# Patient Record
Sex: MALE | Race: WHITE | NOT HISPANIC OR LATINO | Employment: OTHER | URBAN - METROPOLITAN AREA
[De-identification: names, ages, dates, MRNs, and addresses within clinical notes are randomized per-mention and may not be internally consistent; named-entity substitution may affect disease eponyms.]

---

## 2017-01-18 ENCOUNTER — LAB (OUTPATIENT)
Dept: INTERNAL MEDICINE | Facility: CLINIC | Age: 67
End: 2017-01-18

## 2017-01-18 DIAGNOSIS — R79.89 LFTS ABNORMAL: ICD-10-CM

## 2017-01-18 DIAGNOSIS — E78.5 HYPERLIPIDEMIA, UNSPECIFIED HYPERLIPIDEMIA TYPE: ICD-10-CM

## 2017-01-18 DIAGNOSIS — Z12.5 SCREENING FOR PROSTATE CANCER: Primary | ICD-10-CM

## 2017-01-18 DIAGNOSIS — I15.9 SECONDARY HYPERTENSION: ICD-10-CM

## 2017-01-18 LAB
ALBUMIN SERPL-MCNC: 4.1 G/DL (ref 3.2–4.8)
ALBUMIN/GLOB SERPL: 1.5 G/DL (ref 1.5–2.5)
ALP SERPL-CCNC: 64 U/L (ref 25–100)
ALT SERPL W P-5'-P-CCNC: 40 U/L (ref 7–40)
ANION GAP SERPL CALCULATED.3IONS-SCNC: 8 MMOL/L (ref 3–11)
ARTICHOKE IGE QN: 96 MG/DL (ref 0–130)
AST SERPL-CCNC: 22 U/L (ref 0–33)
BASOPHILS # BLD AUTO: 0.01 10*3/MM3 (ref 0–0.2)
BASOPHILS NFR BLD AUTO: 0.2 % (ref 0–1)
BILIRUB SERPL-MCNC: 0.8 MG/DL (ref 0.3–1.2)
BUN BLD-MCNC: 17 MG/DL (ref 9–23)
BUN/CREAT SERPL: 24.3 (ref 7–25)
CALCIUM SPEC-SCNC: 9.6 MG/DL (ref 8.7–10.4)
CHLORIDE SERPL-SCNC: 103 MMOL/L (ref 99–109)
CHOLEST SERPL-MCNC: 172 MG/DL (ref 0–200)
CO2 SERPL-SCNC: 31 MMOL/L (ref 20–31)
CREAT BLD-MCNC: 0.7 MG/DL (ref 0.6–1.3)
DEPRECATED RDW RBC AUTO: 42.4 FL (ref 37–54)
EOSINOPHIL # BLD AUTO: 0.13 10*3/MM3 (ref 0.1–0.3)
EOSINOPHIL NFR BLD AUTO: 2.2 % (ref 0–3)
ERYTHROCYTE [DISTWIDTH] IN BLOOD BY AUTOMATED COUNT: 12.6 % (ref 11.3–14.5)
GFR SERPL CREATININE-BSD FRML MDRD: 113 ML/MIN/1.73
GLOBULIN UR ELPH-MCNC: 2.8 GM/DL
GLUCOSE BLD-MCNC: 107 MG/DL (ref 70–100)
HCT VFR BLD AUTO: 43.5 % (ref 38.9–50.9)
HCV AB SER DONR QL: NORMAL
HDLC SERPL-MCNC: 35 MG/DL (ref 40–60)
HGB BLD-MCNC: 14.6 G/DL (ref 13.1–17.5)
IMM GRANULOCYTES # BLD: 0.01 10*3/MM3 (ref 0–0.03)
IMM GRANULOCYTES NFR BLD: 0.2 % (ref 0–0.6)
LYMPHOCYTES # BLD AUTO: 1.36 10*3/MM3 (ref 0.6–4.8)
LYMPHOCYTES NFR BLD AUTO: 23.5 % (ref 24–44)
MCH RBC QN AUTO: 30.7 PG (ref 27–31)
MCHC RBC AUTO-ENTMCNC: 33.6 G/DL (ref 32–36)
MCV RBC AUTO: 91.4 FL (ref 80–99)
MONOCYTES # BLD AUTO: 0.65 10*3/MM3 (ref 0–1)
MONOCYTES NFR BLD AUTO: 11.2 % (ref 0–12)
NEUTROPHILS # BLD AUTO: 3.62 10*3/MM3 (ref 1.5–8.3)
NEUTROPHILS NFR BLD AUTO: 62.7 % (ref 41–71)
PLATELET # BLD AUTO: 238 10*3/MM3 (ref 150–450)
PMV BLD AUTO: 10.6 FL (ref 6–12)
POTASSIUM BLD-SCNC: 4.1 MMOL/L (ref 3.5–5.5)
PROT SERPL-MCNC: 6.9 G/DL (ref 5.7–8.2)
PSA SERPL-MCNC: 0.2 NG/ML (ref 0–4)
RBC # BLD AUTO: 4.76 10*6/MM3 (ref 4.2–5.76)
SODIUM BLD-SCNC: 142 MMOL/L (ref 132–146)
TRIGL SERPL-MCNC: 224 MG/DL (ref 0–150)
TSH SERPL DL<=0.05 MIU/L-ACNC: 1.63 MIU/ML (ref 0.35–5.35)
WBC NRBC COR # BLD: 5.78 10*3/MM3 (ref 3.5–10.8)

## 2017-01-18 PROCEDURE — 80053 COMPREHEN METABOLIC PANEL: CPT | Performed by: PHYSICIAN ASSISTANT

## 2017-01-18 PROCEDURE — 85025 COMPLETE CBC W/AUTO DIFF WBC: CPT | Performed by: PHYSICIAN ASSISTANT

## 2017-01-18 PROCEDURE — 80061 LIPID PANEL: CPT | Performed by: PHYSICIAN ASSISTANT

## 2017-01-18 PROCEDURE — 84443 ASSAY THYROID STIM HORMONE: CPT | Performed by: PHYSICIAN ASSISTANT

## 2017-01-18 PROCEDURE — 86803 HEPATITIS C AB TEST: CPT | Performed by: PHYSICIAN ASSISTANT

## 2017-01-18 PROCEDURE — G0103 PSA SCREENING: HCPCS | Performed by: PHYSICIAN ASSISTANT

## 2017-01-19 ENCOUNTER — OFFICE VISIT (OUTPATIENT)
Dept: INTERNAL MEDICINE | Facility: CLINIC | Age: 67
End: 2017-01-19

## 2017-01-19 VITALS
DIASTOLIC BLOOD PRESSURE: 78 MMHG | WEIGHT: 315 LBS | BODY MASS INDEX: 44.1 KG/M2 | SYSTOLIC BLOOD PRESSURE: 136 MMHG | HEIGHT: 71 IN

## 2017-01-19 DIAGNOSIS — E66.01 MORBID OBESITY DUE TO EXCESS CALORIES (HCC): ICD-10-CM

## 2017-01-19 DIAGNOSIS — Z00.00 HEALTH CARE MAINTENANCE: Primary | ICD-10-CM

## 2017-01-19 DIAGNOSIS — R73.09 ELEVATED GLUCOSE: ICD-10-CM

## 2017-01-19 LAB
BILIRUB BLD-MCNC: NEGATIVE MG/DL
CLARITY, POC: CLEAR
COLOR UR: YELLOW
GLUCOSE UR STRIP-MCNC: NEGATIVE MG/DL
HBA1C MFR BLD: 5.4 %
KETONES UR QL: NEGATIVE
LEUKOCYTE EST, POC: NEGATIVE
NITRITE UR-MCNC: NEGATIVE MG/ML
PH UR: 7 [PH] (ref 5–8)
PROT UR STRIP-MCNC: NEGATIVE MG/DL
RBC # UR STRIP: NEGATIVE /UL
SP GR UR: 1.01 (ref 1–1.03)
UROBILINOGEN UR QL: NORMAL

## 2017-01-19 PROCEDURE — 83036 HEMOGLOBIN GLYCOSYLATED A1C: CPT | Performed by: PHYSICIAN ASSISTANT

## 2017-01-19 PROCEDURE — 81003 URINALYSIS AUTO W/O SCOPE: CPT | Performed by: PHYSICIAN ASSISTANT

## 2017-01-19 PROCEDURE — 99397 PER PM REEVAL EST PAT 65+ YR: CPT | Performed by: PHYSICIAN ASSISTANT

## 2017-01-19 RX ORDER — ATORVASTATIN CALCIUM 10 MG/1
10 TABLET, FILM COATED ORAL DAILY
Qty: 30 TABLET | Refills: 11 | Status: SHIPPED | OUTPATIENT
Start: 2017-01-19 | End: 2018-02-01 | Stop reason: SDUPTHER

## 2017-01-19 RX ORDER — LISINOPRIL AND HYDROCHLOROTHIAZIDE 20; 12.5 MG/1; MG/1
1 TABLET ORAL DAILY
Qty: 30 TABLET | Refills: 11 | Status: SHIPPED | OUTPATIENT
Start: 2017-01-19 | End: 2018-02-16 | Stop reason: SDUPTHER

## 2017-01-19 RX ORDER — ATENOLOL 50 MG/1
50 TABLET ORAL DAILY
Qty: 30 TABLET | Refills: 11 | Status: SHIPPED | OUTPATIENT
Start: 2017-01-19 | End: 2018-01-24 | Stop reason: DRUGHIGH

## 2017-01-19 NOTE — PROGRESS NOTES
"Chief Complaint   Patient presents with   • Annual Exam     Med refills       Subjective   Obi Jackman Jr. is a 66 y.o. male.       History of Present Illness     The patient is being seen for a health maintenance evaluation.  The last health maintenance was 1 year(s) ago.    Social History  Obi  does not smoke cigarettes.   He drinks rare alcohol.  He does not use illicit drugs.    General History  Obi  does have regular dental visits.  He does complain of vision problems. Wears glasses. Last eye exam was 1/2016  Immunizations are up to date. The patient needs the following immunizations: influenza vaccine fall 2016; shingles vaccine up to date; unsure about pneumonia; TDaP 2014    Lifestyle  Obi  consumes a in general, an \"unhealthy\" diet.  He exercises rarely.    Reproductive Health  Obi  is sexually active. His contraceptive plan is no method.   He does not have erectile dysfunction.     Screening  Last PSA was 2017.  Last prostate exam was  2016.  Last testicular exam was 2016.  Last colonoscopy was 2016 by Dr Arnold, repeat 5 years. Saw diverticulosis.              Current Outpatient Prescriptions:   •  aspirin 81 MG tablet, Take  by mouth daily., Disp: , Rfl:   •  atenolol (TENORMIN) 50 MG tablet, Take 1 tablet by mouth Daily., Disp: 30 tablet, Rfl: 11  •  atorvastatin (LIPITOR) 10 MG tablet, Take 1 tablet by mouth Daily., Disp: 30 tablet, Rfl: 11  •  lisinopril-hydrochlorothiazide (PRINZIDE,ZESTORETIC) 20-12.5 MG per tablet, Take 1 tablet by mouth Daily., Disp: 30 tablet, Rfl: 11  •  ALPRAZolam (XANAX) 0.25 MG tablet, Take 1 tablet by mouth Daily As Needed for anxiety., Disp: 10 tablet, Rfl: 0  •  cyclobenzaprine (FLEXERIL) 10 MG tablet, Take 1 tablet by mouth 3 (Three) Times a Day As Needed for muscle spasms., Disp: 30 tablet, Rfl: 2  •  naltrexone-bupropion ER (CONTRAVE) 8-90 MG tablet, See pharmacy note for instructions., Disp: 70 tablet, Rfl: 0  •  naltrexone-bupropion ER (CONTRAVE) " "8-90 MG tablet, Take 2 tablets by mouth 2 (Two) Times a Day., Disp: 120 tablet, Rfl: 3     PMFSH  The following portions of the patient's history were reviewed and updated as appropriate: allergies, current medications, past family history, past medical history, past social history, past surgical history and problem list.    Review of Systems   Constitutional: Negative for appetite change, fever and unexpected weight change.   HENT: Negative for ear pain, facial swelling and sore throat.    Eyes: Negative for pain and visual disturbance.   Respiratory: Negative for chest tightness, shortness of breath and wheezing.    Cardiovascular: Negative for chest pain and palpitations.   Gastrointestinal: Negative for abdominal pain and blood in stool.   Endocrine: Negative.    Genitourinary: Negative for difficulty urinating and hematuria.   Musculoskeletal: Negative for joint swelling.   Neurological: Negative for tremors, seizures and syncope.   Hematological: Negative for adenopathy.   Psychiatric/Behavioral: Negative.        Objective   Visit Vitals   • /78   • Ht 71\" (180.3 cm)   • Wt (!) 335 lb (152 kg)   • BMI 46.72 kg/m2       Physical Exam   Constitutional: He is oriented to person, place, and time. He appears well-developed and well-nourished. No distress.   HENT:   Head: Normocephalic and atraumatic. Hair is normal.   Right Ear: Hearing, tympanic membrane, external ear and ear canal normal.   Left Ear: Hearing, tympanic membrane, external ear and ear canal normal.   Nose: No sinus tenderness or nasal deformity.   Mouth/Throat: Uvula is midline, oropharynx is clear and moist and mucous membranes are normal. No oral lesions. No uvula swelling.   Eyes: Conjunctivae, EOM and lids are normal. Pupils are equal, round, and reactive to light. Right eye exhibits no discharge. Left eye exhibits no discharge. No scleral icterus. Right eye exhibits normal extraocular motion and no nystagmus. Left eye exhibits normal " extraocular motion and no nystagmus.   Fundoscopic exam:       The right eye shows red reflex.        The left eye shows red reflex.   Neck: Normal range of motion. Neck supple. No JVD present. No tracheal deviation present. No thyromegaly present.   Cardiovascular: Normal rate, regular rhythm, normal heart sounds, intact distal pulses and normal pulses.  Exam reveals no gallop.    No murmur heard.  Pulmonary/Chest: Effort normal and breath sounds normal. No respiratory distress. He has no wheezes. He has no rales. He exhibits no tenderness.   Abdominal: Soft. Bowel sounds are normal. He exhibits no distension and no mass. There is no tenderness. There is no guarding. No hernia. Hernia confirmed negative in the right inguinal area and confirmed negative in the left inguinal area.   Genitourinary: Testes normal and penis normal. Circumcised.   Genitourinary Comments: Prostate exam deferred d/t recent normal exam with colonoscopy   Musculoskeletal: Normal range of motion. He exhibits no edema, tenderness or deformity.   Lymphadenopathy:     He has no cervical adenopathy. No inguinal adenopathy noted on the right or left side.   Neurological: He is alert and oriented to person, place, and time. He has normal reflexes. He displays normal reflexes. No cranial nerve deficit. He exhibits normal muscle tone. Coordination normal.   Skin: Skin is warm and dry. No rash noted. He is not diaphoretic.   Psychiatric: He has a normal mood and affect. His behavior is normal. Judgment and thought content normal.   Nursing note and vitals reviewed.      Results for orders placed or performed in visit on 01/19/17   POC Urinalysis Dipstick, Automated   Result Value Ref Range    Color Yellow Yellow, Straw, Dark Yellow, Debbie    Clarity, UA Clear Clear    Glucose, UA Negative Negative, 1000 mg/dL (3+) mg/dL    Bilirubin Negative Negative    Ketones, UA Negative Negative    Specific Gravity  1.015 1.005 - 1.030    Blood, UA Negative  Negative    pH, Urine 7.0 5.0 - 8.0    Protein, POC Negative Negative mg/dL    Urobilinogen, UA Normal Normal    Leukocytes Negative Negative    Nitrite, UA Negative Negative   POC Glycosylated Hemoglobin (Hb A1C)   Result Value Ref Range    Hemoglobin A1C 5.4 %        ASSESSMENT/PLAN    Problem List Items Addressed This Visit        Digestive    Morbid obesity due to excess calories     Trial of contrave. Continue to work on diet and exercise changes.         Relevant Medications    naltrexone-bupropion ER (CONTRAVE) 8-90 MG tablet    naltrexone-bupropion ER (CONTRAVE) 8-90 MG tablet       Other    Elevated glucose     A1c is normal. Pt will work on reducing carbohydrates in his diet.         Relevant Orders    POC Glycosylated Hemoglobin (Hb A1C) (Completed)    Health care maintenance - Primary     Immunizations: influenza vaccine done fall 2016; TDaP 2014; pt is going to check with pharmacy about pneumonia vaccines  Eye exam: done 1/2016  Prostate exam: done with colonoscopy 12/2016  PSA: reviewed, normal today  Colonoscopy: done 12/2016; repeat 2021  Labs: reviewed fasting labs with patient today           Relevant Orders    POC Urinalysis Dipstick, Automated (Completed)               Return in about 4 months (around 5/19/2017) for Recheck; annual physical in 1 year.

## 2017-01-19 NOTE — MR AVS SNAPSHOT
Obi Jackman    1/19/2017 1:30 PM   Office Visit    Dept Phone:  [phone]   Encounter #:  [id number]    Provider:  PEGGY Hope   Department:  [facility] MEDICINE [facility] ENDOCRINOLOG[facility] [place]                Your Full Care Plan              Today's Medication Changes          These changes are accurate as of: 1/19/17  3:12 PM.  If you have any questions, ask your nurse or doctor.               Stop taking medication(s)listed here:     HYDROcodone-acetaminophen 5-325 MG per tablet   Commonly known as:  NORCO           LOTEMAX 0.5 % gel ophthalmic gel   Generic drug:  loteprednol etabonate                Where to Get Your Medications      These medications were sent to [facility] - [place], [place] - [facility] [address] - [phone] [facility] [phone] [facility] [address] [address]     Phone:  [phone]     atenolol 50 MG tablet    atorvastatin 10 MG tablet    lisinopril-hydrochlorothiazide 20-12.5 MG per tablet                  Your Updated Medication List          This list is accurate as of: 1/19/17  3:12 PM.  Always use your most recent med list.                ALPRAZolam 0.25 MG tablet   Commonly known as:  XANAX       aspirin 81 MG tablet       atenolol 50 MG tablet   Commonly known as:  TENORMIN   Take 1 tablet by mouth Daily.       atorvastatin 10 MG tablet   Commonly known as:  LIPITOR   Take 1 tablet by mouth Daily.       cyclobenzaprine 10 MG tablet   Commonly known as:  FLEXERIL   Take 1 tablet by mouth 3 (three) times a day as needed for muscle spasms.       lisinopril-hydrochlorothiazide 20-12.5 MG per tablet   Commonly known as:  PRINZIDE,ZESTORETIC   Take 1 tablet by mouth Daily.               We Performed the Following     POC Glycosylated Hemoglobin (Hb A1C)     POC Urinalysis Dipstick, Automated       You Were Diagnosed With        Codes Comments    Annual physical exam    -  Primary ICD-10-CM: Z00.00  ICD-9-CM: V70.0     Elevated glucose    "  ICD-10-CM: R73.09  ICD-9-CM: 790.29       Instructions     None    Patient Instructions History      Upcoming Appointments     Visit Type Date Time Department    PHYSICAL 2017  1:30 PM [facility]      ENDOGENX Signup     [facility] ENDOGENX allows you to send messages to your doctor, view your test results, renew your prescriptions, schedule appointments, and more. To sign up, go to [employer] and click on the Sign Up Now link in the New User? box. Enter your ENDOGENX Activation Code exactly as it appears below along with the last four digits of your Social Security Number and your Date of Birth () to complete the sign-up process. If you do not sign up before the expiration date, you must request a new code.    ENDOGENX Activation Code: [email or web address]  Expires: 2017 10:20 AM    If you have questions, you can email [email or web address] or call [phone] to talk to our ENDOGENX staff. Remember, ENDOGENX is NOT to be used for urgent needs. For medical emergencies, dial 911.               Other Info from Your Visit           Allergies     No Known Allergies      Reason for Visit     Annual Exam Med refills      Vital Signs     Blood Pressure Height Weight Body Mass Index Smoking Status       136/78 71\" (180.3 cm) 335 lb (152 kg) 46.72 kg/m2 Never Smoker       Problems and Diagnoses Noted     Elevated glucose    Annual physical exam    -  Primary      Results     POC Urinalysis Dipstick, Automated      Component Value Standard Range & Units    Color Yellow Yellow, Straw, Dark Yellow, Debbie    Clarity, UA Clear Clear    Glucose, UA Negative Negative, 1000 mg/dL (3+) mg/dL    Bilirubin Negative Negative    Ketones, UA Negative Negative    Specific Gravity  1.015 1.005 - 1.030    Blood, UA Negative Negative    pH, Urine 7.0 5.0 - 8.0    Protein, POC Negative Negative mg/dL    Urobilinogen, UA Normal Normal    Leukocytes Negative Negative    Nitrite, UA Negative Negative          "       POC Glycosylated Hemoglobin (Hb A1C)      Component Value Standard Range & Units    Hemoglobin A1C 5.4 %

## 2017-01-20 ENCOUNTER — TELEPHONE (OUTPATIENT)
Dept: INTERNAL MEDICINE | Facility: CLINIC | Age: 67
End: 2017-01-20

## 2017-01-20 DIAGNOSIS — R51.9 ACUTE NONINTRACTABLE HEADACHE, UNSPECIFIED HEADACHE TYPE: ICD-10-CM

## 2017-01-20 RX ORDER — CYCLOBENZAPRINE HCL 10 MG
10 TABLET ORAL 3 TIMES DAILY PRN
Qty: 30 TABLET | Refills: 2 | Status: SHIPPED | OUTPATIENT
Start: 2017-01-20 | End: 2017-05-27 | Stop reason: SDUPTHER

## 2017-01-20 RX ORDER — ALPRAZOLAM 0.25 MG/1
0.25 TABLET ORAL DAILY PRN
Qty: 10 TABLET | Refills: 0 | OUTPATIENT
Start: 2017-01-20 | End: 2019-01-08 | Stop reason: SDUPTHER

## 2017-01-20 NOTE — TELEPHONE ENCOUNTER
Okay to call in refill of xanax 0.25 mg 1 po prn when flying #10, 0RF. Thanks    I sent in refill of flexeril.

## 2017-01-20 NOTE — ASSESSMENT & PLAN NOTE
Immunizations: influenza vaccine done fall 2016; TDaP 2014; pt is going to check with pharmacy about pneumonia vaccines  Eye exam: done 1/2016  Prostate exam: done with colonoscopy 12/2016  PSA: reviewed, normal today  Colonoscopy: done 12/2016; repeat 2021  Labs: reviewed fasting labs with patient today

## 2017-01-20 NOTE — TELEPHONE ENCOUNTER
----- Message from Deborah VINCENT MA sent at 1/20/2017  3:10 PM EST -----  Contact: patient  Pls advise on xanax refill, LOV: 01/19/16 FOV: none, Efrem has been updated and been placed on your desk.     ----- Message -----     From: Deborah VINCENT MA     Sent: 1/20/2017   9:05 AM       To: Ester Norman can you pls run a Efrem for xanax. Thanks!    ----- Message -----     From: Vonda Tabares MA     Sent: 1/20/2017   9:01 AM       To: Deborah VINCENT MA        ----- Message -----     From: Teena Tolentino     Sent: 1/20/2017   8:32 AM       To: Vonda Tabares MA    rx request: flexiril and xanax  Pharmacy: [facility]   [phone]

## 2017-01-23 ENCOUNTER — TELEPHONE (OUTPATIENT)
Dept: INTERNAL MEDICINE | Facility: CLINIC | Age: 67
End: 2017-01-23

## 2017-01-23 NOTE — TELEPHONE ENCOUNTER
Received fax from [facility] stating that Flexeril has been identified as a high risk drug for elderly pts and if ok to continue the med? Per Cammy called and informed pharmacy that it is okay to use med prn.

## 2017-03-24 ENCOUNTER — OFFICE VISIT (OUTPATIENT)
Dept: INTERNAL MEDICINE | Facility: CLINIC | Age: 67
End: 2017-03-24

## 2017-03-24 VITALS
OXYGEN SATURATION: 98 % | SYSTOLIC BLOOD PRESSURE: 138 MMHG | WEIGHT: 315 LBS | HEIGHT: 71 IN | BODY MASS INDEX: 44.1 KG/M2 | TEMPERATURE: 98.1 F | DIASTOLIC BLOOD PRESSURE: 78 MMHG | HEART RATE: 67 BPM

## 2017-03-24 DIAGNOSIS — J10.1 INFLUENZA A: Primary | ICD-10-CM

## 2017-03-24 LAB
EXPIRATION DATE: NORMAL
FLUAV AG NPH QL: POSITIVE
FLUBV AG NPH QL: NEGATIVE
INTERNAL CONTROL: NORMAL
Lab: NORMAL

## 2017-03-24 PROCEDURE — 87804 INFLUENZA ASSAY W/OPTIC: CPT | Performed by: PHYSICIAN ASSISTANT

## 2017-03-24 PROCEDURE — 96372 THER/PROPH/DIAG INJ SC/IM: CPT | Performed by: PHYSICIAN ASSISTANT

## 2017-03-24 PROCEDURE — 99213 OFFICE O/P EST LOW 20 MIN: CPT | Performed by: PHYSICIAN ASSISTANT

## 2017-03-24 RX ORDER — OSELTAMIVIR PHOSPHATE 75 MG/1
75 CAPSULE ORAL 2 TIMES DAILY
Qty: 10 CAPSULE | Refills: 0 | Status: SHIPPED | OUTPATIENT
Start: 2017-03-24 | End: 2017-06-02

## 2017-03-24 RX ORDER — PREDNISONE 10 MG/1
TABLET ORAL
Qty: 30 TABLET | Refills: 0 | Status: SHIPPED | OUTPATIENT
Start: 2017-03-24 | End: 2017-06-02

## 2017-03-24 RX ORDER — AZITHROMYCIN 250 MG/1
TABLET, FILM COATED ORAL
Qty: 6 TABLET | Refills: 0 | Status: SHIPPED | OUTPATIENT
Start: 2017-03-24 | End: 2017-06-02

## 2017-03-24 RX ORDER — ALBUTEROL SULFATE 90 UG/1
2 AEROSOL, METERED RESPIRATORY (INHALATION) EVERY 4 HOURS PRN
Qty: 1 INHALER | Refills: 2 | Status: SHIPPED | OUTPATIENT
Start: 2017-03-24 | End: 2018-12-28 | Stop reason: SDUPTHER

## 2017-03-24 RX ORDER — METHYLPREDNISOLONE ACETATE 40 MG/ML
40 INJECTION, SUSPENSION INTRA-ARTICULAR; INTRALESIONAL; INTRAMUSCULAR; SOFT TISSUE ONCE
Status: COMPLETED | OUTPATIENT
Start: 2017-03-24 | End: 2017-03-24

## 2017-03-24 RX ADMIN — METHYLPREDNISOLONE ACETATE 40 MG: 40 INJECTION, SUSPENSION INTRA-ARTICULAR; INTRALESIONAL; INTRAMUSCULAR; SOFT TISSUE at 12:12

## 2017-03-24 NOTE — PROGRESS NOTES
Chief Complaint   Patient presents with   • Head/chest congestion, cough wi/mucus, wheezing x1 week       Subjective   Obi Jackman Jr. is a 66 y.o. male.       History of Present Illness     Pt has been feeling bad for about a week with head congestion, has now moved into his chest with wheezing, congestion. 2 sons with flu diagnosis. No fever, no shortness of breath.      Current Outpatient Prescriptions:   •  ALPRAZolam (XANAX) 0.25 MG tablet, Take 1 tablet by mouth Daily As Needed for anxiety., Disp: 10 tablet, Rfl: 0  •  aspirin 81 MG tablet, Take  by mouth daily., Disp: , Rfl:   •  atenolol (TENORMIN) 50 MG tablet, Take 1 tablet by mouth Daily., Disp: 30 tablet, Rfl: 11  •  atorvastatin (LIPITOR) 10 MG tablet, Take 1 tablet by mouth Daily., Disp: 30 tablet, Rfl: 11  •  cyclobenzaprine (FLEXERIL) 10 MG tablet, Take 1 tablet by mouth 3 (Three) Times a Day As Needed for muscle spasms., Disp: 30 tablet, Rfl: 2  •  lisinopril-hydrochlorothiazide (PRINZIDE,ZESTORETIC) 20-12.5 MG per tablet, Take 1 tablet by mouth Daily., Disp: 30 tablet, Rfl: 11  •  naltrexone-bupropion ER (CONTRAVE) 8-90 MG tablet, Take 2 tablets by mouth 2 (Two) Times a Day., Disp: 120 tablet, Rfl: 3  •  albuterol (PROVENTIL HFA;VENTOLIN HFA) 108 (90 BASE) MCG/ACT inhaler, Inhale 2 puffs Every 4 (Four) Hours As Needed for Wheezing., Disp: 1 inhaler, Rfl: 2  •  azithromycin (ZITHROMAX Z-KYLER) 250 MG tablet, Take 2 tablets the first day, then 1 tablet daily for 4 days., Disp: 6 tablet, Rfl: 0  •  oseltamivir (TAMIFLU) 75 MG capsule, Take 1 capsule by mouth 2 (Two) Times a Day., Disp: 10 capsule, Rfl: 0  •  predniSONE (DELTASONE) 10 MG tablet, Take 4 tablets for 3 days, then 3 tablets for 3 days, then 2 tablets for 3 days, then 1 tablet for 3 days, Disp: 30 tablet, Rfl: 0     PMFSH  The following portions of the patient's history were reviewed and updated as appropriate: allergies, current medications, past family history, past medical history,  "past social history, past surgical history and problem list.    Review of Systems   Constitutional: Positive for fatigue. Negative for activity change and unexpected weight change.   HENT: Positive for congestion, postnasal drip and sore throat. Negative for ear pain.    Eyes: Negative for pain and discharge.   Respiratory: Positive for cough. Negative for chest tightness, shortness of breath and wheezing.    Cardiovascular: Negative for chest pain and palpitations.   Gastrointestinal: Negative for abdominal pain, diarrhea and vomiting.   Endocrine: Negative.    Genitourinary: Negative.    Musculoskeletal: Negative for joint swelling.   Skin: Negative for color change, rash and wound.   Allergic/Immunologic: Negative.    Neurological: Negative for seizures and syncope.   Psychiatric/Behavioral: Negative.        Objective   /78  Pulse 67  Temp 98.1 °F (36.7 °C)  Ht 71\" (180.3 cm)  Wt (!) 316 lb (143 kg)  SpO2 98%  BMI 44.07 kg/m2    Physical Exam   Constitutional: He is oriented to person, place, and time. He appears well-developed and well-nourished.  Non-toxic appearance. No distress.   HENT:   Head: Normocephalic and atraumatic. Hair is normal.   Right Ear: External ear normal. No drainage, swelling or tenderness. Tympanic membrane is retracted.   Left Ear: External ear normal. No drainage, swelling or tenderness. Tympanic membrane is retracted.   Nose: Mucosal edema present. No epistaxis.   Mouth/Throat: Uvula is midline and mucous membranes are normal. No oral lesions. No uvula swelling. Posterior oropharyngeal erythema present. No oropharyngeal exudate.   Eyes: Conjunctivae and EOM are normal. Pupils are equal, round, and reactive to light. Right eye exhibits no discharge. Left eye exhibits no discharge. No scleral icterus.   Neck: Normal range of motion. Neck supple.   Cardiovascular: Normal rate, regular rhythm and normal heart sounds.  Exam reveals no gallop.    No murmur heard.  Pulmonary/Chest: " No stridor. No respiratory distress. He has wheezes in the right upper field and the left upper field. He has no rales. He exhibits no tenderness.   Abdominal: Soft. There is no tenderness.   Lymphadenopathy:     He has cervical adenopathy.   Neurological: He is alert and oriented to person, place, and time. He exhibits normal muscle tone.   Skin: Skin is warm and dry. No rash noted. He is not diaphoretic.   Psychiatric: He has a normal mood and affect. His behavior is normal. Judgment and thought content normal.   Nursing note and vitals reviewed.      Results for orders placed or performed in visit on 03/24/17   POCT Influenza A/B   Result Value Ref Range    Rapid Influenza A Ag positive     Rapid Influenza B Ag negative     Internal Control Passed Passed    Lot Number [id number]     Expiration Date 12/06/2019         ASSESSMENT/PLAN    Problem List Items Addressed This Visit     None      Visit Diagnoses     Influenza A    -  Primary    Depomedrol 40 mg IM in office. Take prednisone taper and tamilflu as directed. Use albuterol inhaler prn. If no improvement by next wee, start z-pack.    Relevant Medications    methylPREDNISolone acetate (DEPO-medrol) injection 40 mg (Completed)    predniSONE (DELTASONE) 10 MG tablet    azithromycin (ZITHROMAX Z-KYLER) 250 MG tablet    oseltamivir (TAMIFLU) 75 MG capsule    albuterol (PROVENTIL HFA;VENTOLIN HFA) 108 (90 BASE) MCG/ACT inhaler    Other Relevant Orders    POCT Influenza A/B (Completed)               Return if symptoms worsen or fail to improve, for Next scheduled follow up.

## 2017-05-27 DIAGNOSIS — R51.9 ACUTE NONINTRACTABLE HEADACHE, UNSPECIFIED HEADACHE TYPE: ICD-10-CM

## 2017-05-30 RX ORDER — CYCLOBENZAPRINE HCL 10 MG
TABLET ORAL
Qty: 30 TABLET | Refills: 2 | Status: SHIPPED | OUTPATIENT
Start: 2017-05-30 | End: 2017-12-29 | Stop reason: SDUPTHER

## 2017-06-02 ENCOUNTER — OFFICE VISIT (OUTPATIENT)
Dept: INTERNAL MEDICINE | Facility: CLINIC | Age: 67
End: 2017-06-02

## 2017-06-02 VITALS — DIASTOLIC BLOOD PRESSURE: 64 MMHG | SYSTOLIC BLOOD PRESSURE: 130 MMHG | BODY MASS INDEX: 44.35 KG/M2 | WEIGHT: 315 LBS

## 2017-06-02 DIAGNOSIS — M76.822 POSTERIOR TIBIAL TENDONITIS, LEFT: ICD-10-CM

## 2017-06-02 DIAGNOSIS — M54.32 SCIATICA OF LEFT SIDE: Primary | ICD-10-CM

## 2017-06-02 PROCEDURE — 99213 OFFICE O/P EST LOW 20 MIN: CPT | Performed by: PHYSICIAN ASSISTANT

## 2017-06-02 NOTE — PROGRESS NOTES
Chief Complaint   Patient presents with   • Left hip pain x2 weeks     needs new script for orthotic inserts       Subjective   Obi Jackman Jr. is a 66 y.o. male.       History of Present Illness     For about 2 weeks pt had pain in the middle of his left buttocks. Stopped putting his wallet in his back pocket. Has since had some improvement- no pain in 2 days. It would hurt when he was standing or walking. No discomfort when he was sleeping unless he laid on his left side.    Has history of posterior tibial tendonitis, feels like he is having some mild recurrence. Was given some shoe inserts years ago when he was first diagnosed- they are wearing out.      Current Outpatient Prescriptions:   •  albuterol (PROVENTIL HFA;VENTOLIN HFA) 108 (90 BASE) MCG/ACT inhaler, Inhale 2 puffs Every 4 (Four) Hours As Needed for Wheezing., Disp: 1 inhaler, Rfl: 2  •  ALPRAZolam (XANAX) 0.25 MG tablet, Take 1 tablet by mouth Daily As Needed for anxiety., Disp: 10 tablet, Rfl: 0  •  aspirin 81 MG tablet, Take  by mouth daily., Disp: , Rfl:   •  atenolol (TENORMIN) 50 MG tablet, Take 1 tablet by mouth Daily., Disp: 30 tablet, Rfl: 11  •  atorvastatin (LIPITOR) 10 MG tablet, Take 1 tablet by mouth Daily., Disp: 30 tablet, Rfl: 11  •  cyclobenzaprine (FLEXERIL) 10 MG tablet, TAKE ONE TABLET BY MOUTH THREE TIMES A DAY AS NEEDED FOR MUSCLE SPASMS, Disp: 30 tablet, Rfl: 2  •  lisinopril-hydrochlorothiazide (PRINZIDE,ZESTORETIC) 20-12.5 MG per tablet, Take 1 tablet by mouth Daily., Disp: 30 tablet, Rfl: 11  •  naltrexone-bupropion ER (CONTRAVE) 8-90 MG tablet, Take 2 tablets by mouth 2 (Two) Times a Day., Disp: 120 tablet, Rfl: 3     PMFSH  The following portions of the patient's history were reviewed and updated as appropriate: allergies, current medications, past family history, past medical history, past social history, past surgical history and problem list.    Review of Systems   Constitutional: Negative for appetite change, fever  and unexpected weight change.   HENT: Negative.    Eyes: Negative for pain and visual disturbance.   Respiratory: Negative for chest tightness, shortness of breath and wheezing.    Cardiovascular: Negative for chest pain and palpitations.   Gastrointestinal: Negative for abdominal pain, blood in stool, diarrhea, nausea and vomiting.   Endocrine: Negative.    Genitourinary: Negative for difficulty urinating, flank pain, frequency and urgency.   Musculoskeletal: Positive for myalgias. Negative for back pain, gait problem and joint swelling.   Skin: Negative for color change and rash.   Neurological: Negative for tremors and weakness.   Hematological: Negative for adenopathy.   Psychiatric/Behavioral: Negative for confusion and decreased concentration.   All other systems reviewed and are negative.      Objective   /64  Wt (!) 318 lb (144 kg)  BMI 44.35 kg/m2    Physical Exam   Constitutional: He is oriented to person, place, and time. He appears well-developed and well-nourished. No distress.   HENT:   Head: Normocephalic and atraumatic.   Eyes: Conjunctivae and EOM are normal. Pupils are equal, round, and reactive to light. No scleral icterus.   Neck: Normal range of motion. Neck supple.   Cardiovascular: Normal rate, regular rhythm and normal heart sounds.  Exam reveals no gallop.    No murmur heard.  Pulmonary/Chest: Effort normal and breath sounds normal. No respiratory distress. He has no wheezes. He has no rales. He exhibits no tenderness.   Abdominal: Soft. There is no tenderness.   Musculoskeletal: He exhibits no tenderness or deformity.        Lumbar back: He exhibits normal range of motion, no tenderness, no bony tenderness, no swelling, no edema, no pain and no spasm.        Left lower leg: He exhibits no tenderness, no bony tenderness, no swelling, no edema, no deformity and no laceration.   Neurological: He is alert and oriented to person, place, and time. He displays no atrophy and no tremor. He  exhibits normal muscle tone. Coordination normal.   Skin: Skin is warm and dry. No rash noted. He is not diaphoretic.   Psychiatric: He has a normal mood and affect. His behavior is normal. Judgment and thought content normal.   Nursing note and vitals reviewed.      Results for orders placed or performed in visit on 03/24/17   POCT Influenza A/B   Result Value Ref Range    Rapid Influenza A Ag positive     Rapid Influenza B Ag negative     Internal Control Passed Passed    Lot Number [id number]     Expiration Date 12/06/2019         ASSESSMENT/PLAN    Problem List Items Addressed This Visit     None      Visit Diagnoses     Sciatica of left side    -  Primary    Pt will schedule PT if his pain recurs.    Relevant Orders    Ambulatory Referral to Physical Therapy Evaluate and treat    Posterior tibial tendonitis, left        Gave pt rx for inserts to see if he can get a new set.    Relevant Orders    Custom Orthotic               Return if symptoms worsen or fail to improve, for Next scheduled follow up.

## 2017-07-03 DIAGNOSIS — E66.01 MORBID OBESITY DUE TO EXCESS CALORIES (HCC): ICD-10-CM

## 2017-07-06 RX ORDER — NALTREXONE HYDROCHLORIDE AND BUPROPION HYDROCHLORIDE 8; 90 MG/1; MG/1
TABLET, EXTENDED RELEASE ORAL
Qty: 120 TABLET | Refills: 3 | Status: SHIPPED | OUTPATIENT
Start: 2017-07-06 | End: 2018-06-22

## 2017-12-29 ENCOUNTER — OFFICE VISIT (OUTPATIENT)
Dept: INTERNAL MEDICINE | Facility: CLINIC | Age: 67
End: 2017-12-29

## 2017-12-29 VITALS
OXYGEN SATURATION: 99 % | HEART RATE: 94 BPM | BODY MASS INDEX: 45.19 KG/M2 | TEMPERATURE: 97.8 F | DIASTOLIC BLOOD PRESSURE: 76 MMHG | SYSTOLIC BLOOD PRESSURE: 130 MMHG | WEIGHT: 315 LBS

## 2017-12-29 DIAGNOSIS — F40.243 FEAR OF FLYING: ICD-10-CM

## 2017-12-29 DIAGNOSIS — B35.1 ONYCHOMYCOSIS OF LEFT GREAT TOE: Primary | ICD-10-CM

## 2017-12-29 DIAGNOSIS — Z00.00 HEALTH CARE MAINTENANCE: ICD-10-CM

## 2017-12-29 DIAGNOSIS — G44.229 CHRONIC TENSION-TYPE HEADACHE, NOT INTRACTABLE: ICD-10-CM

## 2017-12-29 LAB
25(OH)D3 SERPL-MCNC: 8.4 NG/ML
ALBUMIN SERPL-MCNC: 4.1 G/DL (ref 3.2–4.8)
ALBUMIN/GLOB SERPL: 1.6 G/DL (ref 1.5–2.5)
ALP SERPL-CCNC: 63 U/L (ref 25–100)
ALT SERPL W P-5'-P-CCNC: 31 U/L (ref 7–40)
ANION GAP SERPL CALCULATED.3IONS-SCNC: 7 MMOL/L (ref 3–11)
ARTICHOKE IGE QN: 88 MG/DL (ref 0–130)
AST SERPL-CCNC: 21 U/L (ref 0–33)
BILIRUB SERPL-MCNC: 1 MG/DL (ref 0.3–1.2)
BUN BLD-MCNC: 13 MG/DL (ref 9–23)
BUN/CREAT SERPL: 16.3 (ref 7–25)
CALCIUM SPEC-SCNC: 9 MG/DL (ref 8.7–10.4)
CHLORIDE SERPL-SCNC: 102 MMOL/L (ref 99–109)
CHOLEST SERPL-MCNC: 142 MG/DL (ref 0–200)
CO2 SERPL-SCNC: 31 MMOL/L (ref 20–31)
CREAT BLD-MCNC: 0.8 MG/DL (ref 0.6–1.3)
DEPRECATED RDW RBC AUTO: 44 FL (ref 37–54)
ERYTHROCYTE [DISTWIDTH] IN BLOOD BY AUTOMATED COUNT: 13 % (ref 11.3–14.5)
GFR SERPL CREATININE-BSD FRML MDRD: 97 ML/MIN/1.73
GLOBULIN UR ELPH-MCNC: 2.6 GM/DL
GLUCOSE BLD-MCNC: 110 MG/DL (ref 70–100)
HCT VFR BLD AUTO: 44.2 % (ref 38.9–50.9)
HDLC SERPL-MCNC: 36 MG/DL (ref 40–60)
HGB BLD-MCNC: 14.6 G/DL (ref 13.1–17.5)
MCH RBC QN AUTO: 30.5 PG (ref 27–31)
MCHC RBC AUTO-ENTMCNC: 33 G/DL (ref 32–36)
MCV RBC AUTO: 92.3 FL (ref 80–99)
PLATELET # BLD AUTO: 251 10*3/MM3 (ref 150–450)
PMV BLD AUTO: 11 FL (ref 6–12)
POTASSIUM BLD-SCNC: 4.2 MMOL/L (ref 3.5–5.5)
PROT SERPL-MCNC: 6.7 G/DL (ref 5.7–8.2)
PSA SERPL-MCNC: 0.36 NG/ML (ref 0–4)
RBC # BLD AUTO: 4.79 10*6/MM3 (ref 4.2–5.76)
SODIUM BLD-SCNC: 140 MMOL/L (ref 132–146)
TRIGL SERPL-MCNC: 247 MG/DL (ref 0–150)
TSH SERPL DL<=0.05 MIU/L-ACNC: 1.53 MIU/ML (ref 0.35–5.35)
WBC NRBC COR # BLD: 6.03 10*3/MM3 (ref 3.5–10.8)

## 2017-12-29 PROCEDURE — 85027 COMPLETE CBC AUTOMATED: CPT | Performed by: PHYSICIAN ASSISTANT

## 2017-12-29 PROCEDURE — 84443 ASSAY THYROID STIM HORMONE: CPT | Performed by: PHYSICIAN ASSISTANT

## 2017-12-29 PROCEDURE — 82306 VITAMIN D 25 HYDROXY: CPT | Performed by: PHYSICIAN ASSISTANT

## 2017-12-29 PROCEDURE — 99214 OFFICE O/P EST MOD 30 MIN: CPT | Performed by: PHYSICIAN ASSISTANT

## 2017-12-29 PROCEDURE — G0103 PSA SCREENING: HCPCS | Performed by: PHYSICIAN ASSISTANT

## 2017-12-29 PROCEDURE — 80061 LIPID PANEL: CPT | Performed by: PHYSICIAN ASSISTANT

## 2017-12-29 PROCEDURE — 80053 COMPREHEN METABOLIC PANEL: CPT | Performed by: PHYSICIAN ASSISTANT

## 2017-12-29 RX ORDER — CYCLOBENZAPRINE HCL 10 MG
TABLET ORAL
Qty: 30 TABLET | Refills: 2 | Status: SHIPPED | OUTPATIENT
Start: 2017-12-29 | End: 2017-12-29 | Stop reason: SDUPTHER

## 2017-12-29 RX ORDER — CYCLOBENZAPRINE HCL 10 MG
TABLET ORAL
Qty: 30 TABLET | Refills: 2 | Status: SHIPPED | OUTPATIENT
Start: 2017-12-29 | End: 2018-06-22 | Stop reason: SDUPTHER

## 2017-12-29 RX ORDER — TERBINAFINE HYDROCHLORIDE 250 MG/1
250 TABLET ORAL DAILY
Qty: 84 TABLET | Refills: 0 | Status: SHIPPED | OUTPATIENT
Start: 2017-12-29 | End: 2018-06-22 | Stop reason: SDUPTHER

## 2017-12-29 NOTE — PROGRESS NOTES
Chief Complaint   Patient presents with   • Ear pain x2 days     wants blood work       Subjective   Obi Jackman Jr. is a 67 y.o. male.       History of Present Illness     Pt woke up a couple days ago with pain in his left jaw and left ear. Started blowing his nose around the same time. No change in pain since then. Mild congestion and drainage. Worried about going into holiday weekend with infection.    He is fasting for his yearly labs.    Notes that he has a fungus on his left great toenail that seems to be worsening.    His left foot posterior tibialis is flaring back up. Plans to go back to see Dr Guzman if unable to be treated by ortho he plans to see later today.    He is going to see Dr Benjy Jorge for eval of his hips. He has continued low back and hip pain and wants to see if hips are contributing to his issues.          Current Outpatient Prescriptions:   •  albuterol (PROVENTIL HFA;VENTOLIN HFA) 108 (90 BASE) MCG/ACT inhaler, Inhale 2 puffs Every 4 (Four) Hours As Needed for Wheezing., Disp: 1 inhaler, Rfl: 2  •  ALPRAZolam (XANAX) 0.25 MG tablet, Take 1 tablet by mouth Daily As Needed for anxiety., Disp: 10 tablet, Rfl: 0  •  aspirin 81 MG tablet, Take  by mouth daily., Disp: , Rfl:   •  atenolol (TENORMIN) 50 MG tablet, Take 1 tablet by mouth Daily., Disp: 30 tablet, Rfl: 11  •  atorvastatin (LIPITOR) 10 MG tablet, Take 1 tablet by mouth Daily., Disp: 30 tablet, Rfl: 11  •  CONTRAVE 8-90 MG tablet, TAKE 2 TABLETS BY MOUTH 2 TIMES A DAY, Disp: 120 tablet, Rfl: 3  •  cyclobenzaprine (FLEXERIL) 10 MG tablet, Take one tablet by mouth three times a day as needed for muscle spasms., Disp: 30 tablet, Rfl: 2  •  lisinopril-hydrochlorothiazide (PRINZIDE,ZESTORETIC) 20-12.5 MG per tablet, Take 1 tablet by mouth Daily., Disp: 30 tablet, Rfl: 11  •  terbinafine (LAMISIL) 250 MG tablet, Take 1 tablet by mouth Daily., Disp: 84 tablet, Rfl: 0     PMFSH  The following portions of the patient's history were  reviewed and updated as appropriate: allergies, current medications, past family history, past medical history, past social history, past surgical history and problem list.    Review of Systems   Constitutional: Negative for appetite change, fever and unexpected weight change.   HENT: Positive for ear pain.    Eyes: Negative for pain and visual disturbance.   Respiratory: Negative for chest tightness, shortness of breath and wheezing.    Cardiovascular: Negative for chest pain and palpitations.   Gastrointestinal: Negative for abdominal pain, blood in stool, diarrhea, nausea and vomiting.   Endocrine: Negative.    Genitourinary: Negative for difficulty urinating, flank pain, frequency and urgency.   Musculoskeletal: Positive for back pain. Negative for joint swelling.   Skin: Negative for color change and rash.   Neurological: Negative for dizziness, tremors, weakness and light-headedness.   Hematological: Negative for adenopathy.   Psychiatric/Behavioral: Negative for confusion and decreased concentration.   All other systems reviewed and are negative.      Objective   /76  Pulse 94  Temp 97.8 °F (36.6 °C)  Wt (!) 147 kg (324 lb)  SpO2 99%  BMI 45.19 kg/m2    Physical Exam   Constitutional: He appears well-developed and well-nourished.   HENT:   Head: Normocephalic.   Right Ear: Hearing, tympanic membrane, external ear and ear canal normal.   Left Ear: Hearing, tympanic membrane, external ear and ear canal normal.   Nose: Nose normal.   Mouth/Throat: Oropharynx is clear and moist.   Eyes: Conjunctivae are normal. Pupils are equal, round, and reactive to light.   Neck: Normal range of motion.   Cardiovascular: Normal rate, regular rhythm and normal heart sounds.    Pulmonary/Chest: Effort normal and breath sounds normal. He has no decreased breath sounds. He has no wheezes. He has no rhonchi. He has no rales.   Musculoskeletal: Normal range of motion.   Neurological: He is alert.   Skin: Skin is warm and  dry.   Left great toe darkened toe nail consistent with onychomycosis   Psychiatric: He has a normal mood and affect. His behavior is normal.   Nursing note and vitals reviewed.      Results for orders placed or performed in visit on 12/29/17   Lipid Panel   Result Value Ref Range    Total Cholesterol 142 0 - 200 mg/dL    Triglycerides 247 (H) 0 - 150 mg/dL    HDL Cholesterol 36 (L) 40 - 60 mg/dL    LDL Cholesterol  88 0 - 130 mg/dL   Comprehensive Metabolic Panel   Result Value Ref Range    Glucose 110 (H) 70 - 100 mg/dL    BUN 13 9 - 23 mg/dL    Creatinine 0.80 0.60 - 1.30 mg/dL    Sodium 140 132 - 146 mmol/L    Potassium 4.2 3.5 - 5.5 mmol/L    Chloride 102 99 - 109 mmol/L    CO2 31.0 20.0 - 31.0 mmol/L    Calcium 9.0 8.7 - 10.4 mg/dL    Total Protein 6.7 5.7 - 8.2 g/dL    Albumin 4.10 3.20 - 4.80 g/dL    ALT (SGPT) 31 7 - 40 U/L    AST (SGOT) 21 0 - 33 U/L    Alkaline Phosphatase 63 25 - 100 U/L    Total Bilirubin 1.0 0.3 - 1.2 mg/dL    eGFR Non African Amer 97 >60 mL/min/1.73    Globulin 2.6 gm/dL    A/G Ratio 1.6 1.5 - 2.5 g/dL    BUN/Creatinine Ratio 16.3 7.0 - 25.0    Anion Gap 7.0 3.0 - 11.0 mmol/L   CBC (No Diff)   Result Value Ref Range    WBC 6.03 3.50 - 10.80 10*3/mm3    RBC 4.79 4.20 - 5.76 10*6/mm3    Hemoglobin 14.6 13.1 - 17.5 g/dL    Hematocrit 44.2 38.9 - 50.9 %    MCV 92.3 80.0 - 99.0 fL    MCH 30.5 27.0 - 31.0 pg    MCHC 33.0 32.0 - 36.0 g/dL    RDW 13.0 11.3 - 14.5 %    RDW-SD 44.0 37.0 - 54.0 fl    MPV 11.0 6.0 - 12.0 fL    Platelets 251 150 - 450 10*3/mm3   TSH   Result Value Ref Range    TSH 1.526 0.350 - 5.350 mIU/mL   Vitamin D 25 Hydroxy   Result Value Ref Range    25 Hydroxy, Vitamin D 8.4 ng/ml   PSA Screen   Result Value Ref Range    PSA 0.360 0.000 - 4.000 ng/mL        ASSESSMENT/PLAN    Problem List Items Addressed This Visit        Nervous and Auditory    Chronic tension type headache     Headaches are unchanged.  Continue current treatment regimen.             Relevant Medications     cyclobenzaprine (FLEXERIL) 10 MG tablet       Musculoskeletal and Integument    Onychomycosis of left great toe - Primary     Start lamisil as directed. He will let me know if ineffective and he needs a referral to podiatry.         Relevant Medications    terbinafine (LAMISIL) 250 MG tablet       Other    Anxiety disorder     Refilled pt's xanax prescription of 0.25 mg 1 po prn when flying #10, 0RF per Dr Madrigal.    The patient has read and signed the [facility] Controlled Substance Contract.  I will continue to see patient for regular follow up appointments.  They are well controlled on their medication.  TAJ is updated every 3 months. The patient is aware of the potential for addiction and dependence.         Health care maintenance    Relevant Orders    Lipid Panel (Completed)    Comprehensive Metabolic Panel (Completed)    CBC (No Diff) (Completed)    TSH (Completed)    Vitamin D 25 Hydroxy (Completed)    PSA Screen (Completed)               Return for Annual physical.

## 2017-12-30 PROBLEM — B35.1 ONYCHOMYCOSIS OF LEFT GREAT TOE: Status: ACTIVE | Noted: 2017-12-30

## 2017-12-31 NOTE — ASSESSMENT & PLAN NOTE
Start lamisil as directed. He will let me know if ineffective and he needs a referral to podiatry.

## 2017-12-31 NOTE — ASSESSMENT & PLAN NOTE
Refilled pt's xanax prescription of 0.25 mg 1 po prn when flying #10, 0RF per Dr Madrigal.    The patient has read and signed the [facility] Controlled Substance Contract.  I will continue to see patient for regular follow up appointments.  They are well controlled on their medication.  TAJ is updated every 3 months. The patient is aware of the potential for addiction and dependence.

## 2018-01-08 DIAGNOSIS — E55.9 VITAMIN D DEFICIENCY: Primary | ICD-10-CM

## 2018-01-08 RX ORDER — ERGOCALCIFEROL 1.25 MG/1
50000 CAPSULE ORAL WEEKLY
Qty: 12 CAPSULE | Refills: 1 | Status: SHIPPED | OUTPATIENT
Start: 2018-01-08 | End: 2018-07-01 | Stop reason: SDUPTHER

## 2018-01-24 ENCOUNTER — OFFICE VISIT (OUTPATIENT)
Dept: INTERNAL MEDICINE | Facility: CLINIC | Age: 68
End: 2018-01-24

## 2018-01-24 VITALS
SYSTOLIC BLOOD PRESSURE: 130 MMHG | WEIGHT: 315 LBS | TEMPERATURE: 98.4 F | HEIGHT: 71 IN | OXYGEN SATURATION: 99 % | HEART RATE: 82 BPM | DIASTOLIC BLOOD PRESSURE: 86 MMHG | BODY MASS INDEX: 44.1 KG/M2

## 2018-01-24 DIAGNOSIS — R68.89 FLU-LIKE SYMPTOMS: ICD-10-CM

## 2018-01-24 DIAGNOSIS — H66.002 ACUTE SUPPURATIVE OTITIS MEDIA OF LEFT EAR WITHOUT SPONTANEOUS RUPTURE OF TYMPANIC MEMBRANE, RECURRENCE NOT SPECIFIED: Primary | ICD-10-CM

## 2018-01-24 DIAGNOSIS — J06.9 UPPER RESPIRATORY TRACT INFECTION, UNSPECIFIED TYPE: ICD-10-CM

## 2018-01-24 LAB
EXPIRATION DATE: NORMAL
FLUAV AG NPH QL: NEGATIVE
FLUBV AG NPH QL: NEGATIVE
INTERNAL CONTROL: NORMAL
Lab: NORMAL

## 2018-01-24 PROCEDURE — 87804 INFLUENZA ASSAY W/OPTIC: CPT | Performed by: NURSE PRACTITIONER

## 2018-01-24 PROCEDURE — 99214 OFFICE O/P EST MOD 30 MIN: CPT | Performed by: NURSE PRACTITIONER

## 2018-01-24 RX ORDER — ATENOLOL 25 MG/1
TABLET ORAL
Refills: 1 | COMMUNITY
Start: 2018-01-12 | End: 2018-03-12 | Stop reason: DRUGHIGH

## 2018-01-24 RX ORDER — DEXTROMETHORPHAN HYDROBROMIDE AND PROMETHAZINE HYDROCHLORIDE 15; 6.25 MG/5ML; MG/5ML
SYRUP ORAL
Qty: 100 ML | Refills: 0 | Status: CANCELLED | OUTPATIENT
Start: 2018-01-24

## 2018-01-24 RX ORDER — DEXTROMETHORPHAN HYDROBROMIDE AND PROMETHAZINE HYDROCHLORIDE 15; 6.25 MG/5ML; MG/5ML
5 SYRUP ORAL 4 TIMES DAILY PRN
Qty: 100 ML | Refills: 0 | OUTPATIENT
Start: 2018-01-24 | End: 2018-06-22

## 2018-01-24 RX ORDER — AMOXICILLIN AND CLAVULANATE POTASSIUM 875; 125 MG/1; MG/1
1 TABLET, FILM COATED ORAL EVERY 12 HOURS SCHEDULED
Qty: 20 TABLET | Refills: 0 | OUTPATIENT
Start: 2018-01-24 | End: 2018-06-22

## 2018-01-24 NOTE — PROGRESS NOTES
"Chief Complaint   Patient presents with   • Headache     Sx started 48 hours ago   • Cough     phlem   • Generalized Body Aches   • Chills     HPI  Obi Jackman Jr. is a 67 y.o. male  presents with complaint of 4 day history of fatigue, allergy type symptoms; Monday--PND, with yellow-green sputum, prod cough with yellow-green sputum, headaches, body aches, chills, sore throat; ear pain, sinus pressure, denies wheezing, shortness of breath    Taking delsym for cough which is not helping and reports that cough is worse    He was exposed to influenza and RSV on Friday as one grandchild tested positive for flu and the other tested positive for RSV.        Past Medical History:   Diagnosis Date   • Swelling of left ear        No family history on file.    Social History     Social History   • Marital status:      Spouse name: N/A   • Number of children: N/A   • Years of education: N/A     Occupational History   • Not on file.     Social History Main Topics   • Smoking status: Never Smoker   • Smokeless tobacco: Never Used   • Alcohol use Yes      Comment: rarely   • Drug use: No   • Sexual activity: Defer     Other Topics Concern   • Not on file     Social History Narrative       ROS  Review of Systems   Constitutional: Positive for fatigue. Negative for activity change, appetite change and fever.   HENT: Positive for congestion, ear pain, postnasal drip, sinus pressure and sore throat.    Respiratory: Positive for cough. Negative for chest tightness, shortness of breath and wheezing.    Neurological: Positive for headaches. Negative for dizziness.   All other systems reviewed and are negative.      /86  Pulse 82  Temp 98.4 °F (36.9 °C) (Oral)   Ht 180.3 cm (71\")  Wt (!) 147 kg (324 lb)  SpO2 99%  BMI 45.19 kg/m2    PHYSICAL EXAM  Physical Exam   Constitutional: He is oriented to person, place, and time. He appears well-developed and well-nourished.   HENT:   Head: Normocephalic and atraumatic. "   Right Ear: External ear and ear canal normal. Tympanic membrane is not erythematous and not bulging. A middle ear effusion (mild clear) is present.   Left Ear: External ear and ear canal normal. Tympanic membrane is erythematous and bulging. A middle ear effusion (large purulent effusion, cloudy, dull) is present.   Nose: Mucosal edema (with erythema) present.   Mouth/Throat: Uvula is midline. Posterior oropharyngeal erythema present. No oropharyngeal exudate or posterior oropharyngeal edema.   Eyes: Conjunctivae are normal.   Neck: Normal range of motion. Neck supple.   Cardiovascular: Normal rate, regular rhythm and normal heart sounds.    No murmur heard.  Pulmonary/Chest: Effort normal and breath sounds normal.   Lymphadenopathy:     He has cervical adenopathy (bilateral ant cervical node enlargement w/o tenderness).        Right cervical: No posterior cervical adenopathy present.       Left cervical: No posterior cervical adenopathy present.   Neurological: He is alert and oriented to person, place, and time.   Skin: Skin is warm, dry and intact.   Vitals reviewed.    Results for orders placed or performed in visit on 01/24/18   POCT Influenza A/B   Result Value Ref Range    Rapid Influenza A Ag Negative     Rapid Influenza B Ag Negative     Internal Control Passed Passed    Lot Number [id number]     Expiration Date 03/07/2020          ASSESSMENT/PLAN  1. Acute suppurative otitis media of left ear without spontaneous rupture of tympanic membrane, recurrence not specified  - amoxicillin-clavulanate (AUGMENTIN) 875-125 MG per tablet; Take 1 tablet by mouth Every 12 (Twelve) Hours.  Dispense: 20 tablet; Refill: 0    2. Upper respiratory tract infection, unspecified type  - promethazine-dextromethorphan (PROMETHAZINE-DM) 6.25-15 MG/5ML syrup; Take 5 mL by mouth 4 (Four) Times a Day As Needed for Cough.  Dispense: 100 mL; Refill: 0    3. Flu-like symptoms  - POCT Influenza A/B    F/U if no improvement or worsening of  symptoms; otherwise keep next scheduled appointment with Dr. Molina    Patient verbalizes understanding of medication dosage, comfort measures, instructions for treatment and follow-up.    YASMIN Hurd  01/24/2018  6:06 PM

## 2018-02-01 RX ORDER — ATORVASTATIN CALCIUM 10 MG/1
TABLET, FILM COATED ORAL
Qty: 30 TABLET | Refills: 5 | Status: SHIPPED | OUTPATIENT
Start: 2018-02-01 | End: 2018-08-22 | Stop reason: SDUPTHER

## 2018-02-16 RX ORDER — LISINOPRIL AND HYDROCHLOROTHIAZIDE 20; 12.5 MG/1; MG/1
1 TABLET ORAL DAILY
Qty: 30 TABLET | Refills: 11 | Status: SHIPPED | OUTPATIENT
Start: 2018-02-16 | End: 2019-04-12 | Stop reason: SDUPTHER

## 2018-02-16 NOTE — TELEPHONE ENCOUNTER
Received a refill request via fax from [facility] for Lisinopril-HCTZ 20-12.5 MG, med refilled electronically pt is within protocol.

## 2018-03-10 RX ORDER — LISINOPRIL AND HYDROCHLOROTHIAZIDE 20; 12.5 MG/1; MG/1
TABLET ORAL
Qty: 30 TABLET | Refills: 11 | OUTPATIENT
Start: 2018-03-10

## 2018-03-11 ENCOUNTER — TELEPHONE (OUTPATIENT)
Dept: INTERNAL MEDICINE | Facility: CLINIC | Age: 68
End: 2018-03-11

## 2018-03-11 NOTE — TELEPHONE ENCOUNTER
Please advise on Atenolol, listed under historical provider. Received fax from patient pharmacy for refill.

## 2018-03-12 ENCOUNTER — TELEPHONE (OUTPATIENT)
Dept: INTERNAL MEDICINE | Facility: CLINIC | Age: 68
End: 2018-03-12

## 2018-03-12 RX ORDER — ATENOLOL 50 MG/1
50 TABLET ORAL DAILY
Qty: 30 TABLET | Refills: 2 | Status: SHIPPED | OUTPATIENT
Start: 2018-03-12 | End: 2018-06-02 | Stop reason: SDUPTHER

## 2018-03-12 NOTE — TELEPHONE ENCOUNTER
Spoke with pharmacy who claimed they did not receive the rx refill from 2/16/18 but received the refill denial on the 3/10/18. I gave verbal for pharmacy to refill the lisinopril-hctz Q: 30 w/ 11 refills.

## 2018-03-12 NOTE — TELEPHONE ENCOUNTER
[facility] CALLED IN REGARDS TO THEM GETTING A E FILE MESSAGE IN REGARDS TO THE PATIENT'S REFILL ON LISINOPRIL MEDICATION BEING DENIED? PHARM STATES THAT THEY HAVEN'T FILLED FOR PATIENT IN A WHILE AND WOULD LIKE TO GET A CALL BACK FROM THE MA TOP SEE WHAT NEEDS TO BE DONE IN REGARDS TO THIS REQUEST/ PHARM CALL BACK NUMBER [phone]

## 2018-03-12 NOTE — TELEPHONE ENCOUNTER
Called pt and clarified strength of med, per pt he is taking atenolol 50 mg 1 daily, med refilled electronically ok per Cammy.

## 2018-06-02 RX ORDER — ATENOLOL 50 MG/1
50 TABLET ORAL DAILY
Qty: 30 TABLET | Refills: 2 | Status: SHIPPED | OUTPATIENT
Start: 2018-06-02 | End: 2018-06-22 | Stop reason: SDUPTHER

## 2018-06-22 ENCOUNTER — OFFICE VISIT (OUTPATIENT)
Dept: INTERNAL MEDICINE | Facility: CLINIC | Age: 68
End: 2018-06-22

## 2018-06-22 VITALS
HEART RATE: 73 BPM | OXYGEN SATURATION: 96 % | WEIGHT: 315 LBS | SYSTOLIC BLOOD PRESSURE: 126 MMHG | RESPIRATION RATE: 16 BRPM | HEIGHT: 71 IN | BODY MASS INDEX: 44.1 KG/M2 | DIASTOLIC BLOOD PRESSURE: 82 MMHG

## 2018-06-22 DIAGNOSIS — G43.809 OTHER MIGRAINE WITHOUT STATUS MIGRAINOSUS, NOT INTRACTABLE: ICD-10-CM

## 2018-06-22 DIAGNOSIS — F32.A DEPRESSION, UNSPECIFIED DEPRESSION TYPE: ICD-10-CM

## 2018-06-22 DIAGNOSIS — G47.33 OSA (OBSTRUCTIVE SLEEP APNEA): ICD-10-CM

## 2018-06-22 DIAGNOSIS — E55.9 VITAMIN D DEFICIENCY: ICD-10-CM

## 2018-06-22 DIAGNOSIS — G44.229 CHRONIC TENSION-TYPE HEADACHE, NOT INTRACTABLE: Primary | ICD-10-CM

## 2018-06-22 DIAGNOSIS — F40.243 FEAR OF FLYING: ICD-10-CM

## 2018-06-22 DIAGNOSIS — I15.9 SECONDARY HYPERTENSION: ICD-10-CM

## 2018-06-22 DIAGNOSIS — B35.1 ONYCHOMYCOSIS OF LEFT GREAT TOE: ICD-10-CM

## 2018-06-22 DIAGNOSIS — E66.01 MORBID OBESITY DUE TO EXCESS CALORIES (HCC): ICD-10-CM

## 2018-06-22 LAB
25(OH)D3 SERPL-MCNC: 38.5 NG/ML
ALBUMIN SERPL-MCNC: 4.32 G/DL (ref 3.2–4.8)
ALBUMIN/GLOB SERPL: 1.6 G/DL (ref 1.5–2.5)
ALP SERPL-CCNC: 64 U/L (ref 25–100)
ALT SERPL W P-5'-P-CCNC: 35 U/L (ref 7–40)
ANION GAP SERPL CALCULATED.3IONS-SCNC: 7 MMOL/L (ref 3–11)
AST SERPL-CCNC: 25 U/L (ref 0–33)
BILIRUB SERPL-MCNC: 0.9 MG/DL (ref 0.3–1.2)
BUN BLD-MCNC: 13 MG/DL (ref 9–23)
BUN/CREAT SERPL: 14.8 (ref 7–25)
CALCIUM SPEC-SCNC: 9.2 MG/DL (ref 8.7–10.4)
CHLORIDE SERPL-SCNC: 106 MMOL/L (ref 99–109)
CO2 SERPL-SCNC: 31 MMOL/L (ref 20–31)
CREAT BLD-MCNC: 0.88 MG/DL (ref 0.6–1.3)
GFR SERPL CREATININE-BSD FRML MDRD: 87 ML/MIN/1.73
GLOBULIN UR ELPH-MCNC: 2.8 GM/DL
GLUCOSE BLD-MCNC: 106 MG/DL (ref 70–100)
POTASSIUM BLD-SCNC: 4.5 MMOL/L (ref 3.5–5.5)
PROT SERPL-MCNC: 7.1 G/DL (ref 5.7–8.2)
SODIUM BLD-SCNC: 144 MMOL/L (ref 132–146)

## 2018-06-22 PROCEDURE — 82306 VITAMIN D 25 HYDROXY: CPT | Performed by: PHYSICIAN ASSISTANT

## 2018-06-22 PROCEDURE — 99214 OFFICE O/P EST MOD 30 MIN: CPT | Performed by: PHYSICIAN ASSISTANT

## 2018-06-22 PROCEDURE — 80053 COMPREHEN METABOLIC PANEL: CPT | Performed by: PHYSICIAN ASSISTANT

## 2018-06-22 RX ORDER — CYCLOBENZAPRINE HCL 10 MG
TABLET ORAL
Qty: 30 TABLET | Refills: 5 | Status: SHIPPED | OUTPATIENT
Start: 2018-06-22 | End: 2019-09-03 | Stop reason: SDUPTHER

## 2018-06-22 RX ORDER — ATENOLOL 50 MG/1
50 TABLET ORAL DAILY
Qty: 30 TABLET | Refills: 2 | Status: SHIPPED | OUTPATIENT
Start: 2018-06-22 | End: 2018-09-24 | Stop reason: SDUPTHER

## 2018-06-22 RX ORDER — TERBINAFINE HYDROCHLORIDE 250 MG/1
250 TABLET ORAL DAILY
Qty: 84 TABLET | Refills: 0 | Status: SHIPPED | OUTPATIENT
Start: 2018-06-22 | End: 2019-09-03

## 2018-06-22 RX ORDER — BUPROPION HYDROCHLORIDE 150 MG/1
150 TABLET ORAL DAILY
Qty: 30 TABLET | Refills: 5 | Status: SHIPPED | OUTPATIENT
Start: 2018-06-22 | End: 2019-01-08 | Stop reason: SDUPTHER

## 2018-06-22 RX ORDER — SUMATRIPTAN 100 MG/1
100 TABLET, FILM COATED ORAL ONCE AS NEEDED
Qty: 9 TABLET | Refills: 3 | Status: SHIPPED | OUTPATIENT
Start: 2018-06-22 | End: 2018-09-24 | Stop reason: SDUPTHER

## 2018-06-22 NOTE — PROGRESS NOTES
Chief Complaint   Patient presents with   • Med Refill     CPAP reorder, Pt is getting migraines and has taken wife's Imitrex and says it has been working       Subjective   Obi Jackman Jr. is a 67 y.o. male.       History of Present Illness     Pt has been taking contrave regularly and initially had weight loss but now has not seen much continued weight loss.    Pt has been getting migraine headaches one or two times a month, has used his wife's imitrex occasionally and it helps to eliminate the headache before it becomes severe. He takes 1/2- 1 of her 100 mg tablets.    Pt is seeing Dr Abbott for lumbar steroid injections- the 1st 2 have partially worked. He is going to have one more to see if it helps. Surgery may be a next step if this does not work.    Needs a new CPAP machine, saw sleep specialist several years ago.    Needs refill of xanax he uses prn for flying. Still has 1 tablet left.      Current Outpatient Prescriptions:   •  ALPRAZolam (XANAX) 0.25 MG tablet, Take 1 tablet by mouth Daily As Needed for anxiety., Disp: 10 tablet, Rfl: 0  •  aspirin 81 MG tablet, Take  by mouth daily., Disp: , Rfl:   •  atenolol (TENORMIN) 50 MG tablet, Take 1 tablet by mouth Daily., Disp: 30 tablet, Rfl: 2  •  atorvastatin (LIPITOR) 10 MG tablet, TAKE ONE TABLET BY MOUTH DAILY, Disp: 30 tablet, Rfl: 5  •  cyclobenzaprine (FLEXERIL) 10 MG tablet, Take one tablet by mouth three times a day as needed for muscle spasms., Disp: 30 tablet, Rfl: 5  •  lisinopril-hydrochlorothiazide (PRINZIDE,ZESTORETIC) 20-12.5 MG per tablet, Take 1 tablet by mouth Daily., Disp: 30 tablet, Rfl: 11  •  terbinafine (LAMISIL) 250 MG tablet, Take 1 tablet by mouth Daily., Disp: 84 tablet, Rfl: 0  •  vitamin D (ERGOCALCIFEROL) 30870 units capsule capsule, Take 1 capsule by mouth 1 (One) Time Per Week., Disp: 12 capsule, Rfl: 1  •  albuterol (PROVENTIL HFA;VENTOLIN HFA) 108 (90 BASE) MCG/ACT inhaler, Inhale 2 puffs Every 4 (Four) Hours As Needed  "for Wheezing., Disp: 1 inhaler, Rfl: 2  •  buPROPion XL (WELLBUTRIN XL) 150 MG 24 hr tablet, Take 1 tablet by mouth Daily., Disp: 30 tablet, Rfl: 5  •  Liraglutide -Weight Management (SAXENDA) 18 MG/3ML solution pen-injector, Inject 3 mg under the skin Daily. Start with 0.6 mg daily, increase by 0.6 mg weekly or as tolerated, up to 3 mg, Disp: 5 pen, Rfl: 3  •  SUMAtriptan (IMITREX) 100 MG tablet, Take 1 tablet by mouth 1 (One) Time As Needed for Migraine for up to 1 dose., Disp: 9 tablet, Rfl: 3     PMFSH  The following portions of the patient's history were reviewed and updated as appropriate: allergies, current medications, past family history, past medical history, past social history, past surgical history and problem list.    Review of Systems   Constitutional: Negative for appetite change, chills, fever and unexpected weight change.   HENT: Negative.    Eyes: Negative for pain and visual disturbance.   Respiratory: Negative for chest tightness, shortness of breath and wheezing.    Cardiovascular: Negative for chest pain and palpitations.   Gastrointestinal: Negative for abdominal pain, blood in stool, diarrhea, nausea and vomiting.   Endocrine: Negative.    Genitourinary: Negative.  Negative for difficulty urinating, flank pain, frequency and urgency.   Musculoskeletal: Positive for back pain. Negative for joint swelling.   Skin: Negative for color change, rash and wound.   Allergic/Immunologic: Negative.    Neurological: Negative for dizziness, tremors, syncope, speech difficulty and weakness.   Hematological: Negative for adenopathy.   Psychiatric/Behavioral: Positive for decreased concentration. Negative for confusion, hallucinations and suicidal ideas. The patient is nervous/anxious.    All other systems reviewed and are negative.      Objective   /82   Pulse 73   Resp 16   Ht 180.3 cm (71\")   Wt (!) 147 kg (323 lb)   SpO2 96%   BMI 45.05 kg/m²     Physical Exam   Constitutional: He appears " well-developed and well-nourished.   HENT:   Head: Normocephalic.   Right Ear: Hearing, tympanic membrane, external ear and ear canal normal.   Left Ear: Hearing, tympanic membrane, external ear and ear canal normal.   Nose: Nose normal.   Mouth/Throat: Oropharynx is clear and moist.   Eyes: Conjunctivae are normal. Pupils are equal, round, and reactive to light.   Neck: Normal range of motion.   Cardiovascular: Normal rate, regular rhythm and normal heart sounds.    Pulmonary/Chest: Effort normal and breath sounds normal. He has no decreased breath sounds. He has no wheezes. He has no rhonchi. He has no rales.   Musculoskeletal: Normal range of motion.   Neurological: He is alert.   Skin: Skin is warm and dry.   Psychiatric: He has a normal mood and affect. His behavior is normal.   Nursing note and vitals reviewed.           ASSESSMENT/PLAN    Problem List Items Addressed This Visit        Cardiovascular and Mediastinum    Hypertension    Relevant Medications    atenolol (TENORMIN) 50 MG tablet    Other Relevant Orders    Comprehensive Metabolic Panel (Completed)       Respiratory    CHRISTOPHE (obstructive sleep apnea)    Relevant Orders    Ambulatory Referral to Sleep Medicine       Digestive    Morbid obesity due to excess calories     Obesity is unchanged.  Discussed the patient's BMI.  The BMI is above average; BMI management plan is completed.  General weight loss/lifestyle modification strategies discussed (elicit support from others; identify saboteurs; non-food rewards, etc).  Informal exercise measures discussed, e.g. taking stairs instead of elevator.  Regular aerobic exercise program discussed.  Pharmacotherapy as ordered.         Relevant Medications    Liraglutide -Weight Management (SAXENDA) 18 MG/3ML solution pen-injector       Nervous and Auditory    Chronic tension type headache - Primary    Relevant Medications    atenolol (TENORMIN) 50 MG tablet    SUMAtriptan (IMITREX) 100 MG tablet     cyclobenzaprine (FLEXERIL) 10 MG tablet    buPROPion XL (WELLBUTRIN XL) 150 MG 24 hr tablet       Musculoskeletal and Integument    Onychomycosis of left great toe    Relevant Medications    terbinafine (LAMISIL) 250 MG tablet       Other    Anxiety disorder     Gave pt refill of xanax 0.25 mg 1 po prn on flying #10, 0RF.    The patient has read and signed the [facility] Controlled Substance Contract.  I will continue to see patient for regular follow up appointments.  They are well controlled on their medication.  TAJ is updated every 3 months. The patient is aware of the potential for addiction and dependence.         Relevant Medications    buPROPion XL (WELLBUTRIN XL) 150 MG 24 hr tablet    Liraglutide -Weight Management (SAXENDA) 18 MG/3ML solution pen-injector    Depression    Relevant Medications    buPROPion XL (WELLBUTRIN XL) 150 MG 24 hr tablet    Liraglutide -Weight Management (SAXENDA) 18 MG/3ML solution pen-injector      Other Visit Diagnoses     Other migraine without status migrainosus, not intractable        Relevant Medications    atenolol (TENORMIN) 50 MG tablet    SUMAtriptan (IMITREX) 100 MG tablet    cyclobenzaprine (FLEXERIL) 10 MG tablet    buPROPion XL (WELLBUTRIN XL) 150 MG 24 hr tablet    Vitamin D deficiency        Relevant Orders    Vitamin D 25 Hydroxy (Completed)               Return in about 6 months (around 12/22/2018) for Annual physical.

## 2018-06-25 ENCOUNTER — PRIOR AUTHORIZATION (OUTPATIENT)
Dept: INTERNAL MEDICINE | Facility: CLINIC | Age: 68
End: 2018-06-25

## 2018-06-25 NOTE — ASSESSMENT & PLAN NOTE
Obesity is unchanged.  Discussed the patient's BMI.  The BMI is above average; BMI management plan is completed.  General weight loss/lifestyle modification strategies discussed (elicit support from others; identify saboteurs; non-food rewards, etc).  Informal exercise measures discussed, e.g. taking stairs instead of elevator.  Regular aerobic exercise program discussed.  Pharmacotherapy as ordered.

## 2018-06-25 NOTE — ASSESSMENT & PLAN NOTE
Gave pt refill of xanax 0.25 mg 1 po prn on flying #10, 0RF.    The patient has read and signed the [facility] Controlled Substance Contract.  I will continue to see patient for regular follow up appointments.  They are well controlled on their medication.  TAJ is updated every 3 months. The patient is aware of the potential for addiction and dependence.

## 2018-06-25 NOTE — TELEPHONE ENCOUNTER
Received PA request via fax from [facility] pharmacy for , submitted PA via [email or web address], awaiting response.     PA was approved:CaseId:[id number];Status:Approved;Review Type:Prior Auth;Coverage Start Date:06/25/2018;Coverage End Date:10/15/2018  Pharmacy notified.

## 2018-07-01 DIAGNOSIS — E55.9 VITAMIN D DEFICIENCY: ICD-10-CM

## 2018-07-01 RX ORDER — ERGOCALCIFEROL 1.25 MG/1
50000 CAPSULE ORAL WEEKLY
Qty: 12 CAPSULE | Refills: 3 | Status: SHIPPED | OUTPATIENT
Start: 2018-07-01 | End: 2020-07-27

## 2018-08-23 RX ORDER — ATORVASTATIN CALCIUM 10 MG/1
TABLET, FILM COATED ORAL
Qty: 30 TABLET | Refills: 5 | Status: SHIPPED | OUTPATIENT
Start: 2018-08-23 | End: 2019-04-12 | Stop reason: SDUPTHER

## 2018-09-24 DIAGNOSIS — G43.809 OTHER MIGRAINE WITHOUT STATUS MIGRAINOSUS, NOT INTRACTABLE: ICD-10-CM

## 2018-09-24 RX ORDER — SUMATRIPTAN 100 MG/1
100 TABLET, FILM COATED ORAL ONCE AS NEEDED
Qty: 9 TABLET | Refills: 3 | Status: SHIPPED | OUTPATIENT
Start: 2018-09-24 | End: 2019-03-16 | Stop reason: SDUPTHER

## 2018-09-24 RX ORDER — ATENOLOL 50 MG/1
50 TABLET ORAL DAILY
Qty: 30 TABLET | Refills: 2 | Status: SHIPPED | OUTPATIENT
Start: 2018-09-24 | End: 2019-01-19 | Stop reason: SDUPTHER

## 2018-09-24 NOTE — TELEPHONE ENCOUNTER
Received refill request via fax from [facility] for sumatriptan and atenolol, meds refilled electronically pt is within protocol.

## 2018-10-30 DIAGNOSIS — E66.01 MORBID OBESITY DUE TO EXCESS CALORIES (HCC): ICD-10-CM

## 2018-10-30 RX ORDER — LIRAGLUTIDE 6 MG/ML
INJECTION, SOLUTION SUBCUTANEOUS
Qty: 15 ML | Refills: 3 | Status: SHIPPED | OUTPATIENT
Start: 2018-10-30 | End: 2019-06-10 | Stop reason: SDUPTHER

## 2018-11-01 ENCOUNTER — PRIOR AUTHORIZATION (OUTPATIENT)
Dept: INTERNAL MEDICINE | Facility: CLINIC | Age: 68
End: 2018-11-01

## 2018-12-28 ENCOUNTER — OFFICE VISIT (OUTPATIENT)
Dept: INTERNAL MEDICINE | Facility: CLINIC | Age: 68
End: 2018-12-28

## 2018-12-28 VITALS
SYSTOLIC BLOOD PRESSURE: 128 MMHG | HEIGHT: 71 IN | TEMPERATURE: 97.9 F | WEIGHT: 315 LBS | OXYGEN SATURATION: 97 % | BODY MASS INDEX: 44.1 KG/M2 | HEART RATE: 86 BPM | DIASTOLIC BLOOD PRESSURE: 82 MMHG

## 2018-12-28 DIAGNOSIS — J40 BRONCHITIS: ICD-10-CM

## 2018-12-28 DIAGNOSIS — F41.9 ANXIETY: ICD-10-CM

## 2018-12-28 DIAGNOSIS — J06.9 UPPER RESPIRATORY TRACT INFECTION, UNSPECIFIED TYPE: Primary | ICD-10-CM

## 2018-12-28 PROCEDURE — 99214 OFFICE O/P EST MOD 30 MIN: CPT | Performed by: NURSE PRACTITIONER

## 2018-12-28 RX ORDER — ALBUTEROL SULFATE 90 UG/1
2 AEROSOL, METERED RESPIRATORY (INHALATION) EVERY 4 HOURS PRN
Qty: 1 INHALER | Refills: 2 | Status: SHIPPED | OUTPATIENT
Start: 2018-12-28 | End: 2020-10-07

## 2018-12-28 RX ORDER — BENZONATATE 200 MG/1
200 CAPSULE ORAL 3 TIMES DAILY PRN
Qty: 21 CAPSULE | Refills: 0 | Status: SHIPPED | OUTPATIENT
Start: 2018-12-28 | End: 2019-05-17

## 2018-12-28 RX ORDER — AZITHROMYCIN 250 MG/1
TABLET, FILM COATED ORAL
Qty: 6 TABLET | Refills: 0 | Status: SHIPPED | OUTPATIENT
Start: 2018-12-28 | End: 2019-01-08

## 2018-12-28 NOTE — PROGRESS NOTES
Chief Complaint   Patient presents with   • Sore Throat     with bad cough x2days       History of Present Illness  68 y.o.male presents for sore throat, cough, URI sx.    The patient describes several days of runny nose and congestion.  It seems to be not improving with home care.  The patient reports associated sinus drainage and scratchy sore throat.  The patient is now experiencing an intermittent cough productive yellow/green.  There is no fever, chills or acute dyspnea.  Having to sleep in chair because of cough and drainage.  Joint/neck pain this morning.    Hx of anxiety; requesting refill xanax; uses rarely with situational anxiety. Last rx was around June 18.    Review of Systems   Constitutional: Positive for chills. Negative for fever.   HENT: Positive for congestion, postnasal drip, rhinorrhea, sinus pressure, sneezing and sore throat.    Respiratory: Positive for cough. Negative for shortness of breath.    Musculoskeletal: Positive for myalgias and neck pain.   Neurological: Negative for dizziness and headache.   Psychiatric/Behavioral: The patient is nervous/anxious.        [facility]  The following portions of the patient's history were reviewed and updated as appropriate: allergies, current medications, past family history, past medical history, past social history, past surgical history and problem list.     Past Medical History:   Diagnosis Date   • Bulging of lumbar intervertebral disc    • Hypertension    • Swelling of left ear       History reviewed. No pertinent surgical history.   No Known Allergies   History reviewed. No pertinent family history.   Social History     Socioeconomic History   • Marital status:    Tobacco Use   • Smoking status: Never Smoker   • Smokeless tobacco: Never Used   Substance and Sexual Activity   • Alcohol use: Yes     Comment: rarely   • Drug use: No   • Sexual activity: Defer   Other Topics Concern   • Not on file   Social History Narrative   • Not on file  "        Current Outpatient Medications:   •  ALPRAZolam (XANAX) 0.25 MG tablet, Take 1 tablet by mouth Daily As Needed for anxiety., Disp: 10 tablet, Rfl: 0  •  aspirin 81 MG tablet, Take  by mouth daily., Disp: , Rfl:   •  atenolol (TENORMIN) 50 MG tablet, Take 1 tablet by mouth Daily., Disp: 30 tablet, Rfl: 2  •  atorvastatin (LIPITOR) 10 MG tablet, TAKE ONE TABLET BY MOUTH DAILY, Disp: 30 tablet, Rfl: 5  •  buPROPion XL (WELLBUTRIN XL) 150 MG 24 hr tablet, Take 1 tablet by mouth Daily., Disp: 30 tablet, Rfl: 5  •  cyclobenzaprine (FLEXERIL) 10 MG tablet, Take one tablet by mouth three times a day as needed for muscle spasms., Disp: 30 tablet, Rfl: 5  •  Liraglutide -Weight Management (SAXENDA) 18 MG/3ML solution pen-injector, Inject 3 mg under the skin Daily. Start with 0.6 mg daily, increase by 0.6 mg weekly or as tolerated, up to 3 mg, Disp: 5 pen, Rfl: 3  •  lisinopril-hydrochlorothiazide (PRINZIDE,ZESTORETIC) 20-12.5 MG per tablet, Take 1 tablet by mouth Daily., Disp: 30 tablet, Rfl: 11  •  SAXENDA 18 MG/3ML solution pen-injector, INJECT 3 MG UNDER THE SKIN DAILY (START WITH 0.6 MG DAILY, INCREASE BY 0.6 MG WEEKLY OR AS TOLERATED, UP TO 3 MG), Disp: 15 mL, Rfl: 3  •  SUMAtriptan (IMITREX) 100 MG tablet, Take one tablet at onset of headache. May repeat dose one time in 2 hours if headache not relieved., Disp: 9 tablet, Rfl: 3  •  vitamin D (ERGOCALCIFEROL) 48020 units capsule capsule, Take 1 capsule by mouth 1 (One) Time Per Week., Disp: 12 capsule, Rfl: 3  •  albuterol (PROVENTIL HFA;VENTOLIN HFA) 108 (90 BASE) MCG/ACT inhaler, Inhale 2 puffs Every 4 (Four) Hours As Needed for Wheezing., Disp: 1 inhaler, Rfl: 2  •  terbinafine (LAMISIL) 250 MG tablet, Take 1 tablet by mouth Daily., Disp: 84 tablet, Rfl: 0    VITALS:  /82   Pulse 86   Temp 97.9 °F (36.6 °C)   Ht 180.3 cm (71\")   Wt (!) 146 kg (321 lb)   SpO2 97%   BMI 44.77 kg/m²     Physical Exam   Constitutional: He is oriented to person, place, " and time. He appears well-developed and well-nourished. No distress.   HENT:   Right Ear: External ear and ear canal normal. Tympanic membrane is not erythematous and not bulging. A middle ear effusion is present.   Left Ear: External ear and ear canal normal. Tympanic membrane is not erythematous and not bulging. A middle ear effusion is present.   Nose: Mucosal edema, rhinorrhea and congestion present. Right sinus exhibits no maxillary sinus tenderness and no frontal sinus tenderness. Left sinus exhibits no maxillary sinus tenderness and no frontal sinus tenderness.   Mouth/Throat: Mucous membranes are normal. Posterior oropharyngeal erythema present. No oropharyngeal exudate, posterior oropharyngeal edema or tonsillar abscesses.   Cardiovascular: Normal rate, regular rhythm and normal heart sounds.   Pulmonary/Chest: Effort normal and breath sounds normal. No respiratory distress.   Frequent coughing   Lymphadenopathy:        Head (right side): No submental, no submandibular and no tonsillar adenopathy present.        Head (left side): No submental, no submandibular and no tonsillar adenopathy present.     He has no cervical adenopathy.   Neurological: He is alert and oriented to person, place, and time.   Skin: Skin is warm and dry.       LABS  No new labs    ASSESSMENT/PLAN  Obi was seen today for sore throat.    Diagnoses and all orders for this visit:    Upper respiratory tract infection, unspecified type  -     azithromycin (ZITHROMAX) 250 MG tablet; Take 2 tablets the first day, then 1 tablet daily for 4 days.    Bronchitis  -     azithromycin (ZITHROMAX) 250 MG tablet; Take 2 tablets the first day, then 1 tablet daily for 4 days.  -     albuterol sulfate  (90 Base) MCG/ACT inhaler; Inhale 2 puffs Every 4 (Four) Hours As Needed for Wheezing.  -     benzonatate (TESSALON) 200 MG capsule; Take 1 capsule by mouth 3 (Three) Times a Day As Needed for Cough.    Anxiety    If worsening of symptoms or no  improvement in symptoms or fever > 101.5 patient should contact our office for further evaluation treatment or seek urgent care.    Needs refill for xanax;  shey pending. will defer to pcp for review refill xanax.      I discussed the patients findings and my recommendations with patient.     Patient was encouraged to keep me informed of any acute changes, lack of improvement, or any new concerning symptoms.    Patient voiced understanding of all instructions and denied further questions.      FOLLOW-UP  Return if symptoms worsen or fail to improve.    Electronically signed by:    YASMIN Gordon  12/28/2018

## 2019-01-03 ENCOUNTER — TELEPHONE (OUTPATIENT)
Dept: INTERNAL MEDICINE | Facility: CLINIC | Age: 69
End: 2019-01-03

## 2019-01-03 NOTE — TELEPHONE ENCOUNTER
----- Message from YASMIN Chen sent at 12/31/2018 11:14 AM EST -----  Pt was in this weekend for URI.  He was asking for refill on his xanax for situational anxiety.  I asked simone to run a shey.  Looks like last time you filled was in June?  [facility] does not do any benzo that I see, so I told pt I would forward med request to you for review.

## 2019-01-03 NOTE — TELEPHONE ENCOUNTER
He is due for annual physical. Could do refill at that time (see previous message). Depending on when he is able to schedule and how soon he needs it, may be able to refill prior to appt.

## 2019-01-03 NOTE — TELEPHONE ENCOUNTER
Spoke with patient and I made him a physical appt Tuesday and he stated he would be ok to wait for the rx for xanax till then

## 2019-01-08 ENCOUNTER — OFFICE VISIT (OUTPATIENT)
Dept: INTERNAL MEDICINE | Facility: CLINIC | Age: 69
End: 2019-01-08

## 2019-01-08 VITALS
OXYGEN SATURATION: 98 % | TEMPERATURE: 97.8 F | WEIGHT: 315 LBS | HEART RATE: 71 BPM | DIASTOLIC BLOOD PRESSURE: 80 MMHG | HEIGHT: 71 IN | SYSTOLIC BLOOD PRESSURE: 128 MMHG | BODY MASS INDEX: 44.1 KG/M2

## 2019-01-08 DIAGNOSIS — R19.5 POSITIVE FECAL OCCULT BLOOD TEST: ICD-10-CM

## 2019-01-08 DIAGNOSIS — Z00.00 HEALTH CARE MAINTENANCE: Primary | ICD-10-CM

## 2019-01-08 DIAGNOSIS — F32.A DEPRESSION, UNSPECIFIED DEPRESSION TYPE: ICD-10-CM

## 2019-01-08 DIAGNOSIS — F40.243 FEAR OF FLYING: ICD-10-CM

## 2019-01-08 LAB
25(OH)D3 SERPL-MCNC: 49.4 NG/ML
ALBUMIN SERPL-MCNC: 4.51 G/DL (ref 3.2–4.8)
ALBUMIN/GLOB SERPL: 2 G/DL (ref 1.5–2.5)
ALP SERPL-CCNC: 75 U/L (ref 25–100)
ALT SERPL W P-5'-P-CCNC: 41 U/L (ref 7–40)
ANION GAP SERPL CALCULATED.3IONS-SCNC: 6 MMOL/L (ref 3–11)
ARTICHOKE IGE QN: 89 MG/DL (ref 0–130)
AST SERPL-CCNC: 28 U/L (ref 0–33)
BASOPHILS # BLD AUTO: 0.01 10*3/MM3 (ref 0–0.2)
BASOPHILS NFR BLD AUTO: 0.1 % (ref 0–1)
BILIRUB SERPL-MCNC: 0.8 MG/DL (ref 0.3–1.2)
BUN BLD-MCNC: 14 MG/DL (ref 9–23)
BUN/CREAT SERPL: 16.1 (ref 7–25)
CALCIUM SPEC-SCNC: 9.4 MG/DL (ref 8.7–10.4)
CHLORIDE SERPL-SCNC: 100 MMOL/L (ref 99–109)
CHOLEST SERPL-MCNC: 141 MG/DL (ref 0–200)
CO2 SERPL-SCNC: 32 MMOL/L (ref 20–31)
CREAT BLD-MCNC: 0.87 MG/DL (ref 0.6–1.3)
DEPRECATED RDW RBC AUTO: 44.8 FL (ref 37–54)
EOSINOPHIL # BLD AUTO: 0.11 10*3/MM3 (ref 0–0.3)
EOSINOPHIL NFR BLD AUTO: 1.6 % (ref 0–3)
ERYTHROCYTE [DISTWIDTH] IN BLOOD BY AUTOMATED COUNT: 13 % (ref 11.3–14.5)
GFR SERPL CREATININE-BSD FRML MDRD: 88 ML/MIN/1.73
GLOBULIN UR ELPH-MCNC: 2.3 GM/DL
GLUCOSE BLD-MCNC: 80 MG/DL (ref 70–100)
HCT VFR BLD AUTO: 45.8 % (ref 38.9–50.9)
HDLC SERPL-MCNC: 30 MG/DL (ref 40–60)
HGB BLD-MCNC: 15 G/DL (ref 13.1–17.5)
IMM GRANULOCYTES # BLD AUTO: 0.02 10*3/MM3 (ref 0–0.03)
IMM GRANULOCYTES NFR BLD AUTO: 0.3 % (ref 0–0.6)
LYMPHOCYTES # BLD AUTO: 1.64 10*3/MM3 (ref 0.6–4.8)
LYMPHOCYTES NFR BLD AUTO: 24.6 % (ref 24–44)
MCH RBC QN AUTO: 30.7 PG (ref 27–31)
MCHC RBC AUTO-ENTMCNC: 32.8 G/DL (ref 32–36)
MCV RBC AUTO: 93.9 FL (ref 80–99)
MONOCYTES # BLD AUTO: 0.61 10*3/MM3 (ref 0–1)
MONOCYTES NFR BLD AUTO: 9.1 % (ref 0–12)
NEUTROPHILS # BLD AUTO: 4.3 10*3/MM3 (ref 1.5–8.3)
NEUTROPHILS NFR BLD AUTO: 64.6 % (ref 41–71)
PLATELET # BLD AUTO: 285 10*3/MM3 (ref 150–450)
PMV BLD AUTO: 10.4 FL (ref 6–12)
POTASSIUM BLD-SCNC: 4.6 MMOL/L (ref 3.5–5.5)
PROT SERPL-MCNC: 6.8 G/DL (ref 5.7–8.2)
PSA SERPL-MCNC: 0.36 NG/ML (ref 0–4)
RBC # BLD AUTO: 4.88 10*6/MM3 (ref 4.2–5.76)
SODIUM BLD-SCNC: 138 MMOL/L (ref 132–146)
TRIGL SERPL-MCNC: 184 MG/DL (ref 0–150)
TSH SERPL DL<=0.05 MIU/L-ACNC: 1.25 MIU/ML (ref 0.35–5.35)
WBC NRBC COR # BLD: 6.67 10*3/MM3 (ref 3.5–10.8)

## 2019-01-08 PROCEDURE — 84443 ASSAY THYROID STIM HORMONE: CPT | Performed by: PHYSICIAN ASSISTANT

## 2019-01-08 PROCEDURE — 99213 OFFICE O/P EST LOW 20 MIN: CPT | Performed by: PHYSICIAN ASSISTANT

## 2019-01-08 PROCEDURE — 99397 PER PM REEVAL EST PAT 65+ YR: CPT | Performed by: PHYSICIAN ASSISTANT

## 2019-01-08 PROCEDURE — 90471 IMMUNIZATION ADMIN: CPT | Performed by: PHYSICIAN ASSISTANT

## 2019-01-08 PROCEDURE — 82306 VITAMIN D 25 HYDROXY: CPT | Performed by: PHYSICIAN ASSISTANT

## 2019-01-08 PROCEDURE — G0103 PSA SCREENING: HCPCS | Performed by: PHYSICIAN ASSISTANT

## 2019-01-08 PROCEDURE — 90472 IMMUNIZATION ADMIN EACH ADD: CPT | Performed by: PHYSICIAN ASSISTANT

## 2019-01-08 PROCEDURE — 85025 COMPLETE CBC W/AUTO DIFF WBC: CPT | Performed by: PHYSICIAN ASSISTANT

## 2019-01-08 PROCEDURE — 90632 HEPA VACCINE ADULT IM: CPT | Performed by: PHYSICIAN ASSISTANT

## 2019-01-08 PROCEDURE — 80053 COMPREHEN METABOLIC PANEL: CPT | Performed by: PHYSICIAN ASSISTANT

## 2019-01-08 PROCEDURE — 90746 HEPB VACCINE 3 DOSE ADULT IM: CPT | Performed by: PHYSICIAN ASSISTANT

## 2019-01-08 PROCEDURE — 90670 PCV13 VACCINE IM: CPT | Performed by: PHYSICIAN ASSISTANT

## 2019-01-08 PROCEDURE — 80061 LIPID PANEL: CPT | Performed by: PHYSICIAN ASSISTANT

## 2019-01-08 RX ORDER — BUPROPION HYDROCHLORIDE 150 MG/1
150 TABLET ORAL DAILY
Qty: 30 TABLET | Refills: 5 | Status: SHIPPED | OUTPATIENT
Start: 2019-01-08 | End: 2019-09-27 | Stop reason: SDUPTHER

## 2019-01-08 RX ORDER — ALPRAZOLAM 0.25 MG/1
0.25 TABLET ORAL DAILY PRN
Qty: 10 TABLET | Refills: 0 | OUTPATIENT
Start: 2019-01-08 | End: 2019-06-28 | Stop reason: SDUPTHER

## 2019-01-08 NOTE — PROGRESS NOTES
"Chief Complaint   Patient presents with   • Annual Exam     PE/ vaccines       Subjective   Obi Jackman Jr. is a 68 y.o. male.       History of Present Illness     The patient is being seen for a health maintenance evaluation.  The last health maintenance was 2 year(s) ago.    Social History  Obi  does not smoke cigarettes.   He drinks rare alcohol. Once a month.  He does not use illicit drugs.    General History  Obi  does have regular dental visits.  He does complain of vision problems. Wears contacts. Last eye exam was 2018, due soon.  Immunizations are up to date. The patient needs the following immunizations: hepatitis A and hepatitis B series needed; needs one of the pneumonia vaccine; TDaP done 2014    Lifestyle  Obi  consumes a in general, a \"healthy\" diet  .  He exercises rarely.    Reproductive Health  Obi  is sexually active. His contraceptive plan is no method.   He does not have erectile dysfunction.     Screening  Last PSA was 2017.  Last prostate exam was  2017. Family history of prostate cancer: father with prostate cancer in his 70s.   Last testicular exam was 2017.   Last colonoscopy was 2016 by Dr Arnold, repeat 2021. Family history of colon cancer: none  Other pertinent family history and/or screenings: none                        Current Outpatient Medications:   •  albuterol sulfate  (90 Base) MCG/ACT inhaler, Inhale 2 puffs Every 4 (Four) Hours As Needed for Wheezing., Disp: 1 inhaler, Rfl: 2  •  ALPRAZolam (XANAX) 0.25 MG tablet, Take 1 tablet by mouth Daily As Needed for Anxiety., Disp: 10 tablet, Rfl: 0  •  aspirin 81 MG tablet, Take  by mouth daily., Disp: , Rfl:   •  atenolol (TENORMIN) 50 MG tablet, Take 1 tablet by mouth Daily., Disp: 30 tablet, Rfl: 2  •  atorvastatin (LIPITOR) 10 MG tablet, TAKE ONE TABLET BY MOUTH DAILY, Disp: 30 tablet, Rfl: 5  •  benzonatate (TESSALON) 200 MG capsule, Take 1 capsule by mouth 3 (Three) Times a Day As Needed for Cough., Disp: " 21 capsule, Rfl: 0  •  cyclobenzaprine (FLEXERIL) 10 MG tablet, Take one tablet by mouth three times a day as needed for muscle spasms., Disp: 30 tablet, Rfl: 5  •  Liraglutide -Weight Management (SAXENDA) 18 MG/3ML solution pen-injector, Inject 3 mg under the skin Daily. Start with 0.6 mg daily, increase by 0.6 mg weekly or as tolerated, up to 3 mg, Disp: 5 pen, Rfl: 3  •  lisinopril-hydrochlorothiazide (PRINZIDE,ZESTORETIC) 20-12.5 MG per tablet, Take 1 tablet by mouth Daily., Disp: 30 tablet, Rfl: 11  •  SAXENDA 18 MG/3ML solution pen-injector, INJECT 3 MG UNDER THE SKIN DAILY (START WITH 0.6 MG DAILY, INCREASE BY 0.6 MG WEEKLY OR AS TOLERATED, UP TO 3 MG), Disp: 15 mL, Rfl: 3  •  SUMAtriptan (IMITREX) 100 MG tablet, Take one tablet at onset of headache. May repeat dose one time in 2 hours if headache not relieved., Disp: 9 tablet, Rfl: 3  •  terbinafine (LAMISIL) 250 MG tablet, Take 1 tablet by mouth Daily., Disp: 84 tablet, Rfl: 0  •  vitamin D (ERGOCALCIFEROL) 96498 units capsule capsule, Take 1 capsule by mouth 1 (One) Time Per Week., Disp: 12 capsule, Rfl: 3  •  buPROPion XL (WELLBUTRIN XL) 150 MG 24 hr tablet, Take 1 tablet by mouth Daily., Disp: 30 tablet, Rfl: 5     PMF[facility]  The following portions of the patient's history were reviewed and updated as appropriate: allergies, current medications, past family history, past medical history, past social history, past surgical history and problem list.    Review of Systems   Constitutional: Negative for appetite change, fever and unexpected weight change.   HENT: Negative for ear pain, facial swelling and sore throat.    Eyes: Negative for pain and visual disturbance.   Respiratory: Negative for chest tightness, shortness of breath and wheezing.    Cardiovascular: Negative for chest pain and palpitations.   Gastrointestinal: Negative for abdominal pain and blood in stool.   Endocrine: Negative.    Genitourinary: Negative for difficulty urinating and hematuria.  "  Musculoskeletal: Positive for back pain and gait problem. Negative for joint swelling.   Neurological: Negative for tremors, seizures and syncope.   Hematological: Negative for adenopathy.   Psychiatric/Behavioral: Negative.        Objective   /80   Pulse 71   Temp 97.8 °F (36.6 °C)   Ht 180.3 cm (71\")   Wt (!) 143 kg (316 lb)   SpO2 98%   BMI 44.07 kg/m²     Physical Exam   Constitutional: He is oriented to person, place, and time. He appears well-developed and well-nourished. No distress.   HENT:   Head: Normocephalic and atraumatic. Hair is normal.   Right Ear: Hearing, tympanic membrane, external ear and ear canal normal.   Left Ear: Hearing, tympanic membrane, external ear and ear canal normal.   Nose: No sinus tenderness or nasal deformity.   Mouth/Throat: Uvula is midline, oropharynx is clear and moist and mucous membranes are normal. No oral lesions. No uvula swelling.   Eyes: Conjunctivae, EOM and lids are normal. Pupils are equal, round, and reactive to light. Right eye exhibits no discharge. Left eye exhibits no discharge. No scleral icterus. Right eye exhibits normal extraocular motion and no nystagmus. Left eye exhibits normal extraocular motion and no nystagmus.   Fundoscopic exam:       The right eye shows red reflex.        The left eye shows red reflex.   Neck: Normal range of motion. Neck supple. No JVD present. No tracheal deviation present. No thyromegaly present.   Cardiovascular: Normal rate, regular rhythm, normal heart sounds, intact distal pulses and normal pulses. Exam reveals no gallop.   No murmur heard.  Pulmonary/Chest: Effort normal and breath sounds normal. No respiratory distress. He has no wheezes. He has no rales. He exhibits no tenderness.   Abdominal: Soft. Bowel sounds are normal. He exhibits no distension and no mass. There is no tenderness. There is no guarding. No hernia.   Genitourinary: Prostate normal, testes normal and penis normal. Rectal exam shows guaiac " positive stool.   Musculoskeletal: Normal range of motion. He exhibits no edema, tenderness or deformity.   Lymphadenopathy:     He has no cervical adenopathy.   Neurological: He is alert and oriented to person, place, and time. He has normal reflexes. He displays normal reflexes. No cranial nerve deficit. He exhibits normal muscle tone. Coordination normal.   Skin: Skin is warm and dry. No rash noted. He is not diaphoretic.   Psychiatric: He has a normal mood and affect. His behavior is normal. Judgment and thought content normal.   Nursing note and vitals reviewed.      Results for orders placed or performed in visit on 01/08/19   Comprehensive Metabolic Panel   Result Value Ref Range    Glucose 80 70 - 100 mg/dL    BUN 14 9 - 23 mg/dL    Creatinine 0.87 0.60 - 1.30 mg/dL    Sodium 138 132 - 146 mmol/L    Potassium 4.6 3.5 - 5.5 mmol/L    Chloride 100 99 - 109 mmol/L    CO2 32.0 (H) 20.0 - 31.0 mmol/L    Calcium 9.4 8.7 - 10.4 mg/dL    Total Protein 6.8 5.7 - 8.2 g/dL    Albumin 4.51 3.20 - 4.80 g/dL    ALT (SGPT) 41 (H) 7 - 40 U/L    AST (SGOT) 28 0 - 33 U/L    Alkaline Phosphatase 75 25 - 100 U/L    Total Bilirubin 0.8 0.3 - 1.2 mg/dL    eGFR Non African Amer 88 >60 mL/min/1.73    Globulin 2.3 gm/dL    A/G Ratio 2.0 1.5 - 2.5 g/dL    BUN/Creatinine Ratio 16.1 7.0 - 25.0    Anion Gap 6.0 3.0 - 11.0 mmol/L   Lipid Panel   Result Value Ref Range    Total Cholesterol 141 0 - 200 mg/dL    Triglycerides 184 (H) 0 - 150 mg/dL    HDL Cholesterol 30 (L) 40 - 60 mg/dL    LDL Cholesterol  89 0 - 130 mg/dL   TSH   Result Value Ref Range    TSH 1.247 0.350 - 5.350 mIU/mL   Vitamin D 25 Hydroxy   Result Value Ref Range    25 Hydroxy, Vitamin D 49.4 ng/ml   PSA Screen   Result Value Ref Range    PSA 0.360 0.000 - 4.000 ng/mL   CBC Auto Differential   Result Value Ref Range    WBC 6.67 3.50 - 10.80 10*3/mm3    RBC 4.88 4.20 - 5.76 10*6/mm3    Hemoglobin 15.0 13.1 - 17.5 g/dL    Hematocrit 45.8 38.9 - 50.9 %    MCV 93.9 80.0 -  99.0 fL    MCH 30.7 27.0 - 31.0 pg    MCHC 32.8 32.0 - 36.0 g/dL    RDW 13.0 11.3 - 14.5 %    RDW-SD 44.8 37.0 - 54.0 fl    MPV 10.4 6.0 - 12.0 fL    Platelets 285 150 - 450 10*3/mm3    Neutrophil % 64.6 41.0 - 71.0 %    Lymphocyte % 24.6 24.0 - 44.0 %    Monocyte % 9.1 0.0 - 12.0 %    Eosinophil % 1.6 0.0 - 3.0 %    Basophil % 0.1 0.0 - 1.0 %    Immature Grans % 0.3 0.0 - 0.6 %    Neutrophils, Absolute 4.30 1.50 - 8.30 10*3/mm3    Lymphocytes, Absolute 1.64 0.60 - 4.80 10*3/mm3    Monocytes, Absolute 0.61 0.00 - 1.00 10*3/mm3    Eosinophils, Absolute 0.11 0.00 - 0.30 10*3/mm3    Basophils, Absolute 0.01 0.00 - 0.20 10*3/mm3    Immature Grans, Absolute 0.02 0.00 - 0.03 10*3/mm3        ASSESSMENT/PLAN    Problem List Items Addressed This Visit        Other    Anxiety disorder     Gave pt refill of xanax 0.25 mg 1 po prn on flying #10, 0RF.    The patient has read and signed the [facility] Controlled Substance Contract.  I will continue to see patient for regular follow up appointments.  They are well controlled on their medication.  TAJ is updated every 3 months. The patient is aware of the potential for addiction and dependence.         Relevant Medications    buPROPion XL (WELLBUTRIN XL) 150 MG 24 hr tablet    ALPRAZolam (XANAX) 0.25 MG tablet    Depression    Relevant Medications    buPROPion XL (WELLBUTRIN XL) 150 MG 24 hr tablet    ALPRAZolam (XANAX) 0.25 MG tablet    Health care maintenance - Primary     Immunizations: influenza vaccine done fall 2018; TDaP 2014; initial shot of both hep A and hep B series given today; prevnar given today  Eye exam: done 1/2018  Prostate exam: done today  PSA: ordered  Colonoscopy: done 12/2016; repeat 2021  Labs: fasting labs ordered today           Relevant Orders    Hepatitis A Vaccine Adult IM (Completed)    CBC & Differential (Completed)    Comprehensive Metabolic Panel (Completed)    Lipid Panel (Completed)    TSH (Completed)    Vitamin D 25 Hydroxy (Completed)     PSA Screen (Completed)    CBC Auto Differential (Completed)      Other Visit Diagnoses     Positive fecal occult blood test        Pt will check subsequent FOBT at home. Recent colonoscopy that was wnl.               Return in about 4 months (around 5/8/2019) for Recheck, Annual in 1 year.

## 2019-01-10 NOTE — ASSESSMENT & PLAN NOTE
Immunizations: influenza vaccine done fall 2018; TDaP 2014; initial shot of both hep A and hep B series given today; prevnar given today  Eye exam: done 1/2018  Prostate exam: done today  PSA: ordered  Colonoscopy: done 12/2016; repeat 2021  Labs: fasting labs ordered today

## 2019-01-10 NOTE — ASSESSMENT & PLAN NOTE
Gave pt refill of xanax 0.25 mg 1 po prn on flying #10, 0RF.    The patient has read and signed the [facility] Controlled Substance Contract.  I will continue to see patient for regular follow up appointments.  They are well controlled on their medication.  TAJ is updated every 3 months. The patient is aware of the potential for addiction and dependence.

## 2019-01-17 NOTE — PROGRESS NOTES
Your elevated blood sugar and triglycerides have improved! Everything else is stable. Please continue your current medications.

## 2019-01-20 RX ORDER — ATENOLOL 50 MG/1
50 TABLET ORAL DAILY
Qty: 30 TABLET | Refills: 2 | Status: SHIPPED | OUTPATIENT
Start: 2019-01-20 | End: 2019-06-27 | Stop reason: SDUPTHER

## 2019-03-16 DIAGNOSIS — G43.809 OTHER MIGRAINE WITHOUT STATUS MIGRAINOSUS, NOT INTRACTABLE: ICD-10-CM

## 2019-03-16 RX ORDER — SUMATRIPTAN 100 MG/1
TABLET, FILM COATED ORAL
Qty: 9 TABLET | Refills: 3 | Status: SHIPPED | OUTPATIENT
Start: 2019-03-16 | End: 2019-10-21 | Stop reason: SDUPTHER

## 2019-03-23 ENCOUNTER — OFFICE VISIT (OUTPATIENT)
Dept: INTERNAL MEDICINE | Facility: CLINIC | Age: 69
End: 2019-03-23

## 2019-03-23 VITALS
OXYGEN SATURATION: 97 % | DIASTOLIC BLOOD PRESSURE: 86 MMHG | SYSTOLIC BLOOD PRESSURE: 160 MMHG | HEART RATE: 94 BPM | TEMPERATURE: 98.4 F | BODY MASS INDEX: 44.1 KG/M2 | HEIGHT: 71 IN | WEIGHT: 315 LBS

## 2019-03-23 DIAGNOSIS — J01.00 ACUTE NON-RECURRENT MAXILLARY SINUSITIS: Primary | ICD-10-CM

## 2019-03-23 DIAGNOSIS — R68.89 FLU-LIKE SYMPTOMS: ICD-10-CM

## 2019-03-23 DIAGNOSIS — R05.9 COUGH: ICD-10-CM

## 2019-03-23 PROCEDURE — 87804 INFLUENZA ASSAY W/OPTIC: CPT | Performed by: NURSE PRACTITIONER

## 2019-03-23 PROCEDURE — 99213 OFFICE O/P EST LOW 20 MIN: CPT | Performed by: NURSE PRACTITIONER

## 2019-03-23 RX ORDER — DEXTROMETHORPHAN HYDROBROMIDE AND PROMETHAZINE HYDROCHLORIDE 15; 6.25 MG/5ML; MG/5ML
5 SYRUP ORAL 4 TIMES DAILY PRN
Qty: 60 ML | Refills: 0 | Status: SHIPPED | OUTPATIENT
Start: 2019-03-23 | End: 2019-09-03

## 2019-03-23 RX ORDER — AZITHROMYCIN 250 MG/1
TABLET, FILM COATED ORAL
Qty: 6 TABLET | Refills: 0 | Status: SHIPPED | OUTPATIENT
Start: 2019-03-23 | End: 2019-05-17

## 2019-03-23 NOTE — PROGRESS NOTES
Chief Complaint   Patient presents with   • URI     x2 days with body aches        History of Present Illness  68 y.o.male presents for URI.  The patient describes 2 days of sinus congestion not improving with home care.  The patient reports associated sinus pressure with nasal purulence yellow-green drainage.  The patient is now experiencing a post nasal drip with associated scratchy throat, frequent cough, headaches and myalgias.  There is low grade fever, chills; no acute dyspnea.  Positive sick contacts.      Review of Systems   Constitutional: Negative for chills and fever.   HENT: Positive for congestion, postnasal drip, rhinorrhea, sinus pressure and sore throat. Negative for ear pain and sneezing.    Respiratory: Positive for cough. Negative for shortness of breath.    Musculoskeletal: Positive for myalgias.   Neurological: Positive for headache.       [facility]  The following portions of the patient's history were reviewed and updated as appropriate: allergies, current medications, past family history, past medical history, past social history, past surgical history and problem list.       Past Medical History:   Diagnosis Date   • Bulging of lumbar intervertebral disc    • Hypertension    • Swelling of left ear       Past Surgical History:   Procedure Laterality Date   • SPINE SURGERY        No Known Allergies   History reviewed. No pertinent family history.         Current Outpatient Medications:   •  albuterol sulfate  (90 Base) MCG/ACT inhaler, Inhale 2 puffs Every 4 (Four) Hours As Needed for Wheezing., Disp: 1 inhaler, Rfl: 2  •  ALPRAZolam (XANAX) 0.25 MG tablet, Take 1 tablet by mouth Daily As Needed for Anxiety., Disp: 10 tablet, Rfl: 0  •  aspirin 81 MG tablet, Take  by mouth daily., Disp: , Rfl:   •  atenolol (TENORMIN) 50 MG tablet, TAKE 1 TABLET BY MOUTH DAILY, Disp: 30 tablet, Rfl: 2  •  atorvastatin (LIPITOR) 10 MG tablet, TAKE ONE TABLET BY MOUTH DAILY, Disp: 30 tablet, Rfl: 5  •   "benzonatate (TESSALON) 200 MG capsule, Take 1 capsule by mouth 3 (Three) Times a Day As Needed for Cough., Disp: 21 capsule, Rfl: 0  •  buPROPion XL (WELLBUTRIN XL) 150 MG 24 hr tablet, Take 1 tablet by mouth Daily., Disp: 30 tablet, Rfl: 5  •  cyclobenzaprine (FLEXERIL) 10 MG tablet, Take one tablet by mouth three times a day as needed for muscle spasms., Disp: 30 tablet, Rfl: 5  •  Liraglutide -Weight Management (SAXENDA) 18 MG/3ML solution pen-injector, Inject 3 mg under the skin Daily. Start with 0.6 mg daily, increase by 0.6 mg weekly or as tolerated, up to 3 mg, Disp: 5 pen, Rfl: 3  •  lisinopril-hydrochlorothiazide (PRINZIDE,ZESTORETIC) 20-12.5 MG per tablet, Take 1 tablet by mouth Daily., Disp: 30 tablet, Rfl: 11  •  SAXENDA 18 MG/3ML solution pen-injector, INJECT 3 MG UNDER THE SKIN DAILY (START WITH 0.6 MG DAILY, INCREASE BY 0.6 MG WEEKLY OR AS TOLERATED, UP TO 3 MG), Disp: 15 mL, Rfl: 3  •  SUMAtriptan (IMITREX) 100 MG tablet, TAKE 1 TABLET BY MOUTH AT ONSET OF HEADACHE. MAY REPEAT DOSE ONE TIME IN 2 HOURS IF HEADACHE NOT RELIEVED., Disp: 9 tablet, Rfl: 3  •  terbinafine (LAMISIL) 250 MG tablet, Take 1 tablet by mouth Daily., Disp: 84 tablet, Rfl: 0  •  vitamin D (ERGOCALCIFEROL) 57913 units capsule capsule, Take 1 capsule by mouth 1 (One) Time Per Week., Disp: 12 capsule, Rfl: 3    VITALS:  /86   Pulse 94   Temp 98.4 °F (36.9 °C)   Ht 180.3 cm (71\")   Wt (!) 143 kg (315 lb)   SpO2 97%   BMI 43.93 kg/m²     Physical Exam   Constitutional: He is oriented to person, place, and time. He appears well-developed and well-nourished. No distress.   HENT:   Head: Normocephalic and atraumatic.   Right Ear: Tympanic membrane, external ear and ear canal normal.   Left Ear: Tympanic membrane, external ear and ear canal normal.   Nose: Mucosal edema, rhinorrhea, sinus tenderness and congestion present. Right sinus exhibits maxillary sinus tenderness. Right sinus exhibits no frontal sinus tenderness. Left " sinus exhibits maxillary sinus tenderness. Left sinus exhibits no frontal sinus tenderness.   Mouth/Throat: Oropharynx is clear and moist. No oropharyngeal exudate.   Purulent thick nasal secretions   Eyes: Conjunctivae are normal. Pupils are equal, round, and reactive to light. Right conjunctiva is not injected. Left conjunctiva is not injected.   Cardiovascular: Normal rate, regular rhythm and normal heart sounds.   Pulmonary/Chest: Effort normal and breath sounds normal. No respiratory distress.   Lymphadenopathy:        Head (right side): No submental, no submandibular and no tonsillar adenopathy present.        Head (left side): No submental, no submandibular and no tonsillar adenopathy present.     He has no cervical adenopathy.   Neurological: He is alert and oriented to person, place, and time.   Skin: Skin is warm and dry.   Nursing note and vitals reviewed.      LABS  Results for orders placed or performed in visit on 03/23/19   POCT Influenza A/B   Result Value Ref Range    Rapid Influenza A Ag Negative Negative    Rapid Influenza B Ag Negative Negative    Internal Control Passed Passed    Lot Number [id number]     Expiration Date 10-        ASSESSMENT/PLAN  Obi was seen today for uri.    Diagnoses and all orders for this visit:    Acute non-recurrent maxillary sinusitis  -     azithromycin (ZITHROMAX) 250 MG tablet; Take 2 tablets the first day, then 1 tablet daily for 4 days.    Flu-like symptoms  -     POCT Influenza A/B    Cough  -     promethazine-dextromethorphan (PROMETHAZINE-DM) 6.25-15 MG/5ML syrup; Take 5 mL by mouth 4 (Four) Times a Day As Needed for Cough.    Command oral antihistamine and increase water intake.  If worsening of symptoms or no improvement in symptoms or fever > 101.5 patient should contact our office for further evaluation treatment or seek urgent care.    I discussed the patients findings and my recommendations with patient.  Patient was encouraged to keep me  informed of any acute changes, lack of improvement, or any new concerning symptoms.    Patient voiced understanding of all instructions and denied further questions.      FOLLOW-UP  Return if symptoms worsen or fail to improve.    Electronically signed by:    YASMIN Gordon  03/23/2019

## 2019-04-12 RX ORDER — LISINOPRIL AND HYDROCHLOROTHIAZIDE 20; 12.5 MG/1; MG/1
1 TABLET ORAL DAILY
Qty: 30 TABLET | Refills: 11 | Status: SHIPPED | OUTPATIENT
Start: 2019-04-12 | End: 2020-06-02

## 2019-04-12 RX ORDER — ATORVASTATIN CALCIUM 10 MG/1
TABLET, FILM COATED ORAL
Qty: 30 TABLET | Refills: 5 | Status: SHIPPED | OUTPATIENT
Start: 2019-04-12 | End: 2020-01-13

## 2019-05-17 ENCOUNTER — OFFICE VISIT (OUTPATIENT)
Dept: INTERNAL MEDICINE | Facility: CLINIC | Age: 69
End: 2019-05-17

## 2019-05-17 VITALS
OXYGEN SATURATION: 100 % | BODY MASS INDEX: 44.1 KG/M2 | HEIGHT: 71 IN | HEART RATE: 81 BPM | SYSTOLIC BLOOD PRESSURE: 140 MMHG | DIASTOLIC BLOOD PRESSURE: 90 MMHG | TEMPERATURE: 98.2 F | WEIGHT: 315 LBS

## 2019-05-17 DIAGNOSIS — J01.10 ACUTE NON-RECURRENT FRONTAL SINUSITIS: Primary | ICD-10-CM

## 2019-05-17 DIAGNOSIS — J01.00 ACUTE NON-RECURRENT MAXILLARY SINUSITIS: ICD-10-CM

## 2019-05-17 DIAGNOSIS — R05.9 COUGH: ICD-10-CM

## 2019-05-17 PROCEDURE — 99213 OFFICE O/P EST LOW 20 MIN: CPT | Performed by: NURSE PRACTITIONER

## 2019-05-17 RX ORDER — AZITHROMYCIN 250 MG/1
TABLET, FILM COATED ORAL
Qty: 6 TABLET | Refills: 0 | Status: SHIPPED | OUTPATIENT
Start: 2019-05-17 | End: 2019-05-23

## 2019-05-17 RX ORDER — BENZONATATE 200 MG/1
200 CAPSULE ORAL 3 TIMES DAILY PRN
Qty: 30 CAPSULE | Refills: 0 | Status: SHIPPED | OUTPATIENT
Start: 2019-05-17 | End: 2019-09-27

## 2019-05-17 RX ORDER — AZELASTINE HYDROCHLORIDE, FLUTICASONE PROPIONATE 137; 50 UG/1; UG/1
SPRAY, METERED NASAL
Qty: 1 BOTTLE | Refills: 5 | Status: ON HOLD | OUTPATIENT
Start: 2019-05-17 | End: 2020-08-04

## 2019-05-17 NOTE — PROGRESS NOTES
Chief Complaint   Patient presents with   • Sinusitis     post nasal drip sore throat with sinus headache       History of Present Illness  68 y.o.male presents for sinusitis.  The patient describes greater than one week of sinus congestion not improving with home care. The patient reports associated sinus pressure with nasal purulence yellow drainage.  The patient is now experiencing a post nasal drip with associated scratchy throat.  There is no fever, chills or acute dyspnea.  Uses cpap at night can't breath because sinus congestion is so bad; face hurts and frontal headache.  pcn don't work for him on sinus infections. zpak work.    Review of Systems   Constitutional: Positive for fatigue. Negative for chills and fever.   HENT: Positive for congestion, postnasal drip, rhinorrhea, sinus pressure and sore throat. Negative for ear pain and sneezing.    Respiratory: Positive for cough. Negative for shortness of breath.    Musculoskeletal: Negative for myalgias.   Neurological: Positive for headache.       [facility]  The following portions of the patient's history were reviewed and updated as appropriate: allergies, current medications, past family history, past medical history, past social history, past surgical history and problem list.       Past Medical History:   Diagnosis Date   • Bulging of lumbar intervertebral disc    • Hypertension    • Swelling of left ear       Past Surgical History:   Procedure Laterality Date   • SPINE SURGERY        No Known Allergies   History reviewed. No pertinent family history.         Current Outpatient Medications:   •  albuterol sulfate  (90 Base) MCG/ACT inhaler, Inhale 2 puffs Every 4 (Four) Hours As Needed for Wheezing., Disp: 1 inhaler, Rfl: 2  •  ALPRAZolam (XANAX) 0.25 MG tablet, Take 1 tablet by mouth Daily As Needed for Anxiety., Disp: 10 tablet, Rfl: 0  •  aspirin 81 MG tablet, Take  by mouth daily., Disp: , Rfl:   •  atenolol (TENORMIN) 50 MG tablet, TAKE 1 TABLET  "BY MOUTH DAILY, Disp: 30 tablet, Rfl: 2  •  atorvastatin (LIPITOR) 10 MG tablet, TAKE ONE TABLET BY MOUTH DAILY, Disp: 30 tablet, Rfl: 5  •  buPROPion XL (WELLBUTRIN XL) 150 MG 24 hr tablet, Take 1 tablet by mouth Daily., Disp: 30 tablet, Rfl: 5  •  cyclobenzaprine (FLEXERIL) 10 MG tablet, Take one tablet by mouth three times a day as needed for muscle spasms., Disp: 30 tablet, Rfl: 5  •  Liraglutide -Weight Management (SAXENDA) 18 MG/3ML solution pen-injector, Inject 3 mg under the skin Daily. Start with 0.6 mg daily, increase by 0.6 mg weekly or as tolerated, up to 3 mg, Disp: 5 pen, Rfl: 3  •  lisinopril-hydrochlorothiazide (PRINZIDE,ZESTORETIC) 20-12.5 MG per tablet, TAKE 1 TABLET BY MOUTH DAILY, Disp: 30 tablet, Rfl: 11  •  promethazine-dextromethorphan (PROMETHAZINE-DM) 6.25-15 MG/5ML syrup, Take 5 mL by mouth 4 (Four) Times a Day As Needed for Cough., Disp: 60 mL, Rfl: 0  •  SAXENDA 18 MG/3ML solution pen-injector, INJECT 3 MG UNDER THE SKIN DAILY (START WITH 0.6 MG DAILY, INCREASE BY 0.6 MG WEEKLY OR AS TOLERATED, UP TO 3 MG), Disp: 15 mL, Rfl: 3  •  SUMAtriptan (IMITREX) 100 MG tablet, TAKE 1 TABLET BY MOUTH AT ONSET OF HEADACHE. MAY REPEAT DOSE ONE TIME IN 2 HOURS IF HEADACHE NOT RELIEVED., Disp: 9 tablet, Rfl: 3  •  terbinafine (LAMISIL) 250 MG tablet, Take 1 tablet by mouth Daily., Disp: 84 tablet, Rfl: 0  •  vitamin D (ERGOCALCIFEROL) 43301 units capsule capsule, Take 1 capsule by mouth 1 (One) Time Per Week., Disp: 12 capsule, Rfl: 3    VITALS:  /90   Pulse 81   Temp 98.2 °F (36.8 °C)   Ht 180.3 cm (71\")   Wt (!) 144 kg (317 lb)   SpO2 100%   BMI 44.21 kg/m²     Physical Exam   Constitutional: He is oriented to person, place, and time. He appears well-developed and well-nourished. No distress.   HENT:   Head: Normocephalic and atraumatic.   Right Ear: Tympanic membrane, external ear and ear canal normal.   Left Ear: Tympanic membrane, external ear and ear canal normal.   Nose: Mucosal " edema, rhinorrhea, sinus tenderness and congestion present. Right sinus exhibits maxillary sinus tenderness and frontal sinus tenderness. Left sinus exhibits maxillary sinus tenderness and frontal sinus tenderness.   Mouth/Throat: Oropharynx is clear and moist. No oropharyngeal exudate.   Purulent nasal secretions   Eyes: Conjunctivae are normal. Pupils are equal, round, and reactive to light. Right conjunctiva is not injected. Left conjunctiva is not injected.   Cardiovascular: Normal rate, regular rhythm and normal heart sounds.   Pulmonary/Chest: Effort normal and breath sounds normal. No respiratory distress.   Lymphadenopathy:        Head (right side): No submental, no submandibular and no tonsillar adenopathy present.        Head (left side): No submental, no submandibular and no tonsillar adenopathy present.     He has no cervical adenopathy.   Neurological: He is alert and oriented to person, place, and time.   Skin: Skin is warm and dry.   Nursing note and vitals reviewed.      LABS  No new labs    ASSESSMENT/PLAN  Obi was seen today for sinusitis.    Diagnoses and all orders for this visit:    Acute non-recurrent frontal sinusitis  -     Azelastine-Fluticasone 137-50 MCG/ACT suspension; 1 spray each nostril daily  -     azithromycin (ZITHROMAX) 250 MG tablet; Take 2 tablets the first day, then 1 tablet daily for 4 days.    Acute non-recurrent maxillary sinusitis  -     Azelastine-Fluticasone 137-50 MCG/ACT suspension; 1 spray each nostril daily  -     azithromycin (ZITHROMAX) 250 MG tablet; Take 2 tablets the first day, then 1 tablet daily for 4 days.    Cough  -     benzonatate (TESSALON) 200 MG capsule; Take 1 capsule by mouth 3 (Three) Times a Day As Needed for Cough.    cont oral antihistamine.  If worsening of symptoms or no improvement in symptoms or fever > 101.5 patient should contact our office for further evaluation treatment or seek urgent care.    I discussed the patients findings and my  recommendations with patient.  Patient was encouraged to keep me informed of any acute changes, lack of improvement, or any new concerning symptoms.    Patient voiced understanding of all instructions and denied further questions.      FOLLOW-UP  Return if symptoms worsen or fail to improve.    Electronically signed by:    YASMIN Gordon  05/17/2019

## 2019-05-23 ENCOUNTER — OFFICE VISIT (OUTPATIENT)
Dept: INTERNAL MEDICINE | Facility: CLINIC | Age: 69
End: 2019-05-23

## 2019-05-23 VITALS
HEART RATE: 93 BPM | SYSTOLIC BLOOD PRESSURE: 158 MMHG | TEMPERATURE: 97.7 F | OXYGEN SATURATION: 99 % | BODY MASS INDEX: 44.1 KG/M2 | WEIGHT: 315 LBS | DIASTOLIC BLOOD PRESSURE: 82 MMHG | HEIGHT: 71 IN

## 2019-05-23 DIAGNOSIS — J01.10 ACUTE NON-RECURRENT FRONTAL SINUSITIS: Primary | ICD-10-CM

## 2019-05-23 PROCEDURE — 96372 THER/PROPH/DIAG INJ SC/IM: CPT | Performed by: INTERNAL MEDICINE

## 2019-05-23 PROCEDURE — 99213 OFFICE O/P EST LOW 20 MIN: CPT | Performed by: INTERNAL MEDICINE

## 2019-05-23 RX ORDER — CEFDINIR 300 MG/1
300 CAPSULE ORAL 2 TIMES DAILY
Qty: 20 CAPSULE | Refills: 0 | Status: SHIPPED | OUTPATIENT
Start: 2019-05-23 | End: 2019-09-03

## 2019-05-23 RX ORDER — METHYLPREDNISOLONE 4 MG/1
TABLET ORAL
Qty: 21 TABLET | Refills: 0 | Status: SHIPPED | OUTPATIENT
Start: 2019-05-23 | End: 2019-09-03

## 2019-05-23 RX ORDER — METHYLPREDNISOLONE ACETATE 40 MG/ML
40 INJECTION, SUSPENSION INTRA-ARTICULAR; INTRALESIONAL; INTRAMUSCULAR; SOFT TISSUE ONCE
Status: COMPLETED | OUTPATIENT
Start: 2019-05-23 | End: 2019-05-23

## 2019-05-23 RX ORDER — AZELASTINE 1 MG/ML
2 SPRAY, METERED NASAL 2 TIMES DAILY
COMMUNITY
End: 2019-10-01

## 2019-05-23 RX ADMIN — METHYLPREDNISOLONE ACETATE 40 MG: 40 INJECTION, SUSPENSION INTRA-ARTICULAR; INTRALESIONAL; INTRAMUSCULAR; SOFT TISSUE at 08:54

## 2019-05-23 NOTE — PROGRESS NOTES
Chief Complaint   Patient presents with   • Sinusitis     follow up from 05/17/19       Subjective   Obi Jackman Jr. is a 68 y.o. male.       History of Present Illness     Pt has had ongoing congestion, sore throat, purulent mucus and sinus pressure and difficulty breathing through his nose. Took a z-pack with no improvement. Has to sleep sitting up and cannot use his CPAP- tried using it through his mouth and it did not work. Using astelin and saline solution.         Current Outpatient Medications:   •  albuterol sulfate  (90 Base) MCG/ACT inhaler, Inhale 2 puffs Every 4 (Four) Hours As Needed for Wheezing., Disp: 1 inhaler, Rfl: 2  •  ALPRAZolam (XANAX) 0.25 MG tablet, Take 1 tablet by mouth Daily As Needed for Anxiety., Disp: 10 tablet, Rfl: 0  •  aspirin 81 MG tablet, Take  by mouth daily., Disp: , Rfl:   •  atenolol (TENORMIN) 50 MG tablet, TAKE 1 TABLET BY MOUTH DAILY, Disp: 30 tablet, Rfl: 2  •  atorvastatin (LIPITOR) 10 MG tablet, TAKE ONE TABLET BY MOUTH DAILY, Disp: 30 tablet, Rfl: 5  •  azelastine (ASTELIN) 0.1 % nasal spray, 2 sprays into the nostril(s) as directed by provider 2 (Two) Times a Day. Use in each nostril as directed, Disp: , Rfl:   •  Azelastine-Fluticasone 137-50 MCG/ACT suspension, 1 spray each nostril daily, Disp: 1 bottle, Rfl: 5  •  buPROPion XL (WELLBUTRIN XL) 150 MG 24 hr tablet, Take 1 tablet by mouth Daily., Disp: 30 tablet, Rfl: 5  •  cyclobenzaprine (FLEXERIL) 10 MG tablet, Take one tablet by mouth three times a day as needed for muscle spasms., Disp: 30 tablet, Rfl: 5  •  Liraglutide -Weight Management (SAXENDA) 18 MG/3ML solution pen-injector, Inject 3 mg under the skin Daily. Start with 0.6 mg daily, increase by 0.6 mg weekly or as tolerated, up to 3 mg, Disp: 5 pen, Rfl: 3  •  lisinopril-hydrochlorothiazide (PRINZIDE,ZESTORETIC) 20-12.5 MG per tablet, TAKE 1 TABLET BY MOUTH DAILY, Disp: 30 tablet, Rfl: 11  •  promethazine-dextromethorphan (PROMETHAZINE-DM)  6.25-15 MG/5ML syrup, Take 5 mL by mouth 4 (Four) Times a Day As Needed for Cough., Disp: 60 mL, Rfl: 0  •  SUMAtriptan (IMITREX) 100 MG tablet, TAKE 1 TABLET BY MOUTH AT ONSET OF HEADACHE. MAY REPEAT DOSE ONE TIME IN 2 HOURS IF HEADACHE NOT RELIEVED., Disp: 9 tablet, Rfl: 3  •  terbinafine (LAMISIL) 250 MG tablet, Take 1 tablet by mouth Daily., Disp: 84 tablet, Rfl: 0  •  vitamin D (ERGOCALCIFEROL) 17601 units capsule capsule, Take 1 capsule by mouth 1 (One) Time Per Week., Disp: 12 capsule, Rfl: 3  •  benzonatate (TESSALON) 200 MG capsule, Take 1 capsule by mouth 3 (Three) Times a Day As Needed for Cough., Disp: 30 capsule, Rfl: 0  •  cefdinir (OMNICEF) 300 MG capsule, Take 1 capsule by mouth 2 (Two) Times a Day., Disp: 20 capsule, Rfl: 0  •  methylPREDNISolone (MEDROL) 4 MG tablet, follow package directions, Disp: 21 tablet, Rfl: 0  •  SAXENDA 18 MG/3ML solution pen-injector, INJECT 3 MG UNDER THE SKIN DAILY (START WITH 0.6 MG DAILY, INCREASE BY 0.6 MG WEEKLY OR AS TOLERATED, UP TO 3 MG), Disp: 15 mL, Rfl: 3     [facility]  The following portions of the patient's history were reviewed and updated as appropriate: allergies, current medications, past family history, past medical history, past social history, past surgical history and problem list.    Review of Systems   Constitutional: Positive for fatigue. Negative for activity change and unexpected weight change.   HENT: Positive for congestion, postnasal drip and sore throat. Negative for ear pain.    Eyes: Negative for pain and discharge.   Respiratory: Positive for cough. Negative for chest tightness, shortness of breath and wheezing.    Cardiovascular: Negative for chest pain and palpitations.   Gastrointestinal: Negative for abdominal pain, diarrhea and vomiting.   Endocrine: Negative.    Genitourinary: Negative.    Musculoskeletal: Negative for joint swelling.   Skin: Negative for color change, rash and wound.   Allergic/Immunologic: Negative.    Neurological:  "Negative for seizures and syncope.   Psychiatric/Behavioral: Negative.        Objective   /82   Pulse 93   Temp 97.7 °F (36.5 °C)   Ht 180.3 cm (71\")   Wt (!) 143 kg (316 lb)   SpO2 99%   BMI 44.07 kg/m²     Physical Exam   Constitutional: He is oriented to person, place, and time. He appears well-developed and well-nourished.  Non-toxic appearance. No distress.   HENT:   Head: Normocephalic and atraumatic. Hair is normal.   Right Ear: External ear normal. No drainage, swelling or tenderness. Tympanic membrane is retracted.   Left Ear: External ear normal. No drainage, swelling or tenderness. Tympanic membrane is retracted.   Nose: Mucosal edema present. No epistaxis.   Mouth/Throat: Uvula is midline and mucous membranes are normal. No oral lesions. No uvula swelling. Posterior oropharyngeal erythema present. No oropharyngeal exudate.   Eyes: Conjunctivae and EOM are normal. Pupils are equal, round, and reactive to light. Right eye exhibits no discharge. Left eye exhibits no discharge. No scleral icterus.   Neck: Normal range of motion. Neck supple.   Cardiovascular: Normal rate, regular rhythm and normal heart sounds. Exam reveals no gallop.   No murmur heard.  Pulmonary/Chest: Breath sounds normal. No stridor. No respiratory distress. He has no wheezes. He has no rales. He exhibits no tenderness.   Abdominal: Soft. There is no tenderness.   Lymphadenopathy:     He has cervical adenopathy.   Neurological: He is alert and oriented to person, place, and time. He exhibits normal muscle tone.   Skin: Skin is warm and dry. No rash noted. He is not diaphoretic.   Psychiatric: He has a normal mood and affect. His behavior is normal. Judgment and thought content normal.   Nursing note and vitals reviewed.      Results for orders placed or performed in visit on 03/23/19   POCT Influenza A/B   Result Value Ref Range    Rapid Influenza A Ag Negative Negative    Rapid Influenza B Ag Negative Negative    Internal " Control Passed Passed    Lot Number [id number]     Expiration Date 10-         ASSESSMENT/PLAN    Problem List Items Addressed This Visit     None      Visit Diagnoses     Acute non-recurrent frontal sinusitis    -  Primary    Depomedrol 40 mg IM in office. Start medrol dose pack tomorrow if steroid was helpful. Start omnicef as directed.    Relevant Medications    methylPREDNISolone acetate (DEPO-medrol) injection 40 mg (Completed)    methylPREDNISolone (MEDROL) 4 MG tablet    cefdinir (OMNICEF) 300 MG capsule               Return if symptoms worsen or fail to improve.

## 2019-06-10 DIAGNOSIS — E66.01 MORBID OBESITY DUE TO EXCESS CALORIES (HCC): ICD-10-CM

## 2019-06-10 RX ORDER — LIRAGLUTIDE 6 MG/ML
INJECTION, SOLUTION SUBCUTANEOUS
Qty: 15 ML | Refills: 3 | Status: SHIPPED | OUTPATIENT
Start: 2019-06-10 | End: 2019-09-03 | Stop reason: SDUPTHER

## 2019-06-27 RX ORDER — ATENOLOL 50 MG/1
50 TABLET ORAL DAILY
Qty: 30 TABLET | Refills: 2 | Status: SHIPPED | OUTPATIENT
Start: 2019-06-27 | End: 2019-09-27 | Stop reason: SDUPTHER

## 2019-06-28 RX ORDER — ALPRAZOLAM 0.25 MG/1
0.25 TABLET ORAL DAILY PRN
Qty: 10 TABLET | Refills: 0 | Status: SHIPPED | OUTPATIENT
Start: 2019-06-28 | End: 2020-08-13 | Stop reason: SDUPTHER

## 2019-06-28 RX ORDER — ALPRAZOLAM 0.25 MG/1
0.25 TABLET ORAL DAILY PRN
Qty: 10 TABLET | Refills: 0 | OUTPATIENT
Start: 2019-06-28 | End: 2019-06-28 | Stop reason: SDUPTHER

## 2019-07-15 ENCOUNTER — OFFICE VISIT (OUTPATIENT)
Dept: ORTHOPEDIC SURGERY | Facility: CLINIC | Age: 69
End: 2019-07-15

## 2019-07-15 VITALS — OXYGEN SATURATION: 96 % | WEIGHT: 308 LBS | BODY MASS INDEX: 43.12 KG/M2 | HEIGHT: 71 IN | HEART RATE: 79 BPM

## 2019-07-15 DIAGNOSIS — M25.572 LEFT ANKLE PAIN, UNSPECIFIED CHRONICITY: Primary | ICD-10-CM

## 2019-07-15 DIAGNOSIS — I87.8 VENOUS STASIS: ICD-10-CM

## 2019-07-15 DIAGNOSIS — M19.072 OSTEOARTHRITIS OF LEFT ANKLE OR FOOT: ICD-10-CM

## 2019-07-15 DIAGNOSIS — E66.01 CLASS 3 SEVERE OBESITY DUE TO EXCESS CALORIES WITH SERIOUS COMORBIDITY AND BODY MASS INDEX (BMI) OF 40.0 TO 44.9 IN ADULT (HCC): ICD-10-CM

## 2019-07-15 DIAGNOSIS — G62.9 NEUROPATHY: ICD-10-CM

## 2019-07-15 PROCEDURE — 99204 OFFICE O/P NEW MOD 45 MIN: CPT | Performed by: ORTHOPAEDIC SURGERY

## 2019-07-15 NOTE — PROGRESS NOTES
NEW PATIENT    Patient: Obi Jackman Jr.  : 1950    Primary Care Provider: Cammy Oneal PA    Requesting Provider: As above    Pain of the Left Ankle      History    Chief Complaint: Left hindfoot pain    History of Present Illness: This is a very pleasant 68-year-old gentleman who I last saw in .  I had treated him for left posterior tibial tendinitis.  He went through the casting, boot physical therapy and orthotic protocol.  He improved enough to continue to do usual activities, but over the past several years he has gotten worse.  He has noted increasing flattening of the foot.  He is walking with the foot externally rotated.  He has pain in the ankle and hindfoot.  He still has orthotics, the current pair are about 1-1/2 to 2 years old.  He is wondering what his other options are.  He is retired but still active.  He rates the pain as 6-8 out of 10, aching and sharp.  Worse with increased activity.  He has been on a weight loss program, he takes an injectable medication.  He has lost 20 pounds in the past 7 months.  He does not think he has diabetes, but he does not know his hemoglobin A1c.  On exam I found he had fairly dense neuropathy.  He has an MRI on a disc with report.  I reviewed it.  It was done on , ordered by Dr. Dat Jorge.  It shows essentially complete rupture of the posterior tibial tendon.  It shows some arthritic changes in the ankle joint, but more profound in the hindfoot    Current Outpatient Medications on File Prior to Visit   Medication Sig Dispense Refill   • albuterol sulfate  (90 Base) MCG/ACT inhaler Inhale 2 puffs Every 4 (Four) Hours As Needed for Wheezing. 1 inhaler 2   • ALPRAZolam (XANAX) 0.25 MG tablet Take 1 tablet by mouth Daily As Needed for Anxiety (when flying). 10 tablet 0   • aspirin 81 MG tablet Take  by mouth daily.     • atenolol (TENORMIN) 50 MG tablet TAKE 1 TABLET BY MOUTH DAILY 30 tablet 2   • atorvastatin (LIPITOR) 10  MG tablet TAKE ONE TABLET BY MOUTH DAILY 30 tablet 5   • azelastine (ASTELIN) 0.1 % nasal spray 2 sprays into the nostril(s) as directed by provider 2 (Two) Times a Day. Use in each nostril as directed     • Azelastine-Fluticasone 137-50 MCG/ACT suspension 1 spray each nostril daily 1 bottle 5   • benzonatate (TESSALON) 200 MG capsule Take 1 capsule by mouth 3 (Three) Times a Day As Needed for Cough. 30 capsule 0   • buPROPion XL (WELLBUTRIN XL) 150 MG 24 hr tablet Take 1 tablet by mouth Daily. 30 tablet 5   • cefdinir (OMNICEF) 300 MG capsule Take 1 capsule by mouth 2 (Two) Times a Day. 20 capsule 0   • cyclobenzaprine (FLEXERIL) 10 MG tablet Take one tablet by mouth three times a day as needed for muscle spasms. 30 tablet 5   • lisinopril-hydrochlorothiazide (PRINZIDE,ZESTORETIC) 20-12.5 MG per tablet TAKE 1 TABLET BY MOUTH DAILY 30 tablet 11   • methylPREDNISolone (MEDROL) 4 MG tablet follow package directions 21 tablet 0   • promethazine-dextromethorphan (PROMETHAZINE-DM) 6.25-15 MG/5ML syrup Take 5 mL by mouth 4 (Four) Times a Day As Needed for Cough. 60 mL 0   • SAXENDA 18 MG/3ML solution pen-injector INJECT 3 MG UNDER THE SKIN DAILY (START WITH 0.6 MG DAILY, INCREASE BY 0.6 MG WEEKLY OR AS TOLERATED, UP TO 3 MG) 15 mL 3   • SUMAtriptan (IMITREX) 100 MG tablet TAKE 1 TABLET BY MOUTH AT ONSET OF HEADACHE. MAY REPEAT DOSE ONE TIME IN 2 HOURS IF HEADACHE NOT RELIEVED. 9 tablet 3   • terbinafine (LAMISIL) 250 MG tablet Take 1 tablet by mouth Daily. 84 tablet 0   • vitamin D (ERGOCALCIFEROL) 66592 units capsule capsule Take 1 capsule by mouth 1 (One) Time Per Week. 12 capsule 3     No current facility-administered medications on file prior to visit.       No Known Allergies   Past Medical History:   Diagnosis Date   • Bulging of lumbar intervertebral disc    • Hypertension    • Swelling of left ear      Past Surgical History:   Procedure Laterality Date   • SPINE SURGERY       Family History   Problem Relation Age  "of Onset   • No Known Problems Mother    • No Known Problems Father       Social History     Socioeconomic History   • Marital status:      Spouse name: Not on file   • Number of children: Not on file   • Years of education: Not on file   • Highest education level: Not on file   Tobacco Use   • Smoking status: Never Smoker   • Smokeless tobacco: Never Used   Substance and Sexual Activity   • Alcohol use: Yes     Comment: rarely   • Drug use: No   • Sexual activity: Defer        Review of Systems   Constitutional: Negative.    HENT: Negative.    Eyes: Negative.    Respiratory: Negative.    Cardiovascular: Negative.    Gastrointestinal: Negative.    Endocrine: Negative.    Genitourinary: Negative.    Musculoskeletal: Positive for arthralgias and joint swelling.   Skin: Negative.    Allergic/Immunologic: Positive for environmental allergies.   Neurological: Negative.    Hematological: Negative.    Psychiatric/Behavioral: Negative.        The following portions of the patient's history were reviewed and updated as appropriate: allergies, current medications, past family history, past medical history, past social history, past surgical history and problem list.    Physical Exam:   Pulse 79   Ht 180.3 cm (70.98\")   Wt (!) 140 kg (308 lb)   SpO2 96%   BMI 42.98 kg/m²   GENERAL: Body habitus: morbidly obese    Lower extremity edema: Right: 2+ pitting; Leftt: 2+ pitting    Varicose veins:  Right: mild and venous stasis pigment; Left: venous stasis pigment    Gait: antalgic and broad based     Mental Status:  awake and alert; oriented to person, place, and time    Voice:  clear  SKIN:  venous stasis pigment    Hair Growth:  Right:diminished; Left:  diminished  NAILS: Toenails: normal  HEENT: Head: Normocephalic, atraumatic,  without obvious abnormality.  eye: normal external eye, no icterus  ears: normal external ears  nose: normal external nose  pharynx: dental hygiene adequate  PULM:  Repiratory effort " normal  CV:  Dorsalis Pedis:  Right: 2+; Left:2+ of note, the pulses were a little difficult to feel through his edema, if we do surgery I would like to get ABIs preoperatively.    Posterior Tibial: Right:2+; Left:2+    Capillary Refill:  Brisk  MSK:  Hand:right handed and mild arthritis, Heberden's nodes      Tibia:  Right:  tender over subcutaneous border; Left:  tender over subcutaneous border      Ankle:  Right: non tender, ROM  normal and motor function  normal; Left:  Not tender in the ankle joint itself, he is tender at the tip of the fibula.  Achilles is tight.  With the knee flexed he has 10 degrees ankle dorsiflexion, 30 degrees plantarflexion, 15 degrees eversion, inversion just to neutral      Foot:  Right:  non tender and Moderate flatfoot, arch reconstitutes with nonweightbearing; Left:  Significant hindfoot valgus with lateral impingement, tender over the talonavicular, subtalar and calcaneocuboid joints.  With manipulation I can bring the arch back to neutral.  The Achilles is very tight.  Nontender in the midfoot and forefoot      NEURO: Heel Walking:  Right:  He is able to get his forefoot off the ground, take 1 or 2 steps on his heels; Left:  He is able to get his forefoot off the ground, take 1 or 2 steps on his heels    Toe Walking:  Right:  He is able to get up on the toes on the right; Left:  Unable to do this     Lawn-Rachel 5.07 monofilament test: Almost completely insensate to the Lawn Rachel fiber bilaterally    Lower extremity sensation: diminished     Reflexes:  Biceps:  Right:  1+; Left:  1+           Quads:  Right:  1+; Left:  1+           Ankle:  Right:  0; Left:  0      Calf Atrophy:none    Motor Function: No function of the left posterior tibial tendon, others are 5 out of 5         Medical Decision Making    Data Review:   ordered and reviewed x-rays today, reviewed radiology images, reviewed radiology results and reviewed outside records    Assessment and Plan/  Diagnosis/Treatment options:   1. Osteoarthritis of left ankle or foot  Difficult problem in a patient whose BMI is very high.  We talked about conservative treatment which would involve a PTB brace.  I explained that I allow patients either all poly-brace or double upright.  It would allow ankle range of motion.  The goal would be to both help his pain, and help slow down valgus tilt of the hindfoot.  I explained that right now his ankle is still level in the mortise, if that begins to tilt then we have very limited options for treatment.  I would recommend that he at least get the brace, if not consider surgery.  I think his deformity has gone past the ability to be controlled with an orthotic.  Again the primary risk is tilting of the talus.  With respect to surgery I would do a triple arthrodesis with iliac crest bone graft and Achilles lengthening.  I explained the triple fusion to the patient in detail. I used the foot model to show them the joints involved.  I explained one goal is to put the foot back underneath the tibia, to help preserve the ankle as long as possible.  A second goal is to decrease pain from the arthritis in the hindfoot.     I explained the goal is a stiff but less painful foot.  I explained that most patients report about 70-80% improvement in their pain.  I explained that one of the down-sides of the procedure is that over time, the other joints  develop arthritis, and can become painful.       I explained they will still have  up and down movement due to the ankle not being involved in the surgery; they will not have side to side movement.  I explained the post-op regimen: 12 weeks non-weight bearing in a cast, then 6-8 weeks in a walking boot with a support stocking.  He would need an increased time for nonweightbearing due to the neuropathy.  I explained how all foot and ankle surgery swells longer than any other surgery.  He already has pre-existing swelling, and he will probably  have increased swelling permanently.  I explained it really takes a year for full recovery.    I explained the hardware and how it is usually permanent.  I explained the iliac crest bone graft.  I explained the Achilles lengthening.       We discussed the risks including but not limited to: death, infection, neuro-vascular damage, stroke, heart attack, bleeding, blood clots, chronic pain, mal-union, non-union, hardware failure, deformity, amputation,numbness in the thigh, pelvic fracture, hernia, stiffness, progressive arthritis, need for further surgery, etc.  Questions were asked and answered in detail.    He is going to think about his options.  I gave him a prescription for the brace.  He will call back if he wishes to consider surgery.  As above I would recommend that he at least do the brace, to help prevent tilting of the talus.  2. Neuropathy  Quite dense neuropathy on exam    3. Venous stasis  Severe venous stasis he needs to wear support stockings daily, he reports that he generally does.  We talked about various types of support stockings and where to obtain them.  It was difficult for me to palpate his pulses through his edema, if he elects to proceed with surgery I want to get ABIs preoperatively.    4. Class 3 severe obesity due to excess calories with serious comorbidity and body mass index (BMI) of 40.0 to 44.9 in adult (CMS/[facility])  Complicating factor, it will make recovery and obtaining iliac crest graft more difficult.

## 2019-07-16 PROBLEM — I87.8 VENOUS STASIS: Status: ACTIVE | Noted: 2019-07-16

## 2019-07-16 PROBLEM — G62.9 NEUROPATHY: Status: ACTIVE | Noted: 2019-07-16

## 2019-07-16 PROBLEM — E66.01 CLASS 3 SEVERE OBESITY DUE TO EXCESS CALORIES WITH SERIOUS COMORBIDITY AND BODY MASS INDEX (BMI) OF 40.0 TO 44.9 IN ADULT (HCC): Status: ACTIVE | Noted: 2019-07-16

## 2019-07-16 PROBLEM — E66.813 CLASS 3 SEVERE OBESITY DUE TO EXCESS CALORIES WITH SERIOUS COMORBIDITY AND BODY MASS INDEX (BMI) OF 40.0 TO 44.9 IN ADULT: Status: ACTIVE | Noted: 2019-07-16

## 2019-07-16 PROBLEM — M19.079 OSTEOARTHRITIS OF ANKLE OR FOOT: Status: ACTIVE | Noted: 2019-07-16

## 2019-08-07 ENCOUNTER — TELEPHONE (OUTPATIENT)
Dept: ORTHOPEDIC SURGERY | Facility: CLINIC | Age: 69
End: 2019-08-07

## 2019-08-07 DIAGNOSIS — I15.9 SECONDARY HYPERTENSION: ICD-10-CM

## 2019-08-07 DIAGNOSIS — I87.8 VENOUS STASIS: Primary | ICD-10-CM

## 2019-08-07 DIAGNOSIS — G62.9 NEUROPATHY: ICD-10-CM

## 2019-08-07 DIAGNOSIS — E66.01 MORBID OBESITY DUE TO EXCESS CALORIES (HCC): ICD-10-CM

## 2019-08-07 DIAGNOSIS — M19.072 OSTEOARTHRITIS OF LEFT ANKLE OR FOOT: ICD-10-CM

## 2019-08-07 DIAGNOSIS — E78.5 HYPERLIPIDEMIA, UNSPECIFIED HYPERLIPIDEMIA TYPE: ICD-10-CM

## 2019-08-07 DIAGNOSIS — R09.89 OTHER SPECIFIED SYMPTOMS AND SIGNS INVOLVING THE CIRCULATORY AND RESPIRATORY SYSTEMS: ICD-10-CM

## 2019-08-13 ENCOUNTER — OFFICE VISIT (OUTPATIENT)
Dept: INTERNAL MEDICINE | Facility: CLINIC | Age: 69
End: 2019-08-13

## 2019-08-13 VITALS
HEART RATE: 84 BPM | BODY MASS INDEX: 43.4 KG/M2 | SYSTOLIC BLOOD PRESSURE: 140 MMHG | DIASTOLIC BLOOD PRESSURE: 70 MMHG | WEIGHT: 311 LBS | OXYGEN SATURATION: 99 %

## 2019-08-13 DIAGNOSIS — R19.09 UMBILICAL MASS: Primary | ICD-10-CM

## 2019-08-13 PROCEDURE — 99213 OFFICE O/P EST LOW 20 MIN: CPT | Performed by: PHYSICIAN ASSISTANT

## 2019-08-13 NOTE — PROGRESS NOTES
Chief Complaint   Patient presents with   • lump on Abdomen     Pt in today for lump on abdomen x 2 days       Subjective   Obi Jackman Jr. is a 68 y.o. male.       History of Present Illness     Pt noticed a lump on his umbilicus about 2 days ago. Notes that he has lost weight in the past few months and it could have been there longer. Tender to touch but otherwise not bothersome. Does not reduce. Feels firm. History of umbilical hernia repair with mesh years ago. No bowel changes.      Current Outpatient Medications:   •  albuterol sulfate  (90 Base) MCG/ACT inhaler, Inhale 2 puffs Every 4 (Four) Hours As Needed for Wheezing., Disp: 1 inhaler, Rfl: 2  •  ALPRAZolam (XANAX) 0.25 MG tablet, Take 1 tablet by mouth Daily As Needed for Anxiety (when flying)., Disp: 10 tablet, Rfl: 0  •  aspirin 81 MG tablet, Take  by mouth daily., Disp: , Rfl:   •  atenolol (TENORMIN) 50 MG tablet, TAKE 1 TABLET BY MOUTH DAILY, Disp: 30 tablet, Rfl: 2  •  atorvastatin (LIPITOR) 10 MG tablet, TAKE ONE TABLET BY MOUTH DAILY, Disp: 30 tablet, Rfl: 5  •  azelastine (ASTELIN) 0.1 % nasal spray, 2 sprays into the nostril(s) as directed by provider 2 (Two) Times a Day. Use in each nostril as directed, Disp: , Rfl:   •  Azelastine-Fluticasone 137-50 MCG/ACT suspension, 1 spray each nostril daily, Disp: 1 bottle, Rfl: 5  •  benzonatate (TESSALON) 200 MG capsule, Take 1 capsule by mouth 3 (Three) Times a Day As Needed for Cough., Disp: 30 capsule, Rfl: 0  •  buPROPion XL (WELLBUTRIN XL) 150 MG 24 hr tablet, Take 1 tablet by mouth Daily., Disp: 30 tablet, Rfl: 5  •  cefdinir (OMNICEF) 300 MG capsule, Take 1 capsule by mouth 2 (Two) Times a Day., Disp: 20 capsule, Rfl: 0  •  cyclobenzaprine (FLEXERIL) 10 MG tablet, Take one tablet by mouth three times a day as needed for muscle spasms., Disp: 30 tablet, Rfl: 5  •  lisinopril-hydrochlorothiazide (PRINZIDE,ZESTORETIC) 20-12.5 MG per tablet, TAKE 1 TABLET BY MOUTH DAILY, Disp: 30 tablet,  Rfl: 11  •  methylPREDNISolone (MEDROL) 4 MG tablet, follow package directions, Disp: 21 tablet, Rfl: 0  •  promethazine-dextromethorphan (PROMETHAZINE-DM) 6.25-15 MG/5ML syrup, Take 5 mL by mouth 4 (Four) Times a Day As Needed for Cough., Disp: 60 mL, Rfl: 0  •  SAXENDA 18 MG/3ML solution pen-injector, INJECT 3 MG UNDER THE SKIN DAILY (START WITH 0.6 MG DAILY, INCREASE BY 0.6 MG WEEKLY OR AS TOLERATED, UP TO 3 MG), Disp: 15 mL, Rfl: 3  •  SUMAtriptan (IMITREX) 100 MG tablet, TAKE 1 TABLET BY MOUTH AT ONSET OF HEADACHE. MAY REPEAT DOSE ONE TIME IN 2 HOURS IF HEADACHE NOT RELIEVED., Disp: 9 tablet, Rfl: 3  •  terbinafine (LAMISIL) 250 MG tablet, Take 1 tablet by mouth Daily., Disp: 84 tablet, Rfl: 0  •  vitamin D (ERGOCALCIFEROL) 41568 units capsule capsule, Take 1 capsule by mouth 1 (One) Time Per Week., Disp: 12 capsule, Rfl: 3     PMFSH  The following portions of the patient's history were reviewed and updated as appropriate: allergies, current medications, past family history, past medical history, past social history, past surgical history and problem list.    Review of Systems   Constitutional: Negative for activity change, appetite change and fatigue.   HENT: Negative for congestion and rhinorrhea.    Respiratory: Negative for chest tightness and shortness of breath.    Cardiovascular: Negative for chest pain and palpitations.   Gastrointestinal: Negative for abdominal pain, constipation and diarrhea.   Genitourinary: Negative for dysuria.   Musculoskeletal: Negative for arthralgias and myalgias.   Neurological: Negative for dizziness, weakness, light-headedness and headaches.   Psychiatric/Behavioral: Negative for dysphoric mood. The patient is not nervous/anxious.        Objective   /70   Pulse 84   Wt (!) 141 kg (311 lb)   SpO2 99% Comment: ra  BMI 43.40 kg/m²     Physical Exam   Constitutional: He appears well-developed and well-nourished.   HENT:   Head: Normocephalic.   Right Ear: Hearing,  tympanic membrane, external ear and ear canal normal.   Left Ear: Hearing, tympanic membrane, external ear and ear canal normal.   Nose: Nose normal.   Mouth/Throat: Oropharynx is clear and moist.   Eyes: Conjunctivae are normal. Pupils are equal, round, and reactive to light.   Neck: Normal range of motion.   Cardiovascular: Normal rate, regular rhythm and normal heart sounds.   Pulmonary/Chest: Effort normal and breath sounds normal. He has no decreased breath sounds. He has no wheezes. He has no rhonchi. He has no rales.   Abdominal:       Musculoskeletal: Normal range of motion.   Neurological: He is alert.   Skin: Skin is warm and dry.   Psychiatric: He has a normal mood and affect. His behavior is normal.   Nursing note and vitals reviewed.        ASSESSMENT/PLAN    Problem List Items Addressed This Visit     None      Visit Diagnoses     Umbilical mass    -  Primary    Check CT of abd/pelvis to further evaluate mass vs hernia. Further recs based on results.    Relevant Orders    CT Abdomen Pelvis With & Without Contrast               Return if symptoms worsen or fail to improve, for Next scheduled follow up.

## 2019-08-23 ENCOUNTER — HOSPITAL ENCOUNTER (OUTPATIENT)
Dept: CARDIOLOGY | Facility: HOSPITAL | Age: 69
Discharge: HOME | End: 2019-08-23
Admitting: PHYSICIAN ASSISTANT

## 2019-08-23 VITALS — HEIGHT: 71 IN | WEIGHT: 310.85 LBS | BODY MASS INDEX: 43.52 KG/M2

## 2019-08-23 DIAGNOSIS — M19.072 OSTEOARTHRITIS OF LEFT ANKLE OR FOOT: ICD-10-CM

## 2019-08-23 DIAGNOSIS — I87.8 VENOUS STASIS: ICD-10-CM

## 2019-08-23 DIAGNOSIS — R09.89 OTHER SPECIFIED SYMPTOMS AND SIGNS INVOLVING THE CIRCULATORY AND RESPIRATORY SYSTEMS: ICD-10-CM

## 2019-08-23 DIAGNOSIS — E78.5 HYPERLIPIDEMIA, UNSPECIFIED HYPERLIPIDEMIA TYPE: ICD-10-CM

## 2019-08-23 DIAGNOSIS — G62.9 NEUROPATHY: ICD-10-CM

## 2019-08-23 DIAGNOSIS — E66.01 MORBID OBESITY DUE TO EXCESS CALORIES (HCC): ICD-10-CM

## 2019-08-23 DIAGNOSIS — I15.9 SECONDARY HYPERTENSION: ICD-10-CM

## 2019-08-23 LAB
BH CV LOWER ARTERIAL LEFT ABI RATIO: 1.28
BH CV LOWER ARTERIAL LEFT DORSALIS PEDIS SYS MAX: 179 MMHG
BH CV LOWER ARTERIAL LEFT GREAT TOE SYS MAX: 154 MMHG
BH CV LOWER ARTERIAL LEFT POST TIBIAL SYS MAX: 184 MMHG
BH CV LOWER ARTERIAL LEFT TBI RATIO: 1.07
BH CV LOWER ARTERIAL RIGHT ABI RATIO: 1.25
BH CV LOWER ARTERIAL RIGHT DORSALIS PEDIS SYS MAX: 180 MMHG
BH CV LOWER ARTERIAL RIGHT GREAT TOE SYS MAX: 142 MMHG
BH CV LOWER ARTERIAL RIGHT POST TIBIAL SYS MAX: 175 MMHG
BH CV LOWER ARTERIAL RIGHT TBI RATIO: 0.99
UPPER ARTERIAL LEFT ARM BRACHIAL SYS MAX: 143 MMHG
UPPER ARTERIAL RIGHT ARM BRACHIAL SYS MAX: 144 MMHG

## 2019-08-23 PROCEDURE — 93922 UPR/L XTREMITY ART 2 LEVELS: CPT | Performed by: INTERNAL MEDICINE

## 2019-08-23 PROCEDURE — 93922 UPR/L XTREMITY ART 2 LEVELS: CPT

## 2019-08-26 ENCOUNTER — OFFICE VISIT (OUTPATIENT)
Dept: ORTHOPEDIC SURGERY | Facility: CLINIC | Age: 69
End: 2019-08-26

## 2019-08-26 VITALS — HEART RATE: 78 BPM | HEIGHT: 71 IN | BODY MASS INDEX: 43.52 KG/M2 | WEIGHT: 310.85 LBS | OXYGEN SATURATION: 98 %

## 2019-08-26 DIAGNOSIS — M19.072 OSTEOARTHRITIS OF LEFT ANKLE OR FOOT: Primary | ICD-10-CM

## 2019-08-26 DIAGNOSIS — E66.01 CLASS 3 SEVERE OBESITY DUE TO EXCESS CALORIES WITH SERIOUS COMORBIDITY AND BODY MASS INDEX (BMI) OF 40.0 TO 44.9 IN ADULT (HCC): ICD-10-CM

## 2019-08-26 DIAGNOSIS — G62.9 NEUROPATHY: ICD-10-CM

## 2019-08-26 DIAGNOSIS — I87.8 VENOUS STASIS: ICD-10-CM

## 2019-08-26 DIAGNOSIS — E66.01 MORBID OBESITY DUE TO EXCESS CALORIES (HCC): ICD-10-CM

## 2019-08-26 PROCEDURE — 99214 OFFICE O/P EST MOD 30 MIN: CPT | Performed by: ORTHOPAEDIC SURGERY

## 2019-08-26 NOTE — PROGRESS NOTES
ESTABLISHED PATIENT    Patient: Obi Jackman Jr.  : 1950    Primary Care Provider: Cammy Oneal PA    Requesting Provider: As above    Follow-up (Osteoarthritis of left ankle or foot)      History    Chief Complaint: Left hindfoot pain    History of Present Illness: Mr. Jackman returns with ABIs that show normal blood flow.  He reports that he went to the brace shop to be fitted for the brace, but in talking to another patient there, he decided to proceed with surgery.  He has many questions listed, I went over all of them in detail.  He has been trying to wear his support stockings more frequently.    Current Outpatient Medications on File Prior to Visit   Medication Sig Dispense Refill   • albuterol sulfate  (90 Base) MCG/ACT inhaler Inhale 2 puffs Every 4 (Four) Hours As Needed for Wheezing. 1 inhaler 2   • ALPRAZolam (XANAX) 0.25 MG tablet Take 1 tablet by mouth Daily As Needed for Anxiety (when flying). 10 tablet 0   • aspirin 81 MG tablet Take  by mouth daily.     • atenolol (TENORMIN) 50 MG tablet TAKE 1 TABLET BY MOUTH DAILY 30 tablet 2   • atorvastatin (LIPITOR) 10 MG tablet TAKE ONE TABLET BY MOUTH DAILY 30 tablet 5   • azelastine (ASTELIN) 0.1 % nasal spray 2 sprays into the nostril(s) as directed by provider 2 (Two) Times a Day. Use in each nostril as directed     • Azelastine-Fluticasone 137-50 MCG/ACT suspension 1 spray each nostril daily 1 bottle 5   • benzonatate (TESSALON) 200 MG capsule Take 1 capsule by mouth 3 (Three) Times a Day As Needed for Cough. 30 capsule 0   • buPROPion XL (WELLBUTRIN XL) 150 MG 24 hr tablet Take 1 tablet by mouth Daily. 30 tablet 5   • cefdinir (OMNICEF) 300 MG capsule Take 1 capsule by mouth 2 (Two) Times a Day. 20 capsule 0   • cyclobenzaprine (FLEXERIL) 10 MG tablet Take one tablet by mouth three times a day as needed for muscle spasms. 30 tablet 5   • lisinopril-hydrochlorothiazide (PRINZIDE,ZESTORETIC) 20-12.5 MG per tablet TAKE 1 TABLET BY MOUTH  DAILY 30 tablet 11   • methylPREDNISolone (MEDROL) 4 MG tablet follow package directions 21 tablet 0   • promethazine-dextromethorphan (PROMETHAZINE-DM) 6.25-15 MG/5ML syrup Take 5 mL by mouth 4 (Four) Times a Day As Needed for Cough. 60 mL 0   • SAXENDA 18 MG/3ML solution pen-injector INJECT 3 MG UNDER THE SKIN DAILY (START WITH 0.6 MG DAILY, INCREASE BY 0.6 MG WEEKLY OR AS TOLERATED, UP TO 3 MG) 15 mL 3   • SUMAtriptan (IMITREX) 100 MG tablet TAKE 1 TABLET BY MOUTH AT ONSET OF HEADACHE. MAY REPEAT DOSE ONE TIME IN 2 HOURS IF HEADACHE NOT RELIEVED. 9 tablet 3   • terbinafine (LAMISIL) 250 MG tablet Take 1 tablet by mouth Daily. 84 tablet 0   • vitamin D (ERGOCALCIFEROL) 42802 units capsule capsule Take 1 capsule by mouth 1 (One) Time Per Week. 12 capsule 3     No current facility-administered medications on file prior to visit.       No Known Allergies   Past Medical History:   Diagnosis Date   • Bulging of lumbar intervertebral disc    • Hypertension    • Swelling of left ear      Past Surgical History:   Procedure Laterality Date   • SPINE SURGERY       Family History   Problem Relation Age of Onset   • No Known Problems Mother    • No Known Problems Father       Social History     Socioeconomic History   • Marital status:      Spouse name: Not on file   • Number of children: Not on file   • Years of education: Not on file   • Highest education level: Not on file   Tobacco Use   • Smoking status: Never Smoker   • Smokeless tobacco: Never Used   Substance and Sexual Activity   • Alcohol use: Yes     Comment: rarely   • Drug use: No   • Sexual activity: Defer        Review of Systems   Constitutional: Negative.    HENT: Negative.    Eyes: Negative.    Respiratory: Negative.    Cardiovascular: Negative.    Gastrointestinal: Negative.    Endocrine: Negative.    Genitourinary: Negative.    Musculoskeletal: Positive for joint swelling.   Skin: Negative.    Allergic/Immunologic: Negative.    Neurological:  "Negative.    Hematological: Negative.    Psychiatric/Behavioral: Negative.        The following portions of the patient's history were reviewed and updated as appropriate: allergies, current medications, past family history, past medical history, past social history, past surgical history and problem list.    Physical Exam:   Pulse 78   Ht 180.3 cm (70.98\")   Wt (!) 141 kg (310 lb 13.6 oz)   SpO2 98%   BMI 43.37 kg/m²   GENERAL: Body habitus: morbidly obese    Lower extremity edema: Left: 3+ pitting; Right: 3+ pitting    Gait: antalgic and broad based     Mental Status:  awake and alert; oriented to person, place, and time  MSK:  Tibia:  Right:  tender over subcutaneous border; Left:  tender over subcutaneous border        Ankle:  Right: non tender; Left:  Tender in the sinus Tarsi, significant hindfoot valgus        Foot:  Right:  non tender; Left:  Tender in the sinus Tarsi, hindfoot valgus    NEURO Sensation:  diminished    Medical Decision Making    Data Review:   I went over the ABIs which are normal    Assessment/Plan/Diagnosis/Treatment Options:   1. Osteoarthritis of left ankle or foot  Left hindfoot valgus and significant hindfoot arthritis.  The ankle is still level in the mortise.  We discussed options in great detail.  He would like to proceed with surgery.  What I will do is a left triple arthrodesis with iliac crest bone graft and Achilles tendon lengthening.  He is in a high risk category for complications with respect to foot and ankle healing and bone healing and wound healing.  I discussed the postop regimen, 12 weeks nonweightbearing.  I explained the usual 2 to 3-day hospitalization.  We went over everything in detail.  I answered his list of questions.   We discussed the risks including but not limited to: death, infection, neurovascular damage, strokes, heart attacks, blood clots, chronic pain, deformity, stiffness, malunion, nonunion, hardware failure, need for further surgery,  " amputation,numbness in the thigh, hernia, pelvic fracture, etc. Questions were asked and answered in detail.  He asked me if the swelling would be less in his foot after surgery, I explained that no it will be worse.  He asked me if the swelling will be less on the right side, I explained that no absolutely not.  The only thing that helps the venous stasis is wearing compression socks, and losing over 100 pounds would significantly help.        2. Neuropathy  This affects bone healing in the foot and ankle, I explained how its different compared to other types of surgery.  He will be nonweightbearing 12 weeks.      3. Morbid obesity due to excess calories (CMS/[facility]) BMI 43.37  This significantly increases surgical risk, we discussed this in detail, I still will need to do an iliac crest bone graft because of needing a bone block to push the foot back into neutral on the lateral aspect

## 2019-08-27 ENCOUNTER — HOSPITAL ENCOUNTER (OUTPATIENT)
Dept: CT IMAGING | Facility: HOSPITAL | Age: 69
Discharge: HOME | End: 2019-08-27
Admitting: PHYSICIAN ASSISTANT

## 2019-08-27 DIAGNOSIS — R19.09 UMBILICAL MASS: ICD-10-CM

## 2019-08-27 LAB — CREAT BLDA-MCNC: 0.8 MG/DL (ref 0.6–1.3)

## 2019-08-27 PROCEDURE — 25010000002 IOPAMIDOL 61 % SOLUTION: Performed by: PHYSICIAN ASSISTANT

## 2019-08-27 PROCEDURE — 74178 CT ABD&PLV WO CNTR FLWD CNTR: CPT

## 2019-08-27 PROCEDURE — 82565 ASSAY OF CREATININE: CPT

## 2019-08-27 RX ADMIN — BARIUM SULFATE 450 ML: 21 SUSPENSION ORAL at 10:50

## 2019-08-27 RX ADMIN — IOPAMIDOL 95 ML: 612 INJECTION, SOLUTION INTRAVENOUS at 11:50

## 2019-08-29 NOTE — PROGRESS NOTES
The CT of your abdomen and pelvis did not show any evidence of a hernia or other abnormality. The palpable nodule may be a superficial sebaceous cyst. Please monitor it and if it seems to be getting larger, we can do an ultrasound. Otherwise, no cause for concern.

## 2019-08-30 ENCOUNTER — TELEPHONE (OUTPATIENT)
Dept: INTERNAL MEDICINE | Facility: CLINIC | Age: 69
End: 2019-08-30

## 2019-09-03 ENCOUNTER — OFFICE VISIT (OUTPATIENT)
Dept: INTERNAL MEDICINE | Facility: CLINIC | Age: 69
End: 2019-09-03

## 2019-09-03 VITALS
BODY MASS INDEX: 43.82 KG/M2 | HEIGHT: 71 IN | OXYGEN SATURATION: 99 % | HEART RATE: 70 BPM | WEIGHT: 313 LBS | DIASTOLIC BLOOD PRESSURE: 79 MMHG | SYSTOLIC BLOOD PRESSURE: 130 MMHG

## 2019-09-03 DIAGNOSIS — M25.541 ARTHRALGIA OF BOTH HANDS: ICD-10-CM

## 2019-09-03 DIAGNOSIS — G89.29 CHRONIC GROIN PAIN, RIGHT: ICD-10-CM

## 2019-09-03 DIAGNOSIS — R10.31 CHRONIC GROIN PAIN, RIGHT: ICD-10-CM

## 2019-09-03 DIAGNOSIS — E66.01 MORBID OBESITY DUE TO EXCESS CALORIES (HCC): ICD-10-CM

## 2019-09-03 DIAGNOSIS — M79.642 BILATERAL HAND PAIN: ICD-10-CM

## 2019-09-03 DIAGNOSIS — G89.29 CHRONIC RIGHT SHOULDER PAIN: Primary | ICD-10-CM

## 2019-09-03 DIAGNOSIS — G44.229 CHRONIC TENSION-TYPE HEADACHE, NOT INTRACTABLE: ICD-10-CM

## 2019-09-03 DIAGNOSIS — M25.511 CHRONIC RIGHT SHOULDER PAIN: Primary | ICD-10-CM

## 2019-09-03 DIAGNOSIS — M25.542 ARTHRALGIA OF BOTH HANDS: ICD-10-CM

## 2019-09-03 DIAGNOSIS — M79.641 BILATERAL HAND PAIN: ICD-10-CM

## 2019-09-03 PROCEDURE — 86140 C-REACTIVE PROTEIN: CPT | Performed by: PHYSICIAN ASSISTANT

## 2019-09-03 PROCEDURE — 85652 RBC SED RATE AUTOMATED: CPT | Performed by: PHYSICIAN ASSISTANT

## 2019-09-03 PROCEDURE — 99214 OFFICE O/P EST MOD 30 MIN: CPT | Performed by: PHYSICIAN ASSISTANT

## 2019-09-03 PROCEDURE — 86038 ANTINUCLEAR ANTIBODIES: CPT | Performed by: PHYSICIAN ASSISTANT

## 2019-09-03 PROCEDURE — 86431 RHEUMATOID FACTOR QUANT: CPT | Performed by: PHYSICIAN ASSISTANT

## 2019-09-03 RX ORDER — CYCLOBENZAPRINE HCL 10 MG
TABLET ORAL
Qty: 30 TABLET | Refills: 5 | Status: SHIPPED | OUTPATIENT
Start: 2019-09-03 | End: 2020-08-13 | Stop reason: SDUPTHER

## 2019-09-03 NOTE — PROGRESS NOTES
Chief Complaint   Patient presents with   • Follow-up     f/u after CT SCAN   • Joint Pain     pain in left wrist, right shoulder, right groin area, and bilateral thumbs       Subjective   Obi Jackman Jr. is a 68 y.o. male.       History of Present Illness     In the past month or 2 pt has had increased joint pain. Has pain in his left wrist, both thumbs, deep in his right shoulder, right groin. Has intermittent stabbing pain in his right groin that varies in intensity.     His shoulder pain probably started first and has been going on for over a year. When it is at its worst, he can point to a spot deep in his shoulder.    The right groin pain has been ongoing for over a year, gets worse at times as well. Only when he is walking or on his feet.    Has seen Dr Abbott and Dr Guzman, has not seen a general orthopedist regarding hisjoint pain.     Also has pain in his bilateral thumb joints for the past 6-8 months. Uses     Current Outpatient Medications:   •  albuterol sulfate  (90 Base) MCG/ACT inhaler, Inhale 2 puffs Every 4 (Four) Hours As Needed for Wheezing., Disp: 1 inhaler, Rfl: 2  •  ALPRAZolam (XANAX) 0.25 MG tablet, Take 1 tablet by mouth Daily As Needed for Anxiety (when flying)., Disp: 10 tablet, Rfl: 0  •  aspirin 81 MG tablet, Take  by mouth daily., Disp: , Rfl:   •  atenolol (TENORMIN) 50 MG tablet, TAKE 1 TABLET BY MOUTH DAILY, Disp: 30 tablet, Rfl: 2  •  atorvastatin (LIPITOR) 10 MG tablet, TAKE ONE TABLET BY MOUTH DAILY, Disp: 30 tablet, Rfl: 5  •  azelastine (ASTELIN) 0.1 % nasal spray, 2 sprays into the nostril(s) as directed by provider 2 (Two) Times a Day. Use in each nostril as directed, Disp: , Rfl:   •  Azelastine-Fluticasone 137-50 MCG/ACT suspension, 1 spray each nostril daily, Disp: 1 bottle, Rfl: 5  •  benzonatate (TESSALON) 200 MG capsule, Take 1 capsule by mouth 3 (Three) Times a Day As Needed for Cough., Disp: 30 capsule, Rfl: 0  •  buPROPion XL (WELLBUTRIN XL) 150 MG 24 hr  "tablet, Take 1 tablet by mouth Daily., Disp: 30 tablet, Rfl: 5  •  cyclobenzaprine (FLEXERIL) 10 MG tablet, Take one tablet by mouth three times a day as needed for muscle spasms., Disp: 30 tablet, Rfl: 5  •  Liraglutide -Weight Management (SAXENDA) 18 MG/3ML solution pen-injector, Inject 3 mg under the skin into the appropriate area as directed Daily., Disp: 6 pen, Rfl: 5  •  lisinopril-hydrochlorothiazide (PRINZIDE,ZESTORETIC) 20-12.5 MG per tablet, TAKE 1 TABLET BY MOUTH DAILY, Disp: 30 tablet, Rfl: 11  •  SUMAtriptan (IMITREX) 100 MG tablet, TAKE 1 TABLET BY MOUTH AT ONSET OF HEADACHE. MAY REPEAT DOSE ONE TIME IN 2 HOURS IF HEADACHE NOT RELIEVED., Disp: 9 tablet, Rfl: 3  •  vitamin D (ERGOCALCIFEROL) 33435 units capsule capsule, Take 1 capsule by mouth 1 (One) Time Per Week., Disp: 12 capsule, Rfl: 3     PMFSH  The following portions of the patient's history were reviewed and updated as appropriate: allergies, current medications, past family history, past medical history, past social history, past surgical history and problem list.    Review of Systems   Constitutional: Negative for fever and unexpected weight change.   HENT: Negative.    Eyes: Negative.    Respiratory: Negative for chest tightness, shortness of breath and wheezing.    Cardiovascular: Negative.    Gastrointestinal: Negative for abdominal pain.   Endocrine: Negative.    Genitourinary: Negative.    Musculoskeletal: Positive for arthralgias.   Skin: Negative for color change, rash and wound.   Allergic/Immunologic: Negative.    Neurological: Negative for seizures and syncope.   Hematological: Negative for adenopathy. Does not bruise/bleed easily.   Psychiatric/Behavioral: Negative for confusion.       Objective   /79 (BP Location: Left arm, Patient Position: Sitting)   Pulse 70   Ht 180.3 cm (70.98\")   Wt (!) 142 kg (313 lb)   SpO2 99%   BMI 43.67 kg/m²     Physical Exam   Constitutional: He appears well-developed and well-nourished. "   HENT:   Head: Normocephalic.   Right Ear: Hearing, tympanic membrane, external ear and ear canal normal.   Left Ear: Hearing, tympanic membrane, external ear and ear canal normal.   Nose: Nose normal.   Mouth/Throat: Oropharynx is clear and moist.   Eyes: Conjunctivae are normal. Pupils are equal, round, and reactive to light.   Neck: Normal range of motion.   Cardiovascular: Normal rate, regular rhythm and normal heart sounds.   Pulmonary/Chest: Effort normal and breath sounds normal. He has no decreased breath sounds. He has no wheezes. He has no rhonchi. He has no rales.   Musculoskeletal: Normal range of motion.        Right shoulder: He exhibits tenderness, pain and spasm. He exhibits normal range of motion.        Right hip: He exhibits tenderness. He exhibits normal range of motion and normal strength.   Neurological: He is alert.   Skin: Skin is warm and dry.   Psychiatric: He has a normal mood and affect. His behavior is normal.   Nursing note and vitals reviewed.      Results for orders placed or performed in visit on 09/03/19   C-reactive Protein   Result Value Ref Range    C-Reactive Protein 1.32 (H) 0.00 - 0.50 mg/dL   Rheumatoid Factor   Result Value Ref Range    Rheumatoid Factor Quantitative 200.0 (H) 0.0 - 14.0 IU/mL   Sedimentation Rate   Result Value Ref Range    Sed Rate 21 (H) 0 - 20 mm/hr        ASSESSMENT/PLAN    Problem List Items Addressed This Visit        Digestive    Morbid obesity due to excess calories (CMS/HCC)     Obesity is improving with treatment.  Discussed the patient's BMI.  The BMI is above average; BMI management plan is completed.  General weight loss/lifestyle modification strategies discussed (elicit support from others; identify saboteurs; non-food rewards, etc).  Informal exercise measures discussed, e.g. taking stairs instead of elevator.  Regular aerobic exercise program discussed.  Pharmacotherapy as ordered.         Relevant Medications    Liraglutide -Weight  Management (SAXENDA) 18 MG/3ML solution pen-injector       Nervous and Auditory    Chronic tension type headache    Relevant Medications    cyclobenzaprine (FLEXERIL) 10 MG tablet      Other Visit Diagnoses     Chronic right shoulder pain    -  Primary    Check labs as ordered. Check xray of shoulder. Refer for PT and ortho eval. Further recs based on lab and xr results.    Relevant Orders    Ambulatory Referral to Orthopedic Surgery    Ambulatory Referral to Physical Therapy Evaluate and treat    XR Shoulder 2+ View Right (Completed)    Nuclear Antigen Antibody, IFA    C-reactive Protein (Completed)    Rheumatoid Factor (Completed)    Sedimentation Rate (Completed)    Chronic groin pain, right        Relevant Orders    Ambulatory Referral to Orthopedic Surgery    Ambulatory Referral to Physical Therapy Evaluate and treat    XR Hip With or Without Pelvis 2 - 3 View Right (Completed)    Nuclear Antigen Antibody, IFA    C-reactive Protein (Completed)    Rheumatoid Factor (Completed)    Sedimentation Rate (Completed)    Bilateral hand pain        Relevant Orders    XR Hand 3+ View Bilateral (In Office) (Completed)    Nuclear Antigen Antibody, IFA    C-reactive Protein (Completed)    Rheumatoid Factor (Completed)    Sedimentation Rate (Completed)    Arthralgia of both hands        Check labs for underlying autoimmune process. Check xrays as ordered. Further recs based on results.               No Follow-up on file.

## 2019-09-04 ENCOUNTER — HOSPITAL ENCOUNTER (OUTPATIENT)
Dept: GENERAL RADIOLOGY | Facility: HOSPITAL | Age: 69
Discharge: HOME | End: 2019-09-04
Admitting: PHYSICIAN ASSISTANT

## 2019-09-04 DIAGNOSIS — M25.511 CHRONIC RIGHT SHOULDER PAIN: ICD-10-CM

## 2019-09-04 DIAGNOSIS — G89.29 CHRONIC RIGHT SHOULDER PAIN: ICD-10-CM

## 2019-09-04 DIAGNOSIS — R10.31 CHRONIC GROIN PAIN, RIGHT: ICD-10-CM

## 2019-09-04 DIAGNOSIS — G89.29 CHRONIC GROIN PAIN, RIGHT: ICD-10-CM

## 2019-09-04 DIAGNOSIS — M79.641 BILATERAL HAND PAIN: ICD-10-CM

## 2019-09-04 DIAGNOSIS — M79.642 BILATERAL HAND PAIN: ICD-10-CM

## 2019-09-04 DIAGNOSIS — M25.50 MULTIPLE JOINT PAIN: Primary | ICD-10-CM

## 2019-09-04 DIAGNOSIS — R76.8 RHEUMATOID FACTOR POSITIVE: ICD-10-CM

## 2019-09-04 LAB
CHROMATIN AB SERPL-ACNC: 200 IU/ML (ref 0–14)
CRP SERPL-MCNC: 1.32 MG/DL (ref 0–0.5)
ERYTHROCYTE [SEDIMENTATION RATE] IN BLOOD: 21 MM/HR (ref 0–20)

## 2019-09-04 PROCEDURE — 73030 X-RAY EXAM OF SHOULDER: CPT

## 2019-09-04 PROCEDURE — 73130 X-RAY EXAM OF HAND: CPT

## 2019-09-04 PROCEDURE — 73502 X-RAY EXAM HIP UNI 2-3 VIEWS: CPT

## 2019-09-05 NOTE — PROGRESS NOTES
Your recent labs show elevation of inflammatory markers and a positive rheumatoid factor. One lab is still pending but I am going to go ahead and put in a referral to a rheumatologist for additional testing and recommendations.     If you are unable to get an appointment with the rheumatologist prior to your surgery, you could still go ahead with the orthopedic appointment to see if there is anything that can be done for your hip pain.

## 2019-09-06 LAB
ANA HOMOGEN TITR SER: ABNORMAL {TITER}
ANA SER QL IA: POSITIVE
Lab: ABNORMAL

## 2019-09-06 NOTE — PROGRESS NOTES
The final autoimmune test, AKASH, was also positive. You should be hearing about a Rheumatology appointment soon. Please let us know if it has not been scheduled.

## 2019-09-09 ENCOUNTER — TRANSCRIBE ORDERS (OUTPATIENT)
Dept: LAB | Facility: HOSPITAL | Age: 69
End: 2019-09-09

## 2019-09-09 ENCOUNTER — HOSPITAL ENCOUNTER (OUTPATIENT)
Dept: PHYSICAL THERAPY | Facility: HOSPITAL | Age: 69
Setting detail: THERAPIES SERIES
Discharge: HOME | End: 2019-09-09

## 2019-09-09 ENCOUNTER — LAB (OUTPATIENT)
Dept: LAB | Facility: HOSPITAL | Age: 69
End: 2019-09-09

## 2019-09-09 ENCOUNTER — TRANSCRIBE ORDERS (OUTPATIENT)
Dept: ADMINISTRATIVE | Facility: HOSPITAL | Age: 69
End: 2019-09-09

## 2019-09-09 DIAGNOSIS — M25.551 CHRONIC RIGHT HIP PAIN: ICD-10-CM

## 2019-09-09 DIAGNOSIS — R76.9 ABNORMAL IMMUNOLOGICAL FINDING IN SERUM: ICD-10-CM

## 2019-09-09 DIAGNOSIS — G89.29 CHRONIC RIGHT HIP PAIN: ICD-10-CM

## 2019-09-09 DIAGNOSIS — M25.511 CHRONIC RIGHT SHOULDER PAIN: Primary | ICD-10-CM

## 2019-09-09 DIAGNOSIS — G89.29 CHRONIC RIGHT SHOULDER PAIN: Primary | ICD-10-CM

## 2019-09-09 DIAGNOSIS — M25.50 PAIN IN JOINT, MULTIPLE SITES: Primary | ICD-10-CM

## 2019-09-09 DIAGNOSIS — M25.551 RIGHT HIP PAIN: Primary | ICD-10-CM

## 2019-09-09 DIAGNOSIS — M25.50 PAIN IN JOINT, MULTIPLE SITES: ICD-10-CM

## 2019-09-09 LAB
ALBUMIN SERPL-MCNC: 4.1 G/DL (ref 3.5–5.2)
ALBUMIN/GLOB SERPL: 1.4 G/DL
ALP SERPL-CCNC: 59 U/L (ref 39–117)
ALT SERPL W P-5'-P-CCNC: 16 U/L (ref 1–41)
ANION GAP SERPL CALCULATED.3IONS-SCNC: 10.9 MMOL/L (ref 5–15)
AST SERPL-CCNC: 19 U/L (ref 1–40)
BASOPHILS # BLD AUTO: 0.02 10*3/MM3 (ref 0–0.2)
BASOPHILS NFR BLD AUTO: 0.3 % (ref 0–1.5)
BILIRUB SERPL-MCNC: 0.4 MG/DL (ref 0.2–1.2)
BUN BLD-MCNC: 8 MG/DL (ref 8–23)
BUN/CREAT SERPL: 10.5 (ref 7–25)
CALCIUM SPEC-SCNC: 9.6 MG/DL (ref 8.6–10.5)
CHLORIDE SERPL-SCNC: 101 MMOL/L (ref 98–107)
CO2 SERPL-SCNC: 29.1 MMOL/L (ref 22–29)
CREAT BLD-MCNC: 0.76 MG/DL (ref 0.76–1.27)
DEPRECATED RDW RBC AUTO: 43.5 FL (ref 37–54)
EOSINOPHIL # BLD AUTO: 0.18 10*3/MM3 (ref 0–0.4)
EOSINOPHIL NFR BLD AUTO: 2.9 % (ref 0.3–6.2)
ERYTHROCYTE [DISTWIDTH] IN BLOOD BY AUTOMATED COUNT: 12.6 % (ref 12.3–15.4)
GFR SERPL CREATININE-BSD FRML MDRD: 102 ML/MIN/1.73
GLOBULIN UR ELPH-MCNC: 2.9 GM/DL
GLUCOSE BLD-MCNC: 86 MG/DL (ref 65–99)
HCT VFR BLD AUTO: 44.4 % (ref 37.5–51)
HGB BLD-MCNC: 14.4 G/DL (ref 13–17.7)
IMM GRANULOCYTES # BLD AUTO: 0.01 10*3/MM3 (ref 0–0.05)
IMM GRANULOCYTES NFR BLD AUTO: 0.2 % (ref 0–0.5)
LYMPHOCYTES # BLD AUTO: 1.24 10*3/MM3 (ref 0.7–3.1)
LYMPHOCYTES NFR BLD AUTO: 20.1 % (ref 19.6–45.3)
MCH RBC QN AUTO: 30.8 PG (ref 26.6–33)
MCHC RBC AUTO-ENTMCNC: 32.4 G/DL (ref 31.5–35.7)
MCV RBC AUTO: 94.9 FL (ref 79–97)
MONOCYTES # BLD AUTO: 0.68 10*3/MM3 (ref 0.1–0.9)
MONOCYTES NFR BLD AUTO: 11 % (ref 5–12)
NEUTROPHILS # BLD AUTO: 4.05 10*3/MM3 (ref 1.7–7)
NEUTROPHILS NFR BLD AUTO: 65.5 % (ref 42.7–76)
NRBC BLD AUTO-RTO: 0 /100 WBC (ref 0–0.2)
PLATELET # BLD AUTO: 302 10*3/MM3 (ref 140–450)
PMV BLD AUTO: 10.1 FL (ref 6–12)
POTASSIUM BLD-SCNC: 4.1 MMOL/L (ref 3.5–5.2)
PROT SERPL-MCNC: 7 G/DL (ref 6–8.5)
RBC # BLD AUTO: 4.68 10*6/MM3 (ref 4.14–5.8)
SODIUM BLD-SCNC: 141 MMOL/L (ref 136–145)
WBC NRBC COR # BLD: 6.18 10*3/MM3 (ref 3.4–10.8)

## 2019-09-09 PROCEDURE — 86235 NUCLEAR ANTIGEN ANTIBODY: CPT

## 2019-09-09 PROCEDURE — 85025 COMPLETE CBC W/AUTO DIFF WBC: CPT

## 2019-09-09 PROCEDURE — 80053 COMPREHEN METABOLIC PANEL: CPT

## 2019-09-09 PROCEDURE — 86200 CCP ANTIBODY: CPT

## 2019-09-09 PROCEDURE — 83520 IMMUNOASSAY QUANT NOS NONAB: CPT

## 2019-09-09 PROCEDURE — 36415 COLL VENOUS BLD VENIPUNCTURE: CPT

## 2019-09-09 PROCEDURE — 86255 FLUORESCENT ANTIBODY SCREEN: CPT

## 2019-09-09 PROCEDURE — 97161 PT EVAL LOW COMPLEX 20 MIN: CPT

## 2019-09-09 NOTE — THERAPY EVALUATION
Outpatient Physical Therapy Ortho Initial Evaluation  [facility]     Patient Name: Obi Jackman Jr.  : 1950  MRN: [id number]  Today's Date: 2019      Visit Date: 2019    Patient Active Problem List   Diagnosis   • Allergic rhinitis   • Anxiety disorder   • Benign paroxysmal positional vertigo   • Chronic tension type headache   • Depression   • ED (erectile dysfunction) of non-organic origin   • Hyperlipidemia   • Hypertension   • LFTs abnormal   • CHRISTOPHE (obstructive sleep apnea)   • Elevated glucose   • Health care maintenance   • Morbid obesity due to excess calories (CMS/[facility])   • Onychomycosis of left great toe   • Osteoarthritis of ankle or foot   • Venous stasis   • Neuropathy   • Class 3 severe obesity due to excess calories with serious comorbidity and body mass index (BMI) of 40.0 to 44.9 in adult (CMS/[facility])        Past Medical History:   Diagnosis Date   • Bulging of lumbar intervertebral disc    • Hypertension    • Swelling of left ear         Past Surgical History:   Procedure Laterality Date   • SPINE SURGERY         Visit Dx:     ICD-10-CM ICD-9-CM   1. Chronic right shoulder pain M25.511 719.41    G89.29 338.29   2. Chronic right hip pain M25.551 719.45    G89.29 338.29         Patient History     Row Name 19 1345          History    Chief Complaint  Pain;Difficulty with daily activities;Difficulty Walking  -LF     Type of Pain  Hip pain;Shoulder pain  -LF     Date Current Problem(s) Began  -- 1 year ago  -LF     Brief Description of Current Complaint  Mr. Jackman presents with right groin and right shoulder pain that have both been ongoing for about a year.  The groin pain is not all the time, but hits him and feels like it takes him to his knees.  Saw rheumatolgist today and was started on prophylatic anti-inflammtory and is scheduled for a MRI of his right hip later this week.  Is is also scheduled to see orthopedics.  His right shoulder is 2-3/10 most of the time, with  increased pain certain movements.  He also describes constant wrist and finger pain.   He is scheduled for a left foot surgery in October and will be NWB following this  -LF     Patient/Caregiver Goals  Relieve pain;Improve mobility;Know what to do to help the symptoms  -LF     Occupation/sports/leisure activities  Retired in June  -LF     What clinical tests have you had for this problem?  X-ray  -     Results of Clinical Tests  arthritic changes at the hip  -LF        Pain     Pain Location  Hip  -LF     Pain at Present  5  -LF     Pain at Best  3  -LF     Pain at Worst  10  -LF     Pain Frequency  Constant/continuous  -LF     Pain Description  Aching;Sharp  -LF     What Performance Factors Make the Current Problem(s) WORSE?  Hip pain worse with walking, standing, rolling over in bed, getting in/out of car and bed.  Does okay for about first 300' this has to slow down. Shoulder pain, reaching out to the side, reaching behind head.  -LF     What Performance Factors Make the Current Problem(s) BETTER?  rest, using a cane, ibuprofen  -LF     Pain Comments  Shoulder pain  2-3 all the time, 8-9/10 at worst   -LF        Fall Risk Assessment    Any falls in the past year:  No  -LF        Daily Activities    Primary Language  English  -     How does patient learn best?  Listening  -     Teaching needs identified  Home Exercise Program;Management of Condition  -LF     Does patient have problems with the following?  None  -LF     Barriers to learning  None  -LF     Pt Participated in POC and Goals  Yes  -LF       User Key  (r) = Recorded By, (t) = Taken By, (c) = Cosigned By    Initials Name Provider Type     Nichelle Matamoros PT Physical Therapist          PT Ortho     Row Name 09/09/19 6232       Posture/Observations    Posture/Observations Comments  Palpation:  Right anterior shoulder tenderness; R hip mild tenderness toward groin.  Increased hip pain with post. shear and rotation.  -LF       Special  Tests/Palpation    Special Tests/Palpation  Shoulder;Hip  -LF       Shoulder Impingement/Rotator Cuff Special Tests    Eden-Suhail Test (RC Lesion vs. Bursitis)  Right:;Negative  -LF    Neer Impingement Test (RC Lesion vs. Bursitis)  Right:;Negative  -LF    Empty Can Test (RC Lesion)  Right:;Negative  -LF    Speed's Test (LH of Biceps Lesion)  Right:;Negative  -LF       General ROM    RT Upper Ext  Rt Shoulder ABduction;Rt Shoulder Flexion;Rt Shoulder External Rotation  -LF    GENERAL ROM COMMENTS  hip IR R 30, L 33; hip ER R 20, L 22.    -LF       Right Upper Ext    Rt Shoulder Abduction AROM  142 pain  -LF    Rt Shoulder Flexion AROM  150 pain  -LF    Rt Shoulder External Rotation AROM  58 supine at 45 abd, pain  -LF    Rt Upper Extremity Comments   behind head and behind back functional reach WNL's compared to LUE.   -LF       MMT (Manual Muscle Testing)    General MMT Comments  Right shoulder strength 4+/5 flexion and IR, otherwise 5/5.  Bilateral hip flex and abduction 4+/5.  5/5 at knee  -LF       Sensation    Sensation WNL?  WNL  -LF      User Key  (r) = Recorded By, (t) = Taken By, (c) = Cosigned By    Initials Name Provider Type    Nichelle Nuno, PT Physical Therapist              PT OP Goals     Row Name 09/09/19 1345          PT Short Term Goals    STG Date to Achieve  09/30/19  -LF     STG 1  Right shoulder pain improved by 50%  -LF     STG 1 Progress  New  -LF     STG 2  Rigth hip pain improved by 50%  -LF     STG 2 Progress  New  -LF     STG 3  Patient independent with HEP for management of symptoms.  -LF     STG 3 Progress  New  -LF     STG 4  Right shoulder tenderness resolved  -LF     STG 4 Progress  New  -LF        Long Term Goals    LTG Date to Achieve  10/21/19  -LF     LTG 1  Patient to report 3 or better on LEFS for getting into or out of car  -LF     LTG 1 Progress  New  -LF     LTG 2  Patient to report 3 on LEFS for walking 2 blocks (per right hip pain limitiations)  -LF     LTG 2  Progress  New  -LF     LTG 3  Patient to report no difficulty walking between rooms.  -LF     LTG 3 Progress  New  -LF     LTG 4  Patient to demonstrate R shoulder ROM WNL's as comparted to L and without increased pain  -LF     LTG 4 Progress  New  -LF     LTG 5  Patient to report right shoulder pain 2/10 or less with daily activities.  -LF        Time Calculation    PT Goal Re-Cert Due Date  12/08/19  -LF       User Key  (r) = Recorded By, (t) = Taken By, (c) = Cosigned By    Initials Name Provider Type    LF Nichelle Matamoros, PT Physical Therapist          PT Assessment/Plan     Row Name 09/09/19 1835          PT Assessment    Functional Limitations  Impaired gait;Limitation in home management;Limitations in community activities;Performance in leisure activities;Performance in self-care ADL  -LF     Impairments  Gait;Impaired flexibility;Muscle strength;Pain;Range of motion  -LF     Assessment Comments  Patient presents with right hip/groin pain limiting his normal mobility.  Also with right shoulder pain most consistent with impingement/tendonitis, although not provoked by special test today.  Further treatment indicated to help decrease pain, improved strength and ROM, and improve overall function.  He will limited with weight-bearing exercises due to left foot and ankle issues and he will be having surgery on this next month.   -LF     Please refer to paper survey for additional self-reported information  Yes  -LF     Rehab Potential  Good  -LF     Patient/caregiver participated in establishment of treatment plan and goals  Yes  -LF     Patient would benefit from skilled therapy intervention  Yes  -LF        PT Plan    PT Frequency  2x/week  -LF     Planned CPT's?  PT EVAL LOW COMPLEXITY: 63440;PT THER PROC EA 15 MIN: 44237;PT MANUAL THERAPY EA 15 MIN: 37173;PT GAIT TRAINING EA 15 MIN: 49091;PT ELECTRICAL STIM UNATTEND: ;PT HOT/COLD PACK WC NONMCARE: 36934  -LF     PT Plan Comments  cont. 2x/week.   Progress HEP with focus on strengthening.  Consider ASTYM to right shoulder  -LF       User Key  (r) = Recorded By, (t) = Taken By, (c) = Cosigned By    Initials Name Provider Type    Nichelle Nuno PT Physical Therapist            Outcome Measure Options: Lower Extremity Functional Scale (LEFS), Quick DASH  Quick DASH  Open a tight or new jar.: Severe Difficulty  Do heavy household chores (e.g., wash walls, wash floors): Severe Difficulty  Carry a shopping bag or briefcase: Mild Difficulty  Wash your back: Mild Difficulty  Use a knife to cut food: Moderate Difficulty  Recreational activities in which you take some force or impact through your arm, should or hand (e.g. golf, hammering, tennis, etc.): Unable  During the past week, to what extent has your arm, shoulder, or hand problem interfered with your normal social activites with family, friends, neighbors or groups?: Moderately  During the past week, were you limited in your work or other regular daily activities as a result of your arm, shoulder or hand problem?: Very limited  Arm, Shoulder, or hand pain: Severe  Tingling (pins and needles) in your arm, shoulder, or hand: Mild  During the past week, how much difficulty have you had sleeping because of the pain in your arm, shoulder or hand?: Moderate Difficiculty  Number of Questions Answered: 11  Quick DASH Score: 56.82  Lower Extremity Functional Index  Any of your usual work, housework or school activities: Moderate difficulty  Your usual hobbies, recreational or sporting activities: Extreme difficulty or unable to perform activity  Getting into or out of the bath: Quite a bit of difficulty  Walking between rooms: Moderate difficulty  Putting on your shoes or socks: Quite a bit of difficulty  Squatting: Extreme difficulty or unable to perform activity  Lifting an object, like a bag of groceries from the floor: A little bit of difficulty  Performing light activities around your home: Moderate  difficulty  Performing heavy activities around your home: Extreme difficulty or unable to perform activity  Getting into or out of a car: Moderate difficulty  Walking 2 blocks: Quite a bit of difficulty  Walking a mile: Extreme difficulty or unable to perform activity  Going up or down 10 stairs (about 1 flight of stairs): Quite a bit of difficulty  Standing for 1 hour: Extreme difficulty or unable to perform activity  Sitting for 1 hour: A little bit of difficulty  Running on even ground: Extreme difficulty or unable to perform activity  Running on uneven ground: Extreme difficulty or unable to perform activity  Making sharp turns while running fast: Extreme difficulty or unable to perform activity  Hopping: Extreme difficulty or unable to perform activity  Rolling over in bed: Moderate difficulty  Total: 20      Time Calculation:     Start Time: 1345     Therapy Charges for Today     Code Description Service Date Service Provider Modifiers Qty    53364011743 HC PT EVAL LOW COMPLEXITY 4 9/9/2019 Nichelle Matamoros, PT GP 1          PT G-Codes  Outcome Measure Options: Lower Extremity Functional Scale (LEFS), Quick DASH  Quick DASH Score: 56.82  Total: 20         Nichelle Matamoros PT  9/9/2019

## 2019-09-10 ENCOUNTER — OFFICE VISIT (OUTPATIENT)
Dept: ORTHOPEDIC SURGERY | Facility: CLINIC | Age: 69
End: 2019-09-10

## 2019-09-10 VITALS — WEIGHT: 313.05 LBS | HEIGHT: 71 IN | HEART RATE: 97 BPM | OXYGEN SATURATION: 99 % | BODY MASS INDEX: 43.83 KG/M2

## 2019-09-10 DIAGNOSIS — M25.551 RIGHT HIP PAIN: Primary | ICD-10-CM

## 2019-09-10 PROCEDURE — 99214 OFFICE O/P EST MOD 30 MIN: CPT | Performed by: ORTHOPAEDIC SURGERY

## 2019-09-10 RX ORDER — ETODOLAC 400 MG/1
400 TABLET, FILM COATED ORAL 2 TIMES DAILY
Refills: 3 | Status: ON HOLD | COMMUNITY
Start: 2019-09-09 | End: 2020-08-04

## 2019-09-10 NOTE — PROGRESS NOTES
Orthopaedic Clinic Note: Hip New Patient    Chief Complaint   Patient presents with   • Right Hip - Pain        HPI  New patient to me.  Prior patient of Dr. Megan Jackman Jr. is a 69 y.o. male who presents with right hip pain for 2 year(s). Onset has been atraumatic and gradual nature.  Patient localizes the pain to the groin.  Is worse with walking, climbing stairs, standing for long periods of time.  He describes as occasional stabbing shooting pain.  Previous treatments have included loading, attempted weight loss, and use of a cane.  Despite this, he is having limitations in daily activities.  He is here to discuss treatment options for his ongoing right hip pain.  Of note, he is scheduled for fusion of bones in the left foot with Dr. Guzman in 6 weeks.     Past Medical History:   Diagnosis Date   • Bulging of lumbar intervertebral disc    • Hypertension    • Swelling of left ear       Past Surgical History:   Procedure Laterality Date   • SPINE SURGERY        Family History   Problem Relation Age of Onset   • No Known Problems Mother    • No Known Problems Father      Social History     Socioeconomic History   • Marital status:      Spouse name: Not on file   • Number of children: Not on file   • Years of education: Not on file   • Highest education level: Not on file   Tobacco Use   • Smoking status: Never Smoker   • Smokeless tobacco: Never Used   Substance and Sexual Activity   • Alcohol use: Yes     Comment: rarely   • Drug use: No   • Sexual activity: Defer      Current Outpatient Medications on File Prior to Visit   Medication Sig Dispense Refill   • albuterol sulfate  (90 Base) MCG/ACT inhaler Inhale 2 puffs Every 4 (Four) Hours As Needed for Wheezing. 1 inhaler 2   • ALPRAZolam (XANAX) 0.25 MG tablet Take 1 tablet by mouth Daily As Needed for Anxiety (when flying). 10 tablet 0   • aspirin 81 MG tablet Take  by mouth daily.     • atenolol (TENORMIN) 50 MG tablet TAKE 1  TABLET BY MOUTH DAILY 30 tablet 2   • atorvastatin (LIPITOR) 10 MG tablet TAKE ONE TABLET BY MOUTH DAILY 30 tablet 5   • azelastine (ASTELIN) 0.1 % nasal spray 2 sprays into the nostril(s) as directed by provider 2 (Two) Times a Day. Use in each nostril as directed     • Azelastine-Fluticasone 137-50 MCG/ACT suspension 1 spray each nostril daily 1 bottle 5   • benzonatate (TESSALON) 200 MG capsule Take 1 capsule by mouth 3 (Three) Times a Day As Needed for Cough. 30 capsule 0   • buPROPion XL (WELLBUTRIN XL) 150 MG 24 hr tablet Take 1 tablet by mouth Daily. 30 tablet 5   • cyclobenzaprine (FLEXERIL) 10 MG tablet Take one tablet by mouth three times a day as needed for muscle spasms. 30 tablet 5   • etodolac (LODINE) 400 MG tablet   3   • Liraglutide -Weight Management (SAXENDA) 18 MG/3ML solution pen-injector Inject 3 mg under the skin into the appropriate area as directed Daily. 6 pen 5   • lisinopril-hydrochlorothiazide (PRINZIDE,ZESTORETIC) 20-12.5 MG per tablet TAKE 1 TABLET BY MOUTH DAILY 30 tablet 11   • SUMAtriptan (IMITREX) 100 MG tablet TAKE 1 TABLET BY MOUTH AT ONSET OF HEADACHE. MAY REPEAT DOSE ONE TIME IN 2 HOURS IF HEADACHE NOT RELIEVED. 9 tablet 3   • vitamin D (ERGOCALCIFEROL) 38757 units capsule capsule Take 1 capsule by mouth 1 (One) Time Per Week. 12 capsule 3     No current facility-administered medications on file prior to visit.       No Known Allergies     Review of Systems   Constitutional: Negative.    HENT: Negative.    Eyes: Negative.    Respiratory: Positive for apnea.    Cardiovascular: Positive for leg swelling.   Gastrointestinal: Negative.    Endocrine: Negative.    Genitourinary: Negative.    Musculoskeletal: Positive for arthralgias (hip pain) and joint swelling.   Skin: Negative.    Allergic/Immunologic: Negative.    Neurological: Negative.    Hematological: Negative.    Psychiatric/Behavioral: Negative.         The patient's Review of Systems was personally reviewed and confirmed  "as accurate.    The following portions of the patient's history were reviewed and updated as appropriate: allergies, current medications, past family history, past medical history, past social history, past surgical history and problem list.    Physical Exam  Pulse 97, height 180.3 cm (70.98\"), weight (!) 142 kg (313 lb 0.9 oz), SpO2 99 %.    Body mass index is 43.68 kg/m².    GENERAL APPEARANCE: awake, alert & oriented x 3, in no acute distress, well developed, well nourished and obese  PSYCH: normal affect  LUNGS:  breathing nonlabored  EYES: sclera anicteric  CARDIOVASCULAR: palpable dorsalis pedis, palpable posterior tibial bilaterally. Capillary refill less than 2 seconds  EXTREMITIES: no clubbing, cyanosis  GAIT:  Normal           Right Hip Exam:  RANGE OF MOTION:   FLEXION CONTRACTURE: None   FLEXION: 110 degrees   INTERNAL ROTATION: 20 degrees at 90 degrees of flexion   EXTERNAL ROTATION: 40 degrees at 90 degrees of flexion    PAIN WITH HIP MOTION: Yes, mild pain in the groin with terminal internal rotation of the hip  PAIN WITH LOGROLL: no  STINCHFIELD TEST: negative    KNEE EXAM: full knee ROM (0-120), stable to varus/valgus stress at terminal extension and 30 degrees     STRENGTH:  5/5 hip adduction, abduction, flexion. 5/5 strength knee flexion, extension. 5/5 strength ankle dorsiflexion and plantarflexion.     GREATER TROCHANTER BURSAL PAIN:  no     REFLEXES:   PATELLAR 2+/4   ACHILLES 2+/4    CLONUS: negative  STRAIGHT LEG TEST:   negative    SENSATION TO LIGHT TOUCH:  DEEP PERONEAL/SUPERFICIAL PERONEAL/SURAL/SAPHENOUS/TIBIAL:  intact    EDEMA:   no  ERYTHEMA:  no  WOUNDS/INCISIONS: none, no overlying skin problems.      Left Hip Exam:   RANGE OF MOTION:   FLEXION CONTRACTURE: None   FLEXION: 110 degrees   INTERNAL ROTATION: 20 degrees at 90 degrees of flexion   EXTERNAL ROTATION: 40 degrees at 90 degrees of flexion    PAIN WITH HIP MOTION: no  PAIN WITH LOGROLL: no  STINCHFIELD TEST: negative    KNEE " EXAM: full knee ROM (0-120), stable to varus/valgus stress at terminal extension and 30 degrees     STRENGTH:  5/5 hip adduction, abduction, flexion. 5/5 strength knee flexion, extension. 5/5 strength ankle dorsiflexion and plantarflexion.     GREATER TROCHANTER BURSAL PAIN:  no     REFLEXES:   PATELLAR 2+/4   ACHILLES 2+/4    CLONUS: negative  STRAIGHT LEG TEST:   negative    SENSATION TO LIGHT TOUCH:  DEEP PERONEAL/SUPERFICIAL PERONEAL/SURAL/SAPHENOUS/TIBIAL:  intact    EDEMA:   no  ERYTHEMA:  no  WOUNDS/INCISIONS: none, no overlying skin problems.      ------------------------------------------------------------------    LEG LENGTHS:  equal  _____________________________________________________  _____________________________________________________    RADIOGRAPHIC FINDINGS:   AP pelvis and right hip radiographs from 9/4/2019 were personally reviewed.  Radiographs demonstrate mild arthritic changes of the right hip.  No significant joint space narrowing.  No acute bony process.    Assessment/Plan:   Diagnosis Plan   1. Right hip pain  [place] Guide For Pain Meds Inj     Patient symptoms appear to be consistent with labral tear of the right hip.  Given his age, I recommended proceeding with an intra-articular right hip cortisone injection.  He is scheduled for an MRI on 9/14/2019.  Barring any exotic diagnoses, I suspect that there will be a labral tear seen on the MRI as well as some degenerative changes of the hip.  We will proceed with cortisone injection in the right hip after the MRI.  I will see him back in 2 months for repeat evaluation.    Evangelist Amato MD  09/10/19  9:28 AM

## 2019-09-11 LAB
ENA SM AB SER-ACNC: <0.2 AI (ref 0–0.9)
ENA SS-A AB SER-ACNC: <0.2 AI (ref 0–0.9)
ENA SS-B AB SER-ACNC: <0.2 AI (ref 0–0.9)

## 2019-09-13 LAB — CCP IGA+IGG SERPL IA-ACNC: 195 UNITS (ref 0–19)

## 2019-09-14 ENCOUNTER — HOSPITAL ENCOUNTER (OUTPATIENT)
Dept: MRI IMAGING | Facility: HOSPITAL | Age: 69
Discharge: HOME | End: 2019-09-14
Admitting: INTERNAL MEDICINE

## 2019-09-14 DIAGNOSIS — M25.551 RIGHT HIP PAIN: ICD-10-CM

## 2019-09-14 PROCEDURE — 73721 MRI JNT OF LWR EXTRE W/O DYE: CPT

## 2019-09-16 ENCOUNTER — HOSPITAL ENCOUNTER (OUTPATIENT)
Dept: PHYSICAL THERAPY | Facility: HOSPITAL | Age: 69
Setting detail: THERAPIES SERIES
Discharge: HOME | End: 2019-09-16

## 2019-09-16 DIAGNOSIS — G89.29 CHRONIC RIGHT HIP PAIN: ICD-10-CM

## 2019-09-16 DIAGNOSIS — G89.29 CHRONIC RIGHT SHOULDER PAIN: Primary | ICD-10-CM

## 2019-09-16 DIAGNOSIS — M25.511 CHRONIC RIGHT SHOULDER PAIN: Primary | ICD-10-CM

## 2019-09-16 DIAGNOSIS — M25.551 CHRONIC RIGHT HIP PAIN: ICD-10-CM

## 2019-09-16 PROCEDURE — 97110 THERAPEUTIC EXERCISES: CPT

## 2019-09-16 NOTE — THERAPY TREATMENT NOTE
Outpatient Physical Therapy Ortho Treatment Note  [facility]     Patient Name: Obi Jackman Jr.  : 1950  MRN: [id number]  Today's Date: 2019      Visit Date: 2019    Visit Dx:    ICD-10-CM ICD-9-CM   1. Chronic right shoulder pain M25.511 719.41    G89.29 338.29   2. Chronic right hip pain M25.551 719.45    G89.29 338.29       Patient Active Problem List   Diagnosis   • Allergic rhinitis   • Anxiety disorder   • Benign paroxysmal positional vertigo   • Chronic tension type headache   • Depression   • ED (erectile dysfunction) of non-organic origin   • Hyperlipidemia   • Hypertension   • LFTs abnormal   • CHRISTOPHE (obstructive sleep apnea)   • Elevated glucose   • Health care maintenance   • Morbid obesity due to excess calories (CMS/[facility])   • Onychomycosis of left great toe   • Osteoarthritis of ankle or foot   • Venous stasis   • Neuropathy   • Class 3 severe obesity due to excess calories with serious comorbidity and body mass index (BMI) of 40.0 to 44.9 in adult (CMS/[facility])        Past Medical History:   Diagnosis Date   • Bulging of lumbar intervertebral disc    • Hypertension    • Swelling of left ear         Past Surgical History:   Procedure Laterality Date   • SPINE SURGERY             PT Assessment/Plan     Row Name 19 2367          PT Assessment    Assessment Comments  Continues with shoulder and hip pain.  Able to complete AAROM  flexion with cane and pulleys, good ROM but some pain when he returns to neutral.  modified HEP to focus gentle stretching at hip.    -LF        PT Plan    PT Plan Comments  cont. per POC with gentle ROM, flexibility exercises. Progress toward strengthening as tolerated  -       User Key  (r) = Recorded By, (t) = Taken By, (c) = Cosigned By    Initials Name Provider Type    Nichelle Nuno, PT Physical Therapist            OP Exercises     Row Name 19 5402             Subjective Comments    Subjective Comments  Still having same pain.  Goes  back to ortho tomorrow for injection, and rheumatologist on Wednesday  -LF         Subjective Pain    Able to rate subjective pain?  yes  -LF      Pre-Treatment Pain Level  8  -LF      Post-Treatment Pain Level  1  -LF      Subjective Pain Comment  8/10 pain with arm movement and walking, 1/10 at rest  -LF         Total Minutes    47700 - PT Therapeutic Exercise Minutes  38  -LF         Exercise 1    Exercise Name 1  AAROM with cane (sitting)  -LF      Reps 1  10  -LF      Additional Comments  attempted table slides but increased pain  -LF         Exercise 2    Exercise Name 2  Pulleys flex/scap  -LF      Reps 2  15 each  -LF         Exercise 3    Exercise Name 3  rows with red band  -LF      Reps 3  10  -LF         Exercise 4    Exercise Name 4  Shldr isometrics:  flex, IR, ER  -LF      Reps 4  10 each  -LF         Exercise 5    Exercise Name 5  bridge (modified range)  -LF      Reps 5  5  -LF         Exercise 6    Exercise Name 6  hip ER stretch  -LF      Time 6  2'  -LF         Exercise 7    Exercise Name 7  hip flexor stretch (supine)  -LF      Time 7  2'  -LF         Exercise 8    Exercise Name 8  pendulum  -LF      Time 8  1'  -LF        User Key  (r) = Recorded By, (t) = Taken By, (c) = Cosigned By    Initials Name Provider Type    LF Nichelle Matamoros, PT Physical Therapist            Therapy Education  Education Details: Modified HEP to include rows, pulleys, shoulder isometrics, hip stretches, and modified bridge.  Will defer SLRs for now.  Also tried to answer patients questions regarding A.D. use after his foot surgery.  He said it will be inpatient, anticipate he should have P.T. to address needs.  Educated on performing exercises in painfree range  Given: HEP  Program: Modified  How Provided: Verbal, Demonstration, Written  Provided to: Patient  Level of Understanding: Teach back education performed              Time Calculation:   Start Time: 1345  Therapy Charges for Today     Code Description  Service Date Service Provider Modifiers Qty    93821268250  PT THER PROC EA 15 MIN 9/16/2019 Nichelle Matamoros, PT GP 3                    Nichelle Matamoros, PT  9/16/2019

## 2019-09-17 ENCOUNTER — APPOINTMENT (OUTPATIENT)
Dept: GENERAL RADIOLOGY | Facility: HOSPITAL | Age: 69
End: 2019-09-17

## 2019-09-17 ENCOUNTER — HOSPITAL ENCOUNTER (OUTPATIENT)
Dept: GENERAL RADIOLOGY | Facility: HOSPITAL | Age: 69
Discharge: HOME | End: 2019-09-17
Admitting: ORTHOPAEDIC SURGERY

## 2019-09-17 DIAGNOSIS — M25.551 RIGHT HIP PAIN: ICD-10-CM

## 2019-09-17 PROCEDURE — 25010000002 TRIAMCINOLONE PER 10 MG: Performed by: ORTHOPAEDIC SURGERY

## 2019-09-17 PROCEDURE — 25010000002 IOPAMIDOL 61 % SOLUTION: Performed by: ORTHOPAEDIC SURGERY

## 2019-09-17 PROCEDURE — 77002 NEEDLE LOCALIZATION BY XRAY: CPT

## 2019-09-17 RX ORDER — BUPIVACAINE HYDROCHLORIDE 2.5 MG/ML
3 INJECTION, SOLUTION EPIDURAL; INFILTRATION; INTRACAUDAL ONCE
Status: COMPLETED | OUTPATIENT
Start: 2019-09-17 | End: 2019-09-17

## 2019-09-17 RX ORDER — LIDOCAINE HYDROCHLORIDE 10 MG/ML
3 INJECTION, SOLUTION EPIDURAL; INFILTRATION; INTRACAUDAL; PERINEURAL ONCE
Status: COMPLETED | OUTPATIENT
Start: 2019-09-17 | End: 2019-09-17

## 2019-09-17 RX ORDER — LIDOCAINE HYDROCHLORIDE 10 MG/ML
5 INJECTION, SOLUTION EPIDURAL; INFILTRATION; INTRACAUDAL; PERINEURAL ONCE
Status: COMPLETED | OUTPATIENT
Start: 2019-09-17 | End: 2019-09-17

## 2019-09-17 RX ORDER — TRIAMCINOLONE ACETONIDE 40 MG/ML
80 INJECTION, SUSPENSION INTRA-ARTICULAR; INTRAMUSCULAR ONCE
Status: COMPLETED | OUTPATIENT
Start: 2019-09-17 | End: 2019-09-17

## 2019-09-17 RX ADMIN — LIDOCAINE HYDROCHLORIDE 5 ML: 10 INJECTION, SOLUTION EPIDURAL; INFILTRATION; INTRACAUDAL; PERINEURAL at 14:40

## 2019-09-17 RX ADMIN — LIDOCAINE HYDROCHLORIDE 3 ML: 10 INJECTION, SOLUTION EPIDURAL; INFILTRATION; INTRACAUDAL; PERINEURAL at 14:40

## 2019-09-17 RX ADMIN — IOPAMIDOL 50 ML: 612 INJECTION, SOLUTION INTRAVENOUS at 14:40

## 2019-09-17 RX ADMIN — TRIAMCINOLONE ACETONIDE 80 MG: 40 INJECTION, SUSPENSION INTRA-ARTICULAR; INTRAMUSCULAR at 14:40

## 2019-09-17 RX ADMIN — BUPIVACAINE HYDROCHLORIDE 3 ML: 2.5 INJECTION, SOLUTION EPIDURAL; INFILTRATION; INTRACAUDAL; PERINEURAL at 14:40

## 2019-09-18 ENCOUNTER — TELEPHONE (OUTPATIENT)
Dept: INTERNAL MEDICINE | Facility: CLINIC | Age: 69
End: 2019-09-18

## 2019-09-18 ENCOUNTER — APPOINTMENT (OUTPATIENT)
Dept: PHYSICAL THERAPY | Facility: HOSPITAL | Age: 69
End: 2019-09-18

## 2019-09-18 NOTE — TELEPHONE ENCOUNTER
PATIENT IS CALLING TO CHECK STATUS OF PRIOR AUTH FOR SAXENDA. ADVISED PATIENT THAT PER INSURANCE PROVIDER, THIS PROCESS CAN TAKE UP TO 30 DAYS. HE STATES THAT HE HAS ENOUGH MEDICATION TO LAST TWO WEEKS.

## 2019-09-20 NOTE — TELEPHONE ENCOUNTER
Saxenda was denied, pharmacy notified, is there anything else you would like to prescribe in place of this?

## 2019-09-20 NOTE — TELEPHONE ENCOUNTER
He has been on this for quite a while. Looks like it was approved in March until June. Can you find out why they are denying it?

## 2019-09-23 ENCOUNTER — HOSPITAL ENCOUNTER (OUTPATIENT)
Dept: PHYSICAL THERAPY | Facility: HOSPITAL | Age: 69
Setting detail: THERAPIES SERIES
Discharge: HOME | End: 2019-09-23

## 2019-09-23 DIAGNOSIS — M25.511 CHRONIC RIGHT SHOULDER PAIN: Primary | ICD-10-CM

## 2019-09-23 DIAGNOSIS — M25.551 CHRONIC RIGHT HIP PAIN: ICD-10-CM

## 2019-09-23 DIAGNOSIS — G89.29 CHRONIC RIGHT HIP PAIN: ICD-10-CM

## 2019-09-23 DIAGNOSIS — G89.29 CHRONIC RIGHT SHOULDER PAIN: Primary | ICD-10-CM

## 2019-09-23 PROCEDURE — 97110 THERAPEUTIC EXERCISES: CPT

## 2019-09-23 NOTE — TELEPHONE ENCOUNTER
Pt has an appt on the 30th, he would like to  a sample of the saxenda but since insurance is not approving it, he would like to try an alternative, he said he has taken a pill form in the past

## 2019-09-23 NOTE — THERAPY TREATMENT NOTE
Outpatient Physical Therapy Ortho Treatment Note   [facility]     Patient Name: Obi Jackman Jr.  : 1950  MRN: [id number]  Today's Date: 2019      Visit Date: 2019    Visit Dx:    ICD-10-CM ICD-9-CM   1. Chronic right shoulder pain M25.511 719.41    G89.29 338.29   2. Chronic right hip pain M25.551 719.45    G89.29 338.29       Patient Active Problem List   Diagnosis   • Allergic rhinitis   • Anxiety disorder   • Benign paroxysmal positional vertigo   • Chronic tension type headache   • Depression   • ED (erectile dysfunction) of non-organic origin   • Hyperlipidemia   • Hypertension   • LFTs abnormal   • CHRISTOPHE (obstructive sleep apnea)   • Elevated glucose   • Health care maintenance   • Morbid obesity due to excess calories (CMS/[facility])   • Onychomycosis of left great toe   • Osteoarthritis of ankle or foot   • Venous stasis   • Neuropathy   • Class 3 severe obesity due to excess calories with serious comorbidity and body mass index (BMI) of 40.0 to 44.9 in adult (CMS/[facility])        Past Medical History:   Diagnosis Date   • Bulging of lumbar intervertebral disc    • Hypertension    • Swelling of left ear         Past Surgical History:   Procedure Laterality Date   • SPINE SURGERY             PT Assessment/Plan     Row Name 19 1400          PT Assessment    Assessment Comments  Good improvement in pain with medications and was able to tolerate exercises much better.  ROM and gait improved.  Raising arm out to side causes most discomfort.  Anticpated patient having 1 more visit to progress HEP, but he decided to cancel at end of todays session due to his schedule, so discussed progressions/modifications. Encouraged him to call with any questions/concerns  -        PT Plan    PT Plan Comments  patient to call for follow-up if needed, otherwise will discharge couple of weeks if not heard from him  -       User Key  (r) = Recorded By, (t) = Taken By, (c) = Cosigned By    Initials Name  Provider Type     Nichelle Matamoros PT Physical Therapist            OP Exercises     Row Name 09/23/19 1400             Subjective Comments    Subjective Comments  Patient reports having hip injection last week, and this with combination of his oral medications he is starting to feel better  -LF         Subjective Pain    Able to rate subjective pain?  yes  -LF      Pre-Treatment Pain Level  3  -LF      Post-Treatment Pain Level  1  -LF      Subjective Pain Comment  1/10 hip pain with walking, 1-2/ shoulder at rest, 3/10 with movement  -LF         Total Minutes    12287 - PT Therapeutic Exercise Minutes  32  -LF         Exercise 1    Exercise Name 1  AAROM with cane flexion, abuction extension  -LF      Reps 1  10  -LF         Exercise 2    Exercise Name 2  Pulleys flex/scap  -LF      Reps 2  15 each  -LF         Exercise 3    Exercise Name 3  rows and B shoulder ER with red band  -LF      Reps 3  10 each  -LF         Exercise 4    Exercise Name 4  wall slides flex and scapt  -LF      Reps 4  10 each  -LF         Exercise 5    Exercise Name 5  finger ladder  -LF      Reps 5  5  -LF         Exercise 6    Exercise Name 6  SLR  -LF      Sets 6  2  -LF      Reps 6  5  -LF         Exercise 7    Exercise Name 7  supine hip abd/add  -LF      Reps 7  5  -LF         Exercise 8    Exercise Name 8  heel slides  -LF      Reps 8  5  -LF         Exercise 9    Exercise Name 9  seated hip flex and LAQ  -LF      Reps 9  10 each  -LF        User Key  (r) = Recorded By, (t) = Taken By, (c) = Cosigned By    Initials Name Provider Type     Nichelle Matamoros PT Physical Therapist          Therapy Education  Education Details: updated HEP with exercises completed today.  Also reviewed RROM shoulder exercises and patient to try as tolerated using yellow theraband to start.  Discussed modifications to reps/resistance of any exercises if increased pain.  Progressions also as symptoms improve  Given: HEP, Symptoms/condition  management  Program: Progressed, Reinforced  How Provided: Verbal, Demonstration, Written  Provided to: Patient  Level of Understanding: Teach back education performed              Time Calculation:   Start Time: 1400  Therapy Charges for Today     Code Description Service Date Service Provider Modifiers Qty    57926246994 HC PT THER PROC EA 15 MIN 9/23/2019 Nichelle Matamoros, PT GP 2                    Nichelle Matamoros, PT  9/23/2019

## 2019-09-25 LAB
DSDNA (NDNA) SCRN BY CRITHIDIA (REFERENCE): ABNORMAL TITER
DSDNA AB TITR SER: ABNORMAL TITER
RNA AB SER-ACNC: 28.3 EU/ML

## 2019-09-27 ENCOUNTER — OFFICE VISIT (OUTPATIENT)
Dept: INTERNAL MEDICINE | Facility: CLINIC | Age: 69
End: 2019-09-27

## 2019-09-27 VITALS
WEIGHT: 307.4 LBS | BODY MASS INDEX: 42.89 KG/M2 | OXYGEN SATURATION: 99 % | SYSTOLIC BLOOD PRESSURE: 140 MMHG | HEART RATE: 76 BPM | DIASTOLIC BLOOD PRESSURE: 90 MMHG

## 2019-09-27 DIAGNOSIS — E66.01 CLASS 3 SEVERE OBESITY DUE TO EXCESS CALORIES WITH SERIOUS COMORBIDITY AND BODY MASS INDEX (BMI) OF 40.0 TO 44.9 IN ADULT (HCC): ICD-10-CM

## 2019-09-27 DIAGNOSIS — F32.A DEPRESSION, UNSPECIFIED DEPRESSION TYPE: ICD-10-CM

## 2019-09-27 DIAGNOSIS — I15.9 SECONDARY HYPERTENSION: ICD-10-CM

## 2019-09-27 DIAGNOSIS — R73.09 ELEVATED GLUCOSE: ICD-10-CM

## 2019-09-27 DIAGNOSIS — Z01.818 PRE-OP EVALUATION: Primary | ICD-10-CM

## 2019-09-27 LAB
GLUCOSE BLDC GLUCOMTR-MCNC: 88 MG/DL (ref 70–130)
HBA1C MFR BLD: 5.1 %

## 2019-09-27 PROCEDURE — 99214 OFFICE O/P EST MOD 30 MIN: CPT | Performed by: PHYSICIAN ASSISTANT

## 2019-09-27 PROCEDURE — 83036 HEMOGLOBIN GLYCOSYLATED A1C: CPT | Performed by: PHYSICIAN ASSISTANT

## 2019-09-27 PROCEDURE — 82947 ASSAY GLUCOSE BLOOD QUANT: CPT | Performed by: PHYSICIAN ASSISTANT

## 2019-09-27 RX ORDER — ATENOLOL 50 MG/1
50 TABLET ORAL DAILY
Qty: 30 TABLET | Refills: 5 | Status: SHIPPED | OUTPATIENT
Start: 2019-09-27 | End: 2020-03-19 | Stop reason: SDUPTHER

## 2019-09-27 RX ORDER — BUPROPION HYDROCHLORIDE 150 MG/1
150 TABLET ORAL DAILY
Qty: 30 TABLET | Refills: 5 | Status: SHIPPED | OUTPATIENT
Start: 2019-09-27 | End: 2020-03-19 | Stop reason: SDUPTHER

## 2019-09-27 NOTE — PROGRESS NOTES
Chief Complaint   Patient presents with   • Preop for foot surgery       Subjective   Obi Jackman Jr. is a 69 y.o. male.       History of Present Illness     Pre-Op Evaluation  Obi Jackman Jr. is a 69 y.o. male who presents to the office today for a preoperative consultation at the request of surgeon Dr Guzman who plans on performing left triple arthrodesis w/ ICBG and Achilles Tendon Lengthening on October 15. This consultation is requested for the specific conditions prompting preoperative evaluation (i.e. because of potential affect on operative risk): hypertension, . Planned anesthesia is general. The patient has the following known anesthesia issues: no previous problems. Patient has a bleeding risk of: no recent abnormal bleeding, no remote history of abnormal bleeding, no use of Ca-channel blockers and no history of PE or DVT. Patient does not have objections to receiving blood products if needed.    Pt notes that his saxenda has been denied by new insurance. He has had a significant amount of weight loss with it- brought a log of his weight loss.    Past Medical History:   Diagnosis Date   • Bulging of lumbar intervertebral disc    • Hyperlipidemia    • Hypertension    • Migraine    • CHRISTOPHE on CPAP    • Rheumatoid arthritis (CMS/HCC)    • Swelling of left ear    • Wears contact lenses      Past Surgical History:   Procedure Laterality Date   • CATARACT EXTRACTION Right    • COLONOSCOPY  2015   • EYE SURGERY Bilateral     cornea transplant    • HERNIA REPAIR       Family History   Problem Relation Age of Onset   • No Known Problems Mother    • No Known Problems Father          Current Outpatient Medications:   •  albuterol sulfate  (90 Base) MCG/ACT inhaler, Inhale 2 puffs Every 4 (Four) Hours As Needed for Wheezing. (Patient taking differently: Inhale 2 puffs Every 4 (Four) Hours As Needed for Wheezing or Shortness of Air.), Disp: 1 inhaler, Rfl: 2  •  ALPRAZolam (XANAX) 0.25 MG tablet, Take  1 tablet by mouth Daily As Needed for Anxiety (when flying)., Disp: 10 tablet, Rfl: 0  •  aspirin 81 MG tablet, Take 81 mg by mouth Daily., Disp: , Rfl:   •  atenolol (TENORMIN) 50 MG tablet, Take 1 tablet by mouth Daily., Disp: 30 tablet, Rfl: 5  •  atorvastatin (LIPITOR) 10 MG tablet, TAKE ONE TABLET BY MOUTH DAILY, Disp: 30 tablet, Rfl: 5  •  Azelastine-Fluticasone 137-50 MCG/ACT suspension, 1 spray each nostril daily (Patient taking differently: 2 sprays Daily As Needed (nasal congestion). 1 spray each nostril daily), Disp: 1 bottle, Rfl: 5  •  buPROPion XL (WELLBUTRIN XL) 150 MG 24 hr tablet, Take 1 tablet by mouth Daily., Disp: 30 tablet, Rfl: 5  •  cyclobenzaprine (FLEXERIL) 10 MG tablet, Take one tablet by mouth three times a day as needed for muscle spasms. (Patient taking differently: Take 10 mg by mouth 3 (Three) Times a Day As Needed for Muscle Spasms. Take one tablet by mouth three times a day as needed for muscle spasms.), Disp: 30 tablet, Rfl: 5  •  etodolac (LODINE) 400 MG tablet, Take 400 mg by mouth 2 (Two) Times a Day., Disp: , Rfl: 3  •  Liraglutide -Weight Management (SAXENDA) 18 MG/3ML solution pen-injector, Inject 3 mg under the skin into the appropriate area as directed Daily., Disp: 6 pen, Rfl: 5  •  lisinopril-hydrochlorothiazide (PRINZIDE,ZESTORETIC) 20-12.5 MG per tablet, TAKE 1 TABLET BY MOUTH DAILY, Disp: 30 tablet, Rfl: 11  •  SUMAtriptan (IMITREX) 100 MG tablet, TAKE 1 TABLET BY MOUTH AT ONSET OF HEADACHE. MAY REPEAT DOSE ONE TIME IN 2 HOURS IF HEADACHE NOT RELIEVED., Disp: 9 tablet, Rfl: 3  •  vitamin D (ERGOCALCIFEROL) 84927 units capsule capsule, Take 1 capsule by mouth 1 (One) Time Per Week. (Patient taking differently: Take 50,000 Units by mouth 1 (One) Time Per Week. Sunday), Disp: 12 capsule, Rfl: 3  •  terbinafine (lamiSIL) 250 MG tablet, Take 250 mg by mouth Daily., Disp: , Rfl:      [facility]  The following portions of the patient's history were reviewed and updated as  appropriate: allergies, current medications, past family history, past medical history, past social history, past surgical history and problem list.    Review of Systems   Constitutional: Negative for activity change, appetite change, chills, diaphoresis and fatigue.   HENT: Negative for congestion and rhinorrhea.    Respiratory: Negative for chest tightness and shortness of breath.    Cardiovascular: Negative for chest pain and palpitations.   Gastrointestinal: Negative for abdominal pain, blood in stool, diarrhea and nausea.   Genitourinary: Negative for dysuria.   Musculoskeletal: Positive for arthralgias and myalgias.   Neurological: Negative for dizziness, weakness, light-headedness and headaches.   Psychiatric/Behavioral: Negative for dysphoric mood. The patient is not nervous/anxious.        Objective   /90   Pulse 76   Wt (!) 139 kg (307 lb 6.4 oz)   SpO2 99%   BMI 42.89 kg/m²     Physical Exam   Constitutional: He appears well-developed and well-nourished.   HENT:   Head: Normocephalic.   Right Ear: Hearing, tympanic membrane, external ear and ear canal normal.   Left Ear: Hearing, tympanic membrane, external ear and ear canal normal.   Nose: Nose normal.   Mouth/Throat: Oropharynx is clear and moist.   Eyes: Conjunctivae are normal. Pupils are equal, round, and reactive to light.   Neck: Normal range of motion.   Cardiovascular: Normal rate, regular rhythm and normal heart sounds.   Pulmonary/Chest: Effort normal and breath sounds normal. He has no decreased breath sounds. He has no wheezes. He has no rhonchi. He has no rales.   Musculoskeletal: Normal range of motion.   Neurological: He is alert.   Skin: Skin is warm and dry.   Psychiatric: He has a normal mood and affect. His behavior is normal.   Nursing note and vitals reviewed.           ASSESSMENT/PLAN    Problem List Items Addressed This Visit        Cardiovascular and Mediastinum    Hypertension     Hypertension is unchanged.  Continue  current treatment regimen.  Dietary sodium restriction.  Weight loss.  Regular aerobic exercise.  Continue current medications.  Blood pressure will be reassessed at the next regular appointment.         Relevant Medications    atenolol (TENORMIN) 50 MG tablet       Digestive    Class 3 severe obesity due to excess calories with serious comorbidity and body mass index (BMI) of 40.0 to 44.9 in adult (CMS/[facility])     Obesity is improving with treatment.  Discussed the patient's BMI.  The BMI is above average; BMI management plan is completed.  General weight loss/lifestyle modification strategies discussed (elicit support from others; identify saboteurs; non-food rewards, etc).  Diet interventions: low calorie (1000 kCal/d) deficit diet.  Regular aerobic exercise program discussed.  Pharmacotherapy as ordered.            Other    Depression     Psychological condition is unchanged.  Continue current treatment regimen.  Regular aerobic exercise.  Psychological condition  will be reassessed at the next regular appointment.         Relevant Medications    buPROPion XL (WELLBUTRIN XL) 150 MG 24 hr tablet    Elevated glucose    Relevant Orders    POC Glycosylated Hemoglobin (Hb A1C) (Completed)    POC Glucose (Completed)    Pre-op evaluation - Primary     1. Patient is at low risk for jessica-op cardiovascular complications with stable BP and heart rate.  2. Patient is at low risk for jessica-op pulmonary complications with no h/o lung disease nor tob use.  3. Labs to be done at preadmission testing; proceed with surgery as planned and please call with questions/concerns                      Return for Next scheduled follow up.

## 2019-09-29 PROBLEM — Z01.818 PRE-OP EVALUATION: Status: ACTIVE | Noted: 2019-09-29

## 2019-09-30 NOTE — ASSESSMENT & PLAN NOTE
Psychological condition is unchanged.  Continue current treatment regimen.  Regular aerobic exercise.  Psychological condition  will be reassessed at the next regular appointment.

## 2019-09-30 NOTE — ASSESSMENT & PLAN NOTE
Obesity is improving with treatment.  Discussed the patient's BMI.  The BMI is above average; BMI management plan is completed.  General weight loss/lifestyle modification strategies discussed (elicit support from others; identify saboteurs; non-food rewards, etc).  Diet interventions: low calorie (1000 kCal/d) deficit diet.  Regular aerobic exercise program discussed.  Pharmacotherapy as ordered.

## 2019-09-30 NOTE — ASSESSMENT & PLAN NOTE
1. Patient is at low risk for jessica-op cardiovascular complications with stable BP and heart rate.  2. Patient is at low risk for jessica-op pulmonary complications with no h/o lung disease nor tob use.  3. Labs to be done at preadmission testing; proceed with surgery as planned and please call with questions/concerns

## 2019-10-01 ENCOUNTER — APPOINTMENT (OUTPATIENT)
Dept: PREADMISSION TESTING | Facility: HOSPITAL | Age: 69
End: 2019-10-01

## 2019-10-01 VITALS — WEIGHT: 302.47 LBS | BODY MASS INDEX: 42.35 KG/M2 | HEIGHT: 71 IN

## 2019-10-01 DIAGNOSIS — M19.072 OSTEOARTHRITIS OF LEFT ANKLE OR FOOT: ICD-10-CM

## 2019-10-01 DIAGNOSIS — G62.9 NEUROPATHY: ICD-10-CM

## 2019-10-01 DIAGNOSIS — E66.01 MORBID OBESITY DUE TO EXCESS CALORIES (HCC): ICD-10-CM

## 2019-10-01 LAB
ANION GAP SERPL CALCULATED.3IONS-SCNC: 11 MMOL/L (ref 5–15)
BASOPHILS # BLD AUTO: 0.02 10*3/MM3 (ref 0–0.2)
BASOPHILS NFR BLD AUTO: 0.2 % (ref 0–1.5)
BUN BLD-MCNC: 13 MG/DL (ref 8–23)
BUN/CREAT SERPL: 17.3 (ref 7–25)
C3 SERPL-MCNC: 155 MG/DL (ref 82–167)
C4 SERPL-MCNC: 29 MG/DL (ref 14–44)
CALCIUM SPEC-SCNC: 9.8 MG/DL (ref 8.6–10.5)
CHLORIDE SERPL-SCNC: 97 MMOL/L (ref 98–107)
CO2 SERPL-SCNC: 29 MMOL/L (ref 22–29)
CREAT BLD-MCNC: 0.75 MG/DL (ref 0.76–1.27)
DEPRECATED RDW RBC AUTO: 42.1 FL (ref 37–54)
EOSINOPHIL # BLD AUTO: 0.04 10*3/MM3 (ref 0–0.4)
EOSINOPHIL NFR BLD AUTO: 0.4 % (ref 0.3–6.2)
ERYTHROCYTE [DISTWIDTH] IN BLOOD BY AUTOMATED COUNT: 12.1 % (ref 12.3–15.4)
GFR SERPL CREATININE-BSD FRML MDRD: 104 ML/MIN/1.73
GLUCOSE BLD-MCNC: 95 MG/DL (ref 65–99)
HBA1C MFR BLD: 5.2 % (ref 4.8–5.6)
HCT VFR BLD AUTO: 48.1 % (ref 37.5–51)
HGB BLD-MCNC: 15.8 G/DL (ref 13–17.7)
IMM GRANULOCYTES # BLD AUTO: 0.03 10*3/MM3 (ref 0–0.05)
IMM GRANULOCYTES NFR BLD AUTO: 0.3 % (ref 0–0.5)
LYMPHOCYTES # BLD AUTO: 1.53 10*3/MM3 (ref 0.7–3.1)
LYMPHOCYTES NFR BLD AUTO: 16.8 % (ref 19.6–45.3)
MCH RBC QN AUTO: 30.8 PG (ref 26.6–33)
MCHC RBC AUTO-ENTMCNC: 32.8 G/DL (ref 31.5–35.7)
MCV RBC AUTO: 93.8 FL (ref 79–97)
MONOCYTES # BLD AUTO: 0.61 10*3/MM3 (ref 0.1–0.9)
MONOCYTES NFR BLD AUTO: 6.7 % (ref 5–12)
NEUTROPHILS # BLD AUTO: 6.9 10*3/MM3 (ref 1.7–7)
NEUTROPHILS NFR BLD AUTO: 75.6 % (ref 42.7–76)
NRBC BLD AUTO-RTO: 0 /100 WBC (ref 0–0.2)
PLATELET # BLD AUTO: 284 10*3/MM3 (ref 140–450)
PMV BLD AUTO: 9.5 FL (ref 6–12)
POTASSIUM BLD-SCNC: 4.1 MMOL/L (ref 3.5–5.2)
RBC # BLD AUTO: 5.13 10*6/MM3 (ref 4.14–5.8)
SODIUM BLD-SCNC: 137 MMOL/L (ref 136–145)
WBC NRBC COR # BLD: 9.13 10*3/MM3 (ref 3.4–10.8)

## 2019-10-01 PROCEDURE — 36415 COLL VENOUS BLD VENIPUNCTURE: CPT

## 2019-10-01 PROCEDURE — 85025 COMPLETE CBC W/AUTO DIFF WBC: CPT | Performed by: ORTHOPAEDIC SURGERY

## 2019-10-01 PROCEDURE — 86160 COMPLEMENT ANTIGEN: CPT | Performed by: INTERNAL MEDICINE

## 2019-10-01 PROCEDURE — 93010 ELECTROCARDIOGRAM REPORT: CPT | Performed by: INTERNAL MEDICINE

## 2019-10-01 PROCEDURE — 83036 HEMOGLOBIN GLYCOSYLATED A1C: CPT | Performed by: ORTHOPAEDIC SURGERY

## 2019-10-01 PROCEDURE — 80048 BASIC METABOLIC PNL TOTAL CA: CPT | Performed by: ORTHOPAEDIC SURGERY

## 2019-10-01 PROCEDURE — 93005 ELECTROCARDIOGRAM TRACING: CPT

## 2019-10-01 RX ORDER — TERBINAFINE HYDROCHLORIDE 250 MG/1
250 TABLET ORAL DAILY
Status: ON HOLD | COMMUNITY
End: 2020-08-04

## 2019-10-01 NOTE — PAT
Patient to apply Chlorhexadine wipes  to surgical area (as instructed) the night before procedure and the AM of procedure. Wipes provided.    Patient instructed to drink 20 ounces (or until full) of Gatorade and it needs to be completed 1 hour before given arrival time for procedure (NO RED Gatorade)    Patient verbalized understanding.    Patient instructed to bring CPAP mask and tubing to the hospital for overnight stay.  Explained that it is not necessary to bring their CPAP machine to the hospital instead a CPAP machine will be provided for use by the hospital. If patient knows their CPAP settings, those settings will be implemented.  If not, the CPAP machine will be utilized on the auto setting using their mask and tubing.    Patient verbalized understanding.    Per Anesthesia Request, patient instructed not to take their ACE/ARB medications on the AM of surgery.

## 2019-10-11 ENCOUNTER — OFFICE VISIT (OUTPATIENT)
Dept: INTERNAL MEDICINE | Facility: CLINIC | Age: 69
End: 2019-10-11

## 2019-10-11 VITALS
DIASTOLIC BLOOD PRESSURE: 90 MMHG | WEIGHT: 301.6 LBS | BODY MASS INDEX: 42.06 KG/M2 | HEART RATE: 76 BPM | SYSTOLIC BLOOD PRESSURE: 130 MMHG | OXYGEN SATURATION: 98 %

## 2019-10-11 DIAGNOSIS — M19.072 OSTEOARTHRITIS OF LEFT ANKLE OR FOOT: Primary | ICD-10-CM

## 2019-10-11 DIAGNOSIS — M25.50 MULTIPLE JOINT PAIN: ICD-10-CM

## 2019-10-11 PROCEDURE — 99213 OFFICE O/P EST LOW 20 MIN: CPT | Performed by: PHYSICIAN ASSISTANT

## 2019-10-11 NOTE — PROGRESS NOTES
Chief Complaint   Patient presents with   • Needs handicap parking permit     Acute       Subjective   Obi Jackman Jr. is a 69 y.o. male.       History of Present Illness     Pt is going to be having his ankle surgery next week. He will be using a knee scooter for 3 months and the full recovery will take up to a year. He has completed his pre-op requirements and is ready.     Struggling some with joint pain because he had stop the diclofenac.       Current Outpatient Medications:   •  atenolol (TENORMIN) 50 MG tablet, Take 1 tablet by mouth Daily., Disp: 30 tablet, Rfl: 5  •  atorvastatin (LIPITOR) 10 MG tablet, TAKE ONE TABLET BY MOUTH DAILY, Disp: 30 tablet, Rfl: 5  •  lisinopril-hydrochlorothiazide (PRINZIDE,ZESTORETIC) 20-12.5 MG per tablet, TAKE 1 TABLET BY MOUTH DAILY, Disp: 30 tablet, Rfl: 11  •  albuterol sulfate  (90 Base) MCG/ACT inhaler, Inhale 2 puffs Every 4 (Four) Hours As Needed for Wheezing. (Patient taking differently: Inhale 2 puffs Every 4 (Four) Hours As Needed for Wheezing or Shortness of Air.), Disp: 1 inhaler, Rfl: 2  •  ALPRAZolam (XANAX) 0.25 MG tablet, Take 1 tablet by mouth Daily As Needed for Anxiety (when flying)., Disp: 10 tablet, Rfl: 0  •  aspirin 81 MG tablet, Take 81 mg by mouth Daily., Disp: , Rfl:   •  Azelastine-Fluticasone 137-50 MCG/ACT suspension, 1 spray each nostril daily (Patient taking differently: 2 sprays Daily As Needed (nasal congestion). 1 spray each nostril daily), Disp: 1 bottle, Rfl: 5  •  buPROPion XL (WELLBUTRIN XL) 150 MG 24 hr tablet, Take 1 tablet by mouth Daily., Disp: 30 tablet, Rfl: 5  •  cyclobenzaprine (FLEXERIL) 10 MG tablet, Take one tablet by mouth three times a day as needed for muscle spasms. (Patient taking differently: Take 10 mg by mouth 3 (Three) Times a Day As Needed for Muscle Spasms. Take one tablet by mouth three times a day as needed for muscle spasms.), Disp: 30 tablet, Rfl: 5  •  etodolac (LODINE) 400 MG tablet, Take 400 mg by  mouth 2 (Two) Times a Day., Disp: , Rfl: 3  •  Liraglutide -Weight Management (SAXENDA) 18 MG/3ML solution pen-injector, Inject 3 mg under the skin into the appropriate area as directed Daily., Disp: 6 pen, Rfl: 5  •  SUMAtriptan (IMITREX) 100 MG tablet, TAKE 1 TABLET BY MOUTH AT ONSET OF HEADACHE. MAY REPEAT DOSE ONE TIME IN 2 HOURS IF HEADACHE NOT RELIEVED., Disp: 9 tablet, Rfl: 3  •  terbinafine (lamiSIL) 250 MG tablet, Take 250 mg by mouth Daily., Disp: , Rfl:   •  vitamin D (ERGOCALCIFEROL) 69442 units capsule capsule, Take 1 capsule by mouth 1 (One) Time Per Week. (Patient taking differently: Take 50,000 Units by mouth 1 (One) Time Per Week. Sunday), Disp: 12 capsule, Rfl: 3     [facility]  The following portions of the patient's history were reviewed and updated as appropriate: allergies, current medications, past family history, past medical history, past social history, past surgical history and problem list.    Review of Systems   Constitutional: Negative for activity change, appetite change and fatigue.   HENT: Negative for congestion and rhinorrhea.    Respiratory: Negative for chest tightness and shortness of breath.    Cardiovascular: Negative for chest pain and palpitations.   Gastrointestinal: Negative for abdominal pain.   Genitourinary: Negative for dysuria.   Musculoskeletal: Positive for arthralgias and gait problem. Negative for myalgias.   Neurological: Negative for dizziness, weakness, light-headedness and headaches.   Psychiatric/Behavioral: Negative for dysphoric mood. The patient is not nervous/anxious.        Objective   /90   Pulse 76   Wt (!) 137 kg (301 lb 9.6 oz)   SpO2 98%   BMI 42.06 kg/m²     Physical Exam   Constitutional: He is oriented to person, place, and time. He appears well-developed and well-nourished.   HENT:   Head: Normocephalic and atraumatic.   Right Ear: External ear normal.   Left Ear: External ear normal.   Eyes: Pupils are equal, round, and reactive to light.    Neck: Normal range of motion.   Cardiovascular: Normal rate.   Pulmonary/Chest: Effort normal.   Musculoskeletal: Normal range of motion.   Neurological: He is alert and oriented to person, place, and time.   Skin: Skin is warm and dry.   Psychiatric: He has a normal mood and affect. His behavior is normal. Thought content normal.            ASSESSMENT/PLAN    Problem List Items Addressed This Visit        Musculoskeletal and Integument    Osteoarthritis of ankle or foot - Primary     Pending surgery next week.           Other Visit Diagnoses     Multiple joint pain        Handicap parking pass completed- for 6 years because pt has chronic joint pain, possibly underlying autoimmune process. He will use it only as needed.               Return for Next scheduled follow up.

## 2019-10-14 ENCOUNTER — TELEPHONE (OUTPATIENT)
Dept: ORTHOPEDIC SURGERY | Facility: CLINIC | Age: 69
End: 2019-10-14

## 2019-10-14 RX ORDER — ONDANSETRON 4 MG/1
4 TABLET, FILM COATED ORAL EVERY 6 HOURS PRN
Qty: 30 TABLET | Refills: 0 | Status: ON HOLD | OUTPATIENT
Start: 2019-10-14 | End: 2019-10-17 | Stop reason: SDUPTHER

## 2019-10-14 RX ORDER — HYDROCODONE BITARTRATE AND ACETAMINOPHEN 7.5; 325 MG/1; MG/1
1-2 TABLET ORAL EVERY 6 HOURS PRN
Qty: 45 TABLET | Refills: 0 | Status: SHIPPED | OUTPATIENT
Start: 2019-10-14 | End: 2019-10-28 | Stop reason: SDUPTHER

## 2019-10-14 RX ORDER — OXYCODONE HYDROCHLORIDE AND ACETAMINOPHEN 5; 325 MG/1; MG/1
1-2 TABLET ORAL EVERY 6 HOURS PRN
Qty: 45 TABLET | Refills: 0 | Status: SHIPPED | OUTPATIENT
Start: 2019-10-14 | End: 2020-02-03

## 2019-10-14 NOTE — TELEPHONE ENCOUNTER
Called patient and let him know that the pain meds will be given to him before discharge, and the nausea meds were called to pharmacy and should be available already. He understood and had no further problems or questions.     Winter

## 2019-10-14 NOTE — TELEPHONE ENCOUNTER
Regarding: Prescription Question  Contact: [phone]  ----- Message from [employer], Generic sent at 10/14/2019 12:49 PM EDT -----    My medication list for post-surgery was sent to me. Will the scripts for meds be provided to me or my wife during my hospital stay, so I can have them filled upon discharge?  Thank you.   Boy Jackman  1950

## 2019-10-15 ENCOUNTER — ANESTHESIA (OUTPATIENT)
Dept: PERIOP | Facility: HOSPITAL | Age: 69
End: 2019-10-15

## 2019-10-15 ENCOUNTER — ANESTHESIA EVENT (OUTPATIENT)
Dept: PERIOP | Facility: HOSPITAL | Age: 69
End: 2019-10-15

## 2019-10-15 ENCOUNTER — APPOINTMENT (OUTPATIENT)
Dept: GENERAL RADIOLOGY | Facility: HOSPITAL | Age: 69
End: 2019-10-15

## 2019-10-15 ENCOUNTER — HOSPITAL ENCOUNTER (INPATIENT)
Facility: HOSPITAL | Age: 69
LOS: 3 days | Discharge: HOME HEALTH | End: 2019-10-18
Attending: ORTHOPAEDIC SURGERY | Admitting: ORTHOPAEDIC SURGERY

## 2019-10-15 DIAGNOSIS — E66.01 MORBID OBESITY DUE TO EXCESS CALORIES (HCC): ICD-10-CM

## 2019-10-15 DIAGNOSIS — G62.9 NEUROPATHY: ICD-10-CM

## 2019-10-15 DIAGNOSIS — M19.072 OSTEOARTHRITIS OF LEFT ANKLE OR FOOT: ICD-10-CM

## 2019-10-15 PROBLEM — Z98.1 S/P ANKLE ARTHRODESIS: Status: ACTIVE | Noted: 2019-10-15

## 2019-10-15 LAB — POTASSIUM BLD-SCNC: 3.5 MMOL/L (ref 3.5–5.2)

## 2019-10-15 PROCEDURE — C1769 GUIDE WIRE: HCPCS | Performed by: ORTHOPAEDIC SURGERY

## 2019-10-15 PROCEDURE — 27685 REVISION OF LOWER LEG TENDON: CPT | Performed by: ORTHOPAEDIC SURGERY

## 2019-10-15 PROCEDURE — 94660 CPAP INITIATION&MGMT: CPT

## 2019-10-15 PROCEDURE — 63710000001 DIPHENHYDRAMINE PER 50 MG: Performed by: ORTHOPAEDIC SURGERY

## 2019-10-15 PROCEDURE — 25010000002 MIDAZOLAM PER 1 MG: Performed by: ANESTHESIOLOGY

## 2019-10-15 PROCEDURE — 25010000002 HYDROMORPHONE PER 4 MG: Performed by: ANESTHESIOLOGY

## 2019-10-15 PROCEDURE — 25010000002 ROPIVACAINE PER 1 MG: Performed by: NURSE ANESTHETIST, CERTIFIED REGISTERED

## 2019-10-15 PROCEDURE — C1713 ANCHOR/SCREW BN/BN,TIS/BN: HCPCS | Performed by: ORTHOPAEDIC SURGERY

## 2019-10-15 PROCEDURE — 20902 REMOVAL OF BONE FOR GRAFT: CPT | Performed by: ORTHOPAEDIC SURGERY

## 2019-10-15 PROCEDURE — 25010000002 CEFAZOLIN PER 500 MG: Performed by: ORTHOPAEDIC SURGERY

## 2019-10-15 PROCEDURE — 28715 ARTHRODESIS TRIPLE: CPT | Performed by: ORTHOPAEDIC SURGERY

## 2019-10-15 PROCEDURE — 84132 ASSAY OF SERUM POTASSIUM: CPT | Performed by: ANESTHESIOLOGY

## 2019-10-15 PROCEDURE — 25010000002 PROPOFOL 10 MG/ML EMULSION: Performed by: NURSE ANESTHETIST, CERTIFIED REGISTERED

## 2019-10-15 PROCEDURE — 0QB30ZZ EXCISION OF LEFT PELVIC BONE, OPEN APPROACH: ICD-10-PCS | Performed by: ORTHOPAEDIC SURGERY

## 2019-10-15 PROCEDURE — 0L8P0ZZ DIVISION OF LEFT LOWER LEG TENDON, OPEN APPROACH: ICD-10-PCS | Performed by: ORTHOPAEDIC SURGERY

## 2019-10-15 PROCEDURE — 76000 FLUOROSCOPY <1 HR PHYS/QHP: CPT

## 2019-10-15 PROCEDURE — 25010000002 FENTANYL CITRATE (PF) 100 MCG/2ML SOLUTION: Performed by: ANESTHESIOLOGY

## 2019-10-15 PROCEDURE — 94799 UNLISTED PULMONARY SVC/PX: CPT

## 2019-10-15 PROCEDURE — 25010000002 HYDROMORPHONE 1 MG/ML SOLUTION: Performed by: ORTHOPAEDIC SURGERY

## 2019-10-15 PROCEDURE — 0SGJ07Z FUSION OF LEFT TARSAL JOINT WITH AUTOLOGOUS TISSUE SUBSTITUTE, OPEN APPROACH: ICD-10-PCS | Performed by: ORTHOPAEDIC SURGERY

## 2019-10-15 PROCEDURE — 25010000002 DEXAMETHASONE SODIUM PHOSPHATE 10 MG/ML SOLUTION: Performed by: ANESTHESIOLOGY

## 2019-10-15 PROCEDURE — 25010000002 NEOSTIGMINE 10 MG/10ML SOLUTION: Performed by: NURSE ANESTHETIST, CERTIFIED REGISTERED

## 2019-10-15 PROCEDURE — 25010000002 BUPRENORPHINE PER 0.1 MG: Performed by: ANESTHESIOLOGY

## 2019-10-15 PROCEDURE — 25010000002 PROMETHAZINE PER 50 MG: Performed by: ORTHOPAEDIC SURGERY

## 2019-10-15 PROCEDURE — 25010000002 FENTANYL CITRATE (PF) 100 MCG/2ML SOLUTION: Performed by: NURSE ANESTHETIST, CERTIFIED REGISTERED

## 2019-10-15 PROCEDURE — 25010000002 ONDANSETRON PER 1 MG: Performed by: NURSE PRACTITIONER

## 2019-10-15 DEVICE — IMPLANTABLE DEVICE: Type: IMPLANTABLE DEVICE | Site: FOOT | Status: FUNCTIONAL

## 2019-10-15 DEVICE — SCRW CANN 16THRD 6.5X80MM: Type: IMPLANTABLE DEVICE | Site: ANKLE | Status: FUNCTIONAL

## 2019-10-15 DEVICE — SCRW CANN 16THRD 6.5X75MM: Type: IMPLANTABLE DEVICE | Site: ANKLE | Status: FUNCTIONAL

## 2019-10-15 DEVICE — SCRW CANN PT 1/3 4.5X50MM: Type: IMPLANTABLE DEVICE | Site: ANKLE | Status: FUNCTIONAL

## 2019-10-15 DEVICE — SCRW CANN PT 1/3 4.5X40MM: Type: IMPLANTABLE DEVICE | Site: ANKLE | Status: FUNCTIONAL

## 2019-10-15 RX ORDER — BUPRENORPHINE HYDROCHLORIDE 0.32 MG/ML
INJECTION INTRAMUSCULAR; INTRAVENOUS
Status: COMPLETED | OUTPATIENT
Start: 2019-10-15 | End: 2019-10-15

## 2019-10-15 RX ORDER — LIDOCAINE HYDROCHLORIDE 10 MG/ML
INJECTION, SOLUTION EPIDURAL; INFILTRATION; INTRACAUDAL; PERINEURAL AS NEEDED
Status: DISCONTINUED | OUTPATIENT
Start: 2019-10-15 | End: 2019-10-15 | Stop reason: SURG

## 2019-10-15 RX ORDER — OXYCODONE HYDROCHLORIDE AND ACETAMINOPHEN 5; 325 MG/1; MG/1
2 TABLET ORAL EVERY 4 HOURS PRN
Status: DISCONTINUED | OUTPATIENT
Start: 2019-10-15 | End: 2019-10-18 | Stop reason: HOSPADM

## 2019-10-15 RX ORDER — PROMETHAZINE HYDROCHLORIDE 25 MG/ML
6.25 INJECTION, SOLUTION INTRAMUSCULAR; INTRAVENOUS ONCE AS NEEDED
Status: DISCONTINUED | OUTPATIENT
Start: 2019-10-15 | End: 2019-10-15 | Stop reason: HOSPADM

## 2019-10-15 RX ORDER — LIDOCAINE HYDROCHLORIDE 10 MG/ML
0.5 INJECTION, SOLUTION EPIDURAL; INFILTRATION; INTRACAUDAL; PERINEURAL ONCE AS NEEDED
Status: COMPLETED | OUTPATIENT
Start: 2019-10-15 | End: 2019-10-15

## 2019-10-15 RX ORDER — ATORVASTATIN CALCIUM 10 MG/1
10 TABLET, FILM COATED ORAL NIGHTLY
Status: DISCONTINUED | OUTPATIENT
Start: 2019-10-16 | End: 2019-10-18 | Stop reason: HOSPADM

## 2019-10-15 RX ORDER — BISACODYL 10 MG
10 SUPPOSITORY, RECTAL RECTAL DAILY PRN
Status: DISCONTINUED | OUTPATIENT
Start: 2019-10-15 | End: 2019-10-18 | Stop reason: HOSPADM

## 2019-10-15 RX ORDER — BUPIVACAINE HYDROCHLORIDE 2.5 MG/ML
INJECTION, SOLUTION EPIDURAL; INFILTRATION; INTRACAUDAL
Status: COMPLETED | OUTPATIENT
Start: 2019-10-15 | End: 2019-10-15

## 2019-10-15 RX ORDER — DIPHENHYDRAMINE HCL 25 MG
25 CAPSULE ORAL NIGHTLY PRN
Status: DISCONTINUED | OUTPATIENT
Start: 2019-10-15 | End: 2019-10-18 | Stop reason: HOSPADM

## 2019-10-15 RX ORDER — SODIUM CHLORIDE 0.9 % (FLUSH) 0.9 %
3 SYRINGE (ML) INJECTION EVERY 12 HOURS SCHEDULED
Status: DISCONTINUED | OUTPATIENT
Start: 2019-10-15 | End: 2019-10-15 | Stop reason: HOSPADM

## 2019-10-15 RX ORDER — DEXAMETHASONE SODIUM PHOSPHATE 10 MG/ML
INJECTION, SOLUTION INTRAMUSCULAR; INTRAVENOUS
Status: COMPLETED | OUTPATIENT
Start: 2019-10-15 | End: 2019-10-15

## 2019-10-15 RX ORDER — FENTANYL CITRATE 50 UG/ML
50 INJECTION, SOLUTION INTRAMUSCULAR; INTRAVENOUS
Status: DISCONTINUED | OUTPATIENT
Start: 2019-10-15 | End: 2019-10-15 | Stop reason: HOSPADM

## 2019-10-15 RX ORDER — ONDANSETRON 2 MG/ML
4 INJECTION INTRAMUSCULAR; INTRAVENOUS EVERY 6 HOURS PRN
Status: DISCONTINUED | OUTPATIENT
Start: 2019-10-15 | End: 2019-10-18 | Stop reason: HOSPADM

## 2019-10-15 RX ORDER — ALBUTEROL SULFATE 2.5 MG/3ML
2.5 SOLUTION RESPIRATORY (INHALATION) EVERY 4 HOURS PRN
Status: DISCONTINUED | OUTPATIENT
Start: 2019-10-15 | End: 2019-10-18 | Stop reason: HOSPADM

## 2019-10-15 RX ORDER — HYDROCODONE BITARTRATE AND ACETAMINOPHEN 7.5; 325 MG/1; MG/1
2 TABLET ORAL EVERY 4 HOURS PRN
Status: DISCONTINUED | OUTPATIENT
Start: 2019-10-15 | End: 2019-10-18 | Stop reason: HOSPADM

## 2019-10-15 RX ORDER — FAMOTIDINE 10 MG/ML
20 INJECTION, SOLUTION INTRAVENOUS ONCE
Status: CANCELLED | OUTPATIENT
Start: 2019-10-15 | End: 2019-10-15

## 2019-10-15 RX ORDER — HYDRALAZINE HYDROCHLORIDE 20 MG/ML
5 INJECTION INTRAMUSCULAR; INTRAVENOUS
Status: DISCONTINUED | OUTPATIENT
Start: 2019-10-15 | End: 2019-10-15 | Stop reason: HOSPADM

## 2019-10-15 RX ORDER — MEPERIDINE HYDROCHLORIDE 25 MG/ML
12.5 INJECTION INTRAMUSCULAR; INTRAVENOUS; SUBCUTANEOUS
Status: DISCONTINUED | OUTPATIENT
Start: 2019-10-15 | End: 2019-10-15 | Stop reason: HOSPADM

## 2019-10-15 RX ORDER — LABETALOL HYDROCHLORIDE 5 MG/ML
5 INJECTION, SOLUTION INTRAVENOUS
Status: DISCONTINUED | OUTPATIENT
Start: 2019-10-15 | End: 2019-10-15 | Stop reason: HOSPADM

## 2019-10-15 RX ORDER — PROMETHAZINE HYDROCHLORIDE 25 MG/ML
12.5 INJECTION, SOLUTION INTRAMUSCULAR; INTRAVENOUS EVERY 4 HOURS PRN
Status: DISCONTINUED | OUTPATIENT
Start: 2019-10-15 | End: 2019-10-18 | Stop reason: HOSPADM

## 2019-10-15 RX ORDER — NEOSTIGMINE METHYLSULFATE 1 MG/ML
INJECTION, SOLUTION INTRAVENOUS AS NEEDED
Status: DISCONTINUED | OUTPATIENT
Start: 2019-10-15 | End: 2019-10-15 | Stop reason: SURG

## 2019-10-15 RX ORDER — MELOXICAM 7.5 MG/1
15 TABLET ORAL ONCE
Status: COMPLETED | OUTPATIENT
Start: 2019-10-15 | End: 2019-10-15

## 2019-10-15 RX ORDER — HYDROMORPHONE HYDROCHLORIDE 1 MG/ML
0.5 INJECTION, SOLUTION INTRAMUSCULAR; INTRAVENOUS; SUBCUTANEOUS
Status: DISCONTINUED | OUTPATIENT
Start: 2019-10-15 | End: 2019-10-15 | Stop reason: HOSPADM

## 2019-10-15 RX ORDER — LABETALOL HYDROCHLORIDE 5 MG/ML
10 INJECTION, SOLUTION INTRAVENOUS EVERY 4 HOURS PRN
Status: DISCONTINUED | OUTPATIENT
Start: 2019-10-15 | End: 2019-10-18 | Stop reason: HOSPADM

## 2019-10-15 RX ORDER — DIPHENOXYLATE HYDROCHLORIDE AND ATROPINE SULFATE 2.5; .025 MG/1; MG/1
1 TABLET ORAL DAILY
Status: DISCONTINUED | OUTPATIENT
Start: 2019-10-15 | End: 2019-10-18 | Stop reason: HOSPADM

## 2019-10-15 RX ORDER — HYDROCODONE BITARTRATE AND ACETAMINOPHEN 5; 325 MG/1; MG/1
1 TABLET ORAL ONCE AS NEEDED
Status: DISCONTINUED | OUTPATIENT
Start: 2019-10-15 | End: 2019-10-15 | Stop reason: HOSPADM

## 2019-10-15 RX ORDER — HYDROCODONE BITARTRATE AND ACETAMINOPHEN 7.5; 325 MG/1; MG/1
1 TABLET ORAL EVERY 4 HOURS PRN
Status: DISCONTINUED | OUTPATIENT
Start: 2019-10-15 | End: 2019-10-18 | Stop reason: HOSPADM

## 2019-10-15 RX ORDER — ATRACURIUM BESYLATE 10 MG/ML
INJECTION, SOLUTION INTRAVENOUS AS NEEDED
Status: DISCONTINUED | OUTPATIENT
Start: 2019-10-15 | End: 2019-10-15 | Stop reason: SURG

## 2019-10-15 RX ORDER — LABETALOL HYDROCHLORIDE 5 MG/ML
10 INJECTION, SOLUTION INTRAVENOUS EVERY 4 HOURS PRN
Status: DISCONTINUED | OUTPATIENT
Start: 2019-10-15 | End: 2019-10-15 | Stop reason: SDUPTHER

## 2019-10-15 RX ORDER — IPRATROPIUM BROMIDE AND ALBUTEROL SULFATE 2.5; .5 MG/3ML; MG/3ML
3 SOLUTION RESPIRATORY (INHALATION) ONCE AS NEEDED
Status: DISCONTINUED | OUTPATIENT
Start: 2019-10-15 | End: 2019-10-15 | Stop reason: HOSPADM

## 2019-10-15 RX ORDER — CEFAZOLIN SODIUM IN 0.9 % NACL 3 G/100 ML
3 INTRAVENOUS SOLUTION, PIGGYBACK (ML) INTRAVENOUS EVERY 8 HOURS
Status: COMPLETED | OUTPATIENT
Start: 2019-10-15 | End: 2019-10-16

## 2019-10-15 RX ORDER — MELOXICAM 7.5 MG/1
15 TABLET ORAL DAILY
Status: DISCONTINUED | OUTPATIENT
Start: 2019-10-16 | End: 2019-10-18 | Stop reason: HOSPADM

## 2019-10-15 RX ORDER — OXYCODONE HYDROCHLORIDE AND ACETAMINOPHEN 5; 325 MG/1; MG/1
1 TABLET ORAL EVERY 4 HOURS PRN
Status: DISCONTINUED | OUTPATIENT
Start: 2019-10-15 | End: 2019-10-18 | Stop reason: HOSPADM

## 2019-10-15 RX ORDER — SODIUM CHLORIDE, SODIUM LACTATE, POTASSIUM CHLORIDE, CALCIUM CHLORIDE 600; 310; 30; 20 MG/100ML; MG/100ML; MG/100ML; MG/100ML
9 INJECTION, SOLUTION INTRAVENOUS CONTINUOUS
Status: DISCONTINUED | OUTPATIENT
Start: 2019-10-15 | End: 2019-10-18 | Stop reason: HOSPADM

## 2019-10-15 RX ORDER — DEXTROSE, SODIUM CHLORIDE, AND POTASSIUM CHLORIDE 5; .45; .15 G/100ML; G/100ML; G/100ML
75 INJECTION INTRAVENOUS CONTINUOUS
Status: DISCONTINUED | OUTPATIENT
Start: 2019-10-15 | End: 2019-10-18 | Stop reason: HOSPADM

## 2019-10-15 RX ORDER — CYCLOBENZAPRINE HCL 10 MG
10 TABLET ORAL 3 TIMES DAILY PRN
Status: DISCONTINUED | OUTPATIENT
Start: 2019-10-15 | End: 2019-10-18 | Stop reason: HOSPADM

## 2019-10-15 RX ORDER — NALOXONE HCL 0.4 MG/ML
0.4 VIAL (ML) INJECTION AS NEEDED
Status: DISCONTINUED | OUTPATIENT
Start: 2019-10-15 | End: 2019-10-15 | Stop reason: HOSPADM

## 2019-10-15 RX ORDER — ATENOLOL 50 MG/1
50 TABLET ORAL DAILY
Status: DISCONTINUED | OUTPATIENT
Start: 2019-10-16 | End: 2019-10-18 | Stop reason: HOSPADM

## 2019-10-15 RX ORDER — LISINOPRIL 20 MG/1
20 TABLET ORAL
Status: DISCONTINUED | OUTPATIENT
Start: 2019-10-15 | End: 2019-10-18 | Stop reason: HOSPADM

## 2019-10-15 RX ORDER — FENTANYL CITRATE 50 UG/ML
INJECTION, SOLUTION INTRAMUSCULAR; INTRAVENOUS
Status: COMPLETED | OUTPATIENT
Start: 2019-10-15 | End: 2019-10-15

## 2019-10-15 RX ORDER — SODIUM CHLORIDE 0.9 % (FLUSH) 0.9 %
3-10 SYRINGE (ML) INJECTION AS NEEDED
Status: DISCONTINUED | OUTPATIENT
Start: 2019-10-15 | End: 2019-10-15 | Stop reason: HOSPADM

## 2019-10-15 RX ORDER — ONDANSETRON 2 MG/ML
4 INJECTION INTRAMUSCULAR; INTRAVENOUS ONCE AS NEEDED
Status: DISCONTINUED | OUTPATIENT
Start: 2019-10-15 | End: 2019-10-15 | Stop reason: HOSPADM

## 2019-10-15 RX ORDER — PROMETHAZINE HYDROCHLORIDE 25 MG/1
25 SUPPOSITORY RECTAL ONCE AS NEEDED
Status: DISCONTINUED | OUTPATIENT
Start: 2019-10-15 | End: 2019-10-15 | Stop reason: HOSPADM

## 2019-10-15 RX ORDER — MIDAZOLAM HYDROCHLORIDE 1 MG/ML
INJECTION INTRAMUSCULAR; INTRAVENOUS
Status: COMPLETED | OUTPATIENT
Start: 2019-10-15 | End: 2019-10-15

## 2019-10-15 RX ORDER — GLYCOPYRROLATE 0.2 MG/ML
INJECTION INTRAMUSCULAR; INTRAVENOUS AS NEEDED
Status: DISCONTINUED | OUTPATIENT
Start: 2019-10-15 | End: 2019-10-15 | Stop reason: SURG

## 2019-10-15 RX ORDER — DIPHENHYDRAMINE HYDROCHLORIDE 50 MG/ML
25 INJECTION INTRAMUSCULAR; INTRAVENOUS NIGHTLY PRN
Status: DISCONTINUED | OUTPATIENT
Start: 2019-10-15 | End: 2019-10-18 | Stop reason: HOSPADM

## 2019-10-15 RX ORDER — NALOXONE HCL 0.4 MG/ML
0.4 VIAL (ML) INJECTION
Status: DISCONTINUED | OUTPATIENT
Start: 2019-10-15 | End: 2019-10-18 | Stop reason: HOSPADM

## 2019-10-15 RX ORDER — ACETAMINOPHEN 500 MG
500 TABLET ORAL ONCE
Status: COMPLETED | OUTPATIENT
Start: 2019-10-15 | End: 2019-10-15

## 2019-10-15 RX ORDER — PROMETHAZINE HYDROCHLORIDE 25 MG/1
25 TABLET ORAL ONCE AS NEEDED
Status: DISCONTINUED | OUTPATIENT
Start: 2019-10-15 | End: 2019-10-15 | Stop reason: HOSPADM

## 2019-10-15 RX ORDER — PROPOFOL 10 MG/ML
VIAL (ML) INTRAVENOUS AS NEEDED
Status: DISCONTINUED | OUTPATIENT
Start: 2019-10-15 | End: 2019-10-15 | Stop reason: SURG

## 2019-10-15 RX ORDER — BUPROPION HYDROCHLORIDE 150 MG/1
150 TABLET ORAL DAILY
Status: DISCONTINUED | OUTPATIENT
Start: 2019-10-16 | End: 2019-10-18 | Stop reason: HOSPADM

## 2019-10-15 RX ORDER — CEFAZOLIN SODIUM IN 0.9 % NACL 3 G/100 ML
3 INTRAVENOUS SOLUTION, PIGGYBACK (ML) INTRAVENOUS ONCE
Status: DISCONTINUED | OUTPATIENT
Start: 2019-10-15 | End: 2019-10-15 | Stop reason: HOSPADM

## 2019-10-15 RX ORDER — PREGABALIN 75 MG/1
150 CAPSULE ORAL ONCE
Status: DISCONTINUED | OUTPATIENT
Start: 2019-10-15 | End: 2019-10-15

## 2019-10-15 RX ORDER — FAMOTIDINE 20 MG/1
20 TABLET, FILM COATED ORAL ONCE
Status: COMPLETED | OUTPATIENT
Start: 2019-10-15 | End: 2019-10-15

## 2019-10-15 RX ADMIN — HYDROMORPHONE HYDROCHLORIDE 0.5 MG: 1 INJECTION, SOLUTION INTRAMUSCULAR; INTRAVENOUS; SUBCUTANEOUS at 12:21

## 2019-10-15 RX ADMIN — HYDROMORPHONE HYDROCHLORIDE 1 MG: 1 INJECTION, SOLUTION INTRAMUSCULAR; INTRAVENOUS; SUBCUTANEOUS at 16:34

## 2019-10-15 RX ADMIN — MIDAZOLAM HYDROCHLORIDE 2 MG: 1 INJECTION, SOLUTION INTRAMUSCULAR; INTRAVENOUS at 07:15

## 2019-10-15 RX ADMIN — SODIUM CHLORIDE, POTASSIUM CHLORIDE, SODIUM LACTATE AND CALCIUM CHLORIDE 9 ML/HR: 600; 310; 30; 20 INJECTION, SOLUTION INTRAVENOUS at 07:07

## 2019-10-15 RX ADMIN — FAMOTIDINE 20 MG: 20 TABLET ORAL at 07:08

## 2019-10-15 RX ADMIN — OXYCODONE HYDROCHLORIDE AND ACETAMINOPHEN 2 TABLET: 5; 325 TABLET ORAL at 20:23

## 2019-10-15 RX ADMIN — FENTANYL CITRATE 50 MCG: 50 INJECTION INTRAMUSCULAR; INTRAVENOUS at 11:41

## 2019-10-15 RX ADMIN — ONDANSETRON 4 MG: 2 INJECTION INTRAMUSCULAR; INTRAVENOUS at 15:17

## 2019-10-15 RX ADMIN — LIDOCAINE HYDROCHLORIDE 50 MG: 10 INJECTION, SOLUTION EPIDURAL; INFILTRATION; INTRACAUDAL; PERINEURAL at 08:01

## 2019-10-15 RX ADMIN — PREGABALIN 150 MG: 75 CAPSULE ORAL at 13:31

## 2019-10-15 RX ADMIN — GLYCOPYRROLATE 0.4 MG: 0.2 INJECTION, SOLUTION INTRAMUSCULAR; INTRAVENOUS at 10:51

## 2019-10-15 RX ADMIN — BUPIVACAINE HYDROCHLORIDE 10 ML: 2.5 INJECTION, SOLUTION EPIDURAL; INFILTRATION; INTRACAUDAL; PERINEURAL at 07:15

## 2019-10-15 RX ADMIN — BUPRENORPHINE HYDROCHLORIDE 0.3 MG: 0.32 INJECTION INTRAMUSCULAR; INTRAVENOUS at 07:20

## 2019-10-15 RX ADMIN — POTASSIUM CHLORIDE, DEXTROSE MONOHYDRATE AND SODIUM CHLORIDE 75 ML/HR: 150; 5; 450 INJECTION, SOLUTION INTRAVENOUS at 16:34

## 2019-10-15 RX ADMIN — DEXAMETHASONE SODIUM PHOSPHATE 2 MG: 10 INJECTION INTRAMUSCULAR; INTRAVENOUS at 07:20

## 2019-10-15 RX ADMIN — MELOXICAM 15 MG: 7.5 TABLET ORAL at 13:45

## 2019-10-15 RX ADMIN — ROPIVACAINE HYDROCHLORIDE 6 ML/HR: 5 INJECTION, SOLUTION EPIDURAL; INFILTRATION; PERINEURAL at 11:19

## 2019-10-15 RX ADMIN — BUPIVACAINE HYDROCHLORIDE 20 ML: 2.5 INJECTION, SOLUTION EPIDURAL; INFILTRATION; INTRACAUDAL; PERINEURAL at 07:20

## 2019-10-15 RX ADMIN — FENTANYL CITRATE 50 MCG: 50 INJECTION INTRAMUSCULAR; INTRAVENOUS at 11:16

## 2019-10-15 RX ADMIN — EPHEDRINE SULFATE 10 MG: 50 INJECTION INTRAMUSCULAR; INTRAVENOUS; SUBCUTANEOUS at 08:19

## 2019-10-15 RX ADMIN — ACETAMINOPHEN 500 MG: 500 TABLET ORAL at 13:31

## 2019-10-15 RX ADMIN — PROMETHAZINE HYDROCHLORIDE 12.5 MG: 25 INJECTION INTRAMUSCULAR; INTRAVENOUS at 21:53

## 2019-10-15 RX ADMIN — MULTIVITAMIN TABLET 1 TABLET: TABLET at 16:29

## 2019-10-15 RX ADMIN — HYDROMORPHONE HYDROCHLORIDE 0.5 MG: 1 INJECTION, SOLUTION INTRAMUSCULAR; INTRAVENOUS; SUBCUTANEOUS at 12:01

## 2019-10-15 RX ADMIN — FENTANYL CITRATE 100 MCG: 50 INJECTION, SOLUTION INTRAMUSCULAR; INTRAVENOUS at 07:15

## 2019-10-15 RX ADMIN — BUPIVACAINE HYDROCHLORIDE 30 ML: 2.5 INJECTION, SOLUTION EPIDURAL; INFILTRATION; INTRACAUDAL; PERINEURAL at 07:25

## 2019-10-15 RX ADMIN — ATRACURIUM BESYLATE 50 MG: 10 INJECTION, SOLUTION INTRAVENOUS at 08:01

## 2019-10-15 RX ADMIN — DEXAMETHASONE SODIUM PHOSPHATE 2 MG: 10 INJECTION INTRAMUSCULAR; INTRAVENOUS at 07:25

## 2019-10-15 RX ADMIN — SODIUM CHLORIDE, POTASSIUM CHLORIDE, SODIUM LACTATE AND CALCIUM CHLORIDE: 600; 310; 30; 20 INJECTION, SOLUTION INTRAVENOUS at 08:20

## 2019-10-15 RX ADMIN — OXYCODONE HYDROCHLORIDE AND ACETAMINOPHEN 2 TABLET: 5; 325 TABLET ORAL at 12:40

## 2019-10-15 RX ADMIN — PROPOFOL 200 MG: 10 INJECTION, EMULSION INTRAVENOUS at 08:01

## 2019-10-15 RX ADMIN — DIPHENHYDRAMINE HYDROCHLORIDE 25 MG: 25 CAPSULE ORAL at 21:00

## 2019-10-15 RX ADMIN — NEOSTIGMINE METHYLSULFATE 2.5 MG: 1 INJECTION, SOLUTION INTRAVENOUS at 10:51

## 2019-10-15 RX ADMIN — ONDANSETRON 4 MG: 2 INJECTION INTRAMUSCULAR; INTRAVENOUS at 20:53

## 2019-10-15 RX ADMIN — BUPRENORPHINE HYDROCHLORIDE 0.3 MG: 0.32 INJECTION INTRAMUSCULAR; INTRAVENOUS at 07:25

## 2019-10-15 RX ADMIN — LISINOPRIL 20 MG: 20 TABLET ORAL at 16:29

## 2019-10-15 RX ADMIN — SODIUM CHLORIDE 3 G: 9 INJECTION, SOLUTION INTRAVENOUS at 17:12

## 2019-10-15 RX ADMIN — LIDOCAINE HYDROCHLORIDE 0.2 ML: 10 INJECTION, SOLUTION EPIDURAL; INFILTRATION; INTRACAUDAL; PERINEURAL at 07:07

## 2019-10-15 NOTE — PROGRESS NOTES
Orthopedic Foot/Ankle Progress Note    Subjective     Post-Op:left triple fusion  Systemic or Specific Complaints: pain at pelvic graft site  Objective     Vital signs in last 24 hours:  Temp:  [97.3 °F (36.3 °C)-97.7 °F (36.5 °C)] 97.5 °F (36.4 °C)  Heart Rate:  [66-77] 70  Resp:  [12-18] 17  BP: (108-136)/(62-79) 125/70    Neurovascular: left toes pink, decrease motor and sensory due to block               Wound: left leg splint dry    Data Review  Lab Results (last 24 hours)     Procedure Component Value Units Date/Time    Potassium [[id number]]  (Normal) Collected:  10/15/19 0708    Specimen:  Blood Updated:  10/15/19 0744     Potassium 3.5 mmol/L             Assessment/Plan     Status post- left triple fusion with ICBG, achilles lengthen  -elevate, non-wt bear  -10-12 weeks 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

## 2019-10-15 NOTE — OP NOTE
Operative Report    10/15/19   11:04 AM    Preoperative diagnosis: Significant arthritis left hindfoot involving talonavicular, calcaneocuboid, subtalar joint with contracture of Achilles and failure of posterior tibial tendon    Postoperative diagnosis: Same    Anesthesia: General with blocks for postop pain control    Surgeon: Elodia Guzman M.D.    Assistant: Rachel WOODS, present for the entire procedure including prepping, draping, retraction, closure, dressing.    Operative procedure: Left triple arthrodesis with iliac crest bone graft and Achilles tendon lengthening    Operative indications: This is a very pleasant 69-year-old gentleman with significant arthritis and deformity of the left hindfoot.  He has failure of the posterior tibial tendon and contracture of the Achilles.  He has a rigid valgus deformity.  He has tried extensive conservative treatment.  At the time of surgery I found significant arthritis in the hindfoot joints.    Operative procedure:The patient was taken to the operating room where general anesthesia was induced without difficulty.  He was given preoperative blocks and antibiotics.  The left leg and iliac crest prepped and draped in the usual sterile fashion.  A well-padded tourniquet was placed on the thigh.  The appropriate timeout was called.  I approached the iliac crest first.  I made a 4 cm incision over the lateral crest and carried this down through soft tissue with electrocautery. The fascia on the crest was divided, and it was dissected subperiosteally.  Care was taken to avoid the anterior iliac spine and lateral femoral cutaneous nerve.  I removed a 2 x 2 centimeter tricortical bone block using a saw and osteotome.  10 cc of cortical cancellus bone were then removed.  The incision was irrigated copiously with antibiotic solution.  The crest was packed with thrombin impregnated Gelfoam.  The fascia was closed snugly with interrupted 0 sutures.  Subcutaneous tissue and  skin with Monocryl and skin glue.    Attention was directed to the leg.  It was elevated, wrapped with an Esmarch, and the tourniquet inflated to 350 mmHg.  Tourniquet time was 110 minutes.  I started with a 10 cm curvilinear incision over the sinus Tarsi and carried this down through soft tissue bluntly.  Neurovascular structures were protected.  The extensor digitorum brevis and the fat pad were elevated, they were later replaced into their origins.  I opened the subtalar and calcaneocuboid joint.  I removed any remaining cartilage.  A joint resurfacing tool was then used to contour the surfaces and expose bleeding subchondral bone to increase the surface area for arthrodesis.  All surfaces of both joints were then completely fish scaled to again increase surface area for fusion.  I then made a 10 cm incision medially over the talonavicular joint.  This was carried down through soft tissue bluntly.  The joint capsule was opened.  There was significant cartilage loss in the joint, it was almost dislocated.  I then used a joint resurfacing tool again to contour the surfaces and expose bleeding subchondral bone.  Both sides were completely fish scaled.  I also contoured the navicular so that it would receive the talus back in its appropriate reduced position.  I then took the tricortical bone block and trimmed it accordingly and placed in the calcaneocuboid joint.  This lengthened the lateral column and reduced the talonavicular joint to its appropriate anatomic position.  This also restored the patient's arch.  I then pinned the talonavicular joint with the guidewires for the 4.5 cannulated screws.  With C-arm in multiple planes to show the arch was now reduced anatomically the joint was reduced anatomically.  The Achilles tendon was now obviously very tight.  I lengthened it using 3 incisions.  I made one incision at the insertion laterally, one at the midpoint medially and one at the musculotendinous junction  laterally.  Through each incision one third of the tendon was transected and the tendon was then placed on stretch and lengthened until I could dorsiflex the ankle 10°.  I then placed two guidewires for the 6.5 cannulated screw set from the neck of the talus and calcaneus.  C-arm in multiple planes including AP ankle, AP foot, Villafuerte heel view, and lateral confirmed appropriate position and the appropriate length screws were placed.  I then took an Integra plate and placed it over my calcaneocuboid bone block.  The screw holes were filled in standard fashion and the central bars were spread to nicely compress the bone graft.  C-arm in multiple planes showed anatomic alignment for walking and shoe wear, excellent bone to bone contact, appropriate position and length of the hardware.  The incisions were irrigated copiously with antibiotic solution.  Bone graft was morselized and packed around the joints.  The extensor digitorum brevis and fat pad were replaced in their origins and the incisions were closed with Monocryl and nylon.  A drain was placed in the lateral incision and brought out through separate stab incision.  The incisions were all dressed sterily.  The patient was placed in a 3-sided splint.  They were awakened, extubated and transferred to recovery in stable condition.  Post op plan is for admission for pain control and elevation.                 Estimated blood loss: 350 cc    Specimens: None    Drains: Hemovac    Complications: None    Elodia Guzman MD  10/15/19  11:04 AM

## 2019-10-15 NOTE — ANESTHESIA PROCEDURE NOTES
Peripheral Block      Patient reassessed immediately prior to procedure    Patient location during procedure: post-op  Start time: 10/15/2019 7:20 AM  Reason for block: at surgeon's request and post-op pain management  Performed by  CRNA: Isaiah Elias CRNA  Assisted by: Lisandro Solorzano MD  Preanesthetic Checklist  Completed: patient identified, site marked, surgical consent, pre-op evaluation, timeout performed, IV checked, risks and benefits discussed and monitors and equipment checked  Prep:  Pt Position: supine  Sterile barriers:cap, gloves, mask and sterile barriers  Prep: ChloraPrep  Patient monitoring: blood pressure monitoring, continuous pulse oximetry and EKG  Procedure  Sedation:yes  Performed under: local infiltration  Guidance:ultrasound guided  Images:still images obtained, printed/placed on chart    Laterality:right  Block Type:adductor canal block  Injection Technique:single-shot  Needle Type:echogenic and short-bevel  Needle Gauge:18 G  Resistance on Injection: none  Catheter Size:20 G (20g)    Medications Used: dexamethasone sodium phosphate injection, 2 mg  buprenorphine (BUPRENEX) injection, 0.3 mg  bupivacaine PF (MARCAINE) 0.25 % injection, 20 mL  Med admintered at 10/15/2019 7:20 AM      Post Assessment  Injection Assessment: negative aspiration for heme, incremental injection and no paresthesia on injection  Patient Tolerance:comfortable throughout block  Complications:no  Additional Notes  Procedure:             The pt was placed in the Supine position.  The Insertion site was  prepped and Draped in sterile fashion.  The pt was anesthetized with  IV Sedation( see meds).  Skin and cutaneous tissue was infiltrated and anesthetized with 1% Lidocaine 3 mls via a 25g needle.  A BBraun 4 inch 18g echogenic needle was then  inserted approximately midline, mid-thigh and advanced In-plane with Ultrasound guidance.  Normal Saline PSF was utilized for hydrodissection of tissue.  The Vastus medialis  and Sartorius muscle where visualized and the needle tip was placed in the adductor canal,  lateral to the femoral artery.  LA injection spread was visualized, injection was incremental 1-5ml, injection pressure was normal or little, no intraneural injection, no vascular injection.  LA dose was injected thru the needle(see dose above).  A BBraun 20g wire stylet catheter was placed via the needle with ultrasound visualization and confirmation with NS fluid bolus. The labeled Catheter was then secured to skin at insertion site with skin afix and steristrips to curled catheter and CHG transparent dressing.  Thank you.

## 2019-10-15 NOTE — BRIEF OP NOTE
Orthopedics left triple arthrodesis with iliac crest graft and achilles lengthening LEFT, ILIAC CREST BONE GRAFTLEFT, ACHILLES TENDON LENGTHENING  Brief Op Note    Obi HANSON Gasper Cheung  10/15/2019    Pre-op Diagnosis:   Osteoarthritis of left ankle or foot [M19.072]  Neuropathy [G62.9]  Morbid obesity due to excess calories (CMS/HCC) [E66.01]      Post-op Diagnosis:  same       Post-Op Diagnosis Codes:     * Osteoarthritis of left ankle or foot [M19.072]     * Neuropathy [G62.9]     * Morbid obesity due to excess calories (CMS/HCC) [E66.01]    Procedure(s):  left triple arthrodesis with iliac crest graft and achilles lengthening LEFT  ILIAC CREST BONE GRAFTLEFT  ACHILLES TENDON LENGTHENING    Surgeon(s):  Elodia Guzman MD    Anesthesia:  General    Staff:   Circulator: Juan Lanier RN; Idris Joseph RN  Radiology Technologist: Esdras Gann RT  Scrub Person: Chris Lock RN  Nursing Assistant: aMia Orantes  Assistant: Rachel Segundo PA-C    Estimated Blood Loss:350cc      Specimens: none      Drains:  hemovac    Complications:  None    Tourniquet:: 110min    Dressing:splint    Disposition:rr stable    Elodia Guzman MD     Date: 10/15/2019  Time: 11:04 AM

## 2019-10-15 NOTE — ANESTHESIA PROCEDURE NOTES
Airway  Urgency: elective    Date/Time: 10/15/2019 8:04 AM  Airway not difficult    General Information and Staff    Patient location during procedure: OR  CRNA: Isaiah Elias CRNA    Indications and Patient Condition  Indications for airway management: airway protection    Preoxygenated: yes  MILS not maintained throughout  Mask difficulty assessment: 1 - vent by mask    Final Airway Details  Final airway type: endotracheal airway      Successful airway: ETT  Cuffed: yes   Successful intubation technique: direct laryngoscopy  Facilitating devices/methods: intubating stylet  Endotracheal tube insertion site: oral  Blade: Vadim  Blade size: 4  Cormack-Lehane Classification: grade IIb - view of arytenoids or posterior of glottis only  Placement verified by: chest auscultation and capnometry   Measured from: lips  ETT/EBT  to lips (cm): 20  Number of attempts at approach: 1    Additional Comments  Negative epigastric sounds, Breath sound equal bilaterally with symmetric chest rise and fall

## 2019-10-15 NOTE — PLAN OF CARE
Problem: Pain, Acute (Adult)  Goal: Acceptable Pain Control/Comfort Level  Outcome: Ongoing (interventions implemented as appropriate)   10/15/19 8245   Pain, Acute (Adult)   Acceptable Pain Control/Comfort Level making progress toward outcome

## 2019-10-15 NOTE — ANESTHESIA POSTPROCEDURE EVALUATION
Patient: Obi Jackman Jr.    Procedure Summary     Date:  10/15/19 Room / Location:   [facility]    Anesthesia Start:  0756 Anesthesia Stop:  1121    Procedures:       left triple arthrodesis with iliac crest graft and achilles lengthening LEFT (Left Foot)      ILIAC CREST BONE GRAFTLEFT (Left Hip)      ACHILLES TENDON LENGTHENING (Left Leg Lower) Diagnosis:       Osteoarthritis of left ankle or foot      Neuropathy      Morbid obesity due to excess calories (CMS/HCC)      (Osteoarthritis of left ankle or foot [M19.072])      (Neuropathy [G62.9])      (Morbid obesity due to excess calories (CMS/HCC) [E66.01])    Surgeon:  Elodia Guzman MD Provider:  Lisandro Solorzano MD    Anesthesia Type:  general ASA Status:  3          Anesthesia Type: general  Last vitals  BP   110/64 (10/15/19 1300)   Temp   97.7 °F (36.5 °C) (10/15/19 1215)   Pulse   70 (10/15/19 1300)   Resp   14 (10/15/19 1300)     SpO2   95 % (10/15/19 1300)     Post Anesthesia Care and Evaluation    Patient location during evaluation: PACU  Patient participation: complete - patient participated  Level of consciousness: awake and alert  Pain management: adequate  Airway patency: patent  Anesthetic complications: No anesthetic complications  PONV Status: none  Cardiovascular status: hemodynamically stable and acceptable  Respiratory status: nonlabored ventilation, acceptable and nasal cannula  Hydration status: acceptable

## 2019-10-15 NOTE — H&P
Patient Name: Obi Jackman Jr.  MRN: [id number]  : 1950  DOS: 10/15/2019    Attending: Elodia Carolina MD    Primary Care Provider: Cammy Oneal PA      Chief complaint:  Left ankle pain    Subjective   Patient is a 69 y.o. male presented for left triple arthrodesis with iliac crest bone graft by Dr. Carolina.    He underwent surgery under GA. He tolerated surgery well and is admitted for further medical management.    Seen in his room after surgery, he is doing fairly well.  He is somewhat drowsy.  No complaints of nausea vomiting or shortness of breath.  He has some pain at the site of iliac crest harvest.       Allergies:  No Known Allergies    Meds:  Medications Prior to Admission   Medication Sig Dispense Refill Last Dose   • atenolol (TENORMIN) 50 MG tablet Take 1 tablet by mouth Daily. 30 tablet 5 10/15/2019 at 0430   • atorvastatin (LIPITOR) 10 MG tablet TAKE ONE TABLET BY MOUTH DAILY 30 tablet 5 10/15/2019 at 0430   • buPROPion XL (WELLBUTRIN XL) 150 MG 24 hr tablet Take 1 tablet by mouth Daily. 30 tablet 5 10/15/2019 at 0430   • lisinopril-hydrochlorothiazide (PRINZIDE,ZESTORETIC) 20-12.5 MG per tablet TAKE 1 TABLET BY MOUTH DAILY 30 tablet 11 10/14/2019 at 0800   • Multiple Vitamins-Minerals (MULTIVITAMIN ADULT PO) Take 1 tablet by mouth Daily.   10/14/2019 at 0800   • terbinafine (lamiSIL) 250 MG tablet Take 250 mg by mouth Daily.   10/14/2019 at 0800   • vitamin D (ERGOCALCIFEROL) 29198 units capsule capsule Take 1 capsule by mouth 1 (One) Time Per Week. (Patient taking differently: Take 50,000 Units by mouth 1 (One) Time Per Week. ) 12 capsule 3 10/14/2019 at 0800   • albuterol sulfate  (90 Base) MCG/ACT inhaler Inhale 2 puffs Every 4 (Four) Hours As Needed for Wheezing. (Patient taking differently: Inhale 2 puffs Every 4 (Four) Hours As Needed for Wheezing or Shortness of Air.) 1 inhaler 2 More than a month at Unknown time   • ALPRAZolam (XANAX) 0.25 MG tablet Take 1 tablet  by mouth Daily As Needed for Anxiety (when flying). 10 tablet 0 More than a month at Unknown time   • aspirin 81 MG tablet Take 81 mg by mouth Daily.   2 weeks ago   • Azelastine-Fluticasone 137-50 MCG/ACT suspension 1 spray each nostril daily (Patient taking differently: 2 sprays Daily As Needed (nasal congestion). 1 spray each nostril daily) 1 bottle 5 More than a month at Unknown time   • cyclobenzaprine (FLEXERIL) 10 MG tablet Take one tablet by mouth three times a day as needed for muscle spasms. (Patient taking differently: Take 10 mg by mouth 3 (Three) Times a Day As Needed for Muscle Spasms. Take one tablet by mouth three times a day as needed for muscle spasms.) 30 tablet 5 More than a month at Unknown time   • etodolac (LODINE) 400 MG tablet Take 400 mg by mouth 2 (Two) Times a Day.  3 10/5/2019   • HYDROcodone-acetaminophen (NORCO) 7.5-325 MG per tablet Take 1-2 tablets by mouth Every 6 (Six) Hours As Needed for Moderate Pain  (as needed for post surgical pain). 45 tablet 0 To start after surgery   • Liraglutide -Weight Management (SAXENDA) 18 MG/3ML solution pen-injector Inject 3 mg under the skin into the appropriate area as directed Daily. 6 pen 5 10/5/2019   • ondansetron (ZOFRAN) 4 MG tablet Take 1 tablet by mouth Every 6 (Six) Hours As Needed for Nausea or Vomiting. 30 tablet 0 To start after surgery   • oxyCODONE-acetaminophen (PERCOCET) 5-325 MG per tablet Take 1-2 tablets by mouth Every 6 (Six) Hours As Needed for Other (as needed for post surgical pain). 45 tablet 0 To start after surgery   • SUMAtriptan (IMITREX) 100 MG tablet TAKE 1 TABLET BY MOUTH AT ONSET OF HEADACHE. MAY REPEAT DOSE ONE TIME IN 2 HOURS IF HEADACHE NOT RELIEVED. 9 tablet 3 2 weeks ago       History:   Past Medical History:   Diagnosis Date   • Bulging of lumbar intervertebral disc    • Hyperlipidemia    • Hypertension    • Migraine    • CHRISTOPHE on CPAP    • Rheumatoid arthritis (CMS/HCC)    • Swelling of left ear    • Wears  "contact lenses      Past Surgical History:   Procedure Laterality Date   • CATARACT EXTRACTION Right    • COLONOSCOPY  2015   • EYE SURGERY Bilateral     cornea transplant    • HERNIA REPAIR       Family History   Problem Relation Age of Onset   • No Known Problems Mother    • No Known Problems Father      Social History     Tobacco Use   • Smoking status: Never Smoker   • Smokeless tobacco: Never Used   Substance Use Topics   • Alcohol use: Yes     Comment: rarely   • Drug use: No   .  Retired from being president of [employer].    Review of Systems  Pertinent items are noted in HPI, all other systems reviewed and negative    Vital Signs  Visit Vitals  /70   Pulse 70   Temp 97.5 °F (36.4 °C) (Oral)   Resp 17   Ht 180.3 cm (71\")   Wt 135 kg (297 lb)   SpO2 99%   BMI 41.42 kg/m²       Physical Exam:    General Appearance:    Alert, cooperative, in no acute distress   Head:    Normocephalic, without obvious abnormality, atraumatic   Eyes:            Lids and lashes normal, conjunctivae and sclerae normal, no   icterus, no pallor, corneas clear   Ears:    Ears appear intact with no abnormalities noted   Neck:   No adenopathy, supple, trachea midline, no thyromegaly   Lungs:     Clear to auscultation, respirations regular, even and                   unlabored    Heart:    Regular rhythm and normal rate, normal S1 and S2, no            murmur, no gallop   Abdomen:     Normal bowel sounds, no masses, no organomegaly, soft        non-tender, non-distended, no guarding, no rebound                 Tenderness.Obese.   Genitalia:    Deferred   Extremities:  Left lower extremity: Ace wrapping and splint on the left leg/ankle.  Cap refill of the distal toes is intact.  Hemovac emanates from beneath the dressing.  Popliteal nerve block catheter in place.   Pulses:   Pulses palpable and equal bilaterally   Skin:   No bleeding, bruising or rash           I reviewed the patient's new clinical results.       Results " from last 7 days   Lab Units 10/15/19  0708   POTASSIUM mmol/L 3.5     Results for ERIC QUINTERO JR. (MRN [id number]) as of 10/15/2019 13:20   Ref. Range 10/1/2019 13:26   Glucose Latest Ref Range: 65 - 99 mg/dL 95   Sodium Latest Ref Range: 136 - 145 mmol/L 137   Potassium Latest Ref Range: 3.5 - 5.2 mmol/L 4.1   CO2 Latest Ref Range: 22.0 - 29.0 mmol/L 29.0   Chloride Latest Ref Range: 98 - 107 mmol/L 97 (L)   Anion Gap Latest Ref Range: 5.0 - 15.0 mmol/L 11.0   Creatinine Latest Ref Range: 0.76 - 1.27 mg/dL 0.75 (L)   BUN Latest Ref Range: 8 - 23 mg/dL 13   BUN/Creatinine Ratio Latest Ref Range: 7.0 - 25.0  17.3   Calcium Latest Ref Range: 8.6 - 10.5 mg/dL 9.8   eGFR Non  Am Latest Ref Range: >60 mL/min/1.73 104   Hemoglobin A1C Latest Ref Range: 4.80 - 5.60 % 5.20   WBC Latest Ref Range: 3.40 - 10.80 10*3/mm3 9.13   RBC Latest Ref Range: 4.14 - 5.80 10*6/mm3 5.13   Hemoglobin Latest Ref Range: 13.0 - 17.7 g/dL 15.8   Hematocrit Latest Ref Range: 37.5 - 51.0 % 48.1   RDW Latest Ref Range: 12.3 - 15.4 % 12.1 (L)   MCV Latest Ref Range: 79.0 - 97.0 fL 93.8   MCH Latest Ref Range: 26.6 - 33.0 pg 30.8   MCHC Latest Ref Range: 31.5 - 35.7 g/dL 32.8   MPV Latest Ref Range: 6.0 - 12.0 fL 9.5   Platelets Latest Ref Range: 140 - 450 10*3/mm3 284     Assessment and Plan:     S/P left ankle triple arthrodesis    Hyperlipidemia    Hypertension    CHRISTOPHE (obstructive sleep apnea)    Morbid obesity due to excess calories (CMS/HCC)    Osteoarthritis of ankle or foot    Neuropathy    Arthritis of foot, left      Plan  1. PT/OT- LLE NWB  2. Pain control-prns, popliteal block   3. IS-encourage  4. DVT proph- [facility]/Lovenox  5. Bowel regimen  6. Resume home medications as appropriate  7. Monitor post-op labs  8. Discharge planning     HTN, HLD  - Continue home atenolol, statin and lisinopril  - Hold HCTZ  - Monitor BP   - Holding parameters for BP meds  - Labetalol PRN for SBP>170    CHRISTOPHE  - CPAP at night    Discuss with pt  and wife postop management plan, and they express understanding and agreement.    Joselyn Patel MD  10/15/19  2:40 PM

## 2019-10-15 NOTE — ANESTHESIA PROCEDURE NOTES
Peripheral Block    Pre-sedation assessment completed: 10/15/2019 7:25 AM    Patient reassessed immediately prior to procedure    Patient location during procedure: OR  Start time: 10/15/2019 7:25 AM  Reason for block: at surgeon's request and post-op pain management  Performed by  CRNA: Isaiah Elias CRNA  Assisted by: Lisandro Solorzano MD  Preanesthetic Checklist  Completed: patient identified, site marked, surgical consent, pre-op evaluation, timeout performed, IV checked, risks and benefits discussed and monitors and equipment checked  Prep:  Pt Position: supine  Sterile barriers:cap, gloves, sterile barriers and mask  Prep: ChloraPrep  Patient monitoring: blood pressure monitoring, continuous pulse oximetry and EKG  Procedure  Sedation:yes  Performed under: general  Guidance:ultrasound guided  Images:still images obtained, printed/placed on chart    Laterality:left  Block Type:TAP  Injection Technique:single-shot  Needle Type:short-bevel and echogenic  Needle Gauge:20 G  Resistance on Injection: none    Medications Used: buprenorphine (BUPRENEX) injection, 0.3 mg  dexamethasone sodium phosphate injection, 2 mg  bupivacaine PF (MARCAINE) 0.25 % injection, 30 mL  Med admintered at 10/15/2019 7:25 AM      Medications  Comment:        Post Assessment  Injection Assessment: negative aspiration for heme, incremental injection and no paresthesia on injection  Patient Tolerance:comfortable throughout block  Complications:no  Additional Notes      Under Ultrasound guidance, a BBraun 4inch 360 degree needle was advanced with Normal Saline hydro dissection of tissue.  The Internal Oblique and Transversus Abdominus muscles where visualized.  At or before the aponeurosis of Internal Oblique, local anesthetic spread was visualized in the Transversus Abdominus Plane. Injection was made incrementally with aspiration every 5 mls.  There was no  intravascular injection,  injection pressure was normal, there was no neural  injection, and the procedure was completed without difficulty.  Thank You.

## 2019-10-15 NOTE — ANESTHESIA PROCEDURE NOTES
Peripheral Block    Pre-sedation assessment completed: 10/15/2019 7:10 AM    Patient reassessed immediately prior to procedure    Patient location during procedure: pre-op  Reason for block: at surgeon's request and post-op pain management  Performed by  CRNA: Isaiah Elias CRNA  Assisted by: Lisandro Solorzano MD  Preanesthetic Checklist  Completed: patient identified, site marked, surgical consent, pre-op evaluation, timeout performed, IV checked, risks and benefits discussed and monitors and equipment checked  Prep:  Pt Position: right lateral decubitus  Sterile barriers:cap, gloves, mask and sterile barriers  Prep: ChloraPrep  Patient monitoring: blood pressure monitoring, continuous pulse oximetry and EKG  Procedure  Sedation:yes  Performed under: local infiltration  Guidance:ultrasound guided  Images:still images obtained, printed/placed on chart    Laterality:left  Block Type:popliteal  Injection Technique:catheter  Needle Type:echogenic  Needle Gauge:18 G  Resistance on Injection: none  Catheter Size:20 G  Cath Depth at skin: 7 cm    Medications Used: midazolam (VERSED) injection, 2 mg  fentaNYL citrate (PF) (SUBLIMAZE) injection, 100 mcg  bupivacaine PF (MARCAINE) 0.25 % injection, 10 mL  Med admintered at 10/15/2019 7:15 AM      Medications  Preservative Free Saline:5ml    Post Assessment  Injection Assessment: negative aspiration for heme, no paresthesia on injection and incremental injection  Patient Tolerance:comfortable throughout block  Complications:no  Additional Notes  Procedure:                                                         The pt was placed in  lateral position.  The Insertion site was  prepped and Draped in sterile fashion.  The pt was anesthetized with  IV Sedation( see meds).  Skin and cutaneous tissue was infiltrated and anesthetized with 1% Lidocaine 3 mls via a 25g needle.  A BBraun 4 inch 18g echogenic needle was then  inserted approximately 3 cm proximal to the popliteal pretty a  at the lateral mid biceps femoris and advanced In-plane with Ultrasound guidance.  Normal Saline PSF was utilized for hydrodissection of tissue.  The popliteal artery was visualized and the common peroneal and tibial bifurcation was located.  LA injection spread was visualized, injection was incremental 1-5ml, injection pressure was normal or little, no intraneural injection, no vascular injection.  .  A BBraun 20g wire stylet catheter was placed via the needle with ultrasound visualization and confirmation with NS fluid bolus. The labeled Catheter was then secured at insertions site with skin afix,  mastisol, steristrips  and a CHG transparent dressing was placed over. Thank you

## 2019-10-15 NOTE — PLAN OF CARE
Problem: Patient Care Overview  Goal: Plan of Care Review  Outcome: Ongoing (interventions implemented as appropriate)   10/15/19 4378   Coping/Psychosocial   Plan of Care Reviewed With patient   Plan of Care Review   Progress improving   OTHER   Outcome Summary pt denioes pain controlled yet is asleep with each follow up post medication delivery voids well site CDI vitals WNL

## 2019-10-15 NOTE — H&P
Pre-Op H&P  Obi Jackman Jr.  [id number]  1950    Chief complaint: Left ankle pain, stiffness and swelling    HPI:  Patient is a 69 y.o.male who presents with complaints of pain, stiffness and swelling of the left ankle due to osteoarthritis; rheumatoid factor positive    X-ray imaging was performed with findings that demonstrate no tilt of the talus in the   mortise, very large soft tissue envelope, numerous phleboliths, reasonable   cartilage in the ankle joint, no comparison.      He presents today for a left triple arthrodesis with iliac crest bone graft and achilles tendon lengthening.      Review of Systems:  General ROS: negative for chills, fever or skin lesions;  No changes since last office visit  Cardiovascular ROS: no chest pain or dyspnea on exertion  Respiratory ROS: no cough, shortness of breath, or wheezing    Allergies: No Known Allergies    Home Meds:    No current facility-administered medications on file prior to encounter.      Current Outpatient Medications on File Prior to Encounter   Medication Sig Dispense Refill   • albuterol sulfate  (90 Base) MCG/ACT inhaler Inhale 2 puffs Every 4 (Four) Hours As Needed for Wheezing. (Patient taking differently: Inhale 2 puffs Every 4 (Four) Hours As Needed for Wheezing or Shortness of Air.) 1 inhaler 2   • ALPRAZolam (XANAX) 0.25 MG tablet Take 1 tablet by mouth Daily As Needed for Anxiety (when flying). 10 tablet 0   • aspirin 81 MG tablet Take 81 mg by mouth Daily.     • atorvastatin (LIPITOR) 10 MG tablet TAKE ONE TABLET BY MOUTH DAILY 30 tablet 5   • Azelastine-Fluticasone 137-50 MCG/ACT suspension 1 spray each nostril daily (Patient taking differently: 2 sprays Daily As Needed (nasal congestion). 1 spray each nostril daily) 1 bottle 5   • lisinopril-hydrochlorothiazide (PRINZIDE,ZESTORETIC) 20-12.5 MG per tablet TAKE 1 TABLET BY MOUTH DAILY 30 tablet 11   • SUMAtriptan (IMITREX) 100 MG tablet TAKE 1 TABLET BY MOUTH AT ONSET OF  "HEADACHE. MAY REPEAT DOSE ONE TIME IN 2 HOURS IF HEADACHE NOT RELIEVED. 9 tablet 3   • vitamin D (ERGOCALCIFEROL) 26948 units capsule capsule Take 1 capsule by mouth 1 (One) Time Per Week. (Patient taking differently: Take 50,000 Units by mouth 1 (One) Time Per Week. Sunday) 12 capsule 3       PMH:   Past Medical History:   Diagnosis Date   • Bulging of lumbar intervertebral disc    • Hyperlipidemia    • Hypertension    • Migraine    • CHRISTOPHE on CPAP    • Rheumatoid arthritis (CMS/HCC)    • Swelling of left ear    • Wears contact lenses      PSH:    Past Surgical History:   Procedure Laterality Date   • CATARACT EXTRACTION Right    • COLONOSCOPY  2015   • EYE SURGERY Bilateral     cornea transplant    • HERNIA REPAIR         Social History:   Tobacco:   Social History     Tobacco Use   Smoking Status Never Smoker   Smokeless Tobacco Never Used      Alcohol:     Social History     Substance and Sexual Activity   Alcohol Use Yes    Comment: rarely       Vitals:  /79 (BP Location: Right arm, Patient Position: Lying)   Pulse 68   Temp 97.3 °F (36.3 °C) (Temporal)   Resp 18   Ht 180.3 cm (71\")   Wt 135 kg (297 lb)   SpO2 96%   BMI 41.42 kg/m²     Physical Exam:  General Appearance:    Alert, cooperative, no distress, appears stated age   Head:    Normocephalic, without obvious abnormality, atraumatic   Lungs:     Clear to auscultation bilaterally, respirations unlabored    Heart:   Regular rate and rhythm, S1 and S2 normal, no murmur, rub    or gallop    Abdomen:    Soft, non-tender.  +bowel sounds   Breast Exam:    deferred   Genitalia:    deferred   Extremities:   Extremities normal, atraumatic, no cyanosis or edema   Skin:   Skin color, texture, turgor normal, no rashes or lesions   Neurologic:   Grossly intact   Results Review  LABS:  Lab Results   Component Value Date    WBC 9.13 10/01/2019    HGB 15.8 10/01/2019    HCT 48.1 10/01/2019    MCV 93.8 10/01/2019     10/01/2019    NEUTROABS 6.90 " 10/01/2019    GLUCOSE 95 10/01/2019    BUN 13 10/01/2019    CREATININE 0.75 (L) 10/01/2019    EGFRIFNONA 104 10/01/2019     10/01/2019    K 4.1 10/01/2019    CL 97 (L) 10/01/2019    CO2 29.0 10/01/2019    CALCIUM 9.8 10/01/2019    ALBUMIN 4.10 09/09/2019    AST 19 09/09/2019    ALT 16 09/09/2019    BILITOT 0.4 09/09/2019       RADIOLOGY:  Imaging Results (last 72 hours)     ** No results found for the last 72 hours. **          I reviewed the patient's new clinical results.    Impression:   Osteoarthritis of the left ankle    Plan:   Left triple arthrodesis with iliac crest bone graft and achilles tendon lengthening    Anahy Livingston PA-C   10/15/2019   6:56 AM

## 2019-10-15 NOTE — ANESTHESIA PREPROCEDURE EVALUATION
Anesthesia Evaluation     Patient summary reviewed and Nursing notes reviewed                Airway   Mallampati: I  TM distance: >3 FB  Neck ROM: full  No difficulty expected  Dental - normal exam     Comment: UPPER FRONT CAPS    Pulmonary - normal exam   (+) sleep apnea on CPAP,   Cardiovascular - normal exam    (+) hypertension, hyperlipidemia,       Neuro/Psych  (+) headaches, psychiatric history Anxiety and Depression,     GI/Hepatic/Renal/Endo    (+) morbid obesity,      Musculoskeletal     Abdominal  - normal exam    Bowel sounds: normal.   Substance History - negative use     OB/GYN negative ob/gyn ROS         Other   (+) arthritis (RHEUMATOID)                   Anesthesia Plan    ASA 3     general   (POPLITEAL CATHETER/ADDUCTOR SS/TAP FOR POST OP PAIN)  intravenous induction   Anesthetic plan, all risks, benefits, and alternatives have been provided, discussed and informed consent has been obtained with: patient.    Plan discussed with CRNA.

## 2019-10-15 NOTE — PROGRESS NOTES
[facility]    Acute pain service Inpatient Progress Note    Patient Name: Obi Jackman Jr.  :  1950  MRN:  [id number]        Acute Pain  Service Inpatient Progress Note:    Analgesia:Poor  Pain Score:10/10  LOC: alert and awake  Resp Status: room air  Cardiac: VS stable  Side Effects:None  Catheter Site:clean, dressing intact and dry  Cath type: peripheral nerve cath with ON Q  Infusion rate: 8ml/hr  Dosing/Volume: bupivacaine 0.25%  Catheter Plan:Catheter to remain Insitu, Continue catheter infusion rate unchanged and Bolus Catheter  Comments: Patient seen in pacu having 10/10 hip pain and mid foot pain.  I bolused his nerve catheter for the foot pain and will approach the hip pain with po opioids at this time.

## 2019-10-16 ENCOUNTER — ANESTHESIA EVENT (OUTPATIENT)
Dept: TELEMETRY | Facility: HOSPITAL | Age: 69
End: 2019-10-16

## 2019-10-16 ENCOUNTER — ANESTHESIA (OUTPATIENT)
Dept: TELEMETRY | Facility: HOSPITAL | Age: 69
End: 2019-10-16

## 2019-10-16 PROBLEM — G89.18 ACUTE POSTOPERATIVE PAIN: Status: ACTIVE | Noted: 2019-10-16

## 2019-10-16 PROBLEM — D62 ACUTE BLOOD LOSS ANEMIA: Status: ACTIVE | Noted: 2019-10-16

## 2019-10-16 LAB
ANION GAP SERPL CALCULATED.3IONS-SCNC: 9 MMOL/L (ref 5–15)
BUN BLD-MCNC: 8 MG/DL (ref 8–23)
BUN/CREAT SERPL: 11 (ref 7–25)
CALCIUM SPEC-SCNC: 8.8 MG/DL (ref 8.6–10.5)
CHLORIDE SERPL-SCNC: 99 MMOL/L (ref 98–107)
CO2 SERPL-SCNC: 32 MMOL/L (ref 22–29)
CREAT BLD-MCNC: 0.73 MG/DL (ref 0.76–1.27)
DEPRECATED RDW RBC AUTO: 44.5 FL (ref 37–54)
ERYTHROCYTE [DISTWIDTH] IN BLOOD BY AUTOMATED COUNT: 12.5 % (ref 12.3–15.4)
GFR SERPL CREATININE-BSD FRML MDRD: 107 ML/MIN/1.73
GLUCOSE BLD-MCNC: 127 MG/DL (ref 65–99)
HCT VFR BLD AUTO: 38.6 % (ref 37.5–51)
HGB BLD-MCNC: 12.4 G/DL (ref 13–17.7)
MCH RBC QN AUTO: 31 PG (ref 26.6–33)
MCHC RBC AUTO-ENTMCNC: 32.1 G/DL (ref 31.5–35.7)
MCV RBC AUTO: 96.5 FL (ref 79–97)
PLATELET # BLD AUTO: 242 10*3/MM3 (ref 140–450)
PMV BLD AUTO: 9.8 FL (ref 6–12)
POTASSIUM BLD-SCNC: 3.8 MMOL/L (ref 3.5–5.2)
RBC # BLD AUTO: 4 10*6/MM3 (ref 4.14–5.8)
SODIUM BLD-SCNC: 140 MMOL/L (ref 136–145)
WBC NRBC COR # BLD: 9.1 10*3/MM3 (ref 3.4–10.8)

## 2019-10-16 PROCEDURE — 97163 PT EVAL HIGH COMPLEX 45 MIN: CPT

## 2019-10-16 PROCEDURE — 80048 BASIC METABOLIC PNL TOTAL CA: CPT | Performed by: NURSE PRACTITIONER

## 2019-10-16 PROCEDURE — 25010000002 DEXAMETHASONE SODIUM PHOSPHATE 10 MG/ML SOLUTION: Performed by: NURSE ANESTHETIST, CERTIFIED REGISTERED

## 2019-10-16 PROCEDURE — 97116 GAIT TRAINING THERAPY: CPT

## 2019-10-16 PROCEDURE — 25010000002 CEFAZOLIN PER 500 MG: Performed by: ORTHOPAEDIC SURGERY

## 2019-10-16 PROCEDURE — 25010000002 BUPRENORPHINE PER 0.1 MG: Performed by: NURSE ANESTHETIST, CERTIFIED REGISTERED

## 2019-10-16 PROCEDURE — 85027 COMPLETE CBC AUTOMATED: CPT | Performed by: ORTHOPAEDIC SURGERY

## 2019-10-16 PROCEDURE — 99024 POSTOP FOLLOW-UP VISIT: CPT | Performed by: ORTHOPAEDIC SURGERY

## 2019-10-16 PROCEDURE — 25010000002 ENOXAPARIN PER 10 MG: Performed by: ORTHOPAEDIC SURGERY

## 2019-10-16 PROCEDURE — 94660 CPAP INITIATION&MGMT: CPT

## 2019-10-16 PROCEDURE — 94799 UNLISTED PULMONARY SVC/PX: CPT

## 2019-10-16 PROCEDURE — 25010000002 HYDROMORPHONE 1 MG/ML SOLUTION: Performed by: ORTHOPAEDIC SURGERY

## 2019-10-16 PROCEDURE — 63710000001 DIPHENHYDRAMINE PER 50 MG: Performed by: ORTHOPAEDIC SURGERY

## 2019-10-16 RX ORDER — BUPRENORPHINE HYDROCHLORIDE 0.32 MG/ML
INJECTION INTRAMUSCULAR; INTRAVENOUS
Status: COMPLETED | OUTPATIENT
Start: 2019-10-16 | End: 2019-10-16

## 2019-10-16 RX ORDER — DOCUSATE SODIUM 100 MG/1
100 CAPSULE, LIQUID FILLED ORAL 2 TIMES DAILY
Status: DISCONTINUED | OUTPATIENT
Start: 2019-10-16 | End: 2019-10-18 | Stop reason: HOSPADM

## 2019-10-16 RX ORDER — DEXAMETHASONE SODIUM PHOSPHATE 10 MG/ML
INJECTION, SOLUTION INTRAMUSCULAR; INTRAVENOUS
Status: COMPLETED | OUTPATIENT
Start: 2019-10-16 | End: 2019-10-16

## 2019-10-16 RX ORDER — BUPIVACAINE HYDROCHLORIDE 2.5 MG/ML
INJECTION, SOLUTION EPIDURAL; INFILTRATION; INTRACAUDAL
Status: COMPLETED | OUTPATIENT
Start: 2019-10-16 | End: 2019-10-16

## 2019-10-16 RX ADMIN — LISINOPRIL 20 MG: 20 TABLET ORAL at 09:09

## 2019-10-16 RX ADMIN — BUPRENORPHINE HYDROCHLORIDE 0.3 MG: 0.32 INJECTION INTRAMUSCULAR; INTRAVENOUS at 08:00

## 2019-10-16 RX ADMIN — SODIUM CHLORIDE 3 G: 9 INJECTION, SOLUTION INTRAVENOUS at 01:44

## 2019-10-16 RX ADMIN — ATENOLOL 50 MG: 50 TABLET ORAL at 09:09

## 2019-10-16 RX ADMIN — SODIUM CHLORIDE 3 G: 9 INJECTION, SOLUTION INTRAVENOUS at 09:07

## 2019-10-16 RX ADMIN — OXYCODONE HYDROCHLORIDE AND ACETAMINOPHEN 1 TABLET: 5; 325 TABLET ORAL at 09:08

## 2019-10-16 RX ADMIN — BUPIVACAINE HYDROCHLORIDE 30 ML: 2.5 INJECTION, SOLUTION EPIDURAL; INFILTRATION; INTRACAUDAL at 08:00

## 2019-10-16 RX ADMIN — OXYCODONE HYDROCHLORIDE AND ACETAMINOPHEN 2 TABLET: 5; 325 TABLET ORAL at 01:44

## 2019-10-16 RX ADMIN — DOCUSATE SODIUM 100 MG: 100 CAPSULE, LIQUID FILLED ORAL at 20:33

## 2019-10-16 RX ADMIN — ATORVASTATIN CALCIUM 10 MG: 10 TABLET, FILM COATED ORAL at 20:33

## 2019-10-16 RX ADMIN — BUPROPION HYDROCHLORIDE 150 MG: 150 TABLET, FILM COATED, EXTENDED RELEASE ORAL at 09:18

## 2019-10-16 RX ADMIN — OXYCODONE HYDROCHLORIDE AND ACETAMINOPHEN 2 TABLET: 5; 325 TABLET ORAL at 13:03

## 2019-10-16 RX ADMIN — OXYCODONE HYDROCHLORIDE AND ACETAMINOPHEN 2 TABLET: 5; 325 TABLET ORAL at 19:40

## 2019-10-16 RX ADMIN — DIPHENHYDRAMINE HYDROCHLORIDE 25 MG: 25 CAPSULE ORAL at 20:33

## 2019-10-16 RX ADMIN — MELOXICAM 15 MG: 7.5 TABLET ORAL at 09:08

## 2019-10-16 RX ADMIN — HYDROMORPHONE HYDROCHLORIDE 1 MG: 1 INJECTION, SOLUTION INTRAMUSCULAR; INTRAVENOUS; SUBCUTANEOUS at 04:01

## 2019-10-16 RX ADMIN — MULTIVITAMIN TABLET 1 TABLET: TABLET at 09:09

## 2019-10-16 RX ADMIN — ENOXAPARIN SODIUM 40 MG: 40 INJECTION SUBCUTANEOUS at 09:09

## 2019-10-16 RX ADMIN — DEXAMETHASONE SODIUM PHOSPHATE 4 MG: 10 INJECTION, SOLUTION INTRAMUSCULAR; INTRAVENOUS at 08:00

## 2019-10-16 NOTE — PLAN OF CARE
Problem: Patient Care Overview  Goal: Plan of Care Review  Outcome: Ongoing (interventions implemented as appropriate)   10/16/19 7739   Coping/Psychosocial   Plan of Care Reviewed With patient   Plan of Care Review   Progress no change   OTHER   Outcome Summary Patient has complained of moderate pain several times; PRN percocet given x2. Arrow pump at 6ml/hr. Patient states msot pain is in his left hip. Experienced N/V last night with small amount of light brown emesis. PRN zofran given with no relief, PRN phenergan given 1 hour later. Pt did experience relief of nausea with dose of phenergan. VSS. LLE has been elevated on pillows. NWB to LLE.        Problem: Surgery Nonspecified (Adult)  Goal: Signs and Symptoms of Listed Potential Problems Will be Absent, Minimized or Managed (Surgery Nonspecified)  Outcome: Ongoing (interventions implemented as appropriate)    Goal: Anesthesia/Sedation Recovery  Outcome: Outcome(s) achieved Date Met: 10/16/19

## 2019-10-16 NOTE — PROGRESS NOTES
[facility]    Acute pain service Inpatient Progress Note    Patient Name: Obi Jackman Jr.  :  1950  MRN:  [id number]        Acute Pain  Service Inpatient Progress Note:    Analgesia:Excellent  LOC: alert and awake  Resp Status: room air  Cardiac: VS stable  Side Effects:None  Catheter Site:clean, dressing intact and dry  Cath type: peripheral nerve cath with ON Q  Infusion rate: 6ml/hr  Catheter Plan:Catheter to remain Insitu and Continue catheter infusion rate unchanged  Comments: Pt cont. To state foot no pain, totally numb, will cont as is, did have pain to hip that required iv/po meds overnight, received TQL block and is now sitting up eating doing well, hip palpated at incision line, no pain, much improved.  Thank you

## 2019-10-16 NOTE — ANESTHESIA PROCEDURE NOTES
Peripheral Block    Pre-sedation assessment completed: 10/16/2019 7:45 AM    Patient reassessed immediately prior to procedure    Patient location during procedure: pre-op  Start time: 10/16/2019 7:50 AM  Reason for block: at surgeon's request and post-op pain management  Performed by  Anesthesiologist: Rajan Jiménez MD  Assisted by: Isaiah Elias CRNA  Preanesthetic Checklist  Completed: patient identified, site marked, surgical consent, pre-op evaluation, timeout performed, IV checked, risks and benefits discussed and monitors and equipment checked  Prep:  Sterile barriers:cap, gloves and mask  Prep: ChloraPrep  Patient monitoring: blood pressure monitoring, continuous pulse oximetry and EKG  Procedure  Sedation:yes    Guidance:ultrasound guided  ULTRASOUND INTERPRETATION. Using ultrasound guidance a 22 G gauge needle was placed in close proximity to the nerve, at which point, under ultrasound guidance anesthetic was injected in the area of the nerve and spread of the anesthesia was seen on ultrasound in close proximity thereto.  There were no abnormalities seen on ultrasound; a digital image was taken; and the patient tolerated the procedure with no complications. Images:still images obtained, printed/placed on chart    Anesthesia block type: Quadratus Lumborum.  Injection Technique:single-shot  Needle Type:echogenic  Needle Gauge:20 G  Resistance on Injection: none    Medications Used: buprenorphine (BUPRENEX) injection, 0.3 mg  dexamethasone sodium phosphate injection, 4 mg  bupivacaine PF (MARCAINE) 0.25 % injection, 30 mL  Med admintered at 10/16/2019 8:00 AM      Medications  Preservative Free Saline:5ml    Post Assessment  Injection Assessment: negative aspiration for heme, no paresthesia on injection and incremental injection  Complications:yes  Additional Notes  Patient requiring multiple doses of pain meds overnight and postop for continuous Left hip pain and abdominal soreness.  To facilitate pain  control and pt discharge, discussed risk/benefits to TQL block.  Pt readily agrees, therefore pt to preop for block.  Trans muscular Quadratus Lumborum Procedure:      The patient was positioned in the lateral decubitus position, legs and arms in position of anatomic correctness or comfort.  The procedure area was prepped with CHG and US probe prepared with sterile Sleeve.  The needle was inserted in-plane to the transducer through the quadratus lumborum muscle, penetrating the ventral fascia, utilizing NS hydro dissection. Fluid spread was then visualized between QL and Psoas, at that time LA was then injected in ml aliquots. Thank you

## 2019-10-16 NOTE — PLAN OF CARE
Problem: Patient Care Overview  Goal: Plan of Care Review  Outcome: Ongoing (interventions implemented as appropriate)   10/16/19 2591   Coping/Psychosocial   Plan of Care Reviewed With patient;spouse   Plan of Care Review   Progress improving   OTHER   Outcome Summary PT eval complete. NWB LLE.  Pt drowsy and lethargic from surgery and nerve cath replacement. Min A bed mobility. Mod A of 2 STS and SPT to knee scooter. Pt ambulated 25' and 15' with knee scooter mod A of 2. Unable to attempt stairs this date due to drowsiness. Recommend home with home health and family pending mobility and evaluation of stairs tomorrow.

## 2019-10-16 NOTE — PROGRESS NOTES
Discharge Planning Assessment  [facility]     Patient Name: Obi Jackman Jr.  MRN: [id number]  Today's Date: 10/16/2019    Admit Date: 10/15/2019    Discharge Needs Assessment     Row Name 10/16/19 0935       Living Environment    Lives With  spouse    Current Living Arrangements  home/apartment/condo    Primary Care Provided by  self    Provides Primary Care For  no one    Family Caregiver if Needed  spouse    Quality of Family Relationships  involved;supportive    Able to Return to Prior Arrangements  yes       Resource/Environmental Concerns    Resource/Environmental Concerns  none       Transition Planning    Patient/Family Anticipates Transition to  home with family    Patient/Family Anticipated Services at Transition  ;rehabilitation services       Discharge Needs Assessment    Readmission Within the Last 30 Days  no previous admission in last 30 days    Concerns to be Addressed  discharge planning    Equipment Currently Used at Home  bipap/cpap;cane, quad;other (see comments);shower chair knee walker    Anticipated Changes Related to Illness  none    Equipment Needed After Discharge  none        Discharge Plan     Row Name 10/16/19 0936       Plan    Plan  Home    Patient/Family in Agreement with Plan  yes    Plan Comments  Spoke with patient in room to initiate discharge planning.  He lives with his wife in [facility].  Prior to admission, he was independent with ADL's, using a quad cane to assist with ambulation.  He also has a CPAP, shower chair and knee walker at home.  He has never had home health.  Mr. Jackman has RX coverage and has his scripts filled at [facility].  His plan is to return home with his wife at discharge.  He denies any needs at this time.  CM will continue to follow.  Pat Oleary RN [phone]    Final Discharge Disposition Code  01 - home or self-care        Destination      No service coordination in this encounter.      Durable Medical Equipment      No  service coordination in this encounter.      Dialysis/Infusion      No service coordination in this encounter.      Home Medical Care      No service coordination in this encounter.      Therapy      No service coordination in this encounter.      Community Resources      No service coordination in this encounter.        Expected Discharge Date and Time     Expected Discharge Date Expected Discharge Time    Oct 17, 2019         Demographic Summary     Row Name 10/16/19 0935       General Information    Admission Type  observation    Arrived From  PACU/recovery room    Referral Source  admission list    Reason for Consult  discharge planning    Preferred Language  English     Used During This Interaction  no    General Information Comments  PCP- Cammy Oneal        Functional Status     Row Name 10/16/19 0935       Functional Status    Usual Activity Tolerance  moderate    Current Activity Tolerance  fair       Functional Status, IADL    Medications  assistive equipment    Meal Preparation  assistive equipment    Housekeeping  assistive equipment    Laundry  assistive equipment    Shopping  assistive equipment        Psychosocial    No documentation.       Abuse/Neglect    No documentation.       Legal     Row Name 10/16/19 0935       Financial/Legal    Finance Comments  Verified with patient that he has [facility].  No issues obtaining medications.        Substance Abuse    No documentation.       Patient Forms    No documentation.           Pat Oleary RN

## 2019-10-16 NOTE — THERAPY EVALUATION
Patient Name: Obi Jackman Jr.  : 1950    MRN: [id number]                              Today's Date: 10/16/2019       Admit Date: 10/15/2019    Visit Dx:     ICD-10-CM ICD-9-CM   1. Osteoarthritis of left ankle or foot M19.072 715.97   2. Neuropathy G62.9 355.9   3. Morbid obesity due to excess calories (CMS/[facility]) E66.01 278.01     Patient Active Problem List   Diagnosis   • Allergic rhinitis   • Anxiety disorder   • Benign paroxysmal positional vertigo   • Chronic tension type headache   • Depression   • ED (erectile dysfunction) of non-organic origin   • Hyperlipidemia   • Hypertension   • LFTs abnormal   • CHRISTOPHE (obstructive sleep apnea)   • Elevated glucose   • Health care maintenance   • Morbid obesity due to excess calories (CMS/[facility])   • Onychomycosis of left great toe   • Osteoarthritis of ankle or foot   • Venous stasis   • Neuropathy   • Class 3 severe obesity due to excess calories with serious comorbidity and body mass index (BMI) of 40.0 to 44.9 in adult (CMS/[facility])   • Pre-op evaluation   • Arthritis of foot, left   • S/P left ankle triple arthrodesis   • Acute blood loss anemia, mild, asymptomatic    • Acute postoperative pain     Past Medical History:   Diagnosis Date   • Bulging of lumbar intervertebral disc    • Hyperlipidemia    • Hypertension    • Migraine    • CHRISTOPHE on CPAP    • Rheumatoid arthritis (CMS/[facility])    • Swelling of left ear    • Wears contact lenses      Past Surgical History:   Procedure Laterality Date   • CATARACT EXTRACTION Right    • COLONOSCOPY     • EYE SURGERY Bilateral     cornea transplant    • HERNIA REPAIR       General Information     Row Name 10/16/19 1306          PT Evaluation Time/Intention    Document Type  evaluation  -AA     Mode of Treatment  physical therapy  -AA     Row Name 10/16/19 130          General Information    Patient Profile Reviewed?  yes  -AA     Prior Level of Function  independent:;all household mobility;community mobility  -AA      Existing Precautions/Restrictions  non-weight bearing;left;fall nerve cath, hemovac  -AA     Barriers to Rehab  none identified  -AA     Row Name 10/16/19 1306          Relationship/Environment    Lives With  spouse  -AA     Row Name 10/16/19 1306          Resource/Environmental Concerns    Current Living Arrangements  home/apartment/condo  -AA     Row Name 10/16/19 1306          Home Main Entrance    Number of Stairs, Main Entrance  two  -AA     Stair Railings, Main Entrance  none  -AA     Row Name 10/16/19 1306          Stairs Within Home, Primary    Number of Stairs, Within Home, Primary  none  -AA     Row Name 10/16/19 1306          Cognitive Assessment/Intervention- PT/OT    Orientation Status (Cognition)  oriented x 4  -AA     Row Name 10/16/19 1306          Safety Issues, Functional Mobility    Safety Issues Affecting Function (Mobility)  awareness of need for assistance;insight into deficits/self awareness;judgment;problem solving;positioning of assistive device;safety precautions follow-through/compliance;sequencing abilities;safety precaution awareness  -AA     Impairments Affecting Function (Mobility)  balance;coordination;endurance/activity tolerance;pain;range of motion (ROM);strength  -AA     Comment, Safety Issues/Impairments (Mobility)  pt lethargic   -AA       User Key  (r) = Recorded By, (t) = Taken By, (c) = Cosigned By    Initials Name Provider Type    Ernestina Costa, PT Physical Therapist        Mobility     Row Name 10/16/19 1306          Bed Mobility Assessment/Treatment    Bed Mobility Assessment/Treatment  scooting/bridging;supine-sit;rolling left  -AA     Rolling Left [place] (Bed Mobility)  minimum assist (75% patient effort);verbal cues  -AA     Scooting/Bridging [place] (Bed Mobility)  minimum assist (75% patient effort);verbal cues  -AA     Supine-Sit [place] (Bed Mobility)  minimum assist (75% patient effort);verbal cues  -AA     Assistive Device (Bed Mobility)  bed  rails  -AA     Comment (Bed Mobility)  increased time to complete due to pain and pt lethargic from anesthesia.  Dizziness upon sitting  -AA     Row Name 10/16/19 1306          Transfer Assessment/Treatment    Comment (Transfers)  STS and SPT bed to knee scooter, toilet to from knee scooter, and recliner to from knee scooter.  Max cues for sequencing and NWB.  Pt require increased time due to dizziness, fatigue, and pain  -AA     Row Name 10/16/19 1306          Bed-Chair Transfer    Bed-Chair [place] (Transfers)  moderate assist (50% patient effort);2 person assist;verbal cues;nonverbal cues (demo/gesture)  -AA     Assistive Device (Bed-Chair Transfers)  walker, knee scooter  -AA     Row Name 10/16/19 1306          Sit-Stand Transfer    Sit-Stand [place] (Transfers)  moderate assist (50% patient effort);2 person assist;verbal cues;nonverbal cues (demo/gesture)  -AA     Assistive Device (Sit-Stand Transfers)  walker, knee scooter  -AA     Row Name 10/16/19 1306          Gait/Stairs Assessment/Training    Gait/Stairs Assessment/Training  gait/ambulation independence;gait/ambulation assistive device;distance ambulated;gait pattern  -AA     [place] Level (Gait)  moderate assist (50% patient effort);2 person assist;verbal cues;nonverbal cues (demo/gesture)  -AA     Assistive Device (Gait)  walker, knee scooter  -AA     Distance in Feet (Gait)  25, 15  -AA     Deviations/Abnormal Patterns (Gait)  bilateral deviations;dena decreased;steppage  -AA     Bilateral Gait Deviations  forward flexed posture  -AA     Comment (Gait/Stairs)  pt ambulated in room with knee scooter and mod A of 2 with balance deficits, pain, and short steps.  Pt unsteady in room with max cues for deep breathing to reduce anxiety.    -AA     Row Name 10/16/19 1306          Mobility Assessment/Intervention    Extremity Weight-bearing Status  left lower extremity  -AA     Left Lower Extremity (Weight-bearing Status)  non weight-bearing  (NWB)  -AA       User Key  (r) = Recorded By, (t) = Taken By, (c) = Cosigned By    Initials Name Provider Type    Ernestina Costa, PT Physical Therapist        Obj/Interventions     Row Name 10/16/19 1306          General ROM    GENERAL ROM COMMENTS  RLe WNL  -AA     Row Name 10/16/19 1306          MMT (Manual Muscle Testing)    General MMT Comments  RLe 5/5  -AA     Row Name 10/16/19 1306          Static Sitting Balance    Level of [place] (Unsupported Sitting, Static Balance)  contact guard assist  -AA     Sitting Position (Unsupported Sitting, Static Balance)  sitting on edge of bed  -AA     Time Able to Maintain Position (Unsupported Sitting, Static Balance)  more than 5 minutes  -AA     Row Name 10/16/19 1306          Static Standing Balance    Level of [place] (Supported Standing, Static Balance)  minimal assist, 75% patient effort;2 person assist  -AA     Time Able to Maintain Position (Supported Standing, Static Balance)  1 to 2 minutes  -AA     Comment (Supported Standing, Static Balance)  knee scooter  -AA     Row Name 10/16/19 1306          Dynamic Standing Balance    Level of [place], Reaches Outside Midline (Standing, Dynamic Balance)  moderate assist, 50 to 74% patient effort;2 person assist  -AA     Assistive Device Utilized (Supported Standing, Dynamic Balance)  other (see comments) knee scooter  -AA     Row Name 10/16/19 1306          Sensory Assessment/Intervention    Sensory General Assessment  -- L foot numbness, able to move toes  -AA       User Key  (r) = Recorded By, (t) = Taken By, (c) = Cosigned By    Initials Name Provider Type    Ernestina Costa, PT Physical Therapist        Goals/Plan     Row Name 10/16/19 1306          Bed Mobility Goal 1 (PT)    Activity/Assistive Device (Bed Mobility Goal 1, PT)  bed mobility activities, all  -AA     [place] Level/Cues Needed (Bed Mobility Goal 1, PT)  independent  -AA     Time Frame (Bed Mobility Goal 1, PT)  10 days  -AA      Row Name 10/16/19 1306          Transfer Goal 1 (PT)    Activity/Assistive Device (Transfer Goal 1, PT)  sit-to-stand/stand-to-sit;bed-to-chair/chair-to-bed;other (see comments) knee scooter  -AA     [place] Level/Cues Needed (Transfer Goal 1, PT)  conditional independence  -AA     Time Frame (Transfer Goal 1, PT)  10 days  -AA     Row Name 10/16/19 1306          Gait Training Goal 1 (PT)    Activity/Assistive Device (Gait Training Goal 1, PT)  gait (walking locomotion);other (see comments) knee scooter  -AA     [place] Level (Gait Training Goal 1, PT)  conditional independence  -AA     Distance (Gait Goal 1, PT)  150 feet  -AA     Time Frame (Gait Training Goal 1, PT)  10 days  -AA     Row Name 10/16/19 1306          Stairs Goal 1 (PT)    Activity/Assistive Device (Stairs Goal 1, PT)  stairs, all skills  -AA     [place] Level/Cues Needed (Stairs Goal 1, PT)  minimum assist (75% or more patient effort)  -AA     Number of Stairs (Stairs Goal 1, PT)  2  -AA     Time Frame (Stairs Goal 1, PT)  10 days  -AA       User Key  (r) = Recorded By, (t) = Taken By, (c) = Cosigned By    Initials Name Provider Type    Ernestina Costa, PT Physical Therapist        Clinical Impression     Row Name 10/16/19 1306          Pain Assessment    Additional Documentation  Pain Scale: Numbers Pre/Post-Treatment (Group)  -AA     Row Name 10/16/19 1306          Pain Scale: Numbers Pre/Post-Treatment    Pain Scale: Numbers, Pretreatment  6/10  -AA     Pain Scale: Numbers, Post-Treatment  6/10  -AA     Pain Location - Side  Left  -AA     Pain Location  hip  -AA     Pre/Post Treatment Pain Comment  pain 10/10 with mobility in L hip, 6/10 at rest.  No pain L ankle   -AA     Pain Intervention(s)  Medication (See MAR);Repositioned;Ambulation/increased activity;Elevated  -AA     Row Name 10/16/19 1306          Plan of Care Review    Plan of Care Reviewed With  patient;spouse  -AA     Row Name 10/16/19 1306          Physical Therapy  Clinical Impression    Patient/Family Goals Statement (PT Clinical Impression)  use knee scooter safely while NWB  -AA     Criteria for Skilled Interventions Met (PT Clinical Impression)  yes;treatment indicated  -AA     Rehab Potential (PT Clinical Summary)  good, to achieve stated therapy goals  -AA     Predicted Duration of Therapy (PT)  10 days  -AA     Row Name 10/16/19 1306          Positioning and Restraints    Pre-Treatment Position  in bed  -AA     Post Treatment Position  chair  -AA     In Chair  notified nsg;exit alarm on;reclined;with family/caregiver;LLE elevated;call light within reach;encouraged to call for assist  -AA       User Key  (r) = Recorded By, (t) = Taken By, (c) = Cosigned By    Initials Name Provider Type    Ernestina Costa PT Physical Therapist        Outcome Measures     Row Name 10/16/19 1306          How much help from another person do you currently need...    Turning from your back to your side while in flat bed without using bedrails?  3  -AA     Moving from lying on back to sitting on the side of a flat bed without bedrails?  3  -AA     Moving to and from a bed to a chair (including a wheelchair)?  2  -AA     Standing up from a chair using your arms (e.g., wheelchair, bedside chair)?  2  -AA     Climbing 3-5 steps with a railing?  1  -AA     To walk in hospital room?  2  -AA     AM-PAC 6 Clicks Score (PT)  13  -AA     Row Name 10/16/19 1306          Functional Assessment    Outcome Measure Options  AM-PAC 6 Clicks Basic Mobility (PT)  -AA       User Key  (r) = Recorded By, (t) = Taken By, (c) = Cosigned By    Initials Name Provider Type    Ernestina Costa, PT Physical Therapist        Physical Therapy Education     Title: PT OT SLP Therapies (In Progress)     Topic: Physical Therapy (In Progress)     Point: Mobility training (Done)     Learning Progress Summary           Patient Acceptance, E,D, NR,VU by DORINA at 10/16/2019  1:06 PM    Comment:  gait sequencing  knee scooter  sequencing with trasnfers                   Point: Body mechanics (Done)     Learning Progress Summary           Patient Acceptance, E,D, NR,VU by  at 10/16/2019  1:06 PM    Comment:  gait sequencing  knee scooter sequencing with trasnfers                   Point: Precautions (Done)     Learning Progress Summary           Patient Acceptance, E,D, NR,VU by  at 10/16/2019  1:06 PM    Comment:  gait sequencing  knee scooter sequencing with trasnfers                               User Key     Initials Effective Dates Name Provider Type [facility] 04/02/18 -  Ernestina Knight, PT Physical Therapist PT              PT Recommendation and Plan  Planned Therapy Interventions (PT Eval): balance training, bed mobility training, gait training, home exercise program, patient/family education, stair training, strengthening, transfer training  Outcome Summary/Treatment Plan (PT)  Anticipated Discharge Disposition (PT): home with assist, home with home health  Plan of Care Reviewed With: patient, spouse  Progress: improving  Outcome Summary: PT eval complete.  Pt drowsy and lethargic from surgery and nerve cath replacement.  Min A bed mobility.  Mod A of 2 STS and SPT to knee scooter.  Pt ambulated 25' and 15' with knee scooter mod A of 2.  Unable to attempt stairs this date due to drowsiness.  Recommend home with home health and family pending mobility and evaluation of stairs tomorrow.       Time Calculation:   PT Charges     Row Name 10/16/19 1306             Time Calculation    Start Time  1306  -AA      PT Received On  10/16/19  -      PT Goal Re-Cert Due Date  10/26/19  -         Time Calculation- PT    Total Timed Code Minutes- PT  16 minute(s)  -         Timed Charges    51179 - Gait Training Minutes   16  -AA        User Key  (r) = Recorded By, (t) = Taken By, (c) = Cosigned By    Initials Name Provider Type     Ernestina Knight, PT Physical Therapist        Therapy Charges for Today     Code Description  Service Date Service Provider Modifiers Qty    65822765276 HC GAIT TRAINING EA 15 MIN 10/16/2019 Ernestina Knight, PT GP 1    28367424098 HC PT THER SUPP EA 15 MIN 10/16/2019 Ernestina Knight, PT GP 4    85254985556 HC PT EVAL HIGH COMPLEXITY 4 10/16/2019 Ernestina Knight, PT GP 1          PT G-Codes  Outcome Measure Options: AM-PAC 6 Clicks Basic Mobility (PT)  AM-PAC 6 Clicks Score (PT): 13 April PRACHI Knight, PT  10/16/2019

## 2019-10-16 NOTE — PROGRESS NOTES
Orthopedic  Progress Note    Subjective     Post-Operative Day: 1 Day Post-Op-left triple fusion    Systemic or Specific Complaints: lots of pain at pelvis, re-blocked this am  Objective     Vital signs in last 24 hours:  Temp:  [97.5 °F (36.4 °C)-98.8 °F (37.1 °C)] 98.8 °F (37.1 °C)  Heart Rate:  [66-90] 89  Resp:  [12-17] 16  BP: (108-184)/(62-95) 126/65    Neurovascular: toes wigggle left, decrease sensory due to block               Wound: left splint dry    Data Review  Lab Results (last 24 hours)     Procedure Component Value Units Date/Time    Basic Metabolic Panel [[id number]]  (Abnormal) Collected:  10/16/19 0646    Specimen:  Blood Updated:  10/16/19 0838     Glucose 127 mg/dL      BUN 8 mg/dL      Creatinine 0.73 mg/dL      Sodium 140 mmol/L      Potassium 3.8 mmol/L      Chloride 99 mmol/L      CO2 32.0 mmol/L      Calcium 8.8 mg/dL      eGFR Non African Amer 107 mL/min/1.73      BUN/Creatinine Ratio 11.0     Anion Gap 9.0 mmol/L     Narrative:       GFR Normal >60  Chronic Kidney Disease <60  Kidney Failure <15    CBC (No Diff) [[id number]]  (Abnormal) Collected:  10/16/19 0646    Specimen:  Blood Updated:  10/16/19 0822     WBC 9.10 10*3/mm3      RBC 4.00 10*6/mm3      Hemoglobin 12.4 g/dL      Hematocrit 38.6 %      MCV 96.5 fL      MCH 31.0 pg      MCHC 32.1 g/dL      RDW 12.5 %      RDW-SD 44.5 fl      MPV 9.8 fL      Platelets 242 10*3/mm3             Assessment/Plan     Status post- s/p triple fusion, lots of pain at bone graft site, too much pain to go home  -blood loss anemia, check in am           Elodia Guzman MD    Date: 10/16/2019  Time: 8:52 AM

## 2019-10-16 NOTE — PROGRESS NOTES
"IM progress note      Obi Jackman JrStalin  [id number]  1950     LOS: 1 day     Attending: Elodia Guzman MD    Primary Care Provider: Cammy Oneal PA      Chief Complaint/Reason for visit:  Left ankle pain    Subjective   Doing well. Better pain control this morning. Denies f/c/n/v/sob/cp.  ( some help from reblock at IC harvest site. Got to work with PT with knee walker)wy  Objective     Vital Signs  Visit Vitals  /74 (BP Location: Right arm, Patient Position: Lying)   Pulse 92   Temp 96.8 °F (36 °C) (Axillary)   Resp 16   Ht 180.3 cm (71\")   Wt 135 kg (297 lb)   SpO2 93%   BMI 41.42 kg/m²     Temp (24hrs), Av.8 °F (36.6 °C), Min:96.8 °F (36 °C), Max:98.8 °F (37.1 °C)      Nutrition: PO    Respiratory: RA    Physical Therapy:     Physical Exam:     General Appearance:    Alert, cooperative, in no acute distress   Head:    Normocephalic, without obvious abnormality, atraumatic    Lungs:     Normal effort, symmetric chest rise, no crepitus, clear to      auscultation bilaterally             Heart:    Regular rhythm and normal rate, normal S1 and S2   Abdomen:     Normal bowel sounds, no masses, no organomegaly, soft        non-tender, non-distended, no guarding, no rebound                Tenderness. Obese.  Iliac crest Aquacel CDI   Extremities:   Left lower extremity: Ace wrapping and splint on the left leg/ankle.  Cap refill of the distal toes is intact.  Hemovac emanates from beneath the dressing.  Popliteal nerve block catheter in place.   Pulses:   Pulses palpable and equal bilaterally   Skin:   No bleeding, bruising or rash   Neurologic:    Cranial nerves 2 - 12 grossly intact     Results Review:     I reviewed the patient's new clinical results.   Results from last 7 days   Lab Units 10/16/19  0646   WBC 10*3/mm3 9.10   HEMOGLOBIN g/dL 12.4*   HEMATOCRIT % 38.6   PLATELETS 10*3/mm3 242     Results for OBI JACKMAN JRStalin (MRN [id number]) as of 10/16/2019 13:46   Ref. Range 10/1/2019 " 13:26   Hemoglobin Latest Ref Range: 13.0 - 17.7 g/dL 15.8   Hematocrit Latest Ref Range: 37.5 - 51.0 % 48.1     Results from last 7 days   Lab Units 10/16/19  0646 10/15/19  0708   SODIUM mmol/L 140  --    POTASSIUM mmol/L 3.8 3.5   CHLORIDE mmol/L 99  --    CO2 mmol/L 32.0*  --    BUN mg/dL 8  --    CREATININE mg/dL 0.73*  --    CALCIUM mg/dL 8.8  --    GLUCOSE mg/dL 127*  --      I reviewed the patient's new imaging including images and reports.    All medications reviewed.     atenolol 50 mg Oral Daily   atorvastatin 10 mg Oral Nightly   buPROPion  mg Oral Daily   enoxaparin 40 mg Subcutaneous Daily   lisinopril 20 mg Oral Q24H   meloxicam 15 mg Oral Daily   multivitamin 1 tablet Oral Daily       Assessment/Plan     S/P left ankle triple arthrodesis    Osteoarthritis of ankle or foot    Neuropathy    Arthritis of foot, left    Morbid obesity due to excess calories (CMS/HCC)    Hyperlipidemia    Hypertension    CHRISTOPHE (obstructive sleep apnea)    Acute blood loss anemia, mild, asymptomatic     Acute postoperative pain      Plan  1. PT/OT- LLE NWB  2. Pain control-prns, popliteal nerve block   3. IS-encouraged  4. DVT proph- mechs/Lovenox  5. Bowel regimen  6. Monitor post-op labs  7. DC planning for home    HTN, HLD  - Continue home atenolol, statin and lisinopril  - Hold HCTZ, ( resume at discharge )wy  - Monitor BP   - Holding parameters for BP meds  - Labetalol PRN for SBP>170     CHRISTOPHE  - CPAP at night      YASMIN Quesada  10/16/19  1:45 PM     I have personally performed the evaluation on this patient. My history is consistent  with HPI obtained. My exam findings are listed above. I have personally reviewed and discussed the above formulated treatment plan with pt, wife, and . YASMIN.wy

## 2019-10-17 ENCOUNTER — APPOINTMENT (OUTPATIENT)
Dept: GENERAL RADIOLOGY | Facility: HOSPITAL | Age: 69
End: 2019-10-17

## 2019-10-17 LAB
DEPRECATED RDW RBC AUTO: 46.8 FL (ref 37–54)
ERYTHROCYTE [DISTWIDTH] IN BLOOD BY AUTOMATED COUNT: 12.8 % (ref 12.3–15.4)
HCT VFR BLD AUTO: 38.6 % (ref 37.5–51)
HGB BLD-MCNC: 12.1 G/DL (ref 13–17.7)
MCH RBC QN AUTO: 31.1 PG (ref 26.6–33)
MCHC RBC AUTO-ENTMCNC: 31.3 G/DL (ref 31.5–35.7)
MCV RBC AUTO: 99.2 FL (ref 79–97)
PLATELET # BLD AUTO: 220 10*3/MM3 (ref 140–450)
PMV BLD AUTO: 10 FL (ref 6–12)
RBC # BLD AUTO: 3.89 10*6/MM3 (ref 4.14–5.8)
WBC NRBC COR # BLD: 8.62 10*3/MM3 (ref 3.4–10.8)

## 2019-10-17 PROCEDURE — 94799 UNLISTED PULMONARY SVC/PX: CPT

## 2019-10-17 PROCEDURE — 94660 CPAP INITIATION&MGMT: CPT

## 2019-10-17 PROCEDURE — 99024 POSTOP FOLLOW-UP VISIT: CPT | Performed by: ORTHOPAEDIC SURGERY

## 2019-10-17 PROCEDURE — 85027 COMPLETE CBC AUTOMATED: CPT | Performed by: ORTHOPAEDIC SURGERY

## 2019-10-17 PROCEDURE — 63710000001 DIPHENHYDRAMINE PER 50 MG: Performed by: ORTHOPAEDIC SURGERY

## 2019-10-17 PROCEDURE — 97530 THERAPEUTIC ACTIVITIES: CPT

## 2019-10-17 PROCEDURE — 25010000002 ENOXAPARIN PER 10 MG: Performed by: ORTHOPAEDIC SURGERY

## 2019-10-17 PROCEDURE — 97116 GAIT TRAINING THERAPY: CPT

## 2019-10-17 PROCEDURE — 74018 RADEX ABDOMEN 1 VIEW: CPT

## 2019-10-17 RX ORDER — ONDANSETRON 4 MG/1
4 TABLET, FILM COATED ORAL EVERY 6 HOURS PRN
Qty: 30 TABLET | Refills: 0 | Status: SHIPPED | OUTPATIENT
Start: 2019-10-17 | End: 2022-04-25

## 2019-10-17 RX ORDER — SUMATRIPTAN 50 MG/1
50 TABLET, FILM COATED ORAL ONCE
Status: COMPLETED | OUTPATIENT
Start: 2019-10-17 | End: 2019-10-17

## 2019-10-17 RX ORDER — POLYETHYLENE GLYCOL 3350 17 G/17G
17 POWDER, FOR SOLUTION ORAL DAILY
Qty: 510 G | Refills: 0 | Status: SHIPPED | OUTPATIENT
Start: 2019-10-18 | End: 2019-11-17

## 2019-10-17 RX ORDER — ACETAMINOPHEN 325 MG/1
650 TABLET ORAL EVERY 6 HOURS SCHEDULED
Status: DISCONTINUED | OUTPATIENT
Start: 2019-10-17 | End: 2019-10-18 | Stop reason: HOSPADM

## 2019-10-17 RX ORDER — DOCUSATE SODIUM 100 MG/1
100 CAPSULE, LIQUID FILLED ORAL 2 TIMES DAILY
Qty: 60 CAPSULE | Refills: 0 | Status: SHIPPED | OUTPATIENT
Start: 2019-10-17 | End: 2020-02-03

## 2019-10-17 RX ORDER — SIMETHICONE 80 MG
80 TABLET,CHEWABLE ORAL 4 TIMES DAILY PRN
Status: DISCONTINUED | OUTPATIENT
Start: 2019-10-17 | End: 2019-10-18 | Stop reason: HOSPADM

## 2019-10-17 RX ORDER — ACETAMINOPHEN 160 MG/5ML
650 SOLUTION ORAL EVERY 6 HOURS SCHEDULED
Status: DISCONTINUED | OUTPATIENT
Start: 2019-10-17 | End: 2019-10-17

## 2019-10-17 RX ORDER — ACETAMINOPHEN 325 MG/1
650 TABLET ORAL EVERY 6 HOURS PRN
Status: DISCONTINUED | OUTPATIENT
Start: 2019-10-17 | End: 2019-10-18 | Stop reason: HOSPADM

## 2019-10-17 RX ADMIN — ACETAMINOPHEN 650 MG: 325 TABLET ORAL at 09:22

## 2019-10-17 RX ADMIN — BUPROPION HYDROCHLORIDE 150 MG: 150 TABLET, FILM COATED, EXTENDED RELEASE ORAL at 08:38

## 2019-10-17 RX ADMIN — DIPHENHYDRAMINE HYDROCHLORIDE 25 MG: 25 CAPSULE ORAL at 20:18

## 2019-10-17 RX ADMIN — Medication 10 MG: at 04:17

## 2019-10-17 RX ADMIN — SUMATRIPTAN SUCCINATE 50 MG: 50 TABLET ORAL at 15:19

## 2019-10-17 RX ADMIN — ACETAMINOPHEN 650 MG: 325 TABLET ORAL at 17:47

## 2019-10-17 RX ADMIN — Medication 80 MG: at 18:06

## 2019-10-17 RX ADMIN — OXYCODONE HYDROCHLORIDE AND ACETAMINOPHEN 2 TABLET: 5; 325 TABLET ORAL at 13:28

## 2019-10-17 RX ADMIN — OXYCODONE HYDROCHLORIDE AND ACETAMINOPHEN 2 TABLET: 5; 325 TABLET ORAL at 01:55

## 2019-10-17 RX ADMIN — DOCUSATE SODIUM 100 MG: 100 CAPSULE, LIQUID FILLED ORAL at 08:38

## 2019-10-17 RX ADMIN — DOCUSATE SODIUM 100 MG: 100 CAPSULE, LIQUID FILLED ORAL at 20:01

## 2019-10-17 RX ADMIN — MULTIVITAMIN TABLET 1 TABLET: TABLET at 08:38

## 2019-10-17 RX ADMIN — MELOXICAM 15 MG: 7.5 TABLET ORAL at 08:38

## 2019-10-17 RX ADMIN — LISINOPRIL 20 MG: 20 TABLET ORAL at 08:38

## 2019-10-17 RX ADMIN — ATORVASTATIN CALCIUM 10 MG: 10 TABLET, FILM COATED ORAL at 20:01

## 2019-10-17 RX ADMIN — POLYETHYLENE GLYCOL 3350 17 G: 17 POWDER, FOR SOLUTION ORAL at 10:13

## 2019-10-17 RX ADMIN — OXYCODONE HYDROCHLORIDE AND ACETAMINOPHEN 1 TABLET: 5; 325 TABLET ORAL at 17:47

## 2019-10-17 RX ADMIN — ATENOLOL 50 MG: 50 TABLET ORAL at 08:38

## 2019-10-17 RX ADMIN — ENOXAPARIN SODIUM 40 MG: 40 INJECTION SUBCUTANEOUS at 08:38

## 2019-10-17 NOTE — PLAN OF CARE
Problem: Patient Care Overview  Goal: Plan of Care Review  Outcome: Ongoing (interventions implemented as appropriate)   10/16/19 2042   Coping/Psychosocial   Plan of Care Reviewed With patient   Plan of Care Review   Progress improving   OTHER   Outcome Summary Left hip pain somewhat improved with the block this am, still required percocets for pain control. ankle pain well controlled with aeroinfuser at 6ml/hr. left leg elevated, up to chair with PT, required asist of 2 d/t weakness. NWB to Left leg. voiding well appetite good, VSS. cont to monitor       Problem: Fall Risk (Adult)  Goal: Identify Related Risk Factors and Signs and Symptoms  Outcome: Ongoing (interventions implemented as appropriate)   10/16/19 2042   Fall Risk (Adult)   Related Risk Factors (Fall Risk) gait/mobility problems;fatigue/slow reaction;polypharmacy;sensory deficits;sleep pattern alteration;environment unfamiliar   Signs and Symptoms (Fall Risk) presence of risk factors       Problem: Pain, Acute (Adult)  Goal: Identify Related Risk Factors and Signs and Symptoms  Outcome: Ongoing (interventions implemented as appropriate)   10/16/19 2042   Pain, Acute (Adult)   Related Risk Factors (Acute Pain) knowledge deficit;patient perception;persistent pain;positioning;procedure/treatment   Signs and Symptoms (Acute Pain) constipation/diarrhea;facial mask of pain/grimace;fatigue/weakness;sleep pattern alteration;verbalization of pain descriptors       Problem: Skin Injury Risk (Adult)  Goal: Identify Related Risk Factors and Signs and Symptoms  Outcome: Ongoing (interventions implemented as appropriate)   10/16/19 2042   Skin Injury Risk (Adult)   Related Risk Factors (Skin Injury Risk) hospitalization prolonged;medical devices;mobility impaired;moisture

## 2019-10-17 NOTE — PROGRESS NOTES
[facility]    Acute pain service Inpatient Progress Note    Patient Name: Obi Jackman Jr.  :  1950  MRN:  [id number]        Acute Pain  Service Inpatient Progress Note:    Analgesia:Excellent  Pain Score:3/10  LOC: alert and awake  Side Effects:None  Catheter Site:clean, dry and dressing intact  Cath type: peripheral nerve cath with ON Q  Infusion rate: 6ml/hr  Catheter Plan:Catheter to remain Insitu and Continue catheter infusion rate unchanged  Comments: Add tylenol

## 2019-10-17 NOTE — PROGRESS NOTES
"IM progress note      Obi Jackman Jr.  [id number]  1950     LOS: 2 days     Attending: Elodia Guzman MD    Primary Care Provider: Cammy Oneal PA      Chief Complaint/Reason for visit:  Left ankle pain    Subjective   Doing ok. Adequate pain control. Had large BM yesterday, still concerned with constipation. Denies f/c/n/v/sob/cp. Headache and no appetite this am.wy   Pain at IC harvest site    Objective     Vital Signs  Visit Vitals  /74   Pulse 87   Temp 96.3 °F (35.7 °C) (Oral)   Resp 16   Ht 180.3 cm (71\")   Wt 135 kg (297 lb)   SpO2 94%   BMI 41.42 kg/m²     Temp (24hrs), Av.3 °F (36.8 °C), Min:96.3 °F (35.7 °C), Max:99.7 °F (37.6 °C)      Nutrition: PO    Respiratory: RA    Physical Therapy: PT eval complete.  Pt drowsy and lethargic from surgery and nerve cath replacement.  Min A bed mobility.  Mod A of 2 STS and SPT to knee scooter.  Pt ambulated 25' and 15' with knee scooter mod A of 2.  Unable to attempt stairs this date due to drowsiness.  Recommend home with home health and family pending mobility and evaluation of stairs tomorrow.     Physical Exam:     General Appearance:    Alert, cooperative, in no acute distress   Head:    Normocephalic, without obvious abnormality, atraumatic    Lungs:     Normal effort, symmetric chest rise, no crepitus, clear to      auscultation bilaterally             Heart:    Regular rhythm and normal rate, normal S1 and S2   Abdomen:     Normal bowel sounds, no masses, no organomegaly, soft        non-tender, non-distended, no guarding, no rebound                Tenderness. Obese.  Iliac crest Aquacel CDI   Extremities:   Left lower extremity: Ace wrapping and splint on the left leg/ankle.  Cap refill of the distal toes is intact.  Hemovac emanates from beneath the dressing.  Popliteal nerve block catheter in place.   Pulses:   Pulses palpable and equal bilaterally   Skin:   No bleeding, bruising or rash   Neurologic:    Cranial nerves 2 - 12 " grossly intact     Results Review:     I reviewed the patient's new clinical results.   Results from last 7 days   Lab Units 10/17/19  0323 10/16/19  0646   WBC 10*3/mm3 8.62 9.10   HEMOGLOBIN g/dL 12.1* 12.4*   HEMATOCRIT % 38.6 38.6   PLATELETS 10*3/mm3 220 242     Results for ERIC QUINTERO JR. (MRN [id number]) as of 10/16/2019 13:46   Ref. Range 10/1/2019 13:26   Hemoglobin Latest Ref Range: 13.0 - 17.7 g/dL 15.8   Hematocrit Latest Ref Range: 37.5 - 51.0 % 48.1     Results from last 7 days   Lab Units 10/16/19  0646 10/15/19  0708   SODIUM mmol/L 140  --    POTASSIUM mmol/L 3.8 3.5   CHLORIDE mmol/L 99  --    CO2 mmol/L 32.0*  --    BUN mg/dL 8  --    CREATININE mg/dL 0.73*  --    CALCIUM mg/dL 8.8  --    GLUCOSE mg/dL 127*  --      I reviewed the patient's new imaging including images and reports.    All medications reviewed.     atenolol 50 mg Oral Daily   atorvastatin 10 mg Oral Nightly   buPROPion  mg Oral Daily   docusate sodium 100 mg Oral BID   enoxaparin 40 mg Subcutaneous Daily   lisinopril 20 mg Oral Q24H   meloxicam 15 mg Oral Daily   multivitamin 1 tablet Oral Daily   polyethylene glycol 17 g Oral Daily   SUMAtriptan 50 mg Oral Once       Assessment/Plan     S/P left ankle triple arthrodesis    Osteoarthritis of ankle or foot    Neuropathy    Arthritis of foot, left    Morbid obesity due to excess calories (CMS/[facility])    Hyperlipidemia    Hypertension    CHRISTOPHE (obstructive sleep apnea)    Acute blood loss anemia, mild, asymptomatic     Acute postoperative pain      Plan  1. PT/OT- LLE NWB  2. Pain control-prns, popliteal nerve block   3. IS-encouraged  4. DVT proph- mechs/Lovenox  5. Bowel regimen ( Miralax added)wy  6. Monitor post-op labs  7. DC planning for home, hopefully tomorrow    HTN, HLD  - Continue home atenolol, statin and lisinopril  - Hold HCTZ, ( resume at discharge )wy  - Monitor BP   - Holding parameters for BP meds  - Labetalol PRN for SBP>170     CHRISTOPHE  - CPAP at  YASMIN Gonzales  10/17/19  2:40 PM     I have personally performed the evaluation on this patient. My history is consistent  with HPI obtained. My exam findings are listed above. I have personally reviewed and discussed the above formulated treatment plan with YASMIN.

## 2019-10-17 NOTE — PROGRESS NOTES
Orthopedic  Progress Note    Subjective     Post-Operative Day: 2 Days Post-Op-left triple fusion    Systemic or Specific Complaints: lots of pain, difficulty with mobility, cannot go home  Objective     Vital signs in last 24 hours:  Temp:  [96.3 °F (35.7 °C)-99.7 °F (37.6 °C)] 96.3 °F (35.7 °C)  Heart Rate:  [81-87] 87  Resp:  [16-18] 16  BP: (116-137)/(52-74) 134/74    Neurovascular: left toes wiggle               Wound: left splint dry and intact    Data Review  Lab Results (last 24 hours)     Procedure Component Value Units Date/Time    CBC (No Diff) [[id number]]  (Abnormal) Collected:  10/17/19 0323    Specimen:  Blood Updated:  10/17/19 0501     WBC 8.62 10*3/mm3      RBC 3.89 10*6/mm3      Hemoglobin 12.1 g/dL      Hematocrit 38.6 %      MCV 99.2 fL      MCH 31.1 pg      MCHC 31.3 g/dL      RDW 12.8 %      RDW-SD 46.8 fl      MPV 10.0 fL      Platelets 220 10*3/mm3             Assessment/Plan     Status post- left triple fusion  -too much pain to go home  -KUB no free air, no acute findings  -continue PT, mobility, pain meds             Elodia Guzman MD    Date: 10/17/2019  Time: 2:46 PM

## 2019-10-17 NOTE — PROGRESS NOTES
Continued Stay Note  [facility]     Patient Name: Obi Jackman Jr.  MRN: [id number]  Today's Date: 10/17/2019    Admit Date: 10/15/2019    Discharge Plan     Row Name 10/17/19 1523       Plan    Plan  Home with family    Patient/Family in Agreement with Plan  yes    Plan Comments  Spoke with pt. at bedside. Plans are to dc to home with family when medically ready. Denies needs at this time. Will cont. to follow and update.     Final Discharge Disposition Code  01 - home or self-care        Discharge Codes    No documentation.       Expected Discharge Date and Time     Expected Discharge Date Expected Discharge Time    Oct 17, 2019             Naya Henao RN

## 2019-10-17 NOTE — THERAPY TREATMENT NOTE
Patient Name: Obi Jackman Jr.  : 1950    MRN: [id number]                              Today's Date: 10/17/2019       Admit Date: 10/15/2019    Visit Dx:     ICD-10-CM ICD-9-CM   1. Osteoarthritis of left ankle or foot M19.072 715.97   2. Neuropathy G62.9 355.9   3. Morbid obesity due to excess calories (CMS/[facility]) E66.01 278.01     Patient Active Problem List   Diagnosis   • Allergic rhinitis   • Anxiety disorder   • Benign paroxysmal positional vertigo   • Chronic tension type headache   • Depression   • ED (erectile dysfunction) of non-organic origin   • Hyperlipidemia   • Hypertension   • LFTs abnormal   • CHRISTOPHE (obstructive sleep apnea)   • Elevated glucose   • Health care maintenance   • Morbid obesity due to excess calories (CMS/[facility])   • Onychomycosis of left great toe   • Osteoarthritis of ankle or foot   • Venous stasis   • Neuropathy   • Class 3 severe obesity due to excess calories with serious comorbidity and body mass index (BMI) of 40.0 to 44.9 in adult (CMS/[facility])   • Pre-op evaluation   • Arthritis of foot, left   • S/P left ankle triple arthrodesis   • Acute blood loss anemia, mild, asymptomatic    • Acute postoperative pain     Past Medical History:   Diagnosis Date   • Bulging of lumbar intervertebral disc    • Hyperlipidemia    • Hypertension    • Migraine    • CHRISTOPHE on CPAP    • Rheumatoid arthritis (CMS/[facility])    • Swelling of left ear    • Wears contact lenses      Past Surgical History:   Procedure Laterality Date   • CATARACT EXTRACTION Right    • COLONOSCOPY     • EYE SURGERY Bilateral     cornea transplant    • HERNIA REPAIR       General Information     Row Name 10/17/19 1439          PT Evaluation Time/Intention    Document Type  therapy note (daily note)  -AA     Mode of Treatment  physical therapy  -AA     Row Name 10/17/19 1439          General Information    Patient Profile Reviewed?  yes  -AA     Existing Precautions/Restrictions  non-weight bearing;left;fall nerve cath,  hemovac  -AA     Row Name 10/17/19 1439          Cognitive Assessment/Intervention- PT/OT    Orientation Status (Cognition)  oriented x 4  -AA     Row Name 10/17/19 1439          Safety Issues, Functional Mobility    Safety Issues Affecting Function (Mobility)  insight into deficits/self awareness;positioning of assistive device;safety precautions follow-through/compliance;sequencing abilities  -AA     Impairments Affecting Function (Mobility)  balance;coordination;endurance/activity tolerance;pain;range of motion (ROM);strength  -AA       User Key  (r) = Recorded By, (t) = Taken By, (c) = Cosigned By    Initials Name Provider Type    AA Ernestina Knight, PT Physical Therapist        Mobility     Row Name 10/17/19 1439          Bed Mobility Assessment/Treatment    Bed Mobility Assessment/Treatment  scooting/bridging;supine-sit;rolling left;sit-supine  -AA     Rolling Left [place] (Bed Mobility)  conditional independence  -AA     Scooting/Bridging [place] (Bed Mobility)  conditional independence  -AA     Supine-Sit [place] (Bed Mobility)  supervision  -AA     Sit-Supine [place] (Bed Mobility)  contact guard;verbal cues  -AA     Assistive Device (Bed Mobility)  bed rails  -AA     Comment (Bed Mobility)  pt able to advance LLE off EOB without A safely and maintaining NWB. No dizziness  -AA     Row Name 10/17/19 1439          Transfer Assessment/Treatment    Comment (Transfers)  STS to knee scooter with pt stand and sit on knee scooter then transition to stand and place LLE on knee scooter.  Mod A of 2 for transfer.  Min A of 2 knee scooter to recliner.  STS from recliner to RW CGA.  SPT with RW recliner to from BSC with min A of 1 and VC for sequencing.  Pt maintain NWB LLE during all transfers.  Improved safety and sequencing this date.  Wife present and educated on transfer techniques.   -AA     Row Name 10/17/19 1439          Bed-Chair Transfer    Bed-Chair [place] (Transfers)  moderate assist  (50% patient effort);minimum assist (75% patient effort);1 person assist;2 person assist;verbal cues  -AA     Assistive Device (Bed-Chair Transfers)  walker, knee scooter;walker, front-wheeled  -AA     Row Name 10/17/19 1439          Sit-Stand Transfer    Sit-Stand [place] (Transfers)  moderate assist (50% patient effort);minimum assist (75% patient effort);2 person assist;1 person assist;verbal cues  -AA     Assistive Device (Sit-Stand Transfers)  walker, knee scooter;walker, front-wheeled  -AA     Row Name 10/17/19 1439          Gait/Stairs Assessment/Training    Gait/Stairs Assessment/Training  gait/ambulation independence;gait/ambulation assistive device;distance ambulated;gait pattern  -AA     [place] Level (Gait)  minimum assist (75% patient effort);2 person assist;verbal cues  -AA     Assistive Device (Gait)  walker, knee scooter  -AA     Distance in Feet (Gait)  150  -AA     Bilateral Gait Deviations  forward flexed posture  -AA     Comment (Gait/Stairs)  pt decline stair training, family plans to take pt up in rollator/WC;  pt ambulated with knee scooter and min A of 1-2 with improved safety and balance.  Pt performs turns and backing up with good sequencing.  Navigating small spaces to recliner min A with VC required.  -AA     Row Name 10/17/19 1439          Mobility Assessment/Intervention    Extremity Weight-bearing Status  left lower extremity  -AA     Left Lower Extremity (Weight-bearing Status)  non weight-bearing (NWB)  -AA       User Key  (r) = Recorded By, (t) = Taken By, (c) = Cosigned By    Initials Name Provider Type    Ernestina Costa PT Physical Therapist        Obj/Interventions     Row Name 10/17/19 1439          Static Sitting Balance    Level of [place] (Unsupported Sitting, Static Balance)  independent  -AA     Sitting Position (Unsupported Sitting, Static Balance)  sitting on edge of bed  -AA     Time Able to Maintain Position (Unsupported Sitting, Static Balance)  more  than 5 minutes  -     Row Name 10/17/19 1439          Dynamic Sitting Balance    Level of [place], Reaches Outside Midline (Sitting, Dynamic Balance)  independent  -AA     Sitting Position, Reaches Outside Midline (Sitting, Dynamic Balance)  sitting on edge of bed  -     Row Name 10/17/19 1439          Static Standing Balance    Level of [place] (Supported Standing, Static Balance)  minimal assist, 75% patient effort  -AA     Time Able to Maintain Position (Supported Standing, Static Balance)  more than 5 minutes  -AA     Assistive Device Utilized (Supported Standing, Static Balance)  other (see comments);walker, rolling knee scooter  -AA     Row Name 10/17/19 1439          Dynamic Standing Balance    Level of [place], Reaches Outside Midline (Standing, Dynamic Balance)  moderate assist, 50 to 74% patient effort;minimal assist, 75% patient effort;2 person assist  -AA     Assistive Device Utilized (Supported Standing, Dynamic Balance)  walker, rolling;other (see comments) knee scooter  -       User Key  (r) = Recorded By, (t) = Taken By, (c) = Cosigned By    Initials Name Provider Type    Ernestina Costa PT Physical Therapist        Goals/Plan     Row Name 10/17/19 1439          Bed Mobility Goal 1 (PT)    Progress/Outcomes (Bed Mobility Goal 1, PT)  good progress toward goal;goal ongoing  -[facility] Name 10/17/19 1439          Transfer Goal 1 (PT)    Progress/Outcome (Transfer Goal 1, PT)  goal ongoing  -[facility] Name 10/17/19 1439          Gait Training Goal 1 (PT)    Progress/Outcome (Gait Training Goal 1, PT)  goal partially met;goal ongoing  -       User Key  (r) = Recorded By, (t) = Taken By, (c) = Cosigned By    Initials Name Provider Type    Ernestina Costa PT Physical Therapist        Clinical Impression     Row Name 10/17/19 1439          Pain Assessment    Additional Documentation  Pain Scale: Numbers Pre/Post-Treatment (Group)  -[facility] Name 10/17/19 1439          Pain  Scale: Numbers Pre/Post-Treatment    Pain Scale: Numbers, Pretreatment  5/10  -AA     Pain Scale: Numbers, Post-Treatment  5/10  -AA     Pain Location - Side  Left  -AA     Pain Location  hip  -AA     Pre/Post Treatment Pain Comment  no ankle pain  -AA     Pain Intervention(s)  Repositioned;Ambulation/increased activity;Cold applied;Medication (See MAR);Elevated  -AA     Row Name 10/17/19 1439          Plan of Care Review    Plan of Care Reviewed With  patient;spouse  -     Row Name 10/17/19 1439          Positioning and Restraints    Pre-Treatment Position  in bed  -AA     Post Treatment Position  bed  -AA     In Bed  notified nsg;sitting;with nsg;with family/caregiver  -AA       User Key  (r) = Recorded By, (t) = Taken By, (c) = Cosigned By    Initials Name Provider Type    Ernestina Costa PT Physical Therapist        Outcome Measures     Row Name 10/17/19 1439          How much help from another person do you currently need...    Turning from your back to your side while in flat bed without using bedrails?  4  -AA     Moving from lying on back to sitting on the side of a flat bed without bedrails?  4  -AA     Moving to and from a bed to a chair (including a wheelchair)?  2  -AA     Standing up from a chair using your arms (e.g., wheelchair, bedside chair)?  3  -AA     Climbing 3-5 steps with a railing?  1  -AA     To walk in hospital room?  3  -AA     AM-PAC 6 Clicks Score (PT)  [facility]     Row Name 10/17/19 1439          Functional Assessment    Outcome Measure Options  AM-PAC 6 Clicks Basic Mobility (PT)  -       User Key  (r) = Recorded By, (t) = Taken By, (c) = Cosigned By    Initials Name Provider Type    Ernestina Costa PT Physical Therapist        Physical Therapy Education     Title: PT OT SLP Therapies (In Progress)     Topic: Physical Therapy (In Progress)     Point: Mobility training (Done)     Learning Progress Summary           Patient Acceptance, E,D,TB, NR,VU,DU by DORINA at 10/17/2019  2:39  PM    Comment:  transfer sequencing with knee scooter and RW from variety of surfaces  transfers with RW SPT  safety and mobility in the home    Acceptance, E,D, NR,VU by AA at 10/16/2019  1:06 PM    Comment:  gait sequencing  knee scooter sequencing with trasnfers   Family Acceptance, E,D,TB, NR,VU,DU by AA at 10/17/2019  2:39 PM    Comment:  transfer sequencing with knee scooter and RW from variety of surfaces  transfers with RW SPT  safety and mobility in the home                   Point: Body mechanics (Done)     Learning Progress Summary           Patient Acceptance, E,D,TB, NR,VU,DU by AA at 10/17/2019  2:39 PM    Comment:  transfer sequencing with knee scooter and RW from variety of surfaces  transfers with RW SPT  safety and mobility in the home    Acceptance, E,D, NR,VU by AA at 10/16/2019  1:06 PM    Comment:  gait sequencing  knee scooter sequencing with trasnfers   Family Acceptance, E,D,TB, NR,VU,DU by AA at 10/17/2019  2:39 PM    Comment:  transfer sequencing with knee scooter and RW from variety of surfaces  transfers with RW SPT  safety and mobility in the home                   Point: Precautions (Done)     Learning Progress Summary           Patient Acceptance, E,D,TB, NR,VU,DU by AA at 10/17/2019  2:39 PM    Comment:  transfer sequencing with knee scooter and RW from variety of surfaces  transfers with RW SPT  safety and mobility in the home    Acceptance, E,D, NR,VU by AA at 10/16/2019  1:06 PM    Comment:  gait sequencing  knee scooter sequencing with trasnfers   Family Acceptance, E,D,TB, NR,VU,DU by AA at 10/17/2019  2:39 PM    Comment:  transfer sequencing with knee scooter and RW from variety of surfaces  transfers with RW SPT  safety and mobility in the home                               User Key     Initials Effective Dates Name Provider Type Discipline    DORINA 04/02/18 -  Eugene April L, PT Physical Therapist PT              PT Recommendation and Plan  Planned Therapy Interventions (PT  Eval): balance training, bed mobility training, gait training, home exercise program, patient/family education, stair training, strengthening, transfer training  Outcome Summary/Treatment Plan (PT)  Anticipated Discharge Disposition (PT): home with assist, home with home health  Plan of Care Reviewed With: patient, spouse  Progress: improving  Outcome Summary: Pt presents with improved alertness and ability to participate.  Pt perform bed mobility modified independently.  Transfer training with knee scooter to from EOB and to from recliner with mod A to min A of 2.  STS and SPT with RW and min A recliner to from BSC.  Pt ambulated with knee scooter 150 feet with min A of 2.  Decline stair training at this time.  Wife present and educated with teach back on safety and sequencing.  No pain L ankle.  Pain 5/10 hip L.  Pt plans to DC home with family and home health     Time Calculation:   PT Charges     Row Name 10/17/19 1439             Time Calculation    Start Time  1439  -AA      PT Received On  10/17/19  -AA      PT Goal Re-Cert Due Date  10/26/19  -AA         Time Calculation- PT    Total Timed Code Minutes- PT  42 minute(s)  -AA         Timed Charges    25826 - Gait Training Minutes   17  -AA      92405 - PT Therapeutic Activity Minutes  25  -AA        User Key  (r) = Recorded By, (t) = Taken By, (c) = Cosigned By    Initials Name Provider Type    Ernestina Costa, PT Physical Therapist        Therapy Charges for Today     Code Description Service Date Service Provider Modifiers Qty    12059342511 HC GAIT TRAINING EA 15 MIN 10/16/2019 Ernestina Knight, PT GP 1    74180417182 HC PT THER SUPP EA 15 MIN 10/16/2019 Ernestina Knight, PT GP 4    29459917480 HC PT EVAL HIGH COMPLEXITY 4 10/16/2019 Ernestina Knight, PT GP 1    21474662316 HC GAIT TRAINING EA 15 MIN 10/17/2019 Ernestina Knight, PT GP 1    70123648517 HC PT THERAPEUTIC ACT EA 15 MIN 10/17/2019 Ernestina Knight, PT GP 2          PT G-Codes  Outcome Measure Options:  -formerly [facility] 6 Clicks Basic Mobility (PT)  -formerly [facility] 6 Clicks Score (PT): 17 April PRACHI Knight, PT  10/17/2019

## 2019-10-17 NOTE — NURSING NOTE
Acute pain Service:  Catheter site leaking. Changed dressing sterile technique.  Catheter 6 cm at skin.  Skin affix, steristrips and CHG tegaderm applied. Will continue to monitor.

## 2019-10-17 NOTE — PLAN OF CARE
Problem: Patient Care Overview  Goal: Plan of Care Review  Outcome: Ongoing (interventions implemented as appropriate)   10/17/19 5881   Coping/Psychosocial   Plan of Care Reviewed With patient;spouse   Plan of Care Review   Progress improving   OTHER   Outcome Summary Pt presents with improved alertness and ability to participate. Pt perform bed mobility modified independently. Transfer training with knee scooter to from EOB and to from recliner with mod A to min A of 2. STS and SPT with RW and min A recliner to from BSC. Pt ambulated with knee scooter 150 feet with min A of 2. Decline stair training at this time. Wife present and educated with teach back on safety and sequencing. No pain L ankle. Pain 5/10 hip L. Pt plans to DC home with family and home health

## 2019-10-18 VITALS
HEIGHT: 71 IN | DIASTOLIC BLOOD PRESSURE: 78 MMHG | WEIGHT: 297 LBS | TEMPERATURE: 98 F | HEART RATE: 81 BPM | OXYGEN SATURATION: 96 % | BODY MASS INDEX: 41.58 KG/M2 | SYSTOLIC BLOOD PRESSURE: 137 MMHG | RESPIRATION RATE: 16 BRPM

## 2019-10-18 LAB
DEPRECATED RDW RBC AUTO: 45.3 FL (ref 37–54)
ERYTHROCYTE [DISTWIDTH] IN BLOOD BY AUTOMATED COUNT: 12.6 % (ref 12.3–15.4)
HCT VFR BLD AUTO: 38.9 % (ref 37.5–51)
HGB BLD-MCNC: 12.3 G/DL (ref 13–17.7)
MCH RBC QN AUTO: 30.5 PG (ref 26.6–33)
MCHC RBC AUTO-ENTMCNC: 31.6 G/DL (ref 31.5–35.7)
MCV RBC AUTO: 96.5 FL (ref 79–97)
PLATELET # BLD AUTO: 277 10*3/MM3 (ref 140–450)
PMV BLD AUTO: 9.6 FL (ref 6–12)
RBC # BLD AUTO: 4.03 10*6/MM3 (ref 4.14–5.8)
WBC NRBC COR # BLD: 7.88 10*3/MM3 (ref 3.4–10.8)

## 2019-10-18 PROCEDURE — 97116 GAIT TRAINING THERAPY: CPT

## 2019-10-18 PROCEDURE — 97530 THERAPEUTIC ACTIVITIES: CPT

## 2019-10-18 PROCEDURE — 25010000002 ENOXAPARIN PER 10 MG: Performed by: ORTHOPAEDIC SURGERY

## 2019-10-18 PROCEDURE — 94660 CPAP INITIATION&MGMT: CPT

## 2019-10-18 PROCEDURE — 94799 UNLISTED PULMONARY SVC/PX: CPT

## 2019-10-18 PROCEDURE — 99024 POSTOP FOLLOW-UP VISIT: CPT | Performed by: ORTHOPAEDIC SURGERY

## 2019-10-18 PROCEDURE — 85027 COMPLETE CBC AUTOMATED: CPT | Performed by: ORTHOPAEDIC SURGERY

## 2019-10-18 RX ADMIN — ENOXAPARIN SODIUM 40 MG: 40 INJECTION SUBCUTANEOUS at 08:13

## 2019-10-18 RX ADMIN — LISINOPRIL 20 MG: 20 TABLET ORAL at 08:11

## 2019-10-18 RX ADMIN — BUPROPION HYDROCHLORIDE 150 MG: 150 TABLET, FILM COATED, EXTENDED RELEASE ORAL at 08:13

## 2019-10-18 RX ADMIN — HYDROCODONE BITARTRATE AND ACETAMINOPHEN 1 TABLET: 7.5; 325 TABLET ORAL at 01:32

## 2019-10-18 RX ADMIN — ACETAMINOPHEN 650 MG: 325 TABLET ORAL at 01:23

## 2019-10-18 RX ADMIN — ATENOLOL 50 MG: 50 TABLET ORAL at 08:13

## 2019-10-18 RX ADMIN — OXYCODONE HYDROCHLORIDE AND ACETAMINOPHEN 2 TABLET: 5; 325 TABLET ORAL at 13:57

## 2019-10-18 RX ADMIN — MULTIVITAMIN TABLET 1 TABLET: TABLET at 08:13

## 2019-10-18 RX ADMIN — DOCUSATE SODIUM 100 MG: 100 CAPSULE, LIQUID FILLED ORAL at 08:11

## 2019-10-18 RX ADMIN — ACETAMINOPHEN 650 MG: 325 TABLET ORAL at 05:15

## 2019-10-18 RX ADMIN — HYDROCODONE BITARTRATE AND ACETAMINOPHEN 2 TABLET: 7.5; 325 TABLET ORAL at 08:27

## 2019-10-18 RX ADMIN — POLYETHYLENE GLYCOL 3350 17 G: 17 POWDER, FOR SOLUTION ORAL at 08:11

## 2019-10-18 RX ADMIN — MELOXICAM 15 MG: 7.5 TABLET ORAL at 08:13

## 2019-10-18 NOTE — PROGRESS NOTES
[facility]    Acute pain service Inpatient Progress Note    Patient Name: Obi Jackman Jr.  :  1950  MRN:  [id number]        Acute Pain  Service Inpatient Progress Note:    Analgesia:Excellent  Pain Score:2/10  Side Effects:None  Catheter Site:clean, dry and dressing intact  Cath type: peripheral nerve cath with ON Q  Infusion rate: 6ml/hr  Catheter Plan:Catheter to remain Insitu and Continue catheter infusion rate unchanged

## 2019-10-18 NOTE — PROGRESS NOTES
Acute Pain Service:  Peripheral nerve catheter and disposable infusion device teaching completed with patient and family member. Discussion, handout and bracelet provided with [facility] on call central phone number.  Instructed to call with any questions or concerns.  Patient verbalized understanding.  Service will continue to follow until catheter DC'd.  Please contact patient's wife Ivette at [phone] if needed.

## 2019-10-18 NOTE — PROGRESS NOTES
Case Management Discharge Note    Final Note: Plan is for pt. to dc to home with [facility]. Notified Lena of pt's dc by . Family to transport.     Destination      No service has been selected for the patient.      Durable Medical Equipment      No service has been selected for the patient.      Dialysis/Infusion      No service has been selected for the patient.      Home Medical Care - Selection Complete      Service Provider Request Status Selected Services Address Phone Number Fax Number    [facility] Selected Home Health Services [address] [facility] [address] [phone]      Therapy      No service has been selected for the patient.      Community Resources      No service has been selected for the patient.             Final Discharge Disposition Code: 06 - home with home health care

## 2019-10-18 NOTE — PROGRESS NOTES
Case Management Discharge Note    Final Note: Spoke with patient in room.  Therapy recommending home health at discharge.  Patient agreeable.  List provided and patient selected [facility].  Referral called to [facility] for PT/OT.  Patient denies any other needs.  Pat Oleary RN [phone]    Destination      No service has been selected for the patient.      Durable Medical Equipment      No service has been selected for the patient.      Dialysis/Infusion      No service has been selected for the patient.      Home Medical Care      Service Provider Request Status Selected Services Address Phone Number Fax Number    [facility] Selected Home Health Services [address] [address] [phone]      Therapy      No service has been selected for the patient.      Community Resources      No service has been selected for the patient.             Final Discharge Disposition Code: 06 - home with home health care

## 2019-10-18 NOTE — DISCHARGE SUMMARY
Patient Name: Obi Jackman Jr.  MRN: [id number]  : 1950  DOS: 10/18/2019    Attending: Elodia Carolina MD    Primary Care Provider: Cammy Oneal PA    Date of Admission:.10/15/2019  6:36 AM    Date of Discharge:  10/18/2019    Discharge Diagnosis:   S/P left ankle triple arthrodesis    Osteoarthritis of ankle or foot    Neuropathy    Arthritis of foot, left    Morbid obesity due to excess calories (CMS/[facility])    Hyperlipidemia    Hypertension    CHRISTOPHE (obstructive sleep apnea)    Acute blood loss anemia, mild, asymptomatic     Acute postoperative pain      Hospital Course  Patient is a 69 y.o. male presented for left triple arthrodesis with iliac crest bone graft by Dr. Carolina.     He underwent surgery under [facility]. He tolerated surgery well and was admitted for further medical management.      Patient was provided pain medications as needed for pain control, along with popliteal nerve block infusion of Ropivacaine.    Adjustments were made to pain medications to optimize postop pain management. Risks and benefits of opiate medications discussed with patient.    He was seen by PT has progressed well over his stay.  He used an IS for atelectasis prophylaxis and Lovenox along with mechanicals for DVT prophylaxis.  Home medications were resumed as appropriate, and labs were monitored and remained fairly stable.     With the progress he has made, he is ready for DC home today.    Keep splint clean and dry until follow up appointment with Dr. Carolina.  He will have an On Q pump ( instructed on it during this admit)  Discussed with patient regarding plan and he shows understanding and agreement.      Procedures Performed  10/15/19 11:04 AM     Preoperative diagnosis: Significant arthritis left hindfoot involving talonavicular, calcaneocuboid, subtalar joint with contracture of Achilles and failure of posterior tibial tendon     Postoperative diagnosis: Same     Anesthesia: General with blocks for postop pain  "control     Surgeon: Elodia Guzman M.D.    Operative procedure: Left triple arthrodesis with iliac crest bone graft and Achilles tendon lengthening    Pertinent Test Results:    I reviewed the patient's new clinical results.   Results from last 7 days   Lab Units 10/18/19  0637 10/17/19  0323 10/16/19  0646   WBC 10*3/mm3 7.88 8.62 9.10   HEMOGLOBIN g/dL 12.3* 12.1* 12.4*   HEMATOCRIT % 38.9 38.6 38.6   PLATELETS 10*3/mm3 277 220 242     Results from last 7 days   Lab Units 10/16/19  0646 10/15/19  0708   SODIUM mmol/L 140  --    POTASSIUM mmol/L 3.8 3.5   CHLORIDE mmol/L 99  --    CO2 mmol/L 32.0*  --    BUN mg/dL 8  --    CREATININE mg/dL 0.73*  --    CALCIUM mg/dL 8.8  --    GLUCOSE mg/dL 127*  --      I reviewed the patient's new imaging including images and reports.      Physical therapy: Pt increased ambulation to 180 feet with knee scooter and CGA x1. Pt declined stair training, despite being educated on correct techniques, sequencing, and hand placement during ascent/descent of stairs. Gait limited by fatigue. Pt improved with all transfers in/out of bed, requiring only CGA x1. Anticipate pt d/c home with assist and HHPT.     Discharge Assessment:    Vital Signs  Visit Vitals  /78   Pulse 81   Temp 98 °F (36.7 °C) (Oral)   Resp 16   Ht 180.3 cm (71\")   Wt 135 kg (297 lb)   SpO2 96%   BMI 41.42 kg/m²     Temp (24hrs), Av.6 °F (36.4 °C), Min:96.8 °F (36 °C), Max:98.1 °F (36.7 °C)      General Appearance:    Alert, cooperative, in no acute distress   Lungs:     Clear to auscultation,respirations regular, even and                   unlabored    Heart:    Regular rhythm and normal rate, normal S1 and S2   Abdomen:     Normal bowel sounds, no masses, no organomegaly, soft        non-tender, non-distended, no guarding, no rebound                 Tenderness. Iliac crest Aquacel CDI   Extremities:   Left lower extremity: Ace wrapping and splint on the left leg/ankle.  Cap refill of the distal toes is intact. "  Hemovac- out.  Popliteal nerve block catheter in place.   Pulses:   Pulses palpable and equal bilaterally   Skin:   No bleeding, bruising or rash   Neurologic:   Cranial nerves 2 - 12 grossly intact, sensation intact       Discharge Disposition: Home    Discharge Medications     Discharge Medications      New Medications      Instructions Start Date   docusate sodium 100 MG capsule  Commonly known as:  COLACE   100 mg, Oral, 2 Times Daily      enoxaparin 40 MG/0.4ML solution syringe  Commonly known as:  LOVENOX   40 mg, Subcutaneous, Daily      polyethylene glycol powder  Commonly known as:  MIRALAX   17 g, Oral, Daily         Changes to Medications      Instructions Start Date   albuterol sulfate  (90 Base) MCG/ACT inhaler  Commonly known as:  PROVENTIL HFA;VENTOLIN HFA;PROAIR HFA  What changed:  reasons to take this   2 puffs, Inhalation, Every 4 Hours PRN      aspirin 81 MG tablet  What changed:  additional instructions   81 mg, Oral, Daily, Resume in 2 weeks      Azelastine-Fluticasone 137-50 MCG/ACT suspension  What changed:    · how much to take  · when to take this  · reasons to take this  · additional instructions   1 spray each nostril daily      cyclobenzaprine 10 MG tablet  Commonly known as:  FLEXERIL  What changed:    · how much to take  · how to take this  · when to take this  · reasons to take this  · additional instructions   Take one tablet by mouth three times a day as needed for muscle spasms.      vitamin D 14641 units capsule capsule  Commonly known as:  ERGOCALCIFEROL  What changed:  additional instructions   50,000 Units, Oral, Weekly         Continue These Medications      Instructions Start Date   ALPRAZolam 0.25 MG tablet  Commonly known as:  XANAX   0.25 mg, Oral, Daily PRN      atenolol 50 MG tablet  Commonly known as:  TENORMIN   50 mg, Oral, Daily      atorvastatin 10 MG tablet  Commonly known as:  LIPITOR   TAKE ONE TABLET BY MOUTH DAILY      buPROPion  MG 24 hr  tablet  Commonly known as:  WELLBUTRIN XL   150 mg, Oral, Daily      etodolac 400 MG tablet  Commonly known as:  LODINE   400 mg, Oral, 2 Times Daily      HYDROcodone-acetaminophen 7.5-325 MG per tablet  Commonly known as:  NORCO   1-2 tablets, Oral, Every 6 Hours PRN      Liraglutide -Weight Management 18 MG/3ML solution pen-injector  Commonly known as:  SAXENDA   3 mg, Subcutaneous, Daily      lisinopril-hydrochlorothiazide 20-12.5 MG per tablet  Commonly known as:  PRINZIDE,ZESTORETIC   1 tablet, Oral, Daily      MULTIVITAMIN ADULT PO   1 tablet, Oral, Daily      ondansetron 4 MG tablet  Commonly known as:  ZOFRAN   4 mg, Oral, Every 6 Hours PRN      oxyCODONE-acetaminophen 5-325 MG per tablet  Commonly known as:  PERCOCET   1-2 tablets, Oral, Every 6 Hours PRN      SUMAtriptan 100 MG tablet  Commonly known as:  IMITREX   TAKE 1 TABLET BY MOUTH AT ONSET OF HEADACHE. MAY REPEAT DOSE ONE TIME IN 2 HOURS IF HEADACHE NOT RELIEVED.      terbinafine 250 MG tablet  Commonly known as:  lamiSIL   250 mg, Oral, Daily             Discharge Diet: Regular diet    Activity at Discharge:  NWOSIRIS GUTIÉRREZE    Follow-up Appointments  Dr. Carolina per her orders    Discharge took over 30 min.    YASMIN Quesada  10/18/19  12:17 PM

## 2019-10-18 NOTE — DISCHARGE INSTR - ACTIVITY
Do NOT bear any weight on your left foot.     Keep your left foot elevated.     Keep your splint clean and dry.     Use ice as needed for pain and swelling.

## 2019-10-18 NOTE — PLAN OF CARE
Problem: Patient Care Overview  Goal: Plan of Care Review  Outcome: Ongoing (interventions implemented as appropriate)   10/17/19 2035   Coping/Psychosocial   Plan of Care Reviewed With patient   Plan of Care Review   Progress improving   OTHER   Outcome Summary Vitals WNL. Pt tolerates ambulation in the nair with assist X 1 and knee scooter. Pt is able to stand and pivot in room with assist X 1 and walker. LLE NWB status maintained. LLE kept elevated while pt in bed. Hemovac drain was D/C'd today was 0 mL of output. Pt denies numbness or tingling. Chief complaint this shift has been pain. While pt has denied left foot pain, pt has reported left hip pain, a migraine, and abdominal pain. YASMIN Quesada and Dr. RODARTE notified of pain throughout the day. Imitrex was ordered for the migraine bringing pain from 7/10 to 4/10. PRN percocet and scheduled tylenol were given for hip pain. Gel packs applied to both the left hip and foot. Ropivacaine infusing at 6 mL/hr, rate unchanged. Pt reports improvements with these interventions. Simethicone was ordered for gas pain with no improvement. Multiple BMs this shift. KUB ordered and did not reveal obstruction. Splint and Aquacel dressings are clean, dry, and intact. Anticipate D/C home tomorrow.        Problem: Fall Risk (Adult)  Goal: Identify Related Risk Factors and Signs and Symptoms  Outcome: Outcome(s) achieved Date Met: 10/17/19   10/17/19 2035   Fall Risk (Adult)   Related Risk Factors (Fall Risk) gait/mobility problems;environment unfamiliar   Signs and Symptoms (Fall Risk) presence of risk factors     Goal: Absence of Fall  Outcome: Ongoing (interventions implemented as appropriate)   10/17/19 2035   Fall Risk (Adult)   Absence of Fall achieves outcome       Problem: Pain, Acute (Adult)  Goal: Identify Related Risk Factors and Signs and Symptoms  Outcome: Outcome(s) achieved Date Met: 10/17/19   10/17/19 2035   Pain, Acute (Adult)   Related Risk Factors (Acute  Pain) knowledge deficit;surgery   Signs and Symptoms (Acute Pain) alteration in muscle tone;BADLs/IADLs reluctance/inability to perform;fatigue/weakness;verbalization of pain descriptors     Goal: Acceptable Pain Control/Comfort Level  Outcome: Ongoing (interventions implemented as appropriate)   10/17/19 2035   Pain, Acute (Adult)   Acceptable Pain Control/Comfort Level making progress toward outcome       Problem: Skin Injury Risk (Adult)  Goal: Identify Related Risk Factors and Signs and Symptoms  Outcome: Outcome(s) achieved Date Met: 10/17/19   10/17/19 2035   Skin Injury Risk (Adult)   Related Risk Factors (Skin Injury Risk) mobility impaired     Goal: Skin Health and Integrity  Outcome: Ongoing (interventions implemented as appropriate)   10/17/19 2035   Skin Injury Risk (Adult)   Skin Health and Integrity making progress toward outcome       Problem: Surgery Nonspecified (Adult)  Goal: Signs and Symptoms of Listed Potential Problems Will be Absent, Minimized or Managed (Surgery Nonspecified)  Outcome: Ongoing (interventions implemented as appropriate)   10/17/19 2035   Goal/Outcome Evaluation   Problems Assessed (Surgery) all   Problems Present (Surgery) pain

## 2019-10-18 NOTE — DISCHARGE INSTRUCTIONS
1. Do not put weight on operated foot, use crutches, wheelchair,  walker or knee walker  2. Elevate operated foot over heart.  Keep pressure off the heel, put pillows under the calf and let the heel hang free so there is no pressure from the splint on the heel.    3. Keep the splint dry and intact- the ace bandage may be tightened or loosened, but do not remove the splint underneath the ace bandage.    4. Call the office if any problems: [phone]  5. Take the Zofran with the pain meds if you have nausea/vomiting  6. Take lovenox to help prevent blood clots      [facility], [place], operated by [facility] will provide you with home health. They will contact you to set up your first appointment.     You have a nerve catheter to assist with pain control. Please call the phone number provided by anesthesiology for any questions, problems, or concerns regarding your nerve catheter.

## 2019-10-18 NOTE — PLAN OF CARE
Problem: Patient Care Overview  Goal: Plan of Care Review  Outcome: Outcome(s) achieved Date Met: 10/18/19   10/18/19 1816   Coping/Psychosocial   Plan of Care Reviewed With patient;spouse   Plan of Care Review   Progress improving   OTHER   Outcome Summary Vitals WNL. Pt denies numbness or tingling. Chief complaint this shift has been LLE pain rated 3-7/10. PRN norco and percocet alternated with scheduled mobic. Ice packs applied to hip and ankle. Ropivacaine rate increased at 1219 to 10 mL/hr per order for approximately 1 hr. This decreased ankle pain from a 7 to a 4/10. Ropivacaine was accidentally pulled by patient at time of D/C. Per anesthesiology, pt could not be reblocked. Dr. CAYDEN villegas notify Dr. Guzman. Splint and Aquacel dressings are clean, dry, and intact. Pt voids spontaneously. BM this shift. Pt tolerates ambulation in the nair with assist X 1 and a knee scooter. NWB status maintained. LLE elevated when pt not up. Pt D/C'd home this afternoon with .        Problem: Fall Risk (Adult)  Goal: Absence of Fall  Outcome: Outcome(s) achieved Date Met: 10/18/19   10/18/19 1816   Fall Risk (Adult)   Absence of Fall achieves outcome       Problem: Pain, Acute (Adult)  Goal: Acceptable Pain Control/Comfort Level  Outcome: Outcome(s) achieved Date Met: 10/18/19   10/18/19 1816   Pain, Acute (Adult)   Acceptable Pain Control/Comfort Level achieves outcome       Problem: Skin Injury Risk (Adult)  Goal: Skin Health and Integrity  Outcome: Outcome(s) achieved Date Met: 10/18/19   10/18/19 1816   Skin Injury Risk (Adult)   Skin Health and Integrity achieves outcome       Problem: Fracture Orthopaedic (Adult)  Goal: Signs and Symptoms of Listed Potential Problems Will be Absent, Minimized or Managed (Fracture Orthopaedic)  Outcome: Outcome(s) achieved Date Met: 10/18/19   10/18/19 1816   Goal/Outcome Evaluation   Problems Assessed (Orthopaedic Fracture) all   Problems Present (Orthopaedic Fracture) functional  deficit/self-care deficit;pain;skin integrity impairment       Problem: Surgery Nonspecified (Adult)  Goal: Signs and Symptoms of Listed Potential Problems Will be Absent, Minimized or Managed (Surgery Nonspecified)  Outcome: Outcome(s) achieved Date Met: 10/18/19   10/18/19 7856   Goal/Outcome Evaluation   Problems Assessed (Surgery) all

## 2019-10-18 NOTE — THERAPY TREATMENT NOTE
Patient Name: Obi Jackman Jr.  : 1950    MRN: [id number]                              Today's Date: 10/18/2019       Admit Date: 10/15/2019    Visit Dx:     ICD-10-CM ICD-9-CM   1. Osteoarthritis of left ankle or foot M19.072 715.97   2. Neuropathy G62.9 355.9   3. Morbid obesity due to excess calories (CMS/[facility]) E66.01 278.01     Patient Active Problem List   Diagnosis   • Allergic rhinitis   • Anxiety disorder   • Benign paroxysmal positional vertigo   • Chronic tension type headache   • Depression   • ED (erectile dysfunction) of non-organic origin   • Hyperlipidemia   • Hypertension   • LFTs abnormal   • CHRISTOPHE (obstructive sleep apnea)   • Elevated glucose   • Health care maintenance   • Morbid obesity due to excess calories (CMS/[facility])   • Onychomycosis of left great toe   • Osteoarthritis of ankle or foot   • Venous stasis   • Neuropathy   • Class 3 severe obesity due to excess calories with serious comorbidity and body mass index (BMI) of 40.0 to 44.9 in adult (CMS/[facility])   • Pre-op evaluation   • Arthritis of foot, left   • S/P left ankle triple arthrodesis   • Acute blood loss anemia, mild, asymptomatic    • Acute postoperative pain     Past Medical History:   Diagnosis Date   • Bulging of lumbar intervertebral disc    • Hyperlipidemia    • Hypertension    • Migraine    • CHRISTOPHE on CPAP    • Rheumatoid arthritis (CMS/[facility])    • Swelling of left ear    • Wears contact lenses      Past Surgical History:   Procedure Laterality Date   • CATARACT EXTRACTION Right    • COLONOSCOPY     • EYE SURGERY Bilateral     cornea transplant    • HERNIA REPAIR       General Information     Row Name 10/18/19 1015          PT Evaluation Time/Intention    Document Type  therapy note (daily note)  -RAHUL     Mode of Treatment  physical therapy  -RAHUL     Row Name 10/18/19 1015          General Information    Patient Profile Reviewed?  yes  -RAHUL     Existing Precautions/Restrictions  non-weight bearing;left;fall R LE nerve  catheter  -RAHUL     Row Name 10/18/19 1015          Cognitive Assessment/Intervention- PT/OT    Orientation Status (Cognition)  oriented x 4  -RAHUL     Row Name 10/18/19 1015          Safety Issues, Functional Mobility    Impairments Affecting Function (Mobility)  balance;coordination;endurance/activity tolerance;pain;range of motion (ROM);strength  -RAHLU       User Key  (r) = Recorded By, (t) = Taken By, (c) = Cosigned By    Initials Name Provider Type    RAHUL Singh Garnica, PT Physical Therapist        Mobility     Row Name 10/18/19 1015          Bed Mobility Assessment/Treatment    Bed Mobility Assessment/Treatment  scooting/bridging;supine-sit;rolling left  -RAHUL     Rolling Left [place] (Bed Mobility)  conditional independence  -RAHUL     Scooting/Bridging [place] (Bed Mobility)  conditional independence  -RAHUL     Supine-Sit [place] (Bed Mobility)  supervision  -     Assistive Device (Bed Mobility)  bed rails;head of bed elevated  -RAHUL     Comment (Bed Mobility)  Verbal cues for advancement of L LE and using bed rails to assist to sitting.   -     Row Name 10/18/19 1015          Transfer Assessment/Treatment    Comment (Transfers)  Verbal cues for maintaining NWB precautions for L LE, use of UEs to push off EOB for stand, appropriate placement of L LE on knee scooter, and safe transfer technique. Pt assited to bedside commode from EOB using stand pivot transfer, requiring CGA x1.   -     Row Name 10/18/19 1015          Sit-Stand Transfer    Sit-Stand [place] (Transfers)  contact guard;verbal cues  -     Assistive Device (Sit-Stand Transfers)  walker, knee scooter;walker, front-wheeled  -     Row Name 10/18/19 1015          Gait/Stairs Assessment/Training    [place] Level (Gait)  verbal cues;contact guard  -     Assistive Device (Gait)  walker, knee scooter  -     Distance in Feet (Gait)  180 feet  -     Deviations/Abnormal Patterns (Gait)  bilateral deviations;dena decreased;gait speed  decreased  -RAHUL     Bilateral Gait Deviations  forward flexed posture  -RAHUL     Comment (Gait/Stairs)  Pt ambulated with knee scooter with CGA x1. Verbal cues for appropriate placement of L LE on knee scooter while ambulating, navigating through obstacles/barriers in the room. Pt declined stair training at this time. Pt states family plans to take pt up/down stairs. Pt educated on appropriate technique for ascending/descending stairs, sequencing, and UE placement. Gait limited by fatigue.   -     Row Name 10/18/19 1015          Mobility Assessment/Intervention    Extremity Weight-bearing Status  left lower extremity  -RAHUL     Left Lower Extremity (Weight-bearing Status)  non weight-bearing (NWB)  -RAHUL       User Key  (r) = Recorded By, (t) = Taken By, (c) = Cosigned By    Initials Name Provider Type    Singh Potter, PT Physical Therapist        Obj/Interventions     Row Name 10/18/19 1015          Static Sitting Balance    Level of [place] (Unsupported Sitting, Static Balance)  independent  -RAHUL     Sitting Position (Unsupported Sitting, Static Balance)  sitting on edge of bed  -RAHUL     Time Able to Maintain Position (Unsupported Sitting, Static Balance)  more than 5 minutes  -RAHUL     Row Name 10/18/19 1015          Dynamic Sitting Balance    Level of [place], Reaches Outside Midline (Sitting, Dynamic Balance)  independent  -RAHUL     Sitting Position, Reaches Outside Midline (Sitting, Dynamic Balance)  sitting on edge of bed  -RAHUL     Row Name 10/18/19 1015          Static Standing Balance    Level of [place] (Supported Standing, Static Balance)  contact guard assist  -RAHUL     Time Able to Maintain Position (Supported Standing, Static Balance)  more than 5 minutes  -RAHUL     Assistive Device Utilized (Supported Standing, Static Balance)  walker, rolling;other (see comments) knee scooter  -RAHUL     Row Name 10/18/19 1015          Dynamic Standing Balance    Level of [place], Reaches Outside Midline (Standing,  Dynamic Balance)  contact guard assist  -     Time Able to Maintain Position, Reaches Outside Midline (Standing, Dynamic Balance)  4 to 5 minutes  -RAHLU     Assistive Device Utilized (Supported Standing, Dynamic Balance)  walker, rolling knee scooter  -       User Key  (r) = Recorded By, (t) = Taken By, (c) = Cosigned By    Initials Name Provider Type    Singh Potter, PT Physical Therapist        Goals/Plan    No documentation.       Clinical Impression     Row Name 10/18/19 1015          Pain Assessment    Additional Documentation  Pain Scale: Numbers Pre/Post-Treatment (Group)  -[facility] Name 10/18/19 1015          Pain Scale: Numbers Pre/Post-Treatment    Pain Scale: Numbers, Pretreatment  4/10  -RAHUL     Pain Scale: Numbers, Post-Treatment  4/10  -RAHUL     Pain Location - Side  Left  -     Pain Location  hip  -     Pain Intervention(s)  Cold applied;Repositioned;Ambulation/increased activity  -     Row Name 10/18/19 1015          Plan of Care Review    Plan of Care Reviewed With  patient  -[facility] Name 10/18/19 1015          Physical Therapy Clinical Impression    Patient/Family Goals Statement (PT Clinical Impression)  Return home safely  -     Criteria for Skilled Interventions Met (PT Clinical Impression)  yes;treatment indicated  -     Rehab Potential (PT Clinical Summary)  good, to achieve stated therapy goals  -     Row Name 10/18/19 1015          Positioning and Restraints    Pre-Treatment Position  in bed  -     Post Treatment Position  chair  -RAHUL     In Chair  notified nsg;reclined;sitting;call light within reach;encouraged to call for assist;exit alarm on;legs elevated  -       User Key  (r) = Recorded By, (t) = Taken By, (c) = Cosigned By    Initials Name Provider Type    Singh Potter, PT Physical Therapist        Outcome Measures     Row Name 10/18/19 1015          How much help from another person do you currently need...    Turning from your back to your side while in flat bed  without using bedrails?  4  -RAHUL     Moving from lying on back to sitting on the side of a flat bed without bedrails?  4  -RAHUL     Moving to and from a bed to a chair (including a wheelchair)?  3  -RAHUL     Standing up from a chair using your arms (e.g., wheelchair, bedside chair)?  3  -RAHUL     Climbing 3-5 steps with a railing?  1  -RAHUL     To walk in hospital room?  3  -RAHUL     AM-PAC 6 Clicks Score (PT)  18  -RAHUL     Row Name 10/18/19 1015          Functional Assessment    Outcome Measure Options  AM-PAC 6 Clicks Basic Mobility (PT)  -       User Key  (r) = Recorded By, (t) = Taken By, (c) = Cosigned By    Initials Name Provider Type    Singh Potter, JORGE ALBERTO Physical Therapist        Physical Therapy Education     Title: PT OT SLP Therapies (Done)     Topic: Physical Therapy (Done)     Point: Mobility training (Done)     Learning Progress Summary           Patient Acceptance, E,D, VU by RAHUL at 10/18/2019 10:15 AM    Comment:  Educated pt on sequencing during bed to bedside commode transfers, transfers onto knee scooter, L LE positioning on knee scooter, and precautions on L LE.    Acceptance, E,D,TB, NR,VU,DU by AA at 10/17/2019  2:39 PM    Comment:  transfer sequencing with knee scooter and RW from variety of surfaces  transfers with RW SPT  safety and mobility in the home    Acceptance, E,D, NR,VU by AA at 10/16/2019  1:06 PM    Comment:  gait sequencing  knee scooter sequencing with trasnfers   Family Acceptance, E,D,TB, NR,VU,DU by AA at 10/17/2019  2:39 PM    Comment:  transfer sequencing with knee scooter and RW from variety of surfaces  transfers with RW SPT  safety and mobility in the home                   Point: Home exercise program (Done)     Learning Progress Summary           Patient Acceptance, E,D, VU by RAHUL at 10/18/2019 10:15 AM    Comment:  Educated pt on sequencing during bed to bedside commode transfers, transfers onto knee scooter, L LE positioning on knee scooter, and precautions on L LE.                    Point: Body mechanics (Done)     Learning Progress Summary           Patient Acceptance, E,D, VU by RAHUL at 10/18/2019 10:15 AM    Comment:  Educated pt on sequencing during bed to bedside commode transfers, transfers onto knee scooter, L LE positioning on knee scooter, and precautions on L LE.    Acceptance, E,D,TB, NR,VU,DU by AA at 10/17/2019  2:39 PM    Comment:  transfer sequencing with knee scooter and RW from variety of surfaces  transfers with RW SPT  safety and mobility in the home    Acceptance, E,D, NR,VU by DORINA at 10/16/2019  1:06 PM    Comment:  gait sequencing  knee scooter sequencing with cynthiaschaparroers   Family Acceptance, E,D,TB, NR,VU,DU by DORINA at 10/17/2019  2:39 PM    Comment:  transfer sequencing with knee scooter and RW from variety of surfaces  transfers with RW SPT  safety and mobility in the home                   Point: Precautions (Done)     Learning Progress Summary           Patient Acceptance, E,D, VU by RAHUL at 10/18/2019 10:15 AM    Comment:  Educated pt on sequencing during bed to bedside commode transfers, transfers onto knee scooter, L LE positioning on knee scooter, and precautions on L LE.    Acceptance, E,D,TB, NR,VU,DU by DORINA at 10/17/2019  2:39 PM    Comment:  transfer sequencing with knee scooter and RW from variety of surfaces  transfers with RW SPT  safety and mobility in the home    Acceptance, E,D, NR,VU by DORINA at 10/16/2019  1:06 PM    Comment:  gait sequencing  knee scooter sequencing with cynthiaschaparroers   Family Acceptance, E,D,TB, NR,VU,DU by DORINA at 10/17/2019  2:39 PM    Comment:  transfer sequencing with knee scooter and RW from variety of surfaces  transfers with RW SPT  safety and mobility in the home                               User Key     Initials Effective Dates Name Provider Type Discipline    DORINA 04/02/18 -  Ernestina Knight, PT Physical Therapist PT    RAHUL 09/10/19 -  Singh Garnica, PT Physical Therapist PT              PT Recommendation and Plan  Planned Therapy  Interventions (PT Eval): balance training, bed mobility training, gait training, home exercise program, patient/family education, ROM (range of motion), stair training, strengthening, transfer training  Outcome Summary/Treatment Plan (PT)  Anticipated Discharge Disposition (PT): home with assist, home with home health  Plan of Care Reviewed With: patient  Progress: improving  Outcome Summary: Pt increased ambulation to 180 feet with knee scooter and CGA x1. Pt declined stair training, despite being educated on correct techniques, sequencing, and hand placement during ascent/descent of stairs. Gait limited by fatigue. Pt improved with all transfers in/out of bed, requiring only CGA x1. Anticipate pt d/c home with assist and HHPT.      Time Calculation:   PT Charges     Row Name 10/18/19 1015             Time Calculation    Start Time  1015  -RAHUL      PT Received On  10/18/19  -RAHUL      PT Goal Re-Cert Due Date  10/26/19  -         Time Calculation- PT    Total Timed Code Minutes- PT  39 minute(s)  -RAHUL         Timed Charges    22807 - Gait Training Minutes   16  -RAHUL      01332 - PT Therapeutic Activity Minutes  23  -RAHUL        User Key  (r) = Recorded By, (t) = Taken By, (c) = Cosigned By    Initials Name Provider Type    RAHUL Singh Garnica, PT Physical Therapist        Therapy Charges for Today     Code Description Service Date Service Provider Modifiers Qty    07101998992 HC GAIT TRAINING EA 15 MIN 10/18/2019 Singh Garnica, PT GP 1    84369940444 HC PT THERAPEUTIC ACT EA 15 MIN 10/18/2019 Singh Garnica, PT GP 2          PT G-Codes  Outcome Measure Options: AM-PAC 6 Clicks Basic Mobility (PT)  AM-PAC 6 Clicks Score (PT): 18    Singh Garnica PT  10/18/2019

## 2019-10-18 NOTE — PROGRESS NOTES
Orthopedic  Progress Note    Subjective     Post-Operative Day: 3 Days Post-Op-left triple fusion    Systemic or Specific Complaints: better pain control and safer  Mobility, ready to go home  Objective     Vital signs in last 24 hours:  Temp:  [96.8 °F (36 °C)-98.1 °F (36.7 °C)] 98 °F (36.7 °C)  Heart Rate:  [69-88] 81  Resp:  [16] 16  BP: (119-144)/(66-84) 137/78    Neurovascular: left toes wiggle,                Wound: left splint dry, iliac crest dressing clean    Data Review  Lab Results (last 24 hours)     Procedure Component Value Units Date/Time    CBC (No Diff) [[id number]]  (Abnormal) Collected:  10/18/19 0637    Specimen:  Blood Updated:  10/18/19 0726     WBC 7.88 10*3/mm3      RBC 4.03 10*6/mm3      Hemoglobin 12.3 g/dL      Hematocrit 38.9 %      MCV 96.5 fL      MCH 30.5 pg      MCHC 31.6 g/dL      RDW 12.6 %      RDW-SD 45.3 fl      MPV 9.6 fL      Platelets 277 10*3/mm3             Assessment/Plan     Status post- improved pain control after left triple fusion, ok to go home, see me in 2 weeks, non-wt bearing.  2 weeks Lovenox.  Elevate, keep splint clean and dry           Elodia Guzman MD    Date: 10/18/2019  Time: 12:40 PM

## 2019-10-18 NOTE — PLAN OF CARE
Problem: Patient Care Overview  Goal: Plan of Care Review  Outcome: Ongoing (interventions implemented as appropriate)   10/18/19 8187   Coping/Psychosocial   Plan of Care Reviewed With patient   Plan of Care Review   Progress improving   OTHER   Outcome Summary Pt VSS. Pt slept well last night and only required one Norco around midnight, due to pain in his incisional area in left hip. Gel packs replaced through the night. Ropivacaine infusing at 6ml/hr with no rate change. Will continue to monitor.

## 2019-10-18 NOTE — PLAN OF CARE
Problem: Patient Care Overview  Goal: Plan of Care Review  Outcome: Ongoing (interventions implemented as appropriate)   10/18/19 1015   Coping/Psychosocial   Plan of Care Reviewed With patient   Plan of Care Review   Progress improving   OTHER   Outcome Summary Pt increased ambulation to 180 feet with knee scooter and CGA x1. Pt declined stair training, despite being educated on correct techniques, sequencing, and hand placement during ascent/descent of stairs. Gait limited by fatigue. Pt improved with all transfers in/out of bed, requiring only CGA x1. Anticipate pt d/c home with assist and HHPT.

## 2019-10-21 ENCOUNTER — TELEPHONE (OUTPATIENT)
Dept: ORTHOPEDIC SURGERY | Facility: CLINIC | Age: 69
End: 2019-10-21

## 2019-10-21 DIAGNOSIS — G43.809 OTHER MIGRAINE WITHOUT STATUS MIGRAINOSUS, NOT INTRACTABLE: ICD-10-CM

## 2019-10-21 RX ORDER — SUMATRIPTAN 100 MG/1
TABLET, FILM COATED ORAL
Qty: 9 TABLET | Refills: 3 | Status: SHIPPED | OUTPATIENT
Start: 2019-10-21 | End: 2020-01-28 | Stop reason: SDUPTHER

## 2019-10-21 NOTE — TELEPHONE ENCOUNTER
MICAH FROM [facility] CALLED REGARDING A VERBAL ORDER FOR EXTENDED VISITS. SHE SAID SHE WOULD LIKE TO CONTINUE TO WORK WITH THE PATIENT. MICAH CAN BE REACHED [phone].

## 2019-10-21 NOTE — TELEPHONE ENCOUNTER
Called Rosemarie- per Dr Guzman, gave verbal ok for her to continue HHPT with patient. She understood and had no further questions at this time.     Winter

## 2019-10-28 ENCOUNTER — OFFICE VISIT (OUTPATIENT)
Dept: ORTHOPEDIC SURGERY | Facility: CLINIC | Age: 69
End: 2019-10-28

## 2019-10-28 DIAGNOSIS — Z09 SURGERY FOLLOW-UP: Primary | ICD-10-CM

## 2019-10-28 PROCEDURE — 29515 APPLICATION SHORT LEG SPLINT: CPT | Performed by: ORTHOPAEDIC SURGERY

## 2019-10-28 PROCEDURE — 99024 POSTOP FOLLOW-UP VISIT: CPT | Performed by: ORTHOPAEDIC SURGERY

## 2019-10-28 RX ORDER — DIOSMIN COMPLEX NO.1 630 MG
1 TABLET ORAL DAILY
Qty: 90 TABLET | Refills: 5 | Status: SHIPPED | OUTPATIENT
Start: 2019-10-28 | End: 2021-01-13

## 2019-10-28 RX ORDER — HYDROCODONE BITARTRATE AND ACETAMINOPHEN 7.5; 325 MG/1; MG/1
1-2 TABLET ORAL EVERY 6 HOURS PRN
Qty: 45 TABLET | Refills: 0 | Status: SHIPPED | OUTPATIENT
Start: 2019-10-28 | End: 2020-02-03

## 2019-10-28 NOTE — PROGRESS NOTES
Post-op (2 week S/P Triple arthodesis Left; achilles tendon lengthening Left (10/15/19))      Obi HANSON Gasper Wong. is 2 weeks status post left triple fusion, BIBI, 10/15/19. He reports no fever, chills.  He reports pain is well controlled.  They have been taking lovenox for DVT prophylaxis.  They have been NWB in splint.      Past Surgical History:   Procedure Laterality Date   • CATARACT EXTRACTION Right    • COLONOSCOPY  2015   • EYE SURGERY Bilateral     cornea transplant    • FOOT SURGERY Left 10/15/2019    triple arthrodesis and achilles tendon lengthening- [place]   • HERNIA REPAIR         There were no vitals taken for this visit.        Good alignment, no erythema, no drainage, no sign of infection, lots of post op swelling left foot.  Iliac crest is healed    ordered and reviewed x-rays today    Assessment and Plan:   1. Surgery follow-up  Too swollen to remove all staples.  The iliac crest is healed.  We removed every other staple, and put him back in short leg fiberglass splint.  Continue strict non-wt bearing.  I will see him in 7-10 days, no xray needed at that time unless he has a problem.  He should now take 325mg aspirin per day for DVT prophylaxis when Lovenox is finished  - XR Foot 2 View Left  - XR Ankle 2 View Left    2. He has severe venous stasis, should wear support stocking on right, we will also start vasculera

## 2019-11-08 ENCOUNTER — OFFICE VISIT (OUTPATIENT)
Dept: ORTHOPEDIC SURGERY | Facility: CLINIC | Age: 69
End: 2019-11-08

## 2019-11-08 DIAGNOSIS — Z09 SURGERY FOLLOW-UP: Primary | ICD-10-CM

## 2019-11-08 DIAGNOSIS — G62.9 NEUROPATHY: ICD-10-CM

## 2019-11-08 DIAGNOSIS — I87.8 VENOUS STASIS: ICD-10-CM

## 2019-11-08 PROCEDURE — 99024 POSTOP FOLLOW-UP VISIT: CPT | Performed by: ORTHOPAEDIC SURGERY

## 2019-11-08 NOTE — PROGRESS NOTES
Post-op ((1 week f/u- 3 weeks S/P Triple arthodesis Left; achilles tendon lengthening Left (10/15/19)))      Obi Jackman  is 3 weeks status post left triple arthrodesis, 10/15/2019. He reports no fever, chills.  He reports pain is well controlled.  They have been taking lovenox for DVT prophylaxis.  They have been NWB in splint.      Past Surgical History:   Procedure Laterality Date   • ACHILLES TENDON SURGERY Left 10/15/2019    Procedure: ACHILLES TENDON LENGTHENING LEFT;  Surgeon: Elodia Guzman MD;  Location:  [facility];  Service: Orthopedics   • CATARACT EXTRACTION Right    • COLONOSCOPY  2015   • EYE SURGERY Bilateral     cornea transplant    • FOOT SURGERY Left 10/15/2019    triple arthrodesis and achilles tendon lengthening- [facility]   • HERNIA REPAIR     • ILIAC CREST BONE GRAFT Left 10/15/2019    Procedure: ILIAC CREST BONE GRAFT LEFT;  Surgeon: Elodia Guzman MD;  Location: [facility] OR;  Service: Orthopedics   • TOE FUSION Left 10/15/2019    Procedure: TRIPLE ARTHODESIS LEFT;  Surgeon: Elodia Guzman MD;  Location: [facility] OR;  Service: Orthopedics       There were no vitals taken for this visit.        Good alignment, no erythema, no drainage, no sign of infection, normal post op swelling left foot    none    Assessment and Plan:   1. Surgery follow-up  Swelling improved, incisions are healing well, we remove the remaining staples.  We placed him in a short leg fiberglass nonweightbearing cast.  I will see him again in 6 weeks for 2 views of the foot out of the cast    2. Venous stasis  He did get support stockings he is wearing it on the right, he reports he feels much better    3. Neuropathy  No change in neuropathy

## 2019-12-13 ENCOUNTER — TELEPHONE (OUTPATIENT)
Dept: INTERNAL MEDICINE | Facility: CLINIC | Age: 69
End: 2019-12-13

## 2019-12-13 RX ORDER — CLOTRIMAZOLE AND BETAMETHASONE DIPROPIONATE 10; .64 MG/G; MG/G
CREAM TOPICAL 2 TIMES DAILY
Qty: 45 G | Refills: 1 | Status: ON HOLD | OUTPATIENT
Start: 2019-12-13 | End: 2020-08-04

## 2019-12-13 NOTE — TELEPHONE ENCOUNTER
Pt called in and said he is dealing with a foot fungus and wants lotrisone cream (clocrimanzole) to the [facility] on [address]. He asked that it be called in as he has some currently but is about to run out and had knee surgery so its hard for him to get out.    Any questions call him at [phone]

## 2019-12-16 ENCOUNTER — DOCUMENTATION (OUTPATIENT)
Dept: PHYSICAL THERAPY | Facility: HOSPITAL | Age: 69
End: 2019-12-16

## 2019-12-16 DIAGNOSIS — G89.29 CHRONIC RIGHT HIP PAIN: ICD-10-CM

## 2019-12-16 DIAGNOSIS — M25.551 CHRONIC RIGHT HIP PAIN: ICD-10-CM

## 2019-12-16 DIAGNOSIS — G89.29 CHRONIC RIGHT SHOULDER PAIN: Primary | ICD-10-CM

## 2019-12-16 DIAGNOSIS — M25.511 CHRONIC RIGHT SHOULDER PAIN: Primary | ICD-10-CM

## 2019-12-20 ENCOUNTER — OFFICE VISIT (OUTPATIENT)
Dept: ORTHOPEDIC SURGERY | Facility: CLINIC | Age: 69
End: 2019-12-20

## 2019-12-20 DIAGNOSIS — G62.9 NEUROPATHY: ICD-10-CM

## 2019-12-20 DIAGNOSIS — M19.072 OSTEOARTHRITIS OF LEFT ANKLE OR FOOT: ICD-10-CM

## 2019-12-20 DIAGNOSIS — Z09 SURGERY FOLLOW-UP: Primary | ICD-10-CM

## 2019-12-20 DIAGNOSIS — I87.8 VENOUS STASIS: ICD-10-CM

## 2019-12-20 PROCEDURE — 99024 POSTOP FOLLOW-UP VISIT: CPT | Performed by: ORTHOPAEDIC SURGERY

## 2019-12-20 NOTE — PROGRESS NOTES
Post-op (((6 week f/u- 9 weeks S/P Triple arthodesis Left; achilles tendon lengthening Left (10/15/19))))      Obi Jackman Jr. is 8 weeks status post left triple, 10/15/19. He reports no fever, chills.  He reports pain is well controlled.  They have been taking aspirin for DVT prophylaxis.  They have been NWB in cast.      Past Surgical History:   Procedure Laterality Date   • ACHILLES TENDON SURGERY Left 10/15/2019    Procedure: ACHILLES TENDON LENGTHENING LEFT;  Surgeon: Elodia Guzman MD;  Location:  [facility] OR;  Service: Orthopedics   • CATARACT EXTRACTION Right    • COLONOSCOPY  2015   • EYE SURGERY Bilateral     cornea transplant    • FOOT SURGERY Left 10/15/2019    triple arthrodesis and achilles tendon lengthening- [facility]   • HERNIA REPAIR     • ILIAC CREST BONE GRAFT Left 10/15/2019    Procedure: ILIAC CREST BONE GRAFT LEFT;  Surgeon: Elodia Guzman MD;  Location: [facility] OR;  Service: Orthopedics   • TOE FUSION Left 10/15/2019    Procedure: TRIPLE ARTHODESIS LEFT;  Surgeon: Elodia Guzman MD;  Location: [facility] OR;  Service: Orthopedics       There were no vitals taken for this visit.        Good alignment, no erythema, no drainage, no sign of infection, normal post op swelling left foot    ordered and reviewed x-rays today    Assessment and Plan:   1. Surgery follow-up  Healing.  He has been very compliant, so I will let him wear a tall boot but be NON WEIGHT BEARING.  (too early to weight bear) I cautioned him about this very carefully, risk of failure if he walks on it.  Wear support stocking under boot during the day.  May shower but no soaking until all scabs gone.  I will see him for xray of foot 2 views in 4-6 weeks  - XR Foot 2 View Left    2. Osteoarthritis of left ankle or foot  As above    3. Venous stasis  Wear support stockings     4. Neuropathy  No change

## 2020-01-13 RX ORDER — ATORVASTATIN CALCIUM 10 MG/1
TABLET, FILM COATED ORAL
Qty: 30 TABLET | Refills: 0 | Status: SHIPPED | OUTPATIENT
Start: 2020-01-13 | End: 2020-01-28 | Stop reason: SDUPTHER

## 2020-01-20 ENCOUNTER — OFFICE VISIT (OUTPATIENT)
Dept: ORTHOPEDIC SURGERY | Facility: CLINIC | Age: 70
End: 2020-01-20

## 2020-01-20 DIAGNOSIS — G62.9 NEUROPATHY: ICD-10-CM

## 2020-01-20 DIAGNOSIS — M19.072 OSTEOARTHRITIS OF LEFT ANKLE OR FOOT: ICD-10-CM

## 2020-01-20 DIAGNOSIS — E66.01 MORBID OBESITY DUE TO EXCESS CALORIES (HCC): ICD-10-CM

## 2020-01-20 DIAGNOSIS — Z98.890 POST-OPERATIVE STATE: Primary | ICD-10-CM

## 2020-01-20 PROCEDURE — 99212 OFFICE O/P EST SF 10 MIN: CPT | Performed by: ORTHOPAEDIC SURGERY

## 2020-01-20 RX ORDER — IBUPROFEN 200 MG
200 TABLET ORAL AS NEEDED
Status: ON HOLD | COMMUNITY
End: 2020-08-05 | Stop reason: SDUPTHER

## 2020-01-20 RX ORDER — ACETAMINOPHEN 325 MG/1
325 TABLET ORAL AS NEEDED
COMMUNITY

## 2020-01-20 NOTE — PROGRESS NOTES
Follow-up of the Left Foot (1 month f/u/3 months S/P Triple arthodesis Left; achilles tendon lengthening Left (10/15/19))  Answers for HPI/ROS submitted by the patient on 1/13/2020   How long have you been having these symptoms?: Gideon Jackman Jr. is 3 months status post left triple arthrodesis, 10/15/2019. He reports no fever, chills.  He reports pain is improving .  They have been taking aspirin for DVT prophylaxis.  They have been NWB in boot.      Past Surgical History:   Procedure Laterality Date   • ACHILLES TENDON SURGERY Left 10/15/2019    Procedure: ACHILLES TENDON LENGTHENING LEFT;  Surgeon: Elodia Guzman MD;  Location:  [facility] OR;  Service: Orthopedics   • CATARACT EXTRACTION Right    • COLONOSCOPY  2015   • EYE SURGERY Bilateral     cornea transplant    • FOOT SURGERY Left 10/15/2019    triple arthrodesis and achilles tendon lengthening- [place]   • HERNIA REPAIR     • ILIAC CREST BONE GRAFT Left 10/15/2019    Procedure: ILIAC CREST BONE GRAFT LEFT;  Surgeon: Elodia Guzman MD;  Location:  [facility] OR;  Service: Orthopedics   • TOE FUSION Left 10/15/2019    Procedure: TRIPLE ARTHODESIS LEFT;  Surgeon: Elodia Guzman MD;  Location:  [facility] OR;  Service: Orthopedics       There were no vitals taken for this visit.        Good alignment, no erythema, no drainage, no sign of infection, he is wearing a support stocking but he cut off the foot part so it is making the foot swell, he needs to wear a full-length support stocking from toes to knee, his venous stasis is somewhat improved taking the Vasculera    ordered and reviewed x-rays today    Assessment and Plan:   1. Post-operative state  Improving, but he needs to wear support stocking from toes to knee.  The way he cut the foot off is creating a venous tourniquet at the ankle and making the foot swelling worse.  He may wean himself off the scooter now and walk in the boot.  I think physical therapy will help him get started.  He is to walk  only in the boot.  He will see Ms. Valencia in 6 to 8 weeks for standing 2 views of the foot, if everything looks good he may start weaning out of the boot at that time.  I will then see him 3 months later  - XR Foot 2 View Left    2.  Neuropathy- no change

## 2020-01-23 ENCOUNTER — TELEPHONE (OUTPATIENT)
Dept: ORTHOPEDIC SURGERY | Facility: CLINIC | Age: 70
End: 2020-01-23

## 2020-01-23 NOTE — TELEPHONE ENCOUNTER
PATIENT HAD SURGERY IN October. PATIENT ICNISION WOUND IS IRRITATED. PATIENT PUT NEOSPORIN AND A BAND AID ON IT YESTERDAY. UPON REMOVING BAND AID, HE NOTICED THAT THE INCISION WAS IRRITATED. PATIENT IS CONCERNED AND WOULD A RETURN PHONE CALL.

## 2020-01-23 NOTE — TELEPHONE ENCOUNTER
Called patient back- he states that after shower today, that there was some drainage from the incision and it smells some. I am bringing him in tomorrow morning to have Cheyanne look at it.     Winter

## 2020-01-24 ENCOUNTER — LAB (OUTPATIENT)
Dept: LAB | Facility: HOSPITAL | Age: 70
End: 2020-01-24

## 2020-01-24 ENCOUNTER — OFFICE VISIT (OUTPATIENT)
Dept: ORTHOPEDIC SURGERY | Facility: CLINIC | Age: 70
End: 2020-01-24

## 2020-01-24 DIAGNOSIS — T14.8XXA WOUND INFECTION: Primary | ICD-10-CM

## 2020-01-24 DIAGNOSIS — L08.9 WOUND INFECTION: Primary | ICD-10-CM

## 2020-01-24 DIAGNOSIS — L08.9 WOUND INFECTION: ICD-10-CM

## 2020-01-24 DIAGNOSIS — T14.8XXA WOUND INFECTION: ICD-10-CM

## 2020-01-24 PROCEDURE — 99213 OFFICE O/P EST LOW 20 MIN: CPT | Performed by: PHYSICIAN ASSISTANT

## 2020-01-24 PROCEDURE — 87186 SC STD MICRODIL/AGAR DIL: CPT

## 2020-01-24 PROCEDURE — 87015 SPECIMEN INFECT AGNT CONCNTJ: CPT

## 2020-01-24 PROCEDURE — 87070 CULTURE OTHR SPECIMN AEROBIC: CPT

## 2020-01-24 PROCEDURE — 87075 CULTR BACTERIA EXCEPT BLOOD: CPT

## 2020-01-24 PROCEDURE — 87205 SMEAR GRAM STAIN: CPT

## 2020-01-24 RX ORDER — LEVOFLOXACIN 750 MG/1
750 TABLET ORAL DAILY
Qty: 10 TABLET | Refills: 0 | Status: SHIPPED | OUTPATIENT
Start: 2020-01-24 | End: 2020-01-27 | Stop reason: SDUPTHER

## 2020-01-24 NOTE — PROGRESS NOTES
[facility] [place] Clinic Note        Subjective     CC: Post-op (3 months s/p (L) ankle triple arthrodesis 10/15/2019- incision check)      KEYA Jackman Jr. is a 69 y.o. male.  Patient presents for evaluation of his left ankle.  He is 3 months status post left triple arthrodesis.  He saw Dr. Guzman this past Monday.  During that appointment she instructed him on progressive weightbearing in a boot.  He states after that appointment he started noting irritation to the lateral incision with opening up of the central part of the wound and drainage.  He contacted our clinic and was brought in today for evaluation.    ROS:    Constiutional:Pt denies fever, chills, nausea, or vomiting.  MSK:as above        Objective      Past Medical History  Past Medical History:   Diagnosis Date   • Bulging of lumbar intervertebral disc    • Hyperlipidemia    • Hypertension    • Migraine    • CHRISTOPHE on CPAP    • Rheumatoid arthritis (CMS/HCC)    • Swelling of left ear    • Wears contact lenses          Physical Exam  There were no vitals taken for this visit.    There is no height or weight on file to calculate BMI.    Patient is well nourished and well developed.        Ortho Exam  Left lateral ankle  Surgical incision healed except for the central aspect which shows fibrinous tissue (17 mm x 8 mm at widest margins).  Positive localized erythema.  Significant soft tissue swelling noted to the foot and ankle--per patient improved from Monday.            Imaging/Labs/EMG Reviewed:  Wound cultures were obtained today and sent for analysis.      Assessment:  1. Wound infection        Plan:  1. Wound infection--patient was prescribed Levaquin 750 mg daily for 10 days, Bactroban and gauze placed over the wound, patient was placed in a Unna boot.  2. Strict elevation to help assist with swelling.  3. Patient follow-up on Monday with Dr. Guzman.        Cheyanne Putnam PA-C  01/24/20  12:58 PM

## 2020-01-27 ENCOUNTER — OFFICE VISIT (OUTPATIENT)
Dept: ORTHOPEDIC SURGERY | Facility: CLINIC | Age: 70
End: 2020-01-27

## 2020-01-27 VITALS — WEIGHT: 314 LBS | HEIGHT: 71 IN | BODY MASS INDEX: 43.96 KG/M2 | HEART RATE: 95 BPM | OXYGEN SATURATION: 99 %

## 2020-01-27 DIAGNOSIS — I87.8 VENOUS STASIS: ICD-10-CM

## 2020-01-27 DIAGNOSIS — G62.9 NEUROPATHY: ICD-10-CM

## 2020-01-27 DIAGNOSIS — T14.8XXA WOUND INFECTION: Primary | ICD-10-CM

## 2020-01-27 DIAGNOSIS — L08.9 WOUND INFECTION: Primary | ICD-10-CM

## 2020-01-27 DIAGNOSIS — E66.01 MORBID OBESITY DUE TO EXCESS CALORIES (HCC): ICD-10-CM

## 2020-01-27 LAB
BACTERIA SPEC AEROBE CULT: ABNORMAL
BACTERIA SPEC AEROBE CULT: ABNORMAL
GRAM STN SPEC: ABNORMAL
GRAM STN SPEC: ABNORMAL

## 2020-01-27 PROCEDURE — 99212 OFFICE O/P EST SF 10 MIN: CPT | Performed by: ORTHOPAEDIC SURGERY

## 2020-01-27 PROCEDURE — 29580 STRAPPING UNNA BOOT: CPT | Performed by: ORTHOPAEDIC SURGERY

## 2020-01-27 RX ORDER — LEVOFLOXACIN 750 MG/1
750 TABLET ORAL DAILY
Qty: 10 TABLET | Refills: 0 | Status: SHIPPED | OUTPATIENT
Start: 2020-01-27 | End: 2020-02-03 | Stop reason: SDUPTHER

## 2020-01-27 NOTE — PROGRESS NOTES
"Follow-up (3 months s/p (L) ankle triple arthrodesis 10/15/2019- incision check)      Obi Jackman JrStalin is 3 months status post left triple fusion, 10/15/2019.  I saw him with Ms. Putnam on Friday and he had a superficial lateral wound infection.  We did cultures and started him on Levaquin.  Cultures grew skin adam and Citrobacter braakii.  This was a very sensitive bacteria.  We put him in an Unna boot.  Exam today shows the wound is much improved.  He has not had any fever nor chills.    Past Surgical History:   Procedure Laterality Date   • ACHILLES TENDON SURGERY Left 10/15/2019    Procedure: ACHILLES TENDON LENGTHENING LEFT;  Surgeon: Elodia Guzman MD;  Location:  [facility] OR;  Service: Orthopedics   • CATARACT EXTRACTION Right    • COLONOSCOPY  2015   • EYE SURGERY Bilateral     cornea transplant    • FOOT SURGERY Left 10/15/2019    triple arthrodesis and achilles tendon lengthening- [facility]   • HERNIA REPAIR     • ILIAC CREST BONE GRAFT Left 10/15/2019    Procedure: ILIAC CREST BONE GRAFT LEFT;  Surgeon: Eoldia Guzman MD;  Location:  [facility] OR;  Service: Orthopedics   • TOE FUSION Left 10/15/2019    Procedure: TRIPLE ARTHODESIS LEFT;  Surgeon: Elodia Guzman MD;  Location:  [facility] OR;  Service: Orthopedics       Pulse 95   Ht 180.3 cm (70.98\")   Wt (!) 142 kg (314 lb)   SpO2 99%   BMI 43.81 kg/m²        Left foot: On the lateral foot in the center of the incision, the area of superficial tissue breakdown is much improved.  The erythema has resolved.  It is into the dermis only, much less drainage.    reviewed prior lab results    Assessment and Plan:   1. Wound infection  The wound is much improved.  He grew skin adam and Citrobacter, very sensitive to all the drugs tested.  I want him to continue the Levaquin for a total of 14 days.  We put him back into an Unna boot today as that is helping to control the swelling.  He asked me about the persistent swelling in the leg.  I reminded him that " before surgery we discussed his extreme swelling that it is present in both legs and I explained to him before surgery that the swelling would get worse in the left leg and persist for over a year.  The swelling that he has is normal for this situation, albeit quite severe given his underlying venous stasis disease that is quite severe.  He asked me if we could put him in a shorter Unna boot and I explained that is not the right thing to do, because he will swell over it.  It needs to be a standard Unna boot.  I will see him in a week for repeat exam    2. Neuropathy  No change in fairly dense neuropathy    3. Morbid obesity due to excess calories (CMS/[facility])  His weight has gone up 24 pounds since December 1, 2019, current weight 314 pounds today.  We discussed weight loss.    4. Venous stasis  As above we discussed the severe swelling that he had pre-existing to the surgery and why it will now persist and be worse for at least a year, sometimes patients permanently have more swelling after this kind of surgery with underlying severe venous stasis.  He needs to wear support stockings and that is a permanent recommendation.

## 2020-01-28 ENCOUNTER — APPOINTMENT (OUTPATIENT)
Dept: LAB | Facility: HOSPITAL | Age: 70
End: 2020-01-28

## 2020-01-28 ENCOUNTER — OFFICE VISIT (OUTPATIENT)
Dept: INTERNAL MEDICINE | Facility: CLINIC | Age: 70
End: 2020-01-28

## 2020-01-28 VITALS
WEIGHT: 314 LBS | HEART RATE: 71 BPM | OXYGEN SATURATION: 97 % | BODY MASS INDEX: 43.81 KG/M2 | SYSTOLIC BLOOD PRESSURE: 140 MMHG | DIASTOLIC BLOOD PRESSURE: 90 MMHG

## 2020-01-28 DIAGNOSIS — F40.243 FEAR OF FLYING: ICD-10-CM

## 2020-01-28 DIAGNOSIS — E78.5 HYPERLIPIDEMIA, UNSPECIFIED HYPERLIPIDEMIA TYPE: ICD-10-CM

## 2020-01-28 DIAGNOSIS — G43.809 OTHER MIGRAINE WITHOUT STATUS MIGRAINOSUS, NOT INTRACTABLE: ICD-10-CM

## 2020-01-28 DIAGNOSIS — Z00.00 ENCOUNTER FOR MEDICARE ANNUAL WELLNESS EXAM: Primary | ICD-10-CM

## 2020-01-28 DIAGNOSIS — E66.01 CLASS 3 SEVERE OBESITY DUE TO EXCESS CALORIES WITH SERIOUS COMORBIDITY AND BODY MASS INDEX (BMI) OF 40.0 TO 44.9 IN ADULT (HCC): ICD-10-CM

## 2020-01-28 DIAGNOSIS — Z23 NEED FOR 23-POLYVALENT PNEUMOCOCCAL POLYSACCHARIDE VACCINE: ICD-10-CM

## 2020-01-28 DIAGNOSIS — Z12.5 PROSTATE CANCER SCREENING: ICD-10-CM

## 2020-01-28 LAB
ALBUMIN SERPL-MCNC: 4.4 G/DL (ref 3.5–5.2)
ALBUMIN/GLOB SERPL: 1.4 G/DL
ALP SERPL-CCNC: 59 U/L (ref 39–117)
ALT SERPL W P-5'-P-CCNC: 11 U/L (ref 1–41)
ANION GAP SERPL CALCULATED.3IONS-SCNC: 15.1 MMOL/L (ref 5–15)
AST SERPL-CCNC: 14 U/L (ref 1–40)
BILIRUB SERPL-MCNC: 0.4 MG/DL (ref 0.2–1.2)
BUN BLD-MCNC: 12 MG/DL (ref 8–23)
BUN/CREAT SERPL: 14.8 (ref 7–25)
CALCIUM SPEC-SCNC: 9.5 MG/DL (ref 8.6–10.5)
CHLORIDE SERPL-SCNC: 100 MMOL/L (ref 98–107)
CHOLEST SERPL-MCNC: 135 MG/DL (ref 0–200)
CO2 SERPL-SCNC: 25.9 MMOL/L (ref 22–29)
CREAT BLD-MCNC: 0.81 MG/DL (ref 0.76–1.27)
GFR SERPL CREATININE-BSD FRML MDRD: 95 ML/MIN/1.73
GLOBULIN UR ELPH-MCNC: 3.1 GM/DL
GLUCOSE BLD-MCNC: 99 MG/DL (ref 65–99)
HDLC SERPL-MCNC: 32 MG/DL (ref 40–60)
LDLC SERPL CALC-MCNC: 64 MG/DL (ref 0–100)
LDLC/HDLC SERPL: 1.99 {RATIO}
POTASSIUM BLD-SCNC: 3.9 MMOL/L (ref 3.5–5.2)
PROT SERPL-MCNC: 7.5 G/DL (ref 6–8.5)
PSA SERPL-MCNC: 0.53 NG/ML (ref 0–4)
SODIUM BLD-SCNC: 141 MMOL/L (ref 136–145)
TRIGL SERPL-MCNC: 196 MG/DL (ref 0–150)
VLDLC SERPL-MCNC: 39.2 MG/DL (ref 5–40)

## 2020-01-28 PROCEDURE — G0402 INITIAL PREVENTIVE EXAM: HCPCS | Performed by: PHYSICIAN ASSISTANT

## 2020-01-28 PROCEDURE — 99213 OFFICE O/P EST LOW 20 MIN: CPT | Performed by: PHYSICIAN ASSISTANT

## 2020-01-28 PROCEDURE — 80053 COMPREHEN METABOLIC PANEL: CPT | Performed by: PHYSICIAN ASSISTANT

## 2020-01-28 PROCEDURE — 90732 PPSV23 VACC 2 YRS+ SUBQ/IM: CPT | Performed by: PHYSICIAN ASSISTANT

## 2020-01-28 PROCEDURE — 80061 LIPID PANEL: CPT | Performed by: PHYSICIAN ASSISTANT

## 2020-01-28 PROCEDURE — G0009 ADMIN PNEUMOCOCCAL VACCINE: HCPCS | Performed by: PHYSICIAN ASSISTANT

## 2020-01-28 PROCEDURE — G0103 PSA SCREENING: HCPCS | Performed by: PHYSICIAN ASSISTANT

## 2020-01-28 RX ORDER — ATORVASTATIN CALCIUM 10 MG/1
10 TABLET, FILM COATED ORAL DAILY
Qty: 90 TABLET | Refills: 3 | Status: ON HOLD | OUTPATIENT
Start: 2020-01-28 | End: 2020-12-06 | Stop reason: SDUPTHER

## 2020-01-28 RX ORDER — SUMATRIPTAN 100 MG/1
100 TABLET, FILM COATED ORAL ONCE
Qty: 9 TABLET | Refills: 3 | OUTPATIENT
Start: 2020-01-28 | End: 2020-01-28

## 2020-01-28 RX ORDER — HYDROXYCHLOROQUINE SULFATE 200 MG/1
200 TABLET, FILM COATED ORAL 2 TIMES DAILY
COMMUNITY
End: 2022-07-29 | Stop reason: SDUPTHER

## 2020-01-28 RX ORDER — SUMATRIPTAN 100 MG/1
100 TABLET, FILM COATED ORAL ONCE
Qty: 9 TABLET | Refills: 3 | OUTPATIENT
Start: 2020-01-28 | End: 2020-01-28 | Stop reason: SDUPTHER

## 2020-01-28 NOTE — PROGRESS NOTES
"The ABCs of the Annual Wellness Visit  [facility]    Chief Complaint   Patient presents with   • Welcome To Medicare       Subjective   History of Present Illness:  Obi Jackman Jr. is a 69 y.o. male who presents for a  Welcome to Medicare Visit.    HEALTH RISK ASSESSMENT    Recent Hospitalizations:  {Hospitalization history:4075659913::\"No hospitalization(s) within the last year.\"}    Current Medical Providers:  Patient Care Team:  Cammy Oneal PA as PCP - General (Internal Medicine)  Chanda Castanon MD as Consulting Physician (Rheumatology)    Smoking Status:  Social History     Tobacco Use   Smoking Status Never Smoker   Smokeless Tobacco Never Used       Alcohol Consumption:  Social History     Substance and Sexual Activity   Alcohol Use Yes    Comment: rarely       Depression Screen:   PHQ-2/PHQ-9 Depression Screening 1/28/2020   Little interest or pleasure in doing things 1   Feeling down, depressed, or hopeless 1   Trouble falling or staying asleep, or sleeping too much 2   Feeling tired or having little energy 2   Poor appetite or overeating 0   Feeling bad about yourself - or that you are a failure or have let yourself or your family down 2   Trouble concentrating on things, such as reading the newspaper or watching television 1   Moving or speaking so slowly that other people could have noticed. Or the opposite - being so fidgety or restless that you have been moving around a lot more than usual 0   Thoughts that you would be better off dead, or of hurting yourself in some way 0   Total Score 9   If you checked off any problems, how difficult have these problems made it for you to do your work, take care of things at home, or get along with other people? Somewhat difficult       Fall Risk Screen:  STEADI Fall Risk Assessment was completed, and patient is at MODERATE risk for falls. Assessment completed on:1/28/2020    Health Habits and Functional and Cognitive Screening:  Functional & " "Cognitive Status 1/28/2020   Do you have difficulty preparing food and eating? No   Do you have difficulty bathing yourself, getting dressed or grooming yourself? No   Do you have difficulty using the toilet? No   Do you have difficulty moving around from place to place? No   Do you have trouble with steps or getting out of a bed or a chair? No   Current Diet Well Balanced Diet   Dental Exam Up to date   Eye Exam Up to date   Exercise (times per week) 0 times per week   Current Exercise Activities Include No Regular Exercise   Do you need help using the phone?  No   Are you deaf or do you have serious difficulty hearing?  No   Do you need help with transportation? No   Do you need help shopping? No   Do you need help preparing meals?  No   Do you need help with housework?  No   Do you need help with laundry? No   Do you need help taking your medications? No   Do you need help managing money? No   Do you ever drive or ride in a car without wearing a seat belt? No   Have you felt unusual stress, anger or loneliness in the last month? No   Who do you live with? Spouse   If you need help, do you have trouble finding someone available to you? No   Have you been bothered in the last four weeks by sexual problems? No   Do you have difficulty concentrating, remembering or making decisions? No         Does the patient have evidence of cognitive impairment? {Yes/No w/ pre-defaulted No:67083::\"No\"}    Asprin use counseling:{Aspirin :16735}    Visual Acuity:    No exam data present    Age-appropriate Screening Schedule:  Refer to the list below for future screening recommendations based on patient's age, sex and/or medical conditions. Orders for these recommended tests are listed in the plan section. The patient has been provided with a written plan.    Health Maintenance   Topic Date Due   • ZOSTER VACCINE (1 of 2) 09/10/2001   • INFLUENZA VACCINE  08/01/2019   • LIPID PANEL  01/08/2020   • TDAP/TD VACCINES (2 - Td) " "01/01/2024   • COLONOSCOPY  12/14/2026          The following portions of the patient's history were reviewed and updated as appropriate: {history reviewed:20406::\"allergies\",\"current medications\",\"past family history\",\"past medical history\",\"past social history\",\"past surgical history\",\"problem list\"}.    Outpatient Medications Prior to Visit   Medication Sig Dispense Refill   • acetaminophen (TYLENOL) 325 MG tablet Take 325 mg by mouth As Needed for Mild Pain .     • albuterol sulfate  (90 Base) MCG/ACT inhaler Inhale 2 puffs Every 4 (Four) Hours As Needed for Wheezing. (Patient taking differently: Inhale 2 puffs Every 4 (Four) Hours As Needed for Wheezing or Shortness of Air.) 1 inhaler 2   • ALPRAZolam (XANAX) 0.25 MG tablet Take 1 tablet by mouth Daily As Needed for Anxiety (when flying). 10 tablet 0   • aspirin 81 MG tablet Take 1 tablet by mouth Daily. Resume in 2 weeks     • atenolol (TENORMIN) 50 MG tablet Take 1 tablet by mouth Daily. 30 tablet 5   • atorvastatin (LIPITOR) 10 MG tablet TAKE ONE TABLET BY MOUTH EVERY DAY 30 tablet 0   • Azelastine-Fluticasone 137-50 MCG/ACT suspension 1 spray each nostril daily (Patient taking differently: 2 sprays Daily As Needed (nasal congestion). 1 spray each nostril daily) 1 bottle 5   • buPROPion XL (WELLBUTRIN XL) 150 MG 24 hr tablet Take 1 tablet by mouth Daily. 30 tablet 5   • clotrimazole-betamethasone (LOTRISONE) 1-0.05 % cream Apply  topically to the appropriate area as directed 2 (Two) Times a Day. 45 g 1   • cyclobenzaprine (FLEXERIL) 10 MG tablet Take one tablet by mouth three times a day as needed for muscle spasms. (Patient taking differently: Take 10 mg by mouth 3 (Three) Times a Day As Needed for Muscle Spasms. Take one tablet by mouth three times a day as needed for muscle spasms.) 30 tablet 5   • Dietary Management Product (VASCULERA) tablet Take 1 tablet by mouth Daily. 90 tablet 5   • docusate sodium (COLACE) 100 MG capsule Take 1 capsule by " mouth 2 (Two) Times a Day. 60 capsule 0   • enoxaparin (LOVENOX) 40 MG/0.4ML solution syringe Inject 0.4 mL under the skin into the appropriate area as directed Daily. 5.6 mL 0   • etodolac (LODINE) 400 MG tablet Take 400 mg by mouth 2 (Two) Times a Day.  3   • HYDROcodone-acetaminophen (NORCO) 7.5-325 MG per tablet Take 1-2 tablets by mouth Every 6 (Six) Hours As Needed for Moderate Pain  (as needed for post surgical pain). 45 tablet 0   • hydroxychloroquine (PLAQUENIL) 200 MG tablet Take 200 mg by mouth 2 (Two) Times a Day.     • ibuprofen (ADVIL,MOTRIN) 200 MG tablet Take 200 mg by mouth As Needed for Mild Pain .     • levoFLOXacin (LEVAQUIN) 750 MG tablet Take 1 tablet by mouth Daily. 10 tablet 0   • Liraglutide -Weight Management (SAXENDA) 18 MG/3ML solution pen-injector Inject 3 mg under the skin into the appropriate area as directed Daily. 6 pen 5   • lisinopril-hydrochlorothiazide (PRINZIDE,ZESTORETIC) 20-12.5 MG per tablet TAKE 1 TABLET BY MOUTH DAILY 30 tablet 11   • Multiple Vitamins-Minerals (MULTIVITAMIN ADULT PO) Take 1 tablet by mouth Daily.     • ondansetron (ZOFRAN) 4 MG tablet Take 1 tablet by mouth Every 6 (Six) Hours As Needed for Nausea or Vomiting. 30 tablet 0   • oxaprozin (DAYPRO) 600 MG tablet Take 1,200 mg by mouth. Take 2 po every day     • oxyCODONE-acetaminophen (PERCOCET) 5-325 MG per tablet Take 1-2 tablets by mouth Every 6 (Six) Hours As Needed for Other (as needed for post surgical pain). 45 tablet 0   • promethazine-codeine (PHENERGAN with CODEINE) 6.25-10 MG/5ML syrup Take 5 ml every 4 hours as needed for cough or pain 90 mL 0   • SUMAtriptan (IMITREX) 100 MG tablet TAKE 1 TABLET BY MOUTH AT ONSET OF HEADACHE. MAY REPEAT DOSE ONE TIME IN 2 HOURS IF HEADACHE NOT RELIEVED. 9 tablet 3   • terbinafine (lamiSIL) 250 MG tablet Take 250 mg by mouth Daily.     • vitamin D (ERGOCALCIFEROL) 15528 units capsule capsule Take 1 capsule by mouth 1 (One) Time Per Week. (Patient taking  "differently: Take 50,000 Units by mouth 1 (One) Time Per Week. Sunday) 12 capsule 3   • amoxicillin-clavulanate (AUGMENTIN) 875-125 MG per tablet Take 1 tablet by mouth 2 (Two) Times a Day. 20 tablet 0     No facility-administered medications prior to visit.        Patient Active Problem List   Diagnosis   • Allergic rhinitis   • Anxiety disorder   • Benign paroxysmal positional vertigo   • Chronic tension type headache   • Depression   • ED (erectile dysfunction) of non-organic origin   • Hyperlipidemia   • Hypertension   • LFTs abnormal   • CHRISTOPHE (obstructive sleep apnea)   • Elevated glucose   • Health care maintenance   • Morbid obesity due to excess calories (CMS/[facility])   • Onychomycosis of left great toe   • Osteoarthritis of ankle or foot   • Venous stasis   • Neuropathy   • Class 3 severe obesity due to excess calories with serious comorbidity and body mass index (BMI) of 40.0 to 44.9 in adult (CMS/[facility])   • Pre-op evaluation   • Arthritis of foot, left   • S/P left ankle triple arthrodesis   • Acute blood loss anemia, mild, asymptomatic    • Acute postoperative pain   • Wound infection       Advanced Care Planning:  {Advanced Directive Status:48200}    Review of Systems    Compared to one year ago, the patient feels his physical health is {better worse same:54143}.  Compared to one year ago, the patient feels his mental health is {better worse same:08583}.    Reviewed chart for potential of high risk medication in the elderly: {Response;Yes/No/NA:1442448741::\"yes\"}  Reviewed chart for potential of harmful drug interactions in the elderly:{Response;Yes/No/NA:0810270322::\"yes\"}    Objective         Vitals:    01/28/20 1329   BP: 140/90   Pulse: 71   SpO2: 97%   Weight: (!) 142 kg (314 lb)   PainSc:   8   PainLoc: Foot  Comment: joints       Body mass index is 43.81 kg/m².  Discussed the patient's BMI with him. The BMI {BMI plan ([facility] measure 421):92314}.    Physical Exam          Procedures    Assessment/Plan "   Medicare Risks and Personalized Health Plan  CMS Preventative Services Quick Reference  {Medicare Wellness Risk Factors and Personalized Health Plan:10331}    The above risks/problems have been discussed with the patient.  Pertinent information has been shared with the patient in the After Visit Summary.  Follow up plans and orders are seen below in the Assessment/Plan Section.    There are no diagnoses linked to this encounter.    Follow Up:  No follow-ups on file.     An After Visit Summary and PPPS were given to the patient.

## 2020-01-28 NOTE — PROGRESS NOTES
The ABCs of the Annual Wellness Visit  Welcome to Medicare Visit    Chief Complaint   Patient presents with   • Welcome To Medicare       Subjective   History of Present Illness:  Obi Jackman Jr. is a 69 y.o. male who presents for a  Welcome to Medicare Visit.    HEALTH RISK ASSESSMENT    Recent Hospitalizations:  No hospitalization(s) within the last year.    Current Medical Providers:  Patient Care Team:  Cammy Oneal PA as PCP - General (Internal Medicine)  Chanda Castanon MD as Consulting Physician (Rheumatology)    Smoking Status:  Social History     Tobacco Use   Smoking Status Never Smoker   Smokeless Tobacco Never Used       Alcohol Consumption:  Social History     Substance and Sexual Activity   Alcohol Use Yes    Comment: rarely       Depression Screen:   PHQ-2/PHQ-9 Depression Screening 1/28/2020   Little interest or pleasure in doing things 1   Feeling down, depressed, or hopeless 1   Trouble falling or staying asleep, or sleeping too much 2   Feeling tired or having little energy 2   Poor appetite or overeating 0   Feeling bad about yourself - or that you are a failure or have let yourself or your family down 2   Trouble concentrating on things, such as reading the newspaper or watching television 1   Moving or speaking so slowly that other people could have noticed. Or the opposite - being so fidgety or restless that you have been moving around a lot more than usual 0   Thoughts that you would be better off dead, or of hurting yourself in some way 0   Total Score 9   If you checked off any problems, how difficult have these problems made it for you to do your work, take care of things at home, or get along with other people? Somewhat difficult       Fall Risk Screen:  STEADI Fall Risk Assessment was completed, and patient is at MODERATE risk for falls. Assessment completed on:1/28/2020    Health Habits and Functional and Cognitive Screening:  Functional & Cognitive Status 1/28/2020   Do you  have difficulty preparing food and eating? No   Do you have difficulty bathing yourself, getting dressed or grooming yourself? No   Do you have difficulty using the toilet? No   Do you have difficulty moving around from place to place? No   Do you have trouble with steps or getting out of a bed or a chair? No   Current Diet Well Balanced Diet   Dental Exam Up to date   Eye Exam Up to date   Exercise (times per week) 0 times per week   Current Exercise Activities Include No Regular Exercise   Do you need help using the phone?  No   Are you deaf or do you have serious difficulty hearing?  No   Do you need help with transportation? No   Do you need help shopping? No   Do you need help preparing meals?  No   Do you need help with housework?  No   Do you need help with laundry? No   Do you need help taking your medications? No   Do you need help managing money? No   Do you ever drive or ride in a car without wearing a seat belt? No   Have you felt unusual stress, anger or loneliness in the last month? No   Who do you live with? Spouse   If you need help, do you have trouble finding someone available to you? No   Have you been bothered in the last four weeks by sexual problems? No   Do you have difficulty concentrating, remembering or making decisions? No         Does the patient have evidence of cognitive impairment? No    Asprin use counseling:Does not need ASA (and currently is not on it)    Visual Acuity:    No exam data present    Age-appropriate Screening Schedule:  Refer to the list below for future screening recommendations based on patient's age, sex and/or medical conditions. Orders for these recommended tests are listed in the plan section. The patient has been provided with a written plan.    Health Maintenance   Topic Date Due   • ZOSTER VACCINE (1 of 2) 09/10/2001   • LIPID PANEL  01/28/2021   • COLONOSCOPY  12/14/2021   • TDAP/TD VACCINES (2 - Td) 01/01/2024   • INFLUENZA VACCINE  Completed          The  following portions of the patient's history were reviewed and updated as appropriate: allergies, current medications, past family history, past medical history, past social history, past surgical history and problem list.    Outpatient Medications Prior to Visit   Medication Sig Dispense Refill   • acetaminophen (TYLENOL) 325 MG tablet Take 325 mg by mouth As Needed for Mild Pain .     • albuterol sulfate  (90 Base) MCG/ACT inhaler Inhale 2 puffs Every 4 (Four) Hours As Needed for Wheezing. (Patient taking differently: Inhale 2 puffs Every 4 (Four) Hours As Needed for Wheezing or Shortness of Air.) 1 inhaler 2   • ALPRAZolam (XANAX) 0.25 MG tablet Take 1 tablet by mouth Daily As Needed for Anxiety (when flying). 10 tablet 0   • aspirin 81 MG tablet Take 1 tablet by mouth Daily. Resume in 2 weeks     • atenolol (TENORMIN) 50 MG tablet Take 1 tablet by mouth Daily. 30 tablet 5   • Azelastine-Fluticasone 137-50 MCG/ACT suspension 1 spray each nostril daily (Patient taking differently: 2 sprays Daily As Needed (nasal congestion). 1 spray each nostril daily) 1 bottle 5   • buPROPion XL (WELLBUTRIN XL) 150 MG 24 hr tablet Take 1 tablet by mouth Daily. 30 tablet 5   • clotrimazole-betamethasone (LOTRISONE) 1-0.05 % cream Apply  topically to the appropriate area as directed 2 (Two) Times a Day. 45 g 1   • cyclobenzaprine (FLEXERIL) 10 MG tablet Take one tablet by mouth three times a day as needed for muscle spasms. (Patient taking differently: Take 10 mg by mouth 3 (Three) Times a Day As Needed for Muscle Spasms. Take one tablet by mouth three times a day as needed for muscle spasms.) 30 tablet 5   • Dietary Management Product (VASCULERA) tablet Take 1 tablet by mouth Daily. 90 tablet 5   • docusate sodium (COLACE) 100 MG capsule Take 1 capsule by mouth 2 (Two) Times a Day. 60 capsule 0   • enoxaparin (LOVENOX) 40 MG/0.4ML solution syringe Inject 0.4 mL under the skin into the appropriate area as directed Daily. 5.6  mL 0   • etodolac (LODINE) 400 MG tablet Take 400 mg by mouth 2 (Two) Times a Day.  3   • HYDROcodone-acetaminophen (NORCO) 7.5-325 MG per tablet Take 1-2 tablets by mouth Every 6 (Six) Hours As Needed for Moderate Pain  (as needed for post surgical pain). 45 tablet 0   • hydroxychloroquine (PLAQUENIL) 200 MG tablet Take 200 mg by mouth 2 (Two) Times a Day.     • ibuprofen (ADVIL,MOTRIN) 200 MG tablet Take 200 mg by mouth As Needed for Mild Pain .     • levoFLOXacin (LEVAQUIN) 750 MG tablet Take 1 tablet by mouth Daily. 10 tablet 0   • Liraglutide -Weight Management (SAXENDA) 18 MG/3ML solution pen-injector Inject 3 mg under the skin into the appropriate area as directed Daily. 6 pen 5   • lisinopril-hydrochlorothiazide (PRINZIDE,ZESTORETIC) 20-12.5 MG per tablet TAKE 1 TABLET BY MOUTH DAILY 30 tablet 11   • Multiple Vitamins-Minerals (MULTIVITAMIN ADULT PO) Take 1 tablet by mouth Daily.     • ondansetron (ZOFRAN) 4 MG tablet Take 1 tablet by mouth Every 6 (Six) Hours As Needed for Nausea or Vomiting. 30 tablet 0   • oxaprozin (DAYPRO) 600 MG tablet Take 1,200 mg by mouth. Take 2 po every day     • oxyCODONE-acetaminophen (PERCOCET) 5-325 MG per tablet Take 1-2 tablets by mouth Every 6 (Six) Hours As Needed for Other (as needed for post surgical pain). 45 tablet 0   • promethazine-codeine (PHENERGAN with CODEINE) 6.25-10 MG/5ML syrup Take 5 ml every 4 hours as needed for cough or pain 90 mL 0   • terbinafine (lamiSIL) 250 MG tablet Take 250 mg by mouth Daily.     • vitamin D (ERGOCALCIFEROL) 23066 units capsule capsule Take 1 capsule by mouth 1 (One) Time Per Week. (Patient taking differently: Take 50,000 Units by mouth 1 (One) Time Per Week. Sunday) 12 capsule 3   • atorvastatin (LIPITOR) 10 MG tablet TAKE ONE TABLET BY MOUTH EVERY DAY 30 tablet 0   • SUMAtriptan (IMITREX) 100 MG tablet TAKE 1 TABLET BY MOUTH AT ONSET OF HEADACHE. MAY REPEAT DOSE ONE TIME IN 2 HOURS IF HEADACHE NOT RELIEVED. 9 tablet 3   •  amoxicillin-clavulanate (AUGMENTIN) 875-125 MG per tablet Take 1 tablet by mouth 2 (Two) Times a Day. 20 tablet 0     No facility-administered medications prior to visit.        Patient Active Problem List   Diagnosis   • Allergic rhinitis   • Anxiety disorder   • Benign paroxysmal positional vertigo   • Chronic tension type headache   • Depression   • ED (erectile dysfunction) of non-organic origin   • Hyperlipidemia   • Hypertension   • LFTs abnormal   • CHRISTOPHE (obstructive sleep apnea)   • Elevated glucose   • Health care maintenance   • Morbid obesity due to excess calories (CMS/[facility])   • Onychomycosis of left great toe   • Osteoarthritis of ankle or foot   • Venous stasis   • Neuropathy   • Class 3 severe obesity due to excess calories with serious comorbidity and body mass index (BMI) of 40.0 to 44.9 in adult (CMS/[facility])   • Pre-op evaluation   • Arthritis of foot, left   • S/P left ankle triple arthrodesis   • Acute blood loss anemia, mild, asymptomatic    • Acute postoperative pain   • Wound infection       Advanced Care Planning:  Patient has an advance directive - a copy has not been provided. Have asked the patient to send this to us to add to record    Review of Systems   Constitutional: Negative for activity change, appetite change and fatigue.   HENT: Negative for congestion and rhinorrhea.    Respiratory: Negative for chest tightness and shortness of breath.    Cardiovascular: Negative for chest pain and palpitations.   Gastrointestinal: Negative for abdominal pain.   Genitourinary: Negative for dysuria.   Musculoskeletal: Positive for gait problem and joint swelling. Negative for arthralgias and myalgias.   Neurological: Negative for dizziness, weakness, light-headedness and headaches.   Psychiatric/Behavioral: Negative for dysphoric mood. The patient is not nervous/anxious.        Compared to one year ago, the patient feels his physical health is worse.  Compared to one year ago, the patient feels his  mental health is the same.    Reviewed chart for potential of high risk medication in the elderly: yes  Reviewed chart for potential of harmful drug interactions in the elderly:yes    Objective         Vitals:    01/28/20 1329   BP: 140/90   Pulse: 71   SpO2: 97%   Weight: (!) 142 kg (314 lb)   PainSc:   8   PainLoc: Foot  Comment: joints       Body mass index is 43.81 kg/m².  Discussed the patient's BMI with him. The BMI is above average; BMI management plan is completed.    Physical Exam   Constitutional: He appears well-developed and well-nourished.   HENT:   Head: Normocephalic.   Right Ear: Hearing, tympanic membrane, external ear and ear canal normal.   Left Ear: Hearing, tympanic membrane, external ear and ear canal normal.   Nose: Nose normal.   Mouth/Throat: Oropharynx is clear and moist.   Eyes: Pupils are equal, round, and reactive to light. Conjunctivae are normal.   Neck: Normal range of motion.   Cardiovascular: Normal rate, regular rhythm and normal heart sounds.   Pulmonary/Chest: Effort normal and breath sounds normal. He has no decreased breath sounds. He has no wheezes. He has no rhonchi. He has no rales.   Musculoskeletal: Normal range of motion.   Neurological: He is alert.   Skin: Skin is warm and dry.   Psychiatric: He has a normal mood and affect. His behavior is normal.   Nursing note and vitals reviewed.      Lab Results   Component Value Date    TRIG 196 (H) 01/28/2020    HDL 32 (L) 01/28/2020    LDL 64 01/28/2020    VLDL 39.2 01/28/2020        Procedures    Assessment/Plan   Medicare Risks and Personalized Health Plan  CMS Preventative Services Quick Reference  Advance Directive Discussion  Glaucoma Risk  Hearing Problem  Immunizations Discussed/Encouraged (specific immunizations; Pneumococcal 23 and Shingrix )  Inactivity/Sedentary  Obesity/Overweight     The above risks/problems have been discussed with the patient.  Pertinent information has been shared with the patient in the After  Visit Summary.  Follow up plans and orders are seen below in the Assessment/Plan Section.    Diagnoses and all orders for this visit:    1. Encounter for Medicare annual wellness exam (Primary)    2. Other migraine without status migrainosus, not intractable  -     Discontinue: SUMAtriptan (IMITREX) 100 MG tablet; Take 1 tablet by mouth 1 (One) Time for 1 dose. Take one tablet at onset of headache. May repeat dose one time in 2 hours if headache not relieved.  Dispense: 9 tablet; Refill: 3  -     SUMAtriptan (IMITREX) 100 MG tablet; Take 1 tablet by mouth 1 (One) Time for 1 dose. Take one tablet at onset of headache. May repeat dose one time in 2 hours if headache not relieved.  Dispense: 9 tablet; Refill: 3    3. Hyperlipidemia, unspecified hyperlipidemia type  -     atorvastatin (LIPITOR) 10 MG tablet; Take 1 tablet by mouth Daily.  Dispense: 90 tablet; Refill: 3  -     Lipid Panel  -     Comprehensive Metabolic Panel    4. Prostate cancer screening  -     PSA Screen    5. Need for 23-polyvalent pneumococcal polysaccharide vaccine  -     Pneumococcal Polysaccharide Vaccine 23-Valent (PPSV23) Greater Than or Equal To 3yo Subcutaneous / IM    6. Fear of flying  Assessment & Plan:  Gave pt refill of xanax 0.25 mg 1 po prn on flying #10, 0RF.    The patient has read and signed the [facility] Controlled Substance Contract.  I will continue to see patient for regular follow up appointments.  They are well controlled on their medication.  TAJ is updated every 3 months. The patient is aware of the potential for addiction and dependence.      7. Class 3 severe obesity due to excess calories with serious comorbidity and body mass index (BMI) of 40.0 to 44.9 in adult (CMS/[facility])  Assessment & Plan:  Obesity is worsening.  Discussed the patient's BMI.  The BMI is above average; BMI management plan is completed.  General weight loss/lifestyle modification strategies discussed (elicit support from others; identify  saboteurs; non-food rewards, etc).  Diet interventions: moderate (500 kCal/d) deficit diet.  Informal exercise measures discussed, e.g. taking stairs instead of elevator.  Regular aerobic exercise program discussed.  Completed appeal for saxenda, awaiting the results.        Follow Up:  Return in about 1 year (around 1/28/2021) for Medicare Wellness.     An After Visit Summary and PPPS were given to the patient.

## 2020-01-28 NOTE — PROGRESS NOTES
Chief Complaint   Patient presents with   • Welcome To Medicare       Subjective   Obi Jackman Jr. is a 69 y.o. male.       History of Present Illness       Current Outpatient Medications:   •  acetaminophen (TYLENOL) 325 MG tablet, Take 325 mg by mouth As Needed for Mild Pain ., Disp: , Rfl:   •  albuterol sulfate  (90 Base) MCG/ACT inhaler, Inhale 2 puffs Every 4 (Four) Hours As Needed for Wheezing. (Patient taking differently: Inhale 2 puffs Every 4 (Four) Hours As Needed for Wheezing or Shortness of Air.), Disp: 1 inhaler, Rfl: 2  •  ALPRAZolam (XANAX) 0.25 MG tablet, Take 1 tablet by mouth Daily As Needed for Anxiety (when flying)., Disp: 10 tablet, Rfl: 0  •  aspirin 81 MG tablet, Take 1 tablet by mouth Daily. Resume in 2 weeks, Disp: , Rfl:   •  atenolol (TENORMIN) 50 MG tablet, Take 1 tablet by mouth Daily., Disp: 30 tablet, Rfl: 5  •  atorvastatin (LIPITOR) 10 MG tablet, TAKE ONE TABLET BY MOUTH EVERY DAY, Disp: 30 tablet, Rfl: 0  •  Azelastine-Fluticasone 137-50 MCG/ACT suspension, 1 spray each nostril daily (Patient taking differently: 2 sprays Daily As Needed (nasal congestion). 1 spray each nostril daily), Disp: 1 bottle, Rfl: 5  •  buPROPion XL (WELLBUTRIN XL) 150 MG 24 hr tablet, Take 1 tablet by mouth Daily., Disp: 30 tablet, Rfl: 5  •  clotrimazole-betamethasone (LOTRISONE) 1-0.05 % cream, Apply  topically to the appropriate area as directed 2 (Two) Times a Day., Disp: 45 g, Rfl: 1  •  cyclobenzaprine (FLEXERIL) 10 MG tablet, Take one tablet by mouth three times a day as needed for muscle spasms. (Patient taking differently: Take 10 mg by mouth 3 (Three) Times a Day As Needed for Muscle Spasms. Take one tablet by mouth three times a day as needed for muscle spasms.), Disp: 30 tablet, Rfl: 5  •  Dietary Management Product (VASCULERA) tablet, Take 1 tablet by mouth Daily., Disp: 90 tablet, Rfl: 5  •  docusate sodium (COLACE) 100 MG capsule, Take 1 capsule by mouth 2 (Two) Times a Day.,  Disp: 60 capsule, Rfl: 0  •  enoxaparin (LOVENOX) 40 MG/0.4ML solution syringe, Inject 0.4 mL under the skin into the appropriate area as directed Daily., Disp: 5.6 mL, Rfl: 0  •  etodolac (LODINE) 400 MG tablet, Take 400 mg by mouth 2 (Two) Times a Day., Disp: , Rfl: 3  •  HYDROcodone-acetaminophen (NORCO) 7.5-325 MG per tablet, Take 1-2 tablets by mouth Every 6 (Six) Hours As Needed for Moderate Pain  (as needed for post surgical pain)., Disp: 45 tablet, Rfl: 0  •  hydroxychloroquine (PLAQUENIL) 200 MG tablet, Take 200 mg by mouth 2 (Two) Times a Day., Disp: , Rfl:   •  ibuprofen (ADVIL,MOTRIN) 200 MG tablet, Take 200 mg by mouth As Needed for Mild Pain ., Disp: , Rfl:   •  levoFLOXacin (LEVAQUIN) 750 MG tablet, Take 1 tablet by mouth Daily., Disp: 10 tablet, Rfl: 0  •  Liraglutide -Weight Management (SAXENDA) 18 MG/3ML solution pen-injector, Inject 3 mg under the skin into the appropriate area as directed Daily., Disp: 6 pen, Rfl: 5  •  lisinopril-hydrochlorothiazide (PRINZIDE,ZESTORETIC) 20-12.5 MG per tablet, TAKE 1 TABLET BY MOUTH DAILY, Disp: 30 tablet, Rfl: 11  •  Multiple Vitamins-Minerals (MULTIVITAMIN ADULT PO), Take 1 tablet by mouth Daily., Disp: , Rfl:   •  ondansetron (ZOFRAN) 4 MG tablet, Take 1 tablet by mouth Every 6 (Six) Hours As Needed for Nausea or Vomiting., Disp: 30 tablet, Rfl: 0  •  oxaprozin (DAYPRO) 600 MG tablet, Take 1,200 mg by mouth. Take 2 po every day, Disp: , Rfl:   •  oxyCODONE-acetaminophen (PERCOCET) 5-325 MG per tablet, Take 1-2 tablets by mouth Every 6 (Six) Hours As Needed for Other (as needed for post surgical pain)., Disp: 45 tablet, Rfl: 0  •  promethazine-codeine (PHENERGAN with CODEINE) 6.25-10 MG/5ML syrup, Take 5 ml every 4 hours as needed for cough or pain, Disp: 90 mL, Rfl: 0  •  SUMAtriptan (IMITREX) 100 MG tablet, TAKE 1 TABLET BY MOUTH AT ONSET OF HEADACHE. MAY REPEAT DOSE ONE TIME IN 2 HOURS IF HEADACHE NOT RELIEVED., Disp: 9 tablet, Rfl: 3  •  terbinafine  (lamiSIL) 250 MG tablet, Take 250 mg by mouth Daily., Disp: , Rfl:   •  vitamin D (ERGOCALCIFEROL) 83531 units capsule capsule, Take 1 capsule by mouth 1 (One) Time Per Week. (Patient taking differently: Take 50,000 Units by mouth 1 (One) Time Per Week. Sunday), Disp: 12 capsule, Rfl: 3     PMFSH  {Common H&P Review Areas:18042}    Review of Systems    Objective   /90   Pulse 71   Wt (!) 142 kg (314 lb)   SpO2 97%   BMI 43.81 kg/m²     Physical Exam    Results for orders placed or performed in visit on 01/24/20   Wound Culture - Wound, Ankle, Left   Result Value Ref Range    Wound Culture Scant growth (1+) Citrobacter braakii (A)     Wound Culture Scant growth (1+) Normal Skin Vicki     Gram Stain No WBCs seen     Gram Stain Moderate (3+) Gram positive cocci        Susceptibility    Citrobacter braakii - NORA*     Cefepime <=1 Susceptible ug/ml     Ceftazidime <=1 Susceptible ug/ml     Ceftriaxone <=1 Susceptible ug/ml     Gentamicin <=1 Susceptible ug/ml     Levofloxacin <=0.12 Susceptible ug/ml     Piperacillin + Tazobactam <=4 Susceptible ug/ml     Tetracycline <=1 Susceptible ug/ml     Trimethoprim + Sulfamethoxazole <=20 Susceptible ug/ml     * Cefazolin sensitivity will not be reported for Enterobacteriaceae in non-urine isolates. If cefazolin is preferred, please call the microbiology lab to request an E-test.  With the exception of urinary-sourced infections, aminoglycosides should not be used as monotherapy.   Anaerobic Culture - Swab, Ankle, Left   Result Value Ref Range    Anaerobic Culture No anaerobes isolated at 3 days         ASSESSMENT/PLAN    Problem List Items Addressed This Visit     None               No follow-ups on file.

## 2020-01-29 LAB — BACTERIA SPEC ANAEROBE CULT: NORMAL

## 2020-01-30 NOTE — ASSESSMENT & PLAN NOTE
Obesity is worsening.  Discussed the patient's BMI.  The BMI is above average; BMI management plan is completed.  General weight loss/lifestyle modification strategies discussed (elicit support from others; identify saboteurs; non-food rewards, etc).  Diet interventions: moderate (500 kCal/d) deficit diet.  Informal exercise measures discussed, e.g. taking stairs instead of elevator.  Regular aerobic exercise program discussed.  Completed appeal for saxenda, awaiting the results.

## 2020-01-30 NOTE — ASSESSMENT & PLAN NOTE
Gave pt refill of xanax 0.25 mg 1 po prn on flying #10, 0RF.    The patient has read and signed the [facility] Controlled Substance Contract.  I will continue to see patient for regular follow up appointments.  They are well controlled on their medication.  TAJ is updated every 3 months. The patient is aware of the potential for addiction and dependence.

## 2020-01-30 NOTE — PROGRESS NOTES
The ABCs of the Annual Wellness Visit  Welcome to Medicare Visit    Chief Complaint   Patient presents with   • Welcome To Medicare       Subjective   History of Present Illness:  Obi Jackman Jr. is a 69 y.o. male who presents for a  Welcome to Medicare Visit.    HEALTH RISK ASSESSMENT    Recent Hospitalizations:  No hospitalization(s) within the last year.    Current Medical Providers:  Patient Care Team:  Cammy Oneal PA as PCP - General (Internal Medicine)  Chanda Castanon MD as Consulting Physician (Rheumatology)    Smoking Status:  Social History     Tobacco Use   Smoking Status Never Smoker   Smokeless Tobacco Never Used       Alcohol Consumption:  Social History     Substance and Sexual Activity   Alcohol Use Yes    Comment: rarely       Depression Screen:   PHQ-2/PHQ-9 Depression Screening 1/28/2020   Little interest or pleasure in doing things 1   Feeling down, depressed, or hopeless 1   Trouble falling or staying asleep, or sleeping too much 2   Feeling tired or having little energy 2   Poor appetite or overeating 0   Feeling bad about yourself - or that you are a failure or have let yourself or your family down 2   Trouble concentrating on things, such as reading the newspaper or watching television 1   Moving or speaking so slowly that other people could have noticed. Or the opposite - being so fidgety or restless that you have been moving around a lot more than usual 0   Thoughts that you would be better off dead, or of hurting yourself in some way 0   Total Score 9   If you checked off any problems, how difficult have these problems made it for you to do your work, take care of things at home, or get along with other people? Somewhat difficult       Fall Risk Screen:  STEADI Fall Risk Assessment was completed, and patient is at MODERATE risk for falls. Assessment completed on:1/28/2020    Health Habits and Functional and Cognitive Screening:  Functional & Cognitive Status 1/28/2020   Do you  have difficulty preparing food and eating? No   Do you have difficulty bathing yourself, getting dressed or grooming yourself? No   Do you have difficulty using the toilet? No   Do you have difficulty moving around from place to place? No   Do you have trouble with steps or getting out of a bed or a chair? No   Current Diet Well Balanced Diet   Dental Exam Up to date   Eye Exam Up to date   Exercise (times per week) 0 times per week   Current Exercise Activities Include No Regular Exercise   Do you need help using the phone?  No   Are you deaf or do you have serious difficulty hearing?  No   Do you need help with transportation? No   Do you need help shopping? No   Do you need help preparing meals?  No   Do you need help with housework?  No   Do you need help with laundry? No   Do you need help taking your medications? No   Do you need help managing money? No   Do you ever drive or ride in a car without wearing a seat belt? No   Have you felt unusual stress, anger or loneliness in the last month? No   Who do you live with? Spouse   If you need help, do you have trouble finding someone available to you? No   Have you been bothered in the last four weeks by sexual problems? No   Do you have difficulty concentrating, remembering or making decisions? No         Does the patient have evidence of cognitive impairment? No    Asprin use counseling:Does not need ASA (and currently is not on it)    Visual Acuity:    No exam data present    Age-appropriate Screening Schedule:  Refer to the list below for future screening recommendations based on patient's age, sex and/or medical conditions. Orders for these recommended tests are listed in the plan section. The patient has been provided with a written plan.    Health Maintenance   Topic Date Due   • ZOSTER VACCINE (1 of 2) 09/10/2001   • LIPID PANEL  01/28/2021   • COLONOSCOPY  12/14/2021   • TDAP/TD VACCINES (2 - Td) 01/01/2024   • INFLUENZA VACCINE  Completed          The  following portions of the patient's history were reviewed and updated as appropriate: allergies, current medications, past family history, past medical history, past social history, past surgical history and problem list.    Outpatient Medications Prior to Visit   Medication Sig Dispense Refill   • acetaminophen (TYLENOL) 325 MG tablet Take 325 mg by mouth As Needed for Mild Pain .     • albuterol sulfate  (90 Base) MCG/ACT inhaler Inhale 2 puffs Every 4 (Four) Hours As Needed for Wheezing. (Patient taking differently: Inhale 2 puffs Every 4 (Four) Hours As Needed for Wheezing or Shortness of Air.) 1 inhaler 2   • ALPRAZolam (XANAX) 0.25 MG tablet Take 1 tablet by mouth Daily As Needed for Anxiety (when flying). 10 tablet 0   • aspirin 81 MG tablet Take 1 tablet by mouth Daily. Resume in 2 weeks     • atenolol (TENORMIN) 50 MG tablet Take 1 tablet by mouth Daily. 30 tablet 5   • Azelastine-Fluticasone 137-50 MCG/ACT suspension 1 spray each nostril daily (Patient taking differently: 2 sprays Daily As Needed (nasal congestion). 1 spray each nostril daily) 1 bottle 5   • buPROPion XL (WELLBUTRIN XL) 150 MG 24 hr tablet Take 1 tablet by mouth Daily. 30 tablet 5   • clotrimazole-betamethasone (LOTRISONE) 1-0.05 % cream Apply  topically to the appropriate area as directed 2 (Two) Times a Day. 45 g 1   • cyclobenzaprine (FLEXERIL) 10 MG tablet Take one tablet by mouth three times a day as needed for muscle spasms. (Patient taking differently: Take 10 mg by mouth 3 (Three) Times a Day As Needed for Muscle Spasms. Take one tablet by mouth three times a day as needed for muscle spasms.) 30 tablet 5   • Dietary Management Product (VASCULERA) tablet Take 1 tablet by mouth Daily. 90 tablet 5   • docusate sodium (COLACE) 100 MG capsule Take 1 capsule by mouth 2 (Two) Times a Day. 60 capsule 0   • enoxaparin (LOVENOX) 40 MG/0.4ML solution syringe Inject 0.4 mL under the skin into the appropriate area as directed Daily. 5.6  mL 0   • etodolac (LODINE) 400 MG tablet Take 400 mg by mouth 2 (Two) Times a Day.  3   • HYDROcodone-acetaminophen (NORCO) 7.5-325 MG per tablet Take 1-2 tablets by mouth Every 6 (Six) Hours As Needed for Moderate Pain  (as needed for post surgical pain). 45 tablet 0   • hydroxychloroquine (PLAQUENIL) 200 MG tablet Take 200 mg by mouth 2 (Two) Times a Day.     • ibuprofen (ADVIL,MOTRIN) 200 MG tablet Take 200 mg by mouth As Needed for Mild Pain .     • levoFLOXacin (LEVAQUIN) 750 MG tablet Take 1 tablet by mouth Daily. 10 tablet 0   • Liraglutide -Weight Management (SAXENDA) 18 MG/3ML solution pen-injector Inject 3 mg under the skin into the appropriate area as directed Daily. 6 pen 5   • lisinopril-hydrochlorothiazide (PRINZIDE,ZESTORETIC) 20-12.5 MG per tablet TAKE 1 TABLET BY MOUTH DAILY 30 tablet 11   • Multiple Vitamins-Minerals (MULTIVITAMIN ADULT PO) Take 1 tablet by mouth Daily.     • ondansetron (ZOFRAN) 4 MG tablet Take 1 tablet by mouth Every 6 (Six) Hours As Needed for Nausea or Vomiting. 30 tablet 0   • oxaprozin (DAYPRO) 600 MG tablet Take 1,200 mg by mouth. Take 2 po every day     • oxyCODONE-acetaminophen (PERCOCET) 5-325 MG per tablet Take 1-2 tablets by mouth Every 6 (Six) Hours As Needed for Other (as needed for post surgical pain). 45 tablet 0   • promethazine-codeine (PHENERGAN with CODEINE) 6.25-10 MG/5ML syrup Take 5 ml every 4 hours as needed for cough or pain 90 mL 0   • terbinafine (lamiSIL) 250 MG tablet Take 250 mg by mouth Daily.     • vitamin D (ERGOCALCIFEROL) 78309 units capsule capsule Take 1 capsule by mouth 1 (One) Time Per Week. (Patient taking differently: Take 50,000 Units by mouth 1 (One) Time Per Week. Sunday) 12 capsule 3   • atorvastatin (LIPITOR) 10 MG tablet TAKE ONE TABLET BY MOUTH EVERY DAY 30 tablet 0   • SUMAtriptan (IMITREX) 100 MG tablet TAKE 1 TABLET BY MOUTH AT ONSET OF HEADACHE. MAY REPEAT DOSE ONE TIME IN 2 HOURS IF HEADACHE NOT RELIEVED. 9 tablet 3   •  amoxicillin-clavulanate (AUGMENTIN) 875-125 MG per tablet Take 1 tablet by mouth 2 (Two) Times a Day. 20 tablet 0     No facility-administered medications prior to visit.        Patient Active Problem List   Diagnosis   • Allergic rhinitis   • Anxiety disorder   • Benign paroxysmal positional vertigo   • Chronic tension type headache   • Depression   • ED (erectile dysfunction) of non-organic origin   • Hyperlipidemia   • Hypertension   • LFTs abnormal   • CHRISTOPHE (obstructive sleep apnea)   • Elevated glucose   • Health care maintenance   • Morbid obesity due to excess calories (CMS/[facility])   • Onychomycosis of left great toe   • Osteoarthritis of ankle or foot   • Venous stasis   • Neuropathy   • Class 3 severe obesity due to excess calories with serious comorbidity and body mass index (BMI) of 40.0 to 44.9 in adult (CMS/[facility])   • Pre-op evaluation   • Arthritis of foot, left   • S/P left ankle triple arthrodesis   • Acute blood loss anemia, mild, asymptomatic    • Acute postoperative pain   • Wound infection       Advanced Care Planning:  Patient has an advance directive - a copy has been provided and is visible in patient header    Review of Systems   Constitutional: Negative for activity change, appetite change and fatigue.   HENT: Negative for congestion and rhinorrhea.    Respiratory: Negative for chest tightness and shortness of breath.    Cardiovascular: Negative for chest pain and palpitations.   Gastrointestinal: Negative for abdominal pain.   Genitourinary: Negative for dysuria.   Musculoskeletal: Negative for arthralgias and myalgias.   Neurological: Negative for dizziness, weakness, light-headedness and headaches.   Psychiatric/Behavioral: Negative for dysphoric mood. The patient is not nervous/anxious.        Compared to one year ago, the patient feels his physical health is worse.  Compared to one year ago, the patient feels his mental health is the same.    Reviewed chart for potential of high risk  medication in the elderly: yes  Reviewed chart for potential of harmful drug interactions in the elderly:yes    Objective         Vitals:    01/28/20 1329   BP: 140/90   Pulse: 71   SpO2: 97%   Weight: (!) 142 kg (314 lb)   PainSc:   8   PainLoc: Foot  Comment: joints       Body mass index is 43.81 kg/m².  Discussed the patient's BMI with him. The BMI is above average; BMI management plan is completed.    Physical Exam   Constitutional: He is oriented to person, place, and time. He appears well-developed and well-nourished. No distress.   HENT:   Head: Normocephalic and atraumatic. Hair is normal.   Right Ear: Hearing, tympanic membrane, external ear and ear canal normal.   Left Ear: Hearing, tympanic membrane, external ear and ear canal normal.   Nose: No sinus tenderness or nasal deformity.   Mouth/Throat: Uvula is midline, oropharynx is clear and moist and mucous membranes are normal. No oral lesions. No uvula swelling.   Eyes: Pupils are equal, round, and reactive to light. Conjunctivae, EOM and lids are normal. Right eye exhibits no discharge. Left eye exhibits no discharge. No scleral icterus. Right eye exhibits normal extraocular motion and no nystagmus. Left eye exhibits normal extraocular motion and no nystagmus.   Fundoscopic exam:       The right eye shows red reflex.        The left eye shows red reflex.   Neck: Normal range of motion. Neck supple. No JVD present. No tracheal deviation present. No thyromegaly present.   Cardiovascular: Normal rate, regular rhythm, normal heart sounds, intact distal pulses and normal pulses. Exam reveals no gallop.   No murmur heard.  Pulmonary/Chest: Effort normal and breath sounds normal. No respiratory distress. He has no wheezes. He has no rales. He exhibits no tenderness.   Abdominal: Soft. Bowel sounds are normal. He exhibits no distension and no mass. There is no tenderness. There is no guarding. No hernia.   Genitourinary: Rectum normal, prostate normal, testes  normal and penis normal. Rectal exam shows guaiac negative stool.   Genitourinary Comments: Denied chaperone for  exam   Musculoskeletal: Normal range of motion. He exhibits no edema, tenderness or deformity.   Lymphadenopathy:     He has no cervical adenopathy.   Neurological: He is alert and oriented to person, place, and time. He has normal reflexes. He displays normal reflexes. No cranial nerve deficit. He exhibits normal muscle tone. Coordination normal.   Skin: Skin is warm and dry. No rash noted. He is not diaphoretic.   Psychiatric: He has a normal mood and affect. His behavior is normal. Judgment and thought content normal.   Nursing note and vitals reviewed.      Lab Results   Component Value Date    TRIG 196 (H) 01/28/2020    HDL 32 (L) 01/28/2020    LDL 64 01/28/2020    VLDL 39.2 01/28/2020        Procedures    Assessment/Plan   Medicare Risks and Personalized Health Plan  CMS Preventative Services Quick Reference  Advance Directive Discussion  Immunizations Discussed/Encouraged (specific immunizations; adacel Tdap, Pneumococcal 23 and Shingrix )  Inactivity/Sedentary  Obesity/Overweight   Prostate Cancer Screening     The above risks/problems have been discussed with the patient.  Pertinent information has been shared with the patient in the After Visit Summary.  Follow up plans and orders are seen below in the Assessment/Plan Section.    Diagnoses and all orders for this visit:    1. Encounter for Medicare annual wellness exam (Primary)    2. Other migraine without status migrainosus, not intractable  -     Discontinue: SUMAtriptan (IMITREX) 100 MG tablet; Take 1 tablet by mouth 1 (One) Time for 1 dose. Take one tablet at onset of headache. May repeat dose one time in 2 hours if headache not relieved.  Dispense: 9 tablet; Refill: 3  -     SUMAtriptan (IMITREX) 100 MG tablet; Take 1 tablet by mouth 1 (One) Time for 1 dose. Take one tablet at onset of headache. May repeat dose one time in 2 hours if  headache not relieved.  Dispense: 9 tablet; Refill: 3    3. Hyperlipidemia, unspecified hyperlipidemia type  -     atorvastatin (LIPITOR) 10 MG tablet; Take 1 tablet by mouth Daily.  Dispense: 90 tablet; Refill: 3  -     Lipid Panel  -     Comprehensive Metabolic Panel    4. Prostate cancer screening  -     PSA Screen    5. Need for 23-polyvalent pneumococcal polysaccharide vaccine  -     Pneumococcal Polysaccharide Vaccine 23-Valent (PPSV23) Greater Than or Equal To 3yo Subcutaneous / IM    6. Fear of flying  Assessment & Plan:  Gave pt refill of xanax 0.25 mg 1 po prn on flying #10, 0RF.    The patient has read and signed the [facility] Controlled Substance Contract.  I will continue to see patient for regular follow up appointments.  They are well controlled on their medication.  TAJ is updated every 3 months. The patient is aware of the potential for addiction and dependence.      7. Class 3 severe obesity due to excess calories with serious comorbidity and body mass index (BMI) of 40.0 to 44.9 in adult (CMS/[facility])  Assessment & Plan:  Obesity is worsening.  Discussed the patient's BMI.  The BMI is above average; BMI management plan is completed.  General weight loss/lifestyle modification strategies discussed (elicit support from others; identify saboteurs; non-food rewards, etc).  Diet interventions: moderate (500 kCal/d) deficit diet.  Informal exercise measures discussed, e.g. taking stairs instead of elevator.  Regular aerobic exercise program discussed.  Completed appeal for saxenda, awaiting the results.        Follow Up:  Return in about 1 year (around 1/28/2021) for Medicare Wellness.     An After Visit Summary and PPPS were given to the patient.

## 2020-02-03 ENCOUNTER — OFFICE VISIT (OUTPATIENT)
Dept: ORTHOPEDIC SURGERY | Facility: CLINIC | Age: 70
End: 2020-02-03

## 2020-02-03 VITALS — OXYGEN SATURATION: 95 % | WEIGHT: 313.05 LBS | HEART RATE: 80 BPM | HEIGHT: 71 IN | BODY MASS INDEX: 43.83 KG/M2

## 2020-02-03 DIAGNOSIS — L08.9 WOUND INFECTION: Primary | ICD-10-CM

## 2020-02-03 DIAGNOSIS — T14.8XXA WOUND INFECTION: Primary | ICD-10-CM

## 2020-02-03 DIAGNOSIS — I87.8 VENOUS STASIS: ICD-10-CM

## 2020-02-03 PROCEDURE — 99212 OFFICE O/P EST SF 10 MIN: CPT | Performed by: ORTHOPAEDIC SURGERY

## 2020-02-03 RX ORDER — LEVOFLOXACIN 750 MG/1
750 TABLET ORAL DAILY
Qty: 10 TABLET | Refills: 0 | Status: SHIPPED | OUTPATIENT
Start: 2020-02-03 | End: 2020-02-14 | Stop reason: SDUPTHER

## 2020-02-03 NOTE — PROGRESS NOTES
"Follow-up (4 months s/p (L) ankle triple arthrodesis 10/15/2019)      Obi Jackman . is 3 months status post left triple arthrodesis, 10/15/2019.  Were treating a small little problem with Levaquin, Unna boot, the wound grew skin adam and Citrobacter braakii, no fever no chills.     Past Surgical History:   Procedure Laterality Date   • ACHILLES TENDON SURGERY Left 10/15/2019    Procedure: ACHILLES TENDON LENGTHENING LEFT;  Surgeon: Elodia Guzman MD;  Location:  [facility] OR;  Service: Orthopedics   • CATARACT EXTRACTION Right    • COLONOSCOPY  2015   • EYE SURGERY Bilateral     cornea transplant    • FOOT SURGERY Left 10/15/2019    triple arthrodesis and achilles tendon lengthening- [place]   • HERNIA REPAIR     • ILIAC CREST BONE GRAFT Left 10/15/2019    Procedure: ILIAC CREST BONE GRAFT LEFT;  Surgeon: Elodia Guzman MD;  Location:  [facility] OR;  Service: Orthopedics   • TOE FUSION Left 10/15/2019    Procedure: TRIPLE ARTHODESIS LEFT;  Surgeon: Elodia Guzman MD;  Location:  [facility] OR;  Service: Orthopedics       Pulse 80   Ht 180.3 cm (70.98\")   Wt (!) 142 kg (313 lb 0.9 oz)   SpO2 95%   BMI 43.68 kg/m²        Lateral wound is healing.  Much less central necrotic tissue.  It remained superficial, no erythema.        Assessment and Plan:   1.  Wound infection-improving steadily.  Continue Levaquin, continue the Unna boot with Bactroban, I will see him in a week.    2.  Venous stasis, he asked me about the lymphedema clinic here at [facility].  I am happy to refer him once this wound heals, but I think they will tell him the same thing about wearing compression socks daily.    "

## 2020-02-10 ENCOUNTER — OFFICE VISIT (OUTPATIENT)
Dept: ORTHOPEDIC SURGERY | Facility: CLINIC | Age: 70
End: 2020-02-10

## 2020-02-10 VITALS — OXYGEN SATURATION: 97 % | HEIGHT: 71 IN | HEART RATE: 74 BPM | BODY MASS INDEX: 43.83 KG/M2 | WEIGHT: 313.05 LBS

## 2020-02-10 DIAGNOSIS — T14.8XXA WOUND INFECTION: Primary | ICD-10-CM

## 2020-02-10 DIAGNOSIS — L08.9 WOUND INFECTION: Primary | ICD-10-CM

## 2020-02-10 PROCEDURE — 99212 OFFICE O/P EST SF 10 MIN: CPT | Performed by: ORTHOPAEDIC SURGERY

## 2020-02-10 PROCEDURE — 29580 STRAPPING UNNA BOOT: CPT | Performed by: ORTHOPAEDIC SURGERY

## 2020-02-10 RX ORDER — SUMATRIPTAN 100 MG/1
TABLET, FILM COATED ORAL
COMMUNITY
Start: 2020-02-05 | End: 2020-03-10

## 2020-02-10 NOTE — PROGRESS NOTES
"Follow-up (1 week recheck- 4 months s/p (L) ankle triple arthrodesis 10/15/2019)      Obi Jackman Jr. is 4 months status post left triple arthrodesis, 10/15/2019.. He reports no fever, chills.  We have had him in an Unna boot, changing it once a week for a late wound problem.  It is healing with Unna boot.  He asked me again why he has the swelling after the surgery.  I explained that it is normal for him him.  He had severe edema preoperatively, he had a major surgery, he has significant obesity, and all of that adds up to persistent lower extremity edema.  I explained there are some patients I have had to use an Unna boot weekly preoperatively just to get the edema in control enough to do surgery.  This is something I have indeed seen postoperatively.  We need to continue the Unna boots until the wound heals.    Past Surgical History:   Procedure Laterality Date   • ACHILLES TENDON SURGERY Left 10/15/2019    Procedure: ACHILLES TENDON LENGTHENING LEFT;  Surgeon: Elodia Guzman MD;  Location:  [facility] OR;  Service: Orthopedics   • CATARACT EXTRACTION Right    • COLONOSCOPY  2015   • EYE SURGERY Bilateral     cornea transplant    • FOOT SURGERY Left 10/15/2019    triple arthrodesis and achilles tendon lengthening- [place]   • HERNIA REPAIR     • ILIAC CREST BONE GRAFT Left 10/15/2019    Procedure: ILIAC CREST BONE GRAFT LEFT;  Surgeon: Elodia Guzman MD;  Location: [facility] OR;  Service: Orthopedics   • TOE FUSION Left 10/15/2019    Procedure: TRIPLE ARTHODESIS LEFT;  Surgeon: Elodia Guzman MD;  Location:  [facility] OR;  Service: Orthopedics       Pulse 74   Ht 180.3 cm (70.98\")   Wt (!) 142 kg (313 lb 0.9 oz)   SpO2 97%   BMI 43.68 kg/m²         Good alignment, left foot, the lateral wound is improving slowly.  It is about 5 mm in diameter.  Superficial.  No significant erythema.  Quite significant edema in the leg as expected.  Right leg also has significant edema.    Assessment and Plan:   1. Wound " infection  He is improving steadily.  We put the Bactroban and the Unna boot back on.  He may wear any type of a wide shoe.  He will return to see MsStalin Valencia in a week, he should have weekly Unna boot changes until this is healed.  Once he is healed he can do physical therapy to work on ankle dorsiflexion plantarflexion.  He should see me in April for standing 2 views of the foot

## 2020-02-14 ENCOUNTER — TELEPHONE (OUTPATIENT)
Dept: ORTHOPEDIC SURGERY | Facility: CLINIC | Age: 70
End: 2020-02-14

## 2020-02-14 RX ORDER — LEVOFLOXACIN 750 MG/1
750 TABLET ORAL DAILY
Qty: 14 TABLET | Refills: 0 | Status: SHIPPED | OUTPATIENT
Start: 2020-02-14 | End: 2020-07-27

## 2020-02-14 NOTE — TELEPHONE ENCOUNTER
PATIENT CALLED STATING THAT HIS UNIBOOT IS COMING UNRAVELED AND NEEDS TO BE REWRAPPED.  HE WAS HOPING HE COULD COME BY TODAY AND HAVE SOMEONE REWRAP IT FOR HIM.

## 2020-02-14 NOTE — TELEPHONE ENCOUNTER
I spoke with Mr. Jackman and he will be coming in at 11 to get another uniboot placed back on.    Stephanie

## 2020-02-18 ENCOUNTER — OFFICE VISIT (OUTPATIENT)
Dept: ORTHOPEDIC SURGERY | Facility: CLINIC | Age: 70
End: 2020-02-18

## 2020-02-18 VITALS — HEIGHT: 71 IN | BODY MASS INDEX: 43.83 KG/M2 | HEART RATE: 97 BPM | OXYGEN SATURATION: 99 % | WEIGHT: 313.05 LBS

## 2020-02-18 DIAGNOSIS — T14.8XXA WOUND INFECTION: Primary | ICD-10-CM

## 2020-02-18 DIAGNOSIS — L08.9 WOUND INFECTION: Primary | ICD-10-CM

## 2020-02-18 PROCEDURE — 29580 STRAPPING UNNA BOOT: CPT | Performed by: PHYSICIAN ASSISTANT

## 2020-02-18 PROCEDURE — 99213 OFFICE O/P EST LOW 20 MIN: CPT | Performed by: PHYSICIAN ASSISTANT

## 2020-02-18 NOTE — PROGRESS NOTES
[facility] [facility] Note    Subjective     Patient: Obi Jackman Jr.  : 1950    Primary Care Provider: Cammy Oneal PA    Requesting Provider: As above    Follow-up (1 week f/u--4 months s/p (L) ankle triple arthrodesis 10/15/2019)      History    Chief Complaint: f/up left foot wound    History of Present Illness: This is a very pleasant patient of Dr. Black, new to me, here for routine f/up of his left lateral foot wound.  He has been getting weekly Unna boots.  He reports no new symptoms.    Current Outpatient Medications on File Prior to Visit   Medication Sig Dispense Refill   • acetaminophen (TYLENOL) 325 MG tablet Take 325 mg by mouth As Needed for Mild Pain .     • albuterol sulfate  (90 Base) MCG/ACT inhaler Inhale 2 puffs Every 4 (Four) Hours As Needed for Wheezing. (Patient taking differently: Inhale 2 puffs Every 4 (Four) Hours As Needed for Wheezing or Shortness of Air.) 1 inhaler 2   • ALPRAZolam (XANAX) 0.25 MG tablet Take 1 tablet by mouth Daily As Needed for Anxiety (when flying). 10 tablet 0   • atenolol (TENORMIN) 50 MG tablet Take 1 tablet by mouth Daily. 30 tablet 5   • atorvastatin (LIPITOR) 10 MG tablet Take 1 tablet by mouth Daily. 90 tablet 3   • Azelastine-Fluticasone 137-50 MCG/ACT suspension 1 spray each nostril daily (Patient taking differently: 2 sprays Daily As Needed (nasal congestion). 1 spray each nostril daily) 1 bottle 5   • buPROPion XL (WELLBUTRIN XL) 150 MG 24 hr tablet Take 1 tablet by mouth Daily. 30 tablet 5   • clotrimazole-betamethasone (LOTRISONE) 1-0.05 % cream Apply  topically to the appropriate area as directed 2 (Two) Times a Day. 45 g 1   • cyclobenzaprine (FLEXERIL) 10 MG tablet Take one tablet by mouth three times a day as needed for muscle spasms. (Patient taking differently: Take 10 mg by mouth 3 (Three) Times a Day As Needed for Muscle Spasms. Take one tablet by mouth three times a day as needed for muscle spasms.) 30  tablet 5   • Dietary Management Product (VASCULERA) tablet Take 1 tablet by mouth Daily. 90 tablet 5   • etodolac (LODINE) 400 MG tablet Take 400 mg by mouth 2 (Two) Times a Day.  3   • hydroxychloroquine (PLAQUENIL) 200 MG tablet Take 200 mg by mouth 2 (Two) Times a Day.     • ibuprofen (ADVIL,MOTRIN) 200 MG tablet Take 200 mg by mouth As Needed for Mild Pain .     • levoFLOXacin (LEVAQUIN) 750 MG tablet Take 1 tablet by mouth Daily. 14 tablet 0   • lisinopril-hydrochlorothiazide (PRINZIDE,ZESTORETIC) 20-12.5 MG per tablet TAKE 1 TABLET BY MOUTH DAILY 30 tablet 11   • Multiple Vitamins-Minerals (MULTIVITAMIN ADULT PO) Take 1 tablet by mouth Daily.     • ondansetron (ZOFRAN) 4 MG tablet Take 1 tablet by mouth Every 6 (Six) Hours As Needed for Nausea or Vomiting. 30 tablet 0   • oxaprozin (DAYPRO) 600 MG tablet Take 1,200 mg by mouth. Take 2 po every day     • SUMAtriptan (IMITREX) 100 MG tablet      • terbinafine (lamiSIL) 250 MG tablet Take 250 mg by mouth Daily.     • vitamin D (ERGOCALCIFEROL) 96912 units capsule capsule Take 1 capsule by mouth 1 (One) Time Per Week. (Patient taking differently: Take 50,000 Units by mouth 1 (One) Time Per Week. Sunday) 12 capsule 3     No current facility-administered medications on file prior to visit.       No Known Allergies   Past Medical History:   Diagnosis Date   • Bulging of lumbar intervertebral disc    • Hyperlipidemia    • Hypertension    • Migraine    • CHRISTOPHE on CPAP    • Rheumatoid arthritis (CMS/HCC)    • Swelling of left ear    • Wears contact lenses      Past Surgical History:   Procedure Laterality Date   • ACHILLES TENDON SURGERY Left 10/15/2019    Procedure: ACHILLES TENDON LENGTHENING LEFT;  Surgeon: Elodia Guzman MD;  Location: [facility];  Service: Orthopedics   • CATARACT EXTRACTION Right    • COLONOSCOPY  2015   • EYE SURGERY Bilateral     cornea transplant    • FOOT SURGERY Left 10/15/2019    triple arthrodesis and achilles tendon lengthening- Megan   •  "HERNIA REPAIR     • ILIAC CREST BONE GRAFT Left 10/15/2019    Procedure: ILIAC CREST BONE GRAFT LEFT;  Surgeon: Elodia Guzman MD;  Location:  [facility];  Service: Orthopedics   • TOE FUSION Left 10/15/2019    Procedure: TRIPLE ARTHODESIS LEFT;  Surgeon: Elodia Guzman MD;  Location:  [facility];  Service: Orthopedics     Family History   Problem Relation Age of Onset   • No Known Problems Mother    • No Known Problems Father       Social History     Socioeconomic History   • Marital status:      Spouse name: Not on file   • Number of children: Not on file   • Years of education: Not on file   • Highest education level: Not on file   Tobacco Use   • Smoking status: Never Smoker   • Smokeless tobacco: Never Used   Substance and Sexual Activity   • Alcohol use: Yes     Comment: rarely   • Drug use: No   • Sexual activity: Defer        Review of Systems   Constitutional: Negative.    HENT: Negative.    Eyes: Negative.    Respiratory: Negative.    Cardiovascular: Negative.    Gastrointestinal: Negative.    Endocrine: Negative.    Genitourinary: Negative.    Musculoskeletal: Positive for arthralgias.   Skin: Negative.    Allergic/Immunologic: Negative.    Neurological: Negative.    Hematological: Negative.    Psychiatric/Behavioral: Negative.        The following portions of the patient's history were reviewed and updated as appropriate: allergies, current medications, past family history, past medical history, past social history, past surgical history and problem list.      Objective      Physical Exam  Pulse 97   Ht 180.3 cm (70.98\")   Wt (!) 142 kg (313 lb 0.9 oz)   SpO2 99%   BMI 43.68 kg/m²     Body mass index is 43.68 kg/m².    Patient is well developed, well nourished and in no acute distress.  Alert and oriented x 3.    Ortho Exam  Good alignment, left foot, the lateral wound is improving slowly.  It is about 5 mm in diameter.  Superficial.  No significant erythema.  Quite significant edema in the " leg as expected.  Right leg also has significant edema    Medical Decision Making    Data Review:   none    Assessment:  1. Wound infection        Plan:  Wound continues to slowly heal.  We will continue with the Bactroban dressing and Unna boots.  He will continue wearing a wide shoe.  Plan today is Bactroban dressing with new Unna boot.  I will see him next week or sooner if needed.  He reports he has about 5 days of Levaquin left and will finish that prescription.      Radha Valencia PA-C  02/28/20  10:54 AM

## 2020-02-25 ENCOUNTER — OFFICE VISIT (OUTPATIENT)
Dept: ORTHOPEDIC SURGERY | Facility: CLINIC | Age: 70
End: 2020-02-25

## 2020-02-25 VITALS — HEART RATE: 97 BPM | HEIGHT: 71 IN | OXYGEN SATURATION: 97 % | WEIGHT: 313.05 LBS | BODY MASS INDEX: 43.83 KG/M2

## 2020-02-25 DIAGNOSIS — L08.9 WOUND INFECTION: Primary | ICD-10-CM

## 2020-02-25 DIAGNOSIS — T14.8XXA WOUND INFECTION: Primary | ICD-10-CM

## 2020-02-25 PROCEDURE — 29580 STRAPPING UNNA BOOT: CPT | Performed by: PHYSICIAN ASSISTANT

## 2020-02-25 NOTE — PROGRESS NOTES
[facility] [place] [facility] Note    Subjective     Patient: Obi Jackman Jr.  : 1950    Primary Care Provider: Cammy Oneal PA    Requesting Provider: As above    Follow-up (1 week recheck - Wound infection )      History    Follow-up left foot wound    History of Present Illness: Patient returns today for follow-up of his left foot wound.  He is been in a boot in a boot for a week.  He has been wearing his slipper.  No new symptoms.    Current Outpatient Medications on File Prior to Visit   Medication Sig Dispense Refill   • acetaminophen (TYLENOL) 325 MG tablet Take 325 mg by mouth As Needed for Mild Pain .     • albuterol sulfate  (90 Base) MCG/ACT inhaler Inhale 2 puffs Every 4 (Four) Hours As Needed for Wheezing. (Patient taking differently: Inhale 2 puffs Every 4 (Four) Hours As Needed for Wheezing or Shortness of Air.) 1 inhaler 2   • ALPRAZolam (XANAX) 0.25 MG tablet Take 1 tablet by mouth Daily As Needed for Anxiety (when flying). 10 tablet 0   • atenolol (TENORMIN) 50 MG tablet Take 1 tablet by mouth Daily. 30 tablet 5   • atorvastatin (LIPITOR) 10 MG tablet Take 1 tablet by mouth Daily. 90 tablet 3   • Azelastine-Fluticasone 137-50 MCG/ACT suspension 1 spray each nostril daily (Patient taking differently: 2 sprays Daily As Needed (nasal congestion). 1 spray each nostril daily) 1 bottle 5   • buPROPion XL (WELLBUTRIN XL) 150 MG 24 hr tablet Take 1 tablet by mouth Daily. 30 tablet 5   • clotrimazole-betamethasone (LOTRISONE) 1-0.05 % cream Apply  topically to the appropriate area as directed 2 (Two) Times a Day. 45 g 1   • cyclobenzaprine (FLEXERIL) 10 MG tablet Take one tablet by mouth three times a day as needed for muscle spasms. (Patient taking differently: Take 10 mg by mouth 3 (Three) Times a Day As Needed for Muscle Spasms. Take one tablet by mouth three times a day as needed for muscle spasms.) 30 tablet 5   • Dietary Management Product (VASCULERA) tablet Take 1  tablet by mouth Daily. 90 tablet 5   • etodolac (LODINE) 400 MG tablet Take 400 mg by mouth 2 (Two) Times a Day.  3   • hydroxychloroquine (PLAQUENIL) 200 MG tablet Take 200 mg by mouth 2 (Two) Times a Day.     • ibuprofen (ADVIL,MOTRIN) 200 MG tablet Take 200 mg by mouth As Needed for Mild Pain .     • levoFLOXacin (LEVAQUIN) 750 MG tablet Take 1 tablet by mouth Daily. 14 tablet 0   • lisinopril-hydrochlorothiazide (PRINZIDE,ZESTORETIC) 20-12.5 MG per tablet TAKE 1 TABLET BY MOUTH DAILY 30 tablet 11   • Multiple Vitamins-Minerals (MULTIVITAMIN ADULT PO) Take 1 tablet by mouth Daily.     • ondansetron (ZOFRAN) 4 MG tablet Take 1 tablet by mouth Every 6 (Six) Hours As Needed for Nausea or Vomiting. 30 tablet 0   • oxaprozin (DAYPRO) 600 MG tablet Take 1,200 mg by mouth. Take 2 po every day     • SUMAtriptan (IMITREX) 100 MG tablet      • terbinafine (lamiSIL) 250 MG tablet Take 250 mg by mouth Daily.     • vitamin D (ERGOCALCIFEROL) 02967 units capsule capsule Take 1 capsule by mouth 1 (One) Time Per Week. (Patient taking differently: Take 50,000 Units by mouth 1 (One) Time Per Week. Sunday) 12 capsule 3     No current facility-administered medications on file prior to visit.       No Known Allergies   Past Medical History:   Diagnosis Date   • Bulging of lumbar intervertebral disc    • Hyperlipidemia    • Hypertension    • Migraine    • CHRISTOPHE on CPAP    • Rheumatoid arthritis (CMS/HCC)    • Swelling of left ear    • Wears contact lenses      Past Surgical History:   Procedure Laterality Date   • ACHILLES TENDON SURGERY Left 10/15/2019    Procedure: ACHILLES TENDON LENGTHENING LEFT;  Surgeon: Elodia Guzman MD;  Location: [facility];  Service: Orthopedics   • CATARACT EXTRACTION Right    • COLONOSCOPY  2015   • EYE SURGERY Bilateral     cornea transplant    • FOOT SURGERY Left 10/15/2019    triple arthrodesis and achilles tendon lengthening- Megan   • HERNIA REPAIR     • ILIAC CREST BONE GRAFT Left 10/15/2019     "Procedure: ILIAC CREST BONE GRAFT LEFT;  Surgeon: Elodia Guzman MD;  Location:  [facility];  Service: Orthopedics   • TOE FUSION Left 10/15/2019    Procedure: TRIPLE ARTHODESIS LEFT;  Surgeon: Elodia Guzman MD;  Location:  [facility];  Service: Orthopedics     Family History   Problem Relation Age of Onset   • No Known Problems Mother    • No Known Problems Father       Social History     Socioeconomic History   • Marital status:      Spouse name: Not on file   • Number of children: Not on file   • Years of education: Not on file   • Highest education level: Not on file   Tobacco Use   • Smoking status: Never Smoker   • Smokeless tobacco: Never Used   Substance and Sexual Activity   • Alcohol use: Yes     Comment: rarely   • Drug use: No   • Sexual activity: Defer        Review of Systems   Constitutional: Negative.    HENT: Negative.    Eyes: Negative.    Respiratory: Negative.    Cardiovascular: Negative.    Gastrointestinal: Negative.    Endocrine: Negative.    Genitourinary: Negative.    Musculoskeletal: Positive for arthralgias.   Skin: Negative.    Allergic/Immunologic: Negative.    Neurological: Negative.    Hematological: Negative.    Psychiatric/Behavioral: Negative.        The following portions of the patient's history were reviewed and updated as appropriate: allergies, current medications, past family history, past medical history, past social history, past surgical history and problem list.      Objective      Physical Exam  Pulse 97   Ht 180.3 cm (70.98\")   Wt (!) 142 kg (313 lb 0.9 oz)   SpO2 97%   BMI 43.68 kg/m²     Body mass index is 43.68 kg/m².    Patient is well developed, well nourished and in no acute distress.  Alert and oriented x 3.    Ortho Exam  Wound is slowly healing.  It is still superficial.  No drainage.              Medical Decision Making    Data Review:   none    Assessment:  1. Wound infection        Plan:  Left foot wound.  The the wound continues to slowly heal.  " Plan is to go back in Unna boot with a Bactroban dressing underneath.  He will return in 1 week or sooner if needed.      Radha Valencia PA-C  02/26/20  11:36 AM

## 2020-02-28 ENCOUNTER — LAB (OUTPATIENT)
Dept: LAB | Facility: HOSPITAL | Age: 70
End: 2020-02-28

## 2020-02-28 ENCOUNTER — TRANSCRIBE ORDERS (OUTPATIENT)
Dept: LAB | Facility: HOSPITAL | Age: 70
End: 2020-02-28

## 2020-02-28 DIAGNOSIS — M17.0 PRIMARY OSTEOARTHRITIS OF BOTH KNEES: ICD-10-CM

## 2020-02-28 DIAGNOSIS — Z79.899 HIGH RISK MEDICATION USE: ICD-10-CM

## 2020-02-28 DIAGNOSIS — M17.0 PRIMARY OSTEOARTHRITIS OF BOTH KNEES: Primary | ICD-10-CM

## 2020-02-28 PROCEDURE — 81001 URINALYSIS AUTO W/SCOPE: CPT | Performed by: INTERNAL MEDICINE

## 2020-02-28 PROCEDURE — 86160 COMPLEMENT ANTIGEN: CPT

## 2020-02-28 PROCEDURE — 86225 DNA ANTIBODY NATIVE: CPT

## 2020-02-28 PROCEDURE — 36415 COLL VENOUS BLD VENIPUNCTURE: CPT

## 2020-02-28 PROCEDURE — 85025 COMPLETE CBC W/AUTO DIFF WBC: CPT

## 2020-02-29 LAB
BACTERIA UR QL AUTO: ABNORMAL /HPF
BASOPHILS # BLD AUTO: 0.02 10*3/MM3 (ref 0–0.2)
BASOPHILS NFR BLD AUTO: 0.4 % (ref 0–1.5)
BILIRUB UR QL STRIP: NEGATIVE
CLARITY UR: CLEAR
COLOR UR: YELLOW
DEPRECATED RDW RBC AUTO: 40.1 FL (ref 37–54)
EOSINOPHIL # BLD AUTO: 0.25 10*3/MM3 (ref 0–0.4)
EOSINOPHIL NFR BLD AUTO: 4.5 % (ref 0.3–6.2)
ERYTHROCYTE [DISTWIDTH] IN BLOOD BY AUTOMATED COUNT: 12.2 % (ref 12.3–15.4)
GLUCOSE UR STRIP-MCNC: NEGATIVE MG/DL
HCT VFR BLD AUTO: 42 % (ref 37.5–51)
HGB BLD-MCNC: 14 G/DL (ref 13–17.7)
HGB UR QL STRIP.AUTO: NEGATIVE
HYALINE CASTS UR QL AUTO: ABNORMAL /LPF
IMM GRANULOCYTES # BLD AUTO: 0.01 10*3/MM3 (ref 0–0.05)
IMM GRANULOCYTES NFR BLD AUTO: 0.2 % (ref 0–0.5)
KETONES UR QL STRIP: NEGATIVE
LEUKOCYTE ESTERASE UR QL STRIP.AUTO: ABNORMAL
LYMPHOCYTES # BLD AUTO: 1.53 10*3/MM3 (ref 0.7–3.1)
LYMPHOCYTES NFR BLD AUTO: 27.4 % (ref 19.6–45.3)
MCH RBC QN AUTO: 30 PG (ref 26.6–33)
MCHC RBC AUTO-ENTMCNC: 33.3 G/DL (ref 31.5–35.7)
MCV RBC AUTO: 89.9 FL (ref 79–97)
MONOCYTES # BLD AUTO: 0.81 10*3/MM3 (ref 0.1–0.9)
MONOCYTES NFR BLD AUTO: 14.5 % (ref 5–12)
NEUTROPHILS # BLD AUTO: 2.97 10*3/MM3 (ref 1.7–7)
NEUTROPHILS NFR BLD AUTO: 53 % (ref 42.7–76)
NITRITE UR QL STRIP: NEGATIVE
NRBC BLD AUTO-RTO: 0 /100 WBC (ref 0–0.2)
PH UR STRIP.AUTO: 7 [PH] (ref 5–8)
PLATELET # BLD AUTO: 271 10*3/MM3 (ref 140–450)
PMV BLD AUTO: 10 FL (ref 6–12)
PROT UR QL STRIP: ABNORMAL
RBC # BLD AUTO: 4.67 10*6/MM3 (ref 4.14–5.8)
RBC # UR: ABNORMAL /HPF
REF LAB TEST METHOD: ABNORMAL
SP GR UR STRIP: 1.03 (ref 1–1.03)
SQUAMOUS #/AREA URNS HPF: ABNORMAL /HPF
UROBILINOGEN UR QL STRIP: ABNORMAL
WBC NRBC COR # BLD: 5.59 10*3/MM3 (ref 3.4–10.8)
WBC UR QL AUTO: ABNORMAL /HPF

## 2020-03-01 LAB
C3 SERPL-MCNC: 160 MG/DL (ref 82–167)
C4 SERPL-MCNC: 29 MG/DL (ref 14–44)

## 2020-03-04 ENCOUNTER — OFFICE VISIT (OUTPATIENT)
Dept: ORTHOPEDIC SURGERY | Facility: CLINIC | Age: 70
End: 2020-03-04

## 2020-03-04 VITALS — BODY MASS INDEX: 43.83 KG/M2 | HEIGHT: 71 IN | WEIGHT: 313.05 LBS

## 2020-03-04 DIAGNOSIS — T14.8XXA WOUND INFECTION: Primary | ICD-10-CM

## 2020-03-04 DIAGNOSIS — L08.9 WOUND INFECTION: Primary | ICD-10-CM

## 2020-03-04 PROCEDURE — 29580 STRAPPING UNNA BOOT: CPT | Performed by: PHYSICIAN ASSISTANT

## 2020-03-04 NOTE — PROGRESS NOTES
[facility]    Subjective     Patient: Obi Jackman Jr.  : 1950    Primary Care Provider: Cammy Oneal PA    Requesting Provider: As above    Follow-up (1 week follow up; Wound infection )      History    Chief Complaint: Follow-up wound infection    History of Present Illness: Mr. Jackman returns today for his left lateral wound.  He has been in an Unna boot.  He reports no pain or change in symptoms.    Current Outpatient Medications on File Prior to Visit   Medication Sig Dispense Refill   • acetaminophen (TYLENOL) 325 MG tablet Take 325 mg by mouth As Needed for Mild Pain .     • albuterol sulfate  (90 Base) MCG/ACT inhaler Inhale 2 puffs Every 4 (Four) Hours As Needed for Wheezing. (Patient taking differently: Inhale 2 puffs Every 4 (Four) Hours As Needed for Wheezing or Shortness of Air.) 1 inhaler 2   • ALPRAZolam (XANAX) 0.25 MG tablet Take 1 tablet by mouth Daily As Needed for Anxiety (when flying). 10 tablet 0   • atenolol (TENORMIN) 50 MG tablet Take 1 tablet by mouth Daily. 30 tablet 5   • atorvastatin (LIPITOR) 10 MG tablet Take 1 tablet by mouth Daily. 90 tablet 3   • Azelastine-Fluticasone 137-50 MCG/ACT suspension 1 spray each nostril daily (Patient taking differently: 2 sprays Daily As Needed (nasal congestion). 1 spray each nostril daily) 1 bottle 5   • buPROPion XL (WELLBUTRIN XL) 150 MG 24 hr tablet Take 1 tablet by mouth Daily. 30 tablet 5   • clotrimazole-betamethasone (LOTRISONE) 1-0.05 % cream Apply  topically to the appropriate area as directed 2 (Two) Times a Day. 45 g 1   • cyclobenzaprine (FLEXERIL) 10 MG tablet Take one tablet by mouth three times a day as needed for muscle spasms. (Patient taking differently: Take 10 mg by mouth 3 (Three) Times a Day As Needed for Muscle Spasms. Take one tablet by mouth three times a day as needed for muscle spasms.) 30 tablet 5   • Dietary Management Product (VASCULERA) tablet Take 1 tablet by mouth  Daily. 90 tablet 5   • etodolac (LODINE) 400 MG tablet Take 400 mg by mouth 2 (Two) Times a Day.  3   • hydroxychloroquine (PLAQUENIL) 200 MG tablet Take 200 mg by mouth 2 (Two) Times a Day.     • ibuprofen (ADVIL,MOTRIN) 200 MG tablet Take 200 mg by mouth As Needed for Mild Pain .     • levoFLOXacin (LEVAQUIN) 750 MG tablet Take 1 tablet by mouth Daily. 14 tablet 0   • lisinopril-hydrochlorothiazide (PRINZIDE,ZESTORETIC) 20-12.5 MG per tablet TAKE 1 TABLET BY MOUTH DAILY 30 tablet 11   • Multiple Vitamins-Minerals (MULTIVITAMIN ADULT PO) Take 1 tablet by mouth Daily.     • ondansetron (ZOFRAN) 4 MG tablet Take 1 tablet by mouth Every 6 (Six) Hours As Needed for Nausea or Vomiting. 30 tablet 0   • oxaprozin (DAYPRO) 600 MG tablet Take 1,200 mg by mouth. Take 2 po every day     • SUMAtriptan (IMITREX) 100 MG tablet      • terbinafine (lamiSIL) 250 MG tablet Take 250 mg by mouth Daily.     • vitamin D (ERGOCALCIFEROL) 90098 units capsule capsule Take 1 capsule by mouth 1 (One) Time Per Week. (Patient taking differently: Take 50,000 Units by mouth 1 (One) Time Per Week. Sunday) 12 capsule 3     No current facility-administered medications on file prior to visit.       No Known Allergies   Past Medical History:   Diagnosis Date   • Bulging of lumbar intervertebral disc    • Hyperlipidemia    • Hypertension    • Migraine    • CHRISTOPHE on CPAP    • Rheumatoid arthritis (CMS/HCC)    • Swelling of left ear    • Wears contact lenses      Past Surgical History:   Procedure Laterality Date   • ACHILLES TENDON SURGERY Left 10/15/2019    Procedure: ACHILLES TENDON LENGTHENING LEFT;  Surgeon: Elodia Guzman MD;  Location: [facility];  Service: Orthopedics   • CATARACT EXTRACTION Right    • COLONOSCOPY  2015   • EYE SURGERY Bilateral     cornea transplant    • FOOT SURGERY Left 10/15/2019    triple arthrodesis and achilles tendon lengthening- Megan   • HERNIA REPAIR     • ILIAC CREST BONE GRAFT Left 10/15/2019    Procedure: ILIAC  "CREST BONE GRAFT LEFT;  Surgeon: Elodia Guzman MD;  Location:  [facility];  Service: Orthopedics   • TOE FUSION Left 10/15/2019    Procedure: TRIPLE ARTHODESIS LEFT;  Surgeon: Elodia Guzman MD;  Location:  [facility];  Service: Orthopedics     Family History   Problem Relation Age of Onset   • No Known Problems Mother    • No Known Problems Father       Social History     Socioeconomic History   • Marital status:      Spouse name: Not on file   • Number of children: Not on file   • Years of education: Not on file   • Highest education level: Not on file   Tobacco Use   • Smoking status: Never Smoker   • Smokeless tobacco: Never Used   Substance and Sexual Activity   • Alcohol use: Yes     Comment: rarely   • Drug use: No   • Sexual activity: Defer        Review of Systems   Constitutional: Negative.    HENT: Negative.    Eyes: Negative.    Respiratory: Negative.    Cardiovascular: Negative.    Gastrointestinal: Negative.    Endocrine: Negative.    Genitourinary: Negative.    Musculoskeletal: Positive for arthralgias.   Skin: Negative.    Allergic/Immunologic: Negative.    Neurological: Negative.    Hematological: Negative.    Psychiatric/Behavioral: Negative.        The following portions of the patient's history were reviewed and updated as appropriate: allergies, current medications, past family history, past medical history, past social history, past surgical history and problem list.      Objective      Physical Exam  Ht 180.3 cm (70.98\")   Wt (!) 142 kg (313 lb 0.9 oz)   BMI 43.68 kg/m²     Body mass index is 43.68 kg/m².    Patient is well developed, well nourished and in no acute distress.  Alert and oriented x 3.    Ortho Exam  Lateral foot wound is less erythematous this week.  It is still superficial, however, I can get the tip of a cotton swab in the most proximal area.  There is no drainage.  Still significant swelling.              Medical Decision Making    Data Review: "   none    Assessment:  1. Wound infection        Plan:  Left wound infection.  Patient has been doing Bactroban dressing and Unna boot.  I do think the proximal aspect of the wound has gotten a little deeper.  I had Dr. Olivas come in and look at the wound as well to make sure that we did not need to do anything further.  Recommendation today is that he go back into an Unna boot with a Bactroban dressing.  We may consider wound care in the future.  I spoke with Dr. Guzman and she recommended we begin wet-to-dry dressings upon his return.  I will see him back in 1 week or sooner if needed.    Patient history, diagnosis and treatment plan discussed with Dr. Olivas.    Radha Valencia PA-C  03/05/20  11:29 AM

## 2020-03-05 LAB — REF LAB TEST RESULTS: NORMAL

## 2020-03-06 ENCOUNTER — PRIOR AUTHORIZATION (OUTPATIENT)
Dept: INTERNAL MEDICINE | Facility: CLINIC | Age: 70
End: 2020-03-06

## 2020-03-06 NOTE — TELEPHONE ENCOUNTER
Followed up on Prior Authorization Appeal Form and it is excluded from his plan, there is no authorization for it, we have a sample of it he can  if he is interested, lvm for pt to return call

## 2020-03-10 RX ORDER — SUMATRIPTAN 100 MG/1
TABLET, FILM COATED ORAL
Qty: 9 TABLET | Refills: 3 | Status: SHIPPED | OUTPATIENT
Start: 2020-03-10 | End: 2020-08-13 | Stop reason: SDUPTHER

## 2020-03-11 ENCOUNTER — OFFICE VISIT (OUTPATIENT)
Dept: ORTHOPEDIC SURGERY | Facility: CLINIC | Age: 70
End: 2020-03-11

## 2020-03-11 VITALS — BODY MASS INDEX: 43.83 KG/M2 | HEIGHT: 71 IN | HEART RATE: 90 BPM | WEIGHT: 313.05 LBS | OXYGEN SATURATION: 99 %

## 2020-03-11 DIAGNOSIS — T14.8XXA WOUND INFECTION: Primary | ICD-10-CM

## 2020-03-11 DIAGNOSIS — L08.9 WOUND INFECTION: Primary | ICD-10-CM

## 2020-03-11 PROCEDURE — 29580 STRAPPING UNNA BOOT: CPT | Performed by: PHYSICIAN ASSISTANT

## 2020-03-11 RX ORDER — SULFAMETHOXAZOLE AND TRIMETHOPRIM 800; 160 MG/1; MG/1
TABLET ORAL
COMMUNITY
Start: 2020-03-04 | End: 2020-03-11

## 2020-03-11 NOTE — PROGRESS NOTES
[facility] – [place] [facility] Note    Subjective     Patient: Obi Jackman Jr.  : 1950    Primary Care Provider: Cammy Oneal PA    Requesting Provider: As above    Follow-up (1 week follow up; Wound infection )      History    Chief Complaint: Follow-up left lateral wound    History of Present Illness: Patient returns today for follow-up of his left lateral foot wound.  He has been in weekly Unna boots and been doing Bactroban dressing changes.  Reports no pain or change in symptoms.    Current Outpatient Medications on File Prior to Visit   Medication Sig Dispense Refill   • acetaminophen (TYLENOL) 325 MG tablet Take 325 mg by mouth As Needed for Mild Pain .     • albuterol sulfate  (90 Base) MCG/ACT inhaler Inhale 2 puffs Every 4 (Four) Hours As Needed for Wheezing. (Patient taking differently: Inhale 2 puffs Every 4 (Four) Hours As Needed for Wheezing or Shortness of Air.) 1 inhaler 2   • ALPRAZolam (XANAX) 0.25 MG tablet Take 1 tablet by mouth Daily As Needed for Anxiety (when flying). 10 tablet 0   • atenolol (TENORMIN) 50 MG tablet Take 1 tablet by mouth Daily. 30 tablet 5   • atorvastatin (LIPITOR) 10 MG tablet Take 1 tablet by mouth Daily. 90 tablet 3   • Azelastine-Fluticasone 137-50 MCG/ACT suspension 1 spray each nostril daily (Patient taking differently: 2 sprays Daily As Needed (nasal congestion). 1 spray each nostril daily) 1 bottle 5   • buPROPion XL (WELLBUTRIN XL) 150 MG 24 hr tablet Take 1 tablet by mouth Daily. 30 tablet 5   • clotrimazole-betamethasone (LOTRISONE) 1-0.05 % cream Apply  topically to the appropriate area as directed 2 (Two) Times a Day. 45 g 1   • cyclobenzaprine (FLEXERIL) 10 MG tablet Take one tablet by mouth three times a day as needed for muscle spasms. (Patient taking differently: Take 10 mg by mouth 3 (Three) Times a Day As Needed for Muscle Spasms. Take one tablet by mouth three times a day as needed for muscle spasms.) 30 tablet 5   •  Dietary Management Product (VASCULERA) tablet Take 1 tablet by mouth Daily. 90 tablet 5   • etodolac (LODINE) 400 MG tablet Take 400 mg by mouth 2 (Two) Times a Day.  3   • hydroxychloroquine (PLAQUENIL) 200 MG tablet Take 200 mg by mouth 2 (Two) Times a Day.     • ibuprofen (ADVIL,MOTRIN) 200 MG tablet Take 200 mg by mouth As Needed for Mild Pain .     • levoFLOXacin (LEVAQUIN) 750 MG tablet Take 1 tablet by mouth Daily. 14 tablet 0   • lisinopril-hydrochlorothiazide (PRINZIDE,ZESTORETIC) 20-12.5 MG per tablet TAKE 1 TABLET BY MOUTH DAILY 30 tablet 11   • Multiple Vitamins-Minerals (MULTIVITAMIN ADULT PO) Take 1 tablet by mouth Daily.     • ondansetron (ZOFRAN) 4 MG tablet Take 1 tablet by mouth Every 6 (Six) Hours As Needed for Nausea or Vomiting. 30 tablet 0   • oxaprozin (DAYPRO) 600 MG tablet Take 1,200 mg by mouth. Take 2 po every day     • SUMAtriptan (IMITREX) 100 MG tablet TAKE 1 TABLET BY MOUTH AT ONSET OF HEADACHE. MAY REPEAT DOSE ONE TIME IN 2 HOURS IF HEADACHE NOT RELIEVED. 9 tablet 3   • terbinafine (lamiSIL) 250 MG tablet Take 250 mg by mouth Daily.     • vitamin D (ERGOCALCIFEROL) 60998 units capsule capsule Take 1 capsule by mouth 1 (One) Time Per Week. (Patient taking differently: Take 50,000 Units by mouth 1 (One) Time Per Week. Sunday) 12 capsule 3   • [DISCONTINUED] sulfamethoxazole-trimethoprim (BACTRIM DS,SEPTRA DS) 800-160 MG per tablet        No current facility-administered medications on file prior to visit.       No Known Allergies   Past Medical History:   Diagnosis Date   • Bulging of lumbar intervertebral disc    • Hyperlipidemia    • Hypertension    • Migraine    • CHRISTOPHE on CPAP    • Rheumatoid arthritis (CMS/HCC)    • Swelling of left ear    • Wears contact lenses      Past Surgical History:   Procedure Laterality Date   • ACHILLES TENDON SURGERY Left 10/15/2019    Procedure: ACHILLES TENDON LENGTHENING LEFT;  Surgeon: Elodia Guzman MD;  Location: [facility];  Service: Orthopedics  "  • CATARACT EXTRACTION Right    • COLONOSCOPY  2015   • EYE SURGERY Bilateral     cornea transplant    • FOOT SURGERY Left 10/15/2019    triple arthrodesis and achilles tendon lengthening- [place]   • HERNIA REPAIR     • ILIAC CREST BONE GRAFT Left 10/15/2019    Procedure: ILIAC CREST BONE GRAFT LEFT;  Surgeon: Elodia Guzman MD;  Location:  [facility];  Service: Orthopedics   • TOE FUSION Left 10/15/2019    Procedure: TRIPLE ARTHODESIS LEFT;  Surgeon: Elodia Guzman MD;  Location:  [facility];  Service: Orthopedics     Family History   Problem Relation Age of Onset   • No Known Problems Mother    • No Known Problems Father       Social History     Socioeconomic History   • Marital status:      Spouse name: Not on file   • Number of children: Not on file   • Years of education: Not on file   • Highest education level: Not on file   Tobacco Use   • Smoking status: Never Smoker   • Smokeless tobacco: Never Used   Substance and Sexual Activity   • Alcohol use: Yes     Comment: rarely   • Drug use: No   • Sexual activity: Defer        Review of Systems   Constitutional: Negative.    HENT: Negative.    Eyes: Negative.    Respiratory: Negative.    Cardiovascular: Negative.    Gastrointestinal: Negative.    Endocrine: Negative.    Musculoskeletal: Positive for arthralgias and joint swelling.   Skin: Negative.    Allergic/Immunologic: Negative.    Neurological: Negative.    Hematological: Negative.    Psychiatric/Behavioral: Negative.        The following portions of the patient's history were reviewed and updated as appropriate: allergies, current medications, past family history, past medical history, past social history, past surgical history and problem list.      Objective      Physical Exam  Pulse 90   Ht 180.3 cm (70.98\")   Wt (!) 142 kg (313 lb 0.9 oz)   SpO2 99%   BMI 43.68 kg/m²     Body mass index is 43.68 kg/m².    Patient is well developed, well nourished and in no acute distress.  Alert and " oriented x 3.    Ortho Exam  Left lower extremity exam  Edema is improved compared to last week.  The lateral wound has also improved.  It is returned to being just superficial with inability to probe a cotton swab into the wound.  No new ulcers pre-ulcerous lesions    Medical Decision Making    Data Review:   none    Assessment:  1. Wound infection        Plan:  Left lateral foot wound continues to slowly improve.  It is better than it was last week.  His  Left lower extremity swelling and edema is also much better.  Plan today is continue with Unna boot and Bactroban dressing change.  I will see him back in 1 week or sooner if needed.    Radha Valencia PA-C  03/11/20  10:28

## 2020-03-19 ENCOUNTER — OFFICE VISIT (OUTPATIENT)
Dept: ORTHOPEDIC SURGERY | Facility: CLINIC | Age: 70
End: 2020-03-19

## 2020-03-19 VITALS — WEIGHT: 313.05 LBS | BODY MASS INDEX: 43.83 KG/M2 | HEIGHT: 71 IN

## 2020-03-19 DIAGNOSIS — I15.9 SECONDARY HYPERTENSION: ICD-10-CM

## 2020-03-19 DIAGNOSIS — F32.A DEPRESSION, UNSPECIFIED DEPRESSION TYPE: ICD-10-CM

## 2020-03-19 DIAGNOSIS — T14.8XXA WOUND INFECTION: Primary | ICD-10-CM

## 2020-03-19 DIAGNOSIS — L08.9 WOUND INFECTION: Primary | ICD-10-CM

## 2020-03-19 PROCEDURE — 29580 STRAPPING UNNA BOOT: CPT | Performed by: PHYSICIAN ASSISTANT

## 2020-03-19 RX ORDER — ATENOLOL 50 MG/1
50 TABLET ORAL DAILY
Qty: 30 TABLET | Refills: 5 | Status: SHIPPED | OUTPATIENT
Start: 2020-03-19 | End: 2020-11-02 | Stop reason: SDUPTHER

## 2020-03-19 RX ORDER — BUPROPION HYDROCHLORIDE 150 MG/1
150 TABLET ORAL DAILY
Qty: 30 TABLET | Refills: 5 | Status: SHIPPED | OUTPATIENT
Start: 2020-03-19 | End: 2020-11-02 | Stop reason: SDUPTHER

## 2020-03-19 NOTE — PROGRESS NOTES
[facility] [place] [facility]    Subjective     Patient: Obi Jackman Jr.  : 1950    Primary Care Provider: Cammy Oneal PA    Requesting Provider: As above    Follow-up (1 week follow up; Wound infection)      History    Chief Complaint: Follow-up left foot wound    History of Present Illness: Patient returns today for follow-up of his left lateral foot wound.  He has been in an Unna boot weekly.  He reports no new symptoms.    Current Outpatient Medications on File Prior to Visit   Medication Sig Dispense Refill   • acetaminophen (TYLENOL) 325 MG tablet Take 325 mg by mouth As Needed for Mild Pain .     • albuterol sulfate  (90 Base) MCG/ACT inhaler Inhale 2 puffs Every 4 (Four) Hours As Needed for Wheezing. (Patient taking differently: Inhale 2 puffs Every 4 (Four) Hours As Needed for Wheezing or Shortness of Air.) 1 inhaler 2   • ALPRAZolam (XANAX) 0.25 MG tablet Take 1 tablet by mouth Daily As Needed for Anxiety (when flying). 10 tablet 0   • atorvastatin (LIPITOR) 10 MG tablet Take 1 tablet by mouth Daily. 90 tablet 3   • Azelastine-Fluticasone 137-50 MCG/ACT suspension 1 spray each nostril daily (Patient taking differently: 2 sprays Daily As Needed (nasal congestion). 1 spray each nostril daily) 1 bottle 5   • clotrimazole-betamethasone (LOTRISONE) 1-0.05 % cream Apply  topically to the appropriate area as directed 2 (Two) Times a Day. 45 g 1   • cyclobenzaprine (FLEXERIL) 10 MG tablet Take one tablet by mouth three times a day as needed for muscle spasms. (Patient taking differently: Take 10 mg by mouth 3 (Three) Times a Day As Needed for Muscle Spasms. Take one tablet by mouth three times a day as needed for muscle spasms.) 30 tablet 5   • Dietary Management Product (VASCULERA) tablet Take 1 tablet by mouth Daily. 90 tablet 5   • etodolac (LODINE) 400 MG tablet Take 400 mg by mouth 2 (Two) Times a Day.  3   • hydroxychloroquine (PLAQUENIL) 200 MG tablet Take 200 mg by  mouth 2 (Two) Times a Day.     • ibuprofen (ADVIL,MOTRIN) 200 MG tablet Take 200 mg by mouth As Needed for Mild Pain .     • levoFLOXacin (LEVAQUIN) 750 MG tablet Take 1 tablet by mouth Daily. 14 tablet 0   • lisinopril-hydrochlorothiazide (PRINZIDE,ZESTORETIC) 20-12.5 MG per tablet TAKE 1 TABLET BY MOUTH DAILY 30 tablet 11   • Multiple Vitamins-Minerals (MULTIVITAMIN ADULT PO) Take 1 tablet by mouth Daily.     • ondansetron (ZOFRAN) 4 MG tablet Take 1 tablet by mouth Every 6 (Six) Hours As Needed for Nausea or Vomiting. 30 tablet 0   • oxaprozin (DAYPRO) 600 MG tablet Take 1,200 mg by mouth. Take 2 po every day     • SUMAtriptan (IMITREX) 100 MG tablet TAKE 1 TABLET BY MOUTH AT ONSET OF HEADACHE. MAY REPEAT DOSE ONE TIME IN 2 HOURS IF HEADACHE NOT RELIEVED. 9 tablet 3   • terbinafine (lamiSIL) 250 MG tablet Take 250 mg by mouth Daily.     • vitamin D (ERGOCALCIFEROL) 33322 units capsule capsule Take 1 capsule by mouth 1 (One) Time Per Week. (Patient taking differently: Take 50,000 Units by mouth 1 (One) Time Per Week. Sunday) 12 capsule 3     No current facility-administered medications on file prior to visit.       No Known Allergies   Past Medical History:   Diagnosis Date   • Bulging of lumbar intervertebral disc    • Hyperlipidemia    • Hypertension    • Migraine    • CHRISTOPHE on CPAP    • Rheumatoid arthritis (CMS/HCC)    • Swelling of left ear    • Wears contact lenses      Past Surgical History:   Procedure Laterality Date   • ACHILLES TENDON SURGERY Left 10/15/2019    Procedure: ACHILLES TENDON LENGTHENING LEFT;  Surgeon: Elodia Guzman MD;  Location: [facility];  Service: Orthopedics   • CATARACT EXTRACTION Right    • COLONOSCOPY  2015   • EYE SURGERY Bilateral     cornea transplant    • FOOT SURGERY Left 10/15/2019    triple arthrodesis and achilles tendon lengthening- [place]   • HERNIA REPAIR     • ILIAC CREST BONE GRAFT Left 10/15/2019    Procedure: ILIAC CREST BONE GRAFT LEFT;  Surgeon: Elodia Guzman,  "MD;  Location:  [facility];  Service: Orthopedics   • TOE FUSION Left 10/15/2019    Procedure: TRIPLE ARTHODESIS LEFT;  Surgeon: Elodai Guzman MD;  Location:  [facility];  Service: Orthopedics     Family History   Problem Relation Age of Onset   • No Known Problems Mother    • No Known Problems Father       Social History     Socioeconomic History   • Marital status:      Spouse name: Not on file   • Number of children: Not on file   • Years of education: Not on file   • Highest education level: Not on file   Tobacco Use   • Smoking status: Never Smoker   • Smokeless tobacco: Never Used   Substance and Sexual Activity   • Alcohol use: Yes     Comment: rarely   • Drug use: No   • Sexual activity: Defer        Review of Systems   Constitutional: Negative.    HENT: Negative.    Eyes: Negative.    Respiratory: Negative.    Cardiovascular: Negative.    Gastrointestinal: Negative.    Endocrine: Negative.    Genitourinary: Negative.    Musculoskeletal: Positive for arthralgias.   Skin: Negative.    Allergic/Immunologic: Negative.    Neurological: Negative.    Hematological: Negative.    Psychiatric/Behavioral: Negative.        The following portions of the patient's history were reviewed and updated as appropriate: allergies, current medications, past family history, past medical history, past social history, past surgical history and problem list.      Objective      Physical Exam  Ht 180.3 cm (70.98\")   Wt (!) 142 kg (313 lb 0.9 oz)   BMI 43.68 kg/m²     Body mass index is 43.68 kg/m².    Patient is well developed, well nourished and in no acute distress.  Alert and oriented x 3.    Ortho Exam  Left foot exam: There is less erythema around the wound today.  Is continues to be just superficial.  No tunneling.  No sign of infection.  Edema is worse today than it was last week.        Medical Decision Making    Data Review:   none    Assessment:  1. Wound infection        Plan:  Patient returns today for his left " foot wound.  The wound is still superficial with less erythema, no tunneling or sign of infection.  Plan today is that he return to a new Unna boot.  I will see him next week or sooner if needed.      Radha Valencia PA-C  03/20/20  09:29

## 2020-03-25 ENCOUNTER — OFFICE VISIT (OUTPATIENT)
Dept: ORTHOPEDIC SURGERY | Facility: CLINIC | Age: 70
End: 2020-03-25

## 2020-03-25 VITALS — HEART RATE: 88 BPM | BODY MASS INDEX: 43.83 KG/M2 | OXYGEN SATURATION: 98 % | HEIGHT: 71 IN | WEIGHT: 313.05 LBS

## 2020-03-25 DIAGNOSIS — L08.9 WOUND INFECTION: Primary | ICD-10-CM

## 2020-03-25 DIAGNOSIS — T14.8XXA WOUND INFECTION: Primary | ICD-10-CM

## 2020-03-25 PROCEDURE — 29580 STRAPPING UNNA BOOT: CPT | Performed by: PHYSICIAN ASSISTANT

## 2020-03-25 NOTE — PROGRESS NOTES
[facility] [place] [facility] Note    Subjective     Patient: Obi Jackman Jr.  : 1950    Primary Care Provider: Cammy Oneal PA    Requesting Provider: As above    Follow-up (Follow-up left foot wound)      History    Chief Complaint: f/u left foot wound    History of Present Illness: Patient returns today for his f/up left lateral foot wound.    Current Outpatient Medications on File Prior to Visit   Medication Sig Dispense Refill   • acetaminophen (TYLENOL) 325 MG tablet Take 325 mg by mouth As Needed for Mild Pain .     • albuterol sulfate  (90 Base) MCG/ACT inhaler Inhale 2 puffs Every 4 (Four) Hours As Needed for Wheezing. (Patient taking differently: Inhale 2 puffs Every 4 (Four) Hours As Needed for Wheezing or Shortness of Air.) 1 inhaler 2   • ALPRAZolam (XANAX) 0.25 MG tablet Take 1 tablet by mouth Daily As Needed for Anxiety (when flying). 10 tablet 0   • atenolol (TENORMIN) 50 MG tablet Take 1 tablet by mouth Daily. 30 tablet 5   • atorvastatin (LIPITOR) 10 MG tablet Take 1 tablet by mouth Daily. 90 tablet 3   • Azelastine-Fluticasone 137-50 MCG/ACT suspension 1 spray each nostril daily (Patient taking differently: 2 sprays Daily As Needed (nasal congestion). 1 spray each nostril daily) 1 bottle 5   • buPROPion XL (WELLBUTRIN XL) 150 MG 24 hr tablet Take 1 tablet by mouth Daily. 30 tablet 5   • clotrimazole-betamethasone (LOTRISONE) 1-0.05 % cream Apply  topically to the appropriate area as directed 2 (Two) Times a Day. 45 g 1   • cyclobenzaprine (FLEXERIL) 10 MG tablet Take one tablet by mouth three times a day as needed for muscle spasms. (Patient taking differently: Take 10 mg by mouth 3 (Three) Times a Day As Needed for Muscle Spasms. Take one tablet by mouth three times a day as needed for muscle spasms.) 30 tablet 5   • Dietary Management Product (VASCULERA) tablet Take 1 tablet by mouth Daily. 90 tablet 5   • etodolac (LODINE) 400 MG tablet Take 400 mg by mouth 2  (Two) Times a Day.  3   • hydroxychloroquine (PLAQUENIL) 200 MG tablet Take 200 mg by mouth 2 (Two) Times a Day.     • ibuprofen (ADVIL,MOTRIN) 200 MG tablet Take 200 mg by mouth As Needed for Mild Pain .     • levoFLOXacin (LEVAQUIN) 750 MG tablet Take 1 tablet by mouth Daily. 14 tablet 0   • lisinopril-hydrochlorothiazide (PRINZIDE,ZESTORETIC) 20-12.5 MG per tablet TAKE 1 TABLET BY MOUTH DAILY 30 tablet 11   • Multiple Vitamins-Minerals (MULTIVITAMIN ADULT PO) Take 1 tablet by mouth Daily.     • ondansetron (ZOFRAN) 4 MG tablet Take 1 tablet by mouth Every 6 (Six) Hours As Needed for Nausea or Vomiting. 30 tablet 0   • oxaprozin (DAYPRO) 600 MG tablet Take 1,200 mg by mouth. Take 2 po every day     • SUMAtriptan (IMITREX) 100 MG tablet TAKE 1 TABLET BY MOUTH AT ONSET OF HEADACHE. MAY REPEAT DOSE ONE TIME IN 2 HOURS IF HEADACHE NOT RELIEVED. 9 tablet 3   • terbinafine (lamiSIL) 250 MG tablet Take 250 mg by mouth Daily.     • vitamin D (ERGOCALCIFEROL) 15789 units capsule capsule Take 1 capsule by mouth 1 (One) Time Per Week. (Patient taking differently: Take 50,000 Units by mouth 1 (One) Time Per Week. Sunday) 12 capsule 3     No current facility-administered medications on file prior to visit.       No Known Allergies   Past Medical History:   Diagnosis Date   • Bulging of lumbar intervertebral disc    • Hyperlipidemia    • Hypertension    • Migraine    • CHRISTOPHE on CPAP    • Rheumatoid arthritis (CMS/HCC)    • Swelling of left ear    • Wears contact lenses      Past Surgical History:   Procedure Laterality Date   • ACHILLES TENDON SURGERY Left 10/15/2019    Procedure: ACHILLES TENDON LENGTHENING LEFT;  Surgeon: Elodia Guzman MD;  Location: [facility];  Service: Orthopedics   • CATARACT EXTRACTION Right    • COLONOSCOPY  2015   • EYE SURGERY Bilateral     cornea transplant    • FOOT SURGERY Left 10/15/2019    triple arthrodesis and achilles tendon lengthening- [place]   • HERNIA REPAIR     • ILIAC CREST BONE GRAFT  "Left 10/15/2019    Procedure: ILIAC CREST BONE GRAFT LEFT;  Surgeon: Elodia Guzman MD;  Location:  [facility];  Service: Orthopedics   • TOE FUSION Left 10/15/2019    Procedure: TRIPLE ARTHODESIS LEFT;  Surgeon: Eldoia Guzman MD;  Location:  [facility];  Service: Orthopedics     Family History   Problem Relation Age of Onset   • No Known Problems Mother    • No Known Problems Father       Social History     Socioeconomic History   • Marital status:      Spouse name: Not on file   • Number of children: Not on file   • Years of education: Not on file   • Highest education level: Not on file   Tobacco Use   • Smoking status: Never Smoker   • Smokeless tobacco: Never Used   Substance and Sexual Activity   • Alcohol use: Yes     Comment: rarely   • Drug use: No   • Sexual activity: Defer        Review of Systems   Constitutional: Negative.    HENT: Negative.    Eyes: Negative.    Respiratory: Negative.    Cardiovascular: Negative.    Gastrointestinal: Negative.    Endocrine: Negative.    Genitourinary: Negative.    Musculoskeletal: Positive for arthralgias.   Skin: Negative.    Allergic/Immunologic: Negative.    Neurological: Negative.    Hematological: Negative.    Psychiatric/Behavioral: Negative.        The following portions of the patient's history were reviewed and updated as appropriate: allergies, current medications, past family history, past medical history, past social history, past surgical history and problem list.      Objective      Physical Exam  Pulse 88   Ht 180.3 cm (70.98\")   Wt (!) 142 kg (313 lb 0.9 oz)   SpO2 98%   BMI 43.68 kg/m²     Body mass index is 43.68 kg/m².    Patient is well developed, well nourished and in no acute distress.  Alert and oriented x 3.    Ortho Exam  Left foot exam:  Edema is improved compared to last week.  Lateral wound improving with no erythema, superficial.  No sign of infection.      Medical Decision Making    Data Review:   none    Assessment:  1. Wound " infection        Plan:  Lateral left foot wound continues to slowly improve.  He will go back into an Unna boot with bactroban dressing.  He will return in one week for repeat exam or sooner if needed.    Patient history, diagnosis and treatment plan discussed with Dr. Guzman.        Radha Valencia PA-C  03/25/20  13:00

## 2020-04-01 ENCOUNTER — OFFICE VISIT (OUTPATIENT)
Dept: ORTHOPEDIC SURGERY | Facility: CLINIC | Age: 70
End: 2020-04-01

## 2020-04-01 VITALS — HEIGHT: 71 IN | BODY MASS INDEX: 43.83 KG/M2 | WEIGHT: 313.05 LBS | HEART RATE: 91 BPM | OXYGEN SATURATION: 99 %

## 2020-04-01 DIAGNOSIS — L08.9 WOUND INFECTION: Primary | ICD-10-CM

## 2020-04-01 DIAGNOSIS — T14.8XXA WOUND INFECTION: Primary | ICD-10-CM

## 2020-04-01 PROCEDURE — 99212 OFFICE O/P EST SF 10 MIN: CPT | Performed by: ORTHOPAEDIC SURGERY

## 2020-04-01 RX ORDER — TRAMADOL HYDROCHLORIDE 50 MG/1
50 TABLET ORAL 3 TIMES DAILY PRN
COMMUNITY
End: 2020-08-05 | Stop reason: HOSPADM

## 2020-04-01 NOTE — PROGRESS NOTES
ESTABLISHED PATIENT    Patient: Obi Jackman Jr.  : 1950    Primary Care Provider: Cammy Oneal PA    Requesting Provider: As above    Follow-up (1 week recheck - Wound infection- osteoarthritis of left ankle and foot)      History    Chief Complaint: left foot wound    History of Present Illness: still slowly healing lateral wound, no sign of infection    Current Outpatient Medications on File Prior to Visit   Medication Sig Dispense Refill   • acetaminophen (TYLENOL) 325 MG tablet Take 325 mg by mouth As Needed for Mild Pain .     • albuterol sulfate  (90 Base) MCG/ACT inhaler Inhale 2 puffs Every 4 (Four) Hours As Needed for Wheezing. (Patient taking differently: Inhale 2 puffs Every 4 (Four) Hours As Needed for Wheezing or Shortness of Air.) 1 inhaler 2   • ALPRAZolam (XANAX) 0.25 MG tablet Take 1 tablet by mouth Daily As Needed for Anxiety (when flying). 10 tablet 0   • atenolol (TENORMIN) 50 MG tablet Take 1 tablet by mouth Daily. 30 tablet 5   • atorvastatin (LIPITOR) 10 MG tablet Take 1 tablet by mouth Daily. 90 tablet 3   • Azelastine-Fluticasone 137-50 MCG/ACT suspension 1 spray each nostril daily (Patient taking differently: 2 sprays Daily As Needed (nasal congestion). 1 spray each nostril daily) 1 bottle 5   • buPROPion XL (WELLBUTRIN XL) 150 MG 24 hr tablet Take 1 tablet by mouth Daily. 30 tablet 5   • clotrimazole-betamethasone (LOTRISONE) 1-0.05 % cream Apply  topically to the appropriate area as directed 2 (Two) Times a Day. 45 g 1   • cyclobenzaprine (FLEXERIL) 10 MG tablet Take one tablet by mouth three times a day as needed for muscle spasms. (Patient taking differently: Take 10 mg by mouth 3 (Three) Times a Day As Needed for Muscle Spasms. Take one tablet by mouth three times a day as needed for muscle spasms.) 30 tablet 5   • Dietary Management Product (VASCULERA) tablet Take 1 tablet by mouth Daily. 90 tablet 5   • etodolac (LODINE) 400 MG tablet Take 400 mg by mouth 2  (Two) Times a Day.  3   • hydroxychloroquine (PLAQUENIL) 200 MG tablet Take 200 mg by mouth 2 (Two) Times a Day.     • ibuprofen (ADVIL,MOTRIN) 200 MG tablet Take 200 mg by mouth As Needed for Mild Pain .     • levoFLOXacin (LEVAQUIN) 750 MG tablet Take 1 tablet by mouth Daily. 14 tablet 0   • lisinopril-hydrochlorothiazide (PRINZIDE,ZESTORETIC) 20-12.5 MG per tablet TAKE 1 TABLET BY MOUTH DAILY 30 tablet 11   • Multiple Vitamins-Minerals (MULTIVITAMIN ADULT PO) Take 1 tablet by mouth Daily.     • ondansetron (ZOFRAN) 4 MG tablet Take 1 tablet by mouth Every 6 (Six) Hours As Needed for Nausea or Vomiting. 30 tablet 0   • oxaprozin (DAYPRO) 600 MG tablet Take 1,200 mg by mouth. Take 2 po every day     • SUMAtriptan (IMITREX) 100 MG tablet TAKE 1 TABLET BY MOUTH AT ONSET OF HEADACHE. MAY REPEAT DOSE ONE TIME IN 2 HOURS IF HEADACHE NOT RELIEVED. 9 tablet 3   • terbinafine (lamiSIL) 250 MG tablet Take 250 mg by mouth Daily.     • traMADol (ULTRAM) 50 MG tablet Take 50 mg by mouth As Needed for Moderate Pain .     • vitamin D (ERGOCALCIFEROL) 33701 units capsule capsule Take 1 capsule by mouth 1 (One) Time Per Week. (Patient taking differently: Take 50,000 Units by mouth 1 (One) Time Per Week. Sunday) 12 capsule 3     No current facility-administered medications on file prior to visit.       No Known Allergies   Past Medical History:   Diagnosis Date   • Bulging of lumbar intervertebral disc    • Hyperlipidemia    • Hypertension    • Migraine    • CHRISTOPHE on CPAP    • Rheumatoid arthritis (CMS/HCC)    • Swelling of left ear    • Wears contact lenses      Past Surgical History:   Procedure Laterality Date   • ACHILLES TENDON SURGERY Left 10/15/2019    Procedure: ACHILLES TENDON LENGTHENING LEFT;  Surgeon: Elodia Guzman MD;  Location: [facility];  Service: Orthopedics   • CATARACT EXTRACTION Right    • COLONOSCOPY  2015   • EYE SURGERY Bilateral     cornea transplant    • FOOT SURGERY Left 10/15/2019    triple arthrodesis  "and achilles tendon lengthening- [place]   • HERNIA REPAIR     • ILIAC CREST BONE GRAFT Left 10/15/2019    Procedure: ILIAC CREST BONE GRAFT LEFT;  Surgeon: Elodia Guzman MD;  Location: [facility] OR;  Service: Orthopedics   • TOE FUSION Left 10/15/2019    Procedure: TRIPLE ARTHODESIS LEFT;  Surgeon: Elodia Guzman MD;  Location: [facility] OR;  Service: Orthopedics     Family History   Problem Relation Age of Onset   • No Known Problems Mother    • No Known Problems Father       Social History     Socioeconomic History   • Marital status:      Spouse name: Not on file   • Number of children: Not on file   • Years of education: Not on file   • Highest education level: Not on file   Tobacco Use   • Smoking status: Never Smoker   • Smokeless tobacco: Never Used   Substance and Sexual Activity   • Alcohol use: Yes     Comment: rarely   • Drug use: No   • Sexual activity: Defer        Review of Systems   Constitutional: Negative.    HENT: Negative.    Eyes: Negative.    Respiratory: Negative.    Cardiovascular: Positive for leg swelling.   Gastrointestinal: Negative.    Endocrine: Negative.    Genitourinary: Negative.    Musculoskeletal: Positive for arthralgias.   Skin: Negative.    Allergic/Immunologic: Negative.    Neurological: Negative.    Hematological: Negative.    Psychiatric/Behavioral: Negative.        The following portions of the patient's history were reviewed and updated as appropriate: allergies, current medications, past family history, past medical history, past social history, past surgical history and problem list.    Physical Exam:   Pulse 91   Ht 180.3 cm (70.98\")   Wt (!) 142 kg (313 lb 0.9 oz)   SpO2 99%   BMI 43.68 kg/m²   GENERAL: Body habitus: morbidly obese    Lower extremity edema: Left: 2+ pitting; Right: 2+ pitting    Gait: using cane     Mental Status:  awake and alert; oriented to person, place, and time  MSK:    Medical Decision Making    Data Review: "   none    Assessment/Plan/Diagnosis/Treatment Options:   1. Wound infection  No sign of infection, but wound very slow to heal- underlying co morbidities make this difficult.  I want to try wet to dry for a week, and use ace wraps from toes to knee to control the swelling.  Do the dressing twice per day, and we gave him instructions, I will see him in a week.     Also, Dr Castanon would like to start a new med, and we will try to get in touch with her to discuss it

## 2020-04-01 NOTE — PATIENT INSTRUCTIONS
Change dressing twice per day- use 1/4inch packing strips (Nugauze is a common brand) moisten it with saline, gently place into wound- don't force it but it must go to the bottom of the wound.  Then cover with dry gauze and ace bandages from toes to knee.  The packing should dry and should be pulled out dry to pull out the scab in the wound

## 2020-04-08 ENCOUNTER — OFFICE VISIT (OUTPATIENT)
Dept: ORTHOPEDIC SURGERY | Facility: CLINIC | Age: 70
End: 2020-04-08

## 2020-04-08 VITALS — OXYGEN SATURATION: 98 % | WEIGHT: 313.05 LBS | HEART RATE: 80 BPM | HEIGHT: 71 IN | BODY MASS INDEX: 43.83 KG/M2

## 2020-04-08 DIAGNOSIS — Z98.890 POST-OPERATIVE STATE: Primary | ICD-10-CM

## 2020-04-08 PROCEDURE — 99212 OFFICE O/P EST SF 10 MIN: CPT | Performed by: ORTHOPAEDIC SURGERY

## 2020-04-08 NOTE — PROGRESS NOTES
ESTABLISHED PATIENT    Patient: Obi Jackman Jr.  : 1950    Primary Care Provider: Cammy Oneal PA    Requesting Provider: As above    Follow-up (1 week follow up - Wound infection)      History    Chief Complaint: wound in lateral incision    History of Present Illness: continues to improve- we are doing wet to dry and acewrap toes to knee, much less pain    Current Outpatient Medications on File Prior to Visit   Medication Sig Dispense Refill   • acetaminophen (TYLENOL) 325 MG tablet Take 325 mg by mouth As Needed for Mild Pain .     • albuterol sulfate  (90 Base) MCG/ACT inhaler Inhale 2 puffs Every 4 (Four) Hours As Needed for Wheezing. (Patient taking differently: Inhale 2 puffs Every 4 (Four) Hours As Needed for Wheezing or Shortness of Air.) 1 inhaler 2   • ALPRAZolam (XANAX) 0.25 MG tablet Take 1 tablet by mouth Daily As Needed for Anxiety (when flying). 10 tablet 0   • atenolol (TENORMIN) 50 MG tablet Take 1 tablet by mouth Daily. 30 tablet 5   • atorvastatin (LIPITOR) 10 MG tablet Take 1 tablet by mouth Daily. 90 tablet 3   • Azelastine-Fluticasone 137-50 MCG/ACT suspension 1 spray each nostril daily (Patient taking differently: 2 sprays Daily As Needed (nasal congestion). 1 spray each nostril daily) 1 bottle 5   • buPROPion XL (WELLBUTRIN XL) 150 MG 24 hr tablet Take 1 tablet by mouth Daily. 30 tablet 5   • clotrimazole-betamethasone (LOTRISONE) 1-0.05 % cream Apply  topically to the appropriate area as directed 2 (Two) Times a Day. 45 g 1   • cyclobenzaprine (FLEXERIL) 10 MG tablet Take one tablet by mouth three times a day as needed for muscle spasms. (Patient taking differently: Take 10 mg by mouth 3 (Three) Times a Day As Needed for Muscle Spasms. Take one tablet by mouth three times a day as needed for muscle spasms.) 30 tablet 5   • Dietary Management Product (VASCULERA) tablet Take 1 tablet by mouth Daily. 90 tablet 5   • etodolac (LODINE) 400 MG tablet Take 400 mg by mouth 2  (Two) Times a Day.  3   • hydroxychloroquine (PLAQUENIL) 200 MG tablet Take 200 mg by mouth 2 (Two) Times a Day.     • ibuprofen (ADVIL,MOTRIN) 200 MG tablet Take 200 mg by mouth As Needed for Mild Pain .     • levoFLOXacin (LEVAQUIN) 750 MG tablet Take 1 tablet by mouth Daily. 14 tablet 0   • lisinopril-hydrochlorothiazide (PRINZIDE,ZESTORETIC) 20-12.5 MG per tablet TAKE 1 TABLET BY MOUTH DAILY 30 tablet 11   • Multiple Vitamins-Minerals (MULTIVITAMIN ADULT PO) Take 1 tablet by mouth Daily.     • ondansetron (ZOFRAN) 4 MG tablet Take 1 tablet by mouth Every 6 (Six) Hours As Needed for Nausea or Vomiting. 30 tablet 0   • oxaprozin (DAYPRO) 600 MG tablet Take 1,200 mg by mouth. Take 2 po every day     • SUMAtriptan (IMITREX) 100 MG tablet TAKE 1 TABLET BY MOUTH AT ONSET OF HEADACHE. MAY REPEAT DOSE ONE TIME IN 2 HOURS IF HEADACHE NOT RELIEVED. 9 tablet 3   • terbinafine (lamiSIL) 250 MG tablet Take 250 mg by mouth Daily.     • traMADol (ULTRAM) 50 MG tablet Take 50 mg by mouth As Needed for Moderate Pain .     • vitamin D (ERGOCALCIFEROL) 70877 units capsule capsule Take 1 capsule by mouth 1 (One) Time Per Week. (Patient taking differently: Take 50,000 Units by mouth 1 (One) Time Per Week. Sunday) 12 capsule 3     No current facility-administered medications on file prior to visit.       No Known Allergies   Past Medical History:   Diagnosis Date   • Bulging of lumbar intervertebral disc    • Hyperlipidemia    • Hypertension    • Migraine    • CHRISTOPHE on CPAP    • Rheumatoid arthritis (CMS/HCC)    • Swelling of left ear    • Wears contact lenses      Past Surgical History:   Procedure Laterality Date   • ACHILLES TENDON SURGERY Left 10/15/2019    Procedure: ACHILLES TENDON LENGTHENING LEFT;  Surgeon: Elodia Guzman MD;  Location: [facility];  Service: Orthopedics   • CATARACT EXTRACTION Right    • COLONOSCOPY  2015   • EYE SURGERY Bilateral     cornea transplant    • FOOT SURGERY Left 10/15/2019    triple arthrodesis  "and achilles tendon lengthening- [place]   • HERNIA REPAIR     • ILIAC CREST BONE GRAFT Left 10/15/2019    Procedure: ILIAC CREST BONE GRAFT LEFT;  Surgeon: Elodia Guzman MD;  Location: [facility] OR;  Service: Orthopedics   • TOE FUSION Left 10/15/2019    Procedure: TRIPLE ARTHODESIS LEFT;  Surgeon: Elodia Guzman MD;  Location: [facility] OR;  Service: Orthopedics     Family History   Problem Relation Age of Onset   • No Known Problems Mother    • No Known Problems Father       Social History     Socioeconomic History   • Marital status:      Spouse name: Not on file   • Number of children: Not on file   • Years of education: Not on file   • Highest education level: Not on file   Tobacco Use   • Smoking status: Never Smoker   • Smokeless tobacco: Never Used   Substance and Sexual Activity   • Alcohol use: Yes     Comment: rarely   • Drug use: No   • Sexual activity: Defer        Review of Systems   Constitutional: Negative.    HENT: Negative.    Eyes: Negative.    Respiratory: Negative.    Cardiovascular: Negative.    Gastrointestinal: Negative.    Endocrine: Negative.    Genitourinary: Negative.    Musculoskeletal: Positive for arthralgias and joint swelling.   Skin: Negative.    Allergic/Immunologic: Negative.    Neurological: Negative.    Hematological: Negative.    Psychiatric/Behavioral: Negative.        The following portions of the patient's history were reviewed and updated as appropriate: allergies, current medications, past family history, past medical history, past social history, past surgical history and problem list.    Physical Exam:   Pulse 80   Ht 180.3 cm (70.98\")   Wt (!) 142 kg (313 lb 0.9 oz)   SpO2 98%   BMI 43.68 kg/m²   GENERAL: Body habitus: morbidly obese    Lower extremity edema: Left: 1+ pitting; Right: 1+ pitting    Gait: using cane     Mental Status:  awake and alert; oriented to person, place, and time  MSK:      Left foot lateral wound continues to improve, it is superficial " no erythema    Medical Decision Making    Data Review:   none    Assessment/Plan/Diagnosis/Treatment Options:   1. Post-operative state  Continues to improve, wet to dry with acewraps to control swelling is working.  I will see him in 2 weeks, sooner if he is worse

## 2020-04-22 ENCOUNTER — OFFICE VISIT (OUTPATIENT)
Dept: ORTHOPEDIC SURGERY | Facility: CLINIC | Age: 70
End: 2020-04-22

## 2020-04-22 VITALS — HEART RATE: 97 BPM | BODY MASS INDEX: 43.83 KG/M2 | HEIGHT: 71 IN | OXYGEN SATURATION: 99 % | WEIGHT: 313.05 LBS

## 2020-04-22 DIAGNOSIS — L08.9 WOUND INFECTION: Primary | ICD-10-CM

## 2020-04-22 DIAGNOSIS — T14.8XXA WOUND INFECTION: Primary | ICD-10-CM

## 2020-04-22 PROCEDURE — 99212 OFFICE O/P EST SF 10 MIN: CPT | Performed by: ORTHOPAEDIC SURGERY

## 2020-04-22 NOTE — PROGRESS NOTES
ESTABLISHED PATIENT    Patient: Obi Jackman Jr.  : 1950    Primary Care Provider: Cammy Oneal PA    Requesting Provider: As above    Follow-up (2 week recheck  - Wound infection)      History    Chief Complaint: He returns for follow-up of his superficial wound infection left foot    History of Present Illness: No fever no chills he is using a Band-Aid and I want him to change that back to the wet-to-dry with Nu Gauze    Current Outpatient Medications on File Prior to Visit   Medication Sig Dispense Refill   • acetaminophen (TYLENOL) 325 MG tablet Take 325 mg by mouth As Needed for Mild Pain .     • albuterol sulfate  (90 Base) MCG/ACT inhaler Inhale 2 puffs Every 4 (Four) Hours As Needed for Wheezing. (Patient taking differently: Inhale 2 puffs Every 4 (Four) Hours As Needed for Wheezing or Shortness of Air.) 1 inhaler 2   • ALPRAZolam (XANAX) 0.25 MG tablet Take 1 tablet by mouth Daily As Needed for Anxiety (when flying). 10 tablet 0   • atenolol (TENORMIN) 50 MG tablet Take 1 tablet by mouth Daily. 30 tablet 5   • atorvastatin (LIPITOR) 10 MG tablet Take 1 tablet by mouth Daily. 90 tablet 3   • Azelastine-Fluticasone 137-50 MCG/ACT suspension 1 spray each nostril daily (Patient taking differently: 2 sprays Daily As Needed (nasal congestion). 1 spray each nostril daily) 1 bottle 5   • buPROPion XL (WELLBUTRIN XL) 150 MG 24 hr tablet Take 1 tablet by mouth Daily. 30 tablet 5   • clotrimazole-betamethasone (LOTRISONE) 1-0.05 % cream Apply  topically to the appropriate area as directed 2 (Two) Times a Day. 45 g 1   • cyclobenzaprine (FLEXERIL) 10 MG tablet Take one tablet by mouth three times a day as needed for muscle spasms. (Patient taking differently: Take 10 mg by mouth 3 (Three) Times a Day As Needed for Muscle Spasms. Take one tablet by mouth three times a day as needed for muscle spasms.) 30 tablet 5   • Dietary Management Product (VASCULERA) tablet Take 1 tablet by mouth Daily. 90  tablet 5   • etodolac (LODINE) 400 MG tablet Take 400 mg by mouth 2 (Two) Times a Day.  3   • hydroxychloroquine (PLAQUENIL) 200 MG tablet Take 200 mg by mouth 2 (Two) Times a Day.     • ibuprofen (ADVIL,MOTRIN) 200 MG tablet Take 200 mg by mouth As Needed for Mild Pain .     • levoFLOXacin (LEVAQUIN) 750 MG tablet Take 1 tablet by mouth Daily. 14 tablet 0   • lisinopril-hydrochlorothiazide (PRINZIDE,ZESTORETIC) 20-12.5 MG per tablet TAKE 1 TABLET BY MOUTH DAILY 30 tablet 11   • Multiple Vitamins-Minerals (MULTIVITAMIN ADULT PO) Take 1 tablet by mouth Daily.     • ondansetron (ZOFRAN) 4 MG tablet Take 1 tablet by mouth Every 6 (Six) Hours As Needed for Nausea or Vomiting. 30 tablet 0   • oxaprozin (DAYPRO) 600 MG tablet Take 1,200 mg by mouth. Take 2 po every day     • SUMAtriptan (IMITREX) 100 MG tablet TAKE 1 TABLET BY MOUTH AT ONSET OF HEADACHE. MAY REPEAT DOSE ONE TIME IN 2 HOURS IF HEADACHE NOT RELIEVED. 9 tablet 3   • terbinafine (lamiSIL) 250 MG tablet Take 250 mg by mouth Daily.     • traMADol (ULTRAM) 50 MG tablet Take 50 mg by mouth As Needed for Moderate Pain .     • vitamin D (ERGOCALCIFEROL) 60605 units capsule capsule Take 1 capsule by mouth 1 (One) Time Per Week. (Patient taking differently: Take 50,000 Units by mouth 1 (One) Time Per Week. Sunday) 12 capsule 3     No current facility-administered medications on file prior to visit.       No Known Allergies   Past Medical History:   Diagnosis Date   • Bulging of lumbar intervertebral disc    • Hyperlipidemia    • Hypertension    • Migraine    • CHRISTOPHE on CPAP    • Rheumatoid arthritis (CMS/HCC)    • Swelling of left ear    • Wears contact lenses      Past Surgical History:   Procedure Laterality Date   • ACHILLES TENDON SURGERY Left 10/15/2019    Procedure: ACHILLES TENDON LENGTHENING LEFT;  Surgeon: Elodia Guzman MD;  Location: [facility];  Service: Orthopedics   • CATARACT EXTRACTION Right    • COLONOSCOPY  2015   • EYE SURGERY Bilateral     cornea  "transplant    • FOOT SURGERY Left 10/15/2019    triple arthrodesis and achilles tendon lengthening- [place]   • HERNIA REPAIR     • ILIAC CREST BONE GRAFT Left 10/15/2019    Procedure: ILIAC CREST BONE GRAFT LEFT;  Surgeon: Elodia Guzman MD;  Location:  [facility];  Service: Orthopedics   • TOE FUSION Left 10/15/2019    Procedure: TRIPLE ARTHODESIS LEFT;  Surgeon: Elodia Guzman MD;  Location:  [facility] OR;  Service: Orthopedics     Family History   Problem Relation Age of Onset   • No Known Problems Mother    • No Known Problems Father       Social History     Socioeconomic History   • Marital status:      Spouse name: Not on file   • Number of children: Not on file   • Years of education: Not on file   • Highest education level: Not on file   Tobacco Use   • Smoking status: Never Smoker   • Smokeless tobacco: Never Used   Substance and Sexual Activity   • Alcohol use: Yes     Comment: rarely   • Drug use: No   • Sexual activity: Defer        Review of Systems   Constitutional: Negative.    HENT: Negative.    Eyes: Negative.    Respiratory: Negative.    Cardiovascular: Negative.    Gastrointestinal: Negative.    Endocrine: Negative.    Genitourinary: Negative.    Musculoskeletal: Positive for arthralgias.   Skin: Negative.    Allergic/Immunologic: Negative.    Neurological: Negative.    Hematological: Negative.    Psychiatric/Behavioral: Negative.        The following portions of the patient's history were reviewed and updated as appropriate: allergies, current medications, past family history, past medical history, past social history, past surgical history and problem list.    Physical Exam:   Pulse 97   Ht 180.3 cm (70.98\")   Wt (!) 142 kg (313 lb 0.9 oz)   SpO2 99%   BMI 43.68 kg/m²   GENERAL: Body habitus: trunkal obesity    Lower extremity edema: Left: 2+ pitting; Right: 2+ pitting    Gait: using cane     Mental Status:  awake and alert; oriented to person, place, and time  MSK:          Foot:   " Left:  Continuing to slowly improve, it is a little more macerated because of the Band-Aid           NEURO Sensation:  intact    Medical Decision Making    Data Review:   none    Assessment/Plan/Diagnosis/Treatment Options:   1. Wound infection  Superficial wound is still slowly healing, I probed it it does not have any tracking, there is no significant erythema, it is more macerated because of the Band-Aid.  I want him to go back to the wet-to-dry with Nu Gauze I will see him in 3 or 4 weeks sooner if anything gets worse

## 2020-05-06 ENCOUNTER — OFFICE VISIT (OUTPATIENT)
Dept: ORTHOPEDIC SURGERY | Facility: CLINIC | Age: 70
End: 2020-05-06

## 2020-05-06 VITALS — HEIGHT: 71 IN | OXYGEN SATURATION: 99 % | HEART RATE: 97 BPM | WEIGHT: 313.05 LBS | BODY MASS INDEX: 43.83 KG/M2

## 2020-05-06 DIAGNOSIS — Z98.890 POST-OPERATIVE STATE: Primary | ICD-10-CM

## 2020-05-06 PROCEDURE — 99212 OFFICE O/P EST SF 10 MIN: CPT | Performed by: ORTHOPAEDIC SURGERY

## 2020-05-06 NOTE — PROGRESS NOTES
ESTABLISHED PATIENT    Patient: Obi Jackman Jr.  : 1950    Primary Care Provider: Cammy Oneal PA    Requesting Provider: As above    Follow-up (1.5 week wound  infection check)      History    Chief Complaint: Follow-up left foot wound    History of Present Illness: Continues to slowly improve, no fever no chills    Current Outpatient Medications on File Prior to Visit   Medication Sig Dispense Refill   • acetaminophen (TYLENOL) 325 MG tablet Take 325 mg by mouth As Needed for Mild Pain .     • albuterol sulfate  (90 Base) MCG/ACT inhaler Inhale 2 puffs Every 4 (Four) Hours As Needed for Wheezing. (Patient taking differently: Inhale 2 puffs Every 4 (Four) Hours As Needed for Wheezing or Shortness of Air.) 1 inhaler 2   • ALPRAZolam (XANAX) 0.25 MG tablet Take 1 tablet by mouth Daily As Needed for Anxiety (when flying). 10 tablet 0   • atenolol (TENORMIN) 50 MG tablet Take 1 tablet by mouth Daily. 30 tablet 5   • atorvastatin (LIPITOR) 10 MG tablet Take 1 tablet by mouth Daily. 90 tablet 3   • Azelastine-Fluticasone 137-50 MCG/ACT suspension 1 spray each nostril daily (Patient taking differently: 2 sprays Daily As Needed (nasal congestion). 1 spray each nostril daily) 1 bottle 5   • buPROPion XL (WELLBUTRIN XL) 150 MG 24 hr tablet Take 1 tablet by mouth Daily. 30 tablet 5   • clotrimazole-betamethasone (LOTRISONE) 1-0.05 % cream Apply  topically to the appropriate area as directed 2 (Two) Times a Day. 45 g 1   • cyclobenzaprine (FLEXERIL) 10 MG tablet Take one tablet by mouth three times a day as needed for muscle spasms. (Patient taking differently: Take 10 mg by mouth 3 (Three) Times a Day As Needed for Muscle Spasms. Take one tablet by mouth three times a day as needed for muscle spasms.) 30 tablet 5   • Dietary Management Product (VASCULERA) tablet Take 1 tablet by mouth Daily. 90 tablet 5   • etodolac (LODINE) 400 MG tablet Take 400 mg by mouth 2 (Two) Times a Day.  3   •  hydroxychloroquine (PLAQUENIL) 200 MG tablet Take 200 mg by mouth 2 (Two) Times a Day.     • ibuprofen (ADVIL,MOTRIN) 200 MG tablet Take 200 mg by mouth As Needed for Mild Pain .     • levoFLOXacin (LEVAQUIN) 750 MG tablet Take 1 tablet by mouth Daily. 14 tablet 0   • lisinopril-hydrochlorothiazide (PRINZIDE,ZESTORETIC) 20-12.5 MG per tablet TAKE 1 TABLET BY MOUTH DAILY 30 tablet 11   • Multiple Vitamins-Minerals (MULTIVITAMIN ADULT PO) Take 1 tablet by mouth Daily.     • ondansetron (ZOFRAN) 4 MG tablet Take 1 tablet by mouth Every 6 (Six) Hours As Needed for Nausea or Vomiting. 30 tablet 0   • oxaprozin (DAYPRO) 600 MG tablet Take 1,200 mg by mouth. Take 2 po every day     • SUMAtriptan (IMITREX) 100 MG tablet TAKE 1 TABLET BY MOUTH AT ONSET OF HEADACHE. MAY REPEAT DOSE ONE TIME IN 2 HOURS IF HEADACHE NOT RELIEVED. 9 tablet 3   • terbinafine (lamiSIL) 250 MG tablet Take 250 mg by mouth Daily.     • traMADol (ULTRAM) 50 MG tablet Take 50 mg by mouth As Needed for Moderate Pain .     • vitamin D (ERGOCALCIFEROL) 12553 units capsule capsule Take 1 capsule by mouth 1 (One) Time Per Week. (Patient taking differently: Take 50,000 Units by mouth 1 (One) Time Per Week. Sunday) 12 capsule 3     No current facility-administered medications on file prior to visit.       No Known Allergies   Past Medical History:   Diagnosis Date   • Bulging of lumbar intervertebral disc    • Hyperlipidemia    • Hypertension    • Migraine    • CHRISTOPHE on CPAP    • Rheumatoid arthritis (CMS/HCC)    • Swelling of left ear    • Wears contact lenses      Past Surgical History:   Procedure Laterality Date   • ACHILLES TENDON SURGERY Left 10/15/2019    Procedure: ACHILLES TENDON LENGTHENING LEFT;  Surgeon: Elodia Guzman MD;  Location: [facility];  Service: Orthopedics   • CATARACT EXTRACTION Right    • COLONOSCOPY  2015   • EYE SURGERY Bilateral     cornea transplant    • FOOT SURGERY Left 10/15/2019    triple arthrodesis and achilles tendon  "lengthening- Megan   • HERNIA REPAIR     • ILIAC CREST BONE GRAFT Left 10/15/2019    Procedure: ILIAC CREST BONE GRAFT LEFT;  Surgeon: Elodia Guzman MD;  Location:  [facility];  Service: Orthopedics   • TOE FUSION Left 10/15/2019    Procedure: TRIPLE ARTHODESIS LEFT;  Surgeon: Elodia Guzman MD;  Location:  [facility];  Service: Orthopedics     Family History   Problem Relation Age of Onset   • No Known Problems Mother    • No Known Problems Father       Social History     Socioeconomic History   • Marital status:      Spouse name: Not on file   • Number of children: Not on file   • Years of education: Not on file   • Highest education level: Not on file   Tobacco Use   • Smoking status: Never Smoker   • Smokeless tobacco: Never Used   Substance and Sexual Activity   • Alcohol use: Yes     Comment: rarely   • Drug use: No   • Sexual activity: Defer        Review of Systems   Musculoskeletal: Positive for arthralgias.       The following portions of the patient's history were reviewed and updated as appropriate: allergies, current medications, past family history, past medical history, past social history, past surgical history and problem list.    Physical Exam:   Pulse 97   Ht 180.3 cm (70.98\")   Wt (!) 142 kg (313 lb 0.9 oz)   SpO2 99%   BMI 43.68 kg/m²   GENERAL: Body habitus: obese    Lower extremity edema: Left: 1+ pitting;    Left foot wound slowly improving, no signs of infection          Medical Decision Making    Data Review:   none    Assessment/Plan/Diagnosis/Treatment Options:   1. Post-operative state  Continuing to slowly improve.  We need to continue the wet-to-dry and the Ace wrap toes to knee.  I will see him again in 3 to 4 weeks standing 2 views of the foot at that time                            "

## 2020-05-29 ENCOUNTER — OFFICE VISIT (OUTPATIENT)
Dept: ORTHOPEDIC SURGERY | Facility: CLINIC | Age: 70
End: 2020-05-29

## 2020-05-29 VITALS — HEIGHT: 71 IN | HEART RATE: 81 BPM | OXYGEN SATURATION: 96 % | WEIGHT: 313.05 LBS | BODY MASS INDEX: 43.83 KG/M2

## 2020-05-29 DIAGNOSIS — Z98.890 POST-OPERATIVE STATE: Primary | ICD-10-CM

## 2020-05-29 PROCEDURE — 99212 OFFICE O/P EST SF 10 MIN: CPT | Performed by: ORTHOPAEDIC SURGERY

## 2020-05-29 RX ORDER — GABAPENTIN 300 MG/1
300 CAPSULE ORAL 3 TIMES DAILY
COMMUNITY

## 2020-05-29 NOTE — PROGRESS NOTES
ESTABLISHED PATIENT    Patient: Obi Jackman Jr.  : 1950    Primary Care Provider: Cammy Oneal PA    Requesting Provider: As above    Follow-up (4 week recheck - wound  infection check)      History    Chief Complaint: Slowly healing left foot wound    History of Present Illness: He continues to slowly improve.  He notes that his toes have been looking darker and I explained that it is dependent rubor.  His swelling is improving steadily with time but he has chronic severe venous stasis.  The lateral incisional wound is now just a scab.    Current Outpatient Medications on File Prior to Visit   Medication Sig Dispense Refill   • gabapentin (NEURONTIN) 300 MG capsule Take 300 mg by mouth 3 (Three) Times a Day.     • acetaminophen (TYLENOL) 325 MG tablet Take 325 mg by mouth As Needed for Mild Pain .     • albuterol sulfate  (90 Base) MCG/ACT inhaler Inhale 2 puffs Every 4 (Four) Hours As Needed for Wheezing. (Patient taking differently: Inhale 2 puffs Every 4 (Four) Hours As Needed for Wheezing or Shortness of Air.) 1 inhaler 2   • ALPRAZolam (XANAX) 0.25 MG tablet Take 1 tablet by mouth Daily As Needed for Anxiety (when flying). 10 tablet 0   • atenolol (TENORMIN) 50 MG tablet Take 1 tablet by mouth Daily. 30 tablet 5   • atorvastatin (LIPITOR) 10 MG tablet Take 1 tablet by mouth Daily. 90 tablet 3   • Azelastine-Fluticasone 137-50 MCG/ACT suspension 1 spray each nostril daily (Patient taking differently: 2 sprays Daily As Needed (nasal congestion). 1 spray each nostril daily) 1 bottle 5   • buPROPion XL (WELLBUTRIN XL) 150 MG 24 hr tablet Take 1 tablet by mouth Daily. 30 tablet 5   • clotrimazole-betamethasone (LOTRISONE) 1-0.05 % cream Apply  topically to the appropriate area as directed 2 (Two) Times a Day. 45 g 1   • cyclobenzaprine (FLEXERIL) 10 MG tablet Take one tablet by mouth three times a day as needed for muscle spasms. (Patient taking differently: Take 10 mg by mouth 3 (Three)  Times a Day As Needed for Muscle Spasms. Take one tablet by mouth three times a day as needed for muscle spasms.) 30 tablet 5   • Dietary Management Product (VASCULERA) tablet Take 1 tablet by mouth Daily. 90 tablet 5   • etodolac (LODINE) 400 MG tablet Take 400 mg by mouth 2 (Two) Times a Day.  3   • hydroxychloroquine (PLAQUENIL) 200 MG tablet Take 200 mg by mouth 2 (Two) Times a Day.     • ibuprofen (ADVIL,MOTRIN) 200 MG tablet Take 200 mg by mouth As Needed for Mild Pain .     • levoFLOXacin (LEVAQUIN) 750 MG tablet Take 1 tablet by mouth Daily. 14 tablet 0   • lisinopril-hydrochlorothiazide (PRINZIDE,ZESTORETIC) 20-12.5 MG per tablet TAKE 1 TABLET BY MOUTH DAILY 30 tablet 11   • Multiple Vitamins-Minerals (MULTIVITAMIN ADULT PO) Take 1 tablet by mouth Daily.     • ondansetron (ZOFRAN) 4 MG tablet Take 1 tablet by mouth Every 6 (Six) Hours As Needed for Nausea or Vomiting. 30 tablet 0   • oxaprozin (DAYPRO) 600 MG tablet Take 1,200 mg by mouth. Take 2 po every day     • SUMAtriptan (IMITREX) 100 MG tablet TAKE 1 TABLET BY MOUTH AT ONSET OF HEADACHE. MAY REPEAT DOSE ONE TIME IN 2 HOURS IF HEADACHE NOT RELIEVED. 9 tablet 3   • terbinafine (lamiSIL) 250 MG tablet Take 250 mg by mouth Daily.     • traMADol (ULTRAM) 50 MG tablet Take 50 mg by mouth As Needed for Moderate Pain .     • vitamin D (ERGOCALCIFEROL) 63057 units capsule capsule Take 1 capsule by mouth 1 (One) Time Per Week. (Patient taking differently: Take 50,000 Units by mouth 1 (One) Time Per Week. Sunday) 12 capsule 3     No current facility-administered medications on file prior to visit.       No Known Allergies   Past Medical History:   Diagnosis Date   • Bulging of lumbar intervertebral disc    • Hyperlipidemia    • Hypertension    • Migraine    • CHRISTOPHE on CPAP    • Rheumatoid arthritis (CMS/HCC)    • Swelling of left ear    • Wears contact lenses      Past Surgical History:   Procedure Laterality Date   • ACHILLES TENDON SURGERY Left 10/15/2019     "Procedure: ACHILLES TENDON LENGTHENING LEFT;  Surgeon: Elodia Guzman MD;  Location:  [facility];  Service: Orthopedics   • CATARACT EXTRACTION Right    • COLONOSCOPY  2015   • EYE SURGERY Bilateral     cornea transplant    • FOOT SURGERY Left 10/15/2019    triple arthrodesis and achilles tendon lengthening- [place]   • HERNIA REPAIR     • ILIAC CREST BONE GRAFT Left 10/15/2019    Procedure: ILIAC CREST BONE GRAFT LEFT;  Surgeon: Elodia Guzman MD;  Location:  [facility];  Service: Orthopedics   • TOE FUSION Left 10/15/2019    Procedure: TRIPLE ARTHODESIS LEFT;  Surgeon: Elodia Guzman MD;  Location:  [facility];  Service: Orthopedics     Family History   Problem Relation Age of Onset   • No Known Problems Mother    • No Known Problems Father       Social History     Socioeconomic History   • Marital status:      Spouse name: Not on file   • Number of children: Not on file   • Years of education: Not on file   • Highest education level: Not on file   Tobacco Use   • Smoking status: Never Smoker   • Smokeless tobacco: Never Used   Substance and Sexual Activity   • Alcohol use: Yes     Comment: rarely   • Drug use: No   • Sexual activity: Defer        Review of Systems   Constitutional: Negative.    HENT: Negative.    Eyes: Negative.    Respiratory: Negative.    Cardiovascular: Negative.    Gastrointestinal: Negative.    Endocrine: Negative.    Genitourinary: Negative.    Musculoskeletal: Positive for arthralgias.   Skin: Negative.    Allergic/Immunologic: Negative.    Neurological: Negative.    Hematological: Negative.    Psychiatric/Behavioral: Negative.        The following portions of the patient's history were reviewed and updated as appropriate: allergies, current medications, past family history, past medical history, past social history, past surgical history and problem list.    Physical Exam:   Pulse 81   Ht 180.3 cm (70.98\")   Wt (!) 142 kg (313 lb 0.9 oz)   SpO2 96%   BMI 43.68 kg/m² "   GENERAL: Body habitus: morbidly obese    Lower extremity edema: Left: 1+ pitting; Right: 1+ pitting    Gait: broad based     Mental Status:  awake and alert; oriented to person, place, and time  MSK:  The lateral incision on the left foot now just has a small central scab no signs of infection.  Is very superficial.  Swelling is improving.    NEURO Sensation:  diminished    Medical Decision Making    Data Review:   ordered and reviewed x-rays today    Assessment/Plan/Diagnosis/Treatment Options:   1. Post-operative state  The wound is improving steadily.  There is no signs of infection.  The triple fusion is healing well.  His swelling is diminishing over time but it will never be normal because of his severe underlying edema.  Continue the Ace wraps from toes to knee.  I will see him again in 4 weeks for repeat exam no x-ray needed at that time  - XR Foot 2 View Left

## 2020-06-02 RX ORDER — LISINOPRIL AND HYDROCHLOROTHIAZIDE 20; 12.5 MG/1; MG/1
1 TABLET ORAL DAILY
Qty: 90 TABLET | Refills: 0 | Status: SHIPPED | OUTPATIENT
Start: 2020-06-02 | End: 2020-09-21

## 2020-06-05 ENCOUNTER — TRANSCRIBE ORDERS (OUTPATIENT)
Dept: LAB | Facility: HOSPITAL | Age: 70
End: 2020-06-05

## 2020-06-05 ENCOUNTER — LAB (OUTPATIENT)
Dept: LAB | Facility: HOSPITAL | Age: 70
End: 2020-06-05

## 2020-06-05 DIAGNOSIS — L90.5 SCAR PAINFUL: ICD-10-CM

## 2020-06-05 DIAGNOSIS — R76.9 ABNORMAL IMMUNOLOGICAL FINDING IN SERUM: ICD-10-CM

## 2020-06-05 DIAGNOSIS — M25.50 PAIN IN JOINT, MULTIPLE SITES: Primary | ICD-10-CM

## 2020-06-05 DIAGNOSIS — R52 SCAR PAINFUL: ICD-10-CM

## 2020-06-05 DIAGNOSIS — M25.551 RIGHT HIP PAIN: ICD-10-CM

## 2020-06-05 LAB
ALBUMIN SERPL-MCNC: 4.4 G/DL (ref 3.5–5.2)
ALBUMIN/GLOB SERPL: 1.5 G/DL
ALP SERPL-CCNC: 62 U/L (ref 39–117)
ALT SERPL W P-5'-P-CCNC: 11 U/L (ref 1–41)
ANION GAP SERPL CALCULATED.3IONS-SCNC: 10.2 MMOL/L (ref 5–15)
AST SERPL-CCNC: 16 U/L (ref 1–40)
BASOPHILS # BLD AUTO: 0.03 10*3/MM3 (ref 0–0.2)
BASOPHILS NFR BLD AUTO: 0.5 % (ref 0–1.5)
BILIRUB SERPL-MCNC: 0.5 MG/DL (ref 0.2–1.2)
BUN BLD-MCNC: 15 MG/DL (ref 8–23)
BUN/CREAT SERPL: 21.1 (ref 7–25)
CALCIUM SPEC-SCNC: 9.3 MG/DL (ref 8.6–10.5)
CHLORIDE SERPL-SCNC: 103 MMOL/L (ref 98–107)
CO2 SERPL-SCNC: 26.8 MMOL/L (ref 22–29)
CREAT BLD-MCNC: 0.71 MG/DL (ref 0.76–1.27)
DEPRECATED RDW RBC AUTO: 41.6 FL (ref 37–54)
EOSINOPHIL # BLD AUTO: 0.15 10*3/MM3 (ref 0–0.4)
EOSINOPHIL NFR BLD AUTO: 2.6 % (ref 0.3–6.2)
ERYTHROCYTE [DISTWIDTH] IN BLOOD BY AUTOMATED COUNT: 12.6 % (ref 12.3–15.4)
GFR SERPL CREATININE-BSD FRML MDRD: 110 ML/MIN/1.73
GLOBULIN UR ELPH-MCNC: 2.9 GM/DL
GLUCOSE BLD-MCNC: 97 MG/DL (ref 65–99)
HAV IGM SERPL QL IA: NORMAL
HBV CORE IGM SERPL QL IA: NORMAL
HBV SURFACE AG SERPL QL IA: NORMAL
HCT VFR BLD AUTO: 43.6 % (ref 37.5–51)
HCV AB SER DONR QL: NORMAL
HGB BLD-MCNC: 14.7 G/DL (ref 13–17.7)
IMM GRANULOCYTES # BLD AUTO: 0.01 10*3/MM3 (ref 0–0.05)
IMM GRANULOCYTES NFR BLD AUTO: 0.2 % (ref 0–0.5)
LYMPHOCYTES # BLD AUTO: 1.25 10*3/MM3 (ref 0.7–3.1)
LYMPHOCYTES NFR BLD AUTO: 22 % (ref 19.6–45.3)
MCH RBC QN AUTO: 30.4 PG (ref 26.6–33)
MCHC RBC AUTO-ENTMCNC: 33.7 G/DL (ref 31.5–35.7)
MCV RBC AUTO: 90.3 FL (ref 79–97)
MONOCYTES # BLD AUTO: 0.66 10*3/MM3 (ref 0.1–0.9)
MONOCYTES NFR BLD AUTO: 11.6 % (ref 5–12)
NEUTROPHILS # BLD AUTO: 3.58 10*3/MM3 (ref 1.7–7)
NEUTROPHILS NFR BLD AUTO: 63.1 % (ref 42.7–76)
NRBC BLD AUTO-RTO: 0 /100 WBC (ref 0–0.2)
PLATELET # BLD AUTO: 278 10*3/MM3 (ref 140–450)
PMV BLD AUTO: 10.2 FL (ref 6–12)
POTASSIUM BLD-SCNC: 4.2 MMOL/L (ref 3.5–5.2)
PROT SERPL-MCNC: 7.3 G/DL (ref 6–8.5)
RBC # BLD AUTO: 4.83 10*6/MM3 (ref 4.14–5.8)
SODIUM BLD-SCNC: 140 MMOL/L (ref 136–145)
WBC NRBC COR # BLD: 5.68 10*3/MM3 (ref 3.4–10.8)

## 2020-06-05 PROCEDURE — 85025 COMPLETE CBC W/AUTO DIFF WBC: CPT | Performed by: INTERNAL MEDICINE

## 2020-06-05 PROCEDURE — 80074 ACUTE HEPATITIS PANEL: CPT | Performed by: INTERNAL MEDICINE

## 2020-06-05 PROCEDURE — 80053 COMPREHEN METABOLIC PANEL: CPT | Performed by: INTERNAL MEDICINE

## 2020-06-05 PROCEDURE — 36415 COLL VENOUS BLD VENIPUNCTURE: CPT | Performed by: INTERNAL MEDICINE

## 2020-06-06 LAB — HOLD SPECIMEN: NORMAL

## 2020-06-26 ENCOUNTER — OFFICE VISIT (OUTPATIENT)
Dept: ORTHOPEDIC SURGERY | Facility: CLINIC | Age: 70
End: 2020-06-26

## 2020-06-26 VITALS — BODY MASS INDEX: 43.83 KG/M2 | HEIGHT: 71 IN | WEIGHT: 313.05 LBS | HEART RATE: 80 BPM | OXYGEN SATURATION: 97 %

## 2020-06-26 DIAGNOSIS — Z09 SURGERY FOLLOW-UP: Primary | ICD-10-CM

## 2020-06-26 DIAGNOSIS — I87.8 VENOUS STASIS: ICD-10-CM

## 2020-06-26 PROCEDURE — 99212 OFFICE O/P EST SF 10 MIN: CPT | Performed by: ORTHOPAEDIC SURGERY

## 2020-06-26 NOTE — PROGRESS NOTES
sESTABLISHED PATIENT    Patient: Obi Jackman Jr.  : 1950    Primary Care Provider: Cammy Oneal PA    Requesting Provider: As above    Follow-up (1 month wound recheck- 8 months s/p (L) triple fusion, ICBG, achilles lengthening 10/15/2019)      History    Chief Complaint: Left triple fusion    History of Present Illness: He returns for follow-up of his left triple arthrodesis he still has incomplete healing of the center of the lateral incision.  There is no signs of infection.  It is just 1 small 3 to 4 mm area of granulation tissue.  He has a small dot of serosanguineous drainage on the dressing, essentially consistent with superficial cell slough.  He is still using the Ace wraps to help control the swelling.  He has very severe peripheral edema.  No fever no chills.    Current Outpatient Medications on File Prior to Visit   Medication Sig Dispense Refill   • acetaminophen (TYLENOL) 325 MG tablet Take 325 mg by mouth As Needed for Mild Pain .     • albuterol sulfate  (90 Base) MCG/ACT inhaler Inhale 2 puffs Every 4 (Four) Hours As Needed for Wheezing. (Patient taking differently: Inhale 2 puffs Every 4 (Four) Hours As Needed for Wheezing or Shortness of Air.) 1 inhaler 2   • ALPRAZolam (XANAX) 0.25 MG tablet Take 1 tablet by mouth Daily As Needed for Anxiety (when flying). 10 tablet 0   • atenolol (TENORMIN) 50 MG tablet Take 1 tablet by mouth Daily. 30 tablet 5   • atorvastatin (LIPITOR) 10 MG tablet Take 1 tablet by mouth Daily. 90 tablet 3   • Azelastine-Fluticasone 137-50 MCG/ACT suspension 1 spray each nostril daily (Patient taking differently: 2 sprays Daily As Needed (nasal congestion). 1 spray each nostril daily) 1 bottle 5   • buPROPion XL (WELLBUTRIN XL) 150 MG 24 hr tablet Take 1 tablet by mouth Daily. 30 tablet 5   • clotrimazole-betamethasone (LOTRISONE) 1-0.05 % cream Apply  topically to the appropriate area as directed 2 (Two) Times a Day. 45 g 1   • cyclobenzaprine  (FLEXERIL) 10 MG tablet Take one tablet by mouth three times a day as needed for muscle spasms. (Patient taking differently: Take 10 mg by mouth 3 (Three) Times a Day As Needed for Muscle Spasms. Take one tablet by mouth three times a day as needed for muscle spasms.) 30 tablet 5   • Dietary Management Product (VASCULERA) tablet Take 1 tablet by mouth Daily. 90 tablet 5   • etodolac (LODINE) 400 MG tablet Take 400 mg by mouth 2 (Two) Times a Day.  3   • gabapentin (NEURONTIN) 300 MG capsule Take 300 mg by mouth 3 (Three) Times a Day.     • hydroxychloroquine (PLAQUENIL) 200 MG tablet Take 200 mg by mouth 2 (Two) Times a Day.     • ibuprofen (ADVIL,MOTRIN) 200 MG tablet Take 200 mg by mouth As Needed for Mild Pain .     • levoFLOXacin (LEVAQUIN) 750 MG tablet Take 1 tablet by mouth Daily. 14 tablet 0   • lisinopril-hydrochlorothiazide (PRINZIDE,ZESTORETIC) 20-12.5 MG per tablet TAKE 1 TABLET BY MOUTH DAILY 90 tablet 0   • Multiple Vitamins-Minerals (MULTIVITAMIN ADULT PO) Take 1 tablet by mouth Daily.     • ondansetron (ZOFRAN) 4 MG tablet Take 1 tablet by mouth Every 6 (Six) Hours As Needed for Nausea or Vomiting. 30 tablet 0   • oxaprozin (DAYPRO) 600 MG tablet Take 1,200 mg by mouth. Take 2 po every day     • SUMAtriptan (IMITREX) 100 MG tablet TAKE 1 TABLET BY MOUTH AT ONSET OF HEADACHE. MAY REPEAT DOSE ONE TIME IN 2 HOURS IF HEADACHE NOT RELIEVED. 9 tablet 3   • terbinafine (lamiSIL) 250 MG tablet Take 250 mg by mouth Daily.     • traMADol (ULTRAM) 50 MG tablet Take 50 mg by mouth As Needed for Moderate Pain .     • vitamin D (ERGOCALCIFEROL) 00455 units capsule capsule Take 1 capsule by mouth 1 (One) Time Per Week. (Patient taking differently: Take 50,000 Units by mouth 1 (One) Time Per Week. Sunday) 12 capsule 3     No current facility-administered medications on file prior to visit.       No Known Allergies   Past Medical History:   Diagnosis Date   • Bulging of lumbar intervertebral disc    • Hyperlipidemia     • Hypertension    • Migraine    • CHRISTOPHE on CPAP    • Rheumatoid arthritis (CMS/HCC)    • Swelling of left ear    • Wears contact lenses      Past Surgical History:   Procedure Laterality Date   • ACHILLES TENDON SURGERY Left 10/15/2019    Procedure: ACHILLES TENDON LENGTHENING LEFT;  Surgeon: Elodia Guzman MD;  Location:  [facility];  Service: Orthopedics   • CATARACT EXTRACTION Right    • COLONOSCOPY  2015   • EYE SURGERY Bilateral     cornea transplant    • FOOT SURGERY Left 10/15/2019    triple arthrodesis and achilles tendon lengthening- [place]   • HERNIA REPAIR     • ILIAC CREST BONE GRAFT Left 10/15/2019    Procedure: ILIAC CREST BONE GRAFT LEFT;  Surgeon: Elodia Guzman MD;  Location:  [facility];  Service: Orthopedics   • TOE FUSION Left 10/15/2019    Procedure: TRIPLE ARTHODESIS LEFT;  Surgeon: Elodia Guzman MD;  Location:  [facility];  Service: Orthopedics     Family History   Problem Relation Age of Onset   • No Known Problems Mother    • No Known Problems Father       Social History     Socioeconomic History   • Marital status:      Spouse name: Not on file   • Number of children: Not on file   • Years of education: Not on file   • Highest education level: Not on file   Tobacco Use   • Smoking status: Never Smoker   • Smokeless tobacco: Never Used   Substance and Sexual Activity   • Alcohol use: Yes     Comment: rarely   • Drug use: No   • Sexual activity: Defer        Review of Systems   Constitutional: Negative.    HENT: Negative.    Eyes: Negative.    Respiratory: Negative.    Cardiovascular: Negative.    Gastrointestinal: Negative.    Endocrine: Negative.    Genitourinary: Negative.    Musculoskeletal: Positive for arthralgias.   Skin: Negative.    Allergic/Immunologic: Negative.    Neurological: Negative.    Hematological: Negative.    Psychiatric/Behavioral: Negative.        The following portions of the patient's history were reviewed and updated as appropriate: allergies, current  "medications, past family history, past medical history, past social history, past surgical history and problem list.    Physical Exam:   Pulse 80   Ht 180.3 cm (70.98\")   Wt (!) 142 kg (313 lb 0.9 oz)   SpO2 97%   BMI 43.68 kg/m²   GENERAL: Body habitus: morbidly obese    Lower extremity edema: Left: 3+ pitting; Right: 2+ pitting    Varicose veins:  Left: venous stasis pigment and shiny, atrophic skin; Right: venous stasis pigment and shiny, atrophic skin    Gait: using cane     Mental Status:  awake and alert; oriented to person, place, and time    Voice:  clear    MSK:  Left lateral foot incision still has the tiny area of granulation tissue in the center.  There is no erythema.  Nothing that looks like infection.  No tenderness from the triple fusion, with good pain relief        Medical Decision Making    Data Review:   none    Assessment/Plan/Diagnosis/Treatment Options:   1. Surgery follow-up  Lateral incision is still not fully healed, but I probed it it is only very superficial, no signs of infection.  It is really important that he try to control the swelling.  Ace wrap toes to knee.  He wants to get a swimming pool and I definitely do not recommend that.  I will see him again in 4 weeks sooner if anything gets worse.  Standing 2 views of the foot at that time    2. Venous stasis  Very severe venous stasis, I think that is the primary cause of this very slow healing wound.                      "

## 2020-07-07 ENCOUNTER — OFFICE VISIT (OUTPATIENT)
Dept: ORTHOPEDIC SURGERY | Facility: CLINIC | Age: 70
End: 2020-07-07

## 2020-07-07 VITALS — WEIGHT: 313.05 LBS | HEIGHT: 71 IN | OXYGEN SATURATION: 99 % | HEART RATE: 78 BPM | BODY MASS INDEX: 43.83 KG/M2

## 2020-07-07 DIAGNOSIS — M16.11 PRIMARY OSTEOARTHRITIS OF RIGHT HIP: Primary | ICD-10-CM

## 2020-07-07 DIAGNOSIS — M25.551 RIGHT HIP PAIN: ICD-10-CM

## 2020-07-07 PROCEDURE — 99213 OFFICE O/P EST LOW 20 MIN: CPT | Performed by: ORTHOPAEDIC SURGERY

## 2020-07-07 NOTE — PROGRESS NOTES
Orthopaedic Clinic Note: Hip Established Patient    Chief Complaint   Patient presents with   • Right Hip - Follow-up     10 month f/u  intra-articular right hip cortisone injection 9/17/20        HPI    It has been 10  month(s) since Mr. Jackman's last visit. He returns to clinic today for follow-up right hip arthritis.  He underwent intra-articular injection approximately 10 months ago.  He states the injection only provided trace relief for a month or so.  His pain is returned.  Rates 8/10 on pain scale.  He remains morbidly obese with a BMI of 43.7.  He is continue to localize the pain to the right groin.  He is ambulating with the assistance of a cane.  He is also having significant difficulty healing a wound infection in his left foot that is slowly starting to heal.  Overall he is doing worse.    Past Medical History:   Diagnosis Date   • Bulging of lumbar intervertebral disc    • Hyperlipidemia    • Hypertension    • Migraine    • CHRISTOPHE on CPAP    • Rheumatoid arthritis (CMS/HCC)    • Swelling of left ear    • Wears contact lenses       Past Surgical History:   Procedure Laterality Date   • ACHILLES TENDON SURGERY Left 10/15/2019    Procedure: ACHILLES TENDON LENGTHENING LEFT;  Surgeon: Elodia Guzman MD;  Location:  [email or web address];  Service: Orthopedics   • CATARACT EXTRACTION Right    • COLONOSCOPY  2015   • EYE SURGERY Bilateral     cornea transplant    • FOOT SURGERY Left 10/15/2019    triple arthrodesis and achilles tendon lengthening- [facility]   • HERNIA REPAIR     • ILIAC CREST BONE GRAFT Left 10/15/2019    Procedure: ILIAC CREST BONE GRAFT LEFT;  Surgeon: Elodia Guzman MD;  Location:  [email or web address];  Service: Orthopedics   • TOE FUSION Left 10/15/2019    Procedure: TRIPLE ARTHODESIS LEFT;  Surgeon: Elodia Guzman MD;  Location:  [email or web address];  Service: Orthopedics      Family History   Problem Relation Age of Onset   • No Known Problems Mother    • No Known Problems Father      Social History     Socioeconomic  History   • Marital status:      Spouse name: Not on file   • Number of children: Not on file   • Years of education: Not on file   • Highest education level: Not on file   Tobacco Use   • Smoking status: Never Smoker   • Smokeless tobacco: Never Used   Substance and Sexual Activity   • Alcohol use: Yes     Comment: rarely   • Drug use: No   • Sexual activity: Defer      Current Outpatient Medications on File Prior to Visit   Medication Sig Dispense Refill   • acetaminophen (TYLENOL) 325 MG tablet Take 325 mg by mouth As Needed for Mild Pain .     • albuterol sulfate  (90 Base) MCG/ACT inhaler Inhale 2 puffs Every 4 (Four) Hours As Needed for Wheezing. (Patient taking differently: Inhale 2 puffs Every 4 (Four) Hours As Needed for Wheezing or Shortness of Air.) 1 inhaler 2   • ALPRAZolam (XANAX) 0.25 MG tablet Take 1 tablet by mouth Daily As Needed for Anxiety (when flying). 10 tablet 0   • atenolol (TENORMIN) 50 MG tablet Take 1 tablet by mouth Daily. 30 tablet 5   • atorvastatin (LIPITOR) 10 MG tablet Take 1 tablet by mouth Daily. 90 tablet 3   • Azelastine-Fluticasone 137-50 MCG/ACT suspension 1 spray each nostril daily (Patient taking differently: 2 sprays Daily As Needed (nasal congestion). 1 spray each nostril daily) 1 bottle 5   • buPROPion XL (WELLBUTRIN XL) 150 MG 24 hr tablet Take 1 tablet by mouth Daily. 30 tablet 5   • clotrimazole-betamethasone (LOTRISONE) 1-0.05 % cream Apply  topically to the appropriate area as directed 2 (Two) Times a Day. 45 g 1   • cyclobenzaprine (FLEXERIL) 10 MG tablet Take one tablet by mouth three times a day as needed for muscle spasms. (Patient taking differently: Take 10 mg by mouth 3 (Three) Times a Day As Needed for Muscle Spasms. Take one tablet by mouth three times a day as needed for muscle spasms.) 30 tablet 5   • Dietary Management Product (VASCULERA) tablet Take 1 tablet by mouth Daily. 90 tablet 5   • etodolac (LODINE) 400 MG tablet Take 400 mg by  mouth 2 (Two) Times a Day.  3   • gabapentin (NEURONTIN) 300 MG capsule Take 300 mg by mouth 3 (Three) Times a Day.     • hydroxychloroquine (PLAQUENIL) 200 MG tablet Take 200 mg by mouth 2 (Two) Times a Day.     • ibuprofen (ADVIL,MOTRIN) 200 MG tablet Take 200 mg by mouth As Needed for Mild Pain .     • levoFLOXacin (LEVAQUIN) 750 MG tablet Take 1 tablet by mouth Daily. 14 tablet 0   • lisinopril-hydrochlorothiazide (PRINZIDE,ZESTORETIC) 20-12.5 MG per tablet TAKE 1 TABLET BY MOUTH DAILY 90 tablet 0   • Multiple Vitamins-Minerals (MULTIVITAMIN ADULT PO) Take 1 tablet by mouth Daily.     • ondansetron (ZOFRAN) 4 MG tablet Take 1 tablet by mouth Every 6 (Six) Hours As Needed for Nausea or Vomiting. 30 tablet 0   • oxaprozin (DAYPRO) 600 MG tablet Take 1,200 mg by mouth. Take 2 po every day     • SUMAtriptan (IMITREX) 100 MG tablet TAKE 1 TABLET BY MOUTH AT ONSET OF HEADACHE. MAY REPEAT DOSE ONE TIME IN 2 HOURS IF HEADACHE NOT RELIEVED. 9 tablet 3   • terbinafine (lamiSIL) 250 MG tablet Take 250 mg by mouth Daily.     • traMADol (ULTRAM) 50 MG tablet Take 50 mg by mouth As Needed for Moderate Pain .     • vitamin D (ERGOCALCIFEROL) 14778 units capsule capsule Take 1 capsule by mouth 1 (One) Time Per Week. (Patient taking differently: Take 50,000 Units by mouth 1 (One) Time Per Week. Sunday) 12 capsule 3     No current facility-administered medications on file prior to visit.       No Known Allergies     Review of Systems   Constitutional: Negative.    HENT: Negative.    Eyes: Negative.    Respiratory: Negative.    Cardiovascular: Negative.    Gastrointestinal: Negative.    Endocrine: Negative.    Genitourinary: Negative.    Musculoskeletal: Positive for arthralgias and gait problem.   Skin: Negative.    Allergic/Immunologic: Negative.    Hematological: Negative.    Psychiatric/Behavioral: Negative.         The patient's Review of Systems was personally reviewed and confirmed as accurate.    Physical Exam  Pulse 78,  "height 180.3 cm (70.98\"), weight (!) 142 kg (313 lb 0.9 oz), SpO2 99 %.    Body mass index is 43.68 kg/m².    GENERAL APPEARANCE: awake, alert, oriented, in no acute distress, well developed, well nourished and obese  LUNGS:  breathing nonlabored  EXTREMITIES: no clubbing, cyanosis  PERIPHERAL PULSES: palpable dorsalis pedis and posterior tibial pulses bilaterally.    GAIT:  Antalgic            Hip Exam:  Right    RANGE OF MOTION:  EXTENSION/FLEXION:  normal (0-110 degrees), decreased (5-90 degrees)  IR (at 90 degrees of flexion):  10  ER (at 90 degrees of flexion):  20  PAIN WITH HIP MOTION:  yes, Localized to groin  PAIN WITH LOGROLL:  yes, Localized to groin     STINCHFIELD TEST: positive    STRENGTH:  ABDUCTOR:  5/5  ADDUCTOR:  5/5  HIP FLEXION:  5/5    GREATER TROCHANTER BURSAL PAIN:  yes    SENSATION TO LIGHT TOUCH:  DEEP PERONEAL/SUPERFICIAL PERONEAL/SURAL/SAPHENOUS/TIBIAL:   intact    EDEMA:  yes, 1+ edema to mid tibia  ERYTHEMA:  no  WOUNDS/INCISIONS:   no  _________________________________________________________________  _________________________________________________________________    RADIOGRAPHIC FINDINGS:   Indication: Right hip pain    Comparison: Todays xrays were compared to previous xrays from 9/4/2019    AP pelvis: Right: advanced, end-stage osteoarthritis with bone on bone articulation, subchondral sclerosis, and subchondral cysts, minimal osteophyte formation and Radiographs demonstrate advancing arthritic changes and wear compared to prior radiographs.;Left: mild joint space narrowing, minimal osteophyte formation and No significant changes compared to prior radiographs.      Assessment/Plan:   Diagnosis Plan   1. Primary osteoarthritis of right hip  FL Guide For Pain Meds Inj   2. Right hip pain  XR Hip With or Without Pelvis 2 - 3 View Right     Patient has osteoarthritis of the right hip.  Unfortunately given his multiple medical comorbidities and morbid obesity he is a poor candidate for " surgical intervention.  I discussed treatment options.  He is agreeable to a right hip injection.  We will proceed with right hip cortisone injection.  He will follow-up as needed.    Evangelist Amato MD  07/07/20  09:21

## 2020-07-20 ENCOUNTER — OFFICE VISIT (OUTPATIENT)
Dept: ORTHOPEDIC SURGERY | Facility: CLINIC | Age: 70
End: 2020-07-20

## 2020-07-20 VITALS — HEART RATE: 95 BPM | OXYGEN SATURATION: 99 % | BODY MASS INDEX: 43.82 KG/M2 | HEIGHT: 71 IN | WEIGHT: 313 LBS

## 2020-07-20 DIAGNOSIS — M16.11 PRIMARY OSTEOARTHRITIS OF RIGHT HIP: Primary | ICD-10-CM

## 2020-07-20 PROCEDURE — 99214 OFFICE O/P EST MOD 30 MIN: CPT | Performed by: ORTHOPAEDIC SURGERY

## 2020-07-20 RX ORDER — MELOXICAM 7.5 MG/1
15 TABLET ORAL ONCE
Status: CANCELLED | OUTPATIENT
Start: 2020-07-20 | End: 2020-07-20

## 2020-07-20 RX ORDER — ACETAMINOPHEN 325 MG/1
1000 TABLET ORAL ONCE
Status: CANCELLED | OUTPATIENT
Start: 2020-07-20 | End: 2020-07-20

## 2020-07-20 RX ORDER — PREGABALIN 150 MG/1
150 CAPSULE ORAL ONCE
Status: CANCELLED | OUTPATIENT
Start: 2020-07-20 | End: 2020-07-20

## 2020-07-20 NOTE — PROGRESS NOTES
[facility] Note    Subjective     Chief Complaint   Patient presents with   • Right Hip - Pain        HPI    Obi Jackman Jr. is a 69 y.o. male who presents with right hip pain.  Onset: atraumatic and gradual in nature. The issue has been ongoing for 2 year(s). Pain is a 7/10 on the pain scale. Pain is described as dull, aching, burning, throbbing, stabbing and shooting. Associated symptoms include pain, grinding, stiffness and giving way/buckling. The pain is worse with walking, standing, sitting, climbing stairs and sleeping; resting improve the pain. Previous treatments have included: cane/walker.  Pain is located in the groin, as well as laterally and some posterior pain.  He would like to consider hip replacement surgery at this time.  No history of clots or clotting disorders.  Difficulty with weight loss because of the status of his hip, which precludes exertional activity.  He used to weigh 330 pounds.    I have reviewed the following portions of the patient's history:History of Present Illness    Patient Active Problem List   Diagnosis   • Allergic rhinitis   • Anxiety disorder   • Benign paroxysmal positional vertigo   • Chronic tension type headache   • Depression   • ED (erectile dysfunction) of non-organic origin   • Hyperlipidemia   • Hypertension   • LFTs abnormal   • CHRISTOPHE (obstructive sleep apnea)   • Elevated glucose   • Health care maintenance   • Morbid obesity due to excess calories (CMS/[facility])   • Onychomycosis of left great toe   • Osteoarthritis of ankle or foot   • Venous stasis   • Neuropathy   • Class 3 severe obesity due to excess calories with serious comorbidity and body mass index (BMI) of 40.0 to 44.9 in adult (CMS/[facility])   • Pre-op evaluation   • Arthritis of foot, left   • S/P left ankle triple arthrodesis   • Acute blood loss anemia, mild, asymptomatic    • Acute postoperative pain   • Wound infection   • Primary osteoarthritis of right hip     Past Medical  History:   Diagnosis Date   • Bulging of lumbar intervertebral disc    • Hyperlipidemia    • Hypertension    • Migraine    • CHRISTOPHE on CPAP    • Rheumatoid arthritis (CMS/HCC)    • Swelling of left ear    • Wears contact lenses       Past Surgical History:   Procedure Laterality Date   • ACHILLES TENDON SURGERY Left 10/15/2019    Procedure: ACHILLES TENDON LENGTHENING LEFT;  Surgeon: Elodia Guzman MD;  Location:  [facility];  Service: Orthopedics   • CATARACT EXTRACTION Right    • COLONOSCOPY  2015   • EYE SURGERY Bilateral     cornea transplant    • FOOT SURGERY Left 10/15/2019    triple arthrodesis and achilles tendon lengthening- [place]   • HERNIA REPAIR     • ILIAC CREST BONE GRAFT Left 10/15/2019    Procedure: ILIAC CREST BONE GRAFT LEFT;  Surgeon: Elodia Guzman MD;  Location:  [facility];  Service: Orthopedics   • TOE FUSION Left 10/15/2019    Procedure: TRIPLE ARTHODESIS LEFT;  Surgeon: Elodia Guzman MD;  Location:  [facility];  Service: Orthopedics      Family History   Problem Relation Age of Onset   • No Known Problems Mother    • No Known Problems Father      Social History     Socioeconomic History   • Marital status:      Spouse name: Not on file   • Number of children: Not on file   • Years of education: Not on file   • Highest education level: Not on file   Tobacco Use   • Smoking status: Never Smoker   • Smokeless tobacco: Never Used   Substance and Sexual Activity   • Alcohol use: Yes     Comment: rarely   • Drug use: No   • Sexual activity: Defer      Current Outpatient Medications on File Prior to Visit   Medication Sig Dispense Refill   • acetaminophen (TYLENOL) 325 MG tablet Take 325 mg by mouth As Needed for Mild Pain .     • albuterol sulfate  (90 Base) MCG/ACT inhaler Inhale 2 puffs Every 4 (Four) Hours As Needed for Wheezing. (Patient taking differently: Inhale 2 puffs Every 4 (Four) Hours As Needed for Wheezing or Shortness of Air.) 1 inhaler 2   • ALPRAZolam (XANAX) 0.25 MG  tablet Take 1 tablet by mouth Daily As Needed for Anxiety (when flying). 10 tablet 0   • atenolol (TENORMIN) 50 MG tablet Take 1 tablet by mouth Daily. 30 tablet 5   • atorvastatin (LIPITOR) 10 MG tablet Take 1 tablet by mouth Daily. 90 tablet 3   • Azelastine-Fluticasone 137-50 MCG/ACT suspension 1 spray each nostril daily (Patient taking differently: 2 sprays Daily As Needed (nasal congestion). 1 spray each nostril daily) 1 bottle 5   • buPROPion XL (WELLBUTRIN XL) 150 MG 24 hr tablet Take 1 tablet by mouth Daily. 30 tablet 5   • clotrimazole-betamethasone (LOTRISONE) 1-0.05 % cream Apply  topically to the appropriate area as directed 2 (Two) Times a Day. 45 g 1   • cyclobenzaprine (FLEXERIL) 10 MG tablet Take one tablet by mouth three times a day as needed for muscle spasms. (Patient taking differently: Take 10 mg by mouth 3 (Three) Times a Day As Needed for Muscle Spasms. Take one tablet by mouth three times a day as needed for muscle spasms.) 30 tablet 5   • Dietary Management Product (VASCULERA) tablet Take 1 tablet by mouth Daily. 90 tablet 5   • etodolac (LODINE) 400 MG tablet Take 400 mg by mouth 2 (Two) Times a Day.  3   • gabapentin (NEURONTIN) 300 MG capsule Take 300 mg by mouth 3 (Three) Times a Day.     • hydroxychloroquine (PLAQUENIL) 200 MG tablet Take 200 mg by mouth 2 (Two) Times a Day.     • ibuprofen (ADVIL,MOTRIN) 200 MG tablet Take 200 mg by mouth As Needed for Mild Pain .     • levoFLOXacin (LEVAQUIN) 750 MG tablet Take 1 tablet by mouth Daily. 14 tablet 0   • lisinopril-hydrochlorothiazide (PRINZIDE,ZESTORETIC) 20-12.5 MG per tablet TAKE 1 TABLET BY MOUTH DAILY 90 tablet 0   • Multiple Vitamins-Minerals (MULTIVITAMIN ADULT PO) Take 1 tablet by mouth Daily.     • ondansetron (ZOFRAN) 4 MG tablet Take 1 tablet by mouth Every 6 (Six) Hours As Needed for Nausea or Vomiting. 30 tablet 0   • oxaprozin (DAYPRO) 600 MG tablet Take 1,200 mg by mouth. Take 2 po every day     • SUMAtriptan (IMITREX) 100  "MG tablet TAKE 1 TABLET BY MOUTH AT ONSET OF HEADACHE. MAY REPEAT DOSE ONE TIME IN 2 HOURS IF HEADACHE NOT RELIEVED. 9 tablet 3   • terbinafine (lamiSIL) 250 MG tablet Take 250 mg by mouth Daily.     • traMADol (ULTRAM) 50 MG tablet Take 50 mg by mouth As Needed for Moderate Pain .     • vitamin D (ERGOCALCIFEROL) 85147 units capsule capsule Take 1 capsule by mouth 1 (One) Time Per Week. (Patient taking differently: Take 50,000 Units by mouth 1 (One) Time Per Week. Sunday) 12 capsule 3     No current facility-administered medications on file prior to visit.       No Known Allergies     Review of Systems   Constitutional: Negative.    HENT: Negative.    Eyes: Negative.    Respiratory: Negative.    Cardiovascular: Negative.    Gastrointestinal: Negative.    Endocrine: Negative.    Genitourinary: Negative.    Musculoskeletal: Positive for arthralgias.   Skin: Negative.    Allergic/Immunologic: Negative.    Neurological: Negative.    Hematological: Negative.    Psychiatric/Behavioral: Negative.         Objective      Physical Exam  Pulse 95   Ht 180.3 cm (70.98\")   Wt (!) 142 kg (313 lb)   SpO2 99%   BMI 43.67 kg/m²     Body mass index is 43.67 kg/m².    General:   Mental Status:  Alert   Appearance: Cooperative, in no acute distress   Build and Nutrition: Obese male   Orientation: Alert and oriented to person, place and time   Posture: Normal   Gait: Limping on the right    Integument:   Right hip: No skin lesions, no rash, no ecchymosis    Lower Extremity:   Right Hip:    Tenderness:  None    Swelling:  None    Crepitus:  None    Range of motion:  External Rotation: 30°       Internal Rotation: 30°       Flexion:  100°       Extension:  0°    Deformities:  None  Functional testing: Positive [facility]    Slightly short on the right compared to the left      Imaging/Studies      Imaging Results (Last 24 Hours)     Procedure Component Value Units Date/Time    XR Pelvis 1 or 2 View [[id number]] Resulted:  07/20/20 " 0911     Updated:  07/20/20 0913    Narrative:       Pelvis Radiograph  Indication: Right hip pain  Views: AP view    Comparison: 9/4/2019    Findings:  Bone-on-bone contact in the right hip joint, with no acute bony   abnormalities, worsening compared to the previous films, with no unusual   bony features.          Assessment and Plan     Obi was seen today for pain.    Diagnoses and all orders for this visit:    Primary osteoarthritis of right hip  -     XR Pelvis 1 or 2 View  -     MRI Hip Right Without Contrast; Future  -     Case Request; Standing  -     Instructions on coughing, deep breathing, and incentive spirometry.; Future  -     CBC and Differential; Future  -     Basic metabolic panel; Future  -     Protime-INR; Future  -     APTT; Future  -     Hemoglobin A1c; Future  -     Sedimentation rate; Future  -     C-reactive protein; Future  -     Case Request    Other orders  -     Follow Anesthesia Guidelines / Standing Orders; Future  -     Obtain informed consent  -     Provide instructions to patient regarding NPO status  -     Chlorhexidine Skin Prep - Educate and Review With Patient; Future  -     Provide Patient With ERAS Hydration Instructions  -     Provide Patient With Enhanced Recovery Booklet(s) or Handout  -     Provide Instructions/Handout For Benzoyl Peroxide 5% Wash If Having Shoulder/Arm Surgery (If Prescribed)  -     Provide Instructions/Handout For Bactroban And Chlorhexidine Shower (If Prescribed)  -     Perform A Memory Screening On All Hip/Knee Replacement Patients >Or Equal To 65 Years Or Older  -     Complete A PROMIS And HOOS Or KOOS Survey If Having Hip Or Knee Replacement  -     Provide Patient With Carbo Loading Instructions  -     Provide Patient With ERAS Booklet(s)/Handout  -     mupirocin (Bactroban Nasal) 2 % nasal ointment; into the nostril(s) as directed by provider 2 (Two) Times a Day.  -     chlorhexidine (HIBICLENS) 4 % external liquid; Apply  topically to the  appropriate area as directed Daily. Shower with hibiclens solution as directed for 5 days prior to surgery        1. Primary osteoarthritis of right hip        I reviewed my findings with the patient and his wife today.  He has exhausted conservative treatment options.  Pain has been worsening particular over the past few months, to the point where he would like to proceed with hip replacement surgery.  He has had radiographic worsening of his arthritis, and he is a candidate for hip replacement surgery, although the alternative of a hip injection was discussed.  He tried 1 year ago.  He is scheduled for hip injection tomorrow, which would eliminate surgery for the next 3 months.  He is not keen on that idea, and would prefer to proceed with surgery in light of his significant disability with hip.  We discussed his body mass index, which is over 40, which puts him at increased risk of perioperative complications, to include wound healing problems, infection, and failure of the prosthesis.  He commits to ongoing weight loss, as well as postoperative weight loss.  Risk, benefits, and alternatives have been discussed at length.  Please see my counseling note for details.  I would like to get a preoperative MRI, looking for bone marrow edema in particular in the proximal femur, which may affect implant choice.  The typical rehabilitative course was also discussed.    Surgical Counseling     I have informed the patient of the diagnosis and the prognosis.  Exhaustive conservative treatment modalities have not resulted in long term pain relief.  The symptoms have progressed to the point of daily pain and inability to perform activities of daily living without significant pain.  The patient has reached the point of desiring to proceed with total hip arthroplasty after discussing the risks, benefits and alternatives to the procedure.  The surgical procedure itself was discussed in detail.  Risks of the procedure were  discussed, which included but are not limited to, bleeding, infection, damage to blood vessels and nerves, incomplete pain relief, loosening of the prosthesis (early or late), deep infection (early or late), need for further surgery, leg length discrepancy, hip dislocation, loss of limb, deep venous thrombosis, pulmonary embolus, death, heart attack, stroke, kidney failure, liver failure, and anesthetic complications.  In addition, the potential for deep infection developing in the future was discussed, which could require further surgery.  The hip would have to be re-opened, debrided, and potentially remove the prosthesis, which may or may not be replaced in the future.  Also, the possibility for loosening of the prosthesis has been mentioned.  If the prosthesis loosened, a revision arthroplasty could be performed, with results that are not as predictable compared to the original procedure.  The typical rehabilitative course has also been discussed, and full recovery may take up to a year to see the maximum benefit.  The importance of patient cooperation in the rehabilitative efforts has also been discussed.  No guarantees were given.  The patient understands the potential risks versus the benefits and desires to proceed with total hip arthroplasty at a mutually convenient time.    Return for surgery.    Medical Decision Making  Management Options : major surgery with risk factors  Data/Risk: radiology tests and independent visualization of imaging, lab tests, or EMG/NCV      Osbaldo Olivas MD  07/20/20  12:18    Dragon disclaimer:  Much of this encounter note is an electronic transcription/translation of spoken language to printed text. The electronic translation of spoken language may permit erroneous, or at times, nonsensical words or phrases to be inadvertently transcribed; Although I have reviewed the note for such errors, some may still exist.

## 2020-07-21 ENCOUNTER — APPOINTMENT (OUTPATIENT)
Dept: GENERAL RADIOLOGY | Facility: HOSPITAL | Age: 70
End: 2020-07-21

## 2020-07-24 ENCOUNTER — TELEPHONE (OUTPATIENT)
Dept: ORTHOPEDIC SURGERY | Facility: CLINIC | Age: 70
End: 2020-07-24

## 2020-07-24 ENCOUNTER — OFFICE VISIT (OUTPATIENT)
Dept: ORTHOPEDIC SURGERY | Facility: CLINIC | Age: 70
End: 2020-07-24

## 2020-07-24 VITALS — WEIGHT: 313.05 LBS | HEART RATE: 92 BPM | BODY MASS INDEX: 43.83 KG/M2 | HEIGHT: 71 IN | OXYGEN SATURATION: 99 %

## 2020-07-24 DIAGNOSIS — G62.9 NEUROPATHY: ICD-10-CM

## 2020-07-24 DIAGNOSIS — Z09 SURGERY FOLLOW-UP: Primary | ICD-10-CM

## 2020-07-24 PROCEDURE — 99212 OFFICE O/P EST SF 10 MIN: CPT | Performed by: ORTHOPAEDIC SURGERY

## 2020-07-24 RX ORDER — DIAZEPAM 5 MG
5 TABLET ORAL
Qty: 2 TABLET | Refills: 0 | Status: SHIPPED | OUTPATIENT
Start: 2020-07-24 | End: 2020-07-24

## 2020-07-24 NOTE — TELEPHONE ENCOUNTER
----- Message from Dea Maza sent at 7/24/2020  2:21 PM EDT -----  PATIENT ASK IF YOU COULD PRESCRIBE VALIUM BEFORE HIS MRI, SCHEDULED FOR Monday.    TXS

## 2020-07-24 NOTE — PROGRESS NOTES
NEW PATIENT    Patient: Obi Jackman Jr.  : 1950    Primary Care Provider: Cammy Oneal PA    Requesting Provider: As above    Follow-up ( s/p (L) triple fusion, ICBG, achilles lengthening 10/15/2019)      History    Chief Complaint: left triple fusion    History of Present Illness: he returns for f/u of left triple fusion, 10/15/2019.  No fever, no chills, the lateral incision is fully healed    Current Outpatient Medications on File Prior to Visit   Medication Sig Dispense Refill   • acetaminophen (TYLENOL) 325 MG tablet Take 325 mg by mouth As Needed for Mild Pain .     • albuterol sulfate  (90 Base) MCG/ACT inhaler Inhale 2 puffs Every 4 (Four) Hours As Needed for Wheezing. (Patient taking differently: Inhale 2 puffs Every 4 (Four) Hours As Needed for Wheezing or Shortness of Air.) 1 inhaler 2   • ALPRAZolam (XANAX) 0.25 MG tablet Take 1 tablet by mouth Daily As Needed for Anxiety (when flying). 10 tablet 0   • atenolol (TENORMIN) 50 MG tablet Take 1 tablet by mouth Daily. 30 tablet 5   • atorvastatin (LIPITOR) 10 MG tablet Take 1 tablet by mouth Daily. 90 tablet 3   • Azelastine-Fluticasone 137-50 MCG/ACT suspension 1 spray each nostril daily (Patient taking differently: 2 sprays Daily As Needed (nasal congestion). 1 spray each nostril daily) 1 bottle 5   • buPROPion XL (WELLBUTRIN XL) 150 MG 24 hr tablet Take 1 tablet by mouth Daily. 30 tablet 5   • Chlorhexidine Gluconate 4 % solution Apply  topically to the appropriate area as directed Daily. 237 mL 0   • clotrimazole-betamethasone (LOTRISONE) 1-0.05 % cream Apply  topically to the appropriate area as directed 2 (Two) Times a Day. 45 g 1   • cyclobenzaprine (FLEXERIL) 10 MG tablet Take one tablet by mouth three times a day as needed for muscle spasms. (Patient taking differently: Take 10 mg by mouth 3 (Three) Times a Day As Needed for Muscle Spasms. Take one tablet by mouth three times a day as needed for muscle spasms.) 30 tablet 5   •  Dietary Management Product (VASCULERA) tablet Take 1 tablet by mouth Daily. 90 tablet 5   • etodolac (LODINE) 400 MG tablet Take 400 mg by mouth 2 (Two) Times a Day.  3   • gabapentin (NEURONTIN) 300 MG capsule Take 300 mg by mouth 3 (Three) Times a Day.     • hydroxychloroquine (PLAQUENIL) 200 MG tablet Take 200 mg by mouth 2 (Two) Times a Day.     • ibuprofen (ADVIL,MOTRIN) 200 MG tablet Take 200 mg by mouth As Needed for Mild Pain .     • levoFLOXacin (LEVAQUIN) 750 MG tablet Take 1 tablet by mouth Daily. 14 tablet 0   • lisinopril-hydrochlorothiazide (PRINZIDE,ZESTORETIC) 20-12.5 MG per tablet TAKE 1 TABLET BY MOUTH DAILY 90 tablet 0   • Multiple Vitamins-Minerals (MULTIVITAMIN ADULT PO) Take 1 tablet by mouth Daily.     • mupirocin (BACTROBAN) 2 % ointment Apply into the nostril(s) as directed by provider 2 (Two) Times a Day. 22 g 0   • ondansetron (ZOFRAN) 4 MG tablet Take 1 tablet by mouth Every 6 (Six) Hours As Needed for Nausea or Vomiting. 30 tablet 0   • oxaprozin (DAYPRO) 600 MG tablet Take 1,200 mg by mouth. Take 2 po every day     • SUMAtriptan (IMITREX) 100 MG tablet TAKE 1 TABLET BY MOUTH AT ONSET OF HEADACHE. MAY REPEAT DOSE ONE TIME IN 2 HOURS IF HEADACHE NOT RELIEVED. 9 tablet 3   • terbinafine (lamiSIL) 250 MG tablet Take 250 mg by mouth Daily.     • traMADol (ULTRAM) 50 MG tablet Take 50 mg by mouth As Needed for Moderate Pain .     • vitamin D (ERGOCALCIFEROL) 77232 units capsule capsule Take 1 capsule by mouth 1 (One) Time Per Week. (Patient taking differently: Take 50,000 Units by mouth 1 (One) Time Per Week. Sunday) 12 capsule 3     No current facility-administered medications on file prior to visit.       No Known Allergies   Past Medical History:   Diagnosis Date   • Bulging of lumbar intervertebral disc    • Hyperlipidemia    • Hypertension    • Migraine    • CHRISTOPHE on CPAP    • Rheumatoid arthritis (CMS/HCC)    • Swelling of left ear    • Wears contact lenses      Past Surgical History:  "  Procedure Laterality Date   • ACHILLES TENDON SURGERY Left 10/15/2019    Procedure: ACHILLES TENDON LENGTHENING LEFT;  Surgeon: Elodia Guzman MD;  Location:  [facility];  Service: Orthopedics   • CATARACT EXTRACTION Right    • COLONOSCOPY  2015   • EYE SURGERY Bilateral     cornea transplant    • FOOT SURGERY Left 10/15/2019    triple arthrodesis and achilles tendon lengthening- [place]   • HERNIA REPAIR     • ILIAC CREST BONE GRAFT Left 10/15/2019    Procedure: ILIAC CREST BONE GRAFT LEFT;  Surgeon: Elodia Guzman MD;  Location:  [facility];  Service: Orthopedics   • TOE FUSION Left 10/15/2019    Procedure: TRIPLE ARTHODESIS LEFT;  Surgeon: Elodia Guzman MD;  Location:  [facility];  Service: Orthopedics     Family History   Problem Relation Age of Onset   • No Known Problems Mother    • No Known Problems Father       Social History     Socioeconomic History   • Marital status:      Spouse name: Not on file   • Number of children: Not on file   • Years of education: Not on file   • Highest education level: Not on file   Tobacco Use   • Smoking status: Never Smoker   • Smokeless tobacco: Never Used   Substance and Sexual Activity   • Alcohol use: Yes     Comment: rarely   • Drug use: No   • Sexual activity: Defer        Review of Systems   Constitutional: Negative.    HENT: Negative.    Eyes: Negative.    Respiratory: Negative.    Cardiovascular: Negative.    Gastrointestinal: Negative.    Endocrine: Negative.    Genitourinary: Negative.    Musculoskeletal: Positive for arthralgias.   Skin: Negative.    Allergic/Immunologic: Negative.    Neurological: Negative.    Hematological: Negative.    Psychiatric/Behavioral: Negative.        The following portions of the patient's history were reviewed and updated as appropriate: allergies, current medications, past family history, past medical history, past social history, past surgical history and problem list.    Physical Exam:   Pulse 92   Ht 180.3 cm (70.98\")  "  Wt (!) 142 kg (313 lb 0.9 oz)   SpO2 99%   BMI 43.68 kg/m²   GENERAL: Body habitus: obese     Left foot incisions are fully healed, the foot is plantigrade, nontender, ankle dorsiflexion 10 degrees plantarflexion 30 degrees, no inversion eversion.      medical Decision Making    Data Review:   ordered and reviewed x-rays today    Assessment and Plan/ Diagnosis/Treatment options:   1. Surgery follow-up  Doing well, the lateral incision is finally healed.  I will see him again in 6 months standing 2 views of the left foot, sooner if any problems  - XR Foot 2 View Left          Radiology Ordered []  Radiology Reports Reviewed []      Radiology Images Reviewed []   Labs Reviewed []    Labs Ordered []   PCP Records Reviewed []    Provider Records Reviewed []    ER Records Reviewed []    Hospital Records Reviewed []    History Obtained From Family []    Phone conversation with Provider []    Records Requested []

## 2020-07-27 ENCOUNTER — HOSPITAL ENCOUNTER (OUTPATIENT)
Dept: MRI IMAGING | Facility: HOSPITAL | Age: 70
Discharge: HOME | End: 2020-07-27
Admitting: ORTHOPAEDIC SURGERY

## 2020-07-27 ENCOUNTER — APPOINTMENT (OUTPATIENT)
Dept: PREADMISSION TESTING | Facility: HOSPITAL | Age: 70
End: 2020-07-27

## 2020-07-27 VITALS — BODY MASS INDEX: 41 KG/M2 | HEIGHT: 72 IN | WEIGHT: 302.69 LBS

## 2020-07-27 DIAGNOSIS — M16.11 PRIMARY OSTEOARTHRITIS OF RIGHT HIP: ICD-10-CM

## 2020-07-27 LAB
ANION GAP SERPL CALCULATED.3IONS-SCNC: 12 MMOL/L (ref 5–15)
APTT PPP: 31 SECONDS (ref 24–37)
BASOPHILS # BLD AUTO: 0.02 10*3/MM3 (ref 0–0.2)
BASOPHILS NFR BLD AUTO: 0.4 % (ref 0–1.5)
BUN SERPL-MCNC: 12 MG/DL (ref 8–23)
BUN/CREAT SERPL: 15.8 (ref 7–25)
CALCIUM SPEC-SCNC: 9.6 MG/DL (ref 8.6–10.5)
CHLORIDE SERPL-SCNC: 102 MMOL/L (ref 98–107)
CO2 SERPL-SCNC: 29 MMOL/L (ref 22–29)
CREAT SERPL-MCNC: 0.76 MG/DL (ref 0.76–1.27)
CRP SERPL-MCNC: 0.04 MG/DL (ref 0–0.5)
DEPRECATED RDW RBC AUTO: 40.6 FL (ref 37–54)
EOSINOPHIL # BLD AUTO: 0.14 10*3/MM3 (ref 0–0.4)
EOSINOPHIL NFR BLD AUTO: 2.5 % (ref 0.3–6.2)
ERYTHROCYTE [DISTWIDTH] IN BLOOD BY AUTOMATED COUNT: 12.2 % (ref 12.3–15.4)
ERYTHROCYTE [SEDIMENTATION RATE] IN BLOOD: 19 MM/HR (ref 0–20)
GFR SERPL CREATININE-BSD FRML MDRD: 102 ML/MIN/1.73
GLUCOSE SERPL-MCNC: 95 MG/DL (ref 65–99)
HBA1C MFR BLD: 5.3 % (ref 4.8–5.6)
HCT VFR BLD AUTO: 44.4 % (ref 37.5–51)
HGB BLD-MCNC: 14.9 G/DL (ref 13–17.7)
IMM GRANULOCYTES # BLD AUTO: 0.01 10*3/MM3 (ref 0–0.05)
IMM GRANULOCYTES NFR BLD AUTO: 0.2 % (ref 0–0.5)
INR PPP: 0.98 (ref 0.85–1.16)
LYMPHOCYTES # BLD AUTO: 1.18 10*3/MM3 (ref 0.7–3.1)
LYMPHOCYTES NFR BLD AUTO: 20.7 % (ref 19.6–45.3)
MCH RBC QN AUTO: 30.6 PG (ref 26.6–33)
MCHC RBC AUTO-ENTMCNC: 33.6 G/DL (ref 31.5–35.7)
MCV RBC AUTO: 91.2 FL (ref 79–97)
MONOCYTES # BLD AUTO: 0.64 10*3/MM3 (ref 0.1–0.9)
MONOCYTES NFR BLD AUTO: 11.2 % (ref 5–12)
NEUTROPHILS NFR BLD AUTO: 3.72 10*3/MM3 (ref 1.7–7)
NEUTROPHILS NFR BLD AUTO: 65 % (ref 42.7–76)
NRBC BLD AUTO-RTO: 0 /100 WBC (ref 0–0.2)
PLATELET # BLD AUTO: 281 10*3/MM3 (ref 140–450)
PMV BLD AUTO: 9.9 FL (ref 6–12)
POTASSIUM SERPL-SCNC: 4.3 MMOL/L (ref 3.5–5.2)
PROTHROMBIN TIME: 12.7 SECONDS (ref 11.5–14)
RBC # BLD AUTO: 4.87 10*6/MM3 (ref 4.14–5.8)
SODIUM SERPL-SCNC: 143 MMOL/L (ref 136–145)
WBC # BLD AUTO: 5.71 10*3/MM3 (ref 3.4–10.8)

## 2020-07-27 PROCEDURE — 85610 PROTHROMBIN TIME: CPT | Performed by: ORTHOPAEDIC SURGERY

## 2020-07-27 PROCEDURE — 36415 COLL VENOUS BLD VENIPUNCTURE: CPT

## 2020-07-27 PROCEDURE — 85025 COMPLETE CBC W/AUTO DIFF WBC: CPT | Performed by: ORTHOPAEDIC SURGERY

## 2020-07-27 PROCEDURE — 93005 ELECTROCARDIOGRAM TRACING: CPT

## 2020-07-27 PROCEDURE — 93010 ELECTROCARDIOGRAM REPORT: CPT | Performed by: INTERNAL MEDICINE

## 2020-07-27 PROCEDURE — 80048 BASIC METABOLIC PNL TOTAL CA: CPT | Performed by: ORTHOPAEDIC SURGERY

## 2020-07-27 PROCEDURE — 86140 C-REACTIVE PROTEIN: CPT | Performed by: ORTHOPAEDIC SURGERY

## 2020-07-27 PROCEDURE — 73721 MRI JNT OF LWR EXTRE W/O DYE: CPT

## 2020-07-27 PROCEDURE — 85730 THROMBOPLASTIN TIME PARTIAL: CPT | Performed by: ORTHOPAEDIC SURGERY

## 2020-07-27 PROCEDURE — 83036 HEMOGLOBIN GLYCOSYLATED A1C: CPT | Performed by: ORTHOPAEDIC SURGERY

## 2020-07-27 PROCEDURE — 85652 RBC SED RATE AUTOMATED: CPT | Performed by: ORTHOPAEDIC SURGERY

## 2020-07-27 ASSESSMENT — HOOS JR
HOOS JR SCORE: 25.103
HOOS JR SCORE: 20

## 2020-07-27 NOTE — PAT
Patient instructed to drink 20 ounces (or until full) of Gatorade and it needs to be completed 1 hour before given arrival time for procedure (NO RED Gatorade)    Patient verbalized understanding.  Patient to apply Chlorhexadine wipes  to surgical area (as instructed) the night before procedure and the AM of procedure. Wipes provided.  Per Anesthesia Request, patient instructed not to take their ACE/ARB medications on the AM of surgery.  Patient plans to attend the joint replacement class online-instructions were given.  Memory screening was performed in PAT-documents on chart-patient passed with no issues.  Bactroban and Chlorhexidine Prescription given during PAT visit, as well as written and verbal instructions given to patient during PAT visit.  Patient/family also instructed to complete skin prep checklist and return the checklist on the day of surgery to preoperative staff.  Patient/family verbalized understanding.

## 2020-08-02 ENCOUNTER — APPOINTMENT (OUTPATIENT)
Dept: PREADMISSION TESTING | Facility: HOSPITAL | Age: 70
End: 2020-08-02

## 2020-08-02 PROCEDURE — U0002 COVID-19 LAB TEST NON-CDC: HCPCS

## 2020-08-02 PROCEDURE — C9803 HOPD COVID-19 SPEC COLLECT: HCPCS

## 2020-08-03 ENCOUNTER — ANESTHESIA EVENT (OUTPATIENT)
Dept: PERIOP | Facility: HOSPITAL | Age: 70
End: 2020-08-03

## 2020-08-03 LAB
REF LAB TEST METHOD: NORMAL
SARS-COV-2 RNA RESP QL NAA+PROBE: NOT DETECTED

## 2020-08-04 ENCOUNTER — ANESTHESIA (OUTPATIENT)
Dept: PERIOP | Facility: HOSPITAL | Age: 70
End: 2020-08-04

## 2020-08-04 ENCOUNTER — APPOINTMENT (OUTPATIENT)
Dept: GENERAL RADIOLOGY | Facility: HOSPITAL | Age: 70
End: 2020-08-04

## 2020-08-04 ENCOUNTER — HOSPITAL ENCOUNTER (INPATIENT)
Facility: HOSPITAL | Age: 70
LOS: 1 days | Discharge: HOME | End: 2020-08-05
Attending: ORTHOPAEDIC SURGERY | Admitting: ORTHOPAEDIC SURGERY

## 2020-08-04 DIAGNOSIS — Z74.09 IMPAIRED MOBILITY AND ADLS: ICD-10-CM

## 2020-08-04 DIAGNOSIS — Z96.641 STATUS POST TOTAL REPLACEMENT OF RIGHT HIP: Primary | ICD-10-CM

## 2020-08-04 DIAGNOSIS — Z78.9 IMPAIRED MOBILITY AND ADLS: ICD-10-CM

## 2020-08-04 DIAGNOSIS — M16.11 PRIMARY OSTEOARTHRITIS OF RIGHT HIP: ICD-10-CM

## 2020-08-04 LAB — POTASSIUM SERPL-SCNC: 4 MMOL/L (ref 3.5–5.2)

## 2020-08-04 PROCEDURE — 84132 ASSAY OF SERUM POTASSIUM: CPT | Performed by: ANESTHESIOLOGY

## 2020-08-04 PROCEDURE — 25010000002 CEFAZOLIN PER 500 MG: Performed by: ORTHOPAEDIC SURGERY

## 2020-08-04 PROCEDURE — 25010000002 HYDROMORPHONE PER 4 MG: Performed by: ORTHOPAEDIC SURGERY

## 2020-08-04 PROCEDURE — C1776 JOINT DEVICE (IMPLANTABLE): HCPCS | Performed by: ORTHOPAEDIC SURGERY

## 2020-08-04 PROCEDURE — P9041 ALBUMIN (HUMAN),5%, 50ML: HCPCS | Performed by: NURSE ANESTHETIST, CERTIFIED REGISTERED

## 2020-08-04 PROCEDURE — 25010000002 FENTANYL CITRATE (PF) 100 MCG/2ML SOLUTION: Performed by: NURSE ANESTHETIST, CERTIFIED REGISTERED

## 2020-08-04 PROCEDURE — 25010000002 ONDANSETRON PER 1 MG: Performed by: NURSE ANESTHETIST, CERTIFIED REGISTERED

## 2020-08-04 PROCEDURE — 0SR904A REPLACEMENT OF RIGHT HIP JOINT WITH CERAMIC ON POLYETHYLENE SYNTHETIC SUBSTITUTE, UNCEMENTED, OPEN APPROACH: ICD-10-PCS | Performed by: ORTHOPAEDIC SURGERY

## 2020-08-04 PROCEDURE — 25010000002 HYDROMORPHONE PER 4 MG: Performed by: NURSE ANESTHETIST, CERTIFIED REGISTERED

## 2020-08-04 PROCEDURE — 25010000002 ROPIVACAINE PER 1 MG: Performed by: ORTHOPAEDIC SURGERY

## 2020-08-04 PROCEDURE — 97116 GAIT TRAINING THERAPY: CPT

## 2020-08-04 PROCEDURE — 25010000002 PROPOFOL 10 MG/ML EMULSION: Performed by: NURSE ANESTHETIST, CERTIFIED REGISTERED

## 2020-08-04 PROCEDURE — 25010000002 PHENYLEPHRINE PER 1 ML: Performed by: NURSE ANESTHETIST, CERTIFIED REGISTERED

## 2020-08-04 PROCEDURE — 27130 TOTAL HIP ARTHROPLASTY: CPT | Performed by: ORTHOPAEDIC SURGERY

## 2020-08-04 PROCEDURE — 73502 X-RAY EXAM HIP UNI 2-3 VIEWS: CPT

## 2020-08-04 PROCEDURE — 25010000002 DEXAMETHASONE PER 1 MG: Performed by: NURSE ANESTHETIST, CERTIFIED REGISTERED

## 2020-08-04 PROCEDURE — 72170 X-RAY EXAM OF PELVIS: CPT

## 2020-08-04 PROCEDURE — 25010000002 ALBUMIN HUMAN 5% PER 50 ML: Performed by: NURSE ANESTHETIST, CERTIFIED REGISTERED

## 2020-08-04 PROCEDURE — 27130 TOTAL HIP ARTHROPLASTY: CPT | Performed by: PHYSICIAN ASSISTANT

## 2020-08-04 PROCEDURE — 97161 PT EVAL LOW COMPLEX 20 MIN: CPT

## 2020-08-04 DEVICE — R3 0 DEGREE XLPE ACETABULAR LINER                                    36MM INNER DIAMETER X OUTER DIAMETER 56MM
Type: IMPLANTABLE DEVICE | Site: HIP | Status: FUNCTIONAL
Brand: R3

## 2020-08-04 DEVICE — R3 3 HOLE ACETABULAR SHELL 56MM
Type: IMPLANTABLE DEVICE | Site: HIP | Status: FUNCTIONAL
Brand: R3 ACETABULAR

## 2020-08-04 DEVICE — IMPLANTABLE DEVICE: Type: IMPLANTABLE DEVICE | Site: HIP | Status: FUNCTIONAL

## 2020-08-04 DEVICE — DEV CONTRL TISS STRATAFIX SPIRAL MNCRYL UD 3/0 PLS 60CM: Type: IMPLANTABLE DEVICE | Site: HIP | Status: FUNCTIONAL

## 2020-08-04 DEVICE — POLARSTEM COLLAR LATERAL                                    NON-CEMENTED WITH TI/HA 4
Type: IMPLANTABLE DEVICE | Site: HIP | Status: FUNCTIONAL
Brand: POLARSTEM

## 2020-08-04 DEVICE — DEV CONTRL TISS STRATAFIX SYMM PDS PLUS VIL CT-1 45CM: Type: IMPLANTABLE DEVICE | Site: HIP | Status: FUNCTIONAL

## 2020-08-04 DEVICE — REFLECTION SPHERICAL HEAD SCREW 30MM
Type: IMPLANTABLE DEVICE | Site: HIP | Status: FUNCTIONAL
Brand: REFLECTION

## 2020-08-04 DEVICE — R3 US DELTA HEAD 36 +0
Type: IMPLANTABLE DEVICE | Site: HIP | Status: FUNCTIONAL
Brand: BIOLOX DELTA

## 2020-08-04 RX ORDER — FENTANYL CITRATE 50 UG/ML
50 INJECTION, SOLUTION INTRAMUSCULAR; INTRAVENOUS
Status: DISCONTINUED | OUTPATIENT
Start: 2020-08-04 | End: 2020-08-04 | Stop reason: HOSPADM

## 2020-08-04 RX ORDER — CEFAZOLIN SODIUM IN 0.9 % NACL 3 G/100 ML
3 INTRAVENOUS SOLUTION, PIGGYBACK (ML) INTRAVENOUS ONCE
Status: COMPLETED | OUTPATIENT
Start: 2020-08-04 | End: 2020-08-04

## 2020-08-04 RX ORDER — BUPROPION HYDROCHLORIDE 150 MG/1
150 TABLET ORAL DAILY
Status: DISCONTINUED | OUTPATIENT
Start: 2020-08-05 | End: 2020-08-05 | Stop reason: HOSPADM

## 2020-08-04 RX ORDER — ATORVASTATIN CALCIUM 10 MG/1
10 TABLET, FILM COATED ORAL DAILY
Status: DISCONTINUED | OUTPATIENT
Start: 2020-08-05 | End: 2020-08-05 | Stop reason: HOSPADM

## 2020-08-04 RX ORDER — FAMOTIDINE 10 MG/ML
20 INJECTION, SOLUTION INTRAVENOUS ONCE
Status: DISCONTINUED | OUTPATIENT
Start: 2020-08-04 | End: 2020-08-04

## 2020-08-04 RX ORDER — GABAPENTIN 300 MG/1
300 CAPSULE ORAL 3 TIMES DAILY
Status: DISCONTINUED | OUTPATIENT
Start: 2020-08-04 | End: 2020-08-05 | Stop reason: HOSPADM

## 2020-08-04 RX ORDER — SODIUM CHLORIDE 9 MG/ML
120 INJECTION, SOLUTION INTRAVENOUS CONTINUOUS
Status: DISCONTINUED | OUTPATIENT
Start: 2020-08-04 | End: 2020-08-05 | Stop reason: HOSPADM

## 2020-08-04 RX ORDER — MORPHINE SULFATE 4 MG/ML
4 INJECTION, SOLUTION INTRAMUSCULAR; INTRAVENOUS
Status: DISCONTINUED | OUTPATIENT
Start: 2020-08-04 | End: 2020-08-05 | Stop reason: HOSPADM

## 2020-08-04 RX ORDER — ONDANSETRON 2 MG/ML
INJECTION INTRAMUSCULAR; INTRAVENOUS AS NEEDED
Status: DISCONTINUED | OUTPATIENT
Start: 2020-08-04 | End: 2020-08-04 | Stop reason: SURG

## 2020-08-04 RX ORDER — ONDANSETRON 2 MG/ML
4 INJECTION INTRAMUSCULAR; INTRAVENOUS EVERY 6 HOURS PRN
Status: DISCONTINUED | OUTPATIENT
Start: 2020-08-04 | End: 2020-08-05 | Stop reason: HOSPADM

## 2020-08-04 RX ORDER — LIDOCAINE HYDROCHLORIDE 10 MG/ML
0.5 INJECTION, SOLUTION EPIDURAL; INFILTRATION; INTRACAUDAL; PERINEURAL ONCE AS NEEDED
Status: COMPLETED | OUTPATIENT
Start: 2020-08-04 | End: 2020-08-04

## 2020-08-04 RX ORDER — MAGNESIUM HYDROXIDE 1200 MG/15ML
LIQUID ORAL AS NEEDED
Status: DISCONTINUED | OUTPATIENT
Start: 2020-08-04 | End: 2020-08-04 | Stop reason: HOSPADM

## 2020-08-04 RX ORDER — HYDROMORPHONE HYDROCHLORIDE 1 MG/ML
0.5 INJECTION, SOLUTION INTRAMUSCULAR; INTRAVENOUS; SUBCUTANEOUS
Status: DISCONTINUED | OUTPATIENT
Start: 2020-08-04 | End: 2020-08-05 | Stop reason: HOSPADM

## 2020-08-04 RX ORDER — SODIUM CHLORIDE 0.9 % (FLUSH) 0.9 %
1-10 SYRINGE (ML) INJECTION AS NEEDED
Status: DISCONTINUED | OUTPATIENT
Start: 2020-08-04 | End: 2020-08-05 | Stop reason: HOSPADM

## 2020-08-04 RX ORDER — SODIUM CHLORIDE 0.9 % (FLUSH) 0.9 %
10 SYRINGE (ML) INJECTION AS NEEDED
Status: DISCONTINUED | OUTPATIENT
Start: 2020-08-04 | End: 2020-08-04 | Stop reason: HOSPADM

## 2020-08-04 RX ORDER — ROPIVACAINE HYDROCHLORIDE 5 MG/ML
INJECTION, SOLUTION EPIDURAL; INFILTRATION; PERINEURAL AS NEEDED
Status: DISCONTINUED | OUTPATIENT
Start: 2020-08-04 | End: 2020-08-04 | Stop reason: HOSPADM

## 2020-08-04 RX ORDER — MELOXICAM 15 MG/1
15 TABLET ORAL ONCE
Status: COMPLETED | OUTPATIENT
Start: 2020-08-04 | End: 2020-08-04

## 2020-08-04 RX ORDER — ALBUTEROL SULFATE 2.5 MG/3ML
2.5 SOLUTION RESPIRATORY (INHALATION) EVERY 4 HOURS PRN
Status: DISCONTINUED | OUTPATIENT
Start: 2020-08-04 | End: 2020-08-05 | Stop reason: HOSPADM

## 2020-08-04 RX ORDER — LISINOPRIL 20 MG/1
20 TABLET ORAL
Status: DISCONTINUED | OUTPATIENT
Start: 2020-08-04 | End: 2020-08-05 | Stop reason: HOSPADM

## 2020-08-04 RX ORDER — SODIUM CHLORIDE, SODIUM LACTATE, POTASSIUM CHLORIDE, CALCIUM CHLORIDE 600; 310; 30; 20 MG/100ML; MG/100ML; MG/100ML; MG/100ML
9 INJECTION, SOLUTION INTRAVENOUS CONTINUOUS
Status: DISCONTINUED | OUTPATIENT
Start: 2020-08-04 | End: 2020-08-04

## 2020-08-04 RX ORDER — SODIUM CHLORIDE 0.9 % (FLUSH) 0.9 %
10 SYRINGE (ML) INJECTION EVERY 12 HOURS SCHEDULED
Status: DISCONTINUED | OUTPATIENT
Start: 2020-08-04 | End: 2020-08-04 | Stop reason: HOSPADM

## 2020-08-04 RX ORDER — HYDROMORPHONE HYDROCHLORIDE 1 MG/ML
0.5 INJECTION, SOLUTION INTRAMUSCULAR; INTRAVENOUS; SUBCUTANEOUS
Status: DISCONTINUED | OUTPATIENT
Start: 2020-08-04 | End: 2020-08-04 | Stop reason: HOSPADM

## 2020-08-04 RX ORDER — FAMOTIDINE 20 MG/1
20 TABLET, FILM COATED ORAL ONCE
Status: COMPLETED | OUTPATIENT
Start: 2020-08-04 | End: 2020-08-04

## 2020-08-04 RX ORDER — CYCLOBENZAPRINE HCL 10 MG
10 TABLET ORAL 3 TIMES DAILY PRN
Status: DISCONTINUED | OUTPATIENT
Start: 2020-08-04 | End: 2020-08-05 | Stop reason: HOSPADM

## 2020-08-04 RX ORDER — LABETALOL HYDROCHLORIDE 5 MG/ML
10 INJECTION, SOLUTION INTRAVENOUS EVERY 4 HOURS PRN
Status: DISCONTINUED | OUTPATIENT
Start: 2020-08-04 | End: 2020-08-05 | Stop reason: HOSPADM

## 2020-08-04 RX ORDER — NALOXONE HCL 0.4 MG/ML
0.4 VIAL (ML) INJECTION
Status: DISCONTINUED | OUTPATIENT
Start: 2020-08-04 | End: 2020-08-05 | Stop reason: HOSPADM

## 2020-08-04 RX ORDER — ACETAMINOPHEN 500 MG
1000 TABLET ORAL ONCE
Status: COMPLETED | OUTPATIENT
Start: 2020-08-04 | End: 2020-08-04

## 2020-08-04 RX ORDER — CEFAZOLIN SODIUM IN 0.9 % NACL 3 G/100 ML
3 INTRAVENOUS SOLUTION, PIGGYBACK (ML) INTRAVENOUS EVERY 8 HOURS
Status: COMPLETED | OUTPATIENT
Start: 2020-08-04 | End: 2020-08-05

## 2020-08-04 RX ORDER — NALOXONE HCL 0.4 MG/ML
0.1 VIAL (ML) INJECTION
Status: DISCONTINUED | OUTPATIENT
Start: 2020-08-04 | End: 2020-08-05 | Stop reason: HOSPADM

## 2020-08-04 RX ORDER — MELOXICAM 7.5 MG/1
15 TABLET ORAL DAILY
Status: DISCONTINUED | OUTPATIENT
Start: 2020-08-05 | End: 2020-08-05 | Stop reason: HOSPADM

## 2020-08-04 RX ORDER — PREGABALIN 150 MG/1
150 CAPSULE ORAL ONCE
Status: DISCONTINUED | OUTPATIENT
Start: 2020-08-04 | End: 2020-08-04

## 2020-08-04 RX ORDER — EPHEDRINE SULFATE 50 MG/ML
5 INJECTION, SOLUTION INTRAVENOUS ONCE AS NEEDED
Status: DISCONTINUED | OUTPATIENT
Start: 2020-08-04 | End: 2020-08-04 | Stop reason: HOSPADM

## 2020-08-04 RX ORDER — BUPIVACAINE HYDROCHLORIDE 5 MG/ML
INJECTION, SOLUTION PERINEURAL
Status: COMPLETED | OUTPATIENT
Start: 2020-08-04 | End: 2020-08-04

## 2020-08-04 RX ORDER — ASPIRIN 325 MG
325 TABLET, DELAYED RELEASE (ENTERIC COATED) ORAL DAILY
Status: DISCONTINUED | OUTPATIENT
Start: 2020-08-05 | End: 2020-08-05 | Stop reason: HOSPADM

## 2020-08-04 RX ORDER — ALBUMIN, HUMAN INJ 5% 5 %
SOLUTION INTRAVENOUS CONTINUOUS PRN
Status: DISCONTINUED | OUTPATIENT
Start: 2020-08-04 | End: 2020-08-04 | Stop reason: SURG

## 2020-08-04 RX ORDER — ACETAMINOPHEN 325 MG/1
650 TABLET ORAL EVERY 4 HOURS PRN
Status: DISCONTINUED | OUTPATIENT
Start: 2020-08-04 | End: 2020-08-05 | Stop reason: HOSPADM

## 2020-08-04 RX ORDER — SODIUM CHLORIDE 0.9 % (FLUSH) 0.9 %
3 SYRINGE (ML) INJECTION EVERY 12 HOURS SCHEDULED
Status: DISCONTINUED | OUTPATIENT
Start: 2020-08-04 | End: 2020-08-05 | Stop reason: HOSPADM

## 2020-08-04 RX ORDER — ATENOLOL 50 MG/1
50 TABLET ORAL DAILY
Status: DISCONTINUED | OUTPATIENT
Start: 2020-08-05 | End: 2020-08-05 | Stop reason: HOSPADM

## 2020-08-04 RX ORDER — OXYCODONE HYDROCHLORIDE 5 MG/1
5 TABLET ORAL EVERY 4 HOURS PRN
Status: DISCONTINUED | OUTPATIENT
Start: 2020-08-04 | End: 2020-08-05 | Stop reason: HOSPADM

## 2020-08-04 RX ORDER — ONDANSETRON 2 MG/ML
4 INJECTION INTRAMUSCULAR; INTRAVENOUS ONCE AS NEEDED
Status: DISCONTINUED | OUTPATIENT
Start: 2020-08-04 | End: 2020-08-04 | Stop reason: HOSPADM

## 2020-08-04 RX ORDER — DEXAMETHASONE SODIUM PHOSPHATE 4 MG/ML
INJECTION, SOLUTION INTRA-ARTICULAR; INTRALESIONAL; INTRAMUSCULAR; INTRAVENOUS; SOFT TISSUE AS NEEDED
Status: DISCONTINUED | OUTPATIENT
Start: 2020-08-04 | End: 2020-08-04 | Stop reason: SURG

## 2020-08-04 RX ORDER — NABUMETONE 750 MG/1
1500 TABLET, FILM COATED ORAL DAILY
COMMUNITY
End: 2020-12-06 | Stop reason: HOSPADM

## 2020-08-04 RX ORDER — ONDANSETRON 4 MG/1
4 TABLET, FILM COATED ORAL EVERY 6 HOURS PRN
Status: DISCONTINUED | OUTPATIENT
Start: 2020-08-04 | End: 2020-08-05 | Stop reason: HOSPADM

## 2020-08-04 RX ADMIN — SODIUM CHLORIDE, PRESERVATIVE FREE 3 ML: 5 INJECTION INTRAVENOUS at 20:39

## 2020-08-04 RX ADMIN — PHENYLEPHRINE HYDROCHLORIDE 100 MCG: 10 INJECTION INTRAVENOUS at 11:07

## 2020-08-04 RX ADMIN — OXYCODONE HYDROCHLORIDE 5 MG: 5 TABLET ORAL at 15:11

## 2020-08-04 RX ADMIN — LIDOCAINE HYDROCHLORIDE 0.2 ML: 10 INJECTION, SOLUTION EPIDURAL; INFILTRATION; INTRACAUDAL; PERINEURAL at 08:55

## 2020-08-04 RX ADMIN — TRANEXAMIC ACID 1000 MG: 100 INJECTION, SOLUTION INTRAVENOUS at 10:07

## 2020-08-04 RX ADMIN — TRANEXAMIC ACID 1000 MG: 100 INJECTION, SOLUTION INTRAVENOUS at 11:33

## 2020-08-04 RX ADMIN — FENTANYL CITRATE 50 MCG: 0.05 INJECTION, SOLUTION INTRAMUSCULAR; INTRAVENOUS at 12:48

## 2020-08-04 RX ADMIN — SODIUM CHLORIDE, POTASSIUM CHLORIDE, SODIUM LACTATE AND CALCIUM CHLORIDE: 600; 310; 30; 20 INJECTION, SOLUTION INTRAVENOUS at 11:36

## 2020-08-04 RX ADMIN — DEXAMETHASONE SODIUM PHOSPHATE 8 MG: 4 INJECTION, SOLUTION INTRAMUSCULAR; INTRAVENOUS at 10:07

## 2020-08-04 RX ADMIN — OXYCODONE HYDROCHLORIDE 5 MG: 5 TABLET ORAL at 20:39

## 2020-08-04 RX ADMIN — ONDANSETRON 4 MG: 2 INJECTION INTRAMUSCULAR; INTRAVENOUS at 11:59

## 2020-08-04 RX ADMIN — CEFAZOLIN SODIUM 3 G: 10 INJECTION, POWDER, FOR SOLUTION INTRAVENOUS at 17:52

## 2020-08-04 RX ADMIN — PROPOFOL 50 MCG/KG/MIN: 10 INJECTION, EMULSION INTRAVENOUS at 10:07

## 2020-08-04 RX ADMIN — PHENYLEPHRINE HYDROCHLORIDE 200 MCG: 10 INJECTION INTRAVENOUS at 11:18

## 2020-08-04 RX ADMIN — FENTANYL CITRATE 50 MCG: 0.05 INJECTION, SOLUTION INTRAMUSCULAR; INTRAVENOUS at 13:18

## 2020-08-04 RX ADMIN — ACETAMINOPHEN 1000 MG: 500 TABLET ORAL at 09:33

## 2020-08-04 RX ADMIN — LISINOPRIL 20 MG: 20 TABLET ORAL at 15:38

## 2020-08-04 RX ADMIN — MELOXICAM 15 MG: 15 TABLET ORAL at 09:33

## 2020-08-04 RX ADMIN — MUPIROCIN 1 APPLICATION: 20 OINTMENT TOPICAL at 09:34

## 2020-08-04 RX ADMIN — SODIUM CHLORIDE 120 ML/HR: 9 INJECTION, SOLUTION INTRAVENOUS at 13:59

## 2020-08-04 RX ADMIN — BUPIVACAINE HYDROCHLORIDE 2 ML: 5 INJECTION, SOLUTION PERINEURAL at 10:11

## 2020-08-04 RX ADMIN — EPHEDRINE SULFATE 10 MG: 50 INJECTION INTRAMUSCULAR; INTRAVENOUS; SUBCUTANEOUS at 10:46

## 2020-08-04 RX ADMIN — Medication 3 G: at 09:51

## 2020-08-04 RX ADMIN — PHENYLEPHRINE HYDROCHLORIDE 100 MCG: 10 INJECTION INTRAVENOUS at 11:01

## 2020-08-04 RX ADMIN — CYCLOBENZAPRINE HYDROCHLORIDE 10 MG: 10 TABLET, FILM COATED ORAL at 15:38

## 2020-08-04 RX ADMIN — EPHEDRINE SULFATE 15 MG: 50 INJECTION INTRAMUSCULAR; INTRAVENOUS; SUBCUTANEOUS at 10:07

## 2020-08-04 RX ADMIN — ALBUMIN HUMAN: 0.05 INJECTION, SOLUTION INTRAVENOUS at 11:03

## 2020-08-04 RX ADMIN — FENTANYL CITRATE 50 MCG: 0.05 INJECTION, SOLUTION INTRAMUSCULAR; INTRAVENOUS at 12:43

## 2020-08-04 RX ADMIN — PHENYLEPHRINE HYDROCHLORIDE 200 MCG: 10 INJECTION INTRAVENOUS at 11:28

## 2020-08-04 RX ADMIN — CYCLOBENZAPRINE HYDROCHLORIDE 10 MG: 10 TABLET, FILM COATED ORAL at 23:27

## 2020-08-04 RX ADMIN — SODIUM CHLORIDE, POTASSIUM CHLORIDE, SODIUM LACTATE AND CALCIUM CHLORIDE 9 ML/HR: 600; 310; 30; 20 INJECTION, SOLUTION INTRAVENOUS at 08:55

## 2020-08-04 RX ADMIN — HYDROMORPHONE HYDROCHLORIDE 0.5 MG: 1 INJECTION, SOLUTION INTRAMUSCULAR; INTRAVENOUS; SUBCUTANEOUS at 17:51

## 2020-08-04 RX ADMIN — ACETAMINOPHEN 650 MG: 325 TABLET ORAL at 14:30

## 2020-08-04 RX ADMIN — GABAPENTIN 300 MG: 300 CAPSULE ORAL at 15:38

## 2020-08-04 RX ADMIN — FAMOTIDINE 20 MG: 20 TABLET, FILM COATED ORAL at 09:33

## 2020-08-04 RX ADMIN — HYDROMORPHONE HYDROCHLORIDE 0.5 MG: 1 INJECTION, SOLUTION INTRAMUSCULAR; INTRAVENOUS; SUBCUTANEOUS at 13:00

## 2020-08-04 RX ADMIN — GABAPENTIN 300 MG: 300 CAPSULE ORAL at 20:23

## 2020-08-04 NOTE — PLAN OF CARE
Patient able to ambulate in nair with pt 100ft. Able to void multiple times and sitting in chair for dinner. Pain remains consistent, but improved after doses of PRN pain medicine. No nausea. VSS.

## 2020-08-04 NOTE — H&P
Patient Name: Obi Jackman Jr.  MRN: [id number]  : 1950  DOS: 2020    Attending: Osbaldo Olivas MD    Primary Care Provider: Cammy Oneal PA      Chief complaint: Right hip pain    Subjective   Patient is a pleasant 69 y.o. male presented for scheduled surgery by Dr. Olivas.  He underwent right total hip arthroplasty under spinal anesthesia.  He tolerated surgery well and was admitted for further medical management. His hip has been painful for several months. He uses a cane for ambulation. He denies recent falls.    He is known to us from previous admission for left ankle surgery 10/15/2019; which she recovered well.    When seen in PACU he is doing well. He reports moderate right hip and low back pain. He denies nausea, shortness of breath or chest pain. No hx of DVT or PE.    (Above is noted, agree.  I saw Mr. Jackman in his room afterwards.  Doing fairly well with some postoperative pain, was seen by PT earlier and ambulated 100 feet using rolling walker and contact-guard assist of 2 for safety.  No nausea, vomiting, or shortness of breath.  Tolerated p.o. diet, voided.)wy.    Allergies:  No Known Allergies    Meds:  Medications Prior to Admission   Medication Sig Dispense Refill Last Dose   • Ascorbic Acid (VITAMIN C PO) Take  by mouth Daily.   2020 at 63827   • atenolol (TENORMIN) 50 MG tablet Take 1 tablet by mouth Daily. 30 tablet 5 2020 at 0615   • atorvastatin (LIPITOR) 10 MG tablet Take 1 tablet by mouth Daily. 90 tablet 3 2020 at 0615   • buPROPion XL (WELLBUTRIN XL) 150 MG 24 hr tablet Take 1 tablet by mouth Daily. 30 tablet 5 2020 at 0615   • Chlorhexidine Gluconate 4 % solution Apply  topically to the appropriate area as directed Daily. 237 mL 0 8/3/2020 at 2100   • Cyanocobalamin (VITAMIN B-12 ER PO) Take 1 tablet by mouth Daily.   2020 at 0615   • cyclobenzaprine (FLEXERIL) 10 MG tablet Take one tablet by mouth three times a day as needed for muscle spasms.  (Patient taking differently: Take 10 mg by mouth 3 (Three) Times a Day As Needed for Muscle Spasms. Take one tablet by mouth three times a day as needed for muscle spasms.) 30 tablet 5 Past Month at Unknown time   • Ergocalciferol (VITAMIN D2 PO) Take  by mouth Daily.   8/4/2020 at 0615   • gabapentin (NEURONTIN) 300 MG capsule Take 300 mg by mouth 3 (Three) Times a Day.   8/4/2020 at 0615   • lisinopril-hydrochlorothiazide (PRINZIDE,ZESTORETIC) 20-12.5 MG per tablet TAKE 1 TABLET BY MOUTH DAILY 90 tablet 0 8/3/2020 at 1000   • nabumetone (RELAFEN) 750 MG tablet Take 1,500 mg by mouth Daily.   8/4/2020 at 0615   • oxaprozin (DAYPRO) 600 MG tablet Take 1,200 mg by mouth Daily. Take 2 po every day    8/4/2020 at 0615   • traMADol (ULTRAM) 50 MG tablet Take 50 mg by mouth 3 (Three) Times a Day As Needed for Moderate Pain  (1-2 tablets tid prn).   8/4/2020 at 0615   • acetaminophen (TYLENOL) 325 MG tablet Take 325 mg by mouth As Needed for Mild Pain .   8/2/2020   • albuterol sulfate  (90 Base) MCG/ACT inhaler Inhale 2 puffs Every 4 (Four) Hours As Needed for Wheezing. (Patient taking differently: Inhale 2 puffs Every 4 (Four) Hours As Needed for Wheezing or Shortness of Air.) 1 inhaler 2 More than a month at Unknown time   • ALPRAZolam (XANAX) 0.25 MG tablet Take 1 tablet by mouth Daily As Needed for Anxiety (when flying). 10 tablet 0 More than a month at Unknown time   • Dietary Management Product (VASCULERA) tablet Take 1 tablet by mouth Daily. 90 tablet 5 8/1/2020   • hydroxychloroquine (PLAQUENIL) 200 MG tablet Take 200 mg by mouth 2 (Two) Times a Day.   7/30/2020   • ibuprofen (ADVIL,MOTRIN) 200 MG tablet Take 200 mg by mouth As Needed for Mild Pain .   1 week ago   • mupirocin (BACTROBAN) 2 % ointment Apply into the nostril(s) as directed by provider 2 (Two) Times a Day. 22 g 0 Taking   • ondansetron (ZOFRAN) 4 MG tablet Take 1 tablet by mouth Every 6 (Six) Hours As Needed for Nausea or Vomiting. 30 tablet  0 More than a month at Unknown time   • SUMAtriptan (IMITREX) 100 MG tablet TAKE 1 TABLET BY MOUTH AT ONSET OF HEADACHE. MAY REPEAT DOSE ONE TIME IN 2 HOURS IF HEADACHE NOT RELIEVED. (Patient taking differently: Take 100 mg by mouth 1 (One) Time. TAKE 1 TABLET BY MOUTH AT ONSET OF HEADACHE. MAY REPEAT DOSE ONE TIME IN 2 HOURS IF HEADACHE NOT RELIEVED.) 9 tablet 3 More than a month at Unknown time          Past Medical History:   Diagnosis Date   • Anxiety and depression    • Bulging of lumbar intervertebral disc    • CPAP (continuous positive airway pressure) dependence    • Hyperlipidemia    • Hypertension    • Migraine    • CHRISTOPHE on CPAP    • Rheumatoid arthritis (CMS/HCC)    • Swelling of left ear    • Wears contact lenses    • Wears contact lenses      Past Surgical History:   Procedure Laterality Date   • ACHILLES TENDON SURGERY Left 10/15/2019    Procedure: ACHILLES TENDON LENGTHENING LEFT;  Surgeon: Elodia Guzman MD;  Location:  [facility];  Service: Orthopedics   • CATARACT EXTRACTION Right    • COLONOSCOPY  2015   • EYE SURGERY Bilateral     cornea transplant    • FOOT SURGERY Left 10/15/2019    triple arthrodesis and achilles tendon lengthening- [facility]   • HERNIA REPAIR     • ILIAC CREST BONE GRAFT Left 10/15/2019    Procedure: ILIAC CREST BONE GRAFT LEFT;  Surgeon: Elodia Guzman MD;  Location: [facility];  Service: Orthopedics   • TOE FUSION Left 10/15/2019    Procedure: TRIPLE ARTHODESIS LEFT;  Surgeon: Elodia Guzman MD;  Location:  [facility];  Service: Orthopedics     Family History   Problem Relation Age of Onset   • No Known Problems Mother    • No Known Problems Father      Social History     Tobacco Use   • Smoking status: Never Smoker   • Smokeless tobacco: Never Used   Substance Use Topics   • Alcohol use: Yes     Comment: rarely   • Drug use: No   .  Retired from being president of [employer].    Review of Systems  Pertinent items are noted in HPI, all other systems reviewed and  "negative    Vital Signs  /69 (BP Location: Right arm, Patient Position: Lying)   Pulse 69   Temp 97.4 °F (36.3 °C) (Temporal)   Resp 18   Ht 182.9 cm (72\")   Wt (!) 137 kg (302 lb)   SpO2 90%   BMI 40.96 kg/m²     Physical Exam:    General Appearance:    Alert, cooperative, in no acute distress   Head:    Normocephalic, without obvious abnormality, atraumatic   Eyes:            Lids and lashes normal, conjunctivae and sclerae normal, no   icterus, no pallor, corneas clear,    Ears:    Ears appear intact with no abnormalities noted   Throat:   No oral lesions, no thrush, oral mucosa moist   Neck:   No adenopathy, supple, trachea midline, no thyromegaly    Lungs:     Clear to auscultation,respirations regular, even and unlabored    Heart:    Regular rhythm and normal rate, normal S1 and S2, no murmur, no gallop   Abdomen:     Normal bowel sounds, no masses, no organomegaly, soft non-tender, non-distended, no guarding, no rebound  Tenderness. obese   Genitalia:    Deferred   Extremities:   Right hip bulky surgical dressing CDI.    Pulses:   Pulses palpable and equal bilaterally   Skin:   No bleeding, bruising or rash   Neurologic:   Cranial nerves 2 - 12 grossly intact. Flexion and dorsiflexion intact bilateral feet.( limited in left ankle / post fusion)wy        I reviewed the patient's new clinical results.     Results from last 7 days   Lab Units 08/04/20  0855   POTASSIUM mmol/L 4.0     Results for ERIC QUINTERO JR. (MRN [id number]) as of 8/4/2020 14:03   Ref. Range 7/27/2020 16:44   Glucose Latest Ref Range: 65 - 99 mg/dL 95   Sodium Latest Ref Range: 136 - 145 mmol/L 143   Potassium Latest Ref Range: 3.5 - 5.2 mmol/L 4.3   CO2 Latest Ref Range: 22.0 - 29.0 mmol/L 29.0   Chloride Latest Ref Range: 98 - 107 mmol/L 102   Anion Gap Latest Ref Range: 5.0 - 15.0 mmol/L 12.0   Creatinine Latest Ref Range: 0.76 - 1.27 mg/dL 0.76   BUN Latest Ref Range: 8 - 23 mg/dL 12   BUN/Creatinine Ratio Latest Ref " Range: 7.0 - 25.0  15.8   Calcium Latest Ref Range: 8.6 - 10.5 mg/dL 9.6   eGFR Non  Am Latest Ref Range: >60 mL/min/1.73 102   Hemoglobin A1C Latest Ref Range: 4.80 - 5.60 % 5.30   C-Reactive Protein Latest Ref Range: 0.00 - 0.50 mg/dL 0.04   Protime Latest Ref Range: 11.5 - 14.0 Seconds 12.7   INR Latest Ref Range: 0.85 - 1.16  0.98   PTT Latest Ref Range: 24.0 - 37.0 seconds 31.0   WBC Latest Ref Range: 3.40 - 10.80 10*3/mm3 5.71   RBC Latest Ref Range: 4.14 - 5.80 10*6/mm3 4.87   Hemoglobin Latest Ref Range: 13.0 - 17.7 g/dL 14.9   Hematocrit Latest Ref Range: 37.5 - 51.0 % 44.4   RDW Latest Ref Range: 12.3 - 15.4 % 12.2 (L)   MCV Latest Ref Range: 79.0 - 97.0 fL 91.2   MCH Latest Ref Range: 26.6 - 33.0 pg 30.6   MCHC Latest Ref Range: 31.5 - 35.7 g/dL 33.6   MPV Latest Ref Range: 6.0 - 12.0 fL 9.9   Platelets Latest Ref Range: 140 - 450 10*3/mm3 281       Assessment and Plan:     Status post total replacement of right hip    Primary osteoarthritis of right hip    Hyperlipidemia    Hypertension    CHRISTOPHE (obstructive sleep apnea)    Class 3 severe obesity due to excess calories with serious comorbidity and body mass index (BMI) of 40.0 to 44.9 in adult (CMS/[facility])      Plan  1. PT/OT- WBAT RLE  2. Pain control-prns   3. IS-encourage  4. DVT proph- Mechs/ASA  5. Bowel regimen  6. Resume home medications as appropriate  7. Monitor post-op labs  8. DC planning for home    HTN, HLD  - Continue home atenolol, statin and lisinopril  - Hold HCTZ  - Monitor BP   - Holding parameters for BP meds  - Labetalol PRN for SBP>170     CHRISTOPHE  - CPAP at night    Obesity  - complicates all aspects of care      YASMIN Quesada  08/04/20  13:59     I have personally performed the evaluation on this patient. My history is consistent  with HPI obtained. My exam findings are listed above. I have personally reviewed and discussed the above formulated treatment plan with pt and AH. Emmett.

## 2020-08-04 NOTE — PLAN OF CARE
Problem: Patient Care Overview  Goal: Plan of Care Review  Flowsheets (Taken 8/4/2020 6430)  Progress: improving  Plan of Care Reviewed With: patient; spouse  Outcome Summary: PT eval complete. Pt ambulated 100 feet using RW and CGA x2 for safety. Gait limited by pain and fatigue. Bed mobility and STS performed with CGA x2. No knee buckling noted with ambulation. Reviewed HEP and posterior hip precautions. PADD score = 9. Recommend pt d/c home with assist and HHPT when appropriate.

## 2020-08-04 NOTE — ANESTHESIA PROCEDURE NOTES
Spinal Block      Patient reassessed immediately prior to procedure    Patient location during procedure: OR  Indication:at surgeon's request  Performed By  CRNA: Nhan Cohn CRNA  Preanesthetic Checklist  Completed: patient identified, site marked, surgical consent, pre-op evaluation, timeout performed, IV checked, risks and benefits discussed and monitors and equipment checked  Spinal Block Prep:  Patient Position:sitting  Sterile Tech:cap, gloves, sterile barriers and mask  Prep:Chloraprep  Patient Monitoring:blood pressure monitoring, continuous pulse oximetry and EKG  Spinal Block Procedure  Approach:midline  Guidance:landmark technique and palpation technique  Location:L4-L5  Needle Type:Sprotte  Needle Gauge:22 G  Placement of Spinal needle event:cerebrospinal fluid aspirated  Paresthesia: no  Fluid Appearance:clear  Medications: bupivacaine (MARCAINE) 0.5 % injection, 2 mL  Med Administered at 8/4/2020 10:11 AM   Post Assessment  Patient Tolerance:patient tolerated the procedure well with no apparent complications  Complications no  Additional Notes  Procedure:  Pt assisted to sitting position, with legs in position of comfort over side of bed.  Pt. instructed in optimal spine presentation, the spine was prepped/ Draped and the skin at insertion site was anesthetized with 1% Lidocaine 2 ml.  The spinal needle was then advanced until CSF flow was obtained and LA was injected:

## 2020-08-04 NOTE — ANESTHESIA POSTPROCEDURE EVALUATION
Patient: Obi Jackman Jr.    Procedure Summary     Date:  08/04/20 [facility] / [facility]:   [facility]    Anesthesia Start:  0956 Anesthesia Stop:  1233    Procedure:  TOTAL HIP ARTHROPLASTY RIGHT (Right Hip) Diagnosis:       Primary osteoarthritis of right hip      (Primary osteoarthritis of right hip [M16.11])    Surgeon:  Osbaldo Olivas MD Provider:  Isiah Flores MD    Anesthesia Type:  spinal, MAC ASA Status:  3          Anesthesia Type: spinal, MAC    Vitals  Vitals Value Taken Time   BP     Temp     Pulse 80 8/4/2020 12:32 PM   Resp     SpO2 96 % 8/4/2020 12:32 PM   Vitals shown include unvalidated device data.        Post Anesthesia Care and Evaluation    Patient location during evaluation: PACU  Patient participation: complete - patient participated  Level of consciousness: awake and alert  Pain score: 0  Pain management: adequate  Airway patency: patent  Anesthetic complications: No anesthetic complications  PONV Status: none  Cardiovascular status: hemodynamically stable and acceptable  Respiratory status: nonlabored ventilation, acceptable and nasal cannula  Hydration status: acceptable

## 2020-08-04 NOTE — OP NOTE
DATE OF PROCEDURE:  20    PREOPERATIVE DIAGNOSIS: right hip arthritis    POSTOPERATIVE DIAGNOSIS: right hip arthritis    PROCEDURE PERFORMED: right total hip arthroplasty with [employer] components (#56 press-fit R3 cup with 30 mm screw with neutral polyethylene liner with #4 lateral offset press-fit Polar stem with +0 x 36 ceramic head).     SURGEON: Osbaldo Olivas MD    ASSISTANT: Brittany Salas PA-C  (Brittany Salas PA-C was present and necessary for positioning, draping, retraction, instrumentation and closure.)    SPECIMENS: None    IMPLANTS:   Implants     Implant    Sut Contrl Tiss Stratafix Spiral Mncryl Ud 3/0 Pls 60cm - [id number] - Implanted   (Right) Hip    Inventory item: SUT CONTRL TISS STRATAFIX SPIRAL MNCRYL UD 3/0 PLS 60CM Model/Cat number: [id number]    : [employer]  [employer] OF  AND       As of 2020     Status: Implanted                  Sut Contrl Tiss Stratafix Symm Pds Plus Gloria Ct-1 45cm - [id number] - Implanted   (Right) Hip    Inventory item: SUT CONTRL TISS STRATAFIX SYMM PDS PLUS GLORIA CT-1 45CM Model/Cat number: [id number]    : [employer]  DIV OF Vital Sensors AND       As of 2020     Status: Implanted                  Shll Acet R3 3h Std 56mm - [id number] - Implanted   (Right) Hip    Inventory item: SHLL ACET R3 3H STD 56MM Model/Cat number: [id number]    : [employer] Lot number: [id number]    As of 2020     Status: Implanted                  Liner Acet R3 Xlpe 0d 55e83sd - [id number] - Implanted   (Right) Hip    Inventory item: LINER ACET R3 XLPE 0D [id number] Model/Cat number: [id number]    : [employer] Lot number: [id number]    As of 2020     Status: Implanted                  Scrw Sph Hd Reflection 6.5x30mm - [id number] - Implanted   (Right) Hip    Inventory item: SCRW SPH HD REFLECTION 6.5X30MM Model/Cat number: [id number]    : [employer] Lot number: [id number]    As of 2020     Status:  Implanted                  Stem Fem/Hip Polarstem Cmtless Colar Ti P/Coat Lee 126deg Sz4 - [id number] - Implanted   (Right) Hip    Inventory item: STEM FEM/HIP POLARSTEM CMTLESS COLAR TI P/COAT HA 126DEG SZ4 Model/Cat number: [id number]    : [employer] Lot number: [id number]    As of 8/4/2020     Status: Implanted                  Hd Fem/Hip Bioloxdelta R3 12/14 Sm 36mm Pls0 - [id number] - Implanted   (Right) Hip    Inventory item: HD FEM/HIP BIOLOXDELTA R3 12/14 SM 36MM PLS0 Model/Cat number: [id number]    : [employer] Lot number: [id number]    As of 8/4/2020     Status: Implanted                         ANESTHESIA:  Spinal    STAFF:  Circulator: Jessica Romano RN; Chris Lock RN  Radiology Technologist: Eitan Soliz RT  Scrub Person: Cammy Tello; Ward Bolden  Vendor Representative: Luisito Mitchell  Nursing Assistant: Carrie Lira; Gaudencio Castro PCT  Assistant: Brittany Salas PA-C  Orientee: Kya Chisholm RN    ESTIMATED BLOOD LOSS: 600 mL     COMPLICATIONS: None    PREOPERATIVE ANTIBIOTICS: Ancef 3 g    INDICATIONS: The patient is a 69 y.o. male  with a history of debilitating right hip pain secondary to advanced osteoarthritis, that failed to improve in spite of conservative treatment. The patient opted for a right total hip arthroplasty at this time and consented for the procedure. Please see my office notes for details with regard to preoperative counseling and operative rationale.     DESCRIPTION OF PROCEDURE: The patient was positively identified in the preoperative holding area, brought to the operative suite, and placed in a supine position. After adequate spinal anesthetic had been achieved, the patient was placed in lateral decubitus position with the right side up. The patient was well padded on the pegboard table. After sterile prep and drape of the right hip and lower extremity, a timeout procedure was performed to confirm the operative site, as  well as the other parameters. A skin incision was made on the lateral aspect of the hip for a modified direct lateral approach. Following a sharp skin incision, dissection was carried down to the level of the fascia which was incised in line with its fibers. The underlying interval between the anterior one third and posterior two thirds of the gluteus medius was then entered after the bursa had been reflected posteriorly. This was elevated as a full thickness flap and preserved for later repair. The hip capsule was then incised in a T-shaped fashion and the head dislocated from the acetabulum. Description of femoral head: Complete wear, eburnated, with exuberant osteophytes at the head neck junction, particularly medially. The head was then resected with the aid of an oscillating saw blade. Description of acetabulum: Complete wear, with thickening of the labrum around the periphery. The acetabulum was sequentially reamed up to 56 to accommodate a 56 press-fit R3 cup, which had excellent press-fit characteristics and a single 30 mm screw was placed, which had excellent purchase, and a neutral polyethylene liner was placed. Attention was then redirected towards the femoral aspect. Sequential reaming and broaching was performed on the femur to accommodate a #4 broach with a lateralized neck with a +0 x 36 head.  Trial reduction was performed, with no dislocation throughout the full arc of motion, with appropriate limb lengths.  Intraoperative radiograph was obtained, which showed good alignment and appropriate limb lengths.  The hip was again dislocated and the final #4 lateral offset press-fit Synergy stem was placed, followed by +0 x 36 ceramic head, and the same reduction characteristics were noted as with the trial with no dislocation throughout the full arc of motion, and appropriate limb lengths.  Therefore, the hip was copiously irrigated and attention was directed towards closure. The hip capsule was closed  with #1 Vicryl in an interrupted figure-of-eight fashion, followed by closure of the gluteus medius and vastus lateralis sleeve as a full thickness sleeve repair with #1 Vicryl in an interrupted figure-of-eight fashion, followed by closure of the fascia ray with #1 Vicryl in an interrupted figure-of-eight fashion in 3 strategic locations both proximally, distally and in the central portion, followed by oversewing this from distal to proximal with a #1 StrataFix symmetric, which nicely sealed that layer, followed by closure of the deep fascial layer with #1 Vicryl in a buried interrupted fashion, followed by closure of the subcutaneous layer with 2-0 Vicryl and the skin with 3-0 StrataFix in a running subcuticular fashion. Prineo wound closure dressing was applied followed by a sterile dressing with 4 x 4s secured with micropore tape.  The patient tolerated the procedure well and was brought to the recovery room in good condition.     POSTOPERATIVE PLAN:  1. The patient will begin early range of motion and weight-bearing per the post total hip arthroplasty protocol.   2. I anticipate brief hospitalization for initial rehabilitation and pain control followed by continued rehabilitation in a home health setting or outpatient physical therapy setting.   3. Postoperative medical management with Dr. Patel.  4. Postoperative DVT prophylaxis with aspirin unless there is a contraindication.       Osbaldo Olivas MD  08/04/20  12:12    Dragon disclaimer:  Much of this encounter note is an electronic transcription/translation of spoken language to printed text. The electronic translation of spoken language may permit erroneous, or at times, nonsensical words or phrases to be inadvertently transcribed; Although I have reviewed the note for such errors, some may still exist.

## 2020-08-04 NOTE — INTERVAL H&P NOTE
Pre-Op H&P (See Recent Office Note Attached for Full H&P)    Chief complaint: Right hip pain    Review of Systems:  General ROS:  no fever, chills, rashes.  No change since last office visit.  No recent sick exposure  Cardiovascular ROS: no chest pain or dyspnea on exertion  Respiratory ROS: no cough, shortness of breath, or wheezing    Meds:    No current facility-administered medications on file prior to encounter.      Current Outpatient Medications on File Prior to Encounter   Medication Sig Dispense Refill   • acetaminophen (TYLENOL) 325 MG tablet Take 325 mg by mouth As Needed for Mild Pain .     • albuterol sulfate  (90 Base) MCG/ACT inhaler Inhale 2 puffs Every 4 (Four) Hours As Needed for Wheezing. (Patient taking differently: Inhale 2 puffs Every 4 (Four) Hours As Needed for Wheezing or Shortness of Air.) 1 inhaler 2   • ALPRAZolam (XANAX) 0.25 MG tablet Take 1 tablet by mouth Daily As Needed for Anxiety (when flying). 10 tablet 0   • atenolol (TENORMIN) 50 MG tablet Take 1 tablet by mouth Daily. 30 tablet 5   • atorvastatin (LIPITOR) 10 MG tablet Take 1 tablet by mouth Daily. 90 tablet 3   • Azelastine-Fluticasone 137-50 MCG/ACT suspension 1 spray each nostril daily (Patient taking differently: 2 sprays Daily As Needed (nasal congestion). 1 spray each nostril daily) 1 bottle 5   • buPROPion XL (WELLBUTRIN XL) 150 MG 24 hr tablet Take 1 tablet by mouth Daily. 30 tablet 5   • Chlorhexidine Gluconate 4 % solution Apply  topically to the appropriate area as directed Daily. 237 mL 0   • clotrimazole-betamethasone (LOTRISONE) 1-0.05 % cream Apply  topically to the appropriate area as directed 2 (Two) Times a Day. (Patient taking differently: Apply 1 application topically to the appropriate area as directed 2 (Two) Times a Day.) 45 g 1   • cyclobenzaprine (FLEXERIL) 10 MG tablet Take one tablet by mouth three times a day as needed for muscle spasms. (Patient taking differently: Take 10 mg by mouth 3  "(Three) Times a Day As Needed for Muscle Spasms. Take one tablet by mouth three times a day as needed for muscle spasms.) 30 tablet 5   • Dietary Management Product (VASCULERA) tablet Take 1 tablet by mouth Daily. 90 tablet 5   • etodolac (LODINE) 400 MG tablet Take 400 mg by mouth 2 (Two) Times a Day.  3   • gabapentin (NEURONTIN) 300 MG capsule Take 300 mg by mouth 3 (Three) Times a Day.     • hydroxychloroquine (PLAQUENIL) 200 MG tablet Take 200 mg by mouth 2 (Two) Times a Day.     • ibuprofen (ADVIL,MOTRIN) 200 MG tablet Take 200 mg by mouth As Needed for Mild Pain .     • lisinopril-hydrochlorothiazide (PRINZIDE,ZESTORETIC) 20-12.5 MG per tablet TAKE 1 TABLET BY MOUTH DAILY 90 tablet 0   • Multiple Vitamins-Minerals (MULTIVITAMIN ADULT PO) Take 1 tablet by mouth Daily.     • mupirocin (BACTROBAN) 2 % ointment Apply into the nostril(s) as directed by provider 2 (Two) Times a Day. 22 g 0   • ondansetron (ZOFRAN) 4 MG tablet Take 1 tablet by mouth Every 6 (Six) Hours As Needed for Nausea or Vomiting. 30 tablet 0   • oxaprozin (DAYPRO) 600 MG tablet Take 1,200 mg by mouth Daily. Take 2 po every day      • SUMAtriptan (IMITREX) 100 MG tablet TAKE 1 TABLET BY MOUTH AT ONSET OF HEADACHE. MAY REPEAT DOSE ONE TIME IN 2 HOURS IF HEADACHE NOT RELIEVED. (Patient taking differently: Take 100 mg by mouth 1 (One) Time. TAKE 1 TABLET BY MOUTH AT ONSET OF HEADACHE. MAY REPEAT DOSE ONE TIME IN 2 HOURS IF HEADACHE NOT RELIEVED.) 9 tablet 3   • terbinafine (lamiSIL) 250 MG tablet Take 250 mg by mouth Daily.     • traMADol (ULTRAM) 50 MG tablet Take 50 mg by mouth As Needed for Moderate Pain .         Vital Signs:  /87 (BP Location: Right arm, Patient Position: Lying)   Pulse 74   Temp 98 °F (36.7 °C) (Temporal)   Resp 18   Ht 182.9 cm (72\")   Wt (!) 137 kg (302 lb)   SpO2 97%   BMI 40.96 kg/m²     Physical Exam:    CV:  S1S2 regular rate and rhythm, no murmur               Resp:  Clear to auscultation; respirations " regular, even and unlabored               Ext:  BLE non pitting edema    Results Review:     Lab Results   Component Value Date    WBC 5.71 07/27/2020    HGB 14.9 07/27/2020    HCT 44.4 07/27/2020    MCV 91.2 07/27/2020     07/27/2020    NEUTROABS 3.72 07/27/2020    GLUCOSE 95 07/27/2020    BUN 12 07/27/2020    CREATININE 0.76 07/27/2020    EGFRIFNONA 102 07/27/2020     07/27/2020    K 4.3 07/27/2020     07/27/2020    CO2 29.0 07/27/2020    CALCIUM 9.6 07/27/2020    ALBUMIN 4.40 06/05/2020    AST 16 06/05/2020    ALT 11 06/05/2020    BILITOT 0.5 06/05/2020    PTT 31.0 07/27/2020        I reviewed the patient's new clinical results.   7/27/20 MRI right hip:  Severely advanced DJD of the right hip with likely labral  tearing and degeneration along with remodeling with an elongated  remodeled appearance of the head-neck junction concerning for cam-type  configuration given adjacent osteophyte formation along the lateral  acetabular margin. Inflammation of the adjacent iliopsoas tendon  consistent with tendinopathy and reactive edema. No acute fracture.    Cancer Staging (if applicable)  Cancer Patient: __ yes _x_no __unknown; If yes, clinical stage T:__ N:__M:__, stage group or __N/A    Assessment/Plan:    He has exhausted conservative treatment options.  Pain has been worsening particular over the past few months, to the point where he would like to proceed with hip replacement surgery.  He has had radiographic worsening of his arthritis, and he is a candidate for hip replacement surgery, although the alternative of a hip injection was discussed.  He tried 1 year ago.  He is scheduled for hip injection tomorrow, which would eliminate surgery for the next 3 months.  He is not keen on that idea, and would prefer to proceed with surgery in light of his significant disability with hip.  We discussed his body mass index, which is over 40, which puts him at increased risk of perioperative complications,  to include wound healing problems, infection, and failure of the prosthesis.  He commits to ongoing weight loss, as well as postoperative weight loss.  Risk, benefits, and alternatives have been discussed at length.  Please see my counseling note for details. The typical rehabilitative course was also discussed.    Surgical Counseling     I have informed the patient of the diagnosis and the prognosis.  Exhaustive conservative treatment modalities have not resulted in long term pain relief.  The symptoms have progressed to the point of daily pain and inability to perform activities of daily living without significant pain.  The patient has reached the point of desiring to proceed with total hip arthroplasty after discussing the risks, benefits and alternatives to the procedure.  The surgical procedure itself was discussed in detail.  Risks of the procedure were discussed, which included but are not limited to, bleeding, infection, damage to blood vessels and nerves, incomplete pain relief, loosening of the prosthesis (early or late), deep infection (early or late), need for further surgery, leg length discrepancy, hip dislocation, loss of limb, deep venous thrombosis, pulmonary embolus, death, heart attack, stroke, kidney failure, liver failure, and anesthetic complications.  In addition, the potential for deep infection developing in the future was discussed, which could require further surgery.  The hip would have to be re-opened, debrided, and potentially remove the prosthesis, which may or may not be replaced in the future.  Also, the possibility for loosening of the prosthesis has been mentioned.  If the prosthesis loosened, a revision arthroplasty could be performed, with results that are not as predictable compared to the original procedure.  The typical rehabilitative course has also been discussed, and full recovery may take up to a year to see the maximum benefit.  The importance of patient cooperation in  the rehabilitative efforts has also been discussed.  No guarantees were given.  The patient understands the potential risks versus the benefits and desires to proceed with total hip arthroplasty    Yari Nascimento, YASMIN  8/4/2020   09:23      Agree with above.  Plan for right total hip arthroplasty.    Osbaldo Olivas MD  08/04/20  09:52

## 2020-08-04 NOTE — THERAPY EVALUATION
Patient Name: Obi Jackman Jr.  : 1950    MRN: [id number]                              Today's Date: 2020       Admit Date: 2020    Visit Dx:     ICD-10-CM ICD-9-CM   1. Primary osteoarthritis of right hip M16.11 715.15     Patient Active Problem List   Diagnosis   • Allergic rhinitis   • Anxiety disorder   • Benign paroxysmal positional vertigo   • Chronic tension type headache   • Depression   • ED (erectile dysfunction) of non-organic origin   • Hyperlipidemia   • Hypertension   • LFTs abnormal   • CHRISTOPHE (obstructive sleep apnea)   • Elevated glucose   • Health care maintenance   • Morbid obesity due to excess calories (CMS/[facility])   • Onychomycosis of left great toe   • Osteoarthritis of ankle or foot   • Venous stasis   • Neuropathy   • Class 3 severe obesity due to excess calories with serious comorbidity and body mass index (BMI) of 40.0 to 44.9 in adult (CMS/[facility])   • Pre-op evaluation   • Arthritis of foot, left   • S/P left ankle triple arthrodesis   • Acute blood loss anemia, mild, asymptomatic    • Acute postoperative pain   • Wound infection   • Primary osteoarthritis of right hip   • Status post total replacement of right hip     Past Medical History:   Diagnosis Date   • Anxiety and depression    • Bulging of lumbar intervertebral disc    • CPAP (continuous positive airway pressure) dependence    • Hyperlipidemia    • Hypertension    • Migraine    • CHRISTOPHE on CPAP    • Rheumatoid arthritis (CMS/[facility])    • Swelling of left ear    • Wears contact lenses    • Wears contact lenses      Past Surgical History:   Procedure Laterality Date   • ACHILLES TENDON SURGERY Left 10/15/2019    Procedure: ACHILLES TENDON LENGTHENING LEFT;  Surgeon: Elodia Guzman MD;  Location: [facility];  Service: Orthopedics   • CATARACT EXTRACTION Right    • COLONOSCOPY     • EYE SURGERY Bilateral     cornea transplant    • FOOT SURGERY Left 10/15/2019    triple arthrodesis and achilles tendon lengthening- Megan    • HERNIA REPAIR     • ILIAC CREST BONE GRAFT Left 10/15/2019    Procedure: ILIAC CREST BONE GRAFT LEFT;  Surgeon: Elodia Guzman MD;  Location:  [facility];  Service: Orthopedics   • TOE FUSION Left 10/15/2019    Procedure: TRIPLE ARTHODESIS LEFT;  Surgeon: Elodia Guzman MD;  Location:  [facility];  Service: Orthopedics     General Information     Row Name 08/04/20 1550          PT Evaluation Time/Intention    Document Type  evaluation  -RAHUL     Mode of Treatment  individual therapy;physical therapy  -     Row Name 08/04/20 1550          General Information    Patient Profile Reviewed?  yes  -RAHUL     Prior Level of Function  min assist:;transfer;bed mobility;ADL's;all household mobility  -     Existing Precautions/Restrictions  fall;right;hip;other (see comments) modified lateral approach (posterior precautions)  -RAHUL     Barriers to Rehab  none identified  -RAHUL     Row Name 08/04/20 1550          Relationship/Environment    Lives With  spouse  -     Row Name 08/04/20 1550          Resource/Environmental Concerns    Current Living Arrangements  home/apartment/condo  -     Row Name 08/04/20 1550          Home Main Entrance    Number of Stairs, Main Entrance  two  -RAHUL     Stair Railings, Main Entrance  none  -RAHUL     Row Name 08/04/20 1550          Stairs Within Home, Primary    Stairs, Within Home, Primary  0  -RAHUL     Number of Stairs, Within Home, Primary  none  -RAHUL     Row Name 08/04/20 1550          Cognitive Assessment/Intervention- PT/OT    Orientation Status (Cognition)  oriented x 4  -RAHUL     Row Name 08/04/20 1550          Safety Issues, Functional Mobility    Safety Issues Affecting Function (Mobility)  safety precautions follow-through/compliance;safety precaution awareness  -     Impairments Affecting Function (Mobility)  pain;endurance/activity tolerance;range of motion (ROM);strength  -RAHUL       User Key  (r) = Recorded By, (t) = Taken By, (c) = Cosigned By    Initials Name Provider Type    RAHUL  Singh Garnica, PT Physical Therapist        Mobility     Row Name 08/04/20 1550          Bed Mobility Assessment/Treatment    Bed Mobility Assessment/Treatment  scooting/bridging;supine-sit  -RAHUL     Scooting/Bridging [place] (Bed Mobility)  verbal cues;contact guard  -RAHUL     Supine-Sit [place] (Bed Mobility)  verbal cues;contact guard  -RAHUL     Assistive Device (Bed Mobility)  bed rails;head of bed elevated  -     Comment (Bed Mobility)  Verbal cues for LE sequencing off of EOB and use of UEs to push trunk into sitting  -     Row Name 08/04/20 1550          Transfer Assessment/Treatment    Comment (Transfers)  Verbal cues for safe hand placement during standing/sitting, moving R LE out for comfort prior to sitting, and maintaining posterior hip precautions throughout   -     Row Name 08/04/20 1550          Sit-Stand Transfer    Sit-Stand [place] (Transfers)  verbal cues;contact guard;2 person assist  -RAHUL     Assistive Device (Sit-Stand Transfers)  walker, front-wheeled  -RAHUL     Row Name 08/04/20 1550          Gait/Stairs Assessment/Training    Gait/Stairs Assessment/Training  gait/ambulation independence;gait/ambulation assistive device  -     [place] Level (Gait)  verbal cues;contact guard;2 person assist  -RAHUL     Assistive Device (Gait)  walker, front-wheeled  -     Distance in Feet (Gait)  100 feet  -     Pattern (Gait)  step-through  -     Deviations/Abnormal Patterns (Gait)  bilateral deviations;dena decreased;gait speed decreased;antalgic;stride length decreased  -RAHUL     Bilateral Gait Deviations  forward flexed posture  -RAHUL     Right Sided Gait Deviations  heel strike decreased;weight shift ability decreased  -     [place] Level (Stairs)  not tested  -     Comment (Gait/Stairs)  Pt ambulated with step through pattern and decreased speed. Verbal cues for maintaining body within walker, upright posture, increasing step length, and WB on R LE. No knee buckling noted with  gait. Gait limited by fatigue.   -RAHUL     Row Name 08/04/20 1550          Mobility Assessment/Intervention    Extremity Weight-bearing Status  right lower extremity  -RAHUL     Right Lower Extremity (Weight-bearing Status)  weight-bearing as tolerated (WBAT)  -RAHUL       User Key  (r) = Recorded By, (t) = Taken By, (c) = Cosigned By    Initials Name Provider Type    Singh Potter, PT Physical Therapist        Obj/Interventions     Row Name 08/04/20 1550          General ROM    GENERAL ROM COMMENTS  R LE AROM impaired 25%; L LE AROM WFL; able to actively DF/PF  -RAHUL     Row Name 08/04/20 1550          MMT (Manual Muscle Testing)    General MMT Comments  R LE functionally 4-/5; L LE functionally 4+/5; IND with SLR  -RAHUL     Row Name 08/04/20 1550          Therapeutic Exercise    Lower Extremity (Therapeutic Exercise)  gluteal sets;quad sets, bilateral  -RAHUL     Lower Extremity Range of Motion (Therapeutic Exercise)  ankle dorsiflexion/plantar flexion, bilateral  -RAHUL     Exercise Type (Therapeutic Exercise)  AROM (active range of motion);isometric contraction, static  -RAHUL     Position (Therapeutic Exercise)  seated  -RAHUL     Sets/Reps (Therapeutic Exercise)  10x each  -RAHUL     Expected Outcome (Therapeutic Exercise)  facilitate normal movement patterns;improve functional tolerance, self-care activity  -RAHUL     Comment (Therapeutic Exercise)  Cues for technique  -RAHUL     Row Name 08/04/20 1550          Sensory Assessment/Intervention    Sensory General Assessment  no sensation deficits identified  -RAHUL       User Key  (r) = Recorded By, (t) = Taken By, (c) = Cosigned By    Initials Name Provider Type    Singh Potter, PT Physical Therapist        Goals/Plan     Row Name 08/04/20 1550          Bed Mobility Goal 1 (PT)    Activity/Assistive Device (Bed Mobility Goal 1, PT)  sit to supine/supine to sit  -RAHUL     [place] Level/Cues Needed (Bed Mobility Goal 1, PT)  conditional independence  -RAHUL     Time Frame (Bed Mobility Goal 1,  PT)  long term goal (LTG);3 days  -RAHUL     Row Name 08/04/20 1550          Transfer Goal 1 (PT)    Activity/Assistive Device (Transfer Goal 1, PT)  sit-to-stand/stand-to-sit;walker, rolling  -RAHUL     [place] Level/Cues Needed (Transfer Goal 1, PT)  conditional independence  -RAHUL     Time Frame (Transfer Goal 1, PT)  long term goal (LTG);3 days  -RAHUL     Row Name 08/04/20 1550          Gait Training Goal 1 (PT)    Activity/Assistive Device (Gait Training Goal 1, PT)  gait (walking locomotion);walker, rolling  -RAHUL     [place] Level (Gait Training Goal 1, PT)  conditional independence  -RAHUL     Time Frame (Gait Training Goal 1, PT)  long term goal (LTG);3 days  -RAHUL     Row Name 08/04/20 1550          Stairs Goal 1 (PT)    Activity/Assistive Device (Stairs Goal 1, PT)  stairs, all skills;cane, straight  -RAHUL     [place] Level/Cues Needed (Stairs Goal 1, PT)  contact guard assist  -RAHUL     Number of Stairs (Stairs Goal 1, PT)  2  -RAHUL     Time Frame (Stairs Goal 1, PT)  long term goal (LTG);3 days  -RAHUL       User Key  (r) = Recorded By, (t) = Taken By, (c) = Cosigned By    Initials Name Provider Type    Singh Potter, PT Physical Therapist        Clinical Impression     Row Name 08/04/20 5860          Pain Assessment    Additional Documentation  Pain Scale: Numbers Pre/Post-Treatment (Group)  -     Row Name 08/04/20 8140          Pain Scale: Numbers Pre/Post-Treatment    Pain Scale: Numbers, Pretreatment  6/10  -RAHUL     Pain Scale: Numbers, Post-Treatment  8/10  -RAHUL     Pain Location - Side  Right  -RAHUL     Pain Location - Orientation  incisional  -RAHUL     Pain Location  hip  -RAHUL     Pain Intervention(s)  Cold applied;Repositioned;Ambulation/increased activity  -RAHUL     Row Name 08/04/20 0180          Physical Therapy Clinical Impression    Patient/Family Goals Statement (PT Clinical Impression)  To return home  -RAHUL     Criteria for Skilled Interventions Met (PT Clinical Impression)  yes;treatment indicated  -RAHUL      Rehab Potential (PT Clinical Summary)  good, to achieve stated therapy goals  -     Row Name 08/04/20 1550          Positioning and Restraints    Pre-Treatment Position  in bed  -RAHUL     Post Treatment Position  chair  -RAHUL     In Chair  notified nsg;call light within reach;encouraged to call for assist;exit alarm on;reclined;legs elevated;compression device  -       User Key  (r) = Recorded By, (t) = Taken By, (c) = Cosigned By    Initials Name Provider Type    Singh Potter PT Physical Therapist        Outcome Measures     Row Name 08/04/20 1550          How much help from another person do you currently need...    Turning from your back to your side while in flat bed without using bedrails?  3  -RAHUL     Moving from lying on back to sitting on the side of a flat bed without bedrails?  3  -RAHUL     Moving to and from a bed to a chair (including a wheelchair)?  3  -RAHUL     Standing up from a chair using your arms (e.g., wheelchair, bedside chair)?  3  -RAHUL     Climbing 3-5 steps with a railing?  2  -RAHUL     To walk in hospital room?  3  -RAHUL     AM-PAC 6 Clicks Score (PT)  17  -RAHUL     Row Name 08/04/20 1550          PADD    Diagnosis  2  -RAHUL     Gender  2  -RAHUL     Age Group  1  -RAHUL     Gait Distance  0  -RAHUL     Assist Level  1  -RAHUL     Home Support  3  -RAHUL     PADD Score  9  -RAHUL     Patient Preference  home with home health  -RAHUL     Prediction by PADD Score  directly home (with home health or out-patient rehab)  -     Row Name 08/04/20 1550          Functional Assessment    Outcome Measure Options  AM-PAC 6 Clicks Basic Mobility (PT);PADD  -       User Key  (r) = Recorded By, (t) = Taken By, (c) = Cosigned By    Initials Name Provider Type    Singh Potter, PT Physical Therapist        Physical Therapy Education                 Title: PT OT SLP Therapies (Done)     Topic: Physical Therapy (Done)     Point: Mobility training (Done)     Description:   Instruct learner(s) on safety and technique for assisting patient  out of bed, chair or wheelchair.  Instruct in the proper use of assistive devices, such as walker, crutches, cane or brace.              Patient Friendly Description:   It's important to get you on your feet again, but we need to do so in a way that is safe for you. Falling has serious consequences, and your personal safety is the most important thing of all.        When it's time to get out of bed, one of us or a family member will sit next to you on the bed to give you support.     If your doctor or nurse tells you to use a walker, crutches, a cane, or a brace, be sure you use it every time you get out of bed, even if you think you don't need it.    Learning Progress Summary           Patient Acceptance, E,D, VU by RAHUL at 8/4/2020 1550    Comment:  Educated on safe sequencing with bed mobility, ambulatory transfers, and gait. Reviewed HEP posterior hip precautions.   Significant Other Acceptance, E,D, VU by RAHUL at 8/4/2020 1550    Comment:  Educated on safe sequencing with bed mobility, ambulatory transfers, and gait. Reviewed HEP posterior hip precautions.                   Point: Home exercise program (Done)     Description:   Instruct learner(s) on appropriate technique for monitoring, assisting and/or progressing patient with therapeutic exercises and activities.              Learning Progress Summary           Patient Acceptance, E,D, VU by RAHUL at 8/4/2020 1550    Comment:  Educated on safe sequencing with bed mobility, ambulatory transfers, and gait. Reviewed HEP posterior hip precautions.   Significant Other Acceptance, E,D, VU by RAHUL at 8/4/2020 1550    Comment:  Educated on safe sequencing with bed mobility, ambulatory transfers, and gait. Reviewed HEP posterior hip precautions.                   Point: Body mechanics (Done)     Description:   Instruct learner(s) on proper positioning and spine alignment for patient and/or caregiver during mobility tasks and/or exercises.              Learning Progress  Summary           Patient Acceptance, E,D, VU by RAHUL at 8/4/2020 1550    Comment:  Educated on safe sequencing with bed mobility, ambulatory transfers, and gait. Reviewed HEP posterior hip precautions.   Significant Other Acceptance, E,D, VU by RAHUL at 8/4/2020 1550    Comment:  Educated on safe sequencing with bed mobility, ambulatory transfers, and gait. Reviewed HEP posterior hip precautions.                   Point: Precautions (Done)     Description:   Instruct learner(s) on prescribed precautions during mobility and gait tasks              Learning Progress Summary           Patient Acceptance, E,D, VU by RAHUL at 8/4/2020 1550    Comment:  Educated on safe sequencing with bed mobility, ambulatory transfers, and gait. Reviewed HEP posterior hip precautions.   Significant Other Acceptance, E,D, VU by RAHUL at 8/4/2020 1550    Comment:  Educated on safe sequencing with bed mobility, ambulatory transfers, and gait. Reviewed HEP posterior hip precautions.                               User Key     Initials Effective Dates Name Provider Type Discipline    RAHUL 09/10/19 -  Singh Garnica, PT Physical Therapist PT              PT Recommendation and Plan  Planned Therapy Interventions (PT Eval): balance training, bed mobility training, gait training, home exercise program, patient/family education, strengthening, stair training, transfer training  Outcome Summary/Treatment Plan (PT)  Anticipated Equipment Needs at Discharge (PT): other (see comments)(none)  Anticipated Discharge Disposition (PT): home with assist, home with home health  Plan of Care Reviewed With: patient, spouse  Progress: improving  Outcome Summary: PT eval complete. Pt ambulated 100 feet using RW and CGA x2 for safety. Gait limited by pain and fatigue. Bed mobility and STS performed with CGA x2. No knee buckling noted with ambulation. Reviewed HEP and posterior hip precautions. PADD score = 9. Recommend pt d/c home with assist and HHPT when appropriate.     Time  Calculation:   PT Charges     Row Name 08/04/20 1550             Time Calculation    Start Time  1550  -      PT Received On  08/04/20  -      PT Goal Re-Cert Due Date  08/14/20  -         Time Calculation- PT    Total Timed Code Minutes- PT  10 minute(s)  -         Timed Charges    09007 - PT Therapeutic Exercise Minutes  2  -RAHUL      07287 - Gait Training Minutes   8  -RAHUL        User Key  (r) = Recorded By, (t) = Taken By, (c) = Cosigned By    Initials Name Provider Type    Singh Potter, PT Physical Therapist        Therapy Charges for Today     Code Description Service Date Service Provider Modifiers Qty    70004815149 HC GAIT TRAINING EA 15 MIN 8/4/2020 Singh Garnica, PT GP 1    13116819494 HC PT EVAL LOW COMPLEXITY 3 8/4/2020 Singh Garnica, PT GP 1    46403648005 HC PT THER SUPP EA 15 MIN 8/4/2020 Singh Garnica, PT GP 2          PT G-Codes  Outcome Measure Options: AM-PAC 6 Clicks Basic Mobility (PT), PADD  AM-PAC 6 Clicks Score (PT): 17    Singh Garnica PT  8/4/2020

## 2020-08-05 ENCOUNTER — READMISSION MANAGEMENT (OUTPATIENT)
Dept: CALL CENTER | Facility: HOSPITAL | Age: 70
End: 2020-08-05

## 2020-08-05 VITALS
RESPIRATION RATE: 17 BRPM | BODY MASS INDEX: 40.9 KG/M2 | DIASTOLIC BLOOD PRESSURE: 67 MMHG | WEIGHT: 302 LBS | OXYGEN SATURATION: 96 % | HEIGHT: 72 IN | SYSTOLIC BLOOD PRESSURE: 114 MMHG | HEART RATE: 87 BPM | TEMPERATURE: 98.5 F

## 2020-08-05 LAB
ANION GAP SERPL CALCULATED.3IONS-SCNC: 10 MMOL/L (ref 5–15)
BUN SERPL-MCNC: 15 MG/DL (ref 8–23)
BUN/CREAT SERPL: 21.1 (ref 7–25)
CALCIUM SPEC-SCNC: 8.4 MG/DL (ref 8.6–10.5)
CHLORIDE SERPL-SCNC: 105 MMOL/L (ref 98–107)
CO2 SERPL-SCNC: 26 MMOL/L (ref 22–29)
CREAT SERPL-MCNC: 0.71 MG/DL (ref 0.76–1.27)
DEPRECATED RDW RBC AUTO: 42.4 FL (ref 37–54)
ERYTHROCYTE [DISTWIDTH] IN BLOOD BY AUTOMATED COUNT: 12.4 % (ref 12.3–15.4)
GFR SERPL CREATININE-BSD FRML MDRD: 110 ML/MIN/1.73
GLUCOSE SERPL-MCNC: 111 MG/DL (ref 65–99)
HCT VFR BLD AUTO: 32.1 % (ref 37.5–51)
HGB BLD-MCNC: 10.5 G/DL (ref 13–17.7)
MCH RBC QN AUTO: 30.4 PG (ref 26.6–33)
MCHC RBC AUTO-ENTMCNC: 32.7 G/DL (ref 31.5–35.7)
MCV RBC AUTO: 93 FL (ref 79–97)
PLATELET # BLD AUTO: 211 10*3/MM3 (ref 140–450)
PMV BLD AUTO: 10.5 FL (ref 6–12)
POTASSIUM SERPL-SCNC: 3.5 MMOL/L (ref 3.5–5.2)
RBC # BLD AUTO: 3.45 10*6/MM3 (ref 4.14–5.8)
SODIUM SERPL-SCNC: 141 MMOL/L (ref 136–145)
WBC # BLD AUTO: 10.07 10*3/MM3 (ref 3.4–10.8)

## 2020-08-05 PROCEDURE — 94799 UNLISTED PULMONARY SVC/PX: CPT

## 2020-08-05 PROCEDURE — 97530 THERAPEUTIC ACTIVITIES: CPT

## 2020-08-05 PROCEDURE — 94660 CPAP INITIATION&MGMT: CPT

## 2020-08-05 PROCEDURE — 25010000002 MORPHINE PER 10 MG: Performed by: ORTHOPAEDIC SURGERY

## 2020-08-05 PROCEDURE — 80048 BASIC METABOLIC PNL TOTAL CA: CPT | Performed by: NURSE PRACTITIONER

## 2020-08-05 PROCEDURE — 97116 GAIT TRAINING THERAPY: CPT

## 2020-08-05 PROCEDURE — 85027 COMPLETE CBC AUTOMATED: CPT | Performed by: ORTHOPAEDIC SURGERY

## 2020-08-05 PROCEDURE — 97166 OT EVAL MOD COMPLEX 45 MIN: CPT

## 2020-08-05 PROCEDURE — 99024 POSTOP FOLLOW-UP VISIT: CPT | Performed by: ORTHOPAEDIC SURGERY

## 2020-08-05 PROCEDURE — 25010000002 CEFAZOLIN PER 500 MG: Performed by: ORTHOPAEDIC SURGERY

## 2020-08-05 PROCEDURE — 97110 THERAPEUTIC EXERCISES: CPT

## 2020-08-05 RX ORDER — DOCUSATE SODIUM 100 MG/1
100 CAPSULE, LIQUID FILLED ORAL 2 TIMES DAILY
Qty: 60 CAPSULE | Refills: 0 | Status: SHIPPED | OUTPATIENT
Start: 2020-08-05 | End: 2020-10-01

## 2020-08-05 RX ORDER — OXYCODONE HYDROCHLORIDE 5 MG/1
5 TABLET ORAL EVERY 4 HOURS PRN
Qty: 40 TABLET | Refills: 0 | Status: SHIPPED | OUTPATIENT
Start: 2020-08-05 | End: 2020-08-14

## 2020-08-05 RX ORDER — IBUPROFEN 200 MG
200 TABLET ORAL AS NEEDED
Start: 2020-08-05 | End: 2020-12-06 | Stop reason: HOSPADM

## 2020-08-05 RX ADMIN — ASPIRIN 325 MG: 325 TABLET, COATED ORAL at 08:34

## 2020-08-05 RX ADMIN — MELOXICAM 15 MG: 7.5 TABLET ORAL at 08:35

## 2020-08-05 RX ADMIN — LISINOPRIL 20 MG: 20 TABLET ORAL at 08:34

## 2020-08-05 RX ADMIN — CEFAZOLIN SODIUM 3 G: 10 INJECTION, POWDER, FOR SOLUTION INTRAVENOUS at 01:42

## 2020-08-05 RX ADMIN — CYCLOBENZAPRINE HYDROCHLORIDE 10 MG: 10 TABLET, FILM COATED ORAL at 10:56

## 2020-08-05 RX ADMIN — OXYCODONE HYDROCHLORIDE 5 MG: 5 TABLET ORAL at 00:16

## 2020-08-05 RX ADMIN — OXYCODONE HYDROCHLORIDE 5 MG: 5 TABLET ORAL at 04:52

## 2020-08-05 RX ADMIN — SODIUM CHLORIDE 120 ML/HR: 9 INJECTION, SOLUTION INTRAVENOUS at 01:44

## 2020-08-05 RX ADMIN — OXYCODONE HYDROCHLORIDE 5 MG: 5 TABLET ORAL at 08:35

## 2020-08-05 RX ADMIN — ATORVASTATIN CALCIUM 10 MG: 10 TABLET, FILM COATED ORAL at 08:35

## 2020-08-05 RX ADMIN — ATENOLOL 50 MG: 50 TABLET ORAL at 08:35

## 2020-08-05 RX ADMIN — BUPROPION HYDROCHLORIDE 150 MG: 150 TABLET, FILM COATED, EXTENDED RELEASE ORAL at 08:34

## 2020-08-05 RX ADMIN — GABAPENTIN 300 MG: 300 CAPSULE ORAL at 08:35

## 2020-08-05 RX ADMIN — SODIUM CHLORIDE, PRESERVATIVE FREE 3 ML: 5 INJECTION INTRAVENOUS at 08:34

## 2020-08-05 RX ADMIN — MORPHINE SULFATE 4 MG: 4 INJECTION, SOLUTION INTRAMUSCULAR; INTRAVENOUS at 05:18

## 2020-08-05 NOTE — THERAPY TREATMENT NOTE
Patient Name: Obi Jackman Jr.  : 1950    MRN: [id number]                              Today's Date: 2020       Admit Date: 2020    Visit Dx:     ICD-10-CM ICD-9-CM   1. Status post total replacement of right hip Z96.641 V43.64   2. Primary osteoarthritis of right hip M16.11 715.15   3. Impaired mobility and ADLs Z74.09 V49.89    Z78.9      Patient Active Problem List   Diagnosis   • Allergic rhinitis   • Anxiety disorder   • Benign paroxysmal positional vertigo   • Chronic tension type headache   • Depression   • ED (erectile dysfunction) of non-organic origin   • Hyperlipidemia   • Hypertension   • LFTs abnormal   • CHRISTOPHE (obstructive sleep apnea)   • Elevated glucose   • Health care maintenance   • Morbid obesity due to excess calories (CMS/[facility])   • Onychomycosis of left great toe   • Osteoarthritis of ankle or foot   • Venous stasis   • Neuropathy   • Class 3 severe obesity due to excess calories with serious comorbidity and body mass index (BMI) of 40.0 to 44.9 in adult (CMS/[facility])   • Pre-op evaluation   • Arthritis of foot, left   • S/P left ankle triple arthrodesis   • Acute blood loss anemia, mild, asymptomatic    • Acute postoperative pain   • Wound infection   • Primary osteoarthritis of right hip   • Status post total replacement of right hip     Past Medical History:   Diagnosis Date   • Anxiety and depression    • Bulging of lumbar intervertebral disc    • CPAP (continuous positive airway pressure) dependence    • Hyperlipidemia    • Hypertension    • Migraine    • CHRISTOPHE on CPAP    • Rheumatoid arthritis (CMS/[facility])    • Swelling of left ear    • Wears contact lenses    • Wears contact lenses      Past Surgical History:   Procedure Laterality Date   • ACHILLES TENDON SURGERY Left 10/15/2019    Procedure: ACHILLES TENDON LENGTHENING LEFT;  Surgeon: Elodia Guzman MD;  Location: [facility];  Service: Orthopedics   • CATARACT EXTRACTION Right    • COLONOSCOPY     • EYE SURGERY Bilateral      cornea transplant    • FOOT SURGERY Left 10/15/2019    triple arthrodesis and achilles tendon lengthening- Megan   • HERNIA REPAIR     • ILIAC CREST BONE GRAFT Left 10/15/2019    Procedure: ILIAC CREST BONE GRAFT LEFT;  Surgeon: Elodia Guzman MD;  Location:  [facility];  Service: Orthopedics   • TOE FUSION Left 10/15/2019    Procedure: TRIPLE ARTHODESIS LEFT;  Surgeon: Elodia Guzman MD;  Location:  [facility];  Service: Orthopedics   • TOTAL HIP ARTHROPLASTY Right 8/4/2020    Procedure: TOTAL HIP ARTHROPLASTY RIGHT;  Surgeon: Osbaldo Olivas MD;  Location:  [facility];  Service: Orthopedics;  Laterality: Right;     General Information     Row Name 08/05/20 0830          PT Evaluation Time/Intention    Document Type  therapy note (daily note)  -     Mode of Treatment  individual therapy;physical therapy  -     Row Name 08/05/20 0830          General Information    Patient Profile Reviewed?  yes  -RAHUL     Existing Precautions/Restrictions  fall;right;hip posterior precautions  -     Row Name 08/05/20 0830          Cognitive Assessment/Intervention- PT/OT    Orientation Status (Cognition)  oriented x 4  -     Row Name 08/05/20 0830          Safety Issues, Functional Mobility    Safety Issues Affecting Function (Mobility)  safety precautions follow-through/compliance;safety precaution awareness  -     Impairments Affecting Function (Mobility)  balance;endurance/activity tolerance;pain;range of motion (ROM);strength  -       User Key  (r) = Recorded By, (t) = Taken By, (c) = Cosigned By    Initials Name Provider Type    Singh Potter, PT Physical Therapist        Mobility     Row Name 08/05/20 0830          Bed Mobility Assessment/Treatment    Comment (Bed Mobility)  Pt received UIC  -     Row Name 08/05/20 0830          Transfer Assessment/Treatment    Comment (Transfers)  Verbal cues for use of UEs to push off of chair to stand, reach back for chair prior to sitting, and moving R LE out for comfort  prior to sitting  -RAHUL     Row Name 08/05/20 0830          Sit-Stand Transfer    Sit-Stand [place] (Transfers)  verbal cues;contact guard  -RAHUL     Assistive Device (Sit-Stand Transfers)  walker, front-wheeled  -RAHUL     Row Name 08/05/20 0830          Gait/Stairs Assessment/Training    Gait/Stairs Assessment/Training  gait/ambulation independence;gait/ambulation assistive device  -RAHUL     [place] Level (Gait)  verbal cues;contact guard  -RAHUL     Assistive Device (Gait)  walker, front-wheeled  -RAHUL     Distance in Feet (Gait)  160 feet  -RAHUL     Pattern (Gait)  step-through  -RAHUL     Deviations/Abnormal Patterns (Gait)  bilateral deviations;dena decreased;gait speed decreased;antalgic;stride length decreased  -RAHUL     Bilateral Gait Deviations  forward flexed posture  -RAHUL     Right Sided Gait Deviations  heel strike decreased;weight shift ability decreased  -RAHUL     Negotiation (Stairs)  stairs independence;stairs assistive device  -RAHUL     [place] Level (Stairs)  verbal cues;contact guard  -RAHUL     Assistive Device (Stairs)  cane, straight R HHA; L STC  -RAHUL     Handrail Location (Stairs)  none  -RAHUL     Number of Steps (Stairs)  2  -RAHUL     Ascending Technique (Stairs)  step-to-step  -RAHUL     Descending Technique (Stairs)  step-to-step  -RAHUL     Comment (Gait/Stairs)  Pt ambulated with step through pattern and improved speed. Verbal cues for increasing WB on LEs, step length, and decrease WB through UEs. Pt ascended/descended 2 steps using STC on L, R HHA, and CGA for safety. Verbal cues for LE sequencing and AD management. Gait/stair training limited by pain and fatigue.   -RAHUL     Row Name 08/05/20 0830          Mobility Assessment/Intervention    Extremity Weight-bearing Status  right lower extremity  -RAHUL     Right Lower Extremity (Weight-bearing Status)  weight-bearing as tolerated (WBAT)  -RAHUL       User Key  (r) = Recorded By, (t) = Taken By, (c) = Cosigned By    Initials Name Provider Type    Singh Potter  PT Physical Therapist        Obj/Interventions     Row Name 08/05/20 0830          Therapeutic Exercise    Lower Extremity (Therapeutic Exercise)  gluteal sets;quad sets, bilateral;LAQ (long arc quad), right;heel slides, right;SLR (straight leg raise), right  -RAHUL     Lower Extremity Range of Motion (Therapeutic Exercise)  hip abduction/adduction, right;ankle dorsiflexion/plantar flexion, bilateral  -RAHUL     Exercise Type (Therapeutic Exercise)  AROM (active range of motion);isometric contraction, static;AAROM (active assistive range of motion) AAROM for SLR and HS  -RAHUL     Position (Therapeutic Exercise)  seated  -RAHUL     Sets/Reps (Therapeutic Exercise)  10x each  -RAHUL     Expected Outcome (Therapeutic Exercise)  facilitate normal movement patterns;improve functional tolerance, self-care activity  -RAHUL     Comment (Therapeutic Exercise)  Cues for technique  -RAHUL       User Key  (r) = Recorded By, (t) = Taken By, (c) = Cosigned By    Initials Name Provider Type    Singh Potter, PT Physical Therapist        Goals/Plan    No documentation.       Clinical Impression     Row Name 08/05/20 0830          Pain Assessment    Additional Documentation  Pain Scale: Numbers Pre/Post-Treatment (Group)  -[facility] Name 08/05/20 0830          Pain Scale: Numbers Pre/Post-Treatment    Pain Scale: Numbers, Pretreatment  5/10  -RAHUL     Pain Scale: Numbers, Post-Treatment  8/10  -RAHUL     Pain Location - Side  Right  -RAHUL     Pain Location - Orientation  generalized  -RAHUL     Pain Location  hip  -RAHUL     Pain Intervention(s)  Repositioned;Ambulation/increased activity;Cold applied  -RAHUL     Row Name 08/05/20 0830          Physical Therapy Clinical Impression    Patient/Family Goals Statement (PT Clinical Impression)  To return home  -RAHUL     Criteria for Skilled Interventions Met (PT Clinical Impression)  yes;treatment indicated  -RAHUL     Rehab Potential (PT Clinical Summary)  good, to achieve stated therapy goals  -RAHUL     Row Name 08/05/20 0836           Positioning and Restraints    Pre-Treatment Position  sitting in chair/recliner  -RAHUL     Post Treatment Position  chair  -RAHUL     In Chair  notified nsg;reclined;call light within reach;encouraged to call for assist;exit alarm on;legs elevated;compression device  -RAHUL       User Key  (r) = Recorded By, (t) = Taken By, (c) = Cosigned By    Initials Name Provider Type    Singh Potter PT Physical Therapist        Outcome Measures     Row Name 08/05/20 0830          How much help from another person do you currently need...    Turning from your back to your side while in flat bed without using bedrails?  3  -RAHUL     Moving from lying on back to sitting on the side of a flat bed without bedrails?  3  -RAHUL     Moving to and from a bed to a chair (including a wheelchair)?  3  -RAHUL     Standing up from a chair using your arms (e.g., wheelchair, bedside chair)?  3  -RAHUL     Climbing 3-5 steps with a railing?  3  -RAHUL     To walk in hospital room?  3  -RAHUL     AM-PAC 6 Clicks Score (PT)  18  -RAHUL     Row Name 08/05/20 0830          Functional Assessment    Outcome Measure Options  AM-PAC 6 Clicks Basic Mobility (PT)  -       User Key  (r) = Recorded By, (t) = Taken By, (c) = Cosigned By    Initials Name Provider Type    Singh Potter PT Physical Therapist        Physical Therapy Education                 Title: PT OT SLP Therapies (Done)     Topic: Physical Therapy (Done)     Point: Mobility training (Done)     Description:   Instruct learner(s) on safety and technique for assisting patient out of bed, chair or wheelchair.  Instruct in the proper use of assistive devices, such as walker, crutches, cane or brace.              Patient Friendly Description:   It's important to get you on your feet again, but we need to do so in a way that is safe for you. Falling has serious consequences, and your personal safety is the most important thing of all.        When it's time to get out of bed, one of us or a family member will  sit next to you on the bed to give you support.     If your doctor or nurse tells you to use a walker, crutches, a cane, or a brace, be sure you use it every time you get out of bed, even if you think you don't need it.    Learning Progress Summary           Patient Acceptance, E,D, VU by RAHUL at 8/5/2020 0830    Comment:  Educated on safe sequencing with bed mobility, ambulatory transfers, and gait. Reviewed HEP and hip precautions.    Acceptance, E,D, VU by RAHUL at 8/4/2020 1550    Comment:  Educated on safe sequencing with bed mobility, ambulatory transfers, and gait. Reviewed HEP posterior hip precautions.   Significant Other Acceptance, E,D, VU by RAHUL at 8/4/2020 1550    Comment:  Educated on safe sequencing with bed mobility, ambulatory transfers, and gait. Reviewed HEP posterior hip precautions.                   Point: Home exercise program (Done)     Description:   Instruct learner(s) on appropriate technique for monitoring, assisting and/or progressing patient with therapeutic exercises and activities.              Learning Progress Summary           Patient Acceptance, E,D, VU by RAHUL at 8/5/2020 0830    Comment:  Educated on safe sequencing with bed mobility, ambulatory transfers, and gait. Reviewed HEP and hip precautions.    Acceptance, E,D, VU by RAHUL at 8/4/2020 1550    Comment:  Educated on safe sequencing with bed mobility, ambulatory transfers, and gait. Reviewed HEP posterior hip precautions.   Significant Other Acceptance, E,D, VU by RAHUL at 8/4/2020 1550    Comment:  Educated on safe sequencing with bed mobility, ambulatory transfers, and gait. Reviewed HEP posterior hip precautions.                   Point: Body mechanics (Done)     Description:   Instruct learner(s) on proper positioning and spine alignment for patient and/or caregiver during mobility tasks and/or exercises.              Learning Progress Summary           Patient Acceptance, E,D, VU by RAHUL at 8/5/2020 0830    Comment:  Educated on safe  sequencing with bed mobility, ambulatory transfers, and gait. Reviewed HEP and hip precautions.    Acceptance, E,D, VU by RAHUL at 8/4/2020 1550    Comment:  Educated on safe sequencing with bed mobility, ambulatory transfers, and gait. Reviewed HEP posterior hip precautions.   Significant Other Acceptance, E,D, VU by RAHUL at 8/4/2020 1550    Comment:  Educated on safe sequencing with bed mobility, ambulatory transfers, and gait. Reviewed HEP posterior hip precautions.                   Point: Precautions (Done)     Description:   Instruct learner(s) on prescribed precautions during mobility and gait tasks              Learning Progress Summary           Patient Acceptance, E,D, VU by RAHUL at 8/5/2020 0830    Comment:  Educated on safe sequencing with bed mobility, ambulatory transfers, and gait. Reviewed HEP and hip precautions.    Acceptance, E,D, VU by RAHUL at 8/4/2020 1550    Comment:  Educated on safe sequencing with bed mobility, ambulatory transfers, and gait. Reviewed HEP posterior hip precautions.   Significant Other Acceptance, E,D, VU by RAHUL at 8/4/2020 1550    Comment:  Educated on safe sequencing with bed mobility, ambulatory transfers, and gait. Reviewed HEP posterior hip precautions.                               User Key     Initials Effective Dates Name Provider Type Discipline     09/10/19 -  Singh Garnica, PT Physical Therapist PT              PT Recommendation and Plan  Planned Therapy Interventions (PT Eval): balance training, bed mobility training, gait training, home exercise program, patient/family education, strengthening, stair training, transfer training  Outcome Summary/Treatment Plan (PT)  Anticipated Equipment Needs at Discharge (PT): other (see comments)(none)  Anticipated Discharge Disposition (PT): home with assist, home with home health  Plan of Care Reviewed With: patient  Progress: improving  Outcome Summary: Pt increased ambulation distance to 160 feet using RW and CGA for safety. Pt  ascended/descended 2 steps using STC on the L, R HHA, and CGA. Gait/stair training limited by pain and fatigue. STS performed with CGA. Reviewed HEP, hip precautions, and car transfers. Anticipate pt d/c home with assist and HHPT today, when appropriate.     Time Calculation:   PT Charges     Row Name 08/05/20 0830             Time Calculation    Start Time  0830  -RAHUL      PT Received On  08/05/20  -      PT Goal Re-Cert Due Date  08/14/20  -         Time Calculation- PT    Total Timed Code Minutes- PT  23 minute(s)  -RAHUL         Timed Charges    95766 - PT Therapeutic Exercise Minutes  10  -RAHUL      19541 - Gait Training Minutes   13  -RAHUL        User Key  (r) = Recorded By, (t) = Taken By, (c) = Cosigned By    Initials Name Provider Type    Singh Potter, PT Physical Therapist        Therapy Charges for Today     Code Description Service Date Service Provider Modifiers Qty    54525600105 HC GAIT TRAINING EA 15 MIN 8/4/2020 Singh Garnica, PT GP 1    50699069782 HC PT EVAL LOW COMPLEXITY 3 8/4/2020 Singh Garnica, PT GP 1    42663724575 HC PT THER SUPP EA 15 MIN 8/4/2020 Singh Garnica, PT GP 2    28085909127 HC PT THER PROC EA 15 MIN 8/5/2020 Singh Garnica, PT GP 1    99719239554 HC GAIT TRAINING EA 15 MIN 8/5/2020 Singh Garnica, PT GP 1          PT G-Codes  Outcome Measure Options: AM-PAC 6 Clicks Daily Activity (OT)  AM-PAC 6 Clicks Score (PT): 18  AM-PAC 6 Clicks Score (OT): 19    Singh Garnica PT  8/5/2020

## 2020-08-05 NOTE — THERAPY DISCHARGE NOTE
Acute Care - Occupational Therapy Initial Eval/Discharge  [facility]     Patient Name: Obi Jackman Jr.  : 1950  MRN: [id number]  Today's Date: 2020  Onset of Illness/Injury or Date of Surgery: 20  Date of Referral to OT: 20  Referring Physician: Brendon      Admit Date: 2020       ICD-10-CM ICD-9-CM   1. Impaired mobility and ADLs Z74.09 V49.89    Z78.9    2. Primary osteoarthritis of right hip M16.11 715.15     Patient Active Problem List   Diagnosis   • Allergic rhinitis   • Anxiety disorder   • Benign paroxysmal positional vertigo   • Chronic tension type headache   • Depression   • ED (erectile dysfunction) of non-organic origin   • Hyperlipidemia   • Hypertension   • LFTs abnormal   • CHRISTOPHE (obstructive sleep apnea)   • Elevated glucose   • Health care maintenance   • Morbid obesity due to excess calories (CMS/[facility])   • Onychomycosis of left great toe   • Osteoarthritis of ankle or foot   • Venous stasis   • Neuropathy   • Class 3 severe obesity due to excess calories with serious comorbidity and body mass index (BMI) of 40.0 to 44.9 in adult (CMS/[facility])   • Pre-op evaluation   • Arthritis of foot, left   • S/P left ankle triple arthrodesis   • Acute blood loss anemia, mild, asymptomatic    • Acute postoperative pain   • Wound infection   • Primary osteoarthritis of right hip   • Status post total replacement of right hip     Past Medical History:   Diagnosis Date   • Anxiety and depression    • Bulging of lumbar intervertebral disc    • CPAP (continuous positive airway pressure) dependence    • Hyperlipidemia    • Hypertension    • Migraine    • CHRISTOPHE on CPAP    • Rheumatoid arthritis (CMS/[facility])    • Swelling of left ear    • Wears contact lenses    • Wears contact lenses      Past Surgical History:   Procedure Laterality Date   • ACHILLES TENDON SURGERY Left 10/15/2019    Procedure: ACHILLES TENDON LENGTHENING LEFT;  Surgeon: Elodia Guzman MD;  Location: [facility];  Service:  Orthopedics   • CATARACT EXTRACTION Right    • COLONOSCOPY  2015   • EYE SURGERY Bilateral     cornea transplant    • FOOT SURGERY Left 10/15/2019    triple arthrodesis and achilles tendon lengthening- [place]   • HERNIA REPAIR     • ILIAC CREST BONE GRAFT Left 10/15/2019    Procedure: ILIAC CREST BONE GRAFT LEFT;  Surgeon: Elodia Guzman MD;  Location:  [facility];  Service: Orthopedics   • TOE FUSION Left 10/15/2019    Procedure: TRIPLE ARTHODESIS LEFT;  Surgeon: Elodia Guzman MD;  Location:  [facility];  Service: Orthopedics   • TOTAL HIP ARTHROPLASTY Right 8/4/2020    Procedure: TOTAL HIP ARTHROPLASTY RIGHT;  Surgeon: Osbaldo Olivas MD;  Location:  [facility];  Service: Orthopedics;  Laterality: Right;          OT ASSESSMENT FLOWSHEET (last 12 hours)      Occupational Therapy Evaluation     Row Name 08/05/20 0901                   OT Evaluation Time/Intention    Subjective Information  complains of;pain;weakness  -TB        Document Type  evaluation;discharge evaluation/summary  -TB        Mode of Treatment  occupational therapy;individual therapy  -TB        Patient Effort  good  -TB        Symptoms Noted During/After Treatment  increased pain;fatigue  -TB        Comment  Anticipate d/c to home today  -TB           General Information    Patient Profile Reviewed?  yes  -TB        Onset of Illness/Injury or Date of Surgery  08/04/20  -TB        Referring Physician  Brendon  -SORAIDA        Patient Observations  alert;cooperative;agree to therapy  -TB        Patient/Family Observations  No family present  -TB        General Observations of Patient  Pt UIC, room air, IV  -TB        Prior Level of Function  min assist:;all household mobility;community mobility;transfer;ADL's increased pain and effort all activities  -TB        Equipment Currently Used at Home  cane, straight;raised toilet;shower chair sock aide  -TB        Existing Precautions/Restrictions  fall;right;hip modified lateral approach  -TB        Barriers to  "Rehab  previous functional deficit history of extensive L ankle fx  -TB           Relationship/Environment    Primary Source of Support/Comfort  spouse  -TB        Lives With  spouse  -TB        Family Caregiver if Needed  spouse  -TB           Resource/Environmental Concerns    Current Living Arrangements  home/apartment/condo  -TB           Home Main Entrance    Number of Stairs, Main Entrance  two  -TB           Stairs Within Home, Primary    Stairs, Within Home, Primary  0  -TB           Cognitive Assessment/Intervention- PT/OT    Orientation Status (Cognition)  oriented x 4  -TB        Follows Commands (Cognition)  WFL  -TB           Safety Issues, Functional Mobility    Safety Issues Affecting Function (Mobility)  safety precaution awareness;safety precautions follow-through/compliance  -TB        Impairments Affecting Function (Mobility)  balance;endurance/activity tolerance;pain;range of motion (ROM);strength  -TB        Comment, Safety Issues/Impairments (Mobility)  Pt up in room with RW and SBA x1  -TB           Mobility Assessment/Treatment    Extremity Weight-bearing Status  right lower extremity  -TB        Right Lower Extremity (Weight-bearing Status)  weight-bearing as tolerated (WBAT)  -TB           Bed Mobility Assessment/Treatment    Bed Mobility Assessment/Treatment  scooting/bridging;supine-sit;sit-supine  -TB        Scooting/Bridging [place] (Bed Mobility)  supervision;verbal cues  -TB        Supine-Sit [place] (Bed Mobility)  set up;supervision;verbal cues  -TB        Sit-Supine [place] (Bed Mobility)  set up;minimum assist (75% patient effort);verbal cues  -TB        Bed Mobility, Safety Issues  decreased use of legs for bridging/pushing  -TB        Assistive Device (Bed Mobility)  -- flat surface  -TB        Comment (Bed Mobility)  (S) Leg  issued and pt practiced entering/exiting bed several time from R & L sides and at 2 different bed heights to determine \"where to " "sleep\" after d/c  -TB           Functional Mobility    Functional Mobility- Ind. Level  standby assist  -TB        Functional Mobility- Device  rolling walker  -TB        Functional Mobility-Distance (Feet)  40  -TB        Functional Mobility- Safety Issues  step length decreased;weight-shifting ability decreased;balance decreased during turns  -TB        Functional Mobility- Comment  Pt up in room with RW and SBA x1  -TB           Transfer Assessment/Treatment    Transfer Assessment/Treatment  sit-stand transfer;stand-sit transfer;bed-chair transfer;chair-bed transfer  -TB        Comment (Transfers)  Education and cues for hand placement, sequencing and safety  -TB           Bed-Chair Transfer    Bed-Chair [place] (Transfers)  stand by assist;verbal cues  -TB        Assistive Device (Bed-Chair Transfers)  walker, front-wheeled  -TB           Chair-Bed Transfer    Chair-Bed [place] (Transfers)  stand by assist;verbal cues  -TB        Assistive Device (Chair-Bed Transfers)  walker, front-wheeled  -TB           Sit-Stand Transfer    Sit-Stand [place] (Transfers)  stand by assist;verbal cues  -TB        Assistive Device (Sit-Stand Transfers)  walker, front-wheeled  -TB           Stand-Sit Transfer    Stand-Sit [place] (Transfers)  stand by assist;verbal cues  -TB        Assistive Device (Stand-Sit Transfers)  walker, front-wheeled  -TB           ADL Assessment/Intervention    BADL Assessment/Intervention  upper body dressing;lower body dressing;feeding  -TB           Upper Body Dressing Assessment/Training    Upper Body Dressing [place] Level  doff;destinma/robe;don;pull-over garment;independent  -TB        Upper Body Dressing Position  edge of bed sitting  -TB           Lower Body Dressing Assessment/Training    Lower Body Dressing [place] Level  don;undergarment;pants/bottoms;set up;verbal cues  -TB        Assistive Devices (Lower Body Dressing)  reacher  -TB        Lower Body Dressing " Position  edge of bed sitting;supported standing  -TB        Comment (Lower Body Dressing)  Education completed for hip precautions and use of AE to maintain/increase ADL independence; good return demonstration  -TB           Self-Feeding Assessment/Training    [place] Level (Feeding)  independent;liquids to mouth  -TB           BADL Safety/Performance    Impairments, BADL Safety/Performance  balance;endurance/activity tolerance;pain;range of motion;strength  -TB        Skilled BADL Treatment/Intervention  BADL process/adaptation training;adaptive equipment training  -TB           General ROM    GENERAL ROM COMMENTS  AROM BUE WFL for ADL  -TB           MMT (Manual Muscle Testing)    General MMT Comments  BUE intact   -TB           Motor Assessment/Interventions    Additional Documentation  Balance (Group);Therapeutic Exercise (Group)  -TB           Therapeutic Exercise    Therapeutic Exercise  seated, upper extremities  -TB        Additional Documentation  Therapeutic Exercise (Row)  -TB        37453 - OT Therapeutic Activity Minutes  25  -TB           Upper Extremity Seated Therapeutic Exercise    Performed, Seated Upper Extremity (Therapeutic Exercise)  shoulder flexion/extension;shoulder abduction/adduction;shoulder external/internal rotation;shoulder horizontal abduction/adduction;elbow flexion/extension;forearm supination/pronation;wrist flexion/extension;digit flexion/extension  -TB        Exercise Type, Seated Upper Extremity (Therapeutic Exercise)  AROM (active range of motion)  -TB        Restrictions, Seated Upper Extremity (Therapeutic Exercise)  None  -TB        Comment, Seated Upper Extremity (Therapeutic Exercise)  BUE intact for ADL performance and AD use  -TB           Balance    Balance  dynamic sitting balance;dynamic standing balance  -TB           Dynamic Sitting Balance    Level of [place], Reaches Outside Midline (Sitting, Dynamic Balance)  independent  -TB        Sitting Position,  Reaches Outside Midline (Sitting, Dynamic Balance)  sitting in chair;sitting on edge of bed  -TB        Comment, Reaches Outside Midline (Sitting, Dynamic Balance)  ADL retraining/AE teaching and trial; pt has some AE at home  -TB           Dynamic Standing Balance    Level of Lane, Reaches Outside Midline (Standing, Dynamic Balance)  standby assist  -TB        Time Able to Maintain Position, Reaches Outside Midline (Standing, Dynamic Balance)  4 to 5 minutes  -TB        Assistive Device Utilized (Supported Standing, Dynamic Balance)  walker, rolling  -TB        Comment, Reaches Outside Midline (Standing, Dynamic Balance)  in-room mobility, transfer training and entering/exiting bed several times from R & L sides for training  -TB           Sensory Assessment/Intervention    Sensory General Assessment  no sensation deficits identified BUE intact  -TB           Positioning and Restraints    Pre-Treatment Position  sitting in chair/recliner  -TB        Post Treatment Position  chair  -TB        In Chair  notified nsg;reclined;call light within reach;encouraged to call for assist;exit alarm on;legs elevated  -TB           Pain Assessment    Additional Documentation  Pain Scale: Word Pre/Post-Treatment (Group)  -TB           Pain Scale: Numbers Pre/Post-Treatment    Pain Location - Side  Right  -TB        Pain Location - Orientation  proximal  -TB        Pain Location  hip  -TB        Pain Intervention(s)  Ambulation/increased activity;Repositioned;Cold applied  -TB           Pain Scale: Word Pre/Post-Treatment    Pain: Word Scale, Pretreatment  4 - moderate pain  -TB        Pain: Word Scale, Post-Treatment  2 - mild pain  -TB        Pre/Post Treatment Pain Comment  increased pain following PT, pt resting comfortably UIC at end of session  -TB           Wound 08/04/20 Right anterior hip Incision    Wound - Properties Group Date first assessed: 08/04/20  -KR Present on Hospital Admission: N  -KR Side: Right  -KR  Orientation: anterior  -KR Location: hip  -KR Primary Wound Type: Incision  -KR       Coping    Observed Emotional State  accepting;cooperative  -TB        Verbalized Emotional State  acceptance;hopefulness  -TB           Plan of Care Review    Plan of Care Reviewed With  patient  -TB           Clinical Impression (OT)    Date of Referral to OT  08/04/20  -TB        OT Diagnosis  Impaired mobility and ADL  -TB        Patient/Family Goals Statement (OT Eval)  to regain independence  -TB        Therapy Frequency (OT Eval)  evaluation only Anticipated d/c to home today  -TB        Care Plan Review (OT)  evaluation/treatment results reviewed;patient/other agree to care plan  -TB        Anticipated Equipment Needs at Discharge (OT)  -- None  -TB        Anticipated Discharge Disposition (OT)  home with assist;home with home health  -TB           Vital Signs    Pre Systolic BP Rehab  -- RN cleared OT  -TB        O2 Delivery Post Treatment  room air  -TB        Pre Patient Position  Sitting  -TB        Intra Patient Position  Standing  -TB        Post Patient Position  Sitting  -TB           Discharge Summary (Occupational Therapy)    Additional Documentation  Discharge Summary, OT Eval (Group)  -TB           Discharge Summary, OT Eval    Reason for Discharge (OT Discharge Summary)  patient discharged from this facility Anticipate d/c to home today  -TB           Living Environment    Home Accessibility  -- walk in shower with seat, raised commode  -TB          User Key  (r) = Recorded By, (t) = Taken By, (c) = Cosigned By    Initials Name Effective Dates    TB Kamla Beyer OT 06/08/18 -     Kya Peck RN 05/27/20 -           Occupational Therapy Education                 Title: PT OT SLP Therapies (Done)     Topic: Occupational Therapy (Done)     Point: ADL training (Done)     Description:   Instruct learner(s) on proper safety adaptation and remediation techniques during self care or transfers.    Instruct in proper use of assistive devices.              Learning Progress Summary           Patient Acceptance, E,D,TB, VU,DU by TB at 8/5/2020 1015    Comment:  Education completed for hip precautions, use of AE, and transfer training. Extensive teaching for bed mobility, use of leg  and sequencing entering from R & L sides of bed as well as beds of different heights                   Point: Precautions (Done)     Description:   Instruct learner(s) on prescribed precautions during self-care and functional transfers.              Learning Progress Summary           Patient Acceptance, E,D,TB, VU,DU by  at 8/5/2020 1015    Comment:  Education completed for hip precautions, use of AE, and transfer training. Extensive teaching for bed mobility, use of leg  and sequencing entering from R & L sides of bed as well as beds of different heights                               User Key     Initials Effective Dates Name Provider Type Discipline     06/08/18 -  Kamla Beyer, OT Occupational Therapist OT                OT Recommendation and Plan  Outcome Summary/Treatment Plan (OT)  Anticipated Equipment Needs at Discharge (OT): (None)  Anticipated Discharge Disposition (OT): home with assist, home with home health  Reason for Discharge (OT Discharge Summary): patient discharged from this facility(Anticipate d/c to home today)  Therapy Frequency (OT Eval): evaluation only(Anticipated d/c to home today)  Plan of Care Review  Plan of Care Reviewed With: patient  Plan of Care Reviewed With: patient  Outcome Summary: OT IE completed. Pt motivated to work with OT; to regain independence. Pt up in room with SBA using RW. CGA and cues to complete bed mobility using RW (extensive teaching). Set-up and cues for LB dressing using AE. Independent UB dressing. Anticipate d/c to home today with spouse assist and HHPT. OT will d/c at this time.         Outcome Measures     Row Name 08/05/20 0901             How much  help from another is currently needed...    Putting on and taking off regular lower body clothing?  3  -TB      Bathing (including washing, rinsing, and drying)  3  -TB      Toileting (which includes using toilet bed pan or urinal)  3  -TB      Putting on and taking off regular upper body clothing  3  -TB      Taking care of personal grooming (such as brushing teeth)  3  -TB      Eating meals  4  -TB      AM-PAC 6 Clicks Score (OT)  19  -TB         Functional Assessment    Outcome Measure Options  AM-PAC 6 Clicks Daily Activity (OT)  -TB        User Key  (r) = Recorded By, (t) = Taken By, (c) = Cosigned By    Initials Name Provider Type    TB Kamla Beyer OT Occupational Therapist          Time Calculation:   Time Calculation- OT     Row Name 08/05/20 1020 08/05/20 0901          Time Calculation- OT    OT Start Time  0901  -TB  --     OT Received On  08/05/20  -TB  --        Timed Charges    12890 - OT Therapeutic Activity Minutes  --  25  -TB       User Key  (r) = Recorded By, (t) = Taken By, (c) = Cosigned By    Initials Name Provider Type    TB Kamla Beyer OT Occupational Therapist        Therapy Suggested Charges     Code   Minutes Charges    16564 (CPT®) Hc Ot Neuromusc Re Education Ea 15 Min      64094 (CPT®) Hc Ot Ther Proc Ea 15 Min      48665 (CPT®) Hc Ot Therapeutic Act Ea 15 Min 25 2    09371 (CPT®) Hc Ot Manual Therapy Ea 15 Min      57738 (CPT®) Hc Ot Iontophoresis Ea 15 Min      34462 (CPT®) Hc Ot Elec Stim Ea-Per 15 Min      61019 (CPT®) Hc Ot Ultrasound Ea 15 Min      83220 (CPT®) Hc Ot Self Care/Mgmt/Train Ea 15 Min      Total  25 2        Therapy Charges for Today     Code Description Service Date Service Provider Modifiers Qty    67053397317 HC OT THERAPEUTIC ACT EA 15 MIN 8/5/2020 Kamla Beyer OT GO 2    12556657573 HC OT EVAL MOD COMPLEXITY 4 8/5/2020 Kamla Beyer OT GO 1               OT Discharge Summary  Anticipated Discharge Disposition (OT): home  with assist, home with home health    Kamla Beyer, OT  8/5/2020

## 2020-08-05 NOTE — PLAN OF CARE
Problem: Patient Care Overview  Goal: Plan of Care Review  Outcome: Outcome(s) achieved  Flowsheets  Taken 8/5/2020 1044 by Slime Tony RN  Plan of Care Reviewed With: patient  Taken 8/5/2020 0901 by Kamla Beyer OT  Outcome Summary: OT IE completed. Pt motivated to work with OT; to regain independence. Pt up in room with SBA using RW. CGA and cues to complete bed mobility using RW (extensive teaching). Set-up and cues for LB dressing using AE. Independent UB dressing. Anticipate d/c to home today with spouse assist and HHPT. OT will d/c at this time.

## 2020-08-05 NOTE — PROGRESS NOTES
"      [facility] Orthopaedic Surgery Progress Note    Subjective      LOS: 1 day   Patient Care Team:  Cammy Oneal PA as PCP - General (Internal Medicine)  Chanda Castanon MD as Consulting Physician (Rheumatology)    CC: Right hip pain    Interval History:   Patient sitting comfortably in a chair.  Complaining of some thigh pain.    Objective      Vital Signs  Temp (24hrs), Av °F (36.7 °C), Min:97.4 °F (36.3 °C), Max:98.5 °F (36.9 °C)      /60 (BP Location: Left arm, Patient Position: Lying)   Pulse 81   Temp 98.3 °F (36.8 °C) (Axillary)   Resp 18   Ht 182.9 cm (72\")   Wt (!) 137 kg (302 lb)   SpO2 95%   BMI 40.96 kg/m²     Examination:   Examination of the right hip: The wound is clean, dry, and intact.  Ankle dorsiflexion, ankle plantar flexion, and EHL are intact.  Sensation intact in the foot to light touch.   Thigh is soft and nontender.      Labs:  Results from last 7 days   Lab Units 20  0340   WBC 10*3/mm3 10.07   HEMOGLOBIN g/dL 10.5*   HEMATOCRIT % 32.1*   MCV fL 93.0   PLATELETS 10*3/mm3 211       Radiology:  Imaging Results (Last 24 Hours)     Procedure Component Value Units Date/Time    XR Hip With or Without Pelvis 2 - 3 View Right [[id number]] Collected:  20 1342     Updated:  203    Narrative:       EXAMINATION: XR HIP W OR WO PELVIS 2-3 VIEW RIGHT- 2020     INDICATION: post-op right MARTY; M16.11-Unilateral primary osteoarthritis,  right hip     COMPARISON: Intraoperative film of same date     FINDINGS: Total right hip prosthesis is seen in anatomical alignment. No  abnormal lucency is seen around the margins of the prosthetic  components. No fracture line is identified. Remaining bony structures  appear intact.       Impression:       Right hip prosthesis in anatomic alignment. No acute bony  abnormality is identified.     D:  2020  E:  2020         This report was finalized on 2020 10:00 PM by Dr. Ej Aponte MD.       XR Pelvis 1 or 2 View " [[id number]] Collected:  08/04/20 1221     Updated:  08/04/20 1634    Narrative:          EXAMINATION: XR PELVIS 1 OR 2 VW-08/04/2020:      INDICATION: Anterior Hip Arthroplasty; M16.11-Unilateral primary  osteoarthritis, right hip.      COMPARISON: NONE.     FINDINGS: Imaging of the pelvis and right hip performed intraoperatively  for measurements for total right hip arthroplasty. Please see the  procedure report for full details.           Impression:       Imaging performed intraoperatively for measurements for  total right hip arthroplasty. Please see the procedure report for full  details.     D:  08/04/2020  E:  08/04/2020                   PT:  Physical Therapy - Plan of Care Review - Outcome Summary:  Outcome Summary: PT eval complete. Pt ambulated 100 feet using RW and CGA x2 for safety. Gait limited by pain and fatigue. Bed mobility and STS performed with CGA x2. No knee buckling noted with ambulation. Reviewed HEP and posterior hip precautions. PADD score = 9. Recommend pt d/c home with assist and HHPT when appropriate. (08/04/20 0807)]       Results Review:     I reviewed the patient's new clinical results.    Assessment and Plan     Assessment:   Status post right total hip arthroplasty-doing well      Status post total replacement of right hip    Hyperlipidemia    Hypertension    CHRISTOPHE (obstructive sleep apnea)    Class 3 severe obesity due to excess calories with serious comorbidity and body mass index (BMI) of 40.0 to 44.9 in adult (CMS/[facility])    Primary osteoarthritis of right hip      Plan for disposition: Plan for discharge home when cleared by physical therapy and medically, probably today.  Follow-up with me in 3 weeks as planned.      Future Appointments   Date Time Provider Department Center   8/26/2020  2:30 PM Osbaldo Olivas MD [facility]   1/29/2021  9:10 AM Elodia Guzman MD [facility]           Osbaldo Olivas MD  08/05/20  08:50

## 2020-08-05 NOTE — PROGRESS NOTES
Discharge Planning Assessment  [facility]     Patient Name: Obi Jackman Jr.  MRN: [id number]  Today's Date: 8/5/2020    Admit Date: 8/4/2020    Discharge Needs Assessment     Row Name 08/05/20 1359       Living Environment    Lives With  spouse    Current Living Arrangements  home/apartment/condo    Primary Care Provided by  self    Provides Primary Care For  no one    Family Caregiver if Needed  spouse    Able to Return to Prior Arrangements  yes       Transition Planning    Patient/Family Anticipates Transition to  home    Transportation Anticipated  family or friend will provide       Discharge Needs Assessment    Equipment Currently Used at Home  walker, rolling;shower chair;cane, straight;commode;bipap/cpap        Discharge Plan     Row Name 08/05/20 1403       Plan    Plan  Home w/ [facility]    Provided Post Acute Provider List?  Yes    Post Acute Provider List  Outpatient Therapy    Patient/Family in Agreement with Plan  yes    Plan Comments  Spoke w/ patient at bedside. He lives w/ his wife in a one story in [facility]. PTA he was independent with ADLs and mobility. He has access to a RW at home. A referral has been made to [facility] w/ [facility]. No other needs noted. CM following.     Final Discharge Disposition Code  01 - home or self-care        Destination      Coordination has not been started for this encounter.      Durable Medical Equipment      Coordination has not been started for this encounter.      Dialysis/Infusion      Coordination has not been started for this encounter.      Home Medical Care      Coordination has not been started for this encounter.      Therapy      Coordination has not been started for this encounter.      Community Resources      Coordination has not been started for this encounter.        Expected Discharge Date and Time     Expected Discharge Date Expected Discharge Time    Aug 5, 2020         Demographic Summary     Row Name 08/05/20 1359       General  Information    Arrived From  home    Reason for Consult  discharge planning        Functional Status     Row Name 08/05/20 1359       Functional Status    Usual Activity Tolerance  moderate    Current Activity Tolerance  moderate        Psychosocial    No documentation.       Abuse/Neglect    No documentation.       Legal    No documentation.       Substance Abuse    No documentation.       Patient Forms    No documentation.           Shayy Ravi RN

## 2020-08-05 NOTE — PLAN OF CARE
Problem: Patient Care Overview  Goal: Plan of Care Review  Flowsheets (Taken 8/5/2020 0901)  Outcome Summary: OT IE completed. Pt motivated to work with OT; to regain independence. Education completed for hip precautions. Pt up in room with SBA using RW. CGA and cues to complete bed mobility using leg  (extensive teaching). Set-up and cues for LB dressing using AE. Independent UB dressing. Anticipate d/c to home today with spouse assist and HHPT. OT will d/c at this time.

## 2020-08-05 NOTE — PLAN OF CARE
Problem: Patient Care Overview  Goal: Plan of Care Review  Flowsheets (Taken 8/5/2020 0651)  Progress: improving  Plan of Care Reviewed With: patient  Outcome Summary: pt VSS. A&O x 4. RA. complaint of pain this shift - managed with PRN pain meds. Voiding adequately. ambulated to bathroom with assist x1, GB, and RW. slept well through the night. will continue to monitor.     Problem: Pain, Chronic (Adult)  Goal: Identify Related Risk Factors and Signs and Symptoms  Flowsheets (Taken 8/5/2020 0652)  Related Risk Factors (Chronic Pain): surgery  Signs and Symptoms (Chronic Pain): verbalization of pain descriptors     Problem: Fall Risk (Adult)  Goal: Identify Related Risk Factors and Signs and Symptoms  Flowsheets (Taken 8/5/2020 0652)  Related Risk Factors (Fall Risk): fear of falling; environment unfamiliar; fatigue/slow reaction  Signs and Symptoms (Fall Risk): presence of risk factors

## 2020-08-05 NOTE — PLAN OF CARE
Problem: Patient Care Overview  Goal: Plan of Care Review  8/5/2020 1110 by Singh Garnica, PT  Flowsheets (Taken 8/5/2020 0830)  Progress: improving  Plan of Care Reviewed With: patient  Outcome Summary: Pt increased ambulation distance to 160 feet using RW and CGA for safety. Pt ascended/descended 2 steps using STC on the L, R HHA, and CGA. Gait/stair training limited by pain and fatigue. STS performed with CGA. Reviewed HEP, hip precautions, and car transfers. Anticipate pt d/c home with assist and HHPT today, when appropriate.

## 2020-08-05 NOTE — DISCHARGE SUMMARY
Patient Name: Obi Jackman Jr.  MRN: [id number]  : 1950  DOS: 2020    Attending: Osbaldo Olivas MD    Primary Care Provider: Cammy Oneal PA    Date of Admission:.2020  8:01 AM    Date of Discharge:  2020    Discharge Diagnosis:   Status post total replacement of right hip    Primary osteoarthritis of right hip    Hyperlipidemia    Hypertension    CHRISTOPHE (obstructive sleep apnea)    Class 3 severe obesity due to excess calories with serious comorbidity and body mass index (BMI) of 40.0 to 44.9 in adult (CMS/[facility])    Acute blood loss anemia, mild, asymptomatic     Acute postoperative pain      Hospital Course    At admit:    Patient is a pleasant 69 y.o. male presented for scheduled surgery by Dr. Olivas.  He underwent right total hip arthroplasty under spinal anesthesia.  He tolerated surgery well and was admitted for further medical management. His hip has been painful for several months. He uses a cane for ambulation. He denies recent falls.     He is known to us from previous admission for left ankle surgery 10/15/2019; which she recovered well.     After admit:    Patient was provided pain medications as needed for pain control.  Adjustments were made to pain medications to optimize postop pain management when indicated. Risks and benefits of opiate medications discussed with patient.    He was seen by PT and has progressed well over his stay.    The patient was placed on DVT prophylaxis including aspirin. The patient was encouraged to use IS for atelectasis prophylaxis.   The patient was placed on a bowel regimen to prevent constipation while on pain medication.   The patient's H/H was monitored with a slight decrease that remained asymptomatic.    It is felt by all involved that the patient can discharge home at this time, and the patient has no further questions       Procedures Performed    DATE OF PROCEDURE:  20     PREOPERATIVE DIAGNOSIS: right hip arthritis     POSTOPERATIVE  "DIAGNOSIS: right hip arthritis     PROCEDURE PERFORMED: right total hip arthroplasty with [employer] components (#56 press-fit R3 cup with 30 mm screw with neutral polyethylene liner with #4 lateral offset press-fit Polar stem with +0 x 36 ceramic head).      SURGEON: Osbaldo Olivas MD    Pertinent Test Results:    I reviewed the patient's new clinical results.   Results from last 7 days   Lab Units 20  0340   WBC 10*3/mm3 10.07   HEMOGLOBIN g/dL 10.5*   HEMATOCRIT % 32.1*   PLATELETS 10*3/mm3 211     Results for ERIC QUINTERO JR. (MRN [id number]) as of 2020 12:20   Ref. Range 2020 16:44   Hemoglobin Latest Ref Range: 13.0 - 17.7 g/dL 14.9   Hematocrit Latest Ref Range: 37.5 - 51.0 % 44.4     Results from last 7 days   Lab Units 20  0958 20  0855   SODIUM mmol/L 141  --    POTASSIUM mmol/L 3.5 4.0   CHLORIDE mmol/L 105  --    CO2 mmol/L 26.0  --    BUN mg/dL 15  --    CREATININE mg/dL 0.71*  --    CALCIUM mg/dL 8.4*  --    GLUCOSE mg/dL 111*  --      I reviewed the patient's new imaging including images and reports.      Physical therapy: Pt increased ambulation distance to 160 feet using RW and CGA for safety. Pt ascended/descended 2 steps using STC on the L, R HHA, and CGA. Gait/stair training limited by pain and fatigue. STS performed with CGA. Reviewed HEP, hip precautions, and car transfers. Anticipate pt d/c home with assist and HHPT today, when appropriate.    Discharge Assessment:      Visit Vitals  /67 (BP Location: Left arm, Patient Position: Sitting)   Pulse 87   Temp 98.5 °F (36.9 °C) (Oral)   Resp 17   Ht 182.9 cm (72\")   Wt (!) 137 kg (302 lb)   SpO2 96%   BMI 40.96 kg/m²     Temp (24hrs), Av.1 °F (36.7 °C), Min:97.4 °F (36.3 °C), Max:98.5 °F (36.9 °C)      General Appearance:    Alert, cooperative, in no acute distress   Lungs:     Clear to auscultation,respirations regular, even and  unlabored    Heart:    Regular rhythm and normal rate, normal S1 and S2 "   Abdomen:     Normal bowel sounds, no masses, no organomegaly, soft non-tender, non-distended, no guarding, no rebound  Tenderness. obese   Extremities:   CDI dressing right hip. Moves all extremities well, no edema, no cyanosis, no redness   Pulses:   Pulses palpable and equal bilaterally   Skin:   No bleeding, bruising or rash   Neurologic:   Cranial nerves 2 - 12 grossly intact, sensation intact, Flexion and dorsiflexion intact bilateral feet.       Discharge Disposition: Home         Discharge Medications      New Medications      Instructions Start Date   aspirin 325 MG EC tablet   325 mg, Oral, Daily, For 1 month   Start Date:  August 6, 2020     docusate sodium 100 MG capsule  Commonly known as:  Colace   100 mg, Oral, 2 Times Daily      oxyCODONE 5 MG immediate release tablet  Commonly known as:  ROXICODONE   5 mg, Oral, Every 4 Hours PRN         Changes to Medications      Instructions Start Date   albuterol sulfate  (90 Base) MCG/ACT inhaler  Commonly known as:  PROVENTIL HFA;VENTOLIN HFA;PROAIR HFA  What changed:  reasons to take this   2 puffs, Inhalation, Every 4 Hours PRN      cyclobenzaprine 10 MG tablet  Commonly known as:  FLEXERIL  What changed:    · how much to take  · how to take this  · when to take this  · reasons to take this   Take one tablet by mouth three times a day as needed for muscle spasms.      ibuprofen 200 MG tablet  Commonly known as:  ADVIL,MOTRIN  What changed:  additional instructions   200 mg, Oral, As Needed, Must take an hour after aspirin if needed.      SUMAtriptan 100 MG tablet  Commonly known as:  IMITREX  What changed:    · how much to take  · how to take this  · when to take this   TAKE 1 TABLET BY MOUTH AT ONSET OF HEADACHE. MAY REPEAT DOSE ONE TIME IN 2 HOURS IF HEADACHE NOT RELIEVED.         Continue These Medications      Instructions Start Date   acetaminophen 325 MG tablet  Commonly known as:  TYLENOL   325 mg, Oral, As Needed      ALPRAZolam 0.25 MG  tablet  Commonly known as:  XANAX   0.25 mg, Oral, Daily PRN      atenolol 50 MG tablet  Commonly known as:  TENORMIN   50 mg, Oral, Daily      atorvastatin 10 MG tablet  Commonly known as:  LIPITOR   10 mg, Oral, Daily      buPROPion  MG 24 hr tablet  Commonly known as:  WELLBUTRIN XL   150 mg, Oral, Daily      gabapentin 300 MG capsule  Commonly known as:  NEURONTIN   300 mg, Oral, 3 Times Daily      hydroxychloroquine 200 MG tablet  Commonly known as:  PLAQUENIL   200 mg, Oral, 2 Times Daily      lisinopril-hydrochlorothiazide 20-12.5 MG per tablet  Commonly known as:  PRINZIDE,ZESTORETIC   1 tablet, Oral, Daily      nabumetone 750 MG tablet  Commonly known as:  RELAFEN   1,500 mg, Oral, Daily      ondansetron 4 MG tablet  Commonly known as:  ZOFRAN   4 mg, Oral, Every 6 Hours PRN      oxaprozin 600 MG tablet  Commonly known as:  DAYPRO   1,200 mg, Oral, Daily, Take 2 po every day      Vasculera tablet   1 tablet, Oral, Daily      VITAMIN B-12 ER PO   1 tablet, Oral, Daily      VITAMIN C PO   Oral, Daily      VITAMIN D2 PO   Oral, Daily         Stop These Medications    traMADol 50 MG tablet  Commonly known as:  ULTRAM            Discharge Diet: Regular diet      Activity at Discharge: WBAT RLE      Follow-up Appointments  Dr. Olivas per his orders    Discharge took over 30 min     YASMIN Quesada  08/05/20  12:20

## 2020-08-06 ENCOUNTER — TRANSITIONAL CARE MANAGEMENT TELEPHONE ENCOUNTER (OUTPATIENT)
Dept: CALL CENTER | Facility: HOSPITAL | Age: 70
End: 2020-08-06

## 2020-08-06 NOTE — OUTREACH NOTE
Call Center TCM Note      Responses   [facility] patient discharged from?  [place]   Does the patient have one of the following disease processes/diagnoses(primary or secondary)?  Total Joint Replacement   Joint surgery performed?  Hip   TCM attempt successful?  Yes   Call start time  0919   Call end time  0927   Discharge diagnosis  Status post total replacement of right hip   Does the patient have all medications related to this admission filled (includes all antibiotics, pain medications, etc.)  Yes   Is the patient taking all medications as directed (includes completed medication regime)?  Yes   Comments regarding appointments  Has appt with surgeon on 8/12/2020   Does the patient have a follow up appointment with their surgeon?  Yes   Has the patient kept scheduled appointments due by today?  N/A   Comments  Declines followup with PCP at this time.    What is the Home health agency?   [facility]   Has home health visited the patient within 72 hours of discharge?  Call prior to 72 hours   Psychosocial issues?  No   When is the first therapy visit scheduled (PO Day) including how many days per week   HH has called and he has to return their call today to arrange time to start therapy.    Has the patient began therapy sessions (either in the home or as an out patient)?  No   Does the patient have a wound vac in place?  N/A   Has the patient fallen since discharge?  No   Did the patient receive a copy of their discharge instructions?  Yes   Nursing interventions  Reviewed instructions with patient   What is the patient's perception of their functional status since discharge?  Improving   Is the patient able to teach back signs and symptoms of infection?  Temp >100.4 for 24h or longer, Incisional drainage, Blisters around incision, Increased swelling or redness around incision (not associated with surgical edema), Severe discomfort or pain, Changes in mobility, Shortness of breath or chest pain   Is the  patient able to teach back how to prevent infection?  Wash hands before and after touching incision, No tub baths, hot tub or swimming, Shower only as directed by surgeon, Keep incision covered if drainage, Check incision daily   Is the patient able to teach back signs and symptoms of DVT?  Redness in calf, Severe pain in calf, Swelling in calf, Shortness of breath or chest pain, Area hot to touch   Is the patient able to teach back home safety measures?  Ability to shower   Is the patient/caregiver able to teach back the hierarchy of who to call/visit for symptoms/problems? PCP, Specialist, Home health nurse, Urgent Care, ED, 911  Yes   TCM call completed?  Yes          Mack Busby RN    8/6/2020, 09:27

## 2020-08-13 ENCOUNTER — OFFICE VISIT (OUTPATIENT)
Dept: INTERNAL MEDICINE | Facility: CLINIC | Age: 70
End: 2020-08-13

## 2020-08-13 ENCOUNTER — TRANSCRIBE ORDERS (OUTPATIENT)
Dept: INTERNAL MEDICINE | Facility: CLINIC | Age: 70
End: 2020-08-13

## 2020-08-13 ENCOUNTER — TELEPHONE (OUTPATIENT)
Dept: ORTHOPEDIC SURGERY | Facility: CLINIC | Age: 70
End: 2020-08-13

## 2020-08-13 VITALS
BODY MASS INDEX: 43.02 KG/M2 | HEART RATE: 84 BPM | OXYGEN SATURATION: 97 % | SYSTOLIC BLOOD PRESSURE: 144 MMHG | DIASTOLIC BLOOD PRESSURE: 90 MMHG | WEIGHT: 315 LBS

## 2020-08-13 DIAGNOSIS — G44.229 CHRONIC TENSION-TYPE HEADACHE, NOT INTRACTABLE: ICD-10-CM

## 2020-08-13 DIAGNOSIS — M79.604 LEG PAIN, POSTERIOR, RIGHT: ICD-10-CM

## 2020-08-13 DIAGNOSIS — F32.A DEPRESSION, UNSPECIFIED DEPRESSION TYPE: ICD-10-CM

## 2020-08-13 DIAGNOSIS — Z96.641 STATUS POST TOTAL REPLACEMENT OF RIGHT HIP: Primary | ICD-10-CM

## 2020-08-13 DIAGNOSIS — G47.00 INSOMNIA, UNSPECIFIED TYPE: ICD-10-CM

## 2020-08-13 DIAGNOSIS — F43.9 SITUATIONAL STRESS: ICD-10-CM

## 2020-08-13 DIAGNOSIS — M79.604 LEG PAIN, POSTERIOR, RIGHT: Primary | ICD-10-CM

## 2020-08-13 PROCEDURE — 99495 TRANSJ CARE MGMT MOD F2F 14D: CPT | Performed by: PHYSICIAN ASSISTANT

## 2020-08-13 RX ORDER — PROMETHAZINE HYDROCHLORIDE 25 MG/1
25 TABLET ORAL EVERY 6 HOURS PRN
Qty: 20 TABLET | Refills: 0 | Status: SHIPPED | OUTPATIENT
Start: 2020-08-13 | End: 2021-11-01 | Stop reason: SDUPTHER

## 2020-08-13 RX ORDER — CYCLOBENZAPRINE HCL 10 MG
TABLET ORAL
Qty: 30 TABLET | Refills: 5 | Status: SHIPPED | OUTPATIENT
Start: 2020-08-13 | End: 2020-09-21

## 2020-08-13 RX ORDER — SUMATRIPTAN 100 MG/1
TABLET, FILM COATED ORAL
Qty: 9 TABLET | Refills: 3 | Status: SHIPPED | OUTPATIENT
Start: 2020-08-13 | End: 2020-10-12 | Stop reason: SDUPTHER

## 2020-08-13 RX ORDER — TRAZODONE HYDROCHLORIDE 50 MG/1
50 TABLET ORAL NIGHTLY
Qty: 30 TABLET | Refills: 2 | Status: SHIPPED | OUTPATIENT
Start: 2020-08-13 | End: 2020-10-23

## 2020-08-13 RX ORDER — CLOTRIMAZOLE 1 %
CREAM (GRAM) TOPICAL 2 TIMES DAILY
Qty: 15 G | Refills: 1 | Status: SHIPPED | OUTPATIENT
Start: 2020-08-13

## 2020-08-13 RX ORDER — ALPRAZOLAM 0.25 MG/1
0.25 TABLET ORAL DAILY PRN
Qty: 10 TABLET | Refills: 0
Start: 2020-08-13 | End: 2021-06-30 | Stop reason: SDUPTHER

## 2020-08-13 RX ORDER — CLOTRIMAZOLE 1 %
CREAM (GRAM) TOPICAL 2 TIMES DAILY
COMMUNITY
End: 2020-08-13 | Stop reason: SDUPTHER

## 2020-08-13 RX ORDER — ONDANSETRON 4 MG/1
4 TABLET, FILM COATED ORAL EVERY 6 HOURS PRN
Qty: 30 TABLET | Refills: 0 | Status: CANCELLED | OUTPATIENT
Start: 2020-08-13

## 2020-08-13 NOTE — TELEPHONE ENCOUNTER
,   I called Mr. Jackman and his primary care doctor sent him to be checked for a possible blood clot in his right  leg. He was upstairs having his scan for a possible blood clot now.  I let him know I would send you a message letting you know as well as ask for a refill of his pain medicine.

## 2020-08-13 NOTE — TELEPHONE ENCOUNTER
PATIENT CALLED REGARDING BLOOD CLOT. HE SAID HE WENT TO HIS PRIMARY DOCTOR AND THEY WILL BE SENDING HIM TO [facility] IMMEDIATELY. PATIENT SAID HE IS HAVING SWELLING IN HIS RIGHT LEG AND IT HAS WORSENED IN 48 HOURS. PATIENT WOULD ALSO LIKE A REFILL OF OXYCODONE. HE CAN BE REACHED [phone].

## 2020-08-13 NOTE — PROGRESS NOTES
Transitional Care Follow Up Visit  Subjective     Obi Jackman Jr. is a 69 y.o. male who presents for a transitional care management visit.    Within 48 business hours after discharge our office contacted him via telephone to coordinate his care and needs.      I reviewed and discussed the details of that call along with the discharge summary, hospital problems, inpatient lab results, inpatient diagnostic studies, and consultation reports with Obi.     Current outpatient and discharge medications have been reconciled for the patient.  Reviewed by: PEGGY Hope      Date of TCM Phone Call 8/5/2020   [facility]   Date of Admission 8/4/2020   Date of Discharge 8/5/2020   Discharge Disposition Home or Self Care     Risk for Readmission (LACE) Score: 4 (8/5/2020  6:00 AM)      History of Present Illness   Course During Hospital Stay:  Pt was discharged from [facility] on 8/5/2020. He was moving around with his walker initially until the last 2 days when he woke up with pain in the back of his right leg. Hurts from his incision site down his leg. Feels swollen in his calf. Taking oxycodone prn. He has done some HH PT.     Does not follow up with his surgeon for 3 weeks.     He is feeling discouraged about his recovery and going through changes of intermediate. Feels like he is depressed.    He is not sleeping well in the past few months, may have been impacted by pain. He goes to sleep well but then wakes up and has trouble going back to sleep because he continues to think about stressful things.    He has had some nausea with pain and pain meds recently. He tried phenergan and it worked better than the zofran.          The following portions of the patient's history were reviewed and updated as appropriate: allergies, current medications, past family history, past medical history, past social history, past surgical history and problem list.    Review of Systems   Constitutional:  Negative for fever and unexpected weight change.   HENT: Negative.    Eyes: Negative.    Respiratory: Negative for chest tightness, shortness of breath and wheezing.    Cardiovascular: Negative.    Gastrointestinal: Negative for abdominal pain.   Endocrine: Negative.    Genitourinary: Negative.    Musculoskeletal: Positive for arthralgias, joint swelling and myalgias.   Skin: Negative for color change, rash and wound.   Allergic/Immunologic: Negative.    Neurological: Negative for seizures, syncope and numbness.   Hematological: Negative for adenopathy. Does not bruise/bleed easily.   Psychiatric/Behavioral: Positive for dysphoric mood and sleep disturbance. Negative for confusion.       Objective      /90   Pulse 84   Wt (!) 144 kg (317 lb 3.2 oz)   SpO2 97%   BMI 43.02 kg/m²     Physical Exam   Constitutional: He is oriented to person, place, and time. He appears well-developed and well-nourished. No distress.   HENT:   Head: Normocephalic.   Eyes: Pupils are equal, round, and reactive to light. Conjunctivae and EOM are normal.   Neck: Normal range of motion. Neck supple.   Cardiovascular: Normal rate, regular rhythm and normal heart sounds.   No murmur heard.  Pulmonary/Chest: Effort normal and breath sounds normal.   Musculoskeletal: Normal range of motion.        Right upper leg: He exhibits tenderness and swelling.        Right lower leg: He exhibits tenderness and swelling.   Neurological: He is alert and oriented to person, place, and time.   Skin: Skin is warm and dry. No rash noted. He is not diaphoretic.   Psychiatric: His behavior is normal. Judgment and thought content normal. His affect is not inappropriate. He is not actively hallucinating. Cognition and memory are normal.   Nursing note and vitals reviewed.      Assessment/Plan   Problems Addressed this Visit        Nervous and Auditory    Chronic tension type headache    Relevant Medications    cyclobenzaprine (FLEXERIL) 10 MG tablet     SUMAtriptan (IMITREX) 100 MG tablet    traZODone (DESYREL) 50 MG tablet       Other    Depression     Psychological condition is worsening.  Medication changes per orders. Start trazodone to help with sleep and mood changes.  Psychological condition  will be reassessed in 4 weeks.         Relevant Medications    ALPRAZolam (XANAX) 0.25 MG tablet    traZODone (DESYREL) 50 MG tablet    Status post total replacement of right hip - Primary     Due to sudden increase in pain, check venous duplex to r/o clot. If no clot, pt will contact ortho to discuss worsening pain.           Other Visit Diagnoses     Leg pain, posterior, right        Check stat venous duplex. If no clot, pt will contact ortho to discuss next steps.    Relevant Orders    Duplex Venous Lower Extremity - Right CAR    Insomnia, unspecified type        Start trazodone 50 mg qhs to help with sleep and depression.    Relevant Medications    traZODone (DESYREL) 50 MG tablet    Situational stress        Relevant Medications    ALPRAZolam (XANAX) 0.25 MG tablet        Return in about 4 weeks (around 9/10/2020) for Follow up.

## 2020-08-14 ENCOUNTER — OFFICE VISIT (OUTPATIENT)
Dept: ORTHOPEDIC SURGERY | Facility: CLINIC | Age: 70
End: 2020-08-14

## 2020-08-14 DIAGNOSIS — M79.604 LEG PAIN, POSTERIOR, RIGHT: ICD-10-CM

## 2020-08-14 DIAGNOSIS — Z96.641 STATUS POST TOTAL HIP REPLACEMENT, RIGHT: Primary | ICD-10-CM

## 2020-08-14 PROCEDURE — 99024 POSTOP FOLLOW-UP VISIT: CPT | Performed by: PHYSICIAN ASSISTANT

## 2020-08-14 RX ORDER — HYDROCODONE BITARTRATE AND ACETAMINOPHEN 7.5; 325 MG/1; MG/1
1 TABLET ORAL EVERY 6 HOURS PRN
Qty: 30 TABLET | Refills: 0 | Status: SHIPPED | OUTPATIENT
Start: 2020-08-14 | End: 2020-10-07

## 2020-08-14 NOTE — PROGRESS NOTES
I was glad to hear that there is no clot in your right leg! Hopefully you were able to get in touch with Dr Olivas's office to discuss the increased pain and swelling.

## 2020-08-14 NOTE — TELEPHONE ENCOUNTER
Pt sent pic of incision and I showed that to CRD who requested pt come in this afternoon for her to evaluate; Pt agreeable to this and will be here between 1:30pm and 2pm.    Fan

## 2020-08-14 NOTE — PROGRESS NOTES
[facility] Note        Subjective     Post-op (Incision check; 10 days status post Total Hip Arthroplasty Right 8/4/20)       KEYA Jackman Jr. is a 69 y.o. male.  Patient presents today for evaluation of his right hip.  Status post right MARTY by Dr. Olivas on 8/4/2020.  Patient saw his PCP yesterday due to increased pain and swelling to the lower extremity.  He had a Doppler duplex that was negative for DVT.  He states the oxycodone is not lasting long or providing much pain relief.  He denies any numbness or tingling into the extremity.  Most of his pain is radiating along the incision down into the anterior thigh.  He denies any groin pain.  Patient is utilizing a walker to assist with ambulation.  He has been seen by home health a couple of times but did cancel his appointment today to come in to see us.    No reported fever, chills, night sweats or other constitutional symptoms.          Objective      Physical Exam  There were no vitals taken for this visit.    There is no height or weight on file to calculate BMI.        Ortho Exam  Integument:   Right hip: Incision healing well with Prineo dressing intact.  He does have blisters that have ruptured noted proximal aspect of the Prineo dressing.  There is no redness, warmth, drainage or evidence of infection to the incision.    Lower Extremity:   Right Hip:    Tenderness:  Mild incisional tenderness. Negative to groin    Swelling:  Positive with ecchymosis in buttock region    Crepitus:  None    Range of motion:  External Rotation: 20°       Internal Rotation: 20°       Flexion:  90°       Extension:  0°    Deformities:  None  Functional testing: Negative Stinchfield    No leg length discrepancy    Bilateral lower extremity 2+ edema on right, 1+ on left.      Imaging Reviewed:  No new imaging today.    Duplex venous Doppler performed 8/13/2020 negative for DVT.      Assessment:  1. Status post total hip replacement, right         Plan:  1. Status post right total hip with with pain and swelling.  2. Changed oxycodone to hydrocodone (Luke).  3. Patient will continue utilizing walker to assist with ambulation and transition to cane when able.  4. Continue working with home health PT for range of motion, pain and swelling control.  5. Patient is already been told about wearing compression socks/stockings which he does not just does not have on today.  He will return to wearing them.  6. Follow-up on 8/26/2020 with Dr. Olivas as previously directed.  7. Questions and concerns answered.    Patient was also examined by Dr. Olivas and he agrees with the above assessment and plan.      Cheyanne Putnam PA-C  08/14/20  14:16

## 2020-08-14 NOTE — TELEPHONE ENCOUNTER
PATIENT CALLED TO GIVE AN UPDATE, PATIENT STATED HE DOES NOT HAVE A BLOT CLOT. PATIENT STATED HE IS  STILL VERY SWOLLEN AND IN A LOT OF PAIN. PATIENT ALSO REQUESTING A REFILL ON OXYCODONE. PATIENT USES [facility]. PATIENT WOULD LIKE A CALL BACK [phone]

## 2020-08-14 NOTE — TELEPHONE ENCOUNTER
Pt still having a lot of pain and swelling; Duplex scan was negative; Pt has been using compression stockings that go up to the knee as well as elevating both feet; Denies fever; Reports taking the oxycodone, in addition to, ibuprofen and tylenol. He reports only having a few pills left and would like a refill. He then mentioned a blister that appeared at the top part of his incision that busted. I requested he send us a pic of the incision to assess his incision.     Fan

## 2020-08-17 NOTE — ASSESSMENT & PLAN NOTE
Due to sudden increase in pain, check venous duplex to r/o clot. If no clot, pt will contact ortho to discuss worsening pain.

## 2020-08-17 NOTE — ASSESSMENT & PLAN NOTE
Psychological condition is worsening.  Continue current treatment regimen. Continue to use alprazolam sparingly prn. Refill of alprazolam 0.25 mg 1 po daily prn #10, 0RF.  Psychological condition  will be reassessed in 4 weeks.

## 2020-08-17 NOTE — ASSESSMENT & PLAN NOTE
Psychological condition is worsening.  Medication changes per orders. Start trazodone to help with sleep and mood changes.  Psychological condition  will be reassessed in 4 weeks.

## 2020-08-26 ENCOUNTER — OFFICE VISIT (OUTPATIENT)
Dept: ORTHOPEDIC SURGERY | Facility: CLINIC | Age: 70
End: 2020-08-26

## 2020-08-26 DIAGNOSIS — Z96.641 STATUS POST TOTAL HIP REPLACEMENT, RIGHT: Primary | ICD-10-CM

## 2020-08-26 DIAGNOSIS — Z47.89 ORTHOPEDIC AFTERCARE: ICD-10-CM

## 2020-08-26 PROCEDURE — 99024 POSTOP FOLLOW-UP VISIT: CPT | Performed by: ORTHOPAEDIC SURGERY

## 2020-08-26 NOTE — PROGRESS NOTES
[facility] Note    Subjective     Chief Complaint   Patient presents with   • Post-op     3 weeks status post Total Hip Arthroplasty Right 8/4/20        HPI    It has been 3  week(s) since Mr. Jackman's last visit. He returns to clinic today for postoperative follow-up of right hip arthroplasty. He rates his pain a 4/10 on the pain scale. Previous/current treatments: cane/walker, physical therapy and weight loss. Current symptoms: pain and swelling. The pain is worse with walking, standing and climbing stairs; resting, ice and pain medication and/or NSAID improve the pain. Overall, he is doing better.  100% improvement in his pain symptoms compared to his preoperative symptoms.  Ambulating with the aid of a cane.    I have reviewed the following portions of the patient's history and agree with: History of Present Illness and Review of Systems    Patient Active Problem List   Diagnosis   • Allergic rhinitis   • Anxiety disorder   • Benign paroxysmal positional vertigo   • Chronic tension type headache   • Depression   • ED (erectile dysfunction) of non-organic origin   • Hyperlipidemia   • Hypertension   • LFTs abnormal   • CHRISTOPHE (obstructive sleep apnea)   • Elevated glucose   • Health care maintenance   • Morbid obesity due to excess calories (CMS/[facility])   • Onychomycosis of left great toe   • Osteoarthritis of ankle or foot   • Venous stasis   • Neuropathy   • Class 3 severe obesity due to excess calories with serious comorbidity and body mass index (BMI) of 40.0 to 44.9 in adult (CMS/[facility])   • Pre-op evaluation   • Arthritis of foot, left   • S/P left ankle triple arthrodesis   • Acute blood loss anemia, mild, asymptomatic    • Acute postoperative pain   • Wound infection   • Primary osteoarthritis of right hip   • Status post total replacement of right hip     Past Medical History:   Diagnosis Date   • Anxiety and depression    • Bulging of lumbar intervertebral disc    • CPAP (continuous  positive airway pressure) dependence    • Hyperlipidemia    • Hypertension    • Migraine    • CHRISTOPHE on CPAP    • Rheumatoid arthritis (CMS/HCC)    • Swelling of left ear    • Wears contact lenses    • Wears contact lenses       Past Surgical History:   Procedure Laterality Date   • ACHILLES TENDON SURGERY Left 10/15/2019    Procedure: ACHILLES TENDON LENGTHENING LEFT;  Surgeon: Elodia Guzman MD;  Location:  [facility];  Service: Orthopedics   • CATARACT EXTRACTION Right    • COLONOSCOPY  2015   • EYE SURGERY Bilateral     cornea transplant    • FOOT SURGERY Left 10/15/2019    triple arthrodesis and achilles tendon lengthening- [facility]   • HERNIA REPAIR     • ILIAC CREST BONE GRAFT Left 10/15/2019    Procedure: ILIAC CREST BONE GRAFT LEFT;  Surgeon: Elodia Guzman MD;  Location: [facility];  Service: Orthopedics   • TOE FUSION Left 10/15/2019    Procedure: TRIPLE ARTHODESIS LEFT;  Surgeon: Elodia Guzman MD;  Location:  [facility];  Service: Orthopedics   • TOTAL HIP ARTHROPLASTY Right 8/4/2020    Procedure: TOTAL HIP ARTHROPLASTY RIGHT;  Surgeon: Osbaldo Olivas MD;  Location: [facility];  Service: Orthopedics;  Laterality: Right;      Family History   Problem Relation Age of Onset   • No Known Problems Mother    • No Known Problems Father      Social History     Socioeconomic History   • Marital status:      Spouse name: Not on file   • Number of children: Not on file   • Years of education: Not on file   • Highest education level: Not on file   Tobacco Use   • Smoking status: Never Smoker   • Smokeless tobacco: Never Used   Substance and Sexual Activity   • Alcohol use: Yes     Comment: rarely   • Drug use: No   • Sexual activity: Defer      Current Outpatient Medications on File Prior to Visit   Medication Sig Dispense Refill   • acetaminophen (TYLENOL) 325 MG tablet Take 325 mg by mouth As Needed for Mild Pain .     • albuterol sulfate  (90 Base) MCG/ACT inhaler Inhale 2 puffs Every 4 (Four) Hours  As Needed for Wheezing. (Patient taking differently: Inhale 2 puffs Every 4 (Four) Hours As Needed for Wheezing or Shortness of Air.) 1 inhaler 2   • ALPRAZolam (XANAX) 0.25 MG tablet Take 1 tablet by mouth Daily As Needed for Anxiety (when flying). 10 tablet 0   • Ascorbic Acid (VITAMIN C PO) Take  by mouth Daily.     • aspirin 325 MG EC tablet Take 1 tablet by mouth Daily. For 1 month 30 tablet 0   • atenolol (TENORMIN) 50 MG tablet Take 1 tablet by mouth Daily. 30 tablet 5   • atorvastatin (LIPITOR) 10 MG tablet Take 1 tablet by mouth Daily. 90 tablet 3   • buPROPion XL (WELLBUTRIN XL) 150 MG 24 hr tablet Take 1 tablet by mouth Daily. 30 tablet 5   • clotrimazole (LOTRIMIN) 1 % cream Apply  topically to the appropriate area as directed 2 (Two) Times a Day. 15 g 1   • Cyanocobalamin (VITAMIN B-12 ER PO) Take 1 tablet by mouth Daily.     • cyclobenzaprine (FLEXERIL) 10 MG tablet Take one tablet by mouth three times a day as needed for muscle spasms. 30 tablet 5   • Dietary Management Product (VASCULERA) tablet Take 1 tablet by mouth Daily. 90 tablet 5   • docusate sodium (Colace) 100 MG capsule Take 1 capsule by mouth 2 (Two) Times a Day. 60 capsule 0   • Ergocalciferol (VITAMIN D2 PO) Take  by mouth Daily.     • gabapentin (NEURONTIN) 300 MG capsule Take 300 mg by mouth 3 (Three) Times a Day.     • HYDROcodone-acetaminophen (NORCO) 7.5-325 MG per tablet Take 1 tablet by mouth Every 6 (Six) Hours As Needed for Moderate Pain . 30 tablet 0   • hydroxychloroquine (PLAQUENIL) 200 MG tablet Take 200 mg by mouth 2 (Two) Times a Day.     • ibuprofen (ADVIL,MOTRIN) 200 MG tablet Take 1 tablet by mouth As Needed for Mild Pain . Must take an hour after aspirin if needed.     • lisinopril-hydrochlorothiazide (PRINZIDE,ZESTORETIC) 20-12.5 MG per tablet TAKE 1 TABLET BY MOUTH DAILY 90 tablet 0   • nabumetone (RELAFEN) 750 MG tablet Take 1,500 mg by mouth Daily.     • ondansetron (ZOFRAN) 4 MG tablet Take 1 tablet by mouth Every  6 (Six) Hours As Needed for Nausea or Vomiting. 30 tablet 0   • oxaprozin (DAYPRO) 600 MG tablet Take 1,200 mg by mouth Daily. Take 2 po every day      • promethazine (PHENERGAN) 25 MG tablet Take 1 tablet by mouth Every 6 (Six) Hours As Needed for Nausea or Vomiting. 20 tablet 0   • SUMAtriptan (IMITREX) 100 MG tablet TAKE 1 TABLET BY MOUTH AT ONSET OF HEADACHE. MAY REPEAT DOSE ONE TIME IN 2 HOURS IF HEADACHE NOT RELIEVED. 9 tablet 3   • traZODone (DESYREL) 50 MG tablet Take 1 tablet by mouth Every Night. 30 tablet 2     No current facility-administered medications on file prior to visit.       No Known Allergies     Review of Systems   Constitutional: Negative.    HENT: Negative.    Eyes: Negative.    Respiratory: Negative.    Cardiovascular: Positive for leg swelling.   Gastrointestinal: Negative.    Endocrine: Negative.    Genitourinary: Negative.    Musculoskeletal: Positive for arthralgias and joint swelling.   Skin: Negative.    Allergic/Immunologic: Negative.    Neurological: Negative.    Hematological: Negative.    Psychiatric/Behavioral: Negative.         Objective      Physical Exam  There were no vitals taken for this visit.    There is no height or weight on file to calculate BMI.    General:   Mental Status:  Alert   Appearance: Cooperative, in no acute distress   Build and Nutrition: Obese male   Orientation: Alert and oriented to person, place and time   Posture: Normal   Gait: Mild limp with a cane    Integument:   Right hip: Wound is well-healed with no signs of infection    Lower Extremity:   Right Hip:    Tenderness:  None    Swelling:  None    Crepitus:  None    Range of motion:  External Rotation: 30°       Internal Rotation: 30°       Flexion:  100°       Extension:  0°    Deformities:  None  Functional testing: Negative [facility]    No leg length discrepancy      Imaging/Studies  Imaging Results (Last 24 Hours)     Procedure Component Value Units Date/Time    XR Hip With or Without Pelvis 2  - 3 View Right [[id number]] Resulted:  08/26/20 1447     Updated:  08/26/20 1448    Narrative:       Right Hip Radiographs  Indication: status-post right total hip arthroplasty  Views: low AP pelvis and lateral of the right hip    Comparison: no change compared to prior study, 8/4/2020    Findings:   The components are well aligned, with no signs of loosening or failure.            Assessment and Plan     Obi was seen today for post-op.    Diagnoses and all orders for this visit:    Status post total hip replacement, right  -     XR Hip With or Without Pelvis 2 - 3 View Right    Orthopedic aftercare        1. Status post total hip replacement, right    2. Orthopedic aftercare        I reviewed my findings with the patient today.  His right total hip arthroplasty is functioning well, and is pleased with results.  I will see him back in 6 weeks, but sooner for any problems.  No x-rays required on the next visit.    Return in about 6 weeks (around 10/7/2020).    Medical Decision Making  Management Options : physical/occupational therapy  Data/Risk: radiology tests and independent visualization of imaging, lab tests, or EMG/NCV      Osbaldo Olivas MD  08/26/20  14:54    Dragon disclaimer:  Much of this encounter note is an electronic transcription/translation of spoken language to printed text. The electronic translation of spoken language may permit erroneous, or at times, nonsensical words or phrases to be inadvertently transcribed; Although I have reviewed the note for such errors, some may still exist.

## 2020-09-21 DIAGNOSIS — G44.229 CHRONIC TENSION-TYPE HEADACHE, NOT INTRACTABLE: ICD-10-CM

## 2020-09-21 RX ORDER — LISINOPRIL AND HYDROCHLOROTHIAZIDE 20; 12.5 MG/1; MG/1
1 TABLET ORAL DAILY
Qty: 90 TABLET | Refills: 0 | Status: SHIPPED | OUTPATIENT
Start: 2020-09-21 | End: 2020-11-02 | Stop reason: SDUPTHER

## 2020-09-21 RX ORDER — CYCLOBENZAPRINE HCL 10 MG
TABLET ORAL
Qty: 30 TABLET | Refills: 5 | Status: SHIPPED | OUTPATIENT
Start: 2020-09-21 | End: 2021-06-28 | Stop reason: SDUPTHER

## 2020-10-01 ENCOUNTER — OFFICE VISIT (OUTPATIENT)
Dept: INTERNAL MEDICINE | Facility: CLINIC | Age: 70
End: 2020-10-01

## 2020-10-01 ENCOUNTER — LAB (OUTPATIENT)
Dept: LAB | Facility: HOSPITAL | Age: 70
End: 2020-10-01

## 2020-10-01 VITALS — WEIGHT: 313.4 LBS | OXYGEN SATURATION: 98 % | BODY MASS INDEX: 42.5 KG/M2

## 2020-10-01 DIAGNOSIS — R53.81 OTHER MALAISE: ICD-10-CM

## 2020-10-01 DIAGNOSIS — R42 DIZZINESS: Primary | ICD-10-CM

## 2020-10-01 DIAGNOSIS — I15.9 SECONDARY HYPERTENSION: ICD-10-CM

## 2020-10-01 DIAGNOSIS — R42 DIZZINESS: ICD-10-CM

## 2020-10-01 LAB
ALBUMIN SERPL-MCNC: 4.7 G/DL (ref 3.5–5.2)
ALBUMIN/GLOB SERPL: 1.6 G/DL
ALP SERPL-CCNC: 82 U/L (ref 39–117)
ALT SERPL W P-5'-P-CCNC: 13 U/L (ref 1–41)
ANION GAP SERPL CALCULATED.3IONS-SCNC: 9.7 MMOL/L (ref 5–15)
AST SERPL-CCNC: 16 U/L (ref 1–40)
BASOPHILS # BLD AUTO: 0.03 10*3/MM3 (ref 0–0.2)
BASOPHILS NFR BLD AUTO: 0.5 % (ref 0–1.5)
BILIRUB BLD-MCNC: NEGATIVE MG/DL
BILIRUB SERPL-MCNC: 0.5 MG/DL (ref 0–1.2)
BUN SERPL-MCNC: 15 MG/DL (ref 8–23)
BUN/CREAT SERPL: 16.5 (ref 7–25)
CALCIUM SPEC-SCNC: 9.8 MG/DL (ref 8.6–10.5)
CHLORIDE SERPL-SCNC: 99 MMOL/L (ref 98–107)
CLARITY, POC: CLEAR
CO2 SERPL-SCNC: 30.3 MMOL/L (ref 22–29)
COLOR UR: YELLOW
CREAT SERPL-MCNC: 0.91 MG/DL (ref 0.76–1.27)
DEPRECATED RDW RBC AUTO: 40.6 FL (ref 37–54)
EOSINOPHIL # BLD AUTO: 0.18 10*3/MM3 (ref 0–0.4)
EOSINOPHIL NFR BLD AUTO: 3 % (ref 0.3–6.2)
ERYTHROCYTE [DISTWIDTH] IN BLOOD BY AUTOMATED COUNT: 12.2 % (ref 12.3–15.4)
GFR SERPL CREATININE-BSD FRML MDRD: 83 ML/MIN/1.73
GLOBULIN UR ELPH-MCNC: 3 GM/DL
GLUCOSE SERPL-MCNC: 105 MG/DL (ref 65–99)
GLUCOSE UR STRIP-MCNC: NEGATIVE MG/DL
HCT VFR BLD AUTO: 45.9 % (ref 37.5–51)
HGB BLD-MCNC: 15.2 G/DL (ref 13–17.7)
IMM GRANULOCYTES # BLD AUTO: 0.01 10*3/MM3 (ref 0–0.05)
IMM GRANULOCYTES NFR BLD AUTO: 0.2 % (ref 0–0.5)
KETONES UR QL: NEGATIVE
LEUKOCYTE EST, POC: NEGATIVE
LYMPHOCYTES # BLD AUTO: 1.42 10*3/MM3 (ref 0.7–3.1)
LYMPHOCYTES NFR BLD AUTO: 23.4 % (ref 19.6–45.3)
MCH RBC QN AUTO: 30.4 PG (ref 26.6–33)
MCHC RBC AUTO-ENTMCNC: 33.1 G/DL (ref 31.5–35.7)
MCV RBC AUTO: 91.8 FL (ref 79–97)
MONOCYTES # BLD AUTO: 0.65 10*3/MM3 (ref 0.1–0.9)
MONOCYTES NFR BLD AUTO: 10.7 % (ref 5–12)
NEUTROPHILS NFR BLD AUTO: 3.79 10*3/MM3 (ref 1.7–7)
NEUTROPHILS NFR BLD AUTO: 62.2 % (ref 42.7–76)
NITRITE UR-MCNC: NEGATIVE MG/ML
NRBC BLD AUTO-RTO: 0 /100 WBC (ref 0–0.2)
PH UR: 6 [PH] (ref 5–8)
PLATELET # BLD AUTO: 268 10*3/MM3 (ref 140–450)
PMV BLD AUTO: 10.8 FL (ref 6–12)
POTASSIUM SERPL-SCNC: 4.1 MMOL/L (ref 3.5–5.2)
PROT SERPL-MCNC: 7.7 G/DL (ref 6–8.5)
PROT UR STRIP-MCNC: NEGATIVE MG/DL
RBC # BLD AUTO: 5 10*6/MM3 (ref 4.14–5.8)
RBC # UR STRIP: NEGATIVE /UL
SODIUM SERPL-SCNC: 139 MMOL/L (ref 136–145)
SP GR UR: 1.02 (ref 1–1.03)
TSH SERPL DL<=0.05 MIU/L-ACNC: 1.55 UIU/ML (ref 0.27–4.2)
UROBILINOGEN UR QL: NORMAL
WBC # BLD AUTO: 6.08 10*3/MM3 (ref 3.4–10.8)

## 2020-10-01 PROCEDURE — 80053 COMPREHEN METABOLIC PANEL: CPT | Performed by: PHYSICIAN ASSISTANT

## 2020-10-01 PROCEDURE — 84443 ASSAY THYROID STIM HORMONE: CPT | Performed by: PHYSICIAN ASSISTANT

## 2020-10-01 PROCEDURE — 99214 OFFICE O/P EST MOD 30 MIN: CPT | Performed by: PHYSICIAN ASSISTANT

## 2020-10-01 PROCEDURE — 81003 URINALYSIS AUTO W/O SCOPE: CPT | Performed by: PHYSICIAN ASSISTANT

## 2020-10-01 PROCEDURE — 85025 COMPLETE CBC W/AUTO DIFF WBC: CPT | Performed by: PHYSICIAN ASSISTANT

## 2020-10-01 RX ORDER — AMLODIPINE BESYLATE 2.5 MG/1
2.5 TABLET ORAL DAILY
Qty: 30 TABLET | Refills: 2 | Status: SHIPPED | OUTPATIENT
Start: 2020-10-01 | End: 2020-11-02 | Stop reason: SDUPTHER

## 2020-10-01 NOTE — PROGRESS NOTES
Chief Complaint   Patient presents with   • Dizziness     Acute   • Blood Pressure issues       Subjective     Obi Jackman Jr. is a 70 y.o. male.        History of Present Illness     Pt has been having improvement in his hip pain, slowly progressing.    Woke up a few nights ago with dizziness, fell in the night. Saw Dr Kaplan and did not test positive for positional vertigo. Gave him some meclizine to try. Has not seemed to help much. Saw his PT later in the week and his BP was high. Bought a cuff and has been getting high readings, nothing below 148/96.     He has history of benign positional vertigo but usually tests positive.    His dizziness is primarily in the morning, sometimes better by about noon. Does not have the constant spinning. Worse when he moves his head quickly. Has intermittent nausea, no vomiting. He has been doing some head movement exercises.        Current Outpatient Medications:   •  acetaminophen (TYLENOL) 325 MG tablet, Take 325 mg by mouth As Needed for Mild Pain ., Disp: , Rfl:   •  ALPRAZolam (XANAX) 0.25 MG tablet, Take 1 tablet by mouth Daily As Needed for Anxiety (when flying)., Disp: 10 tablet, Rfl: 0  •  Ascorbic Acid (VITAMIN C PO), Take  by mouth Daily., Disp: , Rfl:   •  aspirin 325 MG EC tablet, Take 1 tablet by mouth Daily. For 1 month, Disp: 30 tablet, Rfl: 0  •  atenolol (TENORMIN) 50 MG tablet, Take 1 tablet by mouth Daily., Disp: 30 tablet, Rfl: 5  •  atorvastatin (LIPITOR) 10 MG tablet, Take 1 tablet by mouth Daily., Disp: 90 tablet, Rfl: 3  •  buPROPion XL (WELLBUTRIN XL) 150 MG 24 hr tablet, Take 1 tablet by mouth Daily., Disp: 30 tablet, Rfl: 5  •  clotrimazole (LOTRIMIN) 1 % cream, Apply  topically to the appropriate area as directed 2 (Two) Times a Day., Disp: 15 g, Rfl: 1  •  Cyanocobalamin (VITAMIN B-12 ER PO), Take 1 tablet by mouth Daily., Disp: , Rfl:   •  cyclobenzaprine (FLEXERIL) 10 MG tablet, TAKE ONE TABLET BY MOUTH THREE TIMES A DAY AS NEEDED FOR  MUSCLE SPASMS., Disp: 30 tablet, Rfl: 5  •  Dietary Management Product (VASCULERA) tablet, Take 1 tablet by mouth Daily., Disp: 90 tablet, Rfl: 5  •  Ergocalciferol (VITAMIN D2 PO), Take  by mouth Daily., Disp: , Rfl:   •  gabapentin (NEURONTIN) 300 MG capsule, Take 300 mg by mouth 3 (Three) Times a Day., Disp: , Rfl:   •  HYDROcodone-acetaminophen (NORCO) 7.5-325 MG per tablet, Take 1 tablet by mouth Every 6 (Six) Hours As Needed for Moderate Pain ., Disp: 30 tablet, Rfl: 0  •  hydroxychloroquine (PLAQUENIL) 200 MG tablet, Take 200 mg by mouth 2 (Two) Times a Day., Disp: , Rfl:   •  ibuprofen (ADVIL,MOTRIN) 200 MG tablet, Take 1 tablet by mouth As Needed for Mild Pain . Must take an hour after aspirin if needed., Disp: , Rfl:   •  lisinopril-hydrochlorothiazide (PRINZIDE,ZESTORETIC) 20-12.5 MG per tablet, TAKE 1 TABLET BY MOUTH DAILY, Disp: 90 tablet, Rfl: 0  •  nabumetone (RELAFEN) 750 MG tablet, Take 1,500 mg by mouth Daily., Disp: , Rfl:   •  ondansetron (ZOFRAN) 4 MG tablet, Take 1 tablet by mouth Every 6 (Six) Hours As Needed for Nausea or Vomiting., Disp: 30 tablet, Rfl: 0  •  oxaprozin (DAYPRO) 600 MG tablet, Take 1,200 mg by mouth Daily. Take 2 po every day , Disp: , Rfl:   •  promethazine (PHENERGAN) 25 MG tablet, Take 1 tablet by mouth Every 6 (Six) Hours As Needed for Nausea or Vomiting., Disp: 20 tablet, Rfl: 0  •  SUMAtriptan (IMITREX) 100 MG tablet, TAKE 1 TABLET BY MOUTH AT ONSET OF HEADACHE. MAY REPEAT DOSE ONE TIME IN 2 HOURS IF HEADACHE NOT RELIEVED., Disp: 9 tablet, Rfl: 3  •  traZODone (DESYREL) 50 MG tablet, Take 1 tablet by mouth Every Night., Disp: 30 tablet, Rfl: 2  •  albuterol sulfate  (90 Base) MCG/ACT inhaler, Inhale 2 puffs Every 4 (Four) Hours As Needed for Wheezing. (Patient taking differently: Inhale 2 puffs Every 4 (Four) Hours As Needed for Wheezing or Shortness of Air.), Disp: 1 inhaler, Rfl: 2  •  amLODIPine (NORVASC) 2.5 MG tablet, Take 1 tablet by mouth Daily., Disp: 30  tablet, Rfl: 2     PMFSH  The following portions of the patient's history were reviewed and updated as appropriate: allergies, current medications, past family history, past medical history, past social history, past surgical history and problem list.    Review of Systems   Constitutional: Negative for appetite change, chills, fever and unexpected weight change.   HENT: Negative for ear discharge, sinus pressure and tinnitus.    Eyes: Negative for photophobia and pain.   Respiratory: Negative for chest tightness, shortness of breath and wheezing.    Cardiovascular: Negative for chest pain and palpitations.   Gastrointestinal: Negative for abdominal pain and blood in stool.   Endocrine: Negative for cold intolerance, heat intolerance, polydipsia and polyphagia.   Genitourinary: Negative.    Musculoskeletal: Negative for joint swelling.   Skin: Negative for color change and wound.   Allergic/Immunologic: Negative.    Neurological: Positive for dizziness, weakness and light-headedness. Negative for syncope, facial asymmetry and speech difficulty.   Hematological: Negative for adenopathy.   Psychiatric/Behavioral: Negative for confusion and hallucinations.       Objective   Wt (!) 142 kg (313 lb 6.4 oz)   SpO2 98%   BMI 42.50 kg/m²      Vitals:    10/01/20 0953 10/01/20 1351 10/01/20 1353 10/01/20 1354   Orthostatic BP: 144/90 140/90 126/90 130/82   Orthostatic Pulse: 67      Patient Position:  Lying Sitting Standing         Physical Exam  Vitals signs and nursing note reviewed.   Constitutional:       General: He is not in acute distress.     Appearance: He is well-developed. He is not diaphoretic.   HENT:      Head: Normocephalic and atraumatic.   Eyes:      General: Lids are normal. No scleral icterus.     Conjunctiva/sclera: Conjunctivae normal.      Pupils: Pupils are equal, round, and reactive to light.   Cardiovascular:      Rate and Rhythm: Normal rate and regular rhythm.      Pulses: Normal pulses.      Heart  sounds: Normal heart sounds. No murmur.   Pulmonary:      Effort: Pulmonary effort is normal. No respiratory distress.      Breath sounds: Normal breath sounds. No wheezing or rales.   Chest:      Chest wall: No tenderness.   Abdominal:      Palpations: Abdomen is soft.   Musculoskeletal: Normal range of motion.         General: No deformity.   Lymphadenopathy:      Cervical: No cervical adenopathy.   Neurological:      Mental Status: He is alert and oriented to person, place, and time. He is not disoriented.      Cranial Nerves: No cranial nerve deficit.      Sensory: No sensory deficit.      Motor: No tremor, atrophy or abnormal muscle tone.      Coordination: Coordination normal.      Gait: Gait normal.      Deep Tendon Reflexes: Reflexes are normal and symmetric.   Psychiatric:         Behavior: Behavior normal.         Thought Content: Thought content normal.         Judgment: Judgment normal.         ECG 12 Lead    Date/Time: 10/2/2020 3:35 PM  Performed by: Cammy Oneal PA  Authorized by: Cammy Oneal PA   Comparison: compared with previous ECG from 7/27/2020  Similar to previous ECG  Rhythm: sinus rhythm  Rate: normal  BPM: 63  Conduction: incomplete right bundle branch block  ST Segments: ST segments normal  T Waves: T waves normal  QRS axis: normal  Other: no other findings    Clinical impression: non-specific ECG               ASSESSMENT/PLAN    Problem List Items Addressed This Visit        Cardiovascular and Mediastinum    Hypertension     Hypertension is worsening.  Dietary sodium restriction.  Weight loss.  Regular aerobic exercise.  Medication changes per orders.  Ambulatory blood pressure monitoring. Add amlodipine 2.5 mg daily.  Blood pressure will be reassessed in 4 weeks.         Relevant Medications    amLODIPine (NORVASC) 2.5 MG tablet      Other Visit Diagnoses     Dizziness    -  Primary    ECG unchanged. Trial of low dose amlodipine to lower BP. Use caution with standing and sitting or  moving head quickly. Trial of flonase.    Relevant Medications    amLODIPine (NORVASC) 2.5 MG tablet    Other Relevant Orders    CBC & Differential (Completed)    Comprehensive Metabolic Panel (Completed)    TSH (Completed)    POC Urinalysis Dipstick, Automated (Completed)    Other malaise         Relevant Orders    TSH (Completed)    Dizziness        Relevant Medications    amLODIPine (NORVASC) 2.5 MG tablet    Other Relevant Orders    CBC & Differential (Completed)    Comprehensive Metabolic Panel (Completed)    TSH (Completed)    POC Urinalysis Dipstick, Automated (Completed)               Return in about 4 weeks (around 10/29/2020) for Follow up.  Answers for HPI/ROS submitted by the patient on 10/1/2020   What is the primary reason for your visit?: Other  Please describe your symptoms.: Dizziness/ Veetigi  Have you had these symptoms before?: Yes  How long have you been having these symptoms?: 5-7 days  Please list any medications you are currently taking for this condition.: See My Chart  Please describe any probable cause for these symptoms. : HBP?unknown?

## 2020-10-02 PROCEDURE — 93000 ELECTROCARDIOGRAM COMPLETE: CPT | Performed by: PHYSICIAN ASSISTANT

## 2020-10-02 NOTE — ASSESSMENT & PLAN NOTE
Hypertension is worsening.  Dietary sodium restriction.  Weight loss.  Regular aerobic exercise.  Medication changes per orders.  Ambulatory blood pressure monitoring. Add amlodipine 2.5 mg daily.  Blood pressure will be reassessed in 4 weeks.

## 2020-10-02 NOTE — PROGRESS NOTES
Your blood counts have returned to normal and your metabolic panel looks fine. Let me know if your dizziness is not improving!

## 2020-10-07 ENCOUNTER — OFFICE VISIT (OUTPATIENT)
Dept: ORTHOPEDIC SURGERY | Facility: CLINIC | Age: 70
End: 2020-10-07

## 2020-10-07 DIAGNOSIS — Z47.89 ORTHOPEDIC AFTERCARE: ICD-10-CM

## 2020-10-07 DIAGNOSIS — Z96.641 STATUS POST TOTAL HIP REPLACEMENT, RIGHT: Primary | ICD-10-CM

## 2020-10-07 PROCEDURE — 99024 POSTOP FOLLOW-UP VISIT: CPT | Performed by: ORTHOPAEDIC SURGERY

## 2020-10-07 RX ORDER — MECLIZINE HYDROCHLORIDE 25 MG/1
25 TABLET ORAL EVERY 8 HOURS PRN
COMMUNITY

## 2020-10-07 RX ORDER — TRAMADOL HYDROCHLORIDE 50 MG/1
TABLET ORAL
COMMUNITY
End: 2020-10-23 | Stop reason: SDUPTHER

## 2020-10-07 NOTE — PROGRESS NOTES
[facility] [place] Orthopa[facility] Surgery Clinic Note    Subjective     Chief Complaint   Patient presents with   • Post-op     6 week follow up; 9 weeks status post Total Hip Arthroplasty Right 8/4/20        HPI    It has been 6  week(s) since Mr. Jackman's last visit. He returns to clinic today for postoperative follow-up of right hip arthroplasty. He rates his pain a 3/10 on the pain scale. Previous/current treatments: cane/walker, NSAIDS and physical therapy. Current symptoms: popping and giving way/buckling. The pain is worse with walking; resting improve the pain. Overall, he is doing better.  Over 80% improvement compared to his preoperative symptoms.  Using a cane for ambulation.    I have reviewed the following portions of the patient's history and agree with: History of Present Illness and Review of Systems    Patient Active Problem List   Diagnosis   • Allergic rhinitis   • Anxiety disorder   • Benign paroxysmal positional vertigo   • Chronic tension type headache   • Depression   • ED (erectile dysfunction) of non-organic origin   • Hyperlipidemia   • Hypertension   • LFTs abnormal   • CHRISTOPHE (obstructive sleep apnea)   • Elevated glucose   • Health care maintenance   • Morbid obesity due to excess calories (CMS/[facility])   • Onychomycosis of left great toe   • Osteoarthritis of ankle or foot   • Venous stasis   • Neuropathy   • Class 3 severe obesity due to excess calories with serious comorbidity and body mass index (BMI) of 40.0 to 44.9 in adult (CMS/HCC)   • Pre-op evaluation   • Arthritis of foot, left   • S/P left ankle triple arthrodesis   • Acute blood loss anemia, mild, asymptomatic    • Acute postoperative pain   • Wound infection   • Primary osteoarthritis of right hip   • Status post total replacement of right hip     Past Medical History:   Diagnosis Date   • Anxiety and depression    • Bulging of lumbar intervertebral disc    • CPAP (continuous positive airway pressure) dependence    • Hyperlipidemia    •  Hypertension    • Migraine    • CHRISTOPHE on CPAP    • Rheumatoid arthritis (CMS/HCC)    • Swelling of left ear    • Wears contact lenses    • Wears contact lenses       Past Surgical History:   Procedure Laterality Date   • ACHILLES TENDON SURGERY Left 10/15/2019    Procedure: ACHILLES TENDON LENGTHENING LEFT;  Surgeon: Elodia Guzman MD;  Location:  [facility] OR;  Service: Orthopedics   • CATARACT EXTRACTION Right    • COLONOSCOPY  2015   • EYE SURGERY Bilateral     cornea transplant    • FOOT SURGERY Left 10/15/2019    triple arthrodesis and achilles tendon lengthening- [facility]   • HERNIA REPAIR     • ILIAC CREST BONE GRAFT Left 10/15/2019    Procedure: ILIAC CREST BONE GRAFT LEFT;  Surgeon: Elodia Guzman MD;  Location: [facility] OR;  Service: Orthopedics   • TOE FUSION Left 10/15/2019    Procedure: TRIPLE ARTHODESIS LEFT;  Surgeon: Elodia Guzman MD;  Location:  [facility] OR;  Service: Orthopedics   • TOTAL HIP ARTHROPLASTY Right 8/4/2020    Procedure: TOTAL HIP ARTHROPLASTY RIGHT;  Surgeon: Osbaldo Olivas MD;  Location:  [facility] OR;  Service: Orthopedics;  Laterality: Right;      Family History   Problem Relation Age of Onset   • No Known Problems Mother    • No Known Problems Father      Social History     Socioeconomic History   • Marital status:      Spouse name: Not on file   • Number of children: Not on file   • Years of education: Not on file   • Highest education level: Not on file   Tobacco Use   • Smoking status: Never Smoker   • Smokeless tobacco: Never Used   Substance and Sexual Activity   • Alcohol use: Yes     Comment: rarely   • Drug use: No   • Sexual activity: Defer      Current Outpatient Medications on File Prior to Visit   Medication Sig Dispense Refill   • acetaminophen (TYLENOL) 325 MG tablet Take 325 mg by mouth As Needed for Mild Pain .     • ALPRAZolam (XANAX) 0.25 MG tablet Take 1 tablet by mouth Daily As Needed for Anxiety (when flying). 10 tablet 0   • amLODIPine (NORVASC) 2.5 MG tablet  Take 1 tablet by mouth Daily. 30 tablet 2   • Ascorbic Acid (VITAMIN C PO) Take  by mouth Daily.     • aspirin 325 MG EC tablet Take 1 tablet by mouth Daily. For 1 month 30 tablet 0   • atenolol (TENORMIN) 50 MG tablet Take 1 tablet by mouth Daily. 30 tablet 5   • atorvastatin (LIPITOR) 10 MG tablet Take 1 tablet by mouth Daily. 90 tablet 3   • buPROPion XL (WELLBUTRIN XL) 150 MG 24 hr tablet Take 1 tablet by mouth Daily. 30 tablet 5   • clotrimazole (LOTRIMIN) 1 % cream Apply  topically to the appropriate area as directed 2 (Two) Times a Day. 15 g 1   • Cyanocobalamin (VITAMIN B-12 ER PO) Take 1 tablet by mouth Daily.     • cyclobenzaprine (FLEXERIL) 10 MG tablet TAKE ONE TABLET BY MOUTH THREE TIMES A DAY AS NEEDED FOR MUSCLE SPASMS. 30 tablet 5   • Dietary Management Product (VASCULERA) tablet Take 1 tablet by mouth Daily. 90 tablet 5   • Ergocalciferol (VITAMIN D2 PO) Take  by mouth Daily.     • gabapentin (NEURONTIN) 300 MG capsule Take 300 mg by mouth 3 (Three) Times a Day.     • hydroxychloroquine (PLAQUENIL) 200 MG tablet Take 200 mg by mouth 2 (Two) Times a Day.     • ibuprofen (ADVIL,MOTRIN) 200 MG tablet Take 1 tablet by mouth As Needed for Mild Pain . Must take an hour after aspirin if needed.     • lisinopril-hydrochlorothiazide (PRINZIDE,ZESTORETIC) 20-12.5 MG per tablet TAKE 1 TABLET BY MOUTH DAILY 90 tablet 0   • meclizine (ANTIVERT) 25 MG tablet Every 8 (Eight) Hours.     • nabumetone (RELAFEN) 750 MG tablet Take 1,500 mg by mouth Daily.     • ondansetron (ZOFRAN) 4 MG tablet Take 1 tablet by mouth Every 6 (Six) Hours As Needed for Nausea or Vomiting. 30 tablet 0   • oxaprozin (DAYPRO) 600 MG tablet Take 1,200 mg by mouth Daily. Take 2 po every day      • promethazine (PHENERGAN) 25 MG tablet Take 1 tablet by mouth Every 6 (Six) Hours As Needed for Nausea or Vomiting. 20 tablet 0   • SUMAtriptan (IMITREX) 100 MG tablet TAKE 1 TABLET BY MOUTH AT ONSET OF HEADACHE. MAY REPEAT DOSE ONE TIME IN 2 HOURS  IF HEADACHE NOT RELIEVED. 9 tablet 3   • traZODone (DESYREL) 50 MG tablet Take 1 tablet by mouth Every Night. 30 tablet 2   • traMADol (ULTRAM) 50 MG tablet tramadol 50 mg tablet     • [DISCONTINUED] albuterol sulfate  (90 Base) MCG/ACT inhaler Inhale 2 puffs Every 4 (Four) Hours As Needed for Wheezing. (Patient taking differently: Inhale 2 puffs Every 4 (Four) Hours As Needed for Wheezing or Shortness of Air.) 1 inhaler 2   • [DISCONTINUED] HYDROcodone-acetaminophen (NORCO) 7.5-325 MG per tablet Take 1 tablet by mouth Every 6 (Six) Hours As Needed for Moderate Pain . 30 tablet 0     No current facility-administered medications on file prior to visit.       No Known Allergies     Review of Systems   Constitutional: Negative.    HENT: Negative.    Eyes: Negative.    Respiratory: Negative.    Cardiovascular: Negative.    Gastrointestinal: Negative.    Endocrine: Negative.    Genitourinary: Negative.    Musculoskeletal: Positive for arthralgias.   Skin: Negative.    Allergic/Immunologic: Negative.    Neurological: Positive for dizziness.   Hematological: Negative.    Psychiatric/Behavioral: Negative.         Objective      Physical Exam  There were no vitals taken for this visit.    There is no height or weight on file to calculate BMI.    General:   Mental Status:  Alert   Appearance: Cooperative, in no acute distress   Build and Nutrition: Obese male   Orientation: Alert and oriented to person, place and time   Posture: Normal   Gait: Mild limp on the right    Integument:   Right hip: Wound is well-healed with no signs of infection    Lower Extremity:   Right Hip:    Tenderness:  None    Swelling:  None    Crepitus:  None    Range of motion:  External Rotation: 30°       Internal Rotation: 30°       Flexion:  100°       Extension:  0°    Deformities:  None  Functional testing: Negative [facility]    No leg length discrepancy      Assessment and Plan     Obi was seen today for post-op.    Diagnoses and all  orders for this visit:    Status post total hip replacement, right    Orthopedic aftercare        1. Status post total hip replacement, right    2. Orthopedic aftercare        I reviewed my findings with the patient today.  His right total hip arthroplasty is functioning well.  I will see him back in 4 months, which will be a 6-month checkup with x-rays.  I will see him back sooner for any problems.  He is pleased with results so far.    Return in about 4 months (around 2/7/2021) for Recheck with X-Rays.        Osbaldo Olivas MD  10/07/20  15:32 EDT    Dragon disclaimer:  Much of this encounter note is an electronic transcription/translation of spoken language to printed text. The electronic translation of spoken language may permit erroneous, or at times, nonsensical words or phrases to be inadvertently transcribed; Although I have reviewed the note for such errors, some may still exist.

## 2020-10-12 ENCOUNTER — TELEPHONE (OUTPATIENT)
Dept: INTERNAL MEDICINE | Facility: CLINIC | Age: 70
End: 2020-10-12

## 2020-10-12 RX ORDER — SUMATRIPTAN 100 MG/1
TABLET, FILM COATED ORAL
Qty: 9 TABLET | Refills: 3 | Status: SHIPPED | OUTPATIENT
Start: 2020-10-12 | End: 2021-01-14 | Stop reason: SDUPTHER

## 2020-10-23 DIAGNOSIS — G47.00 INSOMNIA, UNSPECIFIED TYPE: ICD-10-CM

## 2020-10-23 RX ORDER — TRAZODONE HYDROCHLORIDE 50 MG/1
50 TABLET ORAL NIGHTLY
Qty: 60 TABLET | Refills: 5 | Status: SHIPPED | OUTPATIENT
Start: 2020-10-23 | End: 2021-03-03

## 2020-11-02 ENCOUNTER — OFFICE VISIT (OUTPATIENT)
Dept: INTERNAL MEDICINE | Facility: CLINIC | Age: 70
End: 2020-11-02

## 2020-11-02 VITALS
BODY MASS INDEX: 43.73 KG/M2 | DIASTOLIC BLOOD PRESSURE: 80 MMHG | WEIGHT: 315 LBS | SYSTOLIC BLOOD PRESSURE: 128 MMHG | HEART RATE: 78 BPM | OXYGEN SATURATION: 98 %

## 2020-11-02 DIAGNOSIS — R42 DIZZINESS: ICD-10-CM

## 2020-11-02 DIAGNOSIS — F32.A DEPRESSION, UNSPECIFIED DEPRESSION TYPE: ICD-10-CM

## 2020-11-02 DIAGNOSIS — I15.9 SECONDARY HYPERTENSION: ICD-10-CM

## 2020-11-02 PROCEDURE — 99214 OFFICE O/P EST MOD 30 MIN: CPT | Performed by: PHYSICIAN ASSISTANT

## 2020-11-02 RX ORDER — AMLODIPINE BESYLATE 5 MG/1
5 TABLET ORAL DAILY
Qty: 90 TABLET | Refills: 1 | Status: SHIPPED | OUTPATIENT
Start: 2020-11-02 | End: 2021-04-22 | Stop reason: SDUPTHER

## 2020-11-02 RX ORDER — LISINOPRIL AND HYDROCHLOROTHIAZIDE 20; 12.5 MG/1; MG/1
1 TABLET ORAL DAILY
Qty: 90 TABLET | Refills: 1 | Status: SHIPPED | OUTPATIENT
Start: 2020-11-02 | End: 2021-04-22 | Stop reason: SDUPTHER

## 2020-11-02 RX ORDER — ATENOLOL 50 MG/1
50 TABLET ORAL DAILY
Qty: 90 TABLET | Refills: 1 | Status: SHIPPED | OUTPATIENT
Start: 2020-11-02 | End: 2021-04-22 | Stop reason: SDUPTHER

## 2020-11-02 RX ORDER — BUPROPION HYDROCHLORIDE 150 MG/1
150 TABLET ORAL DAILY
Qty: 90 TABLET | Refills: 1 | Status: SHIPPED | OUTPATIENT
Start: 2020-11-02 | End: 2021-04-22 | Stop reason: SDUPTHER

## 2020-11-05 NOTE — ASSESSMENT & PLAN NOTE
Hypertension is improving with treatment.  Dietary sodium restriction.  Weight loss.  Regular aerobic exercise.  Medication changes per orders.  Ambulatory blood pressure monitoring. Increase amlodipine to 5 mg daily.  Blood pressure will be reassessed at the next regular appointment.

## 2020-11-14 DIAGNOSIS — F32.A DEPRESSION, UNSPECIFIED DEPRESSION TYPE: ICD-10-CM

## 2020-11-14 RX ORDER — BUPROPION HYDROCHLORIDE 150 MG/1
150 TABLET ORAL DAILY
Qty: 30 TABLET | Refills: 5 | OUTPATIENT
Start: 2020-11-14

## 2020-12-02 ENCOUNTER — OFFICE VISIT (OUTPATIENT)
Dept: ORTHOPEDIC SURGERY | Facility: CLINIC | Age: 70
End: 2020-12-02

## 2020-12-02 ENCOUNTER — LAB (OUTPATIENT)
Dept: LAB | Facility: HOSPITAL | Age: 70
End: 2020-12-02

## 2020-12-02 VITALS — HEIGHT: 72 IN | HEART RATE: 74 BPM | WEIGHT: 315 LBS | OXYGEN SATURATION: 98 % | BODY MASS INDEX: 42.66 KG/M2

## 2020-12-02 DIAGNOSIS — Z96.641 STATUS POST TOTAL HIP REPLACEMENT, RIGHT: ICD-10-CM

## 2020-12-02 DIAGNOSIS — Z96.641 STATUS POST TOTAL HIP REPLACEMENT, RIGHT: Primary | ICD-10-CM

## 2020-12-02 DIAGNOSIS — Z09 POSTOPERATIVE EXAMINATION: ICD-10-CM

## 2020-12-02 LAB
BASOPHILS # BLD AUTO: 0.02 10*3/MM3 (ref 0–0.2)
BASOPHILS NFR BLD AUTO: 0.2 % (ref 0–1.5)
DEPRECATED RDW RBC AUTO: 38.5 FL (ref 37–54)
EOSINOPHIL # BLD AUTO: 0.06 10*3/MM3 (ref 0–0.4)
EOSINOPHIL NFR BLD AUTO: 0.5 % (ref 0.3–6.2)
ERYTHROCYTE [DISTWIDTH] IN BLOOD BY AUTOMATED COUNT: 12.3 % (ref 12.3–15.4)
ERYTHROCYTE [SEDIMENTATION RATE] IN BLOOD: 32 MM/HR (ref 0–20)
HCT VFR BLD AUTO: 44.7 % (ref 37.5–51)
HGB BLD-MCNC: 15.3 G/DL (ref 13–17.7)
IMM GRANULOCYTES # BLD AUTO: 0.05 10*3/MM3 (ref 0–0.05)
IMM GRANULOCYTES NFR BLD AUTO: 0.4 % (ref 0–0.5)
LYMPHOCYTES # BLD AUTO: 1.23 10*3/MM3 (ref 0.7–3.1)
LYMPHOCYTES NFR BLD AUTO: 10.2 % (ref 19.6–45.3)
MCH RBC QN AUTO: 29.7 PG (ref 26.6–33)
MCHC RBC AUTO-ENTMCNC: 34.2 G/DL (ref 31.5–35.7)
MCV RBC AUTO: 86.6 FL (ref 79–97)
MONOCYTES # BLD AUTO: 1.79 10*3/MM3 (ref 0.1–0.9)
MONOCYTES NFR BLD AUTO: 14.8 % (ref 5–12)
NEUTROPHILS NFR BLD AUTO: 73.9 % (ref 42.7–76)
NEUTROPHILS NFR BLD AUTO: 8.92 10*3/MM3 (ref 1.7–7)
NRBC BLD AUTO-RTO: 0 /100 WBC (ref 0–0.2)
PLATELET # BLD AUTO: 265 10*3/MM3 (ref 140–450)
PMV BLD AUTO: 10.4 FL (ref 6–12)
RBC # BLD AUTO: 5.16 10*6/MM3 (ref 4.14–5.8)
WBC # BLD AUTO: 12.07 10*3/MM3 (ref 3.4–10.8)

## 2020-12-02 PROCEDURE — 86140 C-REACTIVE PROTEIN: CPT

## 2020-12-02 PROCEDURE — 36415 COLL VENOUS BLD VENIPUNCTURE: CPT

## 2020-12-02 PROCEDURE — 99213 OFFICE O/P EST LOW 20 MIN: CPT | Performed by: ORTHOPAEDIC SURGERY

## 2020-12-02 PROCEDURE — 85652 RBC SED RATE AUTOMATED: CPT

## 2020-12-02 PROCEDURE — 85025 COMPLETE CBC W/AUTO DIFF WBC: CPT

## 2020-12-02 NOTE — H&P (VIEW-ONLY)
[facility] [place] [place]    Subjective     Chief Complaint   Patient presents with   • Follow-up     2 month follow up - 4 months status post Total Hip Arthroplasty Right 8/4/20        HPI    It has been 2  month(s) since Mr. Jackman's last visit. He returns to clinic today for postoperative follow-up of right hip arthroplasty. He rates his pain a 9/10 on the pain scale. Previous/current treatments: cane/walker, NSAIDS and physical therapy. Current symptoms: pain, popping, grinding and stiffness. The pain is worse with walking, standing, sitting and climbing stairs; resting and pain medication and/or NSAID improve the pain. Overall, he is doing worse.  Difficulty with weightbearing, with pain in the anterior and posterior aspect of the hip, when he woke up today.  Walking with 2 canes.  Prior to that he was doing well with his hip.    I have reviewed the following portions of the patient's history and agree with: History of Present Illness and Review of Systems    Patient Active Problem List   Diagnosis   • Allergic rhinitis   • Anxiety disorder   • Benign paroxysmal positional vertigo   • Chronic tension type headache   • Depression   • ED (erectile dysfunction) of non-organic origin   • Hyperlipidemia   • Hypertension   • LFTs abnormal   • CHRISTOPHE (obstructive sleep apnea)   • Elevated glucose   • Health care maintenance   • Morbid obesity due to excess calories (CMS/[facility])   • Onychomycosis of left great toe   • Osteoarthritis of ankle or foot   • Venous stasis   • Neuropathy   • Class 3 severe obesity due to excess calories with serious comorbidity and body mass index (BMI) of 40.0 to 44.9 in adult (CMS/[facility])   • Pre-op evaluation   • Arthritis of foot, left   • S/P left ankle triple arthrodesis   • Acute blood loss anemia, mild, asymptomatic    • Acute postoperative pain   • Wound infection   • Primary osteoarthritis of right hip   • Status post total replacement of right hip     Past Medical History:      Diagnosis Date   • Anxiety and depression    • Bulging of lumbar intervertebral disc    • CPAP (continuous positive airway pressure) dependence    • Hyperlipidemia    • Hypertension    • Migraine    • CHRISTOPHE on CPAP    • Rheumatoid arthritis (CMS/HCC)    • Swelling of left ear    • Wears contact lenses    • Wears contact lenses       Past Surgical History:   Procedure Laterality Date   • ACHILLES TENDON SURGERY Left 10/15/2019    Procedure: ACHILLES TENDON LENGTHENING LEFT;  Surgeon: Elodia Guzman MD;  Location: [facility] OR;  Service: Orthopedics   • CATARACT EXTRACTION Right    • COLONOSCOPY  2015   • EYE SURGERY Bilateral     cornea transplant    • FOOT SURGERY Left 10/15/2019    triple arthrodesis and achilles tendon lengthening- [facility]   • HERNIA REPAIR     • ILIAC CREST BONE GRAFT Left 10/15/2019    Procedure: ILIAC CREST BONE GRAFT LEFT;  Surgeon: Elodia Guzman MD;  Location: [facility] OR;  Service: Orthopedics   • TOE FUSION Left 10/15/2019    Procedure: TRIPLE ARTHODESIS LEFT;  Surgeon: Elodia Guzman MD;  Location: [facility] OR;  Service: Orthopedics   • TOTAL HIP ARTHROPLASTY Right 8/4/2020    Procedure: TOTAL HIP ARTHROPLASTY RIGHT;  Surgeon: Osbaldo Olivas MD;  Location: [facility] OR;  Service: Orthopedics;  Laterality: Right;      Family History   Problem Relation Age of Onset   • No Known Problems Mother    • No Known Problems Father      Social History     Socioeconomic History   • Marital status:      Spouse name: Not on file   • Number of children: Not on file   • Years of education: Not on file   • Highest education level: Not on file   Tobacco Use   • Smoking status: Never Smoker   • Smokeless tobacco: Never Used   Substance and Sexual Activity   • Alcohol use: Yes     Comment: rarely   • Drug use: No   • Sexual activity: Defer      Current Outpatient Medications on File Prior to Visit   Medication Sig Dispense Refill   • acetaminophen (TYLENOL) 325 MG tablet Take 325 mg by mouth As Needed  for Mild Pain .     • ALPRAZolam (XANAX) 0.25 MG tablet Take 1 tablet by mouth Daily As Needed for Anxiety (when flying). 10 tablet 0   • amLODIPine (NORVASC) 5 MG tablet Take 1 tablet by mouth Daily. 90 tablet 1   • Ascorbic Acid (VITAMIN C PO) Take  by mouth Daily.     • aspirin 325 MG EC tablet Take 1 tablet by mouth Daily. For 1 month 30 tablet 0   • atenolol (TENORMIN) 50 MG tablet Take 1 tablet by mouth Daily. 90 tablet 1   • atorvastatin (LIPITOR) 10 MG tablet Take 1 tablet by mouth Daily. 90 tablet 3   • buPROPion XL (WELLBUTRIN XL) 150 MG 24 hr tablet Take 1 tablet by mouth Daily. 90 tablet 1   • clotrimazole (LOTRIMIN) 1 % cream Apply  topically to the appropriate area as directed 2 (Two) Times a Day. 15 g 1   • Cyanocobalamin (VITAMIN B-12 ER PO) Take 1 tablet by mouth Daily.     • cyclobenzaprine (FLEXERIL) 10 MG tablet TAKE ONE TABLET BY MOUTH THREE TIMES A DAY AS NEEDED FOR MUSCLE SPASMS. 30 tablet 5   • Dietary Management Product (VASCULERA) tablet Take 1 tablet by mouth Daily. 90 tablet 5   • Ergocalciferol (VITAMIN D2 PO) Take  by mouth Daily.     • gabapentin (NEURONTIN) 300 MG capsule Take 300 mg by mouth 3 (Three) Times a Day.     • hydroxychloroquine (PLAQUENIL) 200 MG tablet Take 200 mg by mouth 2 (Two) Times a Day.     • ibuprofen (ADVIL,MOTRIN) 200 MG tablet Take 1 tablet by mouth As Needed for Mild Pain . Must take an hour after aspirin if needed.     • lisinopril-hydrochlorothiazide (PRINZIDE,ZESTORETIC) 20-12.5 MG per tablet Take 1 tablet by mouth Daily. 90 tablet 1   • meclizine (ANTIVERT) 25 MG tablet Every 8 (Eight) Hours.     • nabumetone (RELAFEN) 750 MG tablet Take 1,500 mg by mouth Daily.     • ondansetron (ZOFRAN) 4 MG tablet Take 1 tablet by mouth Every 6 (Six) Hours As Needed for Nausea or Vomiting. 30 tablet 0   • oxaprozin (DAYPRO) 600 MG tablet Take 1,200 mg by mouth Daily. Take 2 po every day      • promethazine (PHENERGAN) 25 MG tablet Take 1 tablet by mouth Every 6 (Six)  Hours As Needed for Nausea or Vomiting. 20 tablet 0   • SUMAtriptan (IMITREX) 100 MG tablet TAKE 1 TABLET BY MOUTH AT ONSET OF HEADACHE. MAY REPEAT DOSE ONE TIME IN 2 HOURS IF HEADACHE NOT RELIEVED. 9 tablet 3   • traZODone (DESYREL) 50 MG tablet TAKE 1 TABLET BY MOUTH EVERY NIGHT. 60 tablet 5     No current facility-administered medications on file prior to visit.       No Known Allergies     Review of Systems   Constitutional: Negative for activity change, appetite change, chills, diaphoresis, fatigue, fever and unexpected weight change.   HENT: Negative for congestion, dental problem, drooling, ear discharge, ear pain, facial swelling, hearing loss, mouth sores, nosebleeds, postnasal drip, rhinorrhea, sinus pressure, sneezing, sore throat, tinnitus, trouble swallowing and voice change.    Eyes: Negative for photophobia, pain, discharge, redness, itching and visual disturbance.   Respiratory: Negative for apnea, cough, choking, chest tightness, shortness of breath, wheezing and stridor.    Cardiovascular: Negative for chest pain, palpitations and leg swelling.   Gastrointestinal: Negative for abdominal distention, abdominal pain, anal bleeding, blood in stool, constipation, diarrhea, nausea, rectal pain and vomiting.   Endocrine: Negative for cold intolerance, heat intolerance, polydipsia, polyphagia and polyuria.   Genitourinary: Negative for decreased urine volume, difficulty urinating, dysuria, enuresis, flank pain, frequency, genital sores, hematuria and urgency.   Musculoskeletal: Positive for arthralgias. Negative for back pain, gait problem, joint swelling, myalgias, neck pain and neck stiffness.   Skin: Negative for color change, pallor, rash and wound.   Allergic/Immunologic: Negative for environmental allergies, food allergies and immunocompromised state.   Neurological: Negative for dizziness, tremors, seizures, syncope, facial asymmetry, speech difficulty, weakness, light-headedness, numbness and  "headaches.   Hematological: Negative for adenopathy. Does not bruise/bleed easily.   Psychiatric/Behavioral: Negative for agitation, behavioral problems, confusion, decreased concentration, dysphoric mood, hallucinations, self-injury, sleep disturbance and suicidal ideas. The patient is not nervous/anxious and is not hyperactive.         Objective      Physical Exam  Pulse 74   Ht 182.9 cm (72.01\")   Wt (!) 146 kg (321 lb 14 oz)   SpO2 98%   BMI 43.64 kg/m²     Body mass index is 43.64 kg/m².    General:   Mental Status:  Alert   Appearance: Cooperative, in no acute distress   Build and Nutrition: Obese male   Orientation: Alert and oriented to person, place and time   Posture: Normal   Gait: With 2 canes    Integument:   Right hip: Wound is well-healed with no signs of infection    Lower Extremity:   Right Hip:    Tenderness:  None    Swelling:  None    Crepitus:  None    Range of motion:  External Rotation: 30°       Internal Rotation: 30°       Flexion:  90°       Extension:  0°    Deformities:  None  Functional testing: Positive [facility]    No leg length discrepancy      Imaging/Studies  Imaging Results (Last 24 Hours)     Procedure Component Value Units Date/Time    XR Hip With or Without Pelvis 2 - 3 View Right [[id number]] Resulted: 12/02/20 1425     Updated: 12/02/20 1425    Narrative:      Right Hip Radiographs  Indication: status-post right total hip arthroplasty  Views: low AP pelvis and lateral of the right hip    Comparison: no change compared to prior study, 8/26/2020    Findings:   The components are well aligned, with no signs of loosening or failure.            Assessment and Plan     Diagnoses and all orders for this visit:    1. Status post total hip replacement, right (Primary)  -     XR Hip With or Without Pelvis 2 - 3 View Right  -     C-reactive Protein; Future  -     CBC & Differential; Future  -     Sedimentation Rate; Future    2. Postoperative examination        1. Status post total " hip replacement, right    2. Postoperative examination        I reviewed my findings with the patient today.  He is experiencing right hip pain acutely, and we will obtain labs to rule out infection.  I will see him back in a week, but sooner for any problems.  If labs are positive, we may need to pursue hip aspiration if appropriate.  If labs are negative, we will consider further imaging if appropriate.    Return in about 1 week (around 12/9/2020).    Medical Decision Making  Data/Risk: radiology tests and independent visualization of imaging, lab tests, or EMG/NCV    Osbaldo Olivas MD  12/02/20  14:39 ALLISON Maguire disclaimer:  Much of this encounter note is an electronic transcription/translation of spoken language to printed text. The electronic translation of spoken language may permit erroneous, or at times, nonsensical words or phrases to be inadvertently transcribed; Although I have reviewed the note for such errors, some may still exist.

## 2020-12-02 NOTE — PROGRESS NOTES
[facility] Note    Subjective     Chief Complaint   Patient presents with   • Follow-up     2 month follow up - 4 months status post Total Hip Arthroplasty Right 8/4/20        HPI    It has been 2  month(s) since Mr. Jackman's last visit. He returns to clinic today for postoperative follow-up of right hip arthroplasty. He rates his pain a 9/10 on the pain scale. Previous/current treatments: cane/walker, NSAIDS and physical therapy. Current symptoms: pain, popping, grinding and stiffness. The pain is worse with walking, standing, sitting and climbing stairs; resting and pain medication and/or NSAID improve the pain. Overall, he is doing worse.  Difficulty with weightbearing, with pain in the anterior and posterior aspect of the hip, when he woke up today.  Walking with 2 canes.  Prior to that he was doing well with his hip.    I have reviewed the following portions of the patient's history and agree with: History of Present Illness and Review of Systems    Patient Active Problem List   Diagnosis   • Allergic rhinitis   • Anxiety disorder   • Benign paroxysmal positional vertigo   • Chronic tension type headache   • Depression   • ED (erectile dysfunction) of non-organic origin   • Hyperlipidemia   • Hypertension   • LFTs abnormal   • CHRISTOPHE (obstructive sleep apnea)   • Elevated glucose   • Health care maintenance   • Morbid obesity due to excess calories (CMS/[facility])   • Onychomycosis of left great toe   • Osteoarthritis of ankle or foot   • Venous stasis   • Neuropathy   • Class 3 severe obesity due to excess calories with serious comorbidity and body mass index (BMI) of 40.0 to 44.9 in adult (CMS/[facility])   • Pre-op evaluation   • Arthritis of foot, left   • S/P left ankle triple arthrodesis   • Acute blood loss anemia, mild, asymptomatic    • Acute postoperative pain   • Wound infection   • Primary osteoarthritis of right hip   • Status post total replacement of right hip     Past Medical History:    Diagnosis Date   • Anxiety and depression    • Bulging of lumbar intervertebral disc    • CPAP (continuous positive airway pressure) dependence    • Hyperlipidemia    • Hypertension    • Migraine    • CHRISTOPHE on CPAP    • Rheumatoid arthritis (CMS/HCC)    • Swelling of left ear    • Wears contact lenses    • Wears contact lenses       Past Surgical History:   Procedure Laterality Date   • ACHILLES TENDON SURGERY Left 10/15/2019    Procedure: ACHILLES TENDON LENGTHENING LEFT;  Surgeon: Elodia Guzman MD;  Location: [facility];  Service: Orthopedics   • CATARACT EXTRACTION Right    • COLONOSCOPY  2015   • EYE SURGERY Bilateral     cornea transplant    • FOOT SURGERY Left 10/15/2019    triple arthrodesis and achilles tendon lengthening- [place]   • HERNIA REPAIR     • ILIAC CREST BONE GRAFT Left 10/15/2019    Procedure: ILIAC CREST BONE GRAFT LEFT;  Surgeon: Elodia Guzman MD;  Location: [facility];  Service: Orthopedics   • TOE FUSION Left 10/15/2019    Procedure: TRIPLE ARTHODESIS LEFT;  Surgeon: Elodia Guzman MD;  Location: [facility];  Service: Orthopedics   • TOTAL HIP ARTHROPLASTY Right 8/4/2020    Procedure: TOTAL HIP ARTHROPLASTY RIGHT;  Surgeon: Osbaldo Olivas MD;  Location: [facility];  Service: Orthopedics;  Laterality: Right;      Family History   Problem Relation Age of Onset   • No Known Problems Mother    • No Known Problems Father      Social History     Socioeconomic History   • Marital status:      Spouse name: Not on file   • Number of children: Not on file   • Years of education: Not on file   • Highest education level: Not on file   Tobacco Use   • Smoking status: Never Smoker   • Smokeless tobacco: Never Used   Substance and Sexual Activity   • Alcohol use: Yes     Comment: rarely   • Drug use: No   • Sexual activity: Defer      Current Outpatient Medications on File Prior to Visit   Medication Sig Dispense Refill   • acetaminophen (TYLENOL) 325 MG tablet Take 325 mg by mouth As Needed  for Mild Pain .     • ALPRAZolam (XANAX) 0.25 MG tablet Take 1 tablet by mouth Daily As Needed for Anxiety (when flying). 10 tablet 0   • amLODIPine (NORVASC) 5 MG tablet Take 1 tablet by mouth Daily. 90 tablet 1   • Ascorbic Acid (VITAMIN C PO) Take  by mouth Daily.     • aspirin 325 MG EC tablet Take 1 tablet by mouth Daily. For 1 month 30 tablet 0   • atenolol (TENORMIN) 50 MG tablet Take 1 tablet by mouth Daily. 90 tablet 1   • atorvastatin (LIPITOR) 10 MG tablet Take 1 tablet by mouth Daily. 90 tablet 3   • buPROPion XL (WELLBUTRIN XL) 150 MG 24 hr tablet Take 1 tablet by mouth Daily. 90 tablet 1   • clotrimazole (LOTRIMIN) 1 % cream Apply  topically to the appropriate area as directed 2 (Two) Times a Day. 15 g 1   • Cyanocobalamin (VITAMIN B-12 ER PO) Take 1 tablet by mouth Daily.     • cyclobenzaprine (FLEXERIL) 10 MG tablet TAKE ONE TABLET BY MOUTH THREE TIMES A DAY AS NEEDED FOR MUSCLE SPASMS. 30 tablet 5   • Dietary Management Product (VASCULERA) tablet Take 1 tablet by mouth Daily. 90 tablet 5   • Ergocalciferol (VITAMIN D2 PO) Take  by mouth Daily.     • gabapentin (NEURONTIN) 300 MG capsule Take 300 mg by mouth 3 (Three) Times a Day.     • hydroxychloroquine (PLAQUENIL) 200 MG tablet Take 200 mg by mouth 2 (Two) Times a Day.     • ibuprofen (ADVIL,MOTRIN) 200 MG tablet Take 1 tablet by mouth As Needed for Mild Pain . Must take an hour after aspirin if needed.     • lisinopril-hydrochlorothiazide (PRINZIDE,ZESTORETIC) 20-12.5 MG per tablet Take 1 tablet by mouth Daily. 90 tablet 1   • meclizine (ANTIVERT) 25 MG tablet Every 8 (Eight) Hours.     • nabumetone (RELAFEN) 750 MG tablet Take 1,500 mg by mouth Daily.     • ondansetron (ZOFRAN) 4 MG tablet Take 1 tablet by mouth Every 6 (Six) Hours As Needed for Nausea or Vomiting. 30 tablet 0   • oxaprozin (DAYPRO) 600 MG tablet Take 1,200 mg by mouth Daily. Take 2 po every day      • promethazine (PHENERGAN) 25 MG tablet Take 1 tablet by mouth Every 6 (Six)  Hours As Needed for Nausea or Vomiting. 20 tablet 0   • SUMAtriptan (IMITREX) 100 MG tablet TAKE 1 TABLET BY MOUTH AT ONSET OF HEADACHE. MAY REPEAT DOSE ONE TIME IN 2 HOURS IF HEADACHE NOT RELIEVED. 9 tablet 3   • traZODone (DESYREL) 50 MG tablet TAKE 1 TABLET BY MOUTH EVERY NIGHT. 60 tablet 5     No current facility-administered medications on file prior to visit.       No Known Allergies     Review of Systems   Constitutional: Negative for activity change, appetite change, chills, diaphoresis, fatigue, fever and unexpected weight change.   HENT: Negative for congestion, dental problem, drooling, ear discharge, ear pain, facial swelling, hearing loss, mouth sores, nosebleeds, postnasal drip, rhinorrhea, sinus pressure, sneezing, sore throat, tinnitus, trouble swallowing and voice change.    Eyes: Negative for photophobia, pain, discharge, redness, itching and visual disturbance.   Respiratory: Negative for apnea, cough, choking, chest tightness, shortness of breath, wheezing and stridor.    Cardiovascular: Negative for chest pain, palpitations and leg swelling.   Gastrointestinal: Negative for abdominal distention, abdominal pain, anal bleeding, blood in stool, constipation, diarrhea, nausea, rectal pain and vomiting.   Endocrine: Negative for cold intolerance, heat intolerance, polydipsia, polyphagia and polyuria.   Genitourinary: Negative for decreased urine volume, difficulty urinating, dysuria, enuresis, flank pain, frequency, genital sores, hematuria and urgency.   Musculoskeletal: Positive for arthralgias. Negative for back pain, gait problem, joint swelling, myalgias, neck pain and neck stiffness.   Skin: Negative for color change, pallor, rash and wound.   Allergic/Immunologic: Negative for environmental allergies, food allergies and immunocompromised state.   Neurological: Negative for dizziness, tremors, seizures, syncope, facial asymmetry, speech difficulty, weakness, light-headedness, numbness and  "headaches.   Hematological: Negative for adenopathy. Does not bruise/bleed easily.   Psychiatric/Behavioral: Negative for agitation, behavioral problems, confusion, decreased concentration, dysphoric mood, hallucinations, self-injury, sleep disturbance and suicidal ideas. The patient is not nervous/anxious and is not hyperactive.         Objective      Physical Exam  Pulse 74   Ht 182.9 cm (72.01\")   Wt (!) 146 kg (321 lb 14 oz)   SpO2 98%   BMI 43.64 kg/m²     Body mass index is 43.64 kg/m².    General:   Mental Status:  Alert   Appearance: Cooperative, in no acute distress   Build and Nutrition: Obese male   Orientation: Alert and oriented to person, place and time   Posture: Normal   Gait: With 2 canes    Integument:   Right hip: Wound is well-healed with no signs of infection    Lower Extremity:   Right Hip:    Tenderness:  None    Swelling:  None    Crepitus:  None    Range of motion:  External Rotation: 30°       Internal Rotation: 30°       Flexion:  90°       Extension:  0°    Deformities:  None  Functional testing: Positive Stinchfield    No leg length discrepancy      Imaging/Studies  Imaging Results (Last 24 Hours)     Procedure Component Value Units Date/Time    XR Hip With or Without Pelvis 2 - 3 View Right [[id number]] Resulted: 12/02/20 1425     Updated: 12/02/20 1425    Narrative:      Right Hip Radiographs  Indication: status-post right total hip arthroplasty  Views: low AP pelvis and lateral of the right hip    Comparison: no change compared to prior study, 8/26/2020    Findings:   The components are well aligned, with no signs of loosening or failure.            Assessment and Plan     Diagnoses and all orders for this visit:    1. Status post total hip replacement, right (Primary)  -     XR Hip With or Without Pelvis 2 - 3 View Right  -     C-reactive Protein; Future  -     CBC & Differential; Future  -     Sedimentation Rate; Future    2. Postoperative examination        1. Status post total " hip replacement, right    2. Postoperative examination        I reviewed my findings with the patient today.  He is experiencing right hip pain acutely, and we will obtain labs to rule out infection.  I will see him back in a week, but sooner for any problems.  If labs are positive, we may need to pursue hip aspiration if appropriate.  If labs are negative, we will consider further imaging if appropriate.    Return in about 1 week (around 12/9/2020).    Medical Decision Making  Data/Risk: radiology tests and independent visualization of imaging, lab tests, or EMG/NCV    Osbaldo Olivas MD  12/02/20  14:39 ALLISON Maguire disclaimer:  Much of this encounter note is an electronic transcription/translation of spoken language to printed text. The electronic translation of spoken language may permit erroneous, or at times, nonsensical words or phrases to be inadvertently transcribed; Although I have reviewed the note for such errors, some may still exist.

## 2020-12-03 ENCOUNTER — HOSPITAL ENCOUNTER (OUTPATIENT)
Facility: HOSPITAL | Age: 70
Setting detail: SURGERY ADMIT
End: 2020-12-03
Attending: ORTHOPAEDIC SURGERY | Admitting: ORTHOPAEDIC SURGERY

## 2020-12-03 ENCOUNTER — HOSPITAL ENCOUNTER (OUTPATIENT)
Dept: GENERAL RADIOLOGY | Facility: HOSPITAL | Age: 70
Discharge: HOME | End: 2020-12-03

## 2020-12-03 ENCOUNTER — HOSPITAL ENCOUNTER (OUTPATIENT)
Dept: CT IMAGING | Facility: HOSPITAL | Age: 70
Discharge: HOME | End: 2020-12-03

## 2020-12-03 ENCOUNTER — APPOINTMENT (OUTPATIENT)
Dept: GENERAL RADIOLOGY | Facility: HOSPITAL | Age: 70
End: 2020-12-03

## 2020-12-03 ENCOUNTER — PREP FOR SURGERY (OUTPATIENT)
Dept: OTHER | Facility: HOSPITAL | Age: 70
End: 2020-12-03

## 2020-12-03 ENCOUNTER — TELEPHONE (OUTPATIENT)
Dept: ORTHOPEDIC SURGERY | Facility: CLINIC | Age: 70
End: 2020-12-03

## 2020-12-03 ENCOUNTER — HOSPITAL ENCOUNTER (INPATIENT)
Facility: HOSPITAL | Age: 70
LOS: 3 days | Discharge: HOME | End: 2020-12-06
Attending: INTERNAL MEDICINE | Admitting: INTERNAL MEDICINE

## 2020-12-03 VITALS
OXYGEN SATURATION: 94 % | DIASTOLIC BLOOD PRESSURE: 50 MMHG | SYSTOLIC BLOOD PRESSURE: 96 MMHG | HEART RATE: 86 BPM | RESPIRATION RATE: 20 BRPM

## 2020-12-03 DIAGNOSIS — Z96.649 PAIN IN HIP REGION AFTER TOTAL HIP REPLACEMENT, INITIAL ENCOUNTER (HCC): Primary | ICD-10-CM

## 2020-12-03 DIAGNOSIS — T84.84XA PAIN IN HIP REGION AFTER TOTAL HIP REPLACEMENT, INITIAL ENCOUNTER (HCC): Primary | ICD-10-CM

## 2020-12-03 DIAGNOSIS — E78.5 HYPERLIPIDEMIA, UNSPECIFIED HYPERLIPIDEMIA TYPE: ICD-10-CM

## 2020-12-03 DIAGNOSIS — T84.59XA INFECTION OF PROSTHETIC TOTAL HIP JOINT (HCC): ICD-10-CM

## 2020-12-03 DIAGNOSIS — T84.84XA PAIN IN HIP REGION AFTER TOTAL HIP REPLACEMENT, INITIAL ENCOUNTER (HCC): ICD-10-CM

## 2020-12-03 DIAGNOSIS — Z96.649 PAIN IN HIP REGION AFTER TOTAL HIP REPLACEMENT, INITIAL ENCOUNTER (HCC): ICD-10-CM

## 2020-12-03 DIAGNOSIS — T81.49XA POSTOPERATIVE WOUND INFECTION OF RIGHT HIP: Primary | ICD-10-CM

## 2020-12-03 DIAGNOSIS — Z96.649 INFECTION OF PROSTHETIC TOTAL HIP JOINT (HCC): ICD-10-CM

## 2020-12-03 LAB
ANION GAP SERPL CALCULATED.3IONS-SCNC: 13 MMOL/L (ref 5–15)
APPEARANCE FLD: ABNORMAL
BASOPHILS # BLD AUTO: 0.02 10*3/MM3 (ref 0–0.2)
BASOPHILS NFR BLD AUTO: 0.2 % (ref 0–1.5)
BUN SERPL-MCNC: 21 MG/DL (ref 8–23)
BUN/CREAT SERPL: 17.8 (ref 7–25)
CALCIUM SPEC-SCNC: 9.2 MG/DL (ref 8.6–10.5)
CHLORIDE SERPL-SCNC: 96 MMOL/L (ref 98–107)
CK SERPL-CCNC: 106 U/L (ref 20–200)
CO2 SERPL-SCNC: 26 MMOL/L (ref 22–29)
COLOR FLD: ABNORMAL
CREAT SERPL-MCNC: 1.18 MG/DL (ref 0.76–1.27)
CRP SERPL-MCNC: 17.26 MG/DL (ref 0–0.5)
CRYSTALS FLD MICRO: NORMAL
DEPRECATED RDW RBC AUTO: 42.5 FL (ref 37–54)
EOSINOPHIL # BLD AUTO: 0.05 10*3/MM3 (ref 0–0.4)
EOSINOPHIL NFR BLD AUTO: 0.6 % (ref 0.3–6.2)
ERYTHROCYTE [DISTWIDTH] IN BLOOD BY AUTOMATED COUNT: 12.9 % (ref 12.3–15.4)
GFR SERPL CREATININE-BSD FRML MDRD: 61 ML/MIN/1.73
GLUCOSE SERPL-MCNC: 103 MG/DL (ref 65–99)
HCT VFR BLD AUTO: 40.7 % (ref 37.5–51)
HGB BLD-MCNC: 12.9 G/DL (ref 13–17.7)
IMM GRANULOCYTES # BLD AUTO: 0.03 10*3/MM3 (ref 0–0.05)
IMM GRANULOCYTES NFR BLD AUTO: 0.3 % (ref 0–0.5)
LYMPHOCYTES # BLD AUTO: 0.93 10*3/MM3 (ref 0.7–3.1)
LYMPHOCYTES NFR BLD AUTO: 10.4 % (ref 19.6–45.3)
LYMPHOCYTES NFR FLD MANUAL: 1 %
MCH RBC QN AUTO: 28.5 PG (ref 26.6–33)
MCHC RBC AUTO-ENTMCNC: 31.7 G/DL (ref 31.5–35.7)
MCV RBC AUTO: 90 FL (ref 79–97)
MONOCYTES # BLD AUTO: 1.47 10*3/MM3 (ref 0.1–0.9)
MONOCYTES NFR BLD AUTO: 16.4 % (ref 5–12)
MONOCYTES NFR FLD: 17 %
NEUTROPHILS NFR BLD AUTO: 6.45 10*3/MM3 (ref 1.7–7)
NEUTROPHILS NFR BLD AUTO: 72.1 % (ref 42.7–76)
NEUTROPHILS NFR FLD MANUAL: 82 %
NRBC BLD AUTO-RTO: 0 /100 WBC (ref 0–0.2)
PLATELET # BLD AUTO: 245 10*3/MM3 (ref 140–450)
PMV BLD AUTO: 10 FL (ref 6–12)
POTASSIUM SERPL-SCNC: 4 MMOL/L (ref 3.5–5.2)
RBC # BLD AUTO: 4.52 10*6/MM3 (ref 4.14–5.8)
RBC # FLD AUTO: ABNORMAL /MM3
SODIUM SERPL-SCNC: 135 MMOL/L (ref 136–145)
WBC # BLD AUTO: 8.95 10*3/MM3 (ref 3.4–10.8)
WBC # FLD AUTO: ABNORMAL /MM3

## 2020-12-03 PROCEDURE — 89051 BODY FLUID CELL COUNT: CPT | Performed by: ORTHOPAEDIC SURGERY

## 2020-12-03 PROCEDURE — 80048 BASIC METABOLIC PNL TOTAL CA: CPT | Performed by: INTERNAL MEDICINE

## 2020-12-03 PROCEDURE — 0S993ZX DRAINAGE OF RIGHT HIP JOINT, PERCUTANEOUS APPROACH, DIAGNOSTIC: ICD-10-PCS | Performed by: RADIOLOGY

## 2020-12-03 PROCEDURE — 82550 ASSAY OF CK (CPK): CPT | Performed by: INTERNAL MEDICINE

## 2020-12-03 PROCEDURE — 87186 SC STD MICRODIL/AGAR DIL: CPT | Performed by: ORTHOPAEDIC SURGERY

## 2020-12-03 PROCEDURE — 87205 SMEAR GRAM STAIN: CPT | Performed by: ORTHOPAEDIC SURGERY

## 2020-12-03 PROCEDURE — 87070 CULTURE OTHR SPECIMN AEROBIC: CPT | Performed by: ORTHOPAEDIC SURGERY

## 2020-12-03 PROCEDURE — 85025 COMPLETE CBC W/AUTO DIFF WBC: CPT | Performed by: INTERNAL MEDICINE

## 2020-12-03 PROCEDURE — 77012 CT SCAN FOR NEEDLE BIOPSY: CPT

## 2020-12-03 PROCEDURE — 25010000002 VANCOMYCIN 10 G RECONSTITUTED SOLUTION

## 2020-12-03 PROCEDURE — 73700 CT LOWER EXTREMITY W/O DYE: CPT

## 2020-12-03 PROCEDURE — 87075 CULTR BACTERIA EXCEPT BLOOD: CPT | Performed by: ORTHOPAEDIC SURGERY

## 2020-12-03 PROCEDURE — 25010000002 CEFTRIAXONE PER 250 MG: Performed by: INTERNAL MEDICINE

## 2020-12-03 PROCEDURE — 71045 X-RAY EXAM CHEST 1 VIEW: CPT

## 2020-12-03 PROCEDURE — 87635 SARS-COV-2 COVID-19 AMP PRB: CPT | Performed by: INTERNAL MEDICINE

## 2020-12-03 PROCEDURE — 89060 EXAM SYNOVIAL FLUID CRYSTALS: CPT | Performed by: ORTHOPAEDIC SURGERY

## 2020-12-03 RX ORDER — AMLODIPINE BESYLATE 5 MG/1
5 TABLET ORAL DAILY
Status: DISCONTINUED | OUTPATIENT
Start: 2020-12-04 | End: 2020-12-06 | Stop reason: HOSPADM

## 2020-12-03 RX ORDER — LIDOCAINE HYDROCHLORIDE 10 MG/ML
20 INJECTION, SOLUTION EPIDURAL; INFILTRATION; INTRACAUDAL; PERINEURAL ONCE
Status: COMPLETED | OUTPATIENT
Start: 2020-12-03 | End: 2020-12-03

## 2020-12-03 RX ORDER — ALPRAZOLAM 0.25 MG/1
0.25 TABLET ORAL DAILY PRN
Status: DISCONTINUED | OUTPATIENT
Start: 2020-12-03 | End: 2020-12-06 | Stop reason: HOSPADM

## 2020-12-03 RX ORDER — ATENOLOL 50 MG/1
50 TABLET ORAL DAILY
Status: DISCONTINUED | OUTPATIENT
Start: 2020-12-04 | End: 2020-12-06 | Stop reason: HOSPADM

## 2020-12-03 RX ORDER — SODIUM CHLORIDE 0.9 % (FLUSH) 0.9 %
10 SYRINGE (ML) INJECTION EVERY 12 HOURS SCHEDULED
Status: DISCONTINUED | OUTPATIENT
Start: 2020-12-03 | End: 2020-12-06 | Stop reason: HOSPADM

## 2020-12-03 RX ORDER — SUMATRIPTAN 50 MG/1
50 TABLET, FILM COATED ORAL ONCE AS NEEDED
Status: DISCONTINUED | OUTPATIENT
Start: 2020-12-03 | End: 2020-12-06 | Stop reason: HOSPADM

## 2020-12-03 RX ORDER — ACETAMINOPHEN 325 MG/1
650 TABLET ORAL EVERY 6 HOURS PRN
Status: DISCONTINUED | OUTPATIENT
Start: 2020-12-03 | End: 2020-12-06 | Stop reason: HOSPADM

## 2020-12-03 RX ORDER — HYDROCODONE BITARTRATE AND ACETAMINOPHEN 7.5; 325 MG/1; MG/1
1 TABLET ORAL EVERY 8 HOURS PRN
Qty: 30 TABLET | Refills: 0 | Status: ON HOLD | OUTPATIENT
Start: 2020-12-03 | End: 2020-12-06 | Stop reason: SDUPTHER

## 2020-12-03 RX ORDER — OXYCODONE HYDROCHLORIDE 5 MG/1
5 TABLET ORAL EVERY 4 HOURS PRN
Status: DISCONTINUED | OUTPATIENT
Start: 2020-12-03 | End: 2020-12-06 | Stop reason: HOSPADM

## 2020-12-03 RX ORDER — BUPROPION HYDROCHLORIDE 150 MG/1
150 TABLET ORAL DAILY
Status: DISCONTINUED | OUTPATIENT
Start: 2020-12-04 | End: 2020-12-06 | Stop reason: HOSPADM

## 2020-12-03 RX ORDER — TRAZODONE HYDROCHLORIDE 50 MG/1
50 TABLET ORAL NIGHTLY
Status: DISCONTINUED | OUTPATIENT
Start: 2020-12-03 | End: 2020-12-06 | Stop reason: HOSPADM

## 2020-12-03 RX ORDER — SODIUM CHLORIDE 0.9 % (FLUSH) 0.9 %
10 SYRINGE (ML) INJECTION AS NEEDED
Status: DISCONTINUED | OUTPATIENT
Start: 2020-12-03 | End: 2020-12-06 | Stop reason: HOSPADM

## 2020-12-03 RX ORDER — ONDANSETRON 2 MG/ML
4 INJECTION INTRAMUSCULAR; INTRAVENOUS EVERY 6 HOURS PRN
Status: DISCONTINUED | OUTPATIENT
Start: 2020-12-03 | End: 2020-12-06 | Stop reason: HOSPADM

## 2020-12-03 RX ORDER — GABAPENTIN 300 MG/1
300 CAPSULE ORAL EVERY 8 HOURS SCHEDULED
Status: DISCONTINUED | OUTPATIENT
Start: 2020-12-03 | End: 2020-12-06 | Stop reason: HOSPADM

## 2020-12-03 RX ORDER — DEXTROSE, SODIUM CHLORIDE, AND POTASSIUM CHLORIDE 5; .45; .15 G/100ML; G/100ML; G/100ML
75 INJECTION INTRAVENOUS CONTINUOUS
Status: DISCONTINUED | OUTPATIENT
Start: 2020-12-03 | End: 2020-12-06 | Stop reason: HOSPADM

## 2020-12-03 RX ORDER — ATORVASTATIN CALCIUM 10 MG/1
10 TABLET, FILM COATED ORAL DAILY
Status: DISCONTINUED | OUTPATIENT
Start: 2020-12-04 | End: 2020-12-04

## 2020-12-03 RX ORDER — CALCIUM CARBONATE 750 MG/1
750 TABLET, CHEWABLE ORAL 2 TIMES DAILY PRN
Status: DISCONTINUED | OUTPATIENT
Start: 2020-12-03 | End: 2020-12-06 | Stop reason: HOSPADM

## 2020-12-03 RX ORDER — DOCUSATE SODIUM 100 MG/1
100 CAPSULE, LIQUID FILLED ORAL 2 TIMES DAILY
Status: DISCONTINUED | OUTPATIENT
Start: 2020-12-03 | End: 2020-12-06 | Stop reason: HOSPADM

## 2020-12-03 RX ORDER — ONDANSETRON 4 MG/1
4 TABLET, FILM COATED ORAL EVERY 6 HOURS PRN
Status: DISCONTINUED | OUTPATIENT
Start: 2020-12-03 | End: 2020-12-06 | Stop reason: HOSPADM

## 2020-12-03 RX ADMIN — SODIUM CHLORIDE, PRESERVATIVE FREE 10 ML: 5 INJECTION INTRAVENOUS at 23:12

## 2020-12-03 RX ADMIN — POTASSIUM CHLORIDE, DEXTROSE MONOHYDRATE AND SODIUM CHLORIDE 75 ML/HR: 150; 5; 450 INJECTION, SOLUTION INTRAVENOUS at 21:46

## 2020-12-03 RX ADMIN — CEFTRIAXONE 2 G: 2 INJECTION, POWDER, FOR SOLUTION INTRAMUSCULAR; INTRAVENOUS at 21:45

## 2020-12-03 RX ADMIN — TRAZODONE HYDROCHLORIDE 50 MG: 50 TABLET ORAL at 21:46

## 2020-12-03 RX ADMIN — LIDOCAINE HYDROCHLORIDE 20 ML: 10 INJECTION, SOLUTION EPIDURAL; INFILTRATION; INTRACAUDAL; PERINEURAL at 14:34

## 2020-12-03 RX ADMIN — GABAPENTIN 300 MG: 300 CAPSULE ORAL at 21:46

## 2020-12-03 RX ADMIN — VANCOMYCIN HYDROCHLORIDE 3000 MG: 100 INJECTION, POWDER, LYOPHILIZED, FOR SOLUTION INTRAVENOUS at 23:08

## 2020-12-03 RX ADMIN — DOCUSATE SODIUM 100 MG: 100 CAPSULE, LIQUID FILLED ORAL at 21:46

## 2020-12-03 NOTE — POST-PROCEDURE NOTE
Interventional Radiology Operative Note    Date: 12/03/20     Time: 17:40 EST     Pre-op Diagnosis: Status post right side total hip replacement - he is experiencing acute right hip pain. Fluid collection identified on imaging in the space lateral to the device.   Post-op Diagnosis: Same    Procedure: CT guided aspiration of fluid collection lateral to hip replacement    Surgeon: JONH Velarde M.D.  Assistants: None    Sedation:     Estimated Blood Loss (EBL): Trace     Urine Output (UOP): N/A (short procedure)    IVF: N/A (short procedure)    Findings: Fluid collection lateral to hip replacement    Specimens: 50 mL old appearing blood. Portion sent for requested laboratory analysis.     Complications: No immediate    Disposition: Recovery. Stable.

## 2020-12-03 NOTE — NURSING NOTE
Outpatient in need of aspiration of hip. Patient placed on monitors, vitals stable. Images taken and reviewed by Dr. Velarde. A total volume of 50 ml sanguinous fluid removed and sent to lab. Patient discharged home with wife.

## 2020-12-04 ENCOUNTER — ANESTHESIA (OUTPATIENT)
Dept: PERIOP | Facility: HOSPITAL | Age: 70
End: 2020-12-04

## 2020-12-04 ENCOUNTER — APPOINTMENT (OUTPATIENT)
Dept: GENERAL RADIOLOGY | Facility: HOSPITAL | Age: 70
End: 2020-12-04

## 2020-12-04 ENCOUNTER — ANESTHESIA EVENT (OUTPATIENT)
Dept: PERIOP | Facility: HOSPITAL | Age: 70
End: 2020-12-04

## 2020-12-04 LAB
CK SERPL-CCNC: 89 U/L (ref 20–200)
SARS-COV-2 RDRP RESP QL NAA+PROBE: NOT DETECTED

## 2020-12-04 PROCEDURE — 82550 ASSAY OF CK (CPK): CPT | Performed by: PHYSICIAN ASSISTANT

## 2020-12-04 PROCEDURE — 25010000002 PROPOFOL 10 MG/ML EMULSION: Performed by: NURSE ANESTHETIST, CERTIFIED REGISTERED

## 2020-12-04 PROCEDURE — 87070 CULTURE OTHR SPECIMN AEROBIC: CPT | Performed by: ORTHOPAEDIC SURGERY

## 2020-12-04 PROCEDURE — 87102 FUNGUS ISOLATION CULTURE: CPT | Performed by: ORTHOPAEDIC SURGERY

## 2020-12-04 PROCEDURE — 99231 SBSQ HOSP IP/OBS SF/LOW 25: CPT | Performed by: ORTHOPAEDIC SURGERY

## 2020-12-04 PROCEDURE — 25010000002 DAPTOMYCIN PER 1 MG: Performed by: PHYSICIAN ASSISTANT

## 2020-12-04 PROCEDURE — 87075 CULTR BACTERIA EXCEPT BLOOD: CPT | Performed by: ORTHOPAEDIC SURGERY

## 2020-12-04 PROCEDURE — 87116 MYCOBACTERIA CULTURE: CPT | Performed by: ORTHOPAEDIC SURGERY

## 2020-12-04 PROCEDURE — 25010000002 DEXAMETHASONE PER 1 MG: Performed by: NURSE ANESTHETIST, CERTIFIED REGISTERED

## 2020-12-04 PROCEDURE — 25010000002 HYDROMORPHONE PER 4 MG: Performed by: NURSE ANESTHETIST, CERTIFIED REGISTERED

## 2020-12-04 PROCEDURE — 25010000002 HYDROMORPHONE PER 4 MG: Performed by: ORTHOPAEDIC SURGERY

## 2020-12-04 PROCEDURE — 27134 REVISE HIP JOINT REPLACEMENT: CPT | Performed by: ORTHOPAEDIC SURGERY

## 2020-12-04 PROCEDURE — 88305 TISSUE EXAM BY PATHOLOGIST: CPT | Performed by: ORTHOPAEDIC SURGERY

## 2020-12-04 PROCEDURE — 25010000002 VANCOMYCIN 1 G RECONSTITUTED SOLUTION: Performed by: ORTHOPAEDIC SURGERY

## 2020-12-04 PROCEDURE — 27134 REVISE HIP JOINT REPLACEMENT: CPT | Performed by: PHYSICIAN ASSISTANT

## 2020-12-04 PROCEDURE — 25010000002 FENTANYL CITRATE (PF) 100 MCG/2ML SOLUTION: Performed by: NURSE ANESTHETIST, CERTIFIED REGISTERED

## 2020-12-04 PROCEDURE — 25010000002 ONDANSETRON PER 1 MG: Performed by: NURSE ANESTHETIST, CERTIFIED REGISTERED

## 2020-12-04 PROCEDURE — 25010000002 ROPIVACAINE PER 1 MG: Performed by: ORTHOPAEDIC SURGERY

## 2020-12-04 PROCEDURE — 0SRR0JA REPLACEMENT OF RIGHT HIP JOINT, FEMORAL SURFACE WITH SYNTHETIC SUBSTITUTE, UNCEMENTED, OPEN APPROACH: ICD-10-PCS | Performed by: ORTHOPAEDIC SURGERY

## 2020-12-04 PROCEDURE — 87206 SMEAR FLUORESCENT/ACID STAI: CPT | Performed by: ORTHOPAEDIC SURGERY

## 2020-12-04 PROCEDURE — 87015 SPECIMEN INFECT AGNT CONCNTJ: CPT | Performed by: ORTHOPAEDIC SURGERY

## 2020-12-04 PROCEDURE — C1776 JOINT DEVICE (IMPLANTABLE): HCPCS | Performed by: ORTHOPAEDIC SURGERY

## 2020-12-04 PROCEDURE — 88331 PATH CONSLTJ SURG 1 BLK 1SPC: CPT | Performed by: PATHOLOGY

## 2020-12-04 PROCEDURE — 25010000002 NEOSTIGMINE 10 MG/10ML SOLUTION: Performed by: ANESTHESIOLOGY

## 2020-12-04 PROCEDURE — 73502 X-RAY EXAM HIP UNI 2-3 VIEWS: CPT

## 2020-12-04 PROCEDURE — 25010000002 CEFTRIAXONE: Performed by: ORTHOPAEDIC SURGERY

## 2020-12-04 PROCEDURE — 0SUA09Z SUPPLEMENT RIGHT HIP JOINT, ACETABULAR SURFACE WITH LINER, OPEN APPROACH: ICD-10-PCS | Performed by: ORTHOPAEDIC SURGERY

## 2020-12-04 PROCEDURE — 94799 UNLISTED PULMONARY SVC/PX: CPT

## 2020-12-04 PROCEDURE — 0SPR0JZ REMOVAL OF SYNTHETIC SUBSTITUTE FROM RIGHT HIP JOINT, FEMORAL SURFACE, OPEN APPROACH: ICD-10-PCS | Performed by: ORTHOPAEDIC SURGERY

## 2020-12-04 PROCEDURE — P9041 ALBUMIN (HUMAN),5%, 50ML: HCPCS | Performed by: NURSE ANESTHETIST, CERTIFIED REGISTERED

## 2020-12-04 PROCEDURE — 25010000002 ALBUMIN HUMAN 5% PER 50 ML: Performed by: NURSE ANESTHETIST, CERTIFIED REGISTERED

## 2020-12-04 PROCEDURE — 0SP909Z REMOVAL OF LINER FROM RIGHT HIP JOINT, OPEN APPROACH: ICD-10-PCS | Performed by: ORTHOPAEDIC SURGERY

## 2020-12-04 PROCEDURE — 87205 SMEAR GRAM STAIN: CPT | Performed by: ORTHOPAEDIC SURGERY

## 2020-12-04 DEVICE — DEV CONTRL TISS STRATAFIX SPIRAL MNCRYL UD 3/0 PLS 60CM: Type: IMPLANTABLE DEVICE | Site: HIP | Status: FUNCTIONAL

## 2020-12-04 DEVICE — DEV CONTRL TISS STRATAFIX SYMM PDS PLUS VIL CT-1 45CM: Type: IMPLANTABLE DEVICE | Site: HIP | Status: FUNCTIONAL

## 2020-12-04 DEVICE — IMPLANTABLE DEVICE
Type: IMPLANTABLE DEVICE | Site: HIP | Status: FUNCTIONAL
Brand: CERAMENT™BONE VOID FILLER

## 2020-12-04 DEVICE — R3 0 DEGREE XLPE ACETABULAR LINER                                    36MM INNER DIAMETER X OUTER DIAMETER 56MM
Type: IMPLANTABLE DEVICE | Site: HIP | Status: FUNCTIONAL
Brand: R3

## 2020-12-04 DEVICE — OXINIUM FEMORAL HEAD 12/14 TAPER                                    36 MM +0
Type: IMPLANTABLE DEVICE | Site: HIP | Status: FUNCTIONAL
Brand: OXINIUM

## 2020-12-04 RX ORDER — ROPIVACAINE HYDROCHLORIDE 5 MG/ML
INJECTION, SOLUTION EPIDURAL; INFILTRATION; PERINEURAL AS NEEDED
Status: DISCONTINUED | OUTPATIENT
Start: 2020-12-04 | End: 2020-12-04 | Stop reason: HOSPADM

## 2020-12-04 RX ORDER — HYDROMORPHONE HCL 110MG/55ML
0.5 PATIENT CONTROLLED ANALGESIA SYRINGE INTRAVENOUS
Status: DISCONTINUED | OUTPATIENT
Start: 2020-12-04 | End: 2020-12-06 | Stop reason: HOSPADM

## 2020-12-04 RX ORDER — SODIUM CHLORIDE 0.9 % (FLUSH) 0.9 %
1-10 SYRINGE (ML) INJECTION AS NEEDED
Status: DISCONTINUED | OUTPATIENT
Start: 2020-12-04 | End: 2020-12-06 | Stop reason: HOSPADM

## 2020-12-04 RX ORDER — SODIUM CHLORIDE, SODIUM LACTATE, POTASSIUM CHLORIDE, CALCIUM CHLORIDE 600; 310; 30; 20 MG/100ML; MG/100ML; MG/100ML; MG/100ML
9 INJECTION, SOLUTION INTRAVENOUS CONTINUOUS
Status: DISCONTINUED | OUTPATIENT
Start: 2020-12-04 | End: 2020-12-04

## 2020-12-04 RX ORDER — HYDROMORPHONE HYDROCHLORIDE 1 MG/ML
0.5 INJECTION, SOLUTION INTRAMUSCULAR; INTRAVENOUS; SUBCUTANEOUS
Status: DISCONTINUED | OUTPATIENT
Start: 2020-12-04 | End: 2020-12-04

## 2020-12-04 RX ORDER — ONDANSETRON 2 MG/ML
4 INJECTION INTRAMUSCULAR; INTRAVENOUS ONCE AS NEEDED
Status: DISCONTINUED | OUTPATIENT
Start: 2020-12-04 | End: 2020-12-04

## 2020-12-04 RX ORDER — SODIUM CHLORIDE 0.9 % (FLUSH) 0.9 %
10 SYRINGE (ML) INJECTION AS NEEDED
Status: DISCONTINUED | OUTPATIENT
Start: 2020-12-04 | End: 2020-12-04 | Stop reason: HOSPADM

## 2020-12-04 RX ORDER — SODIUM CHLORIDE 0.9 % (FLUSH) 0.9 %
3 SYRINGE (ML) INJECTION EVERY 12 HOURS SCHEDULED
Status: DISCONTINUED | OUTPATIENT
Start: 2020-12-04 | End: 2020-12-06 | Stop reason: HOSPADM

## 2020-12-04 RX ORDER — FAMOTIDINE 20 MG/1
20 TABLET, FILM COATED ORAL ONCE
Status: COMPLETED | OUTPATIENT
Start: 2020-12-04 | End: 2020-12-04

## 2020-12-04 RX ORDER — DEXAMETHASONE SODIUM PHOSPHATE 4 MG/ML
INJECTION, SOLUTION INTRA-ARTICULAR; INTRALESIONAL; INTRAMUSCULAR; INTRAVENOUS; SOFT TISSUE AS NEEDED
Status: DISCONTINUED | OUTPATIENT
Start: 2020-12-04 | End: 2020-12-04 | Stop reason: SURG

## 2020-12-04 RX ORDER — SODIUM CHLORIDE 0.9 % (FLUSH) 0.9 %
10 SYRINGE (ML) INJECTION EVERY 12 HOURS SCHEDULED
Status: DISCONTINUED | OUTPATIENT
Start: 2020-12-04 | End: 2020-12-04 | Stop reason: HOSPADM

## 2020-12-04 RX ORDER — SODIUM CHLORIDE 9 MG/ML
120 INJECTION, SOLUTION INTRAVENOUS CONTINUOUS
Status: DISCONTINUED | OUTPATIENT
Start: 2020-12-04 | End: 2020-12-06 | Stop reason: HOSPADM

## 2020-12-04 RX ORDER — NALOXONE HCL 0.4 MG/ML
0.1 VIAL (ML) INJECTION
Status: DISCONTINUED | OUTPATIENT
Start: 2020-12-04 | End: 2020-12-06 | Stop reason: HOSPADM

## 2020-12-04 RX ORDER — KETAMINE HCL IN NACL, ISO-OSM 100MG/10ML
SYRINGE (ML) INJECTION AS NEEDED
Status: DISCONTINUED | OUTPATIENT
Start: 2020-12-04 | End: 2020-12-04 | Stop reason: SURG

## 2020-12-04 RX ORDER — FENTANYL CITRATE 50 UG/ML
50 INJECTION, SOLUTION INTRAMUSCULAR; INTRAVENOUS
Status: DISCONTINUED | OUTPATIENT
Start: 2020-12-04 | End: 2020-12-04

## 2020-12-04 RX ORDER — GLYCOPYRROLATE 0.2 MG/ML
INJECTION INTRAMUSCULAR; INTRAVENOUS AS NEEDED
Status: DISCONTINUED | OUTPATIENT
Start: 2020-12-04 | End: 2020-12-04 | Stop reason: SURG

## 2020-12-04 RX ORDER — FAMOTIDINE 10 MG/ML
20 INJECTION, SOLUTION INTRAVENOUS ONCE
Status: CANCELLED | OUTPATIENT
Start: 2020-12-04 | End: 2020-12-04

## 2020-12-04 RX ORDER — ONDANSETRON 2 MG/ML
INJECTION INTRAMUSCULAR; INTRAVENOUS AS NEEDED
Status: DISCONTINUED | OUTPATIENT
Start: 2020-12-04 | End: 2020-12-04 | Stop reason: SURG

## 2020-12-04 RX ORDER — PROPOFOL 10 MG/ML
VIAL (ML) INTRAVENOUS AS NEEDED
Status: DISCONTINUED | OUTPATIENT
Start: 2020-12-04 | End: 2020-12-04 | Stop reason: SURG

## 2020-12-04 RX ORDER — FENTANYL CITRATE 50 UG/ML
INJECTION, SOLUTION INTRAMUSCULAR; INTRAVENOUS AS NEEDED
Status: DISCONTINUED | OUTPATIENT
Start: 2020-12-04 | End: 2020-12-04 | Stop reason: SURG

## 2020-12-04 RX ORDER — ALBUMIN, HUMAN INJ 5% 5 %
SOLUTION INTRAVENOUS CONTINUOUS PRN
Status: DISCONTINUED | OUTPATIENT
Start: 2020-12-04 | End: 2020-12-04 | Stop reason: SURG

## 2020-12-04 RX ORDER — MAGNESIUM HYDROXIDE 1200 MG/15ML
LIQUID ORAL AS NEEDED
Status: DISCONTINUED | OUTPATIENT
Start: 2020-12-04 | End: 2020-12-04 | Stop reason: HOSPADM

## 2020-12-04 RX ORDER — ASPIRIN 325 MG
325 TABLET, DELAYED RELEASE (ENTERIC COATED) ORAL DAILY
Status: DISCONTINUED | OUTPATIENT
Start: 2020-12-05 | End: 2020-12-06 | Stop reason: HOSPADM

## 2020-12-04 RX ORDER — LIDOCAINE HYDROCHLORIDE 10 MG/ML
INJECTION, SOLUTION EPIDURAL; INFILTRATION; INTRACAUDAL; PERINEURAL AS NEEDED
Status: DISCONTINUED | OUTPATIENT
Start: 2020-12-04 | End: 2020-12-04 | Stop reason: SURG

## 2020-12-04 RX ORDER — ROCURONIUM BROMIDE 10 MG/ML
INJECTION, SOLUTION INTRAVENOUS AS NEEDED
Status: DISCONTINUED | OUTPATIENT
Start: 2020-12-04 | End: 2020-12-04 | Stop reason: SURG

## 2020-12-04 RX ORDER — VANCOMYCIN HYDROCHLORIDE 1 G/20ML
INJECTION, POWDER, LYOPHILIZED, FOR SOLUTION INTRAVENOUS AS NEEDED
Status: DISCONTINUED | OUTPATIENT
Start: 2020-12-04 | End: 2020-12-04 | Stop reason: HOSPADM

## 2020-12-04 RX ORDER — NEOSTIGMINE METHYLSULFATE 1 MG/ML
INJECTION, SOLUTION INTRAVENOUS AS NEEDED
Status: DISCONTINUED | OUTPATIENT
Start: 2020-12-04 | End: 2020-12-04 | Stop reason: SURG

## 2020-12-04 RX ORDER — LIDOCAINE HYDROCHLORIDE 10 MG/ML
0.5 INJECTION, SOLUTION EPIDURAL; INFILTRATION; INTRACAUDAL; PERINEURAL ONCE AS NEEDED
Status: COMPLETED | OUTPATIENT
Start: 2020-12-04 | End: 2020-12-04

## 2020-12-04 RX ADMIN — OXYCODONE HYDROCHLORIDE 5 MG: 5 TABLET ORAL at 20:18

## 2020-12-04 RX ADMIN — LIDOCAINE HYDROCHLORIDE 50 MG: 10 INJECTION, SOLUTION EPIDURAL; INFILTRATION; INTRACAUDAL; PERINEURAL at 13:35

## 2020-12-04 RX ADMIN — Medication 30 MG: at 13:35

## 2020-12-04 RX ADMIN — HYDROMORPHONE HYDROCHLORIDE 0.5 MG: 1 INJECTION, SOLUTION INTRAMUSCULAR; INTRAVENOUS; SUBCUTANEOUS at 17:47

## 2020-12-04 RX ADMIN — FENTANYL CITRATE 50 MCG: 0.05 INJECTION, SOLUTION INTRAMUSCULAR; INTRAVENOUS at 17:22

## 2020-12-04 RX ADMIN — GLYCOPYRROLATE 0.4 MG: 0.2 INJECTION INTRAMUSCULAR; INTRAVENOUS at 16:28

## 2020-12-04 RX ADMIN — HYDROMORPHONE HYDROCHLORIDE 0.5 MG: 1 INJECTION, SOLUTION INTRAMUSCULAR; INTRAVENOUS; SUBCUTANEOUS at 17:07

## 2020-12-04 RX ADMIN — ALBUMIN HUMAN: 0.05 INJECTION, SOLUTION INTRAVENOUS at 14:42

## 2020-12-04 RX ADMIN — FENTANYL CITRATE 25 MCG: 50 INJECTION, SOLUTION INTRAMUSCULAR; INTRAVENOUS at 16:15

## 2020-12-04 RX ADMIN — HYDROMORPHONE HYDROCHLORIDE 0.5 MG: 1 INJECTION, SOLUTION INTRAMUSCULAR; INTRAVENOUS; SUBCUTANEOUS at 17:15

## 2020-12-04 RX ADMIN — GABAPENTIN 300 MG: 300 CAPSULE ORAL at 20:18

## 2020-12-04 RX ADMIN — OXYCODONE HYDROCHLORIDE 5 MG: 5 TABLET ORAL at 23:42

## 2020-12-04 RX ADMIN — ATENOLOL 50 MG: 50 TABLET ORAL at 09:28

## 2020-12-04 RX ADMIN — FENTANYL CITRATE 25 MCG: 50 INJECTION, SOLUTION INTRAMUSCULAR; INTRAVENOUS at 16:28

## 2020-12-04 RX ADMIN — FENTANYL CITRATE 50 MCG: 0.05 INJECTION, SOLUTION INTRAMUSCULAR; INTRAVENOUS at 16:50

## 2020-12-04 RX ADMIN — ONDANSETRON 4 MG: 2 INJECTION INTRAMUSCULAR; INTRAVENOUS at 15:17

## 2020-12-04 RX ADMIN — NEOSTIGMINE 2.5 MG: 1 INJECTION INTRAVENOUS at 16:28

## 2020-12-04 RX ADMIN — ROCURONIUM BROMIDE 50 MG: 10 INJECTION INTRAVENOUS at 13:35

## 2020-12-04 RX ADMIN — DOCUSATE SODIUM 100 MG: 100 CAPSULE, LIQUID FILLED ORAL at 20:18

## 2020-12-04 RX ADMIN — DAPTOMYCIN 600 MG: 500 INJECTION, POWDER, LYOPHILIZED, FOR SOLUTION INTRAVENOUS at 13:24

## 2020-12-04 RX ADMIN — CEFTRIAXONE 2 G: 2 INJECTION, POWDER, FOR SOLUTION INTRAMUSCULAR; INTRAVENOUS at 21:08

## 2020-12-04 RX ADMIN — FENTANYL CITRATE 50 MCG: 0.05 INJECTION, SOLUTION INTRAMUSCULAR; INTRAVENOUS at 16:55

## 2020-12-04 RX ADMIN — FENTANYL CITRATE 100 MCG: 50 INJECTION, SOLUTION INTRAMUSCULAR; INTRAVENOUS at 13:35

## 2020-12-04 RX ADMIN — FAMOTIDINE 20 MG: 20 TABLET, FILM COATED ORAL at 13:13

## 2020-12-04 RX ADMIN — SODIUM CHLORIDE, POTASSIUM CHLORIDE, SODIUM LACTATE AND CALCIUM CHLORIDE 9 ML/HR: 600; 310; 30; 20 INJECTION, SOLUTION INTRAVENOUS at 13:09

## 2020-12-04 RX ADMIN — TRAZODONE HYDROCHLORIDE 50 MG: 50 TABLET ORAL at 20:18

## 2020-12-04 RX ADMIN — FENTANYL CITRATE 50 MCG: 0.05 INJECTION, SOLUTION INTRAMUSCULAR; INTRAVENOUS at 17:33

## 2020-12-04 RX ADMIN — HYDROMORPHONE HYDROCHLORIDE 0.5 MG: 2 INJECTION, SOLUTION INTRAMUSCULAR; INTRAVENOUS; SUBCUTANEOUS at 18:12

## 2020-12-04 RX ADMIN — LIDOCAINE HYDROCHLORIDE 0.2 ML: 10 INJECTION, SOLUTION EPIDURAL; INFILTRATION; INTRACAUDAL; PERINEURAL at 13:09

## 2020-12-04 RX ADMIN — FENTANYL CITRATE 25 MCG: 50 INJECTION, SOLUTION INTRAMUSCULAR; INTRAVENOUS at 16:20

## 2020-12-04 RX ADMIN — SODIUM CHLORIDE 120 ML/HR: 9 INJECTION, SOLUTION INTRAVENOUS at 18:03

## 2020-12-04 RX ADMIN — DEXAMETHASONE SODIUM PHOSPHATE 4 MG: 4 INJECTION, SOLUTION INTRAMUSCULAR; INTRAVENOUS at 14:05

## 2020-12-04 RX ADMIN — ACETAMINOPHEN 650 MG: 325 TABLET, FILM COATED ORAL at 20:18

## 2020-12-04 RX ADMIN — ROCURONIUM BROMIDE 10 MG: 10 INJECTION INTRAVENOUS at 14:28

## 2020-12-04 RX ADMIN — PROPOFOL 200 MG: 10 INJECTION, EMULSION INTRAVENOUS at 13:35

## 2020-12-04 RX ADMIN — FENTANYL CITRATE 25 MCG: 50 INJECTION, SOLUTION INTRAMUSCULAR; INTRAVENOUS at 16:40

## 2020-12-04 NOTE — ANESTHESIA PREPROCEDURE EVALUATION
Anesthesia Evaluation     Patient summary reviewed and Nursing notes reviewed                Airway   Mallampati: I  TM distance: >3 FB  Neck ROM: full  No difficulty expected  Dental - normal exam     Comment: UPPER INCISOR CROWNS, NOTHING LOOSE    Pulmonary - normal exam   (+) sleep apnea on CPAP,   Cardiovascular - normal exam    (+) hypertension, hyperlipidemia,       Neuro/Psych  (+) headaches, psychiatric history Anxiety and Depression,     GI/Hepatic/Renal/Endo    (+) morbid obesity,      Musculoskeletal     Abdominal  - normal exam    Bowel sounds: normal.   Substance History - negative use     OB/GYN negative ob/gyn ROS         Other   arthritis,                      Anesthesia Plan    ASA 3     general     intravenous induction     Anesthetic plan, all risks, benefits, and alternatives have been provided, discussed and informed consent has been obtained with: patient.    Plan discussed with CRNA.

## 2020-12-04 NOTE — OP NOTE
DATE OF PROCEDURE:  20    PREOPERATIVE DIAGNOSIS: Right hip infection, previous total hip arthroplasty    POSTOPERATIVE DIAGNOSIS:  Right hip infection, previous total hip arthroplasty    PROCEDURE PERFORMED:   Irrigation debridement, right total hip arthroplasty, with head and liner exchange (neutral liner for a 56 cup, with 36+0 Oxinium head)    SURGEON: Osbaldo Olivas MD    ASSISTANT: Brittany Salas PA-C  (Brittany Salas PA-C was present and necessary for positioning, draping, retraction, instrumentation and closure.)    SPECIMENS: None    IMPLANTS:   Implants     Implant    Sut Contrl Tiss Stratafix Spiral Mncryl Ud 3/0 Pls 60cm - [id number] - Implanted   (Right) Hip    Inventory item: SUT CONTRL TISS STRATAFIX SPIRAL MNCRYL UD 3/0 PLS 60CM Model/Cat number: [id number]    : [employer] ENDO SURGERY  DIV OF J AND J Lot number: [id number]    As of 2020     Status: Implanted                  Sut Contrl Tiss Stratafix Symm Pds Plus Gloria Ct-1 45cm - [id number] - Implanted   (Right) Hip    Inventory item: SUT CONTRL TISS STRATAFIX SYMM PDS PLUS GLORIA CT-1 45CM Model/Cat number: [id number]    : [employer]  DIV OF J [employer] J Lot number: [id number]    As of 2020     Status: Implanted                  Liner Acet R3 Xlpe 0d [id number] - [id number] - Implanted   (Right) Hip    Inventory item: LINER ACET R3 XLPE 0D [id number] Model/Cat number: [id number]    : [employer] Lot number: [id number]    As of 2020     Status: Implanted           36 Oxinium head              ANESTHESIA:  Choice    STAFF:  Circulator: Lay Burris RN; Katlyn Zapata RN; Pauly Bermudez RN  Scrub Person: Tameka Ibanez; Ward Bolden  Vendor Representative: Luisito Mitchell  Nursing Assistant: Florence Juarez PCT; Janell Garcia PCT  Assistant: Brittany Salas PA-C    ESTIMATED BLOOD LOSS: 500 cc    COMPLICATIONS: None    PREOPERATIVE ANTIBIOTICS: Rocephin and Cubicin    INDICATIONS: The patient is a 70 y.o. male  with the onset of right hip pain on 12/2/2020, following no particular incident.  No fevers, chills or night sweats.  Patient is status post right total hip arthroplasty on 8/4/2020.  He was doing well prior to 12/2/2020.  When he was last seen in the office on 10/7/2020, he had been range of motion, a mild limp on the right, and was walking with a cane with a well-healed wound.  Inflammatory markers were obtained on 12/2/2020, which showed a CRP of 17.26, sed rate of 32, and a peripheral white blood cell count of 12.07.  CT scan of the right hip showed a fluid collection on the lateral aspect of the hip, and hip aspiration was performed with 50 cc of bloody fluid.  Cell count showed 12,000 white blood cells with a differential of 85% neutrophils.  Patient was admitted to the hospital and undergo irrigation debridement of the hip, with head and liner exchange, as well as the possibility of antibiotic spacer placement if appropriate.  Patient consented for the procedure, knowing the risk, benefits, and alternatives.  Risks discussed included, but not need to: Bleeding, infection, damage to blood vessels and nerves, deep venous thrombosis, pulmonary embolus, death, chronic infection, and hip dislocation.    DESCRIPTION OF PROCEDURE: The patient was positively identified in the preoperative holding area, brought to the operative suite, and placed in a supine position. After adequate general anesthetic had been achieved, the patient was placed in lateral decubitus position with the right side up. The patient was well padded on the pegboard table. After sterile prep and drape of the right hip and lower extremity, a timeout procedure was performed to confirm the operative site, as well as the other parameters.     A skin incision was made on the lateral aspect of the hip for a modified direct lateral approach, utilizing the previous skin incision. Following a sharp skin incision, dissection was carried down to the level  of the fascia which was incised in line with its fibers, as best could be determined in spite of edema and difficulty determining the tissue planes.  A defect was noted overlying the greater trochanter, and copious amounts of serosanguineous fluid was noted, intermixed with fibrous debris.  The fluid was not frankly purulent.  Sample was sent for tissue culture examination.  Synovial sample was also sent for pathologic examination, which showed acute signs of inflammation.    The underlying interval between the anterior one third and posterior two thirds of the gluteus medius was then entered, although this was difficult to visualize secondary to soft tissue edema and thickening of the tissues.This was preserved as a full thickness flap and preserved for later repair.     The hip capsule was then incised in a T-shaped fashion and the head dislocated from the acetabulum, after releases were performed along the femur to aid in exposure of the implanted components.  The hip was then dislocated, and the head was noted to be intact.  Ceramic head was removed without difficulty.  The stem was inspected carefully, and no gross signs of loosening were noted, and the stem was stable with attempts at disimpaction.  Therefore attention was directed towards the acetabulum.  Polyethylene liner was noted to be intact, with no gross signs of wear.  Liner was extracted, and the acetabulum itself was noted to be well seated with no gross signs of loosening or failure.  The screw was removed prior to this, and no purulent material was noted to be exuding from the screw hole nor from behind the cup.  Therefore, because the femoral component and acetabular components were well ingrown, as well as the acute onset of the patient's symptoms, decision was made for retention of the acetabular cup and femoral stem, with polyethylene exchange and head exchange, and treatment with antibiotics in hopes of preserving these  well-seated/ingrown/well-positioned total hip arthroplasty components.  Patient's body habitus also made this an extremely difficult exposure, and at this time more to be removed it would most likely require an extended trochanteric osteotomy, which would be significantly debilitating given the patient's body habitus and weightbearing requirements.  Therefore, a new liner, neutral polyethylene to accommodate a 36 head in the 56 cup was inserted, and was noted to be well seated.  Oxinium head was selected, as this seemed to be the best option in light of a previous ceramic head, and a 36+0 Oxinium head was impacted, and noted to be well-seated.  Hip was reduced, and leg lengths were appropriate, with no dislocation throughout the full arc of motion.  Therefore, attention was directed towards closure after copious irrigation of the hip joint and soft tissues with bacitracin solution first, followed by saline solution, with 6 L total.  Attention was then directed towards closure.  However, calcium sulfate vancomycin impregnated pellets were then placed in both the hip joint and subfascial tissues.    The hip capsule was closed with #1 Vicryl in an interrupted figure-of-eight fashion, followed by closure of the gluteus medius and vastus lateralis sleeve as a full thickness sleeve repair with #1 Vicryl in an interrupted figure-of-eight fashion, followed by closure of the fascia ray with #1 Vicryl in an interrupted figure-of-eight fashion in 3 strategic locations both proximally, distally and in the central portion, followed by oversewing this from distal to proximal with a #1 StrataFix symmetric followed by closure of the deep fascial layer with #1 Vicryl in a buried interrupted fashion, followed by closure of the subcutaneous layer with 2-0 Vicryl and the skin with staples in the skin.  The patient tolerated the procedure well and was brought to the recovery room in good condition.     POSTOPERATIVE PLAN:  1. The  patient will begin early range of motion and weight-bearing per the post total hip arthroplasty protocol.   2. I anticipate hospitalization for at least 2 to 3 days, to determine antibiotic regimen, and to start initial rehabilitation.  Dr. Wakefield has already been consulted and is involved in the patient's care.  3. Postoperative medical management with Dr. Patel.  4. Postoperative DVT prophylaxis with aspirin unless there is a contraindication.       Osbaldo Olivas MD  12/04/20  16:49 ALLISON Maguire disclaimer:  Much of this encounter note is an electronic transcription/translation of spoken language to printed text. The electronic translation of spoken language may permit erroneous, or at times, nonsensical words or phrases to be inadvertently transcribed; Although I have reviewed the note for such errors, some may still exist.

## 2020-12-04 NOTE — PROGRESS NOTES
"IM progress note      Obi Jackman Jr.  [id number]  1950     LOS: 1 day     Attending: Joselyn Patel MD    Primary Care Provider: Cammy Oneal PA      Chief Complaint/Reason for visit:  No chief complaint on file.      Subjective   Doing fairly well.  No nausea, vomiting, or shortness of breath.  Slept fairly well last night.  Good pain control.    Objective        Visit Vitals  /79 (BP Location: Right arm, Patient Position: Lying)   Pulse 76   Temp 97.7 °F (36.5 °C) (Oral)   Resp 18   Ht 180.3 cm (71\")   Wt (!) 145 kg (320 lb 9.6 oz)   SpO2 94%   BMI 44.71 kg/m²     Temp (24hrs), Av °F (36.7 °C), Min:97.6 °F (36.4 °C), Max:98.4 °F (36.9 °C)      Intake/Output:    Intake/Output Summary (Last 24 hours) at 2020 1230  Last data filed at 2020 1059  Gross per 24 hour   Intake --   Output 1275 ml   Net -1275 ml          Physical Exam:     General Appearance:    Alert, cooperative, in no acute distress   Head:    Normocephalic, without obvious abnormality, atraumatic    Lungs:     Normal effort, symmetric chest rise,  clear to      auscultation bilaterally              Heart:    Regular rhythm and normal rate, normal S1 and S2    Abdomen:     Normal bowel sounds, no masses, no organomegaly, soft        non-tender, non-distended, no guarding, no rebound                tenderness   Extremities:  No right hip swelling or tenderness.  Old incision is well-healed.  No clubbing, cyanosis, trace edema.  No deformities.    Pulses:   Pulses palpable and equal bilaterally   Skin:   No bleeding, bruising or rash          Results Review:     I reviewed the patient's new clinical results.   Results from last 7 days   Lab Units 20  2114 20  1455   WBC 10*3/mm3 8.95 12.07*   HEMOGLOBIN g/dL 12.9* 15.3   HEMATOCRIT % 40.7 44.7   PLATELETS 10*3/mm3 245 265     Results from last 7 days   Lab Units 20  2114   SODIUM mmol/L 135*   POTASSIUM mmol/L 4.0   CHLORIDE mmol/L 96*   CO2 mmol/L " 26.0   BUN mg/dL 21   CREATININE mg/dL 1.18   CALCIUM mg/dL 9.2   GLUCOSE mg/dL 103*     I reviewed the patient's new imaging including images and reports.    All medications reviewed.   amLODIPine, 5 mg, Oral, Daily  atenolol, 50 mg, Oral, Daily  buPROPion XL, 150 mg, Oral, Daily  cefTRIAXone, 2 g, Intravenous, Q24H  DAPTOmycin, 6 mg/kg (Adjusted), Intravenous, Q24H  docusate sodium, 100 mg, Oral, BID  gabapentin, 300 mg, Oral, Q8H  sodium chloride, 10 mL, Intravenous, Q12H  traZODone, 50 mg, Oral, Nightly      acetaminophen, 650 mg, Q6H PRN  ALPRAZolam, 0.25 mg, Daily PRN  calcium carbonate EX, 750 mg, BID PRN  ondansetron, 4 mg, Q6H PRN    Or  ondansetron, 4 mg, Q6H PRN  oxyCODONE, 5 mg, Q4H PRN  sodium chloride, 10 mL, PRN  SUMAtriptan, 50 mg, Once PRN        Assessment/Plan       Postoperative wound infection of right hip    Anxiety disorder    Hyperlipidemia    Hypertension    CHRISTOPHE (obstructive sleep apnea)    Status post total replacement of right hip         Plan  For incision and drainage and polyexchange today.  ID consultation for antibiotic choice and duration management.  Follow cultures from aspirate done prior to admit.    Postop management to include   1. PT/OT- WBAT RLE  2. Pain control-prns       3. IS-encourage  4. DVT proph- Mechs/ASA  5. Bowel regimen  6. Resume home medications as appropriate  7. Monitor post-op labs  8. DC planning for home     HTN, HLD  - Continue home atenolol, statin and possibly lisinopril  - Hold HCTZ  - Monitor BP   - Holding parameters for BP meds  - Labetalol PRN for SBP>170     CHRISTOPHE  - CPAP at night     Obesity  - complicates all aspects of care    -Anxiety:  Resume Xanax and Wellbutrin    Joselyn Patel MD  12/04/20  12:30 EST

## 2020-12-04 NOTE — CONSULTS
"Pharmacy Consult-Vancomycin Dosing  Obi Jackman Jr. is a  70 y.o. male receiving vancomycin therapy.     Indication: skin & soft tissue infection- postop wound - R hip  Consulting Provider: KRISTEN Patel MD  ID Consult: yes    Goal AUC: 400 - 600 mg/L*hr    Current Antimicrobial Therapy  Anti-Infectives (From admission, onward)      Ordered     Dose/Rate Route Frequency Start Stop    12/03/20 2055  cefTRIAXone (ROCEPHIN) 2 g/100 mL 0.9% NS IVPB ([facility])     Ordering Provider: Joselyn Patel MD    2 g  over 30 Minutes Intravenous Every 24 Hours Scheduled 12/03/20 2200 12/06/20 2059 12/03/20 2102  vancomycin 3000 mg/500 mL 0.9% NS IVPB ([facility])     Ordering Provider: Maureen Orona RPH    20 mg/kg × 145 kg  over 180 Minutes Intravenous Once 12/03/20 2200 12/03/20 2055  Pharmacy to dose vancomycin     Note to Pharmacy: Obishawna Jackman Jr  3G, [id number]  By KRISTEN Patel MD   Ordering Provider: Joselyn Patel MD     Does not apply Continuous PRN 12/03/20 2053 12/06/20 2052            Allergies  Allergies as of 12/03/2020    (No Known Allergies)       Labs          Results from last 7 days   Lab Units 12/02/20  1455   WBC 10*3/mm3 12.07*       Evaluation of Dosing     Last Dose Received: 20 mg/kg (3000 mg) x 1 (not yet given)  Is Patient on Dialysis or Renal Replacement: no    Ht - 180.3 cm (71\")  Wt - (!) 145 kg (320 lb 9.6 oz)    CrCl cannot be calculated (Patient's most recent lab result is older than the maximum 30 days allowed.).    Intake & Output (last 3 days)       None            Microbiology and Radiology  Microbiology Results (last 10 days)       Procedure Component Value - Date/Time    Body Fluid Culture - Synovial Fluid, Hip, Right [[id number]] Collected: 12/03/20 1447    Lab Status: Preliminary result Specimen: Synovial Fluid from Hip, Right Updated: 12/03/20 2009     Gram Stain Many (4+) WBCs per low power field      No organisms seen            Reported Vancomycin Levels  N/A       [facility] " AUC Calculation:    Current AUC:  n/a    Predicted Steady State AUC on Current Dose: n/a  _________________________________    Predicted Steady State AUC on New Dose:   n/a    Assessment/Plan:  Vancomycin 20 mg/kg x 1 dose tonight.  Further doses to be determined    Maureen Orona RPH  12/3/2020  21:08 EST

## 2020-12-04 NOTE — H&P
Patient Name: Obi Jackman Jr.  MRN: [id number]  : 1950  DOS: 12/3/2020    Attending: Joselyn Patel MD    Primary Care Provider: Cammy Oneal PA      Chief complaint: Right hip pain    Subjective   Patient is a very pleasant 70 y.o. male who is known to us from prior hospitalizations.    Most recently he was hospitalized on 2020 at [facility] where he underwent right total hip arthroplasty by Dr. Olivas.  He did very well postoperatively and was able to be discharged home postop day 1.  For the most part he had an eventful rehab course and he was doing quite well until he presented to see Dr. Olivas yesterday 2 months from last visit.  He started having severe pain in his right hip rated at 9 out of 10.      He started having some popping and grinding and stiffness.  Due to the symptoms he has been walking with 2 canes..  He was worked up with labs showing leukocytosis aspirate of the hip yielded 50 mL of old appearing blood.  Cell count showed 29,000 WBCs with 82% neutrophils.    He also had elevated inflammatory markers.    With concern for infection of his right hip wound he has been admitted for further management.  He denies any fever chills, nausea or vomiting, shortness of breath or chest pain.  He has no recent sick contacts.  No recent trauma.  CURRENT COVID RISKS:  [] Fever [] Cough [] Shortness of breath [] Loss of taste or smell    [] Exposure to COVID positive patient  [] High risk facility   [x]  NONE         Allergies:  No Known Allergies    Meds:  Medications Prior to Admission   Medication Sig Dispense Refill Last Dose   • acetaminophen (TYLENOL) 325 MG tablet Take 325 mg by mouth As Needed for Mild Pain .      • ALPRAZolam (XANAX) 0.25 MG tablet Take 1 tablet by mouth Daily As Needed for Anxiety (when flying). 10 tablet 0    • amLODIPine (NORVASC) 5 MG tablet Take 1 tablet by mouth Daily. 90 tablet 1    • Ascorbic Acid (VITAMIN C PO) Take  by mouth Daily.      •  aspirin 325 MG EC tablet Take 1 tablet by mouth Daily. For 1 month 30 tablet 0    • atenolol (TENORMIN) 50 MG tablet Take 1 tablet by mouth Daily. 90 tablet 1    • atorvastatin (LIPITOR) 10 MG tablet Take 1 tablet by mouth Daily. 90 tablet 3    • buPROPion XL (WELLBUTRIN XL) 150 MG 24 hr tablet Take 1 tablet by mouth Daily. 90 tablet 1    • clotrimazole (LOTRIMIN) 1 % cream Apply  topically to the appropriate area as directed 2 (Two) Times a Day. 15 g 1    • Cyanocobalamin (VITAMIN B-12 ER PO) Take 1 tablet by mouth Daily.      • cyclobenzaprine (FLEXERIL) 10 MG tablet TAKE ONE TABLET BY MOUTH THREE TIMES A DAY AS NEEDED FOR MUSCLE SPASMS. 30 tablet 5    • Dietary Management Product (VASCULERA) tablet Take 1 tablet by mouth Daily. 90 tablet 5    • Ergocalciferol (VITAMIN D2 PO) Take  by mouth Daily.      • gabapentin (NEURONTIN) 300 MG capsule Take 300 mg by mouth 3 (Three) Times a Day.      • HYDROcodone-acetaminophen (NORCO) 7.5-325 MG per tablet Take 1 tablet by mouth Every 8 (Eight) Hours As Needed for Moderate Pain . 30 tablet 0    • hydroxychloroquine (PLAQUENIL) 200 MG tablet Take 200 mg by mouth 2 (Two) Times a Day.      • ibuprofen (ADVIL,MOTRIN) 200 MG tablet Take 1 tablet by mouth As Needed for Mild Pain . Must take an hour after aspirin if needed.      • lisinopril-hydrochlorothiazide (PRINZIDE,ZESTORETIC) 20-12.5 MG per tablet Take 1 tablet by mouth Daily. 90 tablet 1    • meclizine (ANTIVERT) 25 MG tablet Every 8 (Eight) Hours.      • nabumetone (RELAFEN) 750 MG tablet Take 1,500 mg by mouth Daily.      • ondansetron (ZOFRAN) 4 MG tablet Take 1 tablet by mouth Every 6 (Six) Hours As Needed for Nausea or Vomiting. 30 tablet 0    • oxaprozin (DAYPRO) 600 MG tablet Take 1,200 mg by mouth Daily. Take 2 po every day       • promethazine (PHENERGAN) 25 MG tablet Take 1 tablet by mouth Every 6 (Six) Hours As Needed for Nausea or Vomiting. 20 tablet 0    • SUMAtriptan (IMITREX) 100 MG tablet TAKE 1 TABLET BY  MOUTH AT ONSET OF HEADACHE. MAY REPEAT DOSE ONE TIME IN 2 HOURS IF HEADACHE NOT RELIEVED. 9 tablet 3    • traZODone (DESYREL) 50 MG tablet TAKE 1 TABLET BY MOUTH EVERY NIGHT. 60 tablet 5          History:   Past Medical History:   Diagnosis Date   • Anxiety and depression    • Bulging of lumbar intervertebral disc    • CPAP (continuous positive airway pressure) dependence    • Hyperlipidemia    • Hypertension    • Migraine    • CHRISTOPHE on CPAP    • Rheumatoid arthritis (CMS/HCC)    • Swelling of left ear    • Wears contact lenses    • Wears contact lenses      Past Surgical History:   Procedure Laterality Date   • ACHILLES TENDON SURGERY Left 10/15/2019    Procedure: ACHILLES TENDON LENGTHENING LEFT;  Surgeon: Elodia Guzman MD;  Location:  [facility] OR;  Service: Orthopedics   • CATARACT EXTRACTION Right    • COLONOSCOPY  2015   • EYE SURGERY Bilateral     cornea transplant    • FOOT SURGERY Left 10/15/2019    triple arthrodesis and achilles tendon lengthening- Megan   • HERNIA REPAIR     • ILIAC CREST BONE GRAFT Left 10/15/2019    Procedure: ILIAC CREST BONE GRAFT LEFT;  Surgeon: Elodia Guzman MD;  Location: [facility] OR;  Service: Orthopedics   • TOE FUSION Left 10/15/2019    Procedure: TRIPLE ARTHODESIS LEFT;  Surgeon: Elodia Guzman MD;  Location:  [facility] OR;  Service: Orthopedics   • TOTAL HIP ARTHROPLASTY Right 8/4/2020    Procedure: TOTAL HIP ARTHROPLASTY RIGHT;  Surgeon: Osbaldo Olivas MD;  Location:  [facility] OR;  Service: Orthopedics;  Laterality: Right;     Family History   Problem Relation Age of Onset   • No Known Problems Mother    • No Known Problems Father      Social History     Tobacco Use   • Smoking status: Never Smoker   • Smokeless tobacco: Never Used   Substance Use Topics   • Alcohol use: Yes     Comment: rarely   • Drug use: No       Review of Systems  Pertinent items are noted in HPI, all other systems reviewed and negative    Vital Signs  /74 (BP Location: Left arm, Patient Position:  Lying)   Pulse 92   Temp 98.4 °F (36.9 °C) (Oral)   Resp 18   SpO2 94%     Physical Exam:    General Appearance:    Alert, cooperative, in no acute distress   Head:    Normocephalic, without obvious abnormality, atraumatic   Eyes:            Lids and lashes normal, conjunctivae and sclerae normal, no   icterus, no pallor, corneas clear    Ears:    Ears appear intact with no abnormalities noted   Throat:   No oral lesions, no thrush, oral mucosa moist   Neck:   No adenopathy, supple, trachea midline, no thyromegaly         Lungs:     Clear to auscultation,respirations regular, even and   unlabored. No wheezes or rales.    Heart:    Regular rhythm and normal rate, normal S1 and S2, no murmur, no gallop   Abdomen:     Normal bowel sounds, no masses, no organomegaly, soft        non-tender, non-distended, no guarding, no rebound                 tenderness, obese   Genitalia:    Deferred   Extremities:  Trace edema of the lower extremities.  No clubbing or cyanosis.  Right hip pain with external rotation.  No pain with flexion   Pulses:   Pulses palpable and equal bilaterally   Skin:   No bleeding, bruising or rash   Neurologic:   Cranial nerves 2 - 12 grossly intact, no gross motor deficit.      I reviewed the patient's new clinical results.       Results from last 7 days   Lab Units 12/02/20  1455   WBC 10*3/mm3 12.07*   HEMOGLOBIN g/dL 15.3   HEMATOCRIT % 44.7   PLATELETS 10*3/mm3 265           Invalid input(s): LABALBU, PROT  Lab Results   Component Value Date    HGBA1C 5.30 07/27/2020       Assessment and Plan:     Postoperative wound infection of right hip    Status post total replacement of right hip    Anxiety disorder    Hyperlipidemia    Hypertension    CHRISTOPHE (obstructive sleep apnea)        Plan:  Mr. Jackman is being admitted to the hospital for further management.  With the findings on aspirate from his right hip wound I will start him on antibiotics upon admission to include broad-spectrum coverage  including ceftriaxone and vancomycin.  We will evaluate his renal function, CPK and expectation of transitioning to daptomycin soon.  He will be provided pain medication as needed for pain control.  Infectious consultation to follow, I expect a PICC line and weeks of IV antibiotics to follow.  Plan is for surgery with I&D and polyexchange tomorrow.  Home medications for hypertension, dyslipidemia, anxiety, have been reviewed and resumed as appropriate.  Resuming CPAP for obstructive sleep apnea.      Discussed with patient plan for admission further management, he expressed understanding and agreement.  High complexity case.      I believe this patient meets INPATIENT status due to the need for care which can only be reasonably provided in an hospital setting such as aggressive/expedited ancillary services and/or consultation services, the necessity for IV medications, close physician monitoring and/or the possible need for procedures.  In such, I feel patient’s risk for adverse outcomes and need for care warrant INPATIENT evaluation and predict the patient’s care encounter to likely last beyond 2 midnights.        Dragon disclaimer:  Part of this encounter note is an electronic transcription/translation of spoken language to printed text. The electronic translation of spoken language may permit erroneous, or at times, nonsensical words or phrases to be inadvertently transcribed; Although I have reviewed the note for such errors, some may still exist.    Joselyn Patel MD  12/03/20  20:22 EST

## 2020-12-04 NOTE — ANESTHESIA POSTPROCEDURE EVALUATION
Patient: Obi Jackman Jr.    Procedure Summary     Date: 12/04/20 Room / Location:  [facility]    Anesthesia Start: 1324 Anesthesia Stop: 1643    Procedure: INCISION AND DRAINAGE WITH COMPONENT EXCHANGE, HEAD AND LINER RIGHT HIP (Right ) Diagnosis:     Surgeon: Osbaldo Olivas MD Provider: Lisandro Solorzano MD    Anesthesia Type: general ASA Status: 3          Anesthesia Type: general    Vitals  Vitals Value Taken Time   /72 12/04/20 1640   Temp 97.7F    Pulse 92 12/04/20 1643   Resp 16    SpO2 96 % 12/04/20 1643   Vitals shown include unvalidated device data.        Post Anesthesia Care and Evaluation    Patient location during evaluation: PACU  Patient participation: complete - patient participated  Level of consciousness: awake and alert  Pain management: adequate  Airway patency: patent  Anesthetic complications: No anesthetic complications  PONV Status: none  Cardiovascular status: hemodynamically stable and acceptable  Respiratory status: nonlabored ventilation, acceptable and nasal cannula  Hydration status: acceptable    Comments: Pt transported to PACU on O2 by NC. Report given to RN at the bedside.

## 2020-12-04 NOTE — PROGRESS NOTES
"      [facility] Orthopaedic Surgery Progress Note    Subjective      LOS: 1 day   Patient Care Team:  Cammy Oneal PA as PCP - General (Internal Medicine)  Chanda Castanon MD as Consulting Physician (Rheumatology)    CC: Right hip pain    Interval History:   Patient sitting comfortably in bed.  Still having hip pain, but somewhat improved after the aspiration.    Objective      Vital Signs  Temp (24hrs), Av.1 °F (36.7 °C), Min:97.6 °F (36.4 °C), Max:98.4 °F (36.9 °C)      /76 (BP Location: Right arm, Patient Position: Lying)   Pulse 86   Temp 98.3 °F (36.8 °C) (Oral)   Resp 17   Ht 180.3 cm (71\")   Wt (!) 145 kg (320 lb 9.6 oz)   SpO2 94%   BMI 44.71 kg/m²     Examination:   Examination of the right hip: The wound is healed with no signs of infection.  Ankle dorsiflexion, ankle plantar flexion, and EHL are intact.  Sensation intact in the foot to light touch.   Thigh is soft and nontender.  Pain with flexion of the hip.      Labs:  Results from last 7 days   Lab Units 20  2114 20  1455   WBC 10*3/mm3 8.95 12.07*   HEMOGLOBIN g/dL 12.9* 15.3   HEMATOCRIT % 40.7 44.7   MCV fL 90.0 86.6   PLATELETS 10*3/mm3 245 265       Radiology:  Imaging Results (Last 24 Hours)     Procedure Component Value Units Date/Time    XR Chest 1 View [[id number]] Collected: 20     Updated: 20    Narrative:      PROCEDURE: CR Chest 1 Vw    COMPARISON:  No relevant comparison or correlation studies available at time of dictation.     INDICATIONS: preop  Relevant clinical info: Pt scheduled for right hip arthroplasty on 2020     TECHNIQUE: Single AP  view of the chest    FINDINGS:  Cardiomediastinal silhouette is within normal limits given projection.  Lungs are relatively well inflated and clear. Osseous structures are intact.      Impression:      No acute disease.         Signer Name: Kerry Dorsey MD   Signed: 12/3/2020 9:57 PM   Workstation Name: [facility]-    [facility] " [place]          PT:  Physical Therapy - Plan of Care Review - Outcome Summary:   ]       Results Review:     I reviewed the patient's new clinical results.    Assessment and Plan     Assessment:   Possible right total hip arthroplasty infection    I have reviewed the CT scan as well as the hip aspirate findings.  The fluid collection is in an unusual location, and aspirate was consistent with blood.  Patient denies any trauma.  Elevated inflammatory markers, and in the hip aspirate does have 12,000 white blood cells with 85% neutrophils.  However, many red blood cells were also noted.  At this point, I think the patient would be best served by an irrigation and debridement of the hip, with either retention of the embedded components if appropriate, with a head and liner swap, or perhaps explant and antibiotic spacer if appropriate.  Surgery is planned for later today based on operating room availability.  Patient consents.        Status post total replacement of right hip    Anxiety disorder    Hyperlipidemia    Hypertension    CHRISTOPHE (obstructive sleep apnea)    Postoperative wound infection of right hip      Plan for disposition: To be determined.      Future Appointments   Date Time Provider Department Center   12/9/2020  2:00 PM Osbaldo Olivas MD [facility]   1/29/2021  9:10 AM Elodia Guzman MD [facility]   2/2/2021  1:30 PM Cammy Oneal PA [facility]   2/8/2021  3:30 PM Osbaldo Olivas MD [facility]           Osbaldo Olivas MD  12/04/20  10:04 EST

## 2020-12-04 NOTE — PLAN OF CARE
Problem: Adult Inpatient Plan of Care  Goal: Plan of Care Review  Outcome: Ongoing, Progressing  Flowsheets (Taken 12/4/2020 0415)  Progress: no change  Plan of Care Reviewed With: patient  Outcome Summary: Pt admitted from Dr. Li office for right hip pain/infection. Alert and oriented, vital signs stable and WNL all night. Rested well, no c/o severe pain to staff. Ambulating well with cane and standby assistance. Makes needs known to staff.     Problem: Hypertension Comorbidity  Goal: Blood Pressure in Desired Range  Outcome: Ongoing, Progressing     Problem: Obstructive Sleep Apnea Risk or Actual (Comorbidity Management)  Goal: Unobstructed Breathing During Sleep  Outcome: Ongoing, Progressing     Problem: Pain Chronic (Persistent) (Comorbidity Management)  Goal: Acceptable Pain Control and Functional Ability  Outcome: Ongoing, Progressing  Intervention: Develop Pain Management Plan  Recent Flowsheet Documentation  Taken 12/3/2020 2030 by Darian Olson RN  Pain Management Interventions:   diversional activity provided   pain management plan reviewed with patient/caregiver   pillow support provided   position adjusted  Intervention: Manage Persistent Pain  Recent Flowsheet Documentation  Taken 12/3/2020 2030 by Darian Olson RN  Bowel Elimination Promotion: adequate fluid intake promoted  Sleep/Rest Enhancement:   regular sleep/rest pattern promoted   noise level reduced  Intervention: Optimize Psychosocial Wellbeing  Recent Flowsheet Documentation  Taken 12/3/2020 2030 by Darian Olson RN  Supportive Measures: active listening utilized  Diversional Activities:   television   music  Family/Support System Care:   self-care encouraged   support provided     Problem: Fall Injury Risk  Goal: Absence of Fall and Fall-Related Injury  Outcome: Ongoing, Progressing  Intervention: Promote Injury-Free Environment  Recent Flowsheet Documentation  Taken 12/4/2020 0200 by Darian Olson RN  Safety Promotion/Fall  Prevention:   activity supervised   assistive device/personal items within reach   clutter free environment maintained   fall prevention program maintained   gait belt   nonskid shoes/slippers when out of bed   room organization consistent   safety round/check completed  Taken 12/4/2020 0000 by Darian Olson, RN  Safety Promotion/Fall Prevention:   activity supervised   assistive device/personal items within reach   clutter free environment maintained   fall prevention program maintained   gait belt   nonskid shoes/slippers when out of bed   room organization consistent   safety round/check completed  Taken 12/3/2020 2200 by Darian Olson, RN  Safety Promotion/Fall Prevention:   activity supervised   assistive device/personal items within reach   clutter free environment maintained   fall prevention program maintained   gait belt   nonskid shoes/slippers when out of bed   room organization consistent   safety round/check completed  Taken 12/3/2020 2030 by Darian Olson, RN  Safety Promotion/Fall Prevention:   activity supervised   assistive device/personal items within reach   clutter free environment maintained   fall prevention program maintained   gait belt   nonskid shoes/slippers when out of bed   room organization consistent   safety round/check completed   Goal Outcome Evaluation:  Plan of Care Reviewed With: patient  Progress: no change  Outcome Summary: Pt admitted from Dr. Li office for right hip pain/infection. Alert and oriented, vital signs stable and WNL all night. Rested well, no c/o severe pain to staff. Ambulating well with cane and standby assistance. Makes needs known to staff.

## 2020-12-04 NOTE — ANESTHESIA PROCEDURE NOTES
Airway  Urgency: elective    Date/Time: 12/4/2020 1:37 PM  Airway not difficult    General Information and Staff    Patient location during procedure: OR  CRNA: Isaiah Elias CRNA    Indications and Patient Condition  Indications for airway management: airway protection    Preoxygenated: yes  MILS not maintained throughout  Mask difficulty assessment: 1 - vent by mask    Final Airway Details  Final airway type: endotracheal airway      Successful airway: ETT  Cuffed: yes   Successful intubation technique: direct laryngoscopy  Endotracheal tube insertion site: oral  Blade: Vadim  Blade size: 4  ETT size (mm): 7.5  Cormack-Lehane Classification: grade IIa - partial view of glottis  Placement verified by: chest auscultation and capnometry   Measured from: lips  ETT/EBT  to lips (cm): 20  Number of attempts at approach: 1  Assessment: lips, teeth, and gum same as pre-op and atraumatic intubation    Additional Comments  Negative epigastric sounds, Breath sound equal bilaterally with symmetric chest rise and fall

## 2020-12-04 NOTE — CONSULTS
INFECTIOUS DISEASE CONSULT/INITIAL HOSPITAL VISIT    Obi Jackman Jr.  1950  [id number]    Date of Consult: 12/4/2020    Admission Date: 12/3/2020      Requesting Provider: Joselyn Patel MD  Evaluating Physician: Luis Delaney MD    Reason for Consultation: Possible prosthetic hip infection.    History of present illness:    Patient is a 70 y.o. male with h/o CHRISTOPHE/CPAP, RA/NSAIDs/Plaquenil, and HTN who we are asked to evaluate for post-operative right MARTY wound infection.  The patient underwent a right total hip arthroplasty on 8/4/20.  He did fairly well initially, but then started having increasing pain up to 9/10 about 4 days ago.  He has been have popping and grinding of the hip with stiffness which is worse with movement and sitting.  He is now having difficulty bear weight on the hip and is using 2 canes for walking. He denies any fever or chills and no drainage redness at the incision site.  He underwent CT-guided aspiration of the right hip with 50 cc of old appearing blood aspirated.  The fluid has 29,200 WBC with 82% neutrophils, no crystals, and culture negative to date.  His CRP is 17.3, ESR 32, and WBC 12,100 with 74% neutrophils.  A COVID-19 screen is negative. He is currently on Rocephin and Vancomycin.  ID was asked to evaluate and manage his antibiotic therapy.     Past Medical History:   Diagnosis Date   • Anxiety and depression    • Bulging of lumbar intervertebral disc    • CPAP (continuous positive airway pressure) dependence    • Hyperlipidemia    • Hypertension    • Migraine    • CHRISTOPHE on CPAP    • Rheumatoid arthritis (CMS/HCC)    • Swelling of left ear    • Wears contact lenses    • Wears contact lenses        Past Surgical History:   Procedure Laterality Date   • ACHILLES TENDON SURGERY Left 10/15/2019    Procedure: ACHILLES TENDON LENGTHENING LEFT;  Surgeon: Elodia Guzman MD;  Location: [facility];  Service: Orthopedics   • CATARACT EXTRACTION Right    • COLONOSCOPY  2015    • EYE SURGERY Bilateral     cornea transplant    • FOOT SURGERY Left 10/15/2019    triple arthrodesis and achilles tendon lengthening- [place]   • HERNIA REPAIR     • ILIAC CREST BONE GRAFT Left 10/15/2019    Procedure: ILIAC CREST BONE GRAFT LEFT;  Surgeon: Elodia Guzman MD;  Location:  [facility];  Service: Orthopedics   • TOE FUSION Left 10/15/2019    Procedure: TRIPLE ARTHODESIS LEFT;  Surgeon: Elodia Guzman MD;  Location:  [facility];  Service: Orthopedics   • TOTAL HIP ARTHROPLASTY Right 8/4/2020    Procedure: TOTAL HIP ARTHROPLASTY RIGHT;  Surgeon: Osbaldo Olivas MD;  Location:  [facility];  Service: Orthopedics;  Laterality: Right;       Family History   Problem Relation Age of Onset   • No Known Problems Mother    • No Known Problems Father        Social History     Socioeconomic History   • Marital status:      Spouse name: Not on file   • Number of children: Not on file   • Years of education: Not on file   • Highest education level: Not on file   Tobacco Use   • Smoking status: Never Smoker   • Smokeless tobacco: Never Used   Substance and Sexual Activity   • Alcohol use: Yes     Comment: rarely   • Drug use: No   • Sexual activity: Defer       No Known Allergies      Medication:    Current Facility-Administered Medications:   •  [MAR Hold] acetaminophen (TYLENOL) tablet 650 mg, 650 mg, Oral, Q6H PRN, Joselyn Patel MD  •  [MAR Hold] ALPRAZolam (XANAX) tablet 0.25 mg, 0.25 mg, Oral, Daily PRN, Joselyn Patel MD  •  amLODIPine (NORVASC) tablet 5 mg, 5 mg, Oral, Daily, Joselyn Patel MD  •  atenolol (TENORMIN) tablet 50 mg, 50 mg, Oral, Daily, Joselyn Patel MD, 50 mg at 12/04/20 0928  •  [MAR Hold] buPROPion XL (WELLBUTRIN XL) 24 hr tablet 150 mg, 150 mg, Oral, Daily, Joselyn Patel MD  •  [MAR Hold] calcium carbonate EX (TUMS EX) chewable tablet 750 mg, 750 mg, Oral, BID PRN, Joselyn Patel MD  •  cefTRIAXone (ROCEPHIN) 2 g/100 mL 0.9% NS IVPB (MBP), 2 g,  Intravenous, Q24H, Joselyn Patel MD, 2 g at 12/03/20 2145  •  DAPTOmycin (CUBICIN) 600 mg in sodium chloride 0.9 % 50 mL IVPB, 6 mg/kg (Adjusted), Intravenous, Q24H, En Adorno PA, 600 mg at 12/04/20 1324  •  dextrose 5 % and sodium chloride 0.45 % with KCl 20 mEq/L infusion, 75 mL/hr, Intravenous, Continuous, Joselyn Patel MD, Last Rate: 75 mL/hr at 12/03/20 2146, 75 mL/hr at 12/03/20 2146  •  [MAR Hold] docusate sodium (COLACE) capsule 100 mg, 100 mg, Oral, BID, Joselyn Patel MD, 100 mg at 12/03/20 2146  •  fentaNYL citrate (PF) (SUBLIMAZE) injection 50 mcg, 50 mcg, Intravenous, Q5 Min PRN, Nhan Cohn, CRNA  •  gabapentin (NEURONTIN) capsule 300 mg, 300 mg, Oral, Q8H, Joselyn Patel MD, Stopped at 12/04/20 0615  •  HYDROmorphone (DILAUDID) injection 0.5 mg, 0.5 mg, Intravenous, Q5 Min PRN, Lisandro Solorzano MD  •  HYDROmorphone (DILAUDID) injection 0.5 mg, 0.5 mg, Intravenous, Q5 Min PRN, Nhan Cohn, CRNA  •  lactated ringers bolus 500 mL, 500 mL, Intravenous, Once PRN, Lisandro Solorzano MD  •  lactated ringers bolus 500 mL, 500 mL, Intravenous, Once PRN, Nhan Cohn, CRNA  •  lactated ringers infusion, 9 mL/hr, Intravenous, Continuous, Lisandro Solorzano MD, Last Rate: 9 mL/hr at 12/04/20 1309, Currently Infusing at 12/04/20 1324  •  [MAR Hold] ondansetron (ZOFRAN) tablet 4 mg, 4 mg, Oral, Q6H PRN **OR** [MAR Hold] ondansetron (ZOFRAN) injection 4 mg, 4 mg, Intravenous, Q6H PRN, Joselyn Patel MD  •  ondansetron (ZOFRAN) injection 4 mg, 4 mg, Intravenous, Once PRN, Nhan Cohn CRNA  •  [MAR Hold] oxyCODONE (ROXICODONE) immediate release tablet 5 mg, 5 mg, Oral, Q4H PRN, Joselyn Patel MD  •  ropivacaine (NAROPIN) 0.5 % injection, , , PRN, Osbaldo Olivas MD, 20 mL at 12/04/20 1411  •  sodium chloride (NS) irrigation solution, , , PRN, Osbaldo Olivas MD, 3,000 mL at 12/04/20 1540  •  [MAR Hold] sodium chloride 0.9 % flush 10 mL, 10 mL, Intravenous,  Q12H, Joselyn Patel MD, 10 mL at 12/03/20 2312  •  [MAR Hold] sodium chloride 0.9 % flush 10 mL, 10 mL, Intravenous, PRN, Joselyn Patel MD  •  sodium chloride 0.9 % flush 10 mL, 10 mL, Intravenous, Q12H, Lisandro Solorzano MD  •  sodium chloride 0.9 % flush 10 mL, 10 mL, Intravenous, PRN, Lisandro Solorzano MD  •  sodium chloride 3,000 mL with bacitracin 50,000 Units irrigation, , , PRN, Osbaldo Olivas MD, 3,000 mL at 12/04/20 1248  •  sterile water irrigation solution, , , PRN, Osbaldo Olivas MD, 1,000 mL at 12/04/20 1350  •  [MAR Hold] SUMAtriptan (IMITREX) tablet 50 mg, 50 mg, Oral, Once PRN, Joselyn Patel MD  •  [MAR Hold] traZODone (DESYREL) tablet 50 mg, 50 mg, Oral, Nightly, Joselyn Patel MD, 50 mg at 12/03/20 2146  •  vancomycin (VANCOCIN) powder, , , PRN, Osbaldo Olivas MD, 1 g at 12/04/20 1517    Facility-Administered Medications Ordered in Other Encounters:   •  albumin human 5 % solution, , , Continuous PRN, Nhan Cohn CRNA, Stopped at 12/04/20 1519  •  dexamethasone (DECADRON) injection, , Intravenous, PRN, Nhan Cohn CRNA, 4 mg at 12/04/20 1405  •  fentaNYL citrate (PF) (SUBLIMAZE) injection, , , PRN, Isaiah Elias CRNA, 25 mcg at 12/04/20 1620  •  ketamine (KETALAR) syringe, , , PRN, Isaiah Elias CRNA, 30 mg at 12/04/20 1335  •  lidocaine PF 1% (XYLOCAINE) injection, , , PRN, Isaiah Elias CRNA, 50 mg at 12/04/20 1335  •  ondansetron (ZOFRAN) injection, , Intravenous, PRN, Nhan Cohn CRNA, 4 mg at 12/04/20 1517  •  Propofol (DIPRIVAN) injection, , Intravenous, PRN, Isaiah Elias CRNA, 200 mg at 12/04/20 1335  •  rocuronium (ZEMURON) injection, , Intravenous, PRN, Isaiah Elias CRNA, 10 mg at 12/04/20 1428    Antibiotics:  Anti-Infectives (From admission, onward)    Ordered     Dose/Rate Route Frequency Start Stop    12/04/20 1517  vancomycin (VANCOCIN) powder     Ordering Provider: Osbaldo Olivas MD      As Needed 12/04/20 1517       20 1249  sodium chloride 3,000 mL with bacitracin 50,000 Units irrigation     Ordering Provider: Osbaldo Olivas MD      As Needed 20 1248      20 1014  DAPTOmycin (CUBICIN) 600 mg in sodium chloride 0.9 % 50 mL IVPB     Ordering Provider: En Adorno PA    6 mg/kg × 103 kg (Adjusted)  100 mL/hr over 30 Minutes Intravenous Every 24 Hours 20 1100 20 1059    20 205  cefTRIAXone (ROCEPHIN) 2 g/100 mL 0.9% NS IVPB (MBP)     Ordering Provider: Joselyn Patel MD    2 g  over 30 Minutes Intravenous Every 24 Hours Scheduled 20 2200 20 210  vancomycin 3000 mg/500 mL 0.9% NS IVPB ([facility])     Ordering Provider: Maureen Orona RPH    20 mg/kg × 145 kg  over 180 Minutes Intravenous Once 20 0208            Review of Systems:  Constitutional-- No Fever, chills or sweats.  Appetite good, and no malaise. No fatigue.  HEENT-- No new vision, hearing or throat complaints.  No epistaxis or oral sores.  Denies odynophagia or dysphagia. No headache, photophobia or neck stiffness.  CV-- No chest pain, palpitation or syncope  Resp-- No SOB/cough/Hemoptysis  GI- No nausea, vomiting, or diarrhea.  No hematochezia, melena, or hematemesis. Denies jaundice or chronic liver disease.  -- No dysuria, hematuria, or flank pain.  Denies hesitancy, urgency or burning with urination  Lymph- no swollen lymph nodes in neck/axilla or groin.   Heme- No active bruising or bleeding; no Hx of DVT or PE.  MS-- Pain in right hip with no drainage from incision site, some popping and grinding at hip joint.  No new back pain.  Neuro-- No acute focal weakness or numbness in the arms or legs.  No seizures.  Skin--No rashes or lesions      Physical Exam:   Vital Signs  Temp (24hrs), Av.1 °F (36.7 °C), Min:97.6 °F (36.4 °C), Max:98.4 °F (36.9 °C)    Temp  Min: 97.6 °F (36.4 °C)  Max: 98.4 °F (36.9 °C)  BP  Min: 123/63  Max: 154/76  Pulse  Min: 76  Max: 92  Resp  Min:  16  Max: 18  SpO2  Min: 94 %  Max: 97 %    GENERAL: Awake and alert, in no acute distress.   HEENT: Normocephalic, atraumatic.  PERRL. EOMI. No conjunctival injection. No icterus. Oropharynx clear without evidence of thrush or exudate. No evidence of peridontal disease.    NECK: Supple without nuchal rigidity. No mass.  LYMPH: No cervical, axillary or inguinal lymphadenopathy.  HEART: RRR; No murmur, rubs, gallops.   LUNGS: Clear to auscultation bilaterally without wheezing, rales, rhonchi. Normal respiratory effort. Nonlabored.  ABDOMEN: Soft, nontender, nondistended. Positive bowel sounds. No rebound or guarding. NO mass or HSM.  EXT:  No cyanosis, clubbing or edema. No cord.  :  Without Mcnally catheter.  MSK:  Right hip wound well healed, no surrounding erythema, no fluctuance, no induration or crepitus, no drainage.  SKIN: Warm and dry without cutaneous eruptions on Inspection/palpation.    NEURO: Oriented to PPT. Normal speech and cognition  PSYCHIATRIC: Normal insight and judgment. Cooperative with PE    Laboratory Data    Results from last 7 days   Lab Units 12/03/20  2114 12/02/20  1455   WBC 10*3/mm3 8.95 12.07*   HEMOGLOBIN g/dL 12.9* 15.3   HEMATOCRIT % 40.7 44.7   PLATELETS 10*3/mm3 245 265     Results from last 7 days   Lab Units 12/03/20  2114   SODIUM mmol/L 135*   POTASSIUM mmol/L 4.0   CHLORIDE mmol/L 96*   CO2 mmol/L 26.0   BUN mg/dL 21   CREATININE mg/dL 1.18   GLUCOSE mg/dL 103*   CALCIUM mg/dL 9.2         Results from last 7 days   Lab Units 12/02/20  1455   SED RATE mm/hr 32*     Results from last 7 days   Lab Units 12/02/20  1455   CRP mg/dL 17.26*         Results from last 7 days   Lab Units 12/04/20  1046 12/03/20  2114   CK TOTAL U/L 89 106         Estimated Creatinine Clearance: 86.1 mL/min (by C-G formula based on SCr of 1.18 mg/dL).      Microbiology:  Microbiology Results (last 10 days)     Procedure Component Value - Date/Time    COVID PRE-OP / PRE-PROCEDURE SCREENING ORDER (NO  ISOLATION) - Swab, Nasopharynx [[id number]]  (Normal) Collected: 12/03/20 2227    Lab Status: Final result Specimen: Swab from Nasopharynx Updated: 12/04/20 0347    Narrative:      The following orders were created for panel order COVID PRE-OP / PRE-PROCEDURE SCREENING ORDER (NO ISOLATION) - Swab, Nasopharynx.  Procedure                               Abnormality         Status                     ---------                               -----------         ------                     COVID-19, ABBOTT IN-HOUS...[[id number]]  Normal              Final result                 Please view results for these tests on the individual orders.    COVID-19, ABBOTT IN-HOUSE,NP Swab (NO TRANSPORT MEDIA) 2 HR TAT - Swab, Nasopharynx [[id number]]  (Normal) Collected: 12/03/20 2227    Lab Status: Final result Specimen: Swab from Nasopharynx Updated: 12/04/20 0347     COVID19 Not Detected    Narrative:      Fact sheet for providers: https://www.fda.gov/media/486330/download     Fact sheet for patients: https://www.fda.gov/media/837347/download    Body Fluid Culture - Synovial Fluid, Hip, Right [[id number]] Collected: 12/03/20 1447    Lab Status: Preliminary result Specimen: Synovial Fluid from Hip, Right Updated: 12/04/20 0819     Body Fluid Culture No growth     Gram Stain Many (4+) WBCs per low power field      No organisms seen                Radiology:  Xr Chest 1 View    Result Date: 12/3/2020  No acute disease.  Signer Name: Kerry Dorsey MD  Signed: 12/3/2020 9:57 PM  Workstation Name: [facility]-  [facility]    Ct Hip Right Wo Contrast    Result Date: 12/3/2020  Fluid collection adjacent to the right hip greater trochanteric region measuring up to 7 cm without gas locules associated demonstrating adjacent subcutaneous edema without evidence for hardware loosening or failure otherwise noted of a right hip arthroplasty.   D:  12/03/2020 E:  12/03/2020  This report was finalized on 12/3/2020 6:05 PM by   Khadar Norris.      Ct Injection/aspriation Large Joint Or Bursa    Result Date: 12/3/2020   Percutaneous aspiration of lateral fluid collection to right hip replacement, yielding 50 mL of old appearing blood. No drainage catheter was left in place. Portion of fluid sent for requested laboratory analysis  Plan:  Per orthopedic surgery ______________________________________________________________________  PROCEDURE SUMMARY: - Aspiration of fluid collection lateral to right hip replacement under CT guidance - Additional procedure(s): None  PROCEDURE DETAILS:  Pre-procedure Consent: Informed consent for the procedure including risks, benefits and alternatives was obtained and time-out was performed prior to the procedure. Preparation: The site was prepared and draped using maximal sterile barrier technique including cutaneous antisepsis.  Anesthesia/sedation Level of anesthesia/sedation: None  Fluid collection aspiration The patient was positioned supine. Initial imaging was performed. Local anesthesia was administered. The fluid collection was accessed using a 5 American skater centesis needle under direct CT fluoroscopic guidance. The centesis catheter was advanced and the needle removed. Fluid aspiration was performed. All instruments were then removed. - Initial imaging findings: Fluid collection lateral to right side hip replacement - Aspiration needle/catheter: 5 American centesis catheter - Post-aspiration imaging findings: Decrease in size of fluid collection  Contrast Contrast agent: None Contrast volume (mL): 0  Radiation Dose CT dose length product (mGy-cm): See technologist notes for details  Additional Details Additional description of procedure: None Equipment details: None Specimens removed: Aspirated fluid sent for analysis. Estimated blood loss (mL): Less than 10 Standardized report: DrainageAspiration  Attestation I was present and scrubbed for the entire procedure. Imaging reviewed. Agree with final  report as written  This report was finalized on 12/3/2020 5:49 PM by Esdras Velarde.            Impression:   1. Acute right hip prosthetic joint infection.  Aspirated on  with culture negative to date, but fluid with 29,000 WBC and 82% neutrophils.  Awaiting wash out with poly exchange today.  Likely organisms are staph, strep, and P. acnes.  Will continue Rocephin and and change Vancomycin to Daptomycin pending cultures.  2. Rheumatoid arthritis s/p right MARTY 2020. On Plaquenil/DayPro  3. HTN  4. CHRISTOPHE/CPAP    PLAN/RECOMMENDATIONS:   Thank you for asking us to see Obi Jackman Jr., I recommend the followin. Monitor aspirate culture and surgical cultures.  Called micro in [place] on  and asked them to hold anaerobic culture for 2 weeks.   2. Awaiting surgical intervention today by Dr. Olivas  3. Continue IV Rocephin  4. D/c Vancomycin and change to Daptomycin 6 mg/kg IV daily. Check CPK.  5. Stop atorvastatin while on Daptomycin to reduce risk for myopathies with the combination of the these therapies.   6. Continue wound care per ortho surgery      Luis Andrews MD saw and examined patient by Telehealth visit with the help of PEGGY Gambino, verified hx and PE, read all radiographic studies, reviewed labs and micro data, and formulated dx, plan for treatment and all medical decision making.      En Adorno PA-C for Luis Delaney MD     I discussed his complex situation in detail with him and his wife today.    Luis Delaney MD  2020  16:26 EST

## 2020-12-05 LAB
DEPRECATED RDW RBC AUTO: 42.7 FL (ref 37–54)
ERYTHROCYTE [DISTWIDTH] IN BLOOD BY AUTOMATED COUNT: 12.5 % (ref 12.3–15.4)
HCT VFR BLD AUTO: 36.5 % (ref 37.5–51)
HGB BLD-MCNC: 11.8 G/DL (ref 13–17.7)
MCH RBC QN AUTO: 29.8 PG (ref 26.6–33)
MCHC RBC AUTO-ENTMCNC: 32.3 G/DL (ref 31.5–35.7)
MCV RBC AUTO: 92.2 FL (ref 79–97)
PLATELET # BLD AUTO: 247 10*3/MM3 (ref 140–450)
PMV BLD AUTO: 9.9 FL (ref 6–12)
RBC # BLD AUTO: 3.96 10*6/MM3 (ref 4.14–5.8)
WBC # BLD AUTO: 7.69 10*3/MM3 (ref 3.4–10.8)

## 2020-12-05 PROCEDURE — 97166 OT EVAL MOD COMPLEX 45 MIN: CPT

## 2020-12-05 PROCEDURE — 25010000002 CEFTRIAXONE PER 250 MG: Performed by: INTERNAL MEDICINE

## 2020-12-05 PROCEDURE — 99024 POSTOP FOLLOW-UP VISIT: CPT | Performed by: ORTHOPAEDIC SURGERY

## 2020-12-05 PROCEDURE — 97116 GAIT TRAINING THERAPY: CPT

## 2020-12-05 PROCEDURE — 25010000002 HYDROMORPHONE PER 4 MG: Performed by: ORTHOPAEDIC SURGERY

## 2020-12-05 PROCEDURE — 85027 COMPLETE CBC AUTOMATED: CPT | Performed by: ORTHOPAEDIC SURGERY

## 2020-12-05 PROCEDURE — 97161 PT EVAL LOW COMPLEX 20 MIN: CPT

## 2020-12-05 PROCEDURE — 97535 SELF CARE MNGMENT TRAINING: CPT

## 2020-12-05 PROCEDURE — 97110 THERAPEUTIC EXERCISES: CPT

## 2020-12-05 PROCEDURE — 25010000002 DAPTOMYCIN PER 1 MG: Performed by: ORTHOPAEDIC SURGERY

## 2020-12-05 RX ADMIN — DAPTOMYCIN 600 MG: 500 INJECTION, POWDER, LYOPHILIZED, FOR SOLUTION INTRAVENOUS at 12:08

## 2020-12-05 RX ADMIN — CEFTRIAXONE 2 G: 2 INJECTION, POWDER, FOR SOLUTION INTRAMUSCULAR; INTRAVENOUS at 20:13

## 2020-12-05 RX ADMIN — ACETAMINOPHEN 650 MG: 325 TABLET, FILM COATED ORAL at 03:41

## 2020-12-05 RX ADMIN — GABAPENTIN 300 MG: 300 CAPSULE ORAL at 15:14

## 2020-12-05 RX ADMIN — TRAZODONE HYDROCHLORIDE 50 MG: 50 TABLET ORAL at 20:13

## 2020-12-05 RX ADMIN — AMLODIPINE BESYLATE 5 MG: 5 TABLET ORAL at 09:02

## 2020-12-05 RX ADMIN — SODIUM CHLORIDE, PRESERVATIVE FREE 10 ML: 5 INJECTION INTRAVENOUS at 20:13

## 2020-12-05 RX ADMIN — OXYCODONE HYDROCHLORIDE 5 MG: 5 TABLET ORAL at 09:02

## 2020-12-05 RX ADMIN — ATENOLOL 50 MG: 50 TABLET ORAL at 09:02

## 2020-12-05 RX ADMIN — DOCUSATE SODIUM 100 MG: 100 CAPSULE, LIQUID FILLED ORAL at 20:13

## 2020-12-05 RX ADMIN — OXYCODONE HYDROCHLORIDE 5 MG: 5 TABLET ORAL at 18:27

## 2020-12-05 RX ADMIN — HYDROMORPHONE HYDROCHLORIDE 0.5 MG: 2 INJECTION, SOLUTION INTRAMUSCULAR; INTRAVENOUS; SUBCUTANEOUS at 00:27

## 2020-12-05 RX ADMIN — OXYCODONE HYDROCHLORIDE 5 MG: 5 TABLET ORAL at 22:43

## 2020-12-05 RX ADMIN — ASPIRIN 325 MG: 325 TABLET, COATED ORAL at 09:01

## 2020-12-05 RX ADMIN — DOCUSATE SODIUM 100 MG: 100 CAPSULE, LIQUID FILLED ORAL at 09:01

## 2020-12-05 RX ADMIN — ACETAMINOPHEN 650 MG: 325 TABLET, FILM COATED ORAL at 15:18

## 2020-12-05 RX ADMIN — GABAPENTIN 300 MG: 300 CAPSULE ORAL at 05:18

## 2020-12-05 RX ADMIN — OXYCODONE HYDROCHLORIDE 5 MG: 5 TABLET ORAL at 12:47

## 2020-12-05 RX ADMIN — OXYCODONE HYDROCHLORIDE 5 MG: 5 TABLET ORAL at 03:41

## 2020-12-05 RX ADMIN — GABAPENTIN 300 MG: 300 CAPSULE ORAL at 20:13

## 2020-12-05 RX ADMIN — BUPROPION HYDROCHLORIDE 150 MG: 150 TABLET, FILM COATED, EXTENDED RELEASE ORAL at 09:01

## 2020-12-05 NOTE — PROGRESS NOTES
" progress note      Obi Jackman Jr.  [id number]  1950     LOS: 2 days     Attending: Joselyn Patel MD    Primary Care Provider: Cammy Oneal PA      Chief Complaint/Reason for visit:  No chief complaint on file.      Subjective   Underwent incision and drainage and head and liner exchange of the right hip yesterday.  Tolerated surgery well.  Doing fairly well when seen this morning, expected postoperative pain.  No nausea vomiting or shortness of breath.    Objective        Visit Vitals  /79 (BP Location: Right arm, Patient Position: Lying)   Pulse 83   Temp 97.8 °F (36.6 °C) (Oral)   Resp 16   Ht 180.3 cm (71\")   Wt (!) 145 kg (320 lb 9.6 oz)   SpO2 95%   BMI 44.71 kg/m²     Temp (24hrs), Av.3 °F (36.8 °C), Min:97.7 °F (36.5 °C), Max:99.5 °F (37.5 °C)      Intake/Output:    Intake/Output Summary (Last 24 hours) at 2020 1107  Last data filed at 2020 0400  Gross per 24 hour   Intake 1550 ml   Output 1900 ml   Net -350 ml          Physical Exam:     General Appearance:    Alert, cooperative, in no acute distress   Head:    Normocephalic, without obvious abnormality, atraumatic    Lungs:     Normal effort, symmetric chest rise,  clear to      auscultation bilaterally              Heart:    Regular rhythm and normal rate, normal S1 and S2    Abdomen:     Normal bowel sounds, no masses, no organomegaly, soft        non-tender, non-distended, no guarding, no rebound                tenderness   Extremities:  Right hip with marisol dressing system on, clean dry and intact.  No clubbing, cyanosis, trace edema.  No deformities.    Pulses:   Pulses palpable and equal bilaterally   Skin:   No bleeding, bruising or rash          Results Review:     I reviewed the patient's new clinical results.   Results from last 7 days   Lab Units 20  0741 204 20  1455   WBC 10*3/mm3 7.69 8.95 12.07*   HEMOGLOBIN g/dL 11.8* 12.9* 15.3   HEMATOCRIT % 36.5* 40.7 44.7   PLATELETS " 10*3/mm3 247 245 265     Results from last 7 days   Lab Units 12/03/20  2114   SODIUM mmol/L 135*   POTASSIUM mmol/L 4.0   CHLORIDE mmol/L 96*   CO2 mmol/L 26.0   BUN mg/dL 21   CREATININE mg/dL 1.18   CALCIUM mg/dL 9.2   GLUCOSE mg/dL 103*     I reviewed the patient's new imaging including images and reports.    All medications reviewed.   amLODIPine, 5 mg, Oral, Daily  aspirin, 325 mg, Oral, Daily  atenolol, 50 mg, Oral, Daily  buPROPion XL, 150 mg, Oral, Daily  cefTRIAXone, 2 g, Intravenous, Q24H  DAPTOmycin, 6 mg/kg (Adjusted), Intravenous, Q24H  docusate sodium, 100 mg, Oral, BID  gabapentin, 300 mg, Oral, Q8H  sodium chloride, 10 mL, Intravenous, Q12H  sodium chloride, 3 mL, Intravenous, Q12H  traZODone, 50 mg, Oral, Nightly      acetaminophen, 650 mg, Q6H PRN  ALPRAZolam, 0.25 mg, Daily PRN  calcium carbonate EX, 750 mg, BID PRN  HYDROmorphone, 0.5 mg, Q2H PRN    And  naloxone, 0.1 mg, Q5 Min PRN  ondansetron, 4 mg, Q6H PRN    Or  ondansetron, 4 mg, Q6H PRN  oxyCODONE, 5 mg, Q4H PRN  sodium chloride, 1-10 mL, PRN  sodium chloride, 10 mL, PRN  SUMAtriptan, 50 mg, Once PRN        Assessment/Plan   Postop day 1 status post I&D, head and liner exchange right hip.    Postoperative wound infection of right hip    Anxiety disorder    Hyperlipidemia    Hypertension    CHRISTOPHE (obstructive sleep apnea)    Status post total replacement of right hip  Acute blood loss anemia, asymptomatic       Plan     ID consultation/following for antibiotic choice and duration management.  Follow cultures from aspirate done prior to admit.  As well as surgical cultures    Postop management to include   1. PT/OT- WBAT RLE  2. Pain control-prns       3. IS-encourage  4. DVT proph- Mechs/ASA  5. Bowel regimen  6. Resume home medications as appropriate  7. Monitor post-op labs  8. DC planning for home     HTN, HLD  - Continue home atenolol.  -Holding statin while on daptomycin.  - Hold HCTZ  - Monitor BP   - Holding parameters for BP meds  -  Labetalol PRN for SBP>170     CHRISTOPHE  - CPAP at night     Obesity  - complicates all aspects of care    -Anxiety:  Resume Xanax and Wellbutrin    Expect antibiotic regimen to be transitioned to outpatient IV antibiotics through Dr. Delaney and [facility] office.  Possible discharge home tomorrow once that is arranged and patient is cleared by PT.    Joselyn Patel MD  12/05/20  11:07 EST

## 2020-12-05 NOTE — PLAN OF CARE
Goal Outcome Evaluation:  Plan of Care Reviewed With: patient  Progress: improving  Outcome Summary: Patient ambulated 100 feet with RW and step to gait pattern, limited by pain and fatigue. Increased assist required to rise into standing, requiring bed to be elevated. Recommend d/c home with family and HHPT. Will progress to stair training as able, to prepare for d/c home. Will continue to progress as able.

## 2020-12-05 NOTE — PLAN OF CARE
Goal Outcome Evaluation:  Plan of Care Reviewed With: patient  Progress: improving  Outcome Summary: Patient increased gait distance to 140 feet with RW and step through gait pattern, limited by pain and fatigue. Increased independence with sit<->stand t/f this PM. Will continue to progress as able. Stair training in AM to prepare for d/c home.

## 2020-12-05 NOTE — PROGRESS NOTES
"      [facility] Progress Note    Subjective      LOS: 2 days   Patient Care Team:  Cammy Oneal PA as PCP - General (Internal Medicine)  Chanda Castanon MD as Consulting Physician (Rheumatology)    CC: Right hip pain    Interval History:   Patient sitting comfortably in a chair.  Hip feels somewhat better than before surgery.  Walked 100 feet with physical therapy earlier today.    Objective      Vital Signs  Temp (24hrs), Av.3 °F (36.8 °C), Min:97.7 °F (36.5 °C), Max:99.5 °F (37.5 °C)      /79 (BP Location: Right arm, Patient Position: Lying)   Pulse 83   Temp 97.8 °F (36.6 °C) (Oral)   Resp 16   Ht 180.3 cm (71\")   Wt (!) 145 kg (320 lb 9.6 oz)   SpO2 95%   BMI 44.71 kg/m²     Examination:   Examination of the right hip: The dressing is clean, dry, and intact.  Ankle dorsiflexion, ankle plantar flexion, and EHL are intact.  Sensation intact in the foot to light touch.   Thigh is soft and nontender.      Labs:  Results from last 7 days   Lab Units 20  0741 20  2114 20  1455   WBC 10*3/mm3 7.69 8.95 12.07*   HEMOGLOBIN g/dL 11.8* 12.9* 15.3   HEMATOCRIT % 36.5* 40.7 44.7   MCV fL 92.2 90.0 86.6   PLATELETS 10*3/mm3 247 245 265       Cultures: No growth to date    Radiology:  Imaging Results (Last 24 Hours)     Procedure Component Value Units Date/Time    XR Hip With or Without Pelvis 2 - 3 View Right [[id number]] Collected: 20 1751     Updated: 20    Narrative:      EXAMINATION: XR RIGHT HIP W OR WO PELVIS 2-3 VIEWS - 2020     INDICATION: T84.59XA-Infection and inflammatory reaction due to other  internal joint prosthesis, initial encounter; Z96.649-Presence of  unspecified artificial hip joint.     COMPARISON: 2020     FINDINGS: History indicates right hip infection, status post irrigation,  debridement, head and liner exchange. Right hip prosthesis is seen in  anatomic alignment. No acute bony abnormality is seen. " Scattered  radiodensities around the margins of the incision by history are  antibiotic impregnated pellets. Bony structures elsewhere appear grossly  intact. 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       Impression:      Right hip prosthesis in anatomic alignment. Antibiotic  pellets noted. No acute bony abnormality is seen.     DICTATED:   12/04/2020  EDITED/ls :   12/04/2020      This report was finalized on 12/4/2020 9:35 PM by Dr. Ej Aponte MD.             PT:  Physical Therapy - Plan of Care Review - Outcome Summary:  Outcome Summary: Patient ambulated 100 feet with RW and step to gait pattern, limited by pain and fatigue. Increased assist required to rise into standing, requiring bed to be elevated. Recommend d/c home with family and HHPT. Will progress to stair training as able, to prepare for d/c home. Will continue to progress as able. (12/05/20 0938)]       Results Review:     I reviewed the patient's new clinical results.    Assessment and Plan     Assessment:   Status post irrigation debridement, with head and liner exchange    Await culture results  Continue rehabilitation      Postoperative wound infection of right hip    Anxiety disorder    Hyperlipidemia    Hypertension    CHRISTOPHE (obstructive sleep apnea)    Status post total replacement of right hip      Plan for disposition: Anticipate discharge when antibiotic regimen determined and safe for discharge to home both medically and from a physical therapy standpoint.  Follow-up with me in 2 to 3 weeks in the office.      Future Appointments   Date Time Provider Department Center   12/9/2020  2:00 PM Osbaldo Olivas MD [facility]   1/29/2021  9:10 AM Elodia Guzman MD [facility]   2/2/2021  1:30 PM Cammy Oneal PA [facility]   2/8/2021  3:30 PM Osbaldo Olivas MD [facility]           Osbaldo SANCHEZ. MD [place]  12/05/20  12:04 EST

## 2020-12-05 NOTE — THERAPY TREATMENT NOTE
Patient Name: Obi Jackman Jr.  : 1950    MRN: [id number]                              Today's Date: 2020       Admit Date: 12/3/2020    Visit Dx:     ICD-10-CM ICD-9-CM   1. Infection of prosthetic total hip joint (CMS/[facility])  T84.59XA 996.66    Z96.649 V43.64     Patient Active Problem List   Diagnosis   • Allergic rhinitis   • Anxiety disorder   • Benign paroxysmal positional vertigo   • Chronic tension type headache   • Depression   • ED (erectile dysfunction) of non-organic origin   • Hyperlipidemia   • Hypertension   • LFTs abnormal   • CHRISTOPHE (obstructive sleep apnea)   • Elevated glucose   • Health care maintenance   • Morbid obesity due to excess calories (CMS/[facility])   • Onychomycosis of left great toe   • Osteoarthritis of ankle or foot   • Venous stasis   • Neuropathy   • Class 3 severe obesity due to excess calories with serious comorbidity and body mass index (BMI) of 40.0 to 44.9 in adult (CMS/[facility])   • Pre-op evaluation   • Arthritis of foot, left   • S/P left ankle triple arthrodesis   • Acute blood loss anemia, mild, asymptomatic    • Acute postoperative pain   • Wound infection   • Primary osteoarthritis of right hip   • Status post total replacement of right hip   • Postoperative wound infection of right hip     Past Medical History:   Diagnosis Date   • Anxiety and depression    • Bulging of lumbar intervertebral disc    • CPAP (continuous positive airway pressure) dependence    • Hyperlipidemia    • Hypertension    • Migraine    • CHRISTOPHE on CPAP    • Rheumatoid arthritis (CMS/[facility])    • Swelling of left ear    • Wears contact lenses    • Wears contact lenses      Past Surgical History:   Procedure Laterality Date   • ACHILLES TENDON SURGERY Left 10/15/2019    Procedure: ACHILLES TENDON LENGTHENING LEFT;  Surgeon: Elodia Guzman MD;  Location: Pending sale to [facility];  Service: Orthopedics   • CATARACT EXTRACTION Right    • COLONOSCOPY     • EYE SURGERY Bilateral     cornea transplant    • FOOT  SURGERY Left 10/15/2019    triple arthrodesis and achilles tendon lengthening- [facility]   • HERNIA REPAIR     • ILIAC CREST BONE GRAFT Left 10/15/2019    Procedure: ILIAC CREST BONE GRAFT LEFT;  Surgeon: Elodia Guzman MD;  Location:  [facility];  Service: Orthopedics   • TOE FUSION Left 10/15/2019    Procedure: TRIPLE ARTHODESIS LEFT;  Surgeon: Elodia Guzman MD;  Location:  [facility];  Service: Orthopedics   • TOTAL HIP ARTHROPLASTY Right 8/4/2020    Procedure: TOTAL HIP ARTHROPLASTY RIGHT;  Surgeon: Osbaldo Olivas MD;  Location:  [facility];  Service: Orthopedics;  Laterality: Right;     General Information     Row Name 12/05/20 1344 12/05/20 0938       Physical Therapy Time and Intention    Document Type  therapy note (daily note)  -LR  evaluation;therapy note (daily note)  -LR    Mode of Treatment  physical therapy;individual therapy  -LR  physical therapy;individual therapy  -LR    Row Name 12/05/20 1344 12/05/20 0938       General Information    Patient Profile Reviewed  yes  -LR  yes  -LR    Prior Level of Function  --  min assist:;all household mobility;community mobility;gait;transfer;bed mobility;using stairs recently had to return to use of bilateral canes d/t sudden increase in pain  -LR    Existing Precautions/Restrictions  fall;right;hip, posterior  -LR  fall;right;hip, posterior  -LR    Barriers to Rehab  none identified  -LR  none identified  -LR    Row Name 12/05/20 0938          Living Environment    Lives With  spouse can assist at all times if needed  -LR     Row Name 12/05/20 0938          Home Main Entrance    Number of Stairs, Main Entrance  two  -LR     Stair Railings, Main Entrance  none  -LR     Row Name 12/05/20 0938          Stairs Within Home, Primary    Number of Stairs, Within Home, Primary  none  -LR     Row Name 12/05/20 1344 12/05/20 0938       Cognition    Orientation Status (Cognition)  oriented x 4  -LR  oriented x 4  -LR    Row Name 12/05/20 1344 12/05/20 0938       Safety  Issues, Functional Mobility    Safety Issues Affecting Function (Mobility)  positioning of assistive device;safety precautions follow-through/compliance;sequencing abilities;safety precaution awareness  -LR  positioning of assistive device;safety precautions follow-through/compliance;sequencing abilities;safety precaution awareness  -LR    Impairments Affecting Function (Mobility)  balance;strength;pain;endurance/activity tolerance;range of motion (ROM)  -LR  balance;strength;pain;endurance/activity tolerance;range of motion (ROM)  -LR      User Key  (r) = Recorded By, (t) = Taken By, (c) = Cosigned By    Initials Name Provider Type    LR Antonella Mason, PT Physical Therapist        Mobility     Row Name 12/05/20 1344 12/05/20 0938       Bed Mobility    Bed Mobility  --  supine-sit  -LR    Supine-Sit [place] (Bed Mobility)  not tested  -LR  verbal cues;minimum assist (75% patient effort)  -LR    Sit-Sidelying [place] (Bed Mobility)  not tested  -LR  not tested  -LR    Assistive Device (Bed Mobility)  --  head of bed elevated;bed rails  -LR    Comment (Bed Mobility)  [facility] on arrival and at end of treatment.  -LR  Verbal cues to move LEs towards EOB and to push up from bed to raise trunk into sitting and to scoot hips out to get feet on floor. Min assist to support and move R LE. Denied dizziness upon sitting up.  -LR    Row Name 12/05/20 1344 12/05/20 0938       Transfers    Comment (Transfers)  Verbal cues for correct hand placement with t/f and to step R LE out before t/f for comfort.  -LR  Despite cues to push up from bed to stand, patient pulled up on RW to stand. Bed elevated to assist with t/f. Verbal cues to step R LE out before t/f for comfort. Cues to reach back for chair to lower into sitting.  -LR    Row Name 12/05/20 1344 12/05/20 0938       Sit-Stand Transfer    Sit-Stand [place] (Transfers)  verbal cues;contact guard  -LR  verbal cues;minimum assist (75% patient effort);2 person  assist  -LR    Assistive Device (Sit-Stand Transfers)  walker, front-wheeled  -LR  walker, front-wheeled  -LR    Row Name 12/05/20 1344 12/05/20 0938       Gait/Stairs (Locomotion)    [place] Level (Gait)  verbal cues;contact guard  -LR  verbal cues;contact guard;2 person assist  -LR    Assistive Device (Gait)  walker, front-wheeled  -LR  walker, front-wheeled  -LR    Distance in Feet (Gait)  140  -LR  100  -LR    Deviations/Abnormal Patterns (Gait)  bilateral deviations;dena decreased;gait speed decreased;stride length decreased;right sided deviations;antalgic  -LR  bilateral deviations;dena decreased;gait speed decreased;stride length decreased;right sided deviations;antalgic  -LR    Bilateral Gait Deviations  forward flexed posture;heel strike decreased  -LR  forward flexed posture;heel strike decreased  -LR    Right Sided Gait Deviations  weight shift ability decreased  -LR  weight shift ability decreased  -LR    Comment (Gait/Stairs)  Patient ambulated with step through gait pattern at slow pace. Verbal cues for correct sequencing of steps, increased R LE weight bearing/stance phase, decreased UE weight bearing, upright posture, and increased step length. Improved with cues for correction. More forward flexed as gait progressed. Gait limited by pain and fatigue.  -LR  Patient ambulated with step to gait pattern at slow pace. Verbal cues for correct sequencing of steps, increased R LE weight bearing/stance phase, decreased UE weight bearing, upright posture, and increased R hip and knee flexion during swing phase. Improved with cues for correction. Gait limited by pain and fatigue.  -LR    Row Name 12/05/20 1344 12/05/20 0938       Mobility    Extremity Weight-bearing Status  right lower extremity  -LR  right lower extremity  -LR    Right Lower Extremity (Weight-bearing Status)  weight-bearing as tolerated (WBAT)  -LR  weight-bearing as tolerated (WBAT)  -LR      User Key  (r) = Recorded By, (t) =  Taken By, (c) = Cosigned By    Initials Name Provider Type    LR Antonella Mason, PT Physical Therapist        Obj/Interventions     Row Name 12/05/20 0938          Range of Motion Comprehensive    General Range of Motion  lower extremity range of motion deficits identified  -LR     Row Name 12/05/20 0938          Strength Comprehensive (MMT)    General Manual Muscle Testing (MMT) Assessment  lower extremity strength deficits identified  -LR     Row Name 12/05/20 1344 12/05/20 0938       Motor Skills    Therapeutic Exercise  ankle;knee;hip;other (see comments) cues for technique; min assist heel slides, hip abd, max assist SLR  -LR  ankle;knee;hip;other (see comments) cues for technique; max assist SLR, mod assist heel slides and hip abd  -LR    Row Name 12/05/20 1344 12/05/20 0938       Hip (Therapeutic Exercise)    Hip (Therapeutic Exercise)  strengthening exercise;isometric exercises  -LR  strengthening exercise;isometric exercises  -LR    Hip Isometrics (Therapeutic Exercise)  bilateral;gluteal sets;sitting;10 repetitions;5 repetitions  -LR  bilateral;gluteal sets;sitting;10 repetitions  -LR    Hip Strengthening (Therapeutic Exercise)  right;aBduction;heel slides;sitting;10 repetitions;5 repetitions  -LR  right;aBduction;heel slides;sitting;10 repetitions  -LR    Row Name 12/05/20 1344 12/05/20 0938       Knee (Therapeutic Exercise)    Knee (Therapeutic Exercise)  strengthening exercise;isometric exercises  -LR  strengthening exercise;isometric exercises  -LR    Knee Isometrics (Therapeutic Exercise)  right;quad sets;sitting;10 repetitions;5 repetitions  -LR  right;quad sets;sitting;10 repetitions  -LR    Knee Strengthening (Therapeutic Exercise)  right;SLR (straight leg raise);SAQ (short arc quad);LAQ (long arc quad);sitting;5 repetitions;10 repetitions  -LR  right;SLR (straight leg raise);SAQ (short arc quad);LAQ (long arc quad);sitting;10 repetitions  -LR    Row Name 12/05/20 1344 12/05/20 0938        Ankle (Therapeutic Exercise)    Ankle (Therapeutic Exercise)  AROM (active range of motion)  -LR  AROM (active range of motion)  -LR    Ankle AROM (Therapeutic Exercise)  bilateral;dorsiflexion;plantarflexion;sitting;10 repetitions;5 repetitions  -LR  bilateral;dorsiflexion;plantarflexion;sitting;10 repetitions  -LR    Row Name 12/05/20 0938          Balance    Balance Assessment  sitting static balance;sitting dynamic balance;standing static balance;standing dynamic balance  -LR     Static Sitting Balance  unsupported;WFL;sitting, edge of bed  -LR     Dynamic Sitting Balance  WFL;unsupported;sitting, edge of bed  -LR     Static Standing Balance  mild impairment;supported;standing  -LR     Dynamic Standing Balance  mild impairment;supported;standing  -LR     Row Name 12/05/20 0938          Sensory Assessment (Somatosensory)    Sensory Assessment (Somatosensory)  sensation intact;other (see comments) denies numbness/tingling;light touch equal and intact  -LR     Row Name 12/05/20 0938          General Lower Extremity Assessment (Range of Motion)    Lower Extremity: Range of Motion  LLE ROM WNL;hip, right: LE ROM  -LR     Comment: Lower Extremity ROM  R hip AROM impaired 50% d/t pain, PROM limited 25% d/t pain  -LR     Row Name 12/05/20 0938          Lower Extremity (Manual Muscle Testing)    Comment, MMT: Lower Extremity  R hip functionally 3+/5, no knee buckling with gait, unable to perform SLR independently.  -LR       User Key  (r) = Recorded By, (t) = Taken By, (c) = Cosigned By    Initials Name Provider Type    LR Antonella Mason, PT Physical Therapist        Goals/Plan     Row Name 12/05/20 1344 12/05/20 0943       Bed Mobility Goal 1 (PT)    Activity/Assistive Device (Bed Mobility Goal 1, PT)  sit to supine/supine to sit  -LR  sit to supine/supine to sit  -LR    [place] Level/Cues Needed (Bed Mobility Goal 1, PT)  modified independence  -LR  modified independence  -LR    Time Frame (Bed Mobility  Goal 1, PT)  long term goal (LTG);3 days  -LR  long term goal (LTG);3 days  -LR    Progress/Outcomes (Bed Mobility Goal 1, PT)  goal ongoing;continuing progress toward goal  -LR  goal ongoing  -LR    Row Name 12/05/20 1344 12/05/20 0938       Transfer Goal 1 (PT)    Activity/Assistive Device (Transfer Goal 1, PT)  sit-to-stand/stand-to-sit;walker, rolling  -LR  sit-to-stand/stand-to-sit;walker, rolling  -LR    [place] Level/Cues Needed (Transfer Goal 1, PT)  modified independence  -LR  modified independence  -LR    Time Frame (Transfer Goal 1, PT)  long term goal (LTG);3 days  -LR  long term goal (LTG);3 days  -LR    Progress/Outcome (Transfer Goal 1, PT)  goal ongoing  -LR  goal ongoing  -LR    Row Name 12/05/20 1344 12/05/20 0938       Gait Training Goal 1 (PT)    Activity/Assistive Device (Gait Training Goal 1, PT)  gait (walking locomotion);walker, rolling  -LR  gait (walking locomotion);walker, rolling  -LR    [place] Level (Gait Training Goal 1, PT)  modified independence  -LR  modified independence  -LR    Distance (Gait Training Goal 1, PT)  150 feet  -LR  150 feet  -LR    Time Frame (Gait Training Goal 1, PT)  long term goal (LTG);3 days  -LR  long term goal (LTG);3 days  -LR    Progress/Outcome (Gait Training Goal 1, PT)  good progress toward goal;goal ongoing  -LR  goal ongoing  -LR    Row Name 12/05/20 1344 12/05/20 0938       Stairs Goal 1 (PT)    Activity/Assistive Device (Stairs Goal 1, PT)  ascending stairs;descending stairs;step-to-step;cane, straight;crutches, axillary  -LR  ascending stairs;descending stairs;step-to-step;cane, straight;crutches, axillary  -LR    [place] Level/Cues Needed (Stairs Goal 1, PT)  contact guard assist  -LR  contact guard assist  -LR    Number of Stairs (Stairs Goal 1, PT)  2  -LR  2  -LR    Time Frame (Stairs Goal 1, PT)  long term goal (LTG);3 days  -LR  long term goal (LTG);3 days  -LR    Progress/Outcome (Stairs Goal 1, PT)  continuing progress toward  goal;goal ongoing  -LR  goal ongoing  -LR      User Key  (r) = Recorded By, (t) = Taken By, (c) = Cosigned By    Initials Name Provider Type    LR Antonella Mason, PT Physical Therapist        Clinical Impression     Row Name 12/05/20 1344 12/05/20 0938       Pain    Additional Documentation  Pain Scale: Numbers Pre/Post-Treatment (Group)  -LR  Pain Scale: Numbers Pre/Post-Treatment (Group)  -LR    Row Name 12/05/20 1344 12/05/20 0938       Pain Scale: Numbers Pre/Post-Treatment    Pretreatment Pain Rating  3/10  -LR  0/10 - no pain denies pain at rest  -LR    Posttreatment Pain Rating  6/10  -LR  8/10  -LR    Pain Location - Side  Right  -LR  Right  -LR    Pain Location  hip  -LR  hip  -LR    Pain Intervention(s)  Ambulation/increased activity;Repositioned  -LR  Ambulation/increased activity;Repositioned;Cold applied;Medication (See MAR)  -LR    Row Name 12/05/20 1344 12/05/20 0938       Plan of Care Review    Plan of Care Reviewed With  patient  -LR  patient  -LR    Progress  improving  -LR  improving  -LR    Outcome Summary  Patient increased gait distance to 140 feet with RW and step through gait pattern, limited by pain and fatigue. Increased independence with sit<->stand t/f this PM. Will continue to progress as able. Stair training in AM to prepare for d/c home.  -LR  Patient ambulated 100 feet with RW and step to gait pattern, limited by pain and fatigue. Increased assist required to rise into standing, requiring bed to be elevated. Recommend d/c home with family and HHPT. Will progress to stair training as able, to prepare for d/c home. Will continue to progress as able.  -LR    Row Name 12/05/20 1344 12/05/20 0938       Therapy Assessment/Plan (PT)    Patient/Family Therapy Goals Statement (PT)  --  go home, decrease pain  -LR    Rehab Potential (PT)  good, to achieve stated therapy goals  -LR  good, to achieve stated therapy goals  -LR    Criteria for Skilled Interventions Met (PT)  yes;meets  criteria;skilled treatment is necessary  -LR  yes;meets criteria;skilled treatment is necessary  -LR    Row Name 12/05/20 1344 12/05/20 0938       Positioning and Restraints    Pre-Treatment Position  sitting in chair/recliner  -LR  in bed  -LR    Post Treatment Position  chair  -LR  chair  -LR    In Chair  notified nsg;reclined;sitting;call light within reach;encouraged to call for assist;exit alarm on;with family/caregiver;legs elevated  -LR  notified nsg;reclined;sitting;call light within reach;encouraged to call for assist;exit alarm on;legs elevated  -LR      User Key  (r) = Recorded By, (t) = Taken By, (c) = Cosigned By    Initials Name Provider Type    LR Antonella Mason, PT Physical Therapist        Outcome Measures     Row Name 12/05/20 1344 12/05/20 0938       How much help from another person do you currently need...    Turning from your back to your side while in flat bed without using bedrails?  3  -LR  3  -LR    Moving from lying on back to sitting on the side of a flat bed without bedrails?  3  -LR  3  -LR    Moving to and from a bed to a chair (including a wheelchair)?  3  -LR  3  -LR    Standing up from a chair using your arms (e.g., wheelchair, bedside chair)?  3  -LR  2  -LR    Climbing 3-5 steps with a railing?  2  -LR  2  -LR    To walk in hospital room?  3  -LR  3  -LR    AM-PAC 6 Clicks Score (PT)  17  -LR  16  -LR    Row Name 12/05/20 0938          PADD    Diagnosis  2  -LR     Gender  2  -LR     Age Group  1  -LR     Gait Distance  0  -LR     Assist Level  1  -LR     Home Support  3  -LR     PADD Score  9  -LR     Patient Preference  home with home health  -LR     Prediction by PADD Score  directly home (with home health or out-patient rehab)  -LR     Row Name 12/05/20 1344 12/05/20 0938       Functional Assessment    Outcome Measure Options  AM-PAC 6 Clicks Basic Mobility (PT);PADD  -LR  AM-PAC 6 Clicks Basic Mobility (PT);PADD  -LR      User Key  (r) = Recorded By, (t) = Taken By,  (c) = Cosigned By    Initials Name Provider Type    Antonella Rossi, PT Physical Therapist        Physical Therapy Education                 Title: PT OT SLP Therapies (Done)     Topic: Physical Therapy (Done)     Point: Mobility training (Done)     Learning Progress Summary           Patient Acceptance, E,D, VU,NR by LR at 12/5/2020 1344    Comment: Educated on hip precautions, weight bearing status, correct sit<->stand t/f technique, correct gait mechanics, safety with mobility, HEP, and progression of POC.    Acceptance, E,D, VU by LR at 12/5/2020 0938    Comment: Educated on posterior hip precautions, weight bearing status, correct supine to sit t/f technique, correct sit<->stand t/f technique, correct gait mechanics, HEP, benefits of mobility, and progression of POC.    Acceptance, E, VU by LG at 12/5/2020 0344                   Point: Home exercise program (Done)     Learning Progress Summary           Patient Acceptance, E,D, VU,NR by LR at 12/5/2020 1344    Comment: Educated on hip precautions, weight bearing status, correct sit<->stand t/f technique, correct gait mechanics, safety with mobility, HEP, and progression of POC.    Acceptance, E,D, VU by LR at 12/5/2020 0938    Comment: Educated on posterior hip precautions, weight bearing status, correct supine to sit t/f technique, correct sit<->stand t/f technique, correct gait mechanics, HEP, benefits of mobility, and progression of POC.    Acceptance, E, VU by LG at 12/5/2020 0344                   Point: Body mechanics (Done)     Learning Progress Summary           Patient Acceptance, E,D, VU,NR by LR at 12/5/2020 1344    Comment: Educated on hip precautions, weight bearing status, correct sit<->stand t/f technique, correct gait mechanics, safety with mobility, HEP, and progression of POC.    Acceptance, E,D, VU by LR at 12/5/2020 0938    Comment: Educated on posterior hip precautions, weight bearing status, correct supine to sit t/f technique,  correct sit<->stand t/f technique, correct gait mechanics, HEP, benefits of mobility, and progression of POC.    Acceptance, E, VU by LG at 12/5/2020 0344                   Point: Precautions (Done)     Learning Progress Summary           Patient Acceptance, E,D, VU,NR by LR at 12/5/2020 1344    Comment: Educated on hip precautions, weight bearing status, correct sit<->stand t/f technique, correct gait mechanics, safety with mobility, HEP, and progression of POC.    Acceptance, E,D, VU by LR at 12/5/2020 0938    Comment: Educated on posterior hip precautions, weight bearing status, correct supine to sit t/f technique, correct sit<->stand t/f technique, correct gait mechanics, HEP, benefits of mobility, and progression of POC.    Acceptance, E, VU by LG at 12/5/2020 0344                               User Key     Initials Effective Dates Name Provider Type Discipline    LR 06/19/15 -  Antonella Mason, PT Physical Therapist PT     11/16/18 -  Tom Gunter RN Registered Nurse Nurse              PT Recommendation and Plan  Planned Therapy Interventions (PT): balance training, bed mobility training, gait training, home exercise program, patient/family education, ROM (range of motion), stair training, strengthening, transfer training  Plan of Care Reviewed With: patient  Progress: improving  Outcome Summary: Patient increased gait distance to 140 feet with RW and step through gait pattern, limited by pain and fatigue. Increased independence with sit<->stand t/f this PM. Will continue to progress as able. Stair training in AM to prepare for d/c home.     Time Calculation:   PT Charges     Row Name 12/05/20 1344 12/05/20 0938          Time Calculation    Start Time  1344  -LR  0938  -LR     PT Received On  12/05/20  -LR  12/05/20  -LR     PT Goal Re-Cert Due Date  12/15/20  -LR  12/15/20  -LR        Time Calculation- PT    Total Timed Code Minutes- PT  23 minute(s)  -LR  24 minute(s)  -LR        Timed  Charges    61709 - PT Therapeutic Exercise Minutes  10  -LR  12  -LR     92538 - Gait Training Minutes   13  -LR  12  -LR       User Key  (r) = Recorded By, (t) = Taken By, (c) = Cosigned By    Initials Name Provider Type    Antonella Rossi, PT Physical Therapist        Therapy Charges for Today     Code Description Service Date Service Provider Modifiers Qty    73096667837 HC PT THER PROC EA 15 MIN 12/5/2020 Antonella Mason, PT GP 1    92933212484 HC GAIT TRAINING EA 15 MIN 12/5/2020 Isabella, Antonella Felipe, PT GP 1    67040564701 HC PT THER SUPP EA 15 MIN 12/5/2020 Antonella Mason, PT GP 3    72679697940 HC PT EVAL LOW COMPLEXITY 3 12/5/2020 Antonella Mason, PT GP 1    71470200424 HC PT THER PROC EA 15 MIN 12/5/2020 Antonella Mason, PT GP 1    97473754305 HC GAIT TRAINING EA 15 MIN 12/5/2020 Antonella Mason, PT GP 1          PT G-Codes  Outcome Measure Options: AM-PAC 6 Clicks Basic Mobility (PT), PADD  AM-PAC 6 Clicks Score (PT): 17  AM-PAC 6 Clicks Score (OT): 17    Antonella Mason, PT  12/5/2020

## 2020-12-05 NOTE — THERAPY EVALUATION
Patient Name: Obi Jackman Jr.  : 1950    MRN: [id number]                              Today's Date: 2020       Admit Date: 12/3/2020    Visit Dx:     ICD-10-CM ICD-9-CM   1. Infection of prosthetic total hip joint (CMS/[facility])  T84.59XA 996.66    Z96.649 V43.64     Patient Active Problem List   Diagnosis   • Allergic rhinitis   • Anxiety disorder   • Benign paroxysmal positional vertigo   • Chronic tension type headache   • Depression   • ED (erectile dysfunction) of non-organic origin   • Hyperlipidemia   • Hypertension   • LFTs abnormal   • CHRISTOPHE (obstructive sleep apnea)   • Elevated glucose   • Health care maintenance   • Morbid obesity due to excess calories (CMS/[facility])   • Onychomycosis of left great toe   • Osteoarthritis of ankle or foot   • Venous stasis   • Neuropathy   • Class 3 severe obesity due to excess calories with serious comorbidity and body mass index (BMI) of 40.0 to 44.9 in adult (CMS/[facility])   • Pre-op evaluation   • Arthritis of foot, left   • S/P left ankle triple arthrodesis   • Acute blood loss anemia, mild, asymptomatic    • Acute postoperative pain   • Wound infection   • Primary osteoarthritis of right hip   • Status post total replacement of right hip   • Postoperative wound infection of right hip     Past Medical History:   Diagnosis Date   • Anxiety and depression    • Bulging of lumbar intervertebral disc    • CPAP (continuous positive airway pressure) dependence    • Hyperlipidemia    • Hypertension    • Migraine    • CHRISTOPHE on CPAP    • Rheumatoid arthritis (CMS/[facility])    • Swelling of left ear    • Wears contact lenses    • Wears contact lenses      Past Surgical History:   Procedure Laterality Date   • ACHILLES TENDON SURGERY Left 10/15/2019    Procedure: ACHILLES TENDON LENGTHENING LEFT;  Surgeon: Elodia Guzman MD;  Location: [facility];  Service: Orthopedics   • CATARACT EXTRACTION Right    • COLONOSCOPY     • EYE SURGERY Bilateral     cornea transplant    • FOOT  SURGERY Left 10/15/2019    triple arthrodesis and achilles tendon lengthening- Megan   • HERNIA REPAIR     • ILIAC CREST BONE GRAFT Left 10/15/2019    Procedure: ILIAC CREST BONE GRAFT LEFT;  Surgeon: Elodia Guzman MD;  Location:  [facility];  Service: Orthopedics   • TOE FUSION Left 10/15/2019    Procedure: TRIPLE ARTHODESIS LEFT;  Surgeon: Elodia Guzman MD;  Location:  [facility];  Service: Orthopedics   • TOTAL HIP ARTHROPLASTY Right 8/4/2020    Procedure: TOTAL HIP ARTHROPLASTY RIGHT;  Surgeon: Osbaldo Olivas MD;  Location:  [facility];  Service: Orthopedics;  Laterality: Right;     General Information     Row Name 12/05/20 [facility]          OT Time and Intention    Document Type  evaluation  -TA     Mode of Treatment  occupational therapy  -TA     Row Name 12/05/20 [facility]          General Information    Patient Profile Reviewed  yes  -TA     Prior Level of Function  independent:;all household mobility;community mobility;gait;transfer;bed mobility;ADL's;driving  -TA     Existing Precautions/Restrictions  fall;hip RLE WBAT  -TA     Barriers to Rehab  none identified  -TA     Row Name 12/05/20 [facility]          Occupational Profile    Reason for Services/Referral (Occupational Profile)  fxl decline from PLOF  -TA     Patient Goals (Occupational Profile)  Return to PLOF  -TA     Row Name 12/05/20 [facility]          Living Environment    Lives With  spouse  -TA     Row Name 12/05/20 [facility]          Home Main Entrance    Number of Stairs, Main Entrance  two  -TA     Stair Railings, Main Entrance  none  -TA     Row Name 12/05/20 [facility]          Cognition    Orientation Status (Cognition)  oriented x 4  -TA     Row Name 12/05/20 [facility]          Safety Issues, Functional Mobility    Safety Issues Affecting Function (Mobility)  safety precaution awareness;safety precautions follow-through/compliance  -TA     Impairments Affecting Function (Mobility)  balance;endurance/activity tolerance;pain;range of motion (ROM)  -TA       User Key  (r)  = Recorded By, (t) = Taken By, (c) = Cosigned By    Initials Name Provider Type    TA Rajan Bruce OT Occupational Therapist        Mobility/ADL's     Row Name 12/05/20 [facility]          Bed Mobility    Bed Mobility  supine-sit;rolling left;sit-sidelying  -TA     Rolling Left [place] (Bed Mobility)  contact guard  -TA     Supine-Sit [place] (Bed Mobility)  moderate assist (50% patient effort);1 person assist;verbal cues  -TA     Sit-Sidelying [place] (Bed Mobility)  maximum assist (25% patient effort);verbal cues  -TA     Bed Mobility, Safety Issues  decreased use of legs for bridging/pushing  -TA     Assistive Device (Bed Mobility)  bed rails;head of bed elevated  -TA     Row Name 12/05/20 [facility]          Transfers    Transfers  sit-stand transfer  -TA     Sit-Stand [place] (Transfers)  minimum assist (75% patient effort);verbal cues  -TA     Row Name 12/05/20 [facility]          Sit-Stand Transfer    Assistive Device (Sit-Stand Transfers)  cane, quad  -TA     Row Name 12/05/20 [facility]          Functional Mobility    Functional Mobility- Comment  Defer to PT  -TA     Row Name 12/05/20 [facility]          Activities of Daily Living    99489 - OT Self Care/Mgmt Minutes  8  -TA     BADL Assessment/Intervention  lower body dressing;grooming  -TA     Row Name 12/05/20 [facility]          Lower Body Dressing Assessment/Training    [place] Level (Lower Body Dressing)  don;doff;socks;dependent (less than 25% patient effort)  -TA     Position (Lower Body Dressing)  sitting up in bed  -TA     Row Name 12/05/20 [facility]          Grooming Assessment/Training    [place] Level (Grooming)  wash face, hands;set up;modified independence  -TA     Position (Grooming)  sitting up in bed  -TA       User Key  (r) = Recorded By, (t) = Taken By, (c) = Cosigned By    Initials Name Provider Type    TA Rajan Bruce OT Occupational Therapist        Obj/Interventions     Row Name 12/05/20 [facility]          Sensory Assessment  (Somatosensory)    Sensory Assessment (Somatosensory)  bilateral UE;sensation intact  -TA     Row Name 12/05/20 0750          Vision Assessment/Intervention    Visual Impairment/Limitations  WFL  -TA     Row Name 12/05/20 0750          Range of Motion Comprehensive    General Range of Motion  bilateral upper extremity ROM WFL  -TA     Row Name 12/05/20 0750          Strength Comprehensive (MMT)    Comment, General Manual Muscle Testing (MMT) Assessment  BUE 4+/5  -TA     Row Name 12/05/20 0750          Balance    Balance Assessment  sitting dynamic balance;standing static balance  -TA     Dynamic Sitting Balance  mild impairment;sitting, edge of bed;unsupported  -TA     Static Standing Balance  mild impairment;supported  -TA     Balance Interventions  sit to stand;occupation based/functional task;supported;weight shifting activity  -TA       User Key  (r) = Recorded By, (t) = Taken By, (c) = Cosigned By    Initials Name Provider Type    TA Rajan Bruce OT Occupational Therapist        Goals/Plan     Row Name 12/05/20 0750          Bed Mobility Goal 1 (OT)    Activity/Assistive Device (Bed Mobility Goal 1, OT)  supine to sit;sit to supine  -TA     [place] Level/Cues Needed (Bed Mobility Goal 1, OT)  contact guard assist;verbal cues required  -TA     Time Frame (Bed Mobility Goal 1, OT)  by discharge  -TA     Progress/Outcomes (Bed Mobility Goal 1, OT)  goal ongoing  -TA     Row Name 12/05/20 0750          Transfer Goal 1 (OT)    Activity/Assistive Device (Transfer Goal 1, OT)  sit-to-stand/stand-to-sit;bed-to-chair/chair-to-bed;toilet;walker, rolling  -TA     [place] Level/Cues Needed (Transfer Goal 1, OT)  supervision required  -TA     Time Frame (Transfer Goal 1, OT)  by discharge  -TA     Progress/Outcome (Transfer Goal 1, OT)  goal ongoing  -TA     Row Name 12/05/20 0750          Dressing Goal 1 (OT)    Activity/Device (Dressing Goal 1, OT)  lower body dressing;long-handled shoe  horn;reacher;sock-aid  -TA     [place]/Cues Needed (Dressing Goal 1, OT)  supervision required;verbal cues required  -TA     Time Frame (Dressing Goal 1, OT)  by discharge  -TA     Progress/Outcome (Dressing Goal 1, OT)  goal ongoing  -TA     Row Name 12/05/20 [facility]          Toileting Goal 1 (OT)    Activity/Device (Toileting Goal 1, OT)  adjust/manage clothing;perform perineal hygiene  -TA     [place] Level/Cues Needed (Toileting Goal 1, OT)  supervision required;verbal cues required  -TA     Time Frame (Toileting Goal 1, OT)  by discharge  -TA     Progress/Outcome (Toileting Goal 1, OT)  goal ongoing  -TA     Row Name 12/05/20 0750          Therapy Assessment/Plan (OT)    Planned Therapy Interventions (OT)  activity tolerance training;BADL retraining;functional balance retraining;occupation/activity based interventions;patient/caregiver education/training;ROM/therapeutic exercise;strengthening exercise;transfer/mobility retraining  -TA       User Key  (r) = Recorded By, (t) = Taken By, (c) = Cosigned By    Initials Name Provider Type    Rajan Madrid OT Occupational Therapist        Clinical Impression     Row Name 12/05/20 [facility]          Pain Assessment    Additional Documentation  Pain Scale: Numbers Pre/Post-Treatment (Group)  -TA     Row Name 12/05/20 [facility]          Pain Scale: Numbers Pre/Post-Treatment    Pretreatment Pain Rating  4/10  -TA     Posttreatment Pain Rating  5/10  -TA     Pain Location - Side  Right  -TA     Pain Location - Orientation  proximal  -TA     Pain Location  hip  -TA     Pre/Posttreatment Pain Comment  RN notified, tolerated, improved with rest.  -TA     Pain Intervention(s)  Medication (See MAR);Ambulation/increased activity;Repositioned  -TA     Row Name 12/05/20 [facility]          Plan of Care Review    Plan of Care Reviewed With  patient  -TA     Outcome Summary  VSS; Pt presents with fxl decline from PLOF, deficits in ADL performance, fxl mobility, occupational  endurance. Pt limited by R hip pain, LE weakness, decreased balance. Pt required Mod A supine>sit at EOB, Min A/QC STS to reposition along EOB with pt unable to acheive full upright posture, LBD dependent, grooming Mod Ind, sit>sidelying Max A, Min A to roll to R side. Pt will benefit from skilled OT services to address deficits, facilitate increased fxl I. Pt has AE for LB ADLs from prior surgery. Recommend IRF at current level pending progress with therapy.  -TA     Row Name 12/05/20 [facility]          Therapy Assessment/Plan (OT)    Patient/Family Therapy Goal Statement (OT)  Return to PLOF  -TA     Rehab Potential (OT)  good, to achieve stated therapy goals  -TA     Criteria for Skilled Therapeutic Interventions Met (OT)  yes;skilled treatment is necessary  -TA     Therapy Frequency (OT)  daily  -TA     Predicted Duration of Therapy Intervention (OT)  5 days  -TA     Row Name 12/05/20 0750          Therapy Plan Review/Discharge Plan (OT)    Anticipated Discharge Disposition (OT)  inpatient rehabilitation facility  -TA     Row Name 12/05/20 [facility]          Vital Signs    Pre Systolic BP Rehab  -- VSS; RN cleared pt for tx  -TA     O2 Delivery Pre Treatment  room air  -TA     O2 Delivery Post Treatment  room air  -TA     Pre Patient Position  Supine  -TA     Intra Patient Position  Standing  -TA     Post Patient Position  Supine  -TA     Row Name 12/05/20 [facility]          Positioning and Restraints    Pre-Treatment Position  in bed  -TA     Post Treatment Position  bed  -TA     In Bed  notified nsg;fowlers;call light within reach;encouraged to call for assist;exit alarm on;with other staff;side rails up x2;legs elevated;SCD pump applied  -TA       User Key  (r) = Recorded By, (t) = Taken By, (c) = Cosigned By    Initials Name Provider Type    Rajan Madrid, OT Occupational Therapist        Outcome Measures     Row Name 12/05/20 0750          How much help from another is currently needed...    Putting on and taking  off regular lower body clothing?  2  -TA     Bathing (including washing, rinsing, and drying)  2  -TA     Toileting (which includes using toilet bed pan or urinal)  2  -TA     Putting on and taking off regular upper body clothing  3  -TA     Taking care of personal grooming (such as brushing teeth)  4  -TA     Eating meals  4  -TA     AM-PAC 6 Clicks Score (OT)  17  -TA     Row Name 12/05/20 0750          Functional Assessment    Outcome Measure Options  AM-PAC 6 Clicks Daily Activity (OT)  -TA       User Key  (r) = Recorded By, (t) = Taken By, (c) = Cosigned By    Initials Name Provider Type    Rajan Madrid OT Occupational Therapist        Occupational Therapy Education                 Title: PT OT SLP Therapies (Done)     Topic: Occupational Therapy (Done)     Point: ADL training (Done)     Description:   Instruct learner(s) on proper safety adaptation and remediation techniques during self care or transfers.   Instruct in proper use of assistive devices.              Learning Progress Summary           Patient Acceptance, E,D, VU,NR by TA at 12/5/2020 0835    Acceptance, E, VU by LG at 12/5/2020 0344                   Point: Home exercise program (Done)     Description:   Instruct learner(s) on appropriate technique for monitoring, assisting and/or progressing therapeutic exercises/activities.              Learning Progress Summary           Patient Acceptance, E,D, VU,NR by TA at 12/5/2020 0835                   Point: Precautions (Done)     Description:   Instruct learner(s) on prescribed precautions during self-care and functional transfers.              Learning Progress Summary           Patient Acceptance, E,D, VU,NR by TA at 12/5/2020 0835    Acceptance, E, VU by LG at 12/5/2020 0344                   Point: Body mechanics (Done)     Description:   Instruct learner(s) on proper positioning and spine alignment during self-care, functional mobility activities and/or exercises.               Learning Progress Summary           Patient Acceptance, E,D, VU,NR by TA at 12/5/2020 0835                               User Key     Initials Effective Dates Name Provider Type Discipline    TA 03/14/16 -  Rajan Bruce OT Occupational Therapist OT     11/16/18 -  Tom Gunter RN Registered Nurse Nurse              OT Recommendation and Plan  Planned Therapy Interventions (OT): activity tolerance training, BADL retraining, functional balance retraining, occupation/activity based interventions, patient/caregiver education/training, ROM/therapeutic exercise, strengthening exercise, transfer/mobility retraining  Therapy Frequency (OT): daily  Plan of Care Review  Plan of Care Reviewed With: patient  Outcome Summary: VSS; Pt presents with fxl decline from PLOF, deficits in ADL performance, fxl mobility, occupational endurance. Pt limited by R hip pain, LE weakness, decreased balance. Pt required Mod A supine>sit at EOB, Min A/QC STS to reposition along EOB with pt unable to acheive full upright posture, LBD dependent, grooming Mod Ind, sit>sidelying Max A, Min A to roll to R side. Pt will benefit from skilled OT services to address deficits, facilitate increased fxl I. Pt has AE for LB ADLs from prior surgery. Recommend IRF at current level pending progress with therapy.     Time Calculation:   Time Calculation- OT     Row Name 12/05/20 0750             Time Calculation- OT    OT Start Time  0750 ttc 8 minutes  -TA      Total Timed Code Minutes- OT  8 minute(s)  -TA      OT Received On  12/05/20  -TA      OT Goal Re-Cert Due Date  12/15/20  -TA         Timed Charges    53000 - OT Self Care/Mgmt Minutes  8  -TA        User Key  (r) = Recorded By, (t) = Taken By, (c) = Cosigned By    Initials Name Provider Type    TA Rajan Bruce OT Occupational Therapist        Therapy Charges for Today     Code Description Service Date Service Provider Modifiers Qty    81244529181 HC OT EVAL MOD COMPLEXITY 4  12/5/2020 Rajan Bruce, OT GO 1    82455603237 HC OT SELF CARE/MGMT/TRAIN EA 15 MIN 12/5/2020 Rajan Bruce, OT GO 1               Rajan Bruce OT  12/5/2020

## 2020-12-05 NOTE — PLAN OF CARE
Problem: Adult Inpatient Plan of Care  Goal: Plan of Care Review  Recent Flowsheet Documentation  Taken 12/5/2020 0750 by Rajan Bruce OT  Plan of Care Reviewed With: patient  Outcome Summary:   VSS   Pt presents with fxl decline from PLOF, deficits in ADL performance, fxl mobility, occupational endurance. Pt limited by R hip pain, LE weakness, decreased balance. Pt required Mod A supine>sit at EOB, Min A/QC STS to reposition along EOB with pt unable to acheive full upright posture, LBD dependent, grooming Mod Ind, sit>sidelying Max A, Min A to roll to R side. Pt will benefit from skilled OT services to address deficits, facilitate increased fxl I. Pt has AE for LB ADLs from prior surgery. Recommend IRF at current level pending progress with therapy.   Goal Outcome Evaluation:  Plan of Care Reviewed With: patient  Progress: no change  Outcome Summary: VSS; Pt presents with fxl decline from PLOF, deficits in ADL performance, fxl mobility, occupational endurance. Pt limited by R hip pain, LE weakness, decreased balance. Pt required Mod A supine>sit at EOB, Min A/QC STS to reposition along EOB with pt unable to acheive full upright posture, LBD dependent, grooming Mod Ind, sit>sidelying Max A, Min A to roll to R side. Pt will benefit from skilled OT services to address deficits, facilitate increased fxl I. Pt has AE for LB ADLs from prior surgery. Recommend IRF at current level pending progress with therapy.

## 2020-12-05 NOTE — THERAPY EVALUATION
Patient Name: Obi Jackman Jr.  : 1950    MRN: [id number]                              Today's Date: 2020       Admit Date: 12/3/2020    Visit Dx:     ICD-10-CM ICD-9-CM   1. Infection of prosthetic total hip joint (CMS/[facility])  T84.59XA 996.66    Z96.649 V43.64     Patient Active Problem List   Diagnosis   • Allergic rhinitis   • Anxiety disorder   • Benign paroxysmal positional vertigo   • Chronic tension type headache   • Depression   • ED (erectile dysfunction) of non-organic origin   • Hyperlipidemia   • Hypertension   • LFTs abnormal   • CHRISTOPHE (obstructive sleep apnea)   • Elevated glucose   • Health care maintenance   • Morbid obesity due to excess calories (CMS/[facility])   • Onychomycosis of left great toe   • Osteoarthritis of ankle or foot   • Venous stasis   • Neuropathy   • Class 3 severe obesity due to excess calories with serious comorbidity and body mass index (BMI) of 40.0 to 44.9 in adult (CMS/[facility])   • Pre-op evaluation   • Arthritis of foot, left   • S/P left ankle triple arthrodesis   • Acute blood loss anemia, mild, asymptomatic    • Acute postoperative pain   • Wound infection   • Primary osteoarthritis of right hip   • Status post total replacement of right hip   • Postoperative wound infection of right hip     Past Medical History:   Diagnosis Date   • Anxiety and depression    • Bulging of lumbar intervertebral disc    • CPAP (continuous positive airway pressure) dependence    • Hyperlipidemia    • Hypertension    • Migraine    • CHRISTOPHE on CPAP    • Rheumatoid arthritis (CMS/[facility])    • Swelling of left ear    • Wears contact lenses    • Wears contact lenses      Past Surgical History:   Procedure Laterality Date   • ACHILLES TENDON SURGERY Left 10/15/2019    Procedure: ACHILLES TENDON LENGTHENING LEFT;  Surgeon: Elodia Guzman MD;  Location: [facility];  Service: Orthopedics   • CATARACT EXTRACTION Right    • COLONOSCOPY     • EYE SURGERY Bilateral     cornea transplant    • FOOT  SURGERY Left 10/15/2019    triple arthrodesis and achilles tendon lengthening- [facility]   • HERNIA REPAIR     • ILIAC CREST BONE GRAFT Left 10/15/2019    Procedure: ILIAC CREST BONE GRAFT LEFT;  Surgeon: Elodia Guzman MD;  Location:  [facility];  Service: Orthopedics   • TOE FUSION Left 10/15/2019    Procedure: TRIPLE ARTHODESIS LEFT;  Surgeon: Elodia Guzman MD;  Location:  [facility];  Service: Orthopedics   • TOTAL HIP ARTHROPLASTY Right 8/4/2020    Procedure: TOTAL HIP ARTHROPLASTY RIGHT;  Surgeon: Osbaldo Olivas MD;  Location:  [facility];  Service: Orthopedics;  Laterality: Right;     General Information     Row Name 12/05/20 0938          Physical Therapy Time and Intention    Document Type  evaluation;therapy note (daily note)  -LR     Mode of Treatment  physical therapy;individual therapy  -LR     Row Name 12/05/20 0938          General Information    Patient Profile Reviewed  yes  -LR     Prior Level of Function  min assist:;all household mobility;community mobility;gait;transfer;bed mobility;using stairs recently had to return to use of bilateral canes d/t sudden increase in pain  -LR     Existing Precautions/Restrictions  fall;right;hip, posterior  -LR     Barriers to Rehab  none identified  -LR     Row Name 12/05/20 0938          Living Environment    Lives With  spouse can assist at all times if needed  -LR     Row Name 12/05/20 0938          Home Main Entrance    Number of Stairs, Main Entrance  two  -LR     Stair Railings, Main Entrance  none  -LR     Row Name 12/05/20 0938          Stairs Within Home, Primary    Number of Stairs, Within Home, Primary  none  -LR     Row Name 12/05/20 0938          Cognition    Orientation Status (Cognition)  oriented x 4  -LR     Row Name 12/05/20 0938          Safety Issues, Functional Mobility    Safety Issues Affecting Function (Mobility)  positioning of assistive device;safety precautions follow-through/compliance;sequencing abilities;safety precaution awareness   -LR     Impairments Affecting Function (Mobility)  balance;strength;pain;endurance/activity tolerance;range of motion (ROM)  -LR       User Key  (r) = Recorded By, (t) = Taken By, (c) = Cosigned By    Initials Name Provider Type    LR Antonella Mason, PT Physical Therapist        Mobility     Row Name 12/05/20 0938          Bed Mobility    Bed Mobility  supine-sit  -LR     Supine-Sit [place] (Bed Mobility)  verbal cues;minimum assist (75% patient effort)  -LR     Sit-Sidelying [place] (Bed Mobility)  not tested  -LR     Assistive Device (Bed Mobility)  head of bed elevated;bed rails  -LR     Comment (Bed Mobility)  Verbal cues to move LEs towards EOB and to push up from bed to raise trunk into sitting and to scoot hips out to get feet on floor. Min assist to support and move R LE. Denied dizziness upon sitting up.  -LR     Row Name 12/05/20 0938          Transfers    Comment (Transfers)  Despite cues to push up from bed to stand, patient pulled up on RW to stand. Bed elevated to assist with t/f. Verbal cues to step R LE out before t/f for comfort. Cues to reach back for chair to lower into sitting.  -LR     Row Name 12/05/20 0938          Sit-Stand Transfer    Sit-Stand [place] (Transfers)  verbal cues;minimum assist (75% patient effort);2 person assist  -LR     Assistive Device (Sit-Stand Transfers)  walker, front-wheeled  -LR     Row Name 12/05/20 0938          Gait/Stairs (Locomotion)    [place] Level (Gait)  verbal cues;contact guard;2 person assist  -LR     Assistive Device (Gait)  walker, front-wheeled  -LR     Distance in Feet (Gait)  100  -LR     Deviations/Abnormal Patterns (Gait)  bilateral deviations;dena decreased;gait speed decreased;stride length decreased;right sided deviations;antalgic  -LR     Bilateral Gait Deviations  forward flexed posture;heel strike decreased  -LR     Right Sided Gait Deviations  weight shift ability decreased  -LR     Comment (Gait/Stairs)   Patient ambulated with step to gait pattern at slow pace. Verbal cues for correct sequencing of steps, increased R LE weight bearing/stance phase, decreased UE weight bearing, upright posture, and increased R hip and knee flexion during swing phase. Improved with cues for correction. Gait limited by pain and fatigue.  -     Row Name 12/05/20 0938          Mobility    Extremity Weight-bearing Status  right lower extremity  -LR     Right Lower Extremity (Weight-bearing Status)  weight-bearing as tolerated (WBAT)  -       User Key  (r) = Recorded By, (t) = Taken By, (c) = Cosigned By    Initials Name Provider Type    LR Antonella Mason, PT Physical Therapist        Obj/Interventions     Row Name 12/05/20 0938          Range of Motion Comprehensive    General Range of Motion  lower extremity range of motion deficits identified  -     Row Name 12/05/20 0938          Strength Comprehensive (MMT)    General Manual Muscle Testing (MMT) Assessment  lower extremity strength deficits identified  -     Row Name 12/05/20 0938          Motor Skills    Therapeutic Exercise  ankle;knee;hip;other (see comments) cues for technique; max assist SLR, mod assist heel slides and hip abd  -     Row Name 12/05/20 0938          Hip (Therapeutic Exercise)    Hip (Therapeutic Exercise)  strengthening exercise;isometric exercises  -     Hip Isometrics (Therapeutic Exercise)  bilateral;gluteal sets;sitting;10 repetitions  -     Hip Strengthening (Therapeutic Exercise)  right;aBduction;heel slides;sitting;10 repetitions  -     Row Name 12/05/20 0938          Knee (Therapeutic Exercise)    Knee (Therapeutic Exercise)  strengthening exercise;isometric exercises  -     Knee Isometrics (Therapeutic Exercise)  right;quad sets;sitting;10 repetitions  -     Knee Strengthening (Therapeutic Exercise)  right;SLR (straight leg raise);SAQ (short arc quad);LAQ (long arc quad);sitting;10 repetitions  -     Row Name 12/05/20 0938           Ankle (Therapeutic Exercise)    Ankle (Therapeutic Exercise)  AROM (active range of motion)  -LR     Ankle AROM (Therapeutic Exercise)  bilateral;dorsiflexion;plantarflexion;sitting;10 repetitions  -LR     Row Name 12/05/20 0938          Balance    Balance Assessment  sitting static balance;sitting dynamic balance;standing static balance;standing dynamic balance  -LR     Static Sitting Balance  unsupported;WFL;sitting, edge of bed  -LR     Dynamic Sitting Balance  WFL;unsupported;sitting, edge of bed  -LR     Static Standing Balance  mild impairment;supported;standing  -LR     Dynamic Standing Balance  mild impairment;supported;standing  -LR     Row Name 12/05/20 0938          Sensory Assessment (Somatosensory)    Sensory Assessment (Somatosensory)  sensation intact;other (see comments) denies numbness/tingling;light touch equal and intact  -LR     Row Name 12/05/20 0938          General Lower Extremity Assessment (Range of Motion)    Lower Extremity: Range of Motion  LLE ROM WNL;hip, right: LE ROM  -LR     Comment: Lower Extremity ROM  R hip AROM impaired 50% d/t pain, PROM limited 25% d/t pain  -     Row Name 12/05/20 0938          Lower Extremity (Manual Muscle Testing)    Comment, MMT: Lower Extremity  R hip functionally 3+/5, no knee buckling with gait, unable to perform SLR independently.  -LR       User Key  (r) = Recorded By, (t) = Taken By, (c) = Cosigned By    Initials Name Provider Type    LR Antonlela Mason, PT Physical Therapist        Goals/Plan     Row Name 12/05/20 0938          Bed Mobility Goal 1 (PT)    Activity/Assistive Device (Bed Mobility Goal 1, PT)  sit to supine/supine to sit  -LR     [place] Level/Cues Needed (Bed Mobility Goal 1, PT)  modified independence  -LR     Time Frame (Bed Mobility Goal 1, PT)  long term goal (LTG);3 days  -LR     Progress/Outcomes (Bed Mobility Goal 1, PT)  goal ongoing  -     Row Name 12/05/20 0938          Transfer Goal 1 (PT)     Activity/Assistive Device (Transfer Goal 1, PT)  sit-to-stand/stand-to-sit;walker, rolling  -LR     [place] Level/Cues Needed (Transfer Goal 1, PT)  modified independence  -LR     Time Frame (Transfer Goal 1, PT)  long term goal (LTG);3 days  -LR     Progress/Outcome (Transfer Goal 1, PT)  goal ongoing  -LR     Row Name 12/05/20 0938          Gait Training Goal 1 (PT)    Activity/Assistive Device (Gait Training Goal 1, PT)  gait (walking locomotion);walker, rolling  -LR     [place] Level (Gait Training Goal 1, PT)  modified independence  -LR     Distance (Gait Training Goal 1, PT)  150 feet  -LR     Time Frame (Gait Training Goal 1, PT)  long term goal (LTG);3 days  -LR     Progress/Outcome (Gait Training Goal 1, PT)  goal ongoing  -LR     Row Name 12/05/20 0938          Stairs Goal 1 (PT)    Activity/Assistive Device (Stairs Goal 1, PT)  ascending stairs;descending stairs;step-to-step;cane, straight;crutches, axillary  -LR     [place] Level/Cues Needed (Stairs Goal 1, PT)  contact guard assist  -LR     Number of Stairs (Stairs Goal 1, PT)  2  -LR     Time Frame (Stairs Goal 1, PT)  long term goal (LTG);3 days  -LR     Progress/Outcome (Stairs Goal 1, PT)  goal ongoing  -LR       User Key  (r) = Recorded By, (t) = Taken By, (c) = Cosigned By    Initials Name Provider Type    LR Antonella Mason, PT Physical Therapist        Clinical Impression     Row Name 12/05/20 0938          Pain    Additional Documentation  Pain Scale: Numbers Pre/Post-Treatment (Group)  -LR     Row Name 12/05/20 0938          Pain Scale: Numbers Pre/Post-Treatment    Pretreatment Pain Rating  0/10 - no pain denies pain at rest  -LR     Posttreatment Pain Rating  8/10  -LR     Pain Location - Side  Right  -LR     Pain Location  hip  -LR     Pain Intervention(s)  Ambulation/increased activity;Repositioned;Cold applied;Medication (See MAR)  -LR     Row Name 12/05/20 0938          Plan of Care Review    Plan of Care Reviewed  With  patient  -LR     Progress  improving  -LR     Outcome Summary  Patient ambulated 100 feet with RW and step to gait pattern, limited by pain and fatigue. Increased assist required to rise into standing, requiring bed to be elevated. Recommend d/c home with family and HHPT. Will progress to stair training as able, to prepare for d/c home. Will continue to progress as able.  -LR     Row Name 12/05/20 0938          Therapy Assessment/Plan (PT)    Patient/Family Therapy Goals Statement (PT)  go home, decrease pain  -LR     Rehab Potential (PT)  good, to achieve stated therapy goals  -LR     Criteria for Skilled Interventions Met (PT)  yes;meets criteria;skilled treatment is necessary  -LR     Row Name 12/05/20 0938          Positioning and Restraints    Pre-Treatment Position  in bed  -LR     Post Treatment Position  chair  -LR     In Chair  notified nsg;reclined;sitting;call light within reach;encouraged to call for assist;exit alarm on;legs elevated  -LR       User Key  (r) = Recorded By, (t) = Taken By, (c) = Cosigned By    Initials Name Provider Type    LR Antonella Mason, PT Physical Therapist        Outcome Measures     Row Name 12/05/20 0938          How much help from another person do you currently need...    Turning from your back to your side while in flat bed without using bedrails?  3  -LR     Moving from lying on back to sitting on the side of a flat bed without bedrails?  3  -LR     Moving to and from a bed to a chair (including a wheelchair)?  3  -LR     Standing up from a chair using your arms (e.g., wheelchair, bedside chair)?  2  -LR     Climbing 3-5 steps with a railing?  2  -LR     To walk in hospital room?  3  -LR     AM-PAC 6 Clicks Score (PT)  16  -LR     Row Name 12/05/20 0938          PADD    Diagnosis  2  -LR     Gender  2  -LR     Age Group  1  -LR     Gait Distance  0  -LR     Assist Level  1  -LR     Home Support  3  -LR     PADD Score  9  -LR     Patient Preference  home with  home health  -LR     Prediction by PADD Score  directly home (with home health or out-patient rehab)  -LR     Row Name 12/05/20 0938          Functional Assessment    Outcome Measure Options  AM-PAC 6 Clicks Basic Mobility (PT);PADD  -LR       User Key  (r) = Recorded By, (t) = Taken By, (c) = Cosigned By    Initials Name Provider Type    Antonella Rossi, PT Physical Therapist        Physical Therapy Education                 Title: PT OT SLP Therapies (Done)     Topic: Physical Therapy (Done)     Point: Mobility training (Done)     Learning Progress Summary           Patient Acceptance, E,D, VU by LR at 12/5/2020 0938    Comment: Educated on posterior hip precautions, weight bearing status, correct supine to sit t/f technique, correct sit<->stand t/f technique, correct gait mechanics, HEP, benefits of mobility, and progression of POC.    Acceptance, E, VU by LG at 12/5/2020 0344                   Point: Home exercise program (Done)     Learning Progress Summary           Patient Acceptance, E,D, VU by LR at 12/5/2020 0938    Comment: Educated on posterior hip precautions, weight bearing status, correct supine to sit t/f technique, correct sit<->stand t/f technique, correct gait mechanics, HEP, benefits of mobility, and progression of POC.    Acceptance, E, VU by LG at 12/5/2020 0344                   Point: Body mechanics (Done)     Learning Progress Summary           Patient Acceptance, E,D, VU by LR at 12/5/2020 0938    Comment: Educated on posterior hip precautions, weight bearing status, correct supine to sit t/f technique, correct sit<->stand t/f technique, correct gait mechanics, HEP, benefits of mobility, and progression of POC.    Acceptance, E, VU by LG at 12/5/2020 0344                   Point: Precautions (Done)     Learning Progress Summary           Patient Acceptance, E,D, VU by LR at 12/5/2020 0938    Comment: Educated on posterior hip precautions, weight bearing status, correct supine to  sit t/f technique, correct sit<->stand t/f technique, correct gait mechanics, HEP, benefits of mobility, and progression of POC.    Acceptance, E, VU by LG at 12/5/2020 0344                               User Key     Initials Effective Dates Name Provider Type Discipline    LR 06/19/15 -  Antonella Mason, PT Physical Therapist PT    LG 11/16/18 -  Tom Gunter RN Registered Nurse Nurse              PT Recommendation and Plan  Planned Therapy Interventions (PT): balance training, bed mobility training, gait training, home exercise program, patient/family education, ROM (range of motion), stair training, strengthening, transfer training  Plan of Care Reviewed With: patient  Progress: improving  Outcome Summary: Patient ambulated 100 feet with RW and step to gait pattern, limited by pain and fatigue. Increased assist required to rise into standing, requiring bed to be elevated. Recommend d/c home with family and HHPT. Will progress to stair training as able, to prepare for d/c home. Will continue to progress as able.     Time Calculation:   PT Charges     Row Name 12/05/20 0938             Time Calculation    Start Time  0938  -LR      PT Received On  12/05/20  -LR      PT Goal Re-Cert Due Date  12/15/20  -LR         Time Calculation- PT    Total Timed Code Minutes- PT  24 minute(s)  -LR         Timed Charges    15247 - PT Therapeutic Exercise Minutes  12  -LR      01197 - Gait Training Minutes   12  -LR        User Key  (r) = Recorded By, (t) = Taken By, (c) = Cosigned By    Initials Name Provider Type    LR Antonella Mason, PT Physical Therapist        Therapy Charges for Today     Code Description Service Date Service Provider Modifiers Qty    08370227773 HC PT THER PROC EA 15 MIN 12/5/2020 Antonella Mason, PT GP 1    77477781579 HC GAIT TRAINING EA 15 MIN 12/5/2020 Antonella Mason, PT GP 1    49797700000 HC PT THER SUPP EA 15 MIN 12/5/2020 Antonella Mason, PT GP 3     [id number]  PT EVAL LOW COMPLEXITY 3 12/5/2020 Antonella Mason, PT GP 1          PT G-Codes  Outcome Measure Options: AM-PAC 6 Clicks Basic Mobility (PT), PADD  AM-PAC 6 Clicks Score (PT): 16  AM-PAC 6 Clicks Score (OT): 17    Antonella Mason, PT  12/5/2020

## 2020-12-05 NOTE — PROGRESS NOTES
INFECTIOUS DISEASE PROGRESS NOTE     Obi Jackman Jr.  1950  [id number]      Admission Date: 12/3/2020      Requesting Provider: Joselyn Patel MD  Evaluating Physician: Luis Delaney MD    Reason for Consultation: Possible prosthetic hip infection.    History of present illness:    12/4/20:Patient is a 70 y.o. male with h/o CHRISTOPHE/CPAP, RA/NSAIDs/Plaquenil, and HTN who we are asked to evaluate for post-operative right MARTY wound infection.  The patient underwent a right total hip arthroplasty on 8/4/20.  He did fairly well initially, but then started having increasing pain up to 9/10 about 4 days ago.  He has been have popping and grinding of the hip with stiffness which is worse with movement and sitting.  He is now having difficulty bear weight on the hip and is using 2 canes for walking. He denies any fever or chills and no drainage redness at the incision site.  He underwent CT-guided aspiration of the right hip with 50 cc of old appearing blood aspirated.  The fluid has 29,200 WBC with 82% neutrophils, no crystals, and culture negative to date.  His CRP is 17.3, ESR 32, and WBC 12,100 with 74% neutrophils.  A COVID-19 screen is negative. He is currently on Rocephin and Vancomycin.  ID was asked to evaluate and manage his antibiotic therapy.   12/5/20:  Underwent I & D, poly exchange Yesterday.  He is having moderate amount of pain. No n/v/d. He has remained afebrile.  He was found to have murky fluid extending down into the joint.The prosthesis did not appear to be loosened.    Past Medical History:   Diagnosis Date   • Anxiety and depression    • Bulging of lumbar intervertebral disc    • CPAP (continuous positive airway pressure) dependence    • Hyperlipidemia    • Hypertension    • Migraine    • CHRISTOPHE on CPAP    • Rheumatoid arthritis (CMS/HCC)    • Swelling of left ear    • Wears contact lenses    • Wears contact lenses        Past Surgical History:   Procedure Laterality Date   • ACHILLES TENDON  SURGERY Left 10/15/2019    Procedure: ACHILLES TENDON LENGTHENING LEFT;  Surgeon: Elodia Guzman MD;  Location: [facility];  Service: Orthopedics   • CATARACT EXTRACTION Right    • COLONOSCOPY  2015   • EYE SURGERY Bilateral     cornea transplant    • FOOT SURGERY Left 10/15/2019    triple arthrodesis and achilles tendon lengthening- [place]   • HERNIA REPAIR     • ILIAC CREST BONE GRAFT Left 10/15/2019    Procedure: ILIAC CREST BONE GRAFT LEFT;  Surgeon: Elodia Guzman MD;  Location: [facility];  Service: Orthopedics   • TOE FUSION Left 10/15/2019    Procedure: TRIPLE ARTHODESIS LEFT;  Surgeon: Elodia Guzman MD;  Location: [facility];  Service: Orthopedics   • TOTAL HIP ARTHROPLASTY Right 8/4/2020    Procedure: TOTAL HIP ARTHROPLASTY RIGHT;  Surgeon: Osbaldo Olivas MD;  Location: [facility];  Service: Orthopedics;  Laterality: Right;       Family History   Problem Relation Age of Onset   • No Known Problems Mother    • No Known Problems Father        Social History     Socioeconomic History   • Marital status:      Spouse name: Not on file   • Number of children: Not on file   • Years of education: Not on file   • Highest education level: Not on file   Tobacco Use   • Smoking status: Never Smoker   • Smokeless tobacco: Never Used   Substance and Sexual Activity   • Alcohol use: Yes     Comment: rarely   • Drug use: No   • Sexual activity: Defer       No Known Allergies      Medication:    Current Facility-Administered Medications:   •  acetaminophen (TYLENOL) tablet 650 mg, 650 mg, Oral, Q6H PRN, Osbaldo Olivas MD, 650 mg at 12/05/20 0341  •  ALPRAZolam (XANAX) tablet 0.25 mg, 0.25 mg, Oral, Daily PRN, Osbaldo Olivas MD  •  amLODIPine (NORVASC) tablet 5 mg, 5 mg, Oral, Daily, Osbaldo Olivas MD, 5 mg at 12/05/20 0902  •  aspirin EC tablet 325 mg, 325 mg, Oral, Daily, Osbaldo Olivas MD, 325 mg at 12/05/20 0901  •  atenolol (TENORMIN) tablet 50 mg, 50 mg, Oral, Daily, Osbaldo Olivas MD, 50 mg at  12/05/20 0902  •  buPROPion XL (WELLBUTRIN XL) 24 hr tablet 150 mg, 150 mg, Oral, Daily, Osbaldo Olivas MD, 150 mg at 12/05/20 0901  •  calcium carbonate EX (TUMS EX) chewable tablet 750 mg, 750 mg, Oral, BID PRN, Osblado Olivas MD  •  cefTRIAXone (ROCEPHIN) 2 g/100 mL 0.9% NS IVPB (MBP), 2 g, Intravenous, Q24H, Luis Delaney MD, 2 g at 12/04/20 2108  •  [START ON 12/6/2020] DAPTOmycin (CUBICIN) 1,000 mg in sodium chloride 0.9 % 50 mL IVPB, 1,000 mg, Intravenous, Q24H, Luis Delaney MD  •  dextrose 5 % and sodium chloride 0.45 % with KCl 20 mEq/L infusion, 75 mL/hr, Intravenous, Continuous, Osbaldo Olivas MD, Last Rate: 75 mL/hr at 12/03/20 2146, 75 mL/hr at 12/03/20 2146  •  docusate sodium (COLACE) capsule 100 mg, 100 mg, Oral, BID, Osbaldo Olivas MD, 100 mg at 12/05/20 0901  •  gabapentin (NEURONTIN) capsule 300 mg, 300 mg, Oral, Q8H, Osbaldo Olivas MD, 300 mg at 12/05/20 0518  •  HYDROmorphone (DILAUDID) injection 0.5 mg, 0.5 mg, Intravenous, Q2H PRN, 0.5 mg at 12/05/20 0027 **AND** naloxone (NARCAN) injection 0.1 mg, 0.1 mg, Intravenous, Q5 Min PRN, Osbaldo Olivas MD  •  ondansetron (ZOFRAN) tablet 4 mg, 4 mg, Oral, Q6H PRN **OR** ondansetron (ZOFRAN) injection 4 mg, 4 mg, Intravenous, Q6H PRN, Osbaldo Olivas MD  •  oxyCODONE (ROXICODONE) immediate release tablet 5 mg, 5 mg, Oral, Q4H PRN, Osbaldo Olivas MD, 5 mg at 12/05/20 0902  •  sodium chloride 0.9 % flush 1-10 mL, 1-10 mL, Intravenous, PRN, Osbaldo Olivas MD  •  sodium chloride 0.9 % flush 10 mL, 10 mL, Intravenous, Q12H, Osbaldo Olivas MD, 10 mL at 12/03/20 2312  •  sodium chloride 0.9 % flush 10 mL, 10 mL, Intravenous, PRN, Osbaldo Olivas MD  •  sodium chloride 0.9 % flush 3 mL, 3 mL, Intravenous, Q12H, Osbaldo Olivas MD  •  sodium chloride 0.9 % infusion, 120 mL/hr, Intravenous, Continuous, Osbaldo Olivas MD, Last Rate: 120 mL/hr at 12/04/20 1803, 120 mL/hr at 12/04/20 1803  •  SUMAtriptan (IMITREX) tablet 50 mg, 50  mg, Oral, Once PRN, Osbaldo Olivas MD  •  traZODone (DESYREL) tablet 50 mg, 50 mg, Oral, Nightly, Osbaldo Olivas MD, 50 mg at 20 2018    Antibiotics:  Anti-Infectives (From admission, onward)    Ordered     Dose/Rate Route Frequency Start Stop    20 1215  DAPTOmycin (CUBICIN) 1,000 mg in sodium chloride 0.9 % 50 mL IVPB     Ordering Provider: Luis Delaney MD    1,000 mg  100 mL/hr over 30 Minutes Intravenous Every 24 Hours 20 1100 20 1059    20 205  cefTRIAXone (ROCEPHIN) 2 g/100 mL 0.9% NS IVPB (MBP)     Luis Delaney MD reviewed the order on 20 1209.   Ordering Provider: Luis Delaney MD    2 g  over 30 Minutes Intravenous Every 24 Hours Scheduled 20 2200 20 1208    20 2102  vancomycin 3000 mg/500 mL 0.9% NS IVPB ([facility])     Ordering Provider: Maureen Orona RP    20 mg/kg × 145 kg  over 180 Minutes Intravenous Once 20 2200 20 0208                Physical Exam:   Vital Signs  Temp (24hrs), Av.3 °F (36.8 °C), Min:97.7 °F (36.5 °C), Max:99.5 °F (37.5 °C)    Temp  Min: 97.7 °F (36.5 °C)  Max: 99.5 °F (37.5 °C)  BP  Min: 114/62  Max: 160/75  Pulse  Min: 83  Max: 93  Resp  Min: 16  Max: 18  SpO2  Min: 94 %  Max: 100 %    GENERAL: Awake and alert, in no acute distress.   HEENT: Normocephalic, atraumatic.  PERRL. EOMI. No conjunctival injection. No icterus. Oropharynx clear without evidence of thrush or exudate.    NECK: Supple  HEART: RRR; No murmur,  LUNGS: Clear to auscultation bilaterally without wheezing, rales, rhonchi. Normal respiratory effort. Nonlabored.  ABDOMEN: Soft, nontender, nondistended.  No rebound or guarding. NO mass or HSM.  EXT:  No cyanosis, clubbing or edema.   :  Without Mcnally catheter.  MSK:  Right hip incision with dressing  SKIN: Warm and dry without cutaneous eruptions on Inspection/palpation.    NEURO: Oriented to PPT. Normal speech and cognition  PSYCHIATRIC: Normal insight and judgment. Cooperative  with PE    Laboratory Data    Results from last 7 days   Lab Units 12/05/20  0741 12/03/20  2114 12/02/20  1455   WBC 10*3/mm3 7.69 8.95 12.07*   HEMOGLOBIN g/dL 11.8* 12.9* 15.3   HEMATOCRIT % 36.5* 40.7 44.7   PLATELETS 10*3/mm3 247 245 265     Results from last 7 days   Lab Units 12/03/20  2114   SODIUM mmol/L 135*   POTASSIUM mmol/L 4.0   CHLORIDE mmol/L 96*   CO2 mmol/L 26.0   BUN mg/dL 21   CREATININE mg/dL 1.18   GLUCOSE mg/dL 103*   CALCIUM mg/dL 9.2         Results from last 7 days   Lab Units 12/02/20  1455   SED RATE mm/hr 32*     Results from last 7 days   Lab Units 12/02/20  1455   CRP mg/dL 17.26*         Results from last 7 days   Lab Units 12/04/20  1046 12/03/20  2114   CK TOTAL U/L 89 106         Estimated Creatinine Clearance: 86.1 mL/min (by C-G formula based on SCr of 1.18 mg/dL).      Microbiology:  Microbiology Results (last 10 days)     Procedure Component Value - Date/Time    Body Fluid Culture - Synovial Fluid, Hip, Right [[id number]] Collected: 12/04/20 1452    Lab Status: Preliminary result Specimen: Synovial Fluid from Hip, Right Updated: 12/04/20 1812     Gram Stain Moderate (3+) WBCs per low power field      No organisms seen    AFB Culture - Synovial Fluid, Hip, Right [[id number]] Collected: 12/04/20 1452    Lab Status: Preliminary result Specimen: Synovial Fluid from Hip, Right Updated: 12/05/20 1151     AFB Stain No acid fast bacilli seen on concentrated smear    COVID PRE-OP / PRE-PROCEDURE SCREENING ORDER (NO ISOLATION) - Swab, Nasopharynx [[id number]]  (Normal) Collected: 12/03/20 2227    Lab Status: Final result Specimen: Swab from Nasopharynx Updated: 12/04/20 7422    Narrative:      The following orders were created for panel order COVID PRE-OP / PRE-PROCEDURE SCREENING ORDER (NO ISOLATION) - Swab, Nasopharynx.  Procedure                               Abnormality         Status                     ---------                               -----------         ------                      COVID-19, [facility]OTT IN-HOUS...[[id number]]  Normal              Final result                 Please view results for these tests on the individual orders.    COVID-19, [facility]OTT IN-HOUSE,NP Swab (NO TRANSPORT MEDIA) 2 HR TAT - Swab, Nasopharynx [[id number]]  (Normal) Collected: 12/03/20 2227    Lab Status: Final result Specimen: Swab from Nasopharynx Updated: 12/04/20 0347     COVID19 Not Detected    Narrative:      Fact sheet for providers: https://www.fda.gov/media/822252/download     Fact sheet for patients: https://www.fda.gov/media/318571/download    Body Fluid Culture - Synovial Fluid, Hip, Right [[id number]] Collected: 12/03/20 1447    Lab Status: Preliminary result Specimen: Synovial Fluid from Hip, Right Updated: 12/05/20 0849     Body Fluid Culture Culture in progress     Gram Stain Many (4+) WBCs per low power field      No organisms seen        Right hip cultures have early growth of a probable coagulase-negative staph-I discussed this with the microbiology lab in [place] today.        Radiology:  Xr Chest 1 View    Result Date: 12/3/2020  No acute disease.  Signer Name: Kerry Dorsey MD  Signed: 12/3/2020 9:57 PM  Workstation Name: [facility]  [facility] [place]    Ct Hip Right Wo Contrast    Result Date: 12/3/2020  Fluid collection adjacent to the right hip greater trochanteric region measuring up to 7 cm without gas locules associated demonstrating adjacent subcutaneous edema without evidence for hardware loosening or failure otherwise noted of a right hip arthroplasty.   D:  12/03/2020 E:  12/03/2020  This report was finalized on 12/3/2020 6:05 PM by Dr. Khadar Norris.      Ct Injection/aspriation Large Joint Or Bursa    Result Date: 12/3/2020   Percutaneous aspiration of lateral fluid collection to right hip replacement, yielding 50 mL of old appearing blood. No drainage catheter was left in place. Portion of fluid sent for requested laboratory analysis  Plan:  Per orthopedic surgery  ______________________________________________________________________  PROCEDURE SUMMARY: - Aspiration of fluid collection lateral to right hip replacement under CT guidance - Additional procedure(s): None  PROCEDURE DETAILS:  Pre-procedure Consent: Informed consent for the procedure including risks, benefits and alternatives was obtained and time-out was performed prior to the procedure. Preparation: The site was prepared and draped using maximal sterile barrier technique including cutaneous antisepsis.  Anesthesia/sedation Level of anesthesia/sedation: None  Fluid collection aspiration The patient was positioned supine. Initial imaging was performed. Local anesthesia was administered. The fluid collection was accessed using a 5 [place] skater centesis needle under direct CT fluoroscopic guidance. The centesis catheter was advanced and the needle removed. Fluid aspiration was performed. All instruments were then removed. - Initial imaging findings: Fluid collection lateral to right side hip replacement - Aspiration needle/catheter: 5 [place] centesis catheter - Post-aspiration imaging findings: Decrease in size of fluid collection  Contrast Contrast agent: None Contrast volume (mL): 0  Radiation Dose CT dose length product (mGy-cm): See technologist notes for details  Additional Details Additional description of procedure: None Equipment details: None Specimens removed: Aspirated fluid sent for analysis. Estimated blood loss (mL): Less than 10 Standardized report: DrainageAspiration  Attestation I was present and scrubbed for the entire procedure. Imaging reviewed. Agree with final report as written  This report was finalized on 12/3/2020 5:49 PM by Esdras Velarde.      Xr Hip With Or Without Pelvis 2 - 3 View Right    Result Date: 12/4/2020  Right hip prosthesis in anatomic alignment. Antibiotic pellets noted. No acute bony abnormality is seen.  DICTATED:   12/04/2020 EDITED/ls :   12/04/2020  This report was  finalized on 12/4/2020 9:35 PM by Dr. Ej Aponte MD.            Impression:   1. 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  I discussed his culture results with the microbiology lab and he has early, preliminary growth of what appears to be a coagulase-negative staph.  I will plan to leave him on intravenous ceftriaxone along with daptomycin but I will increase the daptomycin dose today.  2. Rheumatoid arthritis s/p right MARTY 8/2020. On Plaquenil/DayPro  3. HTN  4. CHRISTOPHE/CPAP  5. Leukocytosis/neutrophilia    PLAN/RECOMMENDATIONS:   1.  Increase daptomycin to 1000 mg IV daily  2.  Continue ceftriaxone 2 g IV daily   3.  Probable discharge to home tomorrow with acute outpatient care/IV antibiotic therapy in our office starting on Monday      I discussed his complex situation with Dr. Osbaldo Olivas today.  I discussed the situation in detail with his wife today.  I spent over 35 minutes on his care today.    Luis Delaney MD  12/5/2020  12:18 EST

## 2020-12-05 NOTE — PLAN OF CARE
Goal Outcome Evaluation:  Plan of Care Reviewed With: patient  Progress: no change       Patient required frequent pain medication throughout the night, he is complaining of a a headache, pt pulled off marisol dressing, Marisol dressing was replaced, vital signs are stable dressing CDI. Will continue to monitor

## 2020-12-06 ENCOUNTER — READMISSION MANAGEMENT (OUTPATIENT)
Dept: CALL CENTER | Facility: HOSPITAL | Age: 70
End: 2020-12-06

## 2020-12-06 VITALS
BODY MASS INDEX: 44.1 KG/M2 | HEIGHT: 71 IN | HEART RATE: 96 BPM | DIASTOLIC BLOOD PRESSURE: 68 MMHG | WEIGHT: 315 LBS | SYSTOLIC BLOOD PRESSURE: 165 MMHG | RESPIRATION RATE: 16 BRPM | TEMPERATURE: 98 F | OXYGEN SATURATION: 96 %

## 2020-12-06 LAB
DEPRECATED RDW RBC AUTO: 47 FL (ref 37–54)
ERYTHROCYTE [DISTWIDTH] IN BLOOD BY AUTOMATED COUNT: 13.2 % (ref 12.3–15.4)
FLUAV RNA RESP QL NAA+PROBE: NOT DETECTED
FLUBV RNA RESP QL NAA+PROBE: NOT DETECTED
HCT VFR BLD AUTO: 40.5 % (ref 37.5–51)
HGB BLD-MCNC: 12.5 G/DL (ref 13–17.7)
MCH RBC QN AUTO: 29.9 PG (ref 26.6–33)
MCHC RBC AUTO-ENTMCNC: 30.9 G/DL (ref 31.5–35.7)
MCV RBC AUTO: 96.9 FL (ref 79–97)
PLATELET # BLD AUTO: 283 10*3/MM3 (ref 140–450)
PMV BLD AUTO: 9.9 FL (ref 6–12)
RBC # BLD AUTO: 4.18 10*6/MM3 (ref 4.14–5.8)
SARS-COV-2 RNA RESP QL NAA+PROBE: NOT DETECTED
WBC # BLD AUTO: 7.45 10*3/MM3 (ref 3.4–10.8)

## 2020-12-06 PROCEDURE — C1751 CATH, INF, PER/CENT/MIDLINE: HCPCS

## 2020-12-06 PROCEDURE — 05HY33Z INSERTION OF INFUSION DEVICE INTO UPPER VEIN, PERCUTANEOUS APPROACH: ICD-10-PCS | Performed by: INTERNAL MEDICINE

## 2020-12-06 PROCEDURE — 25010000002 CEFTRIAXONE PER 250 MG: Performed by: INTERNAL MEDICINE

## 2020-12-06 PROCEDURE — 85027 COMPLETE CBC AUTOMATED: CPT | Performed by: ORTHOPAEDIC SURGERY

## 2020-12-06 PROCEDURE — C1894 INTRO/SHEATH, NON-LASER: HCPCS

## 2020-12-06 PROCEDURE — 99024 POSTOP FOLLOW-UP VISIT: CPT | Performed by: ORTHOPAEDIC SURGERY

## 2020-12-06 PROCEDURE — 97110 THERAPEUTIC EXERCISES: CPT

## 2020-12-06 PROCEDURE — 25010000002 DAPTOMYCIN PER 1 MG: Performed by: INTERNAL MEDICINE

## 2020-12-06 PROCEDURE — 87636 SARSCOV2 & INF A&B AMP PRB: CPT | Performed by: INTERNAL MEDICINE

## 2020-12-06 PROCEDURE — 97116 GAIT TRAINING THERAPY: CPT

## 2020-12-06 RX ORDER — SODIUM CHLORIDE 0.9 % (FLUSH) 0.9 %
10 SYRINGE (ML) INJECTION AS NEEDED
Status: DISCONTINUED | OUTPATIENT
Start: 2020-12-06 | End: 2020-12-06 | Stop reason: HOSPADM

## 2020-12-06 RX ORDER — POLYETHYLENE GLYCOL 3350 17 G/17G
17 POWDER, FOR SOLUTION ORAL DAILY
Qty: 15 PACKET | Refills: 0 | Status: SHIPPED | OUTPATIENT
Start: 2020-12-06 | End: 2021-03-03

## 2020-12-06 RX ORDER — SODIUM CHLORIDE 0.9 % (FLUSH) 0.9 %
10 SYRINGE (ML) INJECTION EVERY 12 HOURS SCHEDULED
Status: DISCONTINUED | OUTPATIENT
Start: 2020-12-06 | End: 2020-12-06 | Stop reason: HOSPADM

## 2020-12-06 RX ORDER — HYDROCODONE BITARTRATE AND ACETAMINOPHEN 7.5; 325 MG/1; MG/1
1 TABLET ORAL EVERY 6 HOURS PRN
Start: 2020-12-06 | End: 2020-12-28

## 2020-12-06 RX ORDER — PSEUDOEPHEDRINE HCL 30 MG
100 TABLET ORAL 2 TIMES DAILY
Qty: 40 CAPSULE | Refills: 0 | Status: SHIPPED | OUTPATIENT
Start: 2020-12-06 | End: 2022-04-25

## 2020-12-06 RX ORDER — SODIUM CHLORIDE 0.9 % (FLUSH) 0.9 %
20 SYRINGE (ML) INJECTION AS NEEDED
Status: DISCONTINUED | OUTPATIENT
Start: 2020-12-06 | End: 2020-12-06 | Stop reason: HOSPADM

## 2020-12-06 RX ORDER — ATORVASTATIN CALCIUM 10 MG/1
10 TABLET, FILM COATED ORAL DAILY
Qty: 90 TABLET | Refills: 3 | Status: SHIPPED | OUTPATIENT
Start: 2021-01-17 | End: 2021-05-13 | Stop reason: SDUPTHER

## 2020-12-06 RX ORDER — OXYCODONE HYDROCHLORIDE 5 MG/1
5 TABLET ORAL EVERY 4 HOURS PRN
Qty: 25 TABLET | Refills: 0 | Status: SHIPPED | OUTPATIENT
Start: 2020-12-06 | End: 2020-12-10

## 2020-12-06 RX ADMIN — CEFTRIAXONE SODIUM 2 G: 2 INJECTION, POWDER, FOR SOLUTION INTRAMUSCULAR; INTRAVENOUS at 15:50

## 2020-12-06 RX ADMIN — ACETAMINOPHEN 650 MG: 325 TABLET, FILM COATED ORAL at 08:07

## 2020-12-06 RX ADMIN — DOCUSATE SODIUM 100 MG: 100 CAPSULE, LIQUID FILLED ORAL at 08:07

## 2020-12-06 RX ADMIN — GABAPENTIN 300 MG: 300 CAPSULE ORAL at 15:49

## 2020-12-06 RX ADMIN — SODIUM CHLORIDE, PRESERVATIVE FREE 10 ML: 5 INJECTION INTRAVENOUS at 08:10

## 2020-12-06 RX ADMIN — OXYCODONE HYDROCHLORIDE 5 MG: 5 TABLET ORAL at 05:47

## 2020-12-06 RX ADMIN — ASPIRIN 325 MG: 325 TABLET, COATED ORAL at 08:07

## 2020-12-06 RX ADMIN — BUPROPION HYDROCHLORIDE 150 MG: 150 TABLET, FILM COATED, EXTENDED RELEASE ORAL at 08:07

## 2020-12-06 RX ADMIN — GABAPENTIN 300 MG: 300 CAPSULE ORAL at 05:47

## 2020-12-06 RX ADMIN — AMLODIPINE BESYLATE 5 MG: 5 TABLET ORAL at 08:07

## 2020-12-06 RX ADMIN — DAPTOMYCIN 1000 MG: 500 INJECTION, POWDER, LYOPHILIZED, FOR SOLUTION INTRAVENOUS at 10:42

## 2020-12-06 RX ADMIN — ATENOLOL 50 MG: 50 TABLET ORAL at 08:07

## 2020-12-06 NOTE — PROGRESS NOTES
INFECTIOUS DISEASE PROGRESS NOTE     Obi Jackman Jr.  1950  [id number]      Admission Date: 12/3/2020      Requesting Provider: Joselyn Patel MD  Evaluating Physician: Luis Delaney MD    Reason for Consultation: Possible prosthetic hip infection.    History of present illness:    12/4/20:Patient is a 70 y.o. male with h/o CHRISTOPHE/CPAP, RA/NSAIDs/Plaquenil, and HTN who we are asked to evaluate for post-operative right MARTY wound infection.  The patient underwent a right total hip arthroplasty on 8/4/20.  He did fairly well initially, but then started having increasing pain up to 9/10 about 4 days ago.  He has been have popping and grinding of the hip with stiffness which is worse with movement and sitting.  He is now having difficulty bear weight on the hip and is using 2 canes for walking. He denies any fever or chills and no drainage redness at the incision site.  He underwent CT-guided aspiration of the right hip with 50 cc of old appearing blood aspirated.  The fluid has 29,200 WBC with 82% neutrophils, no crystals, and culture negative to date.  His CRP is 17.3, ESR 32, and WBC 12,100 with 74% neutrophils.  A COVID-19 screen is negative. He is currently on Rocephin and Vancomycin.  ID was asked to evaluate and manage his antibiotic therapy.   12/5/20:  Underwent I & D, poly exchange Yesterday.  He is having moderate amount of pain. No n/v/d. He has remained afebrile.  He was found to have murky fluid extending down into the joint.The prosthesis did not appear to be loosened.  12/6/20: He denies increased pain.  He has remained afebrile.  He has relatively poor IV access and will require a PICC line. He denies nausea and vomiting.    Past Medical History:   Diagnosis Date   • Anxiety and depression    • Bulging of lumbar intervertebral disc    • CPAP (continuous positive airway pressure) dependence    • Hyperlipidemia    • Hypertension    • Migraine    • CHRISTOPHE on CPAP    • Rheumatoid arthritis  (CMS/[facility])    • Swelling of left ear    • Wears contact lenses    • Wears contact lenses        Past Surgical History:   Procedure Laterality Date   • ACHILLES TENDON SURGERY Left 10/15/2019    Procedure: ACHILLES TENDON LENGTHENING LEFT;  Surgeon: Elodia Guzman MD;  Location: [facility];  Service: Orthopedics   • CATARACT EXTRACTION Right    • COLONOSCOPY  2015   • EYE SURGERY Bilateral     cornea transplant    • FOOT SURGERY Left 10/15/2019    triple arthrodesis and achilles tendon lengthening- [place]   • HERNIA REPAIR     • ILIAC CREST BONE GRAFT Left 10/15/2019    Procedure: ILIAC CREST BONE GRAFT LEFT;  Surgeon: Elodia Guzman MD;  Location: [facility];  Service: Orthopedics   • TOE FUSION Left 10/15/2019    Procedure: TRIPLE ARTHODESIS LEFT;  Surgeon: Elodia Guzman MD;  Location: [facility];  Service: Orthopedics   • TOTAL HIP ARTHROPLASTY Right 8/4/2020    Procedure: TOTAL HIP ARTHROPLASTY RIGHT;  Surgeon: Osbaldo Olivas MD;  Location: [facility];  Service: Orthopedics;  Laterality: Right;       Family History   Problem Relation Age of Onset   • No Known Problems Mother    • No Known Problems Father        Social History     Socioeconomic History   • Marital status:      Spouse name: Not on file   • Number of children: Not on file   • Years of education: Not on file   • Highest education level: Not on file   Tobacco Use   • Smoking status: Never Smoker   • Smokeless tobacco: Never Used   Substance and Sexual Activity   • Alcohol use: Yes     Comment: rarely   • Drug use: No   • Sexual activity: Defer       No Known Allergies      Medication:    Current Facility-Administered Medications:   •  acetaminophen (TYLENOL) tablet 650 mg, 650 mg, Oral, Q6H PRN, Osbaldo Olivas MD, 650 mg at 12/06/20 0807  •  ALPRAZolam (XANAX) tablet 0.25 mg, 0.25 mg, Oral, Daily PRN, Osbaldo Olivas MD  •  amLODIPine (NORVASC) tablet 5 mg, 5 mg, Oral, Daily, Osbaldo Olivas MD, 5 mg at 12/06/20 0807  •  aspirin EC  tablet 325 mg, 325 mg, Oral, Daily, Osbaldo Olivas MD, 325 mg at 12/06/20 0807  •  atenolol (TENORMIN) tablet 50 mg, 50 mg, Oral, Daily, Osbaldo Olivas MD, 50 mg at 12/06/20 0807  •  buPROPion XL (WELLBUTRIN XL) 24 hr tablet 150 mg, 150 mg, Oral, Daily, Osbaldo Olivas MD, 150 mg at 12/06/20 0807  •  calcium carbonate EX (TUMS EX) chewable tablet 750 mg, 750 mg, Oral, BID PRN, Osbaldo Olivas MD  •  cefTRIAXone (ROCEPHIN) 2 g/100 mL 0.9% NS IVPB (MBP), 2 g, Intravenous, Q24H, Luis Delaney MD  •  DAPTOmycin (CUBICIN) 1,000 mg in sodium chloride 0.9 % 50 mL IVPB, 1,000 mg, Intravenous, Q24H, Luis Delaney MD, Last Rate: 100 mL/hr at 12/06/20 1042, 1,000 mg at 12/06/20 1042  •  dextrose 5 % and sodium chloride 0.45 % with KCl 20 mEq/L infusion, 75 mL/hr, Intravenous, Continuous, Osbaldo lOivas MD, Last Rate: 75 mL/hr at 12/03/20 2146, 75 mL/hr at 12/03/20 2146  •  docusate sodium (COLACE) capsule 100 mg, 100 mg, Oral, BID, Osbaldo Olivas MD, 100 mg at 12/06/20 0807  •  gabapentin (NEURONTIN) capsule 300 mg, 300 mg, Oral, Q8H, Osbaldo Olivas MD, 300 mg at 12/06/20 0547  •  HYDROmorphone (DILAUDID) injection 0.5 mg, 0.5 mg, Intravenous, Q2H PRN, 0.5 mg at 12/05/20 0027 **AND** naloxone (NARCAN) injection 0.1 mg, 0.1 mg, Intravenous, Q5 Min PRN, Osbaldo Olivas MD  •  ondansetron (ZOFRAN) tablet 4 mg, 4 mg, Oral, Q6H PRN **OR** ondansetron (ZOFRAN) injection 4 mg, 4 mg, Intravenous, Q6H PRN, Osbaldo Olivas MD  •  oxyCODONE (ROXICODONE) immediate release tablet 5 mg, 5 mg, Oral, Q4H PRN, Osbaldo Olivas MD, 5 mg at 12/06/20 0547  •  sodium chloride 0.9 % flush 1-10 mL, 1-10 mL, Intravenous, PRN, Osbaldo Olivas MD  •  sodium chloride 0.9 % flush 10 mL, 10 mL, Intravenous, Q12H, Osbaldo Olivas MD, 10 mL at 12/06/20 0810  •  sodium chloride 0.9 % flush 10 mL, 10 mL, Intravenous, PRN, Osbaldo Olivas MD  •  sodium chloride 0.9 % flush 3 mL, 3 mL, Intravenous, Q12H, Osbaldo Olivas MD  •  sodium  chloride 0.9 % infusion, 120 mL/hr, Intravenous, Continuous, Osbaldo Olivas MD, Last Rate: 120 mL/hr at 20 1803, 120 mL/hr at 20 1803  •  SUMAtriptan (IMITREX) tablet 50 mg, 50 mg, Oral, Once PRN, Osbaldo Olivas MD  •  traZODone (DESYREL) tablet 50 mg, 50 mg, Oral, Nightly, Osbaldo Olivas MD, 50 mg at 20    Antibiotics:  Anti-Infectives (From admission, onward)    Ordered     Dose/Rate Route Frequency Start Stop    20 1115  cefTRIAXone (ROCEPHIN) 2 g/100 mL 0.9% NS IVPB (MBP)     Ordering Provider: Luis Delaney MD    2 g  over 30 Minutes Intravenous Every 24 Hours 20 1959    20 1215  DAPTOmycin (CUBICIN) 1,000 mg in sodium chloride 0.9 % 50 mL IVPB     Ordering Provider: Luis Delaney MD    1,000 mg  100 mL/hr over 30 Minutes Intravenous Every 24 Hours 20 1100 20 1059    20 2102  vancomycin 3000 mg/500 mL 0.9% NS IVPB ([facility])     Ordering Provider: Maureen Orona RPH    20 mg/kg × 145 kg  over 180 Minutes Intravenous Once 20 2200 20 0208                Physical Exam:   Vital Signs  Temp (24hrs), Av.9 °F (36.6 °C), Min:97.8 °F (36.6 °C), Max:98 °F (36.7 °C)    Temp  Min: 97.8 °F (36.6 °C)  Max: 98 °F (36.7 °C)  BP  Min: 132/62  Max: 165/68  Pulse  Min: 80  Max: 97  Resp  Min: 16  Max: 16  SpO2  Min: 96 %  Max: 96 %    GENERAL: Awake and alert, in no acute distress.   HEENT: No labial ulcers  NECK: Supple  HEART: RRR; No murmur,  LUNGS: Clear to auscultation bilaterally without wheezing, rales, rhonchi. Normal respiratory effort. Nonlabored.  ABDOMEN: Nondistended  EXT:  No cyanosis, clubbing or edema.   :  Without Mcnally catheter.  MSK:  Right hip incision with dressing  SKIN: Warm and dry without cutaneous eruptions on Inspection/palpation.    NEURO: Oriented to PPT. Normal speech and cognition  PSYCHIATRIC: Normal insight and judgment. Cooperative with PE    Laboratory Data    Results from last 7 days   Lab  Units 12/05/20  0741 12/03/20 2114 12/02/20  1455   WBC 10*3/mm3 7.69 8.95 12.07*   HEMOGLOBIN g/dL 11.8* 12.9* 15.3   HEMATOCRIT % 36.5* 40.7 44.7   PLATELETS 10*3/mm3 247 245 265     Results from last 7 days   Lab Units 12/03/20  2114   SODIUM mmol/L 135*   POTASSIUM mmol/L 4.0   CHLORIDE mmol/L 96*   CO2 mmol/L 26.0   BUN mg/dL 21   CREATININE mg/dL 1.18   GLUCOSE mg/dL 103*   CALCIUM mg/dL 9.2         Results from last 7 days   Lab Units 12/02/20  1455   SED RATE mm/hr 32*     Results from last 7 days   Lab Units 12/02/20  1455   CRP mg/dL 17.26*         Results from last 7 days   Lab Units 12/04/20  1046 12/03/20  2114   CK TOTAL U/L 89 106         Estimated Creatinine Clearance: 86.1 mL/min (by C-G formula based on SCr of 1.18 mg/dL).      Microbiology:  Microbiology Results (last 10 days)     Procedure Component Value - Date/Time    COVID PRE-OP / PRE-PROCEDURE SCREENING ORDER (NO ISOLATION) - Swab, Nasopharynx [[id number]]  (Normal) Collected: 12/06/20 0857    Lab Status: Final result Specimen: Swab from Nasopharynx Updated: 12/06/20 0936    Narrative:      The following orders were created for panel order COVID PRE-OP / PRE-PROCEDURE SCREENING ORDER (NO ISOLATION) - Swab, Nasopharynx.  Procedure                               Abnormality         Status                     ---------                               -----------         ------                     COVID-19 and FLU A/B PCR...[[id number]]  Normal              Final result                 Please view results for these tests on the individual orders.    COVID-19 and FLU A/B PCR - Swab, Nasopharynx [[id number]]  (Normal) Collected: 12/06/20 0857    Lab Status: Final result Specimen: Swab from Nasopharynx Updated: 12/06/20 0936     COVID19 Not Detected     Influenza A PCR Not Detected     Influenza B PCR Not Detected    Narrative:      Fact sheet for providers: https://www.fda.gov/media/763112/download    Fact sheet for patients:  https://www.fda.gov/media/242288/download    Body Fluid Culture - Synovial Fluid, Hip, Right [[id number]] Collected: 12/04/20 1452    Lab Status: Preliminary result Specimen: Synovial Fluid from Hip, Right Updated: 12/05/20 1350     Body Fluid Culture Culture in progress     Gram Stain Moderate (3+) WBCs per low power field      No organisms seen    AFB Culture - Synovial Fluid, Hip, Right [[id number]] Collected: 12/04/20 1452    Lab Status: Preliminary result Specimen: Synovial Fluid from Hip, Right Updated: 12/05/20 1151     AFB Stain No acid fast bacilli seen on concentrated smear    COVID PRE-OP / PRE-PROCEDURE SCREENING ORDER (NO ISOLATION) - Swab, Nasopharynx [[id number]]  (Normal) Collected: 12/03/20 2227    Lab Status: Final result Specimen: Swab from Nasopharynx Updated: 12/04/20 0347    Narrative:      The following orders were created for panel order COVID PRE-OP / PRE-PROCEDURE SCREENING ORDER (NO ISOLATION) - Swab, Nasopharynx.  Procedure                               Abnormality         Status                     ---------                               -----------         ------                     COVID-19, ABBOTT IN-HOUS...[[id number]]  Normal              Final result                 Please view results for these tests on the individual orders.    COVID-19, ABBOTT IN-HOUSE,NP Swab (NO TRANSPORT MEDIA) 2 HR TAT - Swab, Nasopharynx [[id number]]  (Normal) Collected: 12/03/20 2227    Lab Status: Final result Specimen: Swab from Nasopharynx Updated: 12/04/20 0347     COVID19 Not Detected    Narrative:      Fact sheet for providers: https://www.fda.gov/media/991377/download     Fact sheet for patients: https://www.fda.gov/media/495691/download    Anaerobic Culture - Synovial Fluid, Hip, Right [[id number]] Collected: 12/03/20 1447    Lab Status: Preliminary result Specimen: Synovial Fluid from Hip, Right Updated: 12/06/20 0654     Anaerobic Culture No anaerobes isolated at 3 days    Narrative:      HOLD CULTURE  12 DAYS PER LYNETTE MALCOM 12/4    Body Fluid Culture - Synovial Fluid, Hip, Right [[id number]]  (Abnormal) Collected: 12/03/20 1447    Lab Status: Preliminary result Specimen: Synovial Fluid from Hip, Right Updated: 12/06/20 0719     Body Fluid Culture Rare Gram Positive Cocci     Gram Stain Many (4+) WBCs per low power field      No organisms seen        Right hip cultures have early growth of a probable coagulase-negative staph        Radiology:  Xr Chest 1 View    Result Date: 12/3/2020  No acute disease.  Signer Name: Kerry Dorsey MD  Signed: 12/3/2020 9:57 PM  Workstation Name: [facility]  [facility]    Ct Hip Right Wo Contrast    Result Date: 12/3/2020  Fluid collection adjacent to the right hip greater trochanteric region measuring up to 7 cm without gas locules associated demonstrating adjacent subcutaneous edema without evidence for hardware loosening or failure otherwise noted of a right hip arthroplasty.   D:  12/03/2020 E:  12/03/2020  This report was finalized on 12/3/2020 6:05 PM by Dr. Khadar Norris.      Ct Injection/aspriation Large Joint Or Bursa    Result Date: 12/3/2020   Percutaneous aspiration of lateral fluid collection to right hip replacement, yielding 50 mL of old appearing blood. No drainage catheter was left in place. Portion of fluid sent for requested laboratory analysis  Plan:  Per orthopedic surgery ______________________________________________________________________  PROCEDURE SUMMARY: - Aspiration of fluid collection lateral to right hip replacement under CT guidance - Additional procedure(s): None  PROCEDURE DETAILS:  Pre-procedure Consent: Informed consent for the procedure including risks, benefits and alternatives was obtained and time-out was performed prior to the procedure. Preparation: The site was prepared and draped using maximal sterile barrier technique including cutaneous antisepsis.  Anesthesia/sedation Level of anesthesia/sedation: None   Fluid collection aspiration The patient was positioned supine. Initial imaging was performed. Local anesthesia was administered. The fluid collection was accessed using a 5 Polish skater centesis needle under direct CT fluoroscopic guidance. The centesis catheter was advanced and the needle removed. Fluid aspiration was performed. All instruments were then removed. - Initial imaging findings: Fluid collection lateral to right side hip replacement - Aspiration needle/catheter: 5 Polish centesis catheter - Post-aspiration imaging findings: Decrease in size of fluid collection  Contrast Contrast agent: None Contrast volume (mL): 0  Radiation Dose CT dose length product (mGy-cm): See technologist notes for details  Additional Details Additional description of procedure: None Equipment details: None Specimens removed: Aspirated fluid sent for analysis. Estimated blood loss (mL): Less than 10 Standardized report: DrainageAspiration  Attestation I was present and scrubbed for the entire procedure. Imaging reviewed. Agree with final report as written  This report was finalized on 12/3/2020 5:49 PM by Esdras Velarde.      Xr Hip With Or Without Pelvis 2 - 3 View Right    Result Date: 12/4/2020  Right hip prosthesis in anatomic alignment. Antibiotic pellets noted. No acute bony abnormality is seen.  DICTATED:   12/04/2020 EDITED/ls :   12/04/2020  This report was finalized on 12/4/2020 9:35 PM by Dr. Ej Aponte MD.            Impression:   1. 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 I will plan for him to discharge on outpatient intravenous daptomycin/ceftriaxone today.  2. Rheumatoid arthritis s/p right MARTY 8/2020. On Plaquenil/DayPro  3. HTN  4. CHRISTOPHE/CPAP  5. Leukocytosis/neutrophilia    PLAN/RECOMMENDATIONS:   1.  Continue daptomycin 1000 mg IV daily  2.  Continue ceftriaxone 2 g IV daily   3.  Discharge to home today with outpatient infusion and acute outpatient follow up in our office  starting tomorrow  4.  PICC line placement prior to discharge today      Outpatient orders:  1.  Have him come to our office tomorrow Monday 12/7 at 1400 for: Daptomycin 1000 mg IV daily and ceftriaxone 2 g IV daily  2.  Appointment with Katharine HOFFMAN in our office on Tuesday 12/8 at 1400-this appointment has been made    I talked to his wife Poornima Jackman today regarding his complex situation.          Luis Delaney MD  12/6/2020  12:39 EST

## 2020-12-06 NOTE — OUTREACH NOTE
Prep Survey      Responses   [facility] patient discharged from?  [place]   Is LACE score < 7 ?  Yes   Eligibility  [facility]   Date of Admission  12/03/20   Date of Discharge  12/06/20   Discharge Disposition  Home or Self Care   Discharge diagnosis  I & D right total hip arthroplasty with head & liner exchange   Does the patient have one of the following disease processes/diagnoses(primary or secondary)?  General Surgery   Does the patient have Home health ordered?  No   Is there a DME ordered?  No   Medication alerts for this patient  IV abx via PICC line at [facility]   Prep survey completed?  Yes          Desiree Joseph RN

## 2020-12-06 NOTE — PROGRESS NOTES
"      [facility] Orthopaedic Surgery Progress Note    Subjective      LOS: 3 days   Patient Care Team:  Cammy Oneal PA as PCP - General (Internal Medicine)  Chanda Castanon MD as Consulting Physician (Rheumatology)    CC: Right hip pain    Interval History:   Patient preparing to work with physical therapy.  Hip feels about the same as it did yesterday.  No new complaints.    Objective      Vital Signs  Temp (24hrs), Av.9 °F (36.6 °C), Min:97.8 °F (36.6 °C), Max:98 °F (36.7 °C)      /68 (BP Location: Right arm, Patient Position: Lying)   Pulse 96   Temp 98 °F (36.7 °C) (Oral)   Resp 16   Ht 180.3 cm (71\")   Wt (!) 145 kg (320 lb 9.6 oz)   SpO2 96%   BMI 44.71 kg/m²     Examination:   Examination of the right hip: The dressing is clean, dry, and intact.  Ankle dorsiflexion, ankle plantar flexion, and EHL are intact.  Sensation intact in the foot to light touch.   Thigh is soft and nontender.      Labs:  Results from last 7 days   Lab Units 20  0741 20  2114 20  1455   WBC 10*3/mm3 7.69 8.95 12.07*   HEMOGLOBIN g/dL 11.8* 12.9* 15.3   HEMATOCRIT % 36.5* 40.7 44.7   MCV fL 92.2 90.0 86.6   PLATELETS 10*3/mm3 247 245 265       Cultures: Hip aspirate from 12/3/2020 shows rare gram-positive cocci    PT:  Physical Therapy - Plan of Care Review - Outcome Summary:  Outcome Summary: Patient increased gait distance to 140 feet with RW and step through gait pattern, limited by pain and fatigue. Increased independence with sit<->stand t/f this PM. Will continue to progress as able. Stair training in AM to prepare for d/c home. (20 1344)]       Results Review:     I reviewed the patient's new clinical results.    Assessment and Plan     Assessment:   Status post irrigation debridement, with head and liner exchange    Continue to monitor culture results  Continue rehabilitation  Continue antibiotics per Dr. Delaney      Postoperative wound infection of right hip, s/p I and D, head and liner " exchange.    Anxiety disorder    Hyperlipidemia    Hypertension    CHRISTOPHE (obstructive sleep apnea)    Status post total replacement of right hip      Plan for disposition: Anticipate discharge today if he is cleared by physical therapy and antibiotic regimen determined.  Follow-up with me in 2 to 3 weeks in the office.      Future Appointments   Date Time Provider Department Center   12/9/2020  2:00 PM Osbaldo Olivas MD [facility]   1/29/2021  9:10 AM Elodia Guzman MD [facility]   2/2/2021  1:30 PM Cammy Oneal PA [facility]   2/8/2021  3:30 PM Osbaldo Olivas MD [facility]           Osbaldo Olivas MD  12/06/20  10:26 EST

## 2020-12-06 NOTE — PROGRESS NOTES
Case Management Discharge Note      Final Note: I talked with patient at bedside. Plan home today with OP IV antibiotics thru [facility] and  made these arrangements. Anyway, patient lives in [place] with wife Rachael. Rachael will transport patient back and forth to [facility] office. PICC placed today. Patient has all kinds of DME at home and I asked him if he wanted a bariatric walker. At his time, patient not interested in one at this time and if he decides he wants one then he will call [facility]. Hanna @ [phone]         Selected Continued Care - Admitted Since 12/3/2020     Destination    No services have been selected for the patient.              Durable Medical Equipment    No services have been selected for the patient.              Dialysis/Infusion Coordination complete    Service Provider Selected Services Address Phone Fax Patient Preferred    [place] [facility]  Infusion and IV Therapy [address], [facility] [id number] [phone] --          Home Medical Care    No services have been selected for the patient.              Therapy    No services have been selected for the patient.              Community Resources    No services have been selected for the patient.                       Final Discharge Disposition Code: 01 - home or self-care

## 2020-12-06 NOTE — PLAN OF CARE
Problem: Adult Inpatient Plan of Care  Goal: Plan of Care Review  Recent Flowsheet Documentation  Taken 12/6/2020 0954 by Antonella Mason, PT  Progress: improving  Plan of Care Reviewed With: patient  Outcome Summary: Patient ambulated 90 feet with RW and step through gait pattern, limited by pain and fatigue. Increased independence with t/f today. Continues to require min assist to get OOB. Patient climbed 1 step with RW x2 with CGA and cues for correct performance. Patient has been d/c home with family and HHPT.   Goal Outcome Evaluation:  Plan of Care Reviewed With: patient  Progress: improving  Outcome Summary: Patient ambulated 90 feet with RW and step through gait pattern, limited by pain and fatigue. Increased independence with t/f today. Continues to require min assist to get OOB. Patient climbed 1 step with RW x2 with CGA and cues for correct performance. Patient has been d/c home with family and HHPT.

## 2020-12-06 NOTE — THERAPY DISCHARGE NOTE
Patient Name: Obi Jackman Jr.  : 1950    MRN: [id number]                              Today's Date: 2020       Admit Date: 12/3/2020    Visit Dx:     ICD-10-CM ICD-9-CM   1. Postoperative wound infection of right hip, s/p I and D, head and liner exchange.  T81.49XA 998.59   2. Infection of prosthetic total hip joint (CMS/LTAC, located within [facility])  T84.59XA 996.66    Z96.649 V43.64   3. Hyperlipidemia, unspecified hyperlipidemia type  E78.5 272.4     Patient Active Problem List   Diagnosis   • Allergic rhinitis   • Anxiety disorder   • Benign paroxysmal positional vertigo   • Chronic tension type headache   • Depression   • ED (erectile dysfunction) of non-organic origin   • Hyperlipidemia   • Hypertension   • LFTs abnormal   • CHRISTOPHE (obstructive sleep apnea)   • Elevated glucose   • Health care maintenance   • Morbid obesity due to excess calories (CMS/LTAC, located within [facility])   • Onychomycosis of left great toe   • Osteoarthritis of ankle or foot   • Venous stasis   • Neuropathy   • Class 3 severe obesity due to excess calories with serious comorbidity and body mass index (BMI) of 40.0 to 44.9 in adult (CMS/LTAC, located within [facility])   • Pre-op evaluation   • Arthritis of foot, left   • S/P left ankle triple arthrodesis   • Acute blood loss anemia, mild, asymptomatic    • Acute postoperative pain   • Wound infection   • Primary osteoarthritis of right hip   • Status post total replacement of right hip   • Postoperative wound infection of right hip, s/p I and D, head and liner exchange.     Past Medical History:   Diagnosis Date   • Anxiety and depression    • Bulging of lumbar intervertebral disc    • CPAP (continuous positive airway pressure) dependence    • Hyperlipidemia    • Hypertension    • Migraine    • CHRISTOPHE on CPAP    • Rheumatoid arthritis (CMS/LTAC, located within [facility])    • Swelling of left ear    • Wears contact lenses    • Wears contact lenses      Past Surgical History:   Procedure Laterality Date   • ACHILLES TENDON SURGERY Left 10/15/2019    Procedure: ACHILLES TENDON  LENGTHENING LEFT;  Surgeon: Elodia Guzman MD;  Location:  [facility];  Service: Orthopedics   • CATARACT EXTRACTION Right    • COLONOSCOPY  2015   • EYE SURGERY Bilateral     cornea transplant    • FOOT SURGERY Left 10/15/2019    triple arthrodesis and achilles tendon lengthening- Megan   • HERNIA REPAIR     • ILIAC CREST BONE GRAFT Left 10/15/2019    Procedure: ILIAC CREST BONE GRAFT LEFT;  Surgeon: Elodia Guzman MD;  Location:  [facility];  Service: Orthopedics   • TOE FUSION Left 10/15/2019    Procedure: TRIPLE ARTHODESIS LEFT;  Surgeon: Elodia Guzman MD;  Location:  [facility];  Service: Orthopedics   • TOTAL HIP ARTHROPLASTY Right 8/4/2020    Procedure: TOTAL HIP ARTHROPLASTY RIGHT;  Surgeon: Osbaldo Olivas MD;  Location:  [facility];  Service: Orthopedics;  Laterality: Right;     General Information     Row Name 12/06/20 0954          Physical Therapy Time and Intention    Document Type  therapy note (daily note);discharge treatment  -LR     Mode of Treatment  physical therapy;individual therapy  -LR     Row Name 12/06/20 0954          General Information    Patient Profile Reviewed  yes  -LR     Existing Precautions/Restrictions  fall;right;hip, posterior  -LR     Barriers to Rehab  none identified  -LR     Row Name 12/06/20 0954          Cognition    Orientation Status (Cognition)  oriented x 4  -LR     Row Name 12/06/20 0954          Safety Issues, Functional Mobility    Safety Issues Affecting Function (Mobility)  positioning of assistive device;sequencing abilities;safety precautions follow-through/compliance;safety precaution awareness  -LR     Impairments Affecting Function (Mobility)  balance;strength;pain;endurance/activity tolerance;range of motion (ROM)  -LR       User Key  (r) = Recorded By, (t) = Taken By, (c) = Cosigned By    Initials Name Provider Type    LR Antonella Mason, PT Physical Therapist        Mobility     Row Name 12/06/20 1882          Bed Mobility    Bed Mobility   supine-sit  -LR     Supine-Sit [place] (Bed Mobility)  verbal cues;minimum assist (75% patient effort)  -LR     Sit-Sidelying [place] (Bed Mobility)  not tested  -LR     Assistive Device (Bed Mobility)  head of bed elevated;bed rails  -LR     Comment (Bed Mobility)  Verbal cues to move LEs towards EOB and for correct use of leg . Cues to push up from bed to raise trunk into sitting and to scoot hips out to get feet on floor. Min assist to move R LE and at trunk. Required increased time to perform. Denied dizziness upon sitting up.  -LR     Row Name 12/06/20 0954          Transfers    Comment (Transfers)  Verbal cues to push up from bed to stand and to reach back for chair to lower into sitting. Verbal cues to step R LE out before t/f for comfort.  -LR     Row Name 12/06/20 0954          Sit-Stand Transfer    Sit-Stand [place] (Transfers)  verbal cues;contact guard  -LR     Assistive Device (Sit-Stand Transfers)  walker, front-wheeled  -LR     Row Name 12/06/20 0954          Gait/Stairs (Locomotion)    [place] Level (Gait)  verbal cues;contact guard  -LR     Assistive Device (Gait)  walker, front-wheeled  -LR     Distance in Feet (Gait)  90  -LR     Deviations/Abnormal Patterns (Gait)  bilateral deviations;dena decreased;gait speed decreased;stride length decreased;right sided deviations;antalgic  -LR     Bilateral Gait Deviations  forward flexed posture;heel strike decreased  -LR     Right Sided Gait Deviations  weight shift ability decreased  -LR     [place] Level (Stairs)  verbal cues;contact guard  -LR     Assistive Device (Stairs)  walker, front-wheeled  -LR     Handrail Location (Stairs)  none  -LR     Number of Steps (Stairs)  2  -LR     Ascending Technique (Stairs)  step-to-step  -LR     Descending Technique (Stairs)  step-to-step  -LR     Comment (Gait/Stairs)  Patient ambulated with step through gait pattern at slow pace with increased antalgia on R during stance phase.  Improved slightly with cues for increased step length, increased R LE weight bearing/stance phase, decreased UE weight bearing. Gait limited by pain and fatigue. PT demonstrated correct stair training technique for 1 step with RW backwards. Verbal cues to back up to step with RW and to use RW for UE support. Verbal cues to step up backwards with strong LE first and down with weak LE first. Performed x2. Had difficulty clearing L foot stepping up backwards but able to perform without assist.  -     Row Name 12/06/20 0954          Mobility    Extremity Weight-bearing Status  right lower extremity  -LR     Right Lower Extremity (Weight-bearing Status)  weight-bearing as tolerated (WBAT)  -       User Key  (r) = Recorded By, (t) = Taken By, (c) = Cosigned By    Initials Name Provider Type    LR Antonella Mason, PT Physical Therapist        Obj/Interventions     Row Name 12/06/20 0954          Motor Skills    Therapeutic Exercise  ankle;knee;hip;other (see comments) cues for technique; min assist heel slides, hip abd, max assist SLR  -     Row Name 12/06/20 0954          Hip (Therapeutic Exercise)    Hip (Therapeutic Exercise)  strengthening exercise;isometric exercises  -     Hip Isometrics (Therapeutic Exercise)  bilateral;gluteal sets;10 repetitions;5 repetitions  -     Hip Strengthening (Therapeutic Exercise)  right;aBduction;heel slides;sitting;10 repetitions;5 repetitions  -     Row Name 12/06/20 0954          Knee (Therapeutic Exercise)    Knee (Therapeutic Exercise)  strengthening exercise;isometric exercises  -     Knee Isometrics (Therapeutic Exercise)  right;quad sets;sitting;10 repetitions;5 repetitions  -     Knee Strengthening (Therapeutic Exercise)  right;SLR (straight leg raise);LAQ (long arc quad);sitting;5 repetitions;10 repetitions  -     Row Name 12/06/20 0954          Ankle (Therapeutic Exercise)    Ankle (Therapeutic Exercise)  AROM (active range of motion)  -     Ankle  AROM (Therapeutic Exercise)  bilateral;dorsiflexion;plantarflexion;sitting;10 repetitions;5 repetitions  -LR       User Key  (r) = Recorded By, (t) = Taken By, (c) = Cosigned By    Initials Name Provider Type    LR Antonella Mason, PT Physical Therapist        Goals/Plan     Row Name 12/06/20 0954          Bed Mobility Goal 1 (PT)    Activity/Assistive Device (Bed Mobility Goal 1, PT)  sit to supine/supine to sit  -LR     [place] Level/Cues Needed (Bed Mobility Goal 1, PT)  modified independence  -LR     Time Frame (Bed Mobility Goal 1, PT)  long term goal (LTG);3 days  -LR     Progress/Outcomes (Bed Mobility Goal 1, PT)  continuing progress toward goal;goal not met;discharged from facility  -     Row Name 12/06/20 0954          Transfer Goal 1 (PT)    Activity/Assistive Device (Transfer Goal 1, PT)  sit-to-stand/stand-to-sit;walker, rolling  -LR     [place] Level/Cues Needed (Transfer Goal 1, PT)  modified independence  -LR     Time Frame (Transfer Goal 1, PT)  long term goal (LTG);3 days  -LR     Progress/Outcome (Transfer Goal 1, PT)  goal not met;discharged from facility;good progress toward goal  -     Row Name 12/06/20 0954          Gait Training Goal 1 (PT)    Activity/Assistive Device (Gait Training Goal 1, PT)  gait (walking locomotion);walker, rolling  -LR     [place] Level (Gait Training Goal 1, PT)  modified independence  -LR     Distance (Gait Training Goal 1, PT)  150 feet  -LR     Time Frame (Gait Training Goal 1, PT)  long term goal (LTG);3 days  -LR     Progress/Outcome (Gait Training Goal 1, PT)  good progress toward goal;goal not met;discharged from facility  -     Row Name 12/06/20 0954          Stairs Goal 1 (PT)    Activity/Assistive Device (Stairs Goal 1, PT)  ascending stairs;descending stairs;step-to-step;cane, straight;crutches, axillary  -LR     [place] Level/Cues Needed (Stairs Goal 1, PT)  contact guard assist  -LR     Number of Stairs (Stairs Goal 1, PT)   1  -LR     Time Frame (Stairs Goal 1, PT)  long term goal (LTG);3 days  -LR     Progress/Outcome (Stairs Goal 1, PT)  goal met  -LR       User Key  (r) = Recorded By, (t) = Taken By, (c) = Cosigned By    Initials Name Provider Type    Antonella Rossi, PT Physical Therapist        Clinical Impression     Row Name 12/06/20 0954          Pain    Additional Documentation  Pain Scale: Numbers Pre/Post-Treatment (Group)  -LR     Row Name 12/06/20 0954          Pain Scale: Numbers Pre/Post-Treatment    Pretreatment Pain Rating  0/10 - no pain  -LR     Posttreatment Pain Rating  5/10  -LR     Pain Location - Side  Right  -LR     Pain Location  hip  -LR     Pain Intervention(s)  Ambulation/increased activity;Repositioned  -LR     Row Name 12/06/20 0954          Plan of Care Review    Plan of Care Reviewed With  patient  -LR     Progress  improving  -LR     Outcome Summary  Patient ambulated 90 feet with RW and step through gait pattern, limited by pain and fatigue. Increased independence with t/f today. Continues to require min assist to get OOB. Patient climbed 1 step with RW x2 with CGA and cues for correct performance. Patient has been d/c home with family and HHPT.  -LR     Row Name 12/06/20 0954          Therapy Assessment/Plan (PT)    Rehab Potential (PT)  good, to achieve stated therapy goals  -LR     Criteria for Skilled Interventions Met (PT)  yes;meets criteria;skilled treatment is necessary  -LR     Row Name 12/06/20 0954          Positioning and Restraints    Pre-Treatment Position  in bed  -LR     Post Treatment Position  chair  -LR     In Chair  notified nsg;reclined;sitting;call light within reach;encouraged to call for assist;exit alarm on;legs elevated  -LR       User Key  (r) = Recorded By, (t) = Taken By, (c) = Cosigned By    Initials Name Provider Type    Antonella Rossi, PT Physical Therapist        Outcome Measures     Row Name 12/06/20 0954          How much help from another  person do you currently need...    Turning from your back to your side while in flat bed without using bedrails?  3  -LR     Moving from lying on back to sitting on the side of a flat bed without bedrails?  3  -LR     Moving to and from a bed to a chair (including a wheelchair)?  3  -LR     Standing up from a chair using your arms (e.g., wheelchair, bedside chair)?  3  -LR     Climbing 3-5 steps with a railing?  2  -LR     To walk in hospital room?  3  -LR     AM-PAC 6 Clicks Score (PT)  17  -LR     Row Name 12/06/20 0954          Functional Assessment    Outcome Measure Options  AM-PAC 6 Clicks Basic Mobility (PT)  -LR       User Key  (r) = Recorded By, (t) = Taken By, (c) = Cosigned By    Initials Name Provider Type    Antonella Rossi, PT Physical Therapist        Physical Therapy Education                 Title: PT OT SLP Therapies (Done)     Topic: Physical Therapy (Done)     Point: Mobility training (Done)     Learning Progress Summary           Patient Acceptance, E,D,H, VU,DU by LR at 12/6/2020 0954    Comment: Issued and reviewed written/illustrated HEP. Educated on precautions, weight bearing status, correct supine to sit t/f technique, correct sit<->stand t/f technique, correct gait mechanics, correct stair training, and correct car t/f technique.    Acceptance, E,D, VU,NR by LR at 12/5/2020 1344    Comment: Educated on hip precautions, weight bearing status, correct sit<->stand t/f technique, correct gait mechanics, safety with mobility, HEP, and progression of POC.    Acceptance, E,D, VU by LR at 12/5/2020 0938    Comment: Educated on posterior hip precautions, weight bearing status, correct supine to sit t/f technique, correct sit<->stand t/f technique, correct gait mechanics, HEP, benefits of mobility, and progression of POC.    Acceptance, E, VU by LG at 12/5/2020 0344                   Point: Home exercise program (Done)     Learning Progress Summary           Patient Acceptance, E,D,H,  VU,DU by LR at 12/6/2020 0954    Comment: Issued and reviewed written/illustrated HEP. Educated on precautions, weight bearing status, correct supine to sit t/f technique, correct sit<->stand t/f technique, correct gait mechanics, correct stair training, and correct car t/f technique.    Acceptance, E,D, VU,NR by LR at 12/5/2020 1344    Comment: Educated on hip precautions, weight bearing status, correct sit<->stand t/f technique, correct gait mechanics, safety with mobility, HEP, and progression of POC.    Acceptance, E,D, VU by LR at 12/5/2020 0938    Comment: Educated on posterior hip precautions, weight bearing status, correct supine to sit t/f technique, correct sit<->stand t/f technique, correct gait mechanics, HEP, benefits of mobility, and progression of POC.    Acceptance, E, VU by LG at 12/5/2020 0344                   Point: Body mechanics (Done)     Learning Progress Summary           Patient Acceptance, E,D,H, VU,DU by LR at 12/6/2020 0954    Comment: Issued and reviewed written/illustrated HEP. Educated on precautions, weight bearing status, correct supine to sit t/f technique, correct sit<->stand t/f technique, correct gait mechanics, correct stair training, and correct car t/f technique.    Acceptance, E,D, VU,NR by LR at 12/5/2020 1344    Comment: Educated on hip precautions, weight bearing status, correct sit<->stand t/f technique, correct gait mechanics, safety with mobility, HEP, and progression of POC.    Acceptance, E,D, VU by LR at 12/5/2020 0938    Comment: Educated on posterior hip precautions, weight bearing status, correct supine to sit t/f technique, correct sit<->stand t/f technique, correct gait mechanics, HEP, benefits of mobility, and progression of POC.    Acceptance, E, VU by LG at 12/5/2020 0344                   Point: Precautions (Done)     Learning Progress Summary           Patient Acceptance, E,D,H, VU,DU by LR at 12/6/2020 0954    Comment: Issued and reviewed  written/illustrated HEP. Educated on precautions, weight bearing status, correct supine to sit t/f technique, correct sit<->stand t/f technique, correct gait mechanics, correct stair training, and correct car t/f technique.    Acceptance, E,D, VU,NR by LR at 12/5/2020 1344    Comment: Educated on hip precautions, weight bearing status, correct sit<->stand t/f technique, correct gait mechanics, safety with mobility, HEP, and progression of POC.    Acceptance, E,D, VU by LR at 12/5/2020 0938    Comment: Educated on posterior hip precautions, weight bearing status, correct supine to sit t/f technique, correct sit<->stand t/f technique, correct gait mechanics, HEP, benefits of mobility, and progression of POC.    Acceptance, E, VU by  at 12/5/2020 0344                               User Key     Initials Effective Dates Name Provider Type Discipline     06/19/15 -  Antonella Mason, PT Physical Therapist PT     11/16/18 -  Tom Gunter RN Registered Nurse Nurse              PT Recommendation and Plan  Planned Therapy Interventions (PT): balance training, bed mobility training, gait training, home exercise program, patient/family education, ROM (range of motion), stair training, strengthening, transfer training  Plan of Care Reviewed With: patient  Progress: improving  Outcome Summary: Patient ambulated 90 feet with RW and step through gait pattern, limited by pain and fatigue. Increased independence with t/f today. Continues to require min assist to get OOB. Patient climbed 1 step with RW x2 with CGA and cues for correct performance. Patient has been d/c home with family and HHPT.     Time Calculation:   PT Charges     Row Name 12/06/20 0954             Time Calculation    Start Time  0954  -      PT Received On  12/06/20  -      PT Goal Re-Cert Due Date  12/15/20  -         Time Calculation- PT    Total Timed Code Minutes- PT  30 minute(s)  -LR         Timed Charges    34947 - PT Therapeutic  Exercise Minutes  13  -LR      81756 - Gait Training Minutes   12  -LR      32027 - PT Therapeutic Activity Minutes  5  -LR        User Key  (r) = Recorded By, (t) = Taken By, (c) = Cosigned By    Initials Name Provider Type    Antonella Rossi, PT Physical Therapist        Therapy Charges for Today     Code Description Service Date Service Provider Modifiers Qty    85825906386  PT THER PROC EA 15 MIN 12/5/2020 MasonAntonella, PT GP 1    07072450459 HC GAIT TRAINING EA 15 MIN 12/5/2020 Mason, Antonella Felipe, PT GP 1    96684105086 HC PT THER SUPP EA 15 MIN 12/5/2020 Mason, Antonella Felipe, PT GP 3    [id number] HC PT EVAL LOW COMPLEXITY 3 12/5/2020 Mason, Antonella Felipe, PT GP 1    91322410158 HC PT THER PROC EA 15 MIN 12/5/2020 Mason, Antonella Felipe, PT GP 1    99538468535 HC GAIT TRAINING EA 15 MIN 12/5/2020 Mason, Antonella Felipe, PT GP 1    48510024537 HC PT THER PROC EA 15 MIN 12/6/2020 Mason, Antonella Felipe, PT GP 1    74382450413 HC GAIT TRAINING EA 15 MIN 12/6/2020 Mason, Antonella Felipe, PT GP 1          PT G-Codes  Outcome Measure Options: AM-PAC 6 Clicks Basic Mobility (PT)  AM-PAC 6 Clicks Score (PT): 17  AM-PAC 6 Clicks Score (OT): 17    PT Discharge Summary  Anticipated Discharge Disposition (PT): home with assist, home with home health  Reason for Discharge: Discharge from facility  Outcomes Achieved: Patient able to partially acheive established goals  Discharge Destination: Home with assist, Home with home health    Antonella Mason, PT  12/6/2020

## 2020-12-06 NOTE — PLAN OF CARE
Problem: Adult Inpatient Plan of Care  Goal: Plan of Care Review  12/6/2020 0617 by Sofía Azar RN  Outcome: Ongoing, Not Progressing  Flowsheets  Taken 12/6/2020 0617 by Sofía Azar RN  Progress: no change  Outcome Summary: Pt. asleep most of shift. Ambulation offered several times by RN and CNA but pt. refuses. Refusing SCDs. ROSA dressing CDI. Ice packs in place. Pain controlled w/ PO meds per order. Voiding appropriately. CPAP at HS. VSS.  Taken 12/5/2020 1344 by Antonella Mason, PT  Plan of Care Reviewed With: patient  12/6/2020 0338 by Sofía Azar RN  Outcome: Ongoing, Not Progressing  Goal: Patient-Specific Goal (Individualized)  12/6/2020 0617 by Sofía Azar RN  Outcome: Ongoing, Not Progressing  12/6/2020 0338 by Sofía Azar RN  Outcome: Ongoing, Not Progressing  Goal: Absence of Hospital-Acquired Illness or Injury  12/6/2020 0617 by Sofía Azar RN  Outcome: Ongoing, Not Progressing  12/6/2020 0338 by Sofía Azar RN  Outcome: Ongoing, Not Progressing  Intervention: Identify and Manage Fall Risk  Recent Flowsheet Documentation  Taken 12/6/2020 0615 by Sofía Azar RN  Safety Promotion/Fall Prevention:   activity supervised   assistive device/personal items within reach   clutter free environment maintained   elopement precautions   fall prevention program maintained   gait belt   nonskid shoes/slippers when out of bed   safety round/check completed   room organization consistent   toileting scheduled  Taken 12/6/2020 0416 by Sofía Azar RN  Safety Promotion/Fall Prevention:   activity supervised   assistive device/personal items within reach   clutter free environment maintained   elopement precautions   fall prevention program maintained   gait belt   nonskid shoes/slippers when out of bed   room organization consistent   safety round/check completed   toileting scheduled  Taken 12/6/2020 0247 by Sofía Azar RN  Safety Promotion/Fall  Prevention:   assistive device/personal items within reach   clutter free environment maintained   fall prevention program maintained   gait belt   elopement precautions   nonskid shoes/slippers when out of bed   room organization consistent   safety round/check completed   toileting scheduled   activity supervised  Taken 12/6/2020 0000 by Sofía Azar RN  Safety Promotion/Fall Prevention:   activity supervised   assistive device/personal items within reach   clutter free environment maintained   elopement precautions   fall prevention program maintained   gait belt   nonskid shoes/slippers when out of bed   room organization consistent   safety round/check completed   toileting scheduled  Taken 12/5/2020 2015 by Sofía Azar RN  Safety Promotion/Fall Prevention:   activity supervised   assistive device/personal items within reach   clutter free environment maintained   elopement precautions   fall prevention program maintained   gait belt   nonskid shoes/slippers when out of bed   room organization consistent   safety round/check completed   toileting scheduled  Intervention: Prevent Skin Injury  Recent Flowsheet Documentation  Taken 12/6/2020 0615 by Sofía Azar RN  Body Position: sitting up in bed  Taken 12/6/2020 0416 by Sofía Azar RN  Body Position: sitting up in bed  Taken 12/6/2020 0247 by Sofía Azar RN  Body Position: tilted, right  Taken 12/6/2020 0000 by Sofía Azar RN  Body Position: sitting up in bed  Taken 12/5/2020 2015 by Sofía Azar RN  Body Position: sitting up in bed  Intervention: Prevent and Manage VTE (venous thromboembolism) Risk  Recent Flowsheet Documentation  Taken 12/6/2020 0416 by Sofía Azar RN  VTE Prevention/Management: patient refused intervention  Taken 12/6/2020 0247 by Sofía Azar RN  VTE Prevention/Management: patient refused intervention  Taken 12/6/2020 0000 by Sofía Azar RN  VTE Prevention/Management: patient refused  intervention  Taken 12/5/2020 2243 by Sofía Azar RN  VTE Prevention/Management: patient refused intervention  Taken 12/5/2020 2015 by Sofía Azar RN  VTE Prevention/Management:   bilateral   sequential compression devices off  Goal: Optimal Comfort and Wellbeing  12/6/2020 0617 by Sofía Azar RN  Outcome: Ongoing, Not Progressing  12/6/2020 0338 by Sofía Azar RN  Outcome: Ongoing, Not Progressing  Intervention: Provide Person-Centered Care  Recent Flowsheet Documentation  Taken 12/5/2020 2015 by Sofía Azar RN  Trust Relationship/Rapport:   care explained   thoughts/feelings acknowledged  Goal: Readiness for Transition of Care  12/6/2020 0617 by Sofía Azar RN  Outcome: Ongoing, Not Progressing  12/6/2020 0338 by Sofía Azar RN  Outcome: Ongoing, Not Progressing     Problem: Hypertension Comorbidity  Goal: Blood Pressure in Desired Range  12/6/2020 0617 by Sofía Azar RN  Outcome: Ongoing, Not Progressing  12/6/2020 0338 by Sofía Azar RN  Outcome: Ongoing, Not Progressing  Intervention: Maintain Hypertension-Management Strategies  Recent Flowsheet Documentation  Taken 12/5/2020 2015 by Sofía Azar RN  Medication Review/Management: medications reviewed     Problem: Obstructive Sleep Apnea Risk or Actual (Comorbidity Management)  Goal: Unobstructed Breathing During Sleep  12/6/2020 0617 by Sofía Azar RN  Outcome: Ongoing, Not Progressing  12/6/2020 0338 by Sofía Azar RN  Outcome: Ongoing, Not Progressing     Problem: Pain Chronic (Persistent) (Comorbidity Management)  Goal: Acceptable Pain Control and Functional Ability  12/6/2020 0617 by Sofía Azar RN  Outcome: Ongoing, Not Progressing  12/6/2020 0338 by Sofía Azar RN  Outcome: Ongoing, Not Progressing  Intervention: Develop Pain Management Plan  Recent Flowsheet Documentation  Taken 12/6/2020 0547 by Sofía Azar RN  Pain Management Interventions: see MAR  Taken 12/5/2020  2243 by Sofía Azar RN  Pain Management Interventions: see MAR  Intervention: Manage Persistent Pain  Recent Flowsheet Documentation  Taken 12/5/2020 2015 by Sofía Azar RN  Bowel Elimination Promotion: (pt refusing ambulation)   adequate fluid intake promoted   ambulation promoted   other (see comments)  Medication Review/Management: medications reviewed  Intervention: Optimize Psychosocial Wellbeing  Recent Flowsheet Documentation  Taken 12/5/2020 2015 by Sofía Azar RN  Supportive Measures:   active listening utilized   verbalization of feelings encouraged  Diversional Activities: television  Family/Support System Care:   self-care encouraged   support provided     Problem: Fall Injury Risk  Goal: Absence of Fall and Fall-Related Injury  12/6/2020 0617 by Sofía Azar RN  Outcome: Ongoing, Not Progressing  12/6/2020 0338 by Sofía Azar RN  Outcome: Ongoing, Not Progressing  Intervention: Identify and Manage Contributors to Fall Injury Risk  Recent Flowsheet Documentation  Taken 12/5/2020 2015 by Sofía Azar RN  Medication Review/Management: medications reviewed  Intervention: Promote Injury-Free Environment  Recent Flowsheet Documentation  Taken 12/6/2020 0615 by Sofía Azar RN  Safety Promotion/Fall Prevention:   activity supervised   assistive device/personal items within reach   clutter free environment maintained   elopement precautions   fall prevention program maintained   gait belt   nonskid shoes/slippers when out of bed   safety round/check completed   room organization consistent   toileting scheduled  Taken 12/6/2020 0416 by Sofía Azar RN  Safety Promotion/Fall Prevention:   activity supervised   assistive device/personal items within reach   clutter free environment maintained   elopement precautions   fall prevention program maintained   gait belt   nonskid shoes/slippers when out of bed   room organization consistent   safety round/check completed    toileting scheduled  Taken 12/6/2020 0247 by Sofía Azar RN  Safety Promotion/Fall Prevention:   assistive device/personal items within reach   clutter free environment maintained   fall prevention program maintained   gait belt   elopement precautions   nonskid shoes/slippers when out of bed   room organization consistent   safety round/check completed   toileting scheduled   activity supervised  Taken 12/6/2020 0000 by Sofía Azar RN  Safety Promotion/Fall Prevention:   activity supervised   assistive device/personal items within reach   clutter free environment maintained   elopement precautions   fall prevention program maintained   gait belt   nonskid shoes/slippers when out of bed   room organization consistent   safety round/check completed   toileting scheduled  Taken 12/5/2020 2015 by Sofía Azar RN  Safety Promotion/Fall Prevention:   activity supervised   assistive device/personal items within reach   clutter free environment maintained   elopement precautions   fall prevention program maintained   gait belt   nonskid shoes/slippers when out of bed   room organization consistent   safety round/check completed   toileting scheduled     Problem: Skin or Soft Tissue Infection  Goal: Infection Symptom Resolution  Outcome: Ongoing, Not Progressing     Problem: Surgical Site Infection  Goal: Improved Infection Symptoms  Outcome: Ongoing, Not Progressing     Problem: Pain Acute  Goal: Optimal Pain Control  Outcome: Ongoing, Not Progressing  Intervention: Develop Pain Management Plan  Recent Flowsheet Documentation  Taken 12/6/2020 0547 by Sofía Azar RN  Pain Management Interventions: see MAR  Taken 12/5/2020 2243 by Sofía zAar RN  Pain Management Interventions: see MAR  Intervention: Optimize Psychosocial Wellbeing  Recent Flowsheet Documentation  Taken 12/5/2020 2015 by Sofía Azar RN  Supportive Measures:   active listening utilized   verbalization of feelings  encouraged  Diversional Activities: television     Problem: Noninvasive Ventilation Acute  Goal: Effective Unassisted Ventilation and Oxygenation  Outcome: Ongoing, Not Progressing   Goal Outcome Evaluation:  Plan of Care Reviewed With: patient  Progress: no change  Outcome Summary: Pt. asleep most of shift. Ambulation offered several times by RN and CNA but pt. refuses. Refusing SCDs. ROSA dressing CDI. Ice packs in place. Pain controlled w/ PO meds per order. Voiding appropriately. CPAP at HS. VSS.

## 2020-12-06 NOTE — DISCHARGE SUMMARY
Patient Name: Obi Jackman Jr.  MRN: [id number]  : 1950  DOS: 2020    Attending: Joselyn Patel MD    Primary Care Provider: Cammy Oneal PA    Date of Admission:.12/3/2020  8:09 PM    Date of Discharge:  2020    Discharge Diagnosis:     Postoperative wound infection of right hip, s/p I and D, head and liner exchange.    Anxiety disorder    Hyperlipidemia    Hypertension    CHRISTOPHE (obstructive sleep apnea)    Status post total replacement of right hip  Staph genetis positive hip synovial fluid culture    Consults: Luis Delaney MD, [facility].    Hospital Course  At admit:  Patient is a very pleasant 70 y.o. male who is known to us from prior hospitalizations.     Most recently he was hospitalized on 2020 at [facility] where he underwent right total hip arthroplasty by Dr. Olivas.  He did very well postoperatively and was able to be discharged home postop day 1.  For the most part he had an eventful rehab course and he was doing quite well until he presented to see Dr. Olivas yesterday 2 months from last visit.  He started having severe pain in his right hip rated at 9 out of 10.       He started having some popping and grinding and stiffness.  Due to the symptoms he has been walking with 2 canes..  He was worked up with labs showing leukocytosis aspirate of the hip yielded 50 mL of old appearing blood.  Cell count showed 29,000 WBCs with 82% neutrophils.     He also had elevated inflammatory markers.     With concern for infection of his right hip wound he has been admitted for further management.  He denies any fever chills, nausea or vomiting, shortness of breath or chest pain.  He has no recent sick contacts.  No recent trauma.  CURRENT COVID RISKS:  []? Fever []? Cough []? Shortness of breath []? Loss of taste or smell    []? Exposure to COVID positive patient  []? High risk facility   [x]?  NONE                After admit  Patient underwent incision and drainage and  head and liner exchange of the right hip.  Tolerated surgery well.  Throughout his stay his antibiotics were managed through infectious consultants.  A PICC line was placed with a planned of IV antibiotics at discharge.  Patient was provided pain medications as needed for pain control.    Adjustments were made to pain medications to optimize postop pain management when indicated. Risks and benefits of opiate medications discussed with patient.    He was seen by PT  and has progressed well over his stay.    Patient used an IS for atelectasis prophylaxis and aspirin along with mechanicals for DVT prophylaxis.    Home medications were resumed as appropriate, and labs were monitored and remained fairly stable.     Statin is placed on hold during the time he is on daptomycin.  He is instructed to resume it after being finished with daptomycin.    With the progress he has made, Mr. Jackman is ready for DC home today.      Discussed with patient regarding plan and he shows understanding and agreement.    Patient will have HHPT following discharge.        Procedures Performed  Procedure(s):  INCISION AND DRAINAGE WITH COMPONENT EXCHANGE, HEAD AND LINER RIGHT HIP       Pertinent Test Results:    I reviewed the patient's new clinical results.   Results from last 7 days   Lab Units 12/05/20  0741 12/03/20  2114 12/02/20  1455   WBC 10*3/mm3 7.69 8.95 12.07*   HEMOGLOBIN g/dL 11.8* 12.9* 15.3   HEMATOCRIT % 36.5* 40.7 44.7   PLATELETS 10*3/mm3 247 245 265     Results from last 7 days   Lab Units 12/03/20  2114   SODIUM mmol/L 135*   POTASSIUM mmol/L 4.0   CHLORIDE mmol/L 96*   CO2 mmol/L 26.0   BUN mg/dL 21   CREATININE mg/dL 1.18   CALCIUM mg/dL 9.2   GLUCOSE mg/dL 103*     Collected Updated Procedure Result Status    12/06/2020 0857 12/06/2020 0936 COVID PRE-OP / PRE-PROCEDURE SCREENING ORDER (NO ISOLATION) - Swab, Nasopharynx [[id number]]    Swab from Nasopharynx    Final result Component Value   No component results               12/06/2020 0857 12/06/2020 0936 COVID-19 and FLU A/B PCR - Swab, Nasopharynx [[id number]]    Swab from Nasopharynx    Final result Component Value   COVID19 Not Detected   Influenza A PCR Not Detected   Influenza B PCR Not Detected              12/04/2020 1452 12/07/2020 0910 Anaerobic Culture - Synovial Fluid, Hip, Right [[id number]]   Synovial Fluid from Hip, Right    Preliminary result Component Value   Anaerobic Culture No anaerobes isolated at 3 days P              12/04/2020 1452 12/04/2020 1501 Fungus Culture - Synovial Fluid, Hip, Right [[id number]]   Synovial Fluid from Hip, Right    In process Component Value   No component results              12/04/2020 1452 12/07/2020 0702 Body Fluid Culture - Synovial Fluid, Hip, Right [[id number]]    (Abnormal)   Synovial Fluid from Hip, Right    Final result Component Value   Body Fluid Culture Heavy growth (4+) Staphylococcus lugdunensisAbnormal    Gram Stain Moderate (3+) WBCs per low power field    No organisms seen              12/04/2020 1452 12/05/2020 1151 AFB Culture - Synovial Fluid, Hip, Right [[id number]]   Synovial Fluid from Hip, Right    Preliminary result Component Value   AFB Stain No acid fast bacilli seen on concentrated smear P              12/03/2020 2227 12/04/2020 0347 COVID PRE-OP / PRE-PROCEDURE SCREENING ORDER (NO ISOLATION) - Swab, Nasopharynx [[id number]]    Swab from Nasopharynx    Final result Component Value   No component results              12/03/2020 2227 12/04/2020 0347 COVID-19, ABBOTT IN-HOUSE,NP Swab (NO TRANSPORT MEDIA) 2 HR TAT - Swab, Nasopharynx [[id number]]    Swab from Nasopharynx    Final result Component Value   COVID19 Not Detected              12/03/2020 1447 12/06/2020 0654 Anaerobic Culture - Synovial Fluid, Hip, Right [[id number]]    Synovial Fluid from Hip, Right    Preliminary result Component Value   Anaerobic Culture No anaerobes isolated at 3 days P              12/03/2020 1447 12/07/2020 0702 Body Fluid Culture -  "Synovial Fluid, Hip, Right [[id number]]    (Abnormal)   Synovial Fluid from Hip, Right    Final result Component Value   Body Fluid Culture Rare Staphylococcus lugdunensisAbnormal    Gram Stain Many (4+) WBCs per low power field    No organisms seen       Susceptibility     Staphylococcus lugdunensis     NORA     Gentamicin <=0.5  Susceptible     Oxacillin 2  Susceptible     Rifampin <=0.5  Susceptible     Vancomycin <=0.5  Susceptible            Linear View   Susceptibility Comments    Staphylococcus lugdunensis   This isolate is presumed to be clindamycin resistant based on detection of inducible clindamycin resistance.  Clindamycin may still be effective in some patients.              I reviewed the patient's new imaging including images and reports.    Physical therapy  Progress: improving  Outcome Summary: Patient ambulated 90 feet with RW and step through gait pattern, limited by pain and fatigue. Increased independence with t/f today. Continues to require min assist to get OOB. Patient climbed 1 step with RW x2 with CGA and cues for correct performance. Patient has been d/c home with family and HHPT.    Discharge Assessment:      Visit Vitals  /68 (BP Location: Right arm, Patient Position: Lying)   Pulse 96   Temp 98 °F (36.7 °C) (Oral)   Resp 16   Ht 180.3 cm (71\")   Wt (!) 145 kg (320 lb 9.6 oz)   SpO2 96%   BMI 44.71 kg/m²     Temp (24hrs), Av.9 °F (36.6 °C), Min:97.8 °F (36.6 °C), Max:98 °F (36.7 °C)      General Appearance:    Alert, cooperative, in no acute distress   Lungs:     Clear to auscultation,respirations regular, even and                   unlabored    Heart:    Regular rhythm and normal rate, normal S1 and S2   Abdomen:     Normal bowel sounds, no masses, no organomegaly, soft        non-tender, non-distended, no guarding, no rebound                 tenderness.  Obese   Extremities:    Right hip ROSA over incision.  Clean dry and intact.  Trace edema distal lower extremities.   Pulses: "   Pulses palpable and equal bilaterally   Skin:   No bleeding, bruising or rash            Discharge Disposition: Home         Discharge Medications      New Medications      Instructions Start Date   cefTRIAXone  Commonly known as: ROCEPHIN   2 g, Intravenous, Every 24 Hours      DAPTOmycin 1,000 mg in sodium chloride 0.9 % 50 mL   1,000 mg, Intravenous, Every 24 Hours   Start Date: December 7, 2020     docusate sodium 100 MG capsule   100 mg, Oral, 2 Times Daily      oxyCODONE 5 MG immediate release tablet  Commonly known as: ROXICODONE   5 mg, Oral, Every 4 Hours PRN      polyethylene glycol 17 g packet  Commonly known as: MIRALAX   17 g, Oral, Daily         Changes to Medications      Instructions Start Date   atorvastatin 10 MG tablet  Commonly known as: LIPITOR  What changed:   · additional instructions  · These instructions start on January 17, 2021. If you are unsure what to do until then, ask your doctor or other care provider.   10 mg, Oral, Daily, Resume when not on Dapto anymore   Start Date: January 17, 2021     HYDROcodone-acetaminophen 7.5-325 MG per tablet  Commonly known as: NORCO  What changed:   · when to take this  · additional instructions   1 tablet, Oral, Every 6 Hours PRN, Resume when done with Percocet if needed      traZODone 50 MG tablet  Commonly known as: DESYREL  What changed:   · when to take this  · reasons to take this   50 mg, Oral, Nightly         Continue These Medications      Instructions Start Date   acetaminophen 325 MG tablet  Commonly known as: TYLENOL   325 mg, Oral, As Needed      ALPRAZolam 0.25 MG tablet  Commonly known as: XANAX   0.25 mg, Oral, Daily PRN      amLODIPine 5 MG tablet  Commonly known as: NORVASC   5 mg, Oral, Daily      aspirin  MG tablet   325 mg, Oral, Daily, For 1 month      atenolol 50 MG tablet  Commonly known as: TENORMIN   50 mg, Oral, Daily      buPROPion  MG 24 hr tablet  Commonly known as: WELLBUTRIN XL   150 mg, Oral, Daily       clotrimazole 1 % cream  Commonly known as: LOTRIMIN   Topical, 2 Times Daily      cyclobenzaprine 10 MG tablet  Commonly known as: FLEXERIL   TAKE ONE TABLET BY MOUTH THREE TIMES A DAY AS NEEDED FOR MUSCLE SPASMS.      gabapentin 300 MG capsule  Commonly known as: NEURONTIN   300 mg, Oral, 3 Times Daily      hydroxychloroquine 200 MG tablet  Commonly known as: PLAQUENIL   200 mg, Oral, 2 Times Daily      lisinopril-hydrochlorothiazide 20-12.5 MG per tablet  Commonly known as: PRINZIDE,ZESTORETIC   1 tablet, Oral, Daily      meclizine 25 MG tablet  Commonly known as: ANTIVERT   25 mg, Every 8 Hours PRN      ondansetron 4 MG tablet  Commonly known as: Zofran   4 mg, Oral, Every 6 Hours PRN      oxaprozin 600 MG tablet  Commonly known as: DAYPRO   1,200 mg, Oral, Daily, Take 2 po every day      promethazine 25 MG tablet  Commonly known as: PHENERGAN   25 mg, Oral, Every 6 Hours PRN      SUMAtriptan 100 MG tablet  Commonly known as: IMITREX   TAKE 1 TABLET BY MOUTH AT ONSET OF HEADACHE. MAY REPEAT DOSE ONE TIME IN 2 HOURS IF HEADACHE NOT RELIEVED.      Vasculera tablet   1 tablet, Oral, Daily      VITAMIN B-12 ER PO   1 tablet, Oral, Daily      VITAMIN C PO   Oral, Daily      VITAMIN D2 PO   Oral, Daily         Stop These Medications    ibuprofen 200 MG tablet  Commonly known as: ADVIL,MOTRIN     nabumetone 750 MG tablet  Commonly known as: RELAFEN          Abx per [facility]  (Outpatient orders:Outpatient orders:  1.  Have him come to our office tomorrow Monday 12/7 at 1400 for: Daptomycin 1000 mg IV daily and ceftriaxone 2 g IV daily  2.  Appointment with Katharine HOFFMAN in our office on Tuesday 12/8 at 1400-this appointment has been made)      Discharge Diet:     Activity at Discharge:   Weightbearing as tolerated right lower extremity  Follow-up Appointments  Dr. Olivas 2 to 3 weeks in his office.  Luis Fernandez MD Monday 12/7/2024 IV antibiotics    Discharge took over 30 min  Discussed with the patient and all  questions were fully answered..    Dragon disclaimer:  Part of this encounter note is an electronic transcription/translation of spoken language to printed text. The electronic translation of spoken language may permit erroneous, or at times, nonsensical words or phrases to be inadvertently transcribed; Although I have reviewed the note for such errors, some may still exist.       Joselyn Patel MD  12/06/20  13:43 EST

## 2020-12-07 ENCOUNTER — TRANSITIONAL CARE MANAGEMENT TELEPHONE ENCOUNTER (OUTPATIENT)
Dept: CALL CENTER | Facility: HOSPITAL | Age: 70
End: 2020-12-07

## 2020-12-07 ENCOUNTER — LAB (OUTPATIENT)
Dept: LAB | Facility: HOSPITAL | Age: 70
End: 2020-12-07

## 2020-12-07 ENCOUNTER — TRANSCRIBE ORDERS (OUTPATIENT)
Dept: LAB | Facility: HOSPITAL | Age: 70
End: 2020-12-07

## 2020-12-07 DIAGNOSIS — T84.51XD INFLAMMATORY REACTION DUE TO INTERNAL PROSTHESIS OF RIGHT HIP, SUBSEQUENT ENCOUNTER: Primary | ICD-10-CM

## 2020-12-07 LAB
ALBUMIN SERPL-MCNC: 3.4 G/DL (ref 3.5–5.2)
ALBUMIN/GLOB SERPL: 1 G/DL
ALP SERPL-CCNC: 67 U/L (ref 39–117)
ALT SERPL W P-5'-P-CCNC: 14 U/L (ref 1–41)
ANION GAP SERPL CALCULATED.3IONS-SCNC: 11 MMOL/L (ref 5–15)
AST SERPL-CCNC: 15 U/L (ref 1–40)
BACTERIA FLD CULT: ABNORMAL
BASOPHILS # BLD AUTO: 0.02 10*3/MM3 (ref 0–0.2)
BASOPHILS NFR BLD AUTO: 0.2 % (ref 0–1.5)
BILIRUB SERPL-MCNC: 0.4 MG/DL (ref 0–1.2)
BUN SERPL-MCNC: 9 MG/DL (ref 8–23)
BUN/CREAT SERPL: 11.4 (ref 7–25)
CALCIUM SPEC-SCNC: 9.6 MG/DL (ref 8.6–10.5)
CHLORIDE SERPL-SCNC: 100 MMOL/L (ref 98–107)
CO2 SERPL-SCNC: 30 MMOL/L (ref 22–29)
CREAT SERPL-MCNC: 0.79 MG/DL (ref 0.76–1.27)
CRP SERPL-MCNC: 19.91 MG/DL (ref 0–0.5)
CYTO UR: NORMAL
DEPRECATED RDW RBC AUTO: 42.7 FL (ref 37–54)
EOSINOPHIL # BLD AUTO: 0.19 10*3/MM3 (ref 0–0.4)
EOSINOPHIL NFR BLD AUTO: 2.3 % (ref 0.3–6.2)
ERYTHROCYTE [DISTWIDTH] IN BLOOD BY AUTOMATED COUNT: 13 % (ref 12.3–15.4)
ERYTHROCYTE [SEDIMENTATION RATE] IN BLOOD: 49 MM/HR (ref 0–20)
GFR SERPL CREATININE-BSD FRML MDRD: 97 ML/MIN/1.73
GLOBULIN UR ELPH-MCNC: 3.5 GM/DL
GLUCOSE SERPL-MCNC: 123 MG/DL (ref 65–99)
GRAM STN SPEC: ABNORMAL
GRAM STN SPEC: ABNORMAL
HCT VFR BLD AUTO: 37.3 % (ref 37.5–51)
HGB BLD-MCNC: 11.7 G/DL (ref 13–17.7)
IMM GRANULOCYTES # BLD AUTO: 0.04 10*3/MM3 (ref 0–0.05)
IMM GRANULOCYTES NFR BLD AUTO: 0.5 % (ref 0–0.5)
LAB AP CASE REPORT: NORMAL
LAB AP CLINICAL INFORMATION: NORMAL
LAB AP DIAGNOSIS COMMENT: NORMAL
LYMPHOCYTES # BLD AUTO: 0.85 10*3/MM3 (ref 0.7–3.1)
LYMPHOCYTES NFR BLD AUTO: 10.3 % (ref 19.6–45.3)
Lab: NORMAL
MCH RBC QN AUTO: 28.5 PG (ref 26.6–33)
MCHC RBC AUTO-ENTMCNC: 31.4 G/DL (ref 31.5–35.7)
MCV RBC AUTO: 91 FL (ref 79–97)
MONOCYTES # BLD AUTO: 0.99 10*3/MM3 (ref 0.1–0.9)
MONOCYTES NFR BLD AUTO: 12 % (ref 5–12)
NEUTROPHILS NFR BLD AUTO: 6.13 10*3/MM3 (ref 1.7–7)
NEUTROPHILS NFR BLD AUTO: 74.7 % (ref 42.7–76)
NRBC BLD AUTO-RTO: 0 /100 WBC (ref 0–0.2)
PATH REPORT.FINAL DX SPEC: NORMAL
PATH REPORT.GROSS SPEC: NORMAL
PLATELET # BLD AUTO: 325 10*3/MM3 (ref 140–450)
PMV BLD AUTO: 9.6 FL (ref 6–12)
POTASSIUM SERPL-SCNC: 4.2 MMOL/L (ref 3.5–5.2)
PROT SERPL-MCNC: 6.9 G/DL (ref 6–8.5)
RBC # BLD AUTO: 4.1 10*6/MM3 (ref 4.14–5.8)
SODIUM SERPL-SCNC: 141 MMOL/L (ref 136–145)
WBC # BLD AUTO: 8.22 10*3/MM3 (ref 3.4–10.8)

## 2020-12-07 PROCEDURE — 85652 RBC SED RATE AUTOMATED: CPT | Performed by: INTERNAL MEDICINE

## 2020-12-07 PROCEDURE — 80053 COMPREHEN METABOLIC PANEL: CPT | Performed by: INTERNAL MEDICINE

## 2020-12-07 PROCEDURE — 86140 C-REACTIVE PROTEIN: CPT | Performed by: INTERNAL MEDICINE

## 2020-12-07 PROCEDURE — 85025 COMPLETE CBC W/AUTO DIFF WBC: CPT | Performed by: INTERNAL MEDICINE

## 2020-12-07 PROCEDURE — 36415 COLL VENOUS BLD VENIPUNCTURE: CPT | Performed by: INTERNAL MEDICINE

## 2020-12-07 NOTE — OUTREACH NOTE
Call Center TCM Note      Responses   [facility] patient discharged from?  [facility]   Does the patient have one of the following disease processes/diagnoses(primary or secondary)?  General Surgery   TCM attempt successful?  Yes   Call start time  1017   Call end time  1020   Discharge diagnosis  I & D right total hip arthroplasty with head & liner exchange   Is patient permission given to speak with other caregiver?  Yes   List who call center can speak with  spouse- Rachael   Person spoke with today (if not patient) and relationship  spouse- Rachael/ Patient   Medication alerts for this patient  IV abx via PICC line at [facility]   Meds reviewed with patient/caregiver?  Yes   Is the patient having any side effects they believe may be caused by any medication additions or changes?  No   Does the patient have all medications related to this admission filled (includes all antibiotics, pain medications, etc.)  Yes   Is the patient taking all medications as directed (includes completed medication regime)?  Yes   Does the patient have a follow up appointment scheduled with their surgeon?  Yes   Has the patient kept scheduled appointments due by today?  N/A   Psychosocial issues?  No   Did the patient receive a copy of their discharge instructions?  Yes   Nursing interventions  Reviewed instructions with patient   What is the patient's perception of their health status since discharge?  Improving   Nursing interventions  Nurse provided patient education   Is the patient /caregiver able to teach back basic post-op care?  Continue use of incentive spirometry at least 1 week post discharge, Drive as instructed by MD in discharge instructions, Take showers only when approved by MD-sponge bathe until then, Practice 'cough and deep breath', Lifting as instructed by MD in discharge instructions, Do not remove steri-strips, Keep incision areas clean,dry and protected, No tub bath, swimming, or hot tub until instructed by MD   Is the  patient/caregiver able to teach back signs and symptoms of incisional infection?  Fever   Is the patient/caregiver able to teach back steps to recovery at home?  Set small, achievable goals for return to baseline health, Rest and rebuild strength, gradually increase activity   Is the patient/caregiver able to teach back the hierarchy of who to call/visit for symptoms/problems? PCP, Specialist, Home health nurse, Urgent Care, ED, 911  Yes   TCM call completed?  Yes   Wrap up additional comments  States he is doing well, he will be going to Dr. Delaney's office today for an antibiotic infusion and will be following up with Dr. Olivas.          Karol Rivas RN    12/7/2020, 10:20 EST

## 2020-12-08 LAB — BACTERIA SPEC ANAEROBE CULT: NORMAL

## 2020-12-09 ENCOUNTER — OFFICE VISIT (OUTPATIENT)
Dept: ORTHOPEDIC SURGERY | Facility: CLINIC | Age: 70
End: 2020-12-09

## 2020-12-09 DIAGNOSIS — Z47.89 ORTHOPEDIC AFTERCARE: Primary | ICD-10-CM

## 2020-12-09 LAB — BACTERIA SPEC ANAEROBE CULT: NORMAL

## 2020-12-09 PROCEDURE — 99024 POSTOP FOLLOW-UP VISIT: CPT | Performed by: PHYSICIAN ASSISTANT

## 2020-12-09 NOTE — PROGRESS NOTES
[facility] Orthopaedic Surgery Clinic Note    Subjective     Patient: Obi Jackman Jr.  : 1950    Primary Care Provider: Cammy Oneal PA    Requesting Provider: As above    Post-op (5 days S/P INCISION AND DRAINAGE WITH COMPONENT EXCHANGE, HEAD AND LINER RIGHT HIP 20)      History    History of Present Illness: Patient presents early status post I&D right hip with headliner exchange with Dr. Olivas on 2020.  He has a marisol dressing on and reports that sometime between 4 and 7 AM it began draining down his leg.  He denies any fever or chills.  He reports the hip pain is significantly better than it was prior to the I&D.  He is on IV antibiotics per ID.    Current Outpatient Medications on File Prior to Visit   Medication Sig Dispense Refill   • acetaminophen (TYLENOL) 325 MG tablet Take 325 mg by mouth As Needed for Mild Pain .     • ALPRAZolam (XANAX) 0.25 MG tablet Take 1 tablet by mouth Daily As Needed for Anxiety (when flying). 10 tablet 0   • amLODIPine (NORVASC) 5 MG tablet Take 1 tablet by mouth Daily. 90 tablet 1   • Ascorbic Acid (VITAMIN C PO) Take  by mouth Daily.     • aspirin 325 MG EC tablet Take 1 tablet by mouth Daily. For 1 month 30 tablet 0   • atenolol (TENORMIN) 50 MG tablet Take 1 tablet by mouth Daily. 90 tablet 1   • [START ON 2021] atorvastatin (LIPITOR) 10 MG tablet Take 1 tablet by mouth Daily. Resume when not on Dapto anymore 90 tablet 3   • buPROPion XL (WELLBUTRIN XL) 150 MG 24 hr tablet Take 1 tablet by mouth Daily. 90 tablet 1   • cefTRIAXone (ROCEPHIN) 2 g/100 mL 0.9% NS IVPB (MBP) Infuse 100 mL into a venous catheter Daily for 10 doses. Indications: Bone and/or Joint Infection 1000 mL 0   • clotrimazole (LOTRIMIN) 1 % cream Apply  topically to the appropriate area as directed 2 (Two) Times a Day. 15 g 1   • Cyanocobalamin (VITAMIN B-12 ER PO) Take 1 tablet by mouth Daily.     • cyclobenzaprine (FLEXERIL) 10 MG tablet TAKE ONE TABLET BY MOUTH THREE TIMES A  DAY AS NEEDED FOR MUSCLE SPASMS. 30 tablet 5   • DAPTOmycin 1,000 mg in sodium chloride 0.9 % 50 mL Infuse 1,000 mg into a venous catheter Daily for 4 doses. Indications: Bone and/or Joint Infection, Infection of the Skin and/or Soft Tissue     • Dietary Management Product (VASCULERA) tablet Take 1 tablet by mouth Daily. 90 tablet 5   • docusate sodium 100 MG capsule Take 1 capsule by mouth 2 (Two) Times a Day. 40 capsule 0   • Ergocalciferol (VITAMIN D2 PO) Take  by mouth Daily.     • gabapentin (NEURONTIN) 300 MG capsule Take 300 mg by mouth 3 (Three) Times a Day.     • HYDROcodone-acetaminophen (NORCO) 7.5-325 MG per tablet Take 1 tablet by mouth Every 6 (Six) Hours As Needed for Moderate Pain . Resume when done with Percocet if needed     • hydroxychloroquine (PLAQUENIL) 200 MG tablet Take 200 mg by mouth 2 (Two) Times a Day.     • lisinopril-hydrochlorothiazide (PRINZIDE,ZESTORETIC) 20-12.5 MG per tablet Take 1 tablet by mouth Daily. 90 tablet 1   • meclizine (ANTIVERT) 25 MG tablet 25 mg Every 8 (Eight) Hours As Needed.     • ondansetron (ZOFRAN) 4 MG tablet Take 1 tablet by mouth Every 6 (Six) Hours As Needed for Nausea or Vomiting. 30 tablet 0   • oxaprozin (DAYPRO) 600 MG tablet Take 1,200 mg by mouth Daily. Take 2 po every day      • oxyCODONE (ROXICODONE) 5 MG immediate release tablet Take 1 tablet by mouth Every 4 (Four) Hours As Needed for Moderate Pain  for up to 4 days. 25 tablet 0   • polyethylene glycol (MIRALAX) 17 g packet Take 17 g by mouth Daily. 15 packet 0   • promethazine (PHENERGAN) 25 MG tablet Take 1 tablet by mouth Every 6 (Six) Hours As Needed for Nausea or Vomiting. 20 tablet 0   • SUMAtriptan (IMITREX) 100 MG tablet TAKE 1 TABLET BY MOUTH AT ONSET OF HEADACHE. MAY REPEAT DOSE ONE TIME IN 2 HOURS IF HEADACHE NOT RELIEVED. 9 tablet 3   • traZODone (DESYREL) 50 MG tablet TAKE 1 TABLET BY MOUTH EVERY NIGHT. (Patient taking differently: Take 50 mg by mouth At Night As Needed.) 60 tablet 5      No current facility-administered medications on file prior to visit.       No Known Allergies   Past Medical History:   Diagnosis Date   • Anxiety and depression    • Bulging of lumbar intervertebral disc    • CPAP (continuous positive airway pressure) dependence    • Hyperlipidemia    • Hypertension    • Migraine    • CHRISTOPHE on CPAP    • Rheumatoid arthritis (CMS/HCC)    • Swelling of left ear    • Wears contact lenses    • Wears contact lenses      Past Surgical History:   Procedure Laterality Date   • ACHILLES TENDON SURGERY Left 10/15/2019    Procedure: ACHILLES TENDON LENGTHENING LEFT;  Surgeon: Elodia Guzman MD;  Location: [facility];  Service: Orthopedics   • CATARACT EXTRACTION Right    • COLONOSCOPY  2015   • EYE SURGERY Bilateral     cornea transplant    • FOOT SURGERY Left 10/15/2019    triple arthrodesis and achilles tendon lengthening- [place]   • HERNIA REPAIR     • ILIAC CREST BONE GRAFT Left 10/15/2019    Procedure: ILIAC CREST BONE GRAFT LEFT;  Surgeon: Elodia Guzman MD;  Location: [facility];  Service: Orthopedics   • INCISION AND DRAINAGE LEG Right 12/4/2020    Procedure: INCISION AND DRAINAGE WITH COMPONENT EXCHANGE, HEAD AND LINER RIGHT HIP;  Surgeon: Osbaldo Olivas MD;  Location: [facility];  Service: Orthopedics;  Laterality: Right;   • TOE FUSION Left 10/15/2019    Procedure: TRIPLE ARTHODESIS LEFT;  Surgeon: Elodia Guzman MD;  Location: [facility];  Service: Orthopedics   • TOTAL HIP ARTHROPLASTY Right 8/4/2020    Procedure: TOTAL HIP ARTHROPLASTY RIGHT;  Surgeon: Osbaldo Olivas MD;  Location: [facility];  Service: Orthopedics;  Laterality: Right;     Family History   Problem Relation Age of Onset   • No Known Problems Mother    • No Known Problems Father       Social History     Socioeconomic History   • Marital status:      Spouse name: Not on file   • Number of children: Not on file   • Years of education: Not on file   • Highest education level: Not on file   Tobacco Use    • Smoking status: Never Smoker   • Smokeless tobacco: Never Used   Substance and Sexual Activity   • Alcohol use: Yes     Comment: rarely   • Drug use: No   • Sexual activity: Defer        Review of Systems   Constitutional: Negative.    HENT: Negative.    Eyes: Negative.    Respiratory: Negative.    Cardiovascular: Negative.    Gastrointestinal: Negative.    Endocrine: Negative.    Genitourinary: Negative.    Musculoskeletal: Positive for arthralgias.   Skin: Negative.    Allergic/Immunologic: Negative.    Neurological: Negative.    Hematological: Negative.    Psychiatric/Behavioral: Negative.        The following portions of the patient's history were reviewed and updated as appropriate: allergies, current medications, past family history, past medical history, past social history, past surgical history and problem list.      Objective      Physical Exam  There were no vitals taken for this visit.    There is no height or weight on file to calculate BMI.    Ortho Exam  Right hip exam: Posterior hip incision is well approximated with staples intact.  There is 1 spot in the distal incision that is draining blood-tinged serous fluid.  No erythema, no induration.  Ambulating with a walker.    Medical Decision Making    Data Review:   none    Assessment:  1. Orthopedic aftercare        Plan:  Patient is status post I&D right hip with component exchange with Dr. Olivas.  His marisol dressing has failed and he is having serosanguineous fluid draining from the dressing.  Plan today is that we will replace the marisol dressing.  He will return to see Dr. Olivas in 1 week or sooner if needed.    Patient history, diagnosis and treatment plan discussed with Dr. Olivas.        Radha Valencia PA-C  12/10/20  14:06 EST

## 2020-12-10 ENCOUNTER — OFFICE VISIT (OUTPATIENT)
Dept: ORTHOPEDIC SURGERY | Facility: CLINIC | Age: 70
End: 2020-12-10

## 2020-12-10 ENCOUNTER — TELEPHONE (OUTPATIENT)
Dept: ORTHOPEDIC SURGERY | Facility: CLINIC | Age: 70
End: 2020-12-10

## 2020-12-10 DIAGNOSIS — Z96.641 STATUS POST TOTAL HIP REPLACEMENT, RIGHT: Primary | ICD-10-CM

## 2020-12-10 PROCEDURE — 99024 POSTOP FOLLOW-UP VISIT: CPT | Performed by: PHYSICIAN ASSISTANT

## 2020-12-10 NOTE — PROGRESS NOTES
[facility] [place] [place] Orthopaedic Surgery Clinic Note        Subjective     Follow-up (Post-op (6 days S/P INCISION AND DRAINAGE WITH COMPONENT EXCHANGE, HEAD AND LINER RIGHT HIP 12/4/20))       KEYA Jackman Jr. is a 70 y.o. male.  Patient returns today after seeing Radha Valencia PA-C yesterday due to drainage noted from his incision.  He currently has a marisol wound VAC in place but the incision has continued to drain, saturating the wound VAC pad.  He contacted the clinic this morning was brought into change out the marisol dressing.    Otherwise he has no new complaints or issues.  He is using a walker to assist with ambulation.  No reported fever, chills, night sweats or other constitutional symptoms.        Objective      Physical Exam  There were no vitals taken for this visit.    There is no height or weight on file to calculate BMI.        Ortho Exam  Right hip incision, staples in place  Positive serosanguineous drainage noted inferior aspect of the incision.  No redness, warmth or fluctuance appreciable along the incision.      Imaging Reviewed:  No new imaging today.      Assessment:  1. Status post total hip replacement, right        Plan:  1. Status post right MARTY (8/4/2020) with I&D and component exchange on 12/4/2020 by Dr. Olivas.  2. Patient continues to have serosanguineous drainage noted from the inferior aspect of the incision.  New marisol wound VAC placed and reinforced.  3. Patient will be sent to PT wound care for traditional wound VAC tomorrow.  4. To keep follow-up appointment with Radha Valencia PA-C for 12/16/2020.  Contact our clinic for sooner appointment should issues arise.  5. Questions and concerns answered.      Cheyanne Putnam PA-C  12/10/20  15:29 ALLISON Maguire disclaimer:  Much of this encounter note is an electronic transcription/translation of spoken language to printed text. The electronic translation of spoken language may permit erroneous, or at times, nonsensical words or  phrases to be inadvertently transcribed; Although I have reviewed the note for such errors, some may still exist.

## 2020-12-10 NOTE — TELEPHONE ENCOUNTER
Per Dr. Olivas we brought the patient in today and changed out the ROSA dressing and set him up with PT tomorrow for a wound vac.    Tanesha Valencia

## 2020-12-11 ENCOUNTER — HOSPITAL ENCOUNTER (OUTPATIENT)
Dept: PHYSICAL THERAPY | Facility: HOSPITAL | Age: 70
Setting detail: THERAPIES SERIES
Discharge: HOME | End: 2020-12-11

## 2020-12-11 DIAGNOSIS — T81.89XD NONHEALING SURGICAL WOUND, SUBSEQUENT ENCOUNTER: Primary | ICD-10-CM

## 2020-12-11 DIAGNOSIS — S71.001D OPEN WOUND OF RIGHT HIP, SUBSEQUENT ENCOUNTER: ICD-10-CM

## 2020-12-11 PROCEDURE — 97602 WOUND(S) CARE NON-SELECTIVE: CPT

## 2020-12-11 PROCEDURE — 97161 PT EVAL LOW COMPLEX 20 MIN: CPT

## 2020-12-11 NOTE — THERAPY EVALUATION
Outpatient Rehabilitation - Wound/Debridement Initial Eval   [facility]     Patient Name: Obi Jackman Jr.  : 1950  MRN: [id number]  Today's Date: 2020                   Admit Date: 2020    Visit Dx:    ICD-10-CM ICD-9-CM   1. Nonhealing surgical wound, subsequent encounter  T81.89XD V58.89     998.83   2. Open wound of right hip, subsequent encounter  S71.001D V58.89     890.0       Patient Active Problem List   Diagnosis   • Allergic rhinitis   • Anxiety disorder   • Benign paroxysmal positional vertigo   • Chronic tension type headache   • Depression   • ED (erectile dysfunction) of non-organic origin   • Hyperlipidemia   • Hypertension   • LFTs abnormal   • CHRISTOPHE (obstructive sleep apnea)   • Elevated glucose   • Health care maintenance   • Morbid obesity due to excess calories (CMS/[facility])   • Onychomycosis of left great toe   • Osteoarthritis of ankle or foot   • Venous stasis   • Neuropathy   • Class 3 severe obesity due to excess calories with serious comorbidity and body mass index (BMI) of 40.0 to 44.9 in adult (CMS/[facility])   • Pre-op evaluation   • Arthritis of foot, left   • S/P left ankle triple arthrodesis   • Acute blood loss anemia, mild, asymptomatic    • Acute postoperative pain   • Wound infection   • Primary osteoarthritis of right hip   • Status post total replacement of right hip   • Postoperative wound infection of right hip, s/p I and D, head and liner exchange.        Past Medical History:   Diagnosis Date   • Anxiety and depression    • Bulging of lumbar intervertebral disc    • CPAP (continuous positive airway pressure) dependence    • Hyperlipidemia    • Hypertension    • Migraine    • CHRISTOPHE on CPAP    • Rheumatoid arthritis (CMS/[facility])    • Swelling of left ear    • Wears contact lenses    • Wears contact lenses         Past Surgical History:   Procedure Laterality Date   • ACHILLES TENDON SURGERY Left 10/15/2019    Procedure: ACHILLES TENDON LENGTHENING LEFT;  Surgeon:  Elodia Guzman MD;  Location:  [facility];  Service: Orthopedics   • CATARACT EXTRACTION Right    • COLONOSCOPY  2015   • EYE SURGERY Bilateral     cornea transplant    • FOOT SURGERY Left 10/15/2019    triple arthrodesis and achilles tendon lengthening- [place]   • HERNIA REPAIR     • ILIAC CREST BONE GRAFT Left 10/15/2019    Procedure: ILIAC CREST BONE GRAFT LEFT;  Surgeon: Elodia Guzman MD;  Location:  [facility];  Service: Orthopedics   • INCISION AND DRAINAGE LEG Right 12/4/2020    Procedure: INCISION AND DRAINAGE WITH COMPONENT EXCHANGE, HEAD AND LINER RIGHT HIP;  Surgeon: Osbaldo Olivas MD;  Location:  [facility];  Service: Orthopedics;  Laterality: Right;   • TOE FUSION Left 10/15/2019    Procedure: TRIPLE ARTHODESIS LEFT;  Surgeon: Elodia Guzman MD;  Location:  [facility];  Service: Orthopedics   • TOTAL HIP ARTHROPLASTY Right 8/4/2020    Procedure: TOTAL HIP ARTHROPLASTY RIGHT;  Surgeon: Osbaldo Olivas MD;  Location:  [facility];  Service: Orthopedics;  Laterality: Right;       Patient History     Row Name 12/11/20 1020             History    Chief Complaint  Ulcer, wound or other skin conditions;Pain  -MP      Type of Pain  Hip pain  -MP      Brief Description of Current Complaint  Patient arrives to OP wound care with incision s/p R MARTY (8/4/20) with I&D and componet exchange occuring on 12/4/20. Patient arrived with marisol dressing covering area. Patient is currently receiving daily abx infusion.  -MP      Patient/Caregiver Goals  Heal wound;Know what to do to help the symptoms  -MP      Patient seeing anyone else for problem(s)?  Ortho surgeon Dr. Olivas  -MP         Pain     Pain Location  Hip  -MP      Pain at Present  4  -MP      Pain at Worst  8  -MP         Services    Are you currently receiving Home Health services  No  -MP         Daily Activities    Primary Language  English  -MP      Pt Participated in POC and Goals  Yes  -MP        User Key  (r) = Recorded By, (t) = Taken By, (c) = Cosigned By     Initials Name Provider Type    Jaison Daugherty PT Physical Therapist          EVALUATION  PT Ortho     Row Name 12/11/20 1020       Subjective Comments    Subjective Comments  Patient states that his wife had to reinforce dressing last night due to increased drainage from area. Will follow up with ortho Wednesday.  -MP       Subjective Pain    Able to rate subjective pain?  yes  -MP    Pre-Treatment Pain Level  4  -MP    Post-Treatment Pain Level  4  -MP       Transfers    Comment (Transfers)  Standing for tx  -MP       Gait/Stairs (Locomotion)    [place] Level (Gait)  modified independence  -MP    Assistive Device (Gait)  walker, front-wheeled  -MP      User Key  (r) = Recorded By, (t) = Taken By, (c) = Cosigned By    Initials Name Provider Type    Jaison Daugherty PT Physical Therapist        LDA Wound     Row Name 12/11/20 1100 12/11/20 1020          Wound 12/04/20 Right hip Incision    Wound - Properties Group Placement Date: 12/04/20  -CB Side: Right  -CB Location: hip  -CB Primary Wound Type: Incision  -CB    Wound Image  --  -MP  Images linked: 1  -MP     Dressing Appearance  --  -MP  dressing loose;moist drainage  -MP     Closure  --  -MP  Staples  -MP     Base  --  -MP  moist;pink difficult to assess due to staples being in place  -MP     Periwound  --  -MP  intact;pink  -MP     Periwound Temperature  --  -MP  warm  -MP     Periwound Skin Turgor  --  -MP  firm  -MP     Edges  --  -MP  other (see comments) closed with staples  -MP     Wound Length (cm)  --  -MP  20.3 cm  -MP     Wound Width (cm)  --  -MP  0.2 cm  -MP     Wound Depth (cm)  --  -MP  0.1 cm  -MP     Drainage Characteristics/Odor  serosanguineous  -MP  serosanguineous  -MP     Drainage Amount  small;moderate  -MP  small;moderate  -MP     Care, Wound  --  -MP  cleansed with;wound cleanser;debrided  -MP     Dressing Care  --  -MP  dressing applied Prevena wound vac applied  -MP     Periwound Care  cleansed with pH balanced  cleanser;dry periwound area maintained  -MP  cleansed with pH balanced cleanser;dry periwound area maintained  -MP     Retired Wound - Properties Group Date first assessed: 12/04/20  -CB Side: Right  -CB Location: hip  -CB Primary Wound Type: Incision  -CB       NPWT (Negative Pressure Wound Therapy) 12/11/20 1020 R hip incision    NPWT (Negative Pressure Wound Therapy) - Properties Group Placement Date: 12/11/20  -MP Placement Time: 1020  -MP Location: R hip incision  -MP    Therapy Setting  --  continuous therapy  -MP     Dressing  --  other (see comments) Prevena wound vac placed  -MP     Pressure Setting  --  125 mmHg  -MP     Sponges Inserted  --  1 1 piece of foam placed over staples  -MP     Finger sweep complete  --  Yes  -MP     Retired NPWT (Negative Pressure Wound Therapy) - Properties Group Placement Date: 12/11/20  -MP Placement Time: 1020  -MP Location: R hip incision  -MP      User Key  (r) = Recorded By, (t) = Taken By, (c) = Cosigned By    Initials Name Provider Type    Betsy Goldberg, RN Registered Nurse    Jaison Daugherty, PT Physical Therapist            WOUND DEBRIDEMENT  Total area of Debridement: Nonselective  Debridement Site 1  Location- Site 1: R hip  Selective Debridement- Site 1: Wound Surface <20cmsq  Instruments- Site 1: other (comment)(Gauze)  Excised Tissue Description- Site 1: scant, other (comment)(Dried exudate and loose skin flakes)  Bleeding- Site 1: none             Therapy Education     Row Name 12/11/20 1020             Therapy Education    Education Details  Patient educated on use of Prevena wound vac and how to trouble shoot if issues arise. Patient given an extra canister and educated on how to use/change if needed.  -MP      Given  Symptoms/condition management  -MP      Program  New  -MP      How Provided  Verbal;Demonstration;Written;Other (comment) Given Prevena user manual  -MP      Provided to  Patient  -MP        User Key  (r) = Recorded By, (t) = Taken By,  (c) = Cosigned By    Initials Name Provider Type    Jaison Daugherty, PT Physical Therapist          Recommendation and Plan  PT Assessment/Plan     Row Name 12/11/20 1020          PT Assessment    Functional Limitations  Impaired locomotion;Other (comment) wound management  -MP     Impairments  Range of motion;Integumentary integrity  -MP     Assessment Comments  Patient presents with R hip incision s/p R MARTY (8/4/20) with I&D and componet exchange that occured 12/4/20. Marisol dressing was placed over incision initially; patient reports he has been having issues with wound saturating dressing and wife needing to reinforce with additional absorptive layers. PT replaced marisol dressing with Prevena wound vac for management of drainage. Incision is closed with staples. Patient would continue to benefit from skilled PT wound care for exudate management and increased wound healing potential.  -MP     Rehab Potential  Fair  -MP     Patient/caregiver participated in establishment of treatment plan and goals  Yes  -MP     Patient would benefit from skilled therapy intervention  Yes  -MP        PT Plan    PT Frequency  1x/week  -MP     Predicted Duration of Therapy Intervention (PT)  5 visits  -MP     Planned CPT's?  PT EVAL LOW COMPLEXITY: 15772;PT NONSELECT DEBRIDE 15 MIN: 38716;PT AVELINO DEBRIDE OPEN WOUND UP TO 20 CM: 93143;PT AVELINO DEBRIDE OPEN WOUND EA ADD 20 CM: 98359  -MP     Physical Therapy Interventions (Optional Details)  patient/family education;wound care  -MP     PT Plan Comments  Prevena wound vac, follow up with patient about HH services  -MP       User Key  (r) = Recorded By, (t) = Taken By, (c) = Cosigned By    Initials Name Provider Type    Jaison Daugherty, PT Physical Therapist            Goals  PT OP Goals     Row Name 12/11/20 1100          PT Short Term Goals    STG 1  Patient will present with minimal drainage from R hip incision.  -MP     STG 1 Progress  New  -MP     STG 2  Patient will verbalize signs  and symptoms of infection.  -MP     STG 2 Progress  New  -MP        Long Term Goals    LTG 1  Patient will present with no drainage from R hip incision.  -MP     LTG 1 Progress  New  -MP     LTG 2  Patient will present with fully epithelialized wound bed as evidence of wound healing.  -MP     LTG 2 Progress  New  -MP        Time Calculation    PT Goal Re-Cert Due Date  03/11/21  -MP       User Key  (r) = Recorded By, (t) = Taken By, (c) = Cosigned By    Initials Name Provider Type    Jaison Daugherty, PT Physical Therapist          Time Calculation: Start Time: 1020  Therapy Charges for Today     Code Description Service Date Service Provider Modifiers Qty    [id number] HC PT EVAL LOW COMPLEXITY 4 12/11/2020 Jaison Abdalla, PT GP 1    [id number]  NONSELECTIVE DEBRIDEMENT 12/11/2020 Jaison Abdalla, PT GP 1                Jaison Abdalla, PT  12/11/2020

## 2020-12-16 ENCOUNTER — TELEPHONE (OUTPATIENT)
Dept: PHYSICAL THERAPY | Facility: HOSPITAL | Age: 70
End: 2020-12-16

## 2020-12-16 ENCOUNTER — LAB (OUTPATIENT)
Dept: LAB | Facility: HOSPITAL | Age: 70
End: 2020-12-16

## 2020-12-16 ENCOUNTER — TRANSCRIBE ORDERS (OUTPATIENT)
Dept: LAB | Facility: HOSPITAL | Age: 70
End: 2020-12-16

## 2020-12-16 ENCOUNTER — OFFICE VISIT (OUTPATIENT)
Dept: ORTHOPEDIC SURGERY | Facility: CLINIC | Age: 70
End: 2020-12-16

## 2020-12-16 ENCOUNTER — APPOINTMENT (OUTPATIENT)
Dept: PHYSICAL THERAPY | Facility: HOSPITAL | Age: 70
End: 2020-12-16

## 2020-12-16 DIAGNOSIS — M06.09 RHEUMATOID ARTHRITIS OF MULTIPLE SITES WITHOUT RHEUMATOID FACTOR (HCC): ICD-10-CM

## 2020-12-16 DIAGNOSIS — D72.823 NEUTROPHILIC LEUKEMOID REACTION: ICD-10-CM

## 2020-12-16 DIAGNOSIS — T84.51XD INFLAMMATORY REACTION DUE TO INTERNAL PROSTHESIS OF RIGHT HIP, SUBSEQUENT ENCOUNTER: ICD-10-CM

## 2020-12-16 DIAGNOSIS — I10 ESSENTIAL HYPERTENSION, MALIGNANT: ICD-10-CM

## 2020-12-16 DIAGNOSIS — M05.79 SEROPOSITIVE RHEUMATOID ARTHRITIS OF MULTIPLE SITES (HCC): ICD-10-CM

## 2020-12-16 DIAGNOSIS — L03.115 CELLULITIS OF RIGHT FOOT: ICD-10-CM

## 2020-12-16 DIAGNOSIS — T84.51XD INFLAMMATORY REACTION DUE TO INTERNAL PROSTHESIS OF RIGHT HIP, SUBSEQUENT ENCOUNTER: Primary | ICD-10-CM

## 2020-12-16 DIAGNOSIS — Z96.641 STATUS POST TOTAL HIP REPLACEMENT, RIGHT: Primary | ICD-10-CM

## 2020-12-16 LAB
ALBUMIN SERPL-MCNC: 4 G/DL (ref 3.5–5.2)
ALBUMIN/GLOB SERPL: 1.2 G/DL
ALP SERPL-CCNC: 72 U/L (ref 39–117)
ALT SERPL W P-5'-P-CCNC: 9 U/L (ref 1–41)
ANION GAP SERPL CALCULATED.3IONS-SCNC: 9 MMOL/L (ref 5–15)
AST SERPL-CCNC: 13 U/L (ref 1–40)
BASOPHILS # BLD AUTO: 0.03 10*3/MM3 (ref 0–0.2)
BASOPHILS NFR BLD AUTO: 0.3 % (ref 0–1.5)
BILIRUB SERPL-MCNC: 0.2 MG/DL (ref 0–1.2)
BUN SERPL-MCNC: 15 MG/DL (ref 8–23)
BUN/CREAT SERPL: 19.7 (ref 7–25)
CALCIUM SPEC-SCNC: 9.5 MG/DL (ref 8.6–10.5)
CHLORIDE SERPL-SCNC: 102 MMOL/L (ref 98–107)
CO2 SERPL-SCNC: 29 MMOL/L (ref 22–29)
CREAT SERPL-MCNC: 0.76 MG/DL (ref 0.76–1.27)
CRP SERPL-MCNC: 0.71 MG/DL (ref 0–0.5)
DEPRECATED RDW RBC AUTO: 43.2 FL (ref 37–54)
EOSINOPHIL # BLD AUTO: 0.18 10*3/MM3 (ref 0–0.4)
EOSINOPHIL NFR BLD AUTO: 2 % (ref 0.3–6.2)
ERYTHROCYTE [DISTWIDTH] IN BLOOD BY AUTOMATED COUNT: 13.1 % (ref 12.3–15.4)
ERYTHROCYTE [SEDIMENTATION RATE] IN BLOOD: 68 MM/HR (ref 0–20)
GFR SERPL CREATININE-BSD FRML MDRD: 102 ML/MIN/1.73
GLOBULIN UR ELPH-MCNC: 3.4 GM/DL
GLUCOSE SERPL-MCNC: 95 MG/DL (ref 65–99)
HCT VFR BLD AUTO: 41 % (ref 37.5–51)
HGB BLD-MCNC: 12.9 G/DL (ref 13–17.7)
IMM GRANULOCYTES # BLD AUTO: 0.05 10*3/MM3 (ref 0–0.05)
IMM GRANULOCYTES NFR BLD AUTO: 0.5 % (ref 0–0.5)
LYMPHOCYTES # BLD AUTO: 1.06 10*3/MM3 (ref 0.7–3.1)
LYMPHOCYTES NFR BLD AUTO: 11.6 % (ref 19.6–45.3)
MCH RBC QN AUTO: 28.5 PG (ref 26.6–33)
MCHC RBC AUTO-ENTMCNC: 31.5 G/DL (ref 31.5–35.7)
MCV RBC AUTO: 90.5 FL (ref 79–97)
MONOCYTES # BLD AUTO: 0.83 10*3/MM3 (ref 0.1–0.9)
MONOCYTES NFR BLD AUTO: 9.1 % (ref 5–12)
NEUTROPHILS NFR BLD AUTO: 7.02 10*3/MM3 (ref 1.7–7)
NEUTROPHILS NFR BLD AUTO: 76.5 % (ref 42.7–76)
NRBC BLD AUTO-RTO: 0 /100 WBC (ref 0–0.2)
PLATELET # BLD AUTO: 583 10*3/MM3 (ref 140–450)
PMV BLD AUTO: 9 FL (ref 6–12)
POTASSIUM SERPL-SCNC: 4 MMOL/L (ref 3.5–5.2)
PROT SERPL-MCNC: 7.4 G/DL (ref 6–8.5)
RBC # BLD AUTO: 4.53 10*6/MM3 (ref 4.14–5.8)
SODIUM SERPL-SCNC: 140 MMOL/L (ref 136–145)
WBC # BLD AUTO: 9.17 10*3/MM3 (ref 3.4–10.8)

## 2020-12-16 PROCEDURE — 85025 COMPLETE CBC W/AUTO DIFF WBC: CPT

## 2020-12-16 PROCEDURE — 36415 COLL VENOUS BLD VENIPUNCTURE: CPT

## 2020-12-16 PROCEDURE — 85652 RBC SED RATE AUTOMATED: CPT

## 2020-12-16 PROCEDURE — 99024 POSTOP FOLLOW-UP VISIT: CPT | Performed by: PHYSICIAN ASSISTANT

## 2020-12-16 PROCEDURE — 86140 C-REACTIVE PROTEIN: CPT

## 2020-12-16 PROCEDURE — 80053 COMPREHEN METABOLIC PANEL: CPT

## 2020-12-16 NOTE — TELEPHONE ENCOUNTER
Patient called from Dr. Olivas's office, stating he has had no drainage from R hip incision for the last two days, and that MD removed prevena vac in the office this PM and redressed wound with a sterile dressing.  Denies any open areas or drainage.  Patient asking if he needs to come for treatment today.  PT instructed patient to keep area clean and covered with dressing, and if he notices any new drainage or open areas to contact PT wound care at [phone].  Otherwise, PT will cancel his appointment today due to wound vac not needed.  We will keep his episode active for 30 days in case he has any wound care needs and needs to be seen by PT wound care.  Ana Blackwood, PT 12/16/2020 15:37 EST

## 2020-12-16 NOTE — PROGRESS NOTES
[facility] [place] Orthopaedic Surgery Clinic Note    Subjective     Patient: Obi Jackman Jr.  : 1950    Primary Care Provider: Cammy Oneal PA    Requesting Provider: As above    Post-op Follow-up (6 days f/u, 12 days S/P INCISION AND DRAINAGE WITH COMPONENT EXCHANGE, HEAD AND LINER RIGHT HIP 20)      History    History of Present Illness: Patient returns for f/up wound check s/p I&D and component exchange right hip.  He has a wound vac and reports there has been no drainage for 2 days.  No hip pain, no fever/chills.  Continues IV abx.    Current Outpatient Medications on File Prior to Visit   Medication Sig Dispense Refill   • acetaminophen (TYLENOL) 325 MG tablet Take 325 mg by mouth As Needed for Mild Pain .     • ALPRAZolam (XANAX) 0.25 MG tablet Take 1 tablet by mouth Daily As Needed for Anxiety (when flying). 10 tablet 0   • amLODIPine (NORVASC) 5 MG tablet Take 1 tablet by mouth Daily. 90 tablet 1   • Ascorbic Acid (VITAMIN C PO) Take  by mouth Daily.     • aspirin 325 MG EC tablet Take 1 tablet by mouth Daily. For 1 month 30 tablet 0   • atenolol (TENORMIN) 50 MG tablet Take 1 tablet by mouth Daily. 90 tablet 1   • [START ON 2021] atorvastatin (LIPITOR) 10 MG tablet Take 1 tablet by mouth Daily. Resume when not on Dapto anymore 90 tablet 3   • buPROPion XL (WELLBUTRIN XL) 150 MG 24 hr tablet Take 1 tablet by mouth Daily. 90 tablet 1   • clotrimazole (LOTRIMIN) 1 % cream Apply  topically to the appropriate area as directed 2 (Two) Times a Day. 15 g 1   • Cyanocobalamin (VITAMIN B-12 ER PO) Take 1 tablet by mouth Daily.     • cyclobenzaprine (FLEXERIL) 10 MG tablet TAKE ONE TABLET BY MOUTH THREE TIMES A DAY AS NEEDED FOR MUSCLE SPASMS. 30 tablet 5   • Dietary Management Product (VASCULERA) tablet Take 1 tablet by mouth Daily. 90 tablet 5   • docusate sodium 100 MG capsule Take 1 capsule by mouth 2 (Two) Times a Day. 40 capsule 0   • Ergocalciferol (VITAMIN D2 PO) Take  by mouth Daily.      • gabapentin (NEURONTIN) 300 MG capsule Take 300 mg by mouth 3 (Three) Times a Day.     • HYDROcodone-acetaminophen (NORCO) 7.5-325 MG per tablet Take 1 tablet by mouth Every 6 (Six) Hours As Needed for Moderate Pain . Resume when done with Percocet if needed     • hydroxychloroquine (PLAQUENIL) 200 MG tablet Take 200 mg by mouth 2 (Two) Times a Day.     • lisinopril-hydrochlorothiazide (PRINZIDE,ZESTORETIC) 20-12.5 MG per tablet Take 1 tablet by mouth Daily. 90 tablet 1   • meclizine (ANTIVERT) 25 MG tablet 25 mg Every 8 (Eight) Hours As Needed.     • ondansetron (ZOFRAN) 4 MG tablet Take 1 tablet by mouth Every 6 (Six) Hours As Needed for Nausea or Vomiting. 30 tablet 0   • oxaprozin (DAYPRO) 600 MG tablet Take 1,200 mg by mouth Daily. Take 2 po every day      • polyethylene glycol (MIRALAX) 17 g packet Take 17 g by mouth Daily. 15 packet 0   • promethazine (PHENERGAN) 25 MG tablet Take 1 tablet by mouth Every 6 (Six) Hours As Needed for Nausea or Vomiting. 20 tablet 0   • SUMAtriptan (IMITREX) 100 MG tablet TAKE 1 TABLET BY MOUTH AT ONSET OF HEADACHE. MAY REPEAT DOSE ONE TIME IN 2 HOURS IF HEADACHE NOT RELIEVED. 9 tablet 3   • traZODone (DESYREL) 50 MG tablet TAKE 1 TABLET BY MOUTH EVERY NIGHT. (Patient taking differently: Take 50 mg by mouth At Night As Needed.) 60 tablet 5     No current facility-administered medications on file prior to visit.       No Known Allergies   Past Medical History:   Diagnosis Date   • Anxiety and depression    • Bulging of lumbar intervertebral disc    • CPAP (continuous positive airway pressure) dependence    • Hyperlipidemia    • Hypertension    • Migraine    • CHRISTOPHE on CPAP    • Rheumatoid arthritis (CMS/HCC)    • Swelling of left ear    • Wears contact lenses    • Wears contact lenses      Past Surgical History:   Procedure Laterality Date   • ACHILLES TENDON SURGERY Left 10/15/2019    Procedure: ACHILLES TENDON LENGTHENING LEFT;  Surgeon: Elodia Guzman MD;  Location: [facility]  OR;  Service: Orthopedics   • CATARACT EXTRACTION Right    • COLONOSCOPY  2015   • EYE SURGERY Bilateral     cornea transplant    • FOOT SURGERY Left 10/15/2019    triple arthrodesis and achilles tendon lengthening- [facility]   • HERNIA REPAIR     • ILIAC CREST BONE GRAFT Left 10/15/2019    Procedure: ILIAC CREST BONE GRAFT LEFT;  Surgeon: Elodia Guzman MD;  Location: [facility] OR;  Service: Orthopedics   • INCISION AND DRAINAGE LEG Right 12/4/2020    Procedure: INCISION AND DRAINAGE WITH COMPONENT EXCHANGE, HEAD AND LINER RIGHT HIP;  Surgeon: Osbaldo Olivas MD;  Location: [facility] OR;  Service: Orthopedics;  Laterality: Right;   • TOE FUSION Left 10/15/2019    Procedure: TRIPLE ARTHODESIS LEFT;  Surgeon: Elodia Guzman MD;  Location: [facility] OR;  Service: Orthopedics   • TOTAL HIP ARTHROPLASTY Right 8/4/2020    Procedure: TOTAL HIP ARTHROPLASTY RIGHT;  Surgeon: Osbaldo Olivas MD;  Location:  [facility] OR;  Service: Orthopedics;  Laterality: Right;     Family History   Problem Relation Age of Onset   • No Known Problems Mother    • No Known Problems Father       Social History     Socioeconomic History   • Marital status:      Spouse name: Not on file   • Number of children: Not on file   • Years of education: Not on file   • Highest education level: Not on file   Tobacco Use   • Smoking status: Never Smoker   • Smokeless tobacco: Never Used   Substance and Sexual Activity   • Alcohol use: Yes     Comment: rarely   • Drug use: No   • Sexual activity: Defer        Review of Systems   Constitutional: Negative.    HENT: Negative.    Eyes: Negative.    Respiratory: Negative.    Cardiovascular: Negative.    Gastrointestinal: Negative.    Endocrine: Negative.    Genitourinary: Negative.    Musculoskeletal: Positive for arthralgias.   Skin: Negative.    Allergic/Immunologic: Negative.    Neurological: Negative.    Hematological: Negative.    Psychiatric/Behavioral: Negative.        The following portions of the  patient's history were reviewed and updated as appropriate: allergies, current medications, past family history, past medical history, past social history, past surgical history and problem list.      Objective      Physical Exam  There were no vitals taken for this visit.    There is no height or weight on file to calculate BMI.    Ortho Exam  Right hip exam: Incision is healing well with intact sutures.  No drainage.  No erythema.  Ambulating with a cane    Medical Decision Making    Data Review:   ordered and reviewed x-rays today    Assessment:  1. Status post total hip replacement, right        Plan:  Doing well s/p I&D and componet exchange right hip with Dr. Olivas.  Patient's incision looks good with no drainage.  He is not having pain.  On IV abx.  Plan today is that he discharge the would vac.  We will remove every other staple except on the most inferior aspect of the incision.  He will return in one week for repeat wound check or sooner if needed.    Patient history, diagnosis and treatment plan discussed with Dr. Olivas.        Radha Valencia PA-C  12/18/20  11:54 EST

## 2020-12-18 ENCOUNTER — HOSPITAL ENCOUNTER (OUTPATIENT)
Dept: INFUSION THERAPY | Facility: HOSPITAL | Age: 70
Discharge: HOME | End: 2020-12-18
Admitting: INTERNAL MEDICINE

## 2020-12-18 VITALS
WEIGHT: 313 LBS | HEIGHT: 72 IN | BODY MASS INDEX: 42.39 KG/M2 | DIASTOLIC BLOOD PRESSURE: 70 MMHG | OXYGEN SATURATION: 96 % | TEMPERATURE: 97.5 F | HEART RATE: 78 BPM | RESPIRATION RATE: 16 BRPM | SYSTOLIC BLOOD PRESSURE: 127 MMHG

## 2020-12-18 PROCEDURE — C1894 INTRO/SHEATH, NON-LASER: HCPCS

## 2020-12-18 PROCEDURE — C1751 CATH, INF, PER/CENT/MIDLINE: HCPCS

## 2020-12-18 RX ORDER — SODIUM CHLORIDE 0.9 % (FLUSH) 0.9 %
10 SYRINGE (ML) INJECTION AS NEEDED
Status: DISCONTINUED | OUTPATIENT
Start: 2020-12-18 | End: 2020-12-20 | Stop reason: HOSPADM

## 2020-12-18 RX ORDER — SODIUM CHLORIDE 0.9 % (FLUSH) 0.9 %
10 SYRINGE (ML) INJECTION EVERY 12 HOURS SCHEDULED
Status: DISCONTINUED | OUTPATIENT
Start: 2020-12-18 | End: 2020-12-20 | Stop reason: HOSPADM

## 2020-12-18 NOTE — PROGRESS NOTES
Pt discharged from ir dept s/p picc line placement.  Pt tolerated without complications.  Education provided by [facility] nurse.  Pt left unit ambulatory.

## 2020-12-23 ENCOUNTER — OFFICE VISIT (OUTPATIENT)
Dept: ORTHOPEDIC SURGERY | Facility: CLINIC | Age: 70
End: 2020-12-23

## 2020-12-23 DIAGNOSIS — Z96.641 STATUS POST TOTAL HIP REPLACEMENT, RIGHT: Primary | ICD-10-CM

## 2020-12-23 PROCEDURE — 99024 POSTOP FOLLOW-UP VISIT: CPT | Performed by: PHYSICIAN ASSISTANT

## 2020-12-23 NOTE — PROGRESS NOTES
[facility] [place] [facility] Note    Subjective     Patient: Obi Jackman Jr.  : 1950    Primary Care Provider: Cammy Oneal PA    Requesting Provider: As above    Post-op (1 week follow up; 19 days s/p Incision And Drainage With Component Exchange, Head And Liner Right Hip 20)      History      History of Present Illness: Patient returns today for wound check of his right hip.  He reports no drainage since last seen.  He has not had any increased hip pain.  He continues IV antibiotics with ID.    Current Outpatient Medications on File Prior to Visit   Medication Sig Dispense Refill   • acetaminophen (TYLENOL) 325 MG tablet Take 325 mg by mouth As Needed for Mild Pain .     • ALPRAZolam (XANAX) 0.25 MG tablet Take 1 tablet by mouth Daily As Needed for Anxiety (when flying). 10 tablet 0   • amLODIPine (NORVASC) 5 MG tablet Take 1 tablet by mouth Daily. 90 tablet 1   • Ascorbic Acid (VITAMIN C PO) Take  by mouth Daily.     • aspirin 325 MG EC tablet Take 1 tablet by mouth Daily. For 1 month 30 tablet 0   • atenolol (TENORMIN) 50 MG tablet Take 1 tablet by mouth Daily. 90 tablet 1   • [START ON 2021] atorvastatin (LIPITOR) 10 MG tablet Take 1 tablet by mouth Daily. Resume when not on Dapto anymore 90 tablet 3   • buPROPion XL (WELLBUTRIN XL) 150 MG 24 hr tablet Take 1 tablet by mouth Daily. 90 tablet 1   • clotrimazole (LOTRIMIN) 1 % cream Apply  topically to the appropriate area as directed 2 (Two) Times a Day. 15 g 1   • Cyanocobalamin (VITAMIN B-12 ER PO) Take 1 tablet by mouth Daily.     • cyclobenzaprine (FLEXERIL) 10 MG tablet TAKE ONE TABLET BY MOUTH THREE TIMES A DAY AS NEEDED FOR MUSCLE SPASMS. 30 tablet 5   • Dietary Management Product (VASCULERA) tablet Take 1 tablet by mouth Daily. 90 tablet 5   • docusate sodium 100 MG capsule Take 1 capsule by mouth 2 (Two) Times a Day. 40 capsule 0   • Ergocalciferol (VITAMIN D2 PO) Take  by mouth Daily.     • gabapentin (NEURONTIN)  300 MG capsule Take 300 mg by mouth 3 (Three) Times a Day.     • hydroxychloroquine (PLAQUENIL) 200 MG tablet Take 200 mg by mouth 2 (Two) Times a Day.     • lisinopril-hydrochlorothiazide (PRINZIDE,ZESTORETIC) 20-12.5 MG per tablet Take 1 tablet by mouth Daily. 90 tablet 1   • meclizine (ANTIVERT) 25 MG tablet 25 mg Every 8 (Eight) Hours As Needed.     • ondansetron (ZOFRAN) 4 MG tablet Take 1 tablet by mouth Every 6 (Six) Hours As Needed for Nausea or Vomiting. 30 tablet 0   • oxaprozin (DAYPRO) 600 MG tablet Take 1,200 mg by mouth Daily. Take 2 po every day      • polyethylene glycol (MIRALAX) 17 g packet Take 17 g by mouth Daily. 15 packet 0   • promethazine (PHENERGAN) 25 MG tablet Take 1 tablet by mouth Every 6 (Six) Hours As Needed for Nausea or Vomiting. 20 tablet 0   • SUMAtriptan (IMITREX) 100 MG tablet TAKE 1 TABLET BY MOUTH AT ONSET OF HEADACHE. MAY REPEAT DOSE ONE TIME IN 2 HOURS IF HEADACHE NOT RELIEVED. 9 tablet 3   • traZODone (DESYREL) 50 MG tablet TAKE 1 TABLET BY MOUTH EVERY NIGHT. (Patient taking differently: Take 50 mg by mouth At Night As Needed.) 60 tablet 5   • HYDROcodone-acetaminophen (NORCO) 7.5-325 MG per tablet Take 1 tablet by mouth Every 6 (Six) Hours As Needed for Moderate Pain . Resume when done with Percocet if needed       No current facility-administered medications on file prior to visit.       No Known Allergies   Past Medical History:   Diagnosis Date   • Anxiety and depression    • Bulging of lumbar intervertebral disc    • CPAP (continuous positive airway pressure) dependence    • Hyperlipidemia    • Hypertension    • Migraine    • CHRISTOPHE on CPAP    • Rheumatoid arthritis (CMS/HCC)    • Swelling of left ear    • Wears contact lenses    • Wears contact lenses      Past Surgical History:   Procedure Laterality Date   • ACHILLES TENDON SURGERY Left 10/15/2019    Procedure: ACHILLES TENDON LENGTHENING LEFT;  Surgeon: Elodia Guzman MD;  Location: [facility];  Service: Orthopedics    • CATARACT EXTRACTION Right    • COLONOSCOPY  2015   • EYE SURGERY Bilateral     cornea transplant    • FOOT SURGERY Left 10/15/2019    triple arthrodesis and achilles tendon lengthening- [facility]   • HERNIA REPAIR     • ILIAC CREST BONE GRAFT Left 10/15/2019    Procedure: ILIAC CREST BONE GRAFT LEFT;  Surgeon: Elodia Guzman MD;  Location:  [facility] OR;  Service: Orthopedics   • INCISION AND DRAINAGE LEG Right 12/4/2020    Procedure: INCISION AND DRAINAGE WITH COMPONENT EXCHANGE, HEAD AND LINER RIGHT HIP;  Surgeon: Osbaldo Olivas MD;  Location: [facility] OR;  Service: Orthopedics;  Laterality: Right;   • TOE FUSION Left 10/15/2019    Procedure: TRIPLE ARTHODESIS LEFT;  Surgeon: Elodia Guzman MD;  Location: [facility] OR;  Service: Orthopedics   • TOTAL HIP ARTHROPLASTY Right 8/4/2020    Procedure: TOTAL HIP ARTHROPLASTY RIGHT;  Surgeon: Osbaldo Olivas MD;  Location: [facility] OR;  Service: Orthopedics;  Laterality: Right;     Family History   Problem Relation Age of Onset   • No Known Problems Mother    • No Known Problems Father       Social History     Socioeconomic History   • Marital status:      Spouse name: Not on file   • Number of children: Not on file   • Years of education: Not on file   • Highest education level: Not on file   Tobacco Use   • Smoking status: Never Smoker   • Smokeless tobacco: Never Used   Substance and Sexual Activity   • Alcohol use: Yes     Comment: rarely   • Drug use: No   • Sexual activity: Defer        Review of Systems   Constitutional: Negative.    HENT: Negative.    Eyes: Negative.    Respiratory: Negative.    Cardiovascular: Negative.    Gastrointestinal: Negative.    Endocrine: Negative.    Genitourinary: Negative.    Musculoskeletal: Positive for arthralgias.   Skin: Negative.    Allergic/Immunologic: Negative.    Neurological: Negative.    Hematological: Negative.    Psychiatric/Behavioral: Negative.        The following portions of the patient's history were reviewed  and updated as appropriate: allergies, current medications, past family history, past medical history, past social history, past surgical history and problem list.      Objective      Physical Exam  There were no vitals taken for this visit.    There is no height or weight on file to calculate BMI.    Patient is well developed, well nourished and in no acute distress.  Alert and oriented x 3.    Ortho Exam  Right hip exam: Ash hip incision is healing well.  It is supple with no erythema or induration.  Patient is ambulating with a cane.    Medical Decision Making    Data Review:   none    Assessment:  1. Status post total hip replacement, right        Plan:  Doing well status post I&D of right total hip arthroplasty with component exchange.  Incision is well-healed.  We will take all staples out today and cover with Steri-Strips.  He may return to physical therapy.  We will give him some hydrocodone to help him with PT.  He will return to see Dr. Olivas in 1 month or sooner if needed.    Patient history, diagnosis and treatment plan discussed with Dr. Olivas.        Radha Valencia PA-C  12/23/20  15:02 EST

## 2020-12-24 ENCOUNTER — LAB (OUTPATIENT)
Dept: LAB | Facility: HOSPITAL | Age: 70
End: 2020-12-24

## 2020-12-24 ENCOUNTER — TRANSCRIBE ORDERS (OUTPATIENT)
Dept: LAB | Facility: HOSPITAL | Age: 70
End: 2020-12-24

## 2020-12-24 DIAGNOSIS — K83.09 BACTERIAL CHOLANGITIS: ICD-10-CM

## 2020-12-24 DIAGNOSIS — B96.89 BACTERIAL CHOLANGITIS: ICD-10-CM

## 2020-12-24 DIAGNOSIS — B95.7 CHRONIC GRANULOMATOUS INFECTION DUE MOSTLY TO STAPHYLOCOCCUS AUREUS: ICD-10-CM

## 2020-12-24 DIAGNOSIS — L03.115 CELLULITIS OF RIGHT LEG: Primary | ICD-10-CM

## 2020-12-24 DIAGNOSIS — D72.823 NEUTROPHILIC LEUKEMOID REACTION: ICD-10-CM

## 2020-12-24 DIAGNOSIS — T84.51XD INFECTION ASSOCIATED WITH INTERNAL RIGHT HIP PROSTHESIS, SUBSEQUENT ENCOUNTER: ICD-10-CM

## 2020-12-24 DIAGNOSIS — L03.115 CELLULITIS OF RIGHT LEG: ICD-10-CM

## 2020-12-24 LAB
ALBUMIN SERPL-MCNC: 3.9 G/DL (ref 3.5–5.2)
ALBUMIN/GLOB SERPL: 1.1 G/DL
ALP SERPL-CCNC: 75 U/L (ref 39–117)
ALT SERPL W P-5'-P-CCNC: 8 U/L (ref 1–41)
ANION GAP SERPL CALCULATED.3IONS-SCNC: 12 MMOL/L (ref 5–15)
AST SERPL-CCNC: 14 U/L (ref 1–40)
BASOPHILS # BLD AUTO: 0.03 10*3/MM3 (ref 0–0.2)
BASOPHILS NFR BLD AUTO: 0.6 % (ref 0–1.5)
BILIRUB SERPL-MCNC: 0.5 MG/DL (ref 0–1.2)
BUN SERPL-MCNC: 15 MG/DL (ref 8–23)
BUN/CREAT SERPL: 18.8 (ref 7–25)
CALCIUM SPEC-SCNC: 9.4 MG/DL (ref 8.6–10.5)
CHLORIDE SERPL-SCNC: 100 MMOL/L (ref 98–107)
CO2 SERPL-SCNC: 26 MMOL/L (ref 22–29)
CREAT SERPL-MCNC: 0.8 MG/DL (ref 0.76–1.27)
CRP SERPL-MCNC: 1.12 MG/DL (ref 0–0.5)
DEPRECATED RDW RBC AUTO: 44.9 FL (ref 37–54)
EOSINOPHIL # BLD AUTO: 0.17 10*3/MM3 (ref 0–0.4)
EOSINOPHIL NFR BLD AUTO: 3.2 % (ref 0.3–6.2)
ERYTHROCYTE [DISTWIDTH] IN BLOOD BY AUTOMATED COUNT: 13.5 % (ref 12.3–15.4)
ERYTHROCYTE [SEDIMENTATION RATE] IN BLOOD: 51 MM/HR (ref 0–20)
GFR SERPL CREATININE-BSD FRML MDRD: 96 ML/MIN/1.73
GLOBULIN UR ELPH-MCNC: 3.6 GM/DL
GLUCOSE SERPL-MCNC: 109 MG/DL (ref 65–99)
HCT VFR BLD AUTO: 42.1 % (ref 37.5–51)
HGB BLD-MCNC: 13.2 G/DL (ref 13–17.7)
IMM GRANULOCYTES # BLD AUTO: 0.01 10*3/MM3 (ref 0–0.05)
IMM GRANULOCYTES NFR BLD AUTO: 0.2 % (ref 0–0.5)
LYMPHOCYTES # BLD AUTO: 1.03 10*3/MM3 (ref 0.7–3.1)
LYMPHOCYTES NFR BLD AUTO: 19.2 % (ref 19.6–45.3)
MCH RBC QN AUTO: 28.8 PG (ref 26.6–33)
MCHC RBC AUTO-ENTMCNC: 31.4 G/DL (ref 31.5–35.7)
MCV RBC AUTO: 91.7 FL (ref 79–97)
MONOCYTES # BLD AUTO: 0.58 10*3/MM3 (ref 0.1–0.9)
MONOCYTES NFR BLD AUTO: 10.8 % (ref 5–12)
NEUTROPHILS NFR BLD AUTO: 3.54 10*3/MM3 (ref 1.7–7)
NEUTROPHILS NFR BLD AUTO: 66 % (ref 42.7–76)
NRBC BLD AUTO-RTO: 0 /100 WBC (ref 0–0.2)
PLATELET # BLD AUTO: 339 10*3/MM3 (ref 140–450)
PMV BLD AUTO: 9.8 FL (ref 6–12)
POTASSIUM SERPL-SCNC: 3.8 MMOL/L (ref 3.5–5.2)
PROT SERPL-MCNC: 7.5 G/DL (ref 6–8.5)
RBC # BLD AUTO: 4.59 10*6/MM3 (ref 4.14–5.8)
SODIUM SERPL-SCNC: 138 MMOL/L (ref 136–145)
WBC # BLD AUTO: 5.36 10*3/MM3 (ref 3.4–10.8)

## 2020-12-24 PROCEDURE — 85652 RBC SED RATE AUTOMATED: CPT

## 2020-12-24 PROCEDURE — 86140 C-REACTIVE PROTEIN: CPT

## 2020-12-24 PROCEDURE — 36415 COLL VENOUS BLD VENIPUNCTURE: CPT

## 2020-12-24 PROCEDURE — 85025 COMPLETE CBC W/AUTO DIFF WBC: CPT

## 2020-12-24 PROCEDURE — 80053 COMPREHEN METABOLIC PANEL: CPT

## 2020-12-28 ENCOUNTER — TELEPHONE (OUTPATIENT)
Dept: ORTHOPEDIC SURGERY | Facility: CLINIC | Age: 70
End: 2020-12-28

## 2020-12-28 RX ORDER — HYDROCODONE BITARTRATE AND ACETAMINOPHEN 5; 325 MG/1; MG/1
1 TABLET ORAL EVERY 8 HOURS PRN
Qty: 30 TABLET | Refills: 0 | Status: SHIPPED | OUTPATIENT
Start: 2020-12-28 | End: 2021-01-18

## 2020-12-28 NOTE — TELEPHONE ENCOUNTER
Replied to patient via [employer] to let him know that the Rx was sent to pharmacy.    Tanesha Valencia

## 2020-12-28 NOTE — TELEPHONE ENCOUNTER
----- Message from Obi Jackman Jr. sent at 12/27/2020  8:25 PM EST -----  Regarding: Prescription Question  Contact: [phone]  Radha: at my follow-up visit last Thursday, Dr. Olivas said he would send a hydrocodone 5mg. Prescription to [facility]. But as of yesterday, it had not arrived. Could someone please check on this for me?  Thank you.   Boy KIRK Happy New Year!

## 2020-12-31 ENCOUNTER — TRANSCRIBE ORDERS (OUTPATIENT)
Dept: LAB | Facility: HOSPITAL | Age: 70
End: 2020-12-31

## 2020-12-31 ENCOUNTER — LAB (OUTPATIENT)
Dept: LAB | Facility: HOSPITAL | Age: 70
End: 2020-12-31

## 2020-12-31 DIAGNOSIS — L03.115 CELLULITIS OF RIGHT LOWER EXTREMITY: ICD-10-CM

## 2020-12-31 DIAGNOSIS — B96.89 BACTERIAL CHOLANGITIS: Primary | ICD-10-CM

## 2020-12-31 DIAGNOSIS — T84.51XD INFECTION ASSOCIATED WITH INTERNAL RIGHT HIP PROSTHESIS, SUBSEQUENT ENCOUNTER: ICD-10-CM

## 2020-12-31 DIAGNOSIS — K83.09 BACTERIAL CHOLANGITIS: Primary | ICD-10-CM

## 2020-12-31 DIAGNOSIS — L03.115 CELLULITIS OF RIGHT LEG: ICD-10-CM

## 2020-12-31 DIAGNOSIS — T84.51XD INFLAMMATORY REACTION DUE TO INTERNAL PROSTHESIS OF RIGHT HIP, SUBSEQUENT ENCOUNTER: ICD-10-CM

## 2020-12-31 DIAGNOSIS — B95.7 CHRONIC GRANULOMATOUS INFECTION DUE MOSTLY TO STAPHYLOCOCCUS AUREUS: ICD-10-CM

## 2020-12-31 LAB
ALBUMIN SERPL-MCNC: 3.9 G/DL (ref 3.5–5.2)
ALBUMIN/GLOB SERPL: 1.1 G/DL
ALP SERPL-CCNC: 71 U/L (ref 39–117)
ALT SERPL W P-5'-P-CCNC: 11 U/L (ref 1–41)
ANION GAP SERPL CALCULATED.3IONS-SCNC: 9 MMOL/L (ref 5–15)
AST SERPL-CCNC: 13 U/L (ref 1–40)
BASOPHILS # BLD AUTO: 0.02 10*3/MM3 (ref 0–0.2)
BASOPHILS NFR BLD AUTO: 0.4 % (ref 0–1.5)
BILIRUB SERPL-MCNC: 0.5 MG/DL (ref 0–1.2)
BUN SERPL-MCNC: 16 MG/DL (ref 8–23)
BUN/CREAT SERPL: 20.3 (ref 7–25)
CALCIUM SPEC-SCNC: 9.6 MG/DL (ref 8.6–10.5)
CHLORIDE SERPL-SCNC: 102 MMOL/L (ref 98–107)
CO2 SERPL-SCNC: 29 MMOL/L (ref 22–29)
CREAT SERPL-MCNC: 0.79 MG/DL (ref 0.76–1.27)
CRP SERPL-MCNC: 0.43 MG/DL (ref 0–0.5)
DEPRECATED RDW RBC AUTO: 44.7 FL (ref 37–54)
EOSINOPHIL # BLD AUTO: 0.3 10*3/MM3 (ref 0–0.4)
EOSINOPHIL NFR BLD AUTO: 6.1 % (ref 0.3–6.2)
ERYTHROCYTE [DISTWIDTH] IN BLOOD BY AUTOMATED COUNT: 13.2 % (ref 12.3–15.4)
ERYTHROCYTE [SEDIMENTATION RATE] IN BLOOD: 39 MM/HR (ref 0–20)
GFR SERPL CREATININE-BSD FRML MDRD: 97 ML/MIN/1.73
GLOBULIN UR ELPH-MCNC: 3.4 GM/DL
GLUCOSE SERPL-MCNC: 111 MG/DL (ref 65–99)
HCT VFR BLD AUTO: 41.9 % (ref 37.5–51)
HGB BLD-MCNC: 13.2 G/DL (ref 13–17.7)
IMM GRANULOCYTES # BLD AUTO: 0.02 10*3/MM3 (ref 0–0.05)
IMM GRANULOCYTES NFR BLD AUTO: 0.4 % (ref 0–0.5)
LYMPHOCYTES # BLD AUTO: 1.09 10*3/MM3 (ref 0.7–3.1)
LYMPHOCYTES NFR BLD AUTO: 22 % (ref 19.6–45.3)
MCH RBC QN AUTO: 29.1 PG (ref 26.6–33)
MCHC RBC AUTO-ENTMCNC: 31.5 G/DL (ref 31.5–35.7)
MCV RBC AUTO: 92.5 FL (ref 79–97)
MONOCYTES # BLD AUTO: 0.5 10*3/MM3 (ref 0.1–0.9)
MONOCYTES NFR BLD AUTO: 10.1 % (ref 5–12)
NEUTROPHILS NFR BLD AUTO: 3.02 10*3/MM3 (ref 1.7–7)
NEUTROPHILS NFR BLD AUTO: 61 % (ref 42.7–76)
NRBC BLD AUTO-RTO: 0 /100 WBC (ref 0–0.2)
PLATELET # BLD AUTO: 258 10*3/MM3 (ref 140–450)
PMV BLD AUTO: 9.5 FL (ref 6–12)
POTASSIUM SERPL-SCNC: 4.1 MMOL/L (ref 3.5–5.2)
PROT SERPL-MCNC: 7.3 G/DL (ref 6–8.5)
RBC # BLD AUTO: 4.53 10*6/MM3 (ref 4.14–5.8)
SODIUM SERPL-SCNC: 140 MMOL/L (ref 136–145)
WBC # BLD AUTO: 4.95 10*3/MM3 (ref 3.4–10.8)

## 2020-12-31 PROCEDURE — 36415 COLL VENOUS BLD VENIPUNCTURE: CPT

## 2020-12-31 PROCEDURE — 86140 C-REACTIVE PROTEIN: CPT | Performed by: INTERNAL MEDICINE

## 2020-12-31 PROCEDURE — 80053 COMPREHEN METABOLIC PANEL: CPT

## 2020-12-31 PROCEDURE — 85025 COMPLETE CBC W/AUTO DIFF WBC: CPT

## 2020-12-31 PROCEDURE — 85652 RBC SED RATE AUTOMATED: CPT | Performed by: INTERNAL MEDICINE

## 2021-01-07 ENCOUNTER — LAB (OUTPATIENT)
Dept: LAB | Facility: HOSPITAL | Age: 71
End: 2021-01-07

## 2021-01-07 ENCOUNTER — TRANSCRIBE ORDERS (OUTPATIENT)
Dept: LAB | Facility: HOSPITAL | Age: 71
End: 2021-01-07

## 2021-01-07 DIAGNOSIS — T84.51XD INFLAMMATORY REACTION DUE TO INTERNAL PROSTHESIS OF RIGHT HIP, SUBSEQUENT ENCOUNTER: ICD-10-CM

## 2021-01-07 DIAGNOSIS — K83.09 BACTERIAL CHOLANGITIS: ICD-10-CM

## 2021-01-07 DIAGNOSIS — L03.115 CELLULITIS OF RIGHT FOOT: ICD-10-CM

## 2021-01-07 DIAGNOSIS — B95.7 CHRONIC GRANULOMATOUS INFECTION DUE MOSTLY TO STAPHYLOCOCCUS AUREUS: ICD-10-CM

## 2021-01-07 DIAGNOSIS — B96.89 BACTERIAL CHOLANGITIS: ICD-10-CM

## 2021-01-07 DIAGNOSIS — T84.51XD INFLAMMATORY REACTION DUE TO INTERNAL PROSTHESIS OF RIGHT HIP, SUBSEQUENT ENCOUNTER: Primary | ICD-10-CM

## 2021-01-07 LAB
ALBUMIN SERPL-MCNC: 3.9 G/DL (ref 3.5–5.2)
ALBUMIN/GLOB SERPL: 1.1 G/DL
ALP SERPL-CCNC: 72 U/L (ref 39–117)
ALT SERPL W P-5'-P-CCNC: 12 U/L (ref 1–41)
ANION GAP SERPL CALCULATED.3IONS-SCNC: 10 MMOL/L (ref 5–15)
AST SERPL-CCNC: 14 U/L (ref 1–40)
BASOPHILS # BLD AUTO: 0.03 10*3/MM3 (ref 0–0.2)
BASOPHILS NFR BLD AUTO: 0.5 % (ref 0–1.5)
BILIRUB SERPL-MCNC: 0.4 MG/DL (ref 0–1.2)
BUN SERPL-MCNC: 12 MG/DL (ref 8–23)
BUN/CREAT SERPL: 16.4 (ref 7–25)
CALCIUM SPEC-SCNC: 9.7 MG/DL (ref 8.6–10.5)
CHLORIDE SERPL-SCNC: 101 MMOL/L (ref 98–107)
CO2 SERPL-SCNC: 28 MMOL/L (ref 22–29)
CREAT SERPL-MCNC: 0.73 MG/DL (ref 0.76–1.27)
CRP SERPL-MCNC: 0.16 MG/DL (ref 0–0.5)
DEPRECATED RDW RBC AUTO: 46.4 FL (ref 37–54)
EOSINOPHIL # BLD AUTO: 0.54 10*3/MM3 (ref 0–0.4)
EOSINOPHIL NFR BLD AUTO: 8.8 % (ref 0.3–6.2)
ERYTHROCYTE [DISTWIDTH] IN BLOOD BY AUTOMATED COUNT: 13.6 % (ref 12.3–15.4)
ERYTHROCYTE [SEDIMENTATION RATE] IN BLOOD: 20 MM/HR (ref 0–20)
GFR SERPL CREATININE-BSD FRML MDRD: 107 ML/MIN/1.73
GLOBULIN UR ELPH-MCNC: 3.5 GM/DL
GLUCOSE SERPL-MCNC: 104 MG/DL (ref 65–99)
HCT VFR BLD AUTO: 43.2 % (ref 37.5–51)
HGB BLD-MCNC: 13.3 G/DL (ref 13–17.7)
IMM GRANULOCYTES # BLD AUTO: 0.02 10*3/MM3 (ref 0–0.05)
IMM GRANULOCYTES NFR BLD AUTO: 0.3 % (ref 0–0.5)
LYMPHOCYTES # BLD AUTO: 1.12 10*3/MM3 (ref 0.7–3.1)
LYMPHOCYTES NFR BLD AUTO: 18.2 % (ref 19.6–45.3)
MCH RBC QN AUTO: 28.5 PG (ref 26.6–33)
MCHC RBC AUTO-ENTMCNC: 30.8 G/DL (ref 31.5–35.7)
MCV RBC AUTO: 92.7 FL (ref 79–97)
MONOCYTES # BLD AUTO: 0.79 10*3/MM3 (ref 0.1–0.9)
MONOCYTES NFR BLD AUTO: 12.8 % (ref 5–12)
NEUTROPHILS NFR BLD AUTO: 3.66 10*3/MM3 (ref 1.7–7)
NEUTROPHILS NFR BLD AUTO: 59.4 % (ref 42.7–76)
NRBC BLD AUTO-RTO: 0 /100 WBC (ref 0–0.2)
PLATELET # BLD AUTO: 311 10*3/MM3 (ref 140–450)
PMV BLD AUTO: 9.2 FL (ref 6–12)
POTASSIUM SERPL-SCNC: 4.2 MMOL/L (ref 3.5–5.2)
PROT SERPL-MCNC: 7.4 G/DL (ref 6–8.5)
RBC # BLD AUTO: 4.66 10*6/MM3 (ref 4.14–5.8)
SODIUM SERPL-SCNC: 139 MMOL/L (ref 136–145)
WBC # BLD AUTO: 6.16 10*3/MM3 (ref 3.4–10.8)

## 2021-01-07 PROCEDURE — 85652 RBC SED RATE AUTOMATED: CPT

## 2021-01-07 PROCEDURE — 85025 COMPLETE CBC W/AUTO DIFF WBC: CPT

## 2021-01-07 PROCEDURE — 36415 COLL VENOUS BLD VENIPUNCTURE: CPT

## 2021-01-07 PROCEDURE — 86140 C-REACTIVE PROTEIN: CPT

## 2021-01-07 PROCEDURE — 80053 COMPREHEN METABOLIC PANEL: CPT

## 2021-01-13 RX ORDER — DIOSMIN COMPLEX NO.1 630 MG
TABLET ORAL
Qty: 90 TABLET | Refills: 4 | Status: SHIPPED | OUTPATIENT
Start: 2021-01-13 | End: 2022-08-10 | Stop reason: SDUPTHER

## 2021-01-14 ENCOUNTER — LAB (OUTPATIENT)
Dept: LAB | Facility: HOSPITAL | Age: 71
End: 2021-01-14

## 2021-01-14 ENCOUNTER — IMMUNIZATION (OUTPATIENT)
Dept: VACCINE CLINIC | Facility: HOSPITAL | Age: 71
End: 2021-01-14

## 2021-01-14 ENCOUNTER — TRANSCRIBE ORDERS (OUTPATIENT)
Dept: LAB | Facility: HOSPITAL | Age: 71
End: 2021-01-14

## 2021-01-14 DIAGNOSIS — B95.7 CHRONIC GRANULOMATOUS INFECTION DUE MOSTLY TO STAPHYLOCOCCUS AUREUS: ICD-10-CM

## 2021-01-14 DIAGNOSIS — T84.51XD INFLAMMATORY REACTION DUE TO INTERNAL PROSTHESIS OF RIGHT HIP, SUBSEQUENT ENCOUNTER: ICD-10-CM

## 2021-01-14 DIAGNOSIS — L03.115 CELLULITIS OF RIGHT LOWER EXTREMITY: Primary | ICD-10-CM

## 2021-01-14 DIAGNOSIS — D72.823 NEUTROPHILIC LEUKEMOID REACTION: ICD-10-CM

## 2021-01-14 LAB
ALBUMIN SERPL-MCNC: 4 G/DL (ref 3.5–5.2)
ALBUMIN/GLOB SERPL: 1.1 G/DL
ALP SERPL-CCNC: 66 U/L (ref 39–117)
ALT SERPL W P-5'-P-CCNC: 14 U/L (ref 1–41)
ANION GAP SERPL CALCULATED.3IONS-SCNC: 9 MMOL/L (ref 5–15)
AST SERPL-CCNC: 19 U/L (ref 1–40)
BACTERIA FLD CULT: ABNORMAL
BASOPHILS # BLD AUTO: 0.02 10*3/MM3 (ref 0–0.2)
BASOPHILS NFR BLD AUTO: 0.4 % (ref 0–1.5)
BILIRUB SERPL-MCNC: 0.5 MG/DL (ref 0–1.2)
BUN SERPL-MCNC: 17 MG/DL (ref 8–23)
BUN/CREAT SERPL: 21 (ref 7–25)
CALCIUM SPEC-SCNC: 9.6 MG/DL (ref 8.6–10.5)
CHLORIDE SERPL-SCNC: 99 MMOL/L (ref 98–107)
CO2 SERPL-SCNC: 30 MMOL/L (ref 22–29)
CREAT SERPL-MCNC: 0.81 MG/DL (ref 0.76–1.27)
CRP SERPL-MCNC: 0.28 MG/DL (ref 0–0.5)
DEPRECATED RDW RBC AUTO: 46 FL (ref 37–54)
EOSINOPHIL # BLD AUTO: 0.23 10*3/MM3 (ref 0–0.4)
EOSINOPHIL NFR BLD AUTO: 4.4 % (ref 0.3–6.2)
ERYTHROCYTE [DISTWIDTH] IN BLOOD BY AUTOMATED COUNT: 13.5 % (ref 12.3–15.4)
ERYTHROCYTE [SEDIMENTATION RATE] IN BLOOD: 18 MM/HR (ref 0–20)
GFR SERPL CREATININE-BSD FRML MDRD: 94 ML/MIN/1.73
GLOBULIN UR ELPH-MCNC: 3.6 GM/DL
GLUCOSE SERPL-MCNC: 132 MG/DL (ref 65–99)
GRAM STN SPEC: ABNORMAL
GRAM STN SPEC: ABNORMAL
HCT VFR BLD AUTO: 42.3 % (ref 37.5–51)
HGB BLD-MCNC: 13.5 G/DL (ref 13–17.7)
IMM GRANULOCYTES # BLD AUTO: 0.02 10*3/MM3 (ref 0–0.05)
IMM GRANULOCYTES NFR BLD AUTO: 0.4 % (ref 0–0.5)
LYMPHOCYTES # BLD AUTO: 1.07 10*3/MM3 (ref 0.7–3.1)
LYMPHOCYTES NFR BLD AUTO: 20.6 % (ref 19.6–45.3)
MCH RBC QN AUTO: 29.5 PG (ref 26.6–33)
MCHC RBC AUTO-ENTMCNC: 31.9 G/DL (ref 31.5–35.7)
MCV RBC AUTO: 92.6 FL (ref 79–97)
MONOCYTES # BLD AUTO: 0.66 10*3/MM3 (ref 0.1–0.9)
MONOCYTES NFR BLD AUTO: 12.7 % (ref 5–12)
NEUTROPHILS NFR BLD AUTO: 3.2 10*3/MM3 (ref 1.7–7)
NEUTROPHILS NFR BLD AUTO: 61.5 % (ref 42.7–76)
NRBC BLD AUTO-RTO: 0 /100 WBC (ref 0–0.2)
PLATELET # BLD AUTO: 299 10*3/MM3 (ref 140–450)
PMV BLD AUTO: 9.2 FL (ref 6–12)
POTASSIUM SERPL-SCNC: 4.6 MMOL/L (ref 3.5–5.2)
PROT SERPL-MCNC: 7.6 G/DL (ref 6–8.5)
RBC # BLD AUTO: 4.57 10*6/MM3 (ref 4.14–5.8)
SODIUM SERPL-SCNC: 138 MMOL/L (ref 136–145)
WBC # BLD AUTO: 5.2 10*3/MM3 (ref 3.4–10.8)

## 2021-01-14 PROCEDURE — 80053 COMPREHEN METABOLIC PANEL: CPT | Performed by: INTERNAL MEDICINE

## 2021-01-14 PROCEDURE — 86140 C-REACTIVE PROTEIN: CPT | Performed by: INTERNAL MEDICINE

## 2021-01-14 PROCEDURE — 85025 COMPLETE CBC W/AUTO DIFF WBC: CPT | Performed by: INTERNAL MEDICINE

## 2021-01-14 PROCEDURE — 85652 RBC SED RATE AUTOMATED: CPT | Performed by: INTERNAL MEDICINE

## 2021-01-14 PROCEDURE — 36415 COLL VENOUS BLD VENIPUNCTURE: CPT | Performed by: INTERNAL MEDICINE

## 2021-01-14 PROCEDURE — 91300 HC SARSCOV02 VAC 30MCG/0.3ML IM: CPT | Performed by: INTERNAL MEDICINE

## 2021-01-14 PROCEDURE — 0001A: CPT | Performed by: INTERNAL MEDICINE

## 2021-01-15 LAB
FUNGUS WND CULT: NORMAL
MYCOBACTERIUM SPEC CULT: NORMAL
NIGHT BLUE STAIN TISS: NORMAL

## 2021-01-15 RX ORDER — SUMATRIPTAN 100 MG/1
TABLET, FILM COATED ORAL
Qty: 9 TABLET | Refills: 3 | Status: SHIPPED | OUTPATIENT
Start: 2021-01-15 | End: 2021-05-06

## 2021-01-18 ENCOUNTER — OFFICE VISIT (OUTPATIENT)
Dept: ORTHOPEDIC SURGERY | Facility: CLINIC | Age: 71
End: 2021-01-18

## 2021-01-18 DIAGNOSIS — Z47.89 ORTHOPEDIC AFTERCARE: ICD-10-CM

## 2021-01-18 DIAGNOSIS — Z96.641 STATUS POST TOTAL HIP REPLACEMENT, RIGHT: Primary | ICD-10-CM

## 2021-01-18 PROCEDURE — 99024 POSTOP FOLLOW-UP VISIT: CPT | Performed by: ORTHOPAEDIC SURGERY

## 2021-01-18 NOTE — PROGRESS NOTES
[facility] Note    Subjective     Chief Complaint   Patient presents with   • Post-op     3 weeks follow up; 6 weeks status post Incision And Drainage With Component Exchange, Head And Liner Right Hip 12/4/20        HPI    It has been 3  week(s) since Mr. Jackman's last visit. He returns to clinic today for postoperative follow-up of right hip incision and drainage . He rates his pain a 3/10 on the pain scale. Previous/current treatments: cane/walker and physical therapy. Current symptoms: swelling. The pain is worse with walking and climbing stairs; resting and pain medication and/or NSAID improve the pain. Overall, he is doing better.  90+ percent improvement compared to his initial preoperative symptoms.  He is ambulating with the aid of a cane.  No fevers, chills or night sweats.    I have reviewed the following portions of the patient's history and agree with: History of Present Illness and Review of Systems    Patient Active Problem List   Diagnosis   • Allergic rhinitis   • Anxiety disorder   • Benign paroxysmal positional vertigo   • Chronic tension type headache   • Depression   • ED (erectile dysfunction) of non-organic origin   • Hyperlipidemia   • Hypertension   • LFTs abnormal   • CHRISTOPHE (obstructive sleep apnea)   • Elevated glucose   • Health care maintenance   • Morbid obesity due to excess calories (CMS/[facility])   • Onychomycosis of left great toe   • Osteoarthritis of ankle or foot   • Venous stasis   • Neuropathy   • Class 3 severe obesity due to excess calories with serious comorbidity and body mass index (BMI) of 40.0 to 44.9 in adult (CMS/[facility])   • Pre-op evaluation   • Arthritis of foot, left   • S/P left ankle triple arthrodesis   • Acute blood loss anemia, mild, asymptomatic    • Acute postoperative pain   • Wound infection   • Primary osteoarthritis of right hip   • Status post total replacement of right hip   • Postoperative wound infection of right hip, s/p I and D, head  and liner exchange.     Past Medical History:   Diagnosis Date   • Anxiety and depression    • Bulging of lumbar intervertebral disc    • CPAP (continuous positive airway pressure) dependence    • Hyperlipidemia    • Hypertension    • Migraine    • CHRISTOPHE on CPAP    • Rheumatoid arthritis (CMS/HCC)    • Swelling of left ear    • Wears contact lenses    • Wears contact lenses       Past Surgical History:   Procedure Laterality Date   • ACHILLES TENDON SURGERY Left 10/15/2019    Procedure: ACHILLES TENDON LENGTHENING LEFT;  Surgeon: Elodia Guzman MD;  Location: [facility];  Service: Orthopedics   • CATARACT EXTRACTION Right    • COLONOSCOPY  2015   • EYE SURGERY Bilateral     cornea transplant    • FOOT SURGERY Left 10/15/2019    triple arthrodesis and achilles tendon lengthening- [facility]   • HERNIA REPAIR     • ILIAC CREST BONE GRAFT Left 10/15/2019    Procedure: ILIAC CREST BONE GRAFT LEFT;  Surgeon: Elodia Guzman MD;  Location: [facility];  Service: Orthopedics   • INCISION AND DRAINAGE LEG Right 12/4/2020    Procedure: INCISION AND DRAINAGE WITH COMPONENT EXCHANGE, HEAD AND LINER RIGHT HIP;  Surgeon: Osbaldo Olivas MD;  Location: [facility];  Service: Orthopedics;  Laterality: Right;   • TOE FUSION Left 10/15/2019    Procedure: TRIPLE ARTHODESIS LEFT;  Surgeon: Eldoia Guzman MD;  Location: [facility];  Service: Orthopedics   • TOTAL HIP ARTHROPLASTY Right 8/4/2020    Procedure: TOTAL HIP ARTHROPLASTY RIGHT;  Surgeon: Osbaldo Olivas MD;  Location: [facility];  Service: Orthopedics;  Laterality: Right;      Family History   Problem Relation Age of Onset   • No Known Problems Mother    • No Known Problems Father      Social History     Socioeconomic History   • Marital status:      Spouse name: Not on file   • Number of children: Not on file   • Years of education: Not on file   • Highest education level: Not on file   Tobacco Use   • Smoking status: Never Smoker   • Smokeless tobacco: Never Used    Substance and Sexual Activity   • Alcohol use: Yes     Comment: rarely   • Drug use: No   • Sexual activity: Defer      Current Outpatient Medications on File Prior to Visit   Medication Sig Dispense Refill   • acetaminophen (TYLENOL) 325 MG tablet Take 325 mg by mouth As Needed for Mild Pain .     • ALPRAZolam (XANAX) 0.25 MG tablet Take 1 tablet by mouth Daily As Needed for Anxiety (when flying). 10 tablet 0   • amLODIPine (NORVASC) 5 MG tablet Take 1 tablet by mouth Daily. 90 tablet 1   • Ascorbic Acid (VITAMIN C PO) Take  by mouth Daily.     • aspirin 325 MG EC tablet Take 1 tablet by mouth Daily. For 1 month 30 tablet 0   • atenolol (TENORMIN) 50 MG tablet Take 1 tablet by mouth Daily. 90 tablet 1   • atorvastatin (LIPITOR) 10 MG tablet Take 1 tablet by mouth Daily. Resume when not on Dapto anymore 90 tablet 3   • buPROPion XL (WELLBUTRIN XL) 150 MG 24 hr tablet Take 1 tablet by mouth Daily. 90 tablet 1   • CEFTRIAXONE 2 G/20ML IV PUSH SYRINGE KIT (PAD) 2 g Infuse 2 g into a venous catheter.     • clotrimazole (LOTRIMIN) 1 % cream Apply  topically to the appropriate area as directed 2 (Two) Times a Day. 15 g 1   • Cyanocobalamin (VITAMIN B-12 ER PO) Take 1 tablet by mouth Daily.     • cyclobenzaprine (FLEXERIL) 10 MG tablet TAKE ONE TABLET BY MOUTH THREE TIMES A DAY AS NEEDED FOR MUSCLE SPASMS. 30 tablet 5   • Dietary Management Product (Vasculera) tablet Take 1 tablet by mouth every day 90 tablet 4   • docusate sodium 100 MG capsule Take 1 capsule by mouth 2 (Two) Times a Day. 40 capsule 0   • Ergocalciferol (VITAMIN D2 PO) Take  by mouth Daily.     • gabapentin (NEURONTIN) 300 MG capsule Take 300 mg by mouth 3 (Three) Times a Day.     • hydroxychloroquine (PLAQUENIL) 200 MG tablet Take 200 mg by mouth 2 (Two) Times a Day.     • lisinopril-hydrochlorothiazide (PRINZIDE,ZESTORETIC) 20-12.5 MG per tablet Take 1 tablet by mouth Daily. 90 tablet 1   • meclizine (ANTIVERT) 25 MG tablet 25 mg Every 8 (Eight) Hours  As Needed.     • ondansetron (ZOFRAN) 4 MG tablet Take 1 tablet by mouth Every 6 (Six) Hours As Needed for Nausea or Vomiting. 30 tablet 0   • oxaprozin (DAYPRO) 600 MG tablet Take 1,200 mg by mouth Daily. Take 2 po every day      • polyethylene glycol (MIRALAX) 17 g packet Take 17 g by mouth Daily. 15 packet 0   • promethazine (PHENERGAN) 25 MG tablet Take 1 tablet by mouth Every 6 (Six) Hours As Needed for Nausea or Vomiting. 20 tablet 0   • SUMAtriptan (IMITREX) 100 MG tablet TAKE 1 TABLET BY MOUTH AT ONSET OF HEADACHE. MAY REPEAT DOSE ONE TIME IN 2 HOURS IF HEADACHE NOT RELIEVED. 9 tablet 3   • traZODone (DESYREL) 50 MG tablet TAKE 1 TABLET BY MOUTH EVERY NIGHT. (Patient taking differently: Take 50 mg by mouth At Night As Needed.) 60 tablet 5   • [DISCONTINUED] HYDROcodone-acetaminophen (NORCO) 5-325 MG per tablet Take 1 tablet by mouth Every 8 (Eight) Hours As Needed for Moderate Pain . 30 tablet 0     No current facility-administered medications on file prior to visit.       No Known Allergies     Review of Systems   Constitutional: Negative.    HENT: Negative.    Eyes: Negative.    Respiratory: Negative.    Cardiovascular: Negative.    Gastrointestinal: Negative.    Endocrine: Negative.    Genitourinary: Negative.    Musculoskeletal: Positive for arthralgias.   Skin: Negative.    Allergic/Immunologic: Negative.    Neurological: Negative.    Hematological: Negative.    Psychiatric/Behavioral: Negative.         Objective      Physical Exam  There were no vitals taken for this visit.    There is no height or weight on file to calculate BMI.    General:   Mental Status:  Alert   Appearance: Cooperative, in no acute distress   Build and Nutrition: Obese male   Orientation: Alert and oriented to person, place and time   Posture: Normal   Gait: Slight limp on the right    Integument:   Right hip: Wound is well-healed, with blanching erythema at the inferior aspect    Lower Extremity:   Right Hip:    Tenderness:   None    Swelling:  None    Crepitus:  None    Range of motion:  External Rotation: 30°       Internal Rotation: 30°       Flexion:  100°       Extension:  0°    Deformities:  None  Functional testing: Negative Stinchfield    No leg length discrepancy        Assessment and Plan     Diagnoses and all orders for this visit:    1. Status post total hip replacement, right (Primary)    2. Orthopedic aftercare        1. Status post total hip replacement, right    2. Orthopedic aftercare        I reviewed my findings with patient today.  His right total hip arthroplasty is doing well, following the head and liner exchange in incision and drainage in early December.  Initial replacement surgery was in July 2020.  He is continuing with IV antibiotics per Dr. Delaney, and I will see him back in 6 weeks with an x-ray.  I will see him back sooner for any problems.    Return in about 6 weeks (around 3/1/2021) for Recheck with X-Rays.      Osbaldo Olivas MD  01/18/21  15:39 ALLISON Maguire disclaimer:  Much of this encounter note is an electronic transcription/translation of spoken language to printed text. The electronic translation of spoken language may permit erroneous, or at times, nonsensical words or phrases to be inadvertently transcribed; Although I have reviewed the note for such errors, some may still exist.  Answers for HPI/ROS submitted by the patient on 1/16/2021   What is the primary reason for your visit?: Other  Please describe your symptoms.: Hip Surgery follow-up.  Have you had these symptoms before?: Yes  How long have you been having these symptoms?: Greater than 2 weeks  Please list any medications you are currently taking for this condition.: Rocephin via PICC line for infection.

## 2021-01-19 ENCOUNTER — DOCUMENTATION (OUTPATIENT)
Dept: PHYSICAL THERAPY | Facility: HOSPITAL | Age: 71
End: 2021-01-19

## 2021-01-19 NOTE — THERAPY DISCHARGE NOTE
Outpatient Rehabilitation - Wound/Debridement D/C Summary        Patient Name: Obi Jackman Jr.  : 1950  MRN: [id number]  Today's Date: 2021                  Admit Date: (Not on file)    Visit Dx:  No diagnosis found.    Patient Active Problem List   Diagnosis   • Allergic rhinitis   • Anxiety disorder   • Benign paroxysmal positional vertigo   • Chronic tension type headache   • Depression   • ED (erectile dysfunction) of non-organic origin   • Hyperlipidemia   • Hypertension   • LFTs abnormal   • CHRISTOPHE (obstructive sleep apnea)   • Elevated glucose   • Health care maintenance   • Morbid obesity due to excess calories (CMS/[facility])   • Onychomycosis of left great toe   • Osteoarthritis of ankle or foot   • Venous stasis   • Neuropathy   • Class 3 severe obesity due to excess calories with serious comorbidity and body mass index (BMI) of 40.0 to 44.9 in adult (CMS/[facility])   • Pre-op evaluation   • Arthritis of foot, left   • S/P left ankle triple arthrodesis   • Acute blood loss anemia, mild, asymptomatic    • Acute postoperative pain   • Wound infection   • Primary osteoarthritis of right hip   • Status post total replacement of right hip   • Postoperative wound infection of right hip, s/p I and D, head and liner exchange.        Past Medical History:   Diagnosis Date   • Anxiety and depression    • Bulging of lumbar intervertebral disc    • CPAP (continuous positive airway pressure) dependence    • Hyperlipidemia    • Hypertension    • Migraine    • CHRISTOPHE on CPAP    • Rheumatoid arthritis (CMS/HCC)    • Swelling of left ear    • Wears contact lenses    • Wears contact lenses         Past Surgical History:   Procedure Laterality Date   • ACHILLES TENDON SURGERY Left 10/15/2019    Procedure: ACHILLES TENDON LENGTHENING LEFT;  Surgeon: Elodia Guzman MD;  Location: [facility];  Service: Orthopedics   • CATARACT EXTRACTION Right    • COLONOSCOPY     • EYE SURGERY Bilateral     cornea transplant    •  FOOT SURGERY Left 10/15/2019    triple arthrodesis and achilles tendon lengthening- [facility]   • HERNIA REPAIR     • ILIAC CREST BONE GRAFT Left 10/15/2019    Procedure: ILIAC CREST BONE GRAFT LEFT;  Surgeon: Elodia Guzman MD;  Location:  [facility];  Service: Orthopedics   • INCISION AND DRAINAGE LEG Right 12/4/2020    Procedure: INCISION AND DRAINAGE WITH COMPONENT EXCHANGE, HEAD AND LINER RIGHT HIP;  Surgeon: Osbaldo Olivas MD;  Location:  [facility];  Service: Orthopedics;  Laterality: Right;   • TOE FUSION Left 10/15/2019    Procedure: TRIPLE ARTHODESIS LEFT;  Surgeon: Elodia Guzman MD;  Location:  [facility];  Service: Orthopedics   • TOTAL HIP ARTHROPLASTY Right 8/4/2020    Procedure: TOTAL HIP ARTHROPLASTY RIGHT;  Surgeon: Osbaldo Olivas MD;  Location:  [facility];  Service: Orthopedics;  Laterality: Right;         EVALUATION                WOUND DEBRIDEMENT                            Recommendation and Plan      Goals  PT OP Goals     Row Name 01/19/21 1536          PT Short Term Goals    STG 1  Patient will present with minimal drainage from R hip incision.  -     STG 1 Progress  Met  -     STG 2  Patient will verbalize signs and symptoms of infection.  -     STG 2 Progress  Not Met  -        Long Term Goals    LTG 1  Patient will present with no drainage from R hip incision.  -     LTG 1 Progress  Met  -     LTG 2  Patient will present with fully epithelialized wound bed as evidence of wound healing.  -     LTG 2 Progress  Met  -       User Key  (r) = Recorded By, (t) = Taken By, (c) = Cosigned By    Initials Name Provider Type    Ana Paula Roblero, PT Physical Therapist          Time Calculation:              OP Discharge Summary     Row Name 01/19/21 1536             OP PT Discharge Summary    Date of Discharge  01/19/21  -      Reason for Discharge  other (comment) Per MD, pt had progressed beyond need for prevena wound vac d/t incision approximation and no remaining drainage. No  follow up in over 30 days, would now need new referral.  -MC      Outcomes Achieved  Patient able to partially acheive established goals  -MC        User Key  (r) = Recorded By, (t) = Taken By, (c) = Cosigned By    Initials Name Provider Type    Ana Paula Roblero, PT Physical Therapist          Ana Paula Leigh, PT  1/19/2021

## 2021-01-21 ENCOUNTER — LAB (OUTPATIENT)
Dept: LAB | Facility: HOSPITAL | Age: 71
End: 2021-01-21

## 2021-01-21 ENCOUNTER — TRANSCRIBE ORDERS (OUTPATIENT)
Dept: LAB | Facility: HOSPITAL | Age: 71
End: 2021-01-21

## 2021-01-21 DIAGNOSIS — K83.09 BACTERIAL CHOLANGITIS: ICD-10-CM

## 2021-01-21 DIAGNOSIS — D72.823 NEUTROPHILIC LEUKEMOID REACTION: ICD-10-CM

## 2021-01-21 DIAGNOSIS — I10 ESSENTIAL HYPERTENSION, MALIGNANT: Primary | ICD-10-CM

## 2021-01-21 DIAGNOSIS — I10 ESSENTIAL HYPERTENSION, BENIGN: ICD-10-CM

## 2021-01-21 DIAGNOSIS — B95.7 CHRONIC GRANULOMATOUS INFECTION DUE MOSTLY TO STAPHYLOCOCCUS AUREUS: ICD-10-CM

## 2021-01-21 DIAGNOSIS — B96.89 BACTERIAL CHOLANGITIS: ICD-10-CM

## 2021-01-21 DIAGNOSIS — L03.115 CELLULITIS OF RIGHT LOWER EXTREMITY: ICD-10-CM

## 2021-01-21 LAB
ALBUMIN SERPL-MCNC: 4.1 G/DL (ref 3.5–5.2)
ALBUMIN/GLOB SERPL: 1.2 G/DL
ALP SERPL-CCNC: 68 U/L (ref 39–117)
ALT SERPL W P-5'-P-CCNC: 15 U/L (ref 1–41)
ANION GAP SERPL CALCULATED.3IONS-SCNC: 10 MMOL/L (ref 5–15)
AST SERPL-CCNC: 18 U/L (ref 1–40)
BASOPHILS # BLD AUTO: 0.03 10*3/MM3 (ref 0–0.2)
BASOPHILS NFR BLD AUTO: 0.5 % (ref 0–1.5)
BILIRUB SERPL-MCNC: 0.4 MG/DL (ref 0–1.2)
BUN SERPL-MCNC: 16 MG/DL (ref 8–23)
BUN/CREAT SERPL: 20 (ref 7–25)
CALCIUM SPEC-SCNC: 9.4 MG/DL (ref 8.6–10.5)
CHLORIDE SERPL-SCNC: 102 MMOL/L (ref 98–107)
CO2 SERPL-SCNC: 29 MMOL/L (ref 22–29)
CREAT SERPL-MCNC: 0.8 MG/DL (ref 0.76–1.27)
CRP SERPL-MCNC: 0.34 MG/DL (ref 0–0.5)
DEPRECATED RDW RBC AUTO: 46.1 FL (ref 37–54)
EOSINOPHIL # BLD AUTO: 0.43 10*3/MM3 (ref 0–0.4)
EOSINOPHIL NFR BLD AUTO: 7.5 % (ref 0.3–6.2)
ERYTHROCYTE [DISTWIDTH] IN BLOOD BY AUTOMATED COUNT: 13.5 % (ref 12.3–15.4)
ERYTHROCYTE [SEDIMENTATION RATE] IN BLOOD: 34 MM/HR (ref 0–20)
GFR SERPL CREATININE-BSD FRML MDRD: 96 ML/MIN/1.73
GLOBULIN UR ELPH-MCNC: 3.4 GM/DL
GLUCOSE SERPL-MCNC: 108 MG/DL (ref 65–99)
HCT VFR BLD AUTO: 43.3 % (ref 37.5–51)
HGB BLD-MCNC: 13.7 G/DL (ref 13–17.7)
IMM GRANULOCYTES # BLD AUTO: 0.02 10*3/MM3 (ref 0–0.05)
IMM GRANULOCYTES NFR BLD AUTO: 0.3 % (ref 0–0.5)
LYMPHOCYTES # BLD AUTO: 0.95 10*3/MM3 (ref 0.7–3.1)
LYMPHOCYTES NFR BLD AUTO: 16.5 % (ref 19.6–45.3)
MCH RBC QN AUTO: 29 PG (ref 26.6–33)
MCHC RBC AUTO-ENTMCNC: 31.6 G/DL (ref 31.5–35.7)
MCV RBC AUTO: 91.7 FL (ref 79–97)
MONOCYTES # BLD AUTO: 0.58 10*3/MM3 (ref 0.1–0.9)
MONOCYTES NFR BLD AUTO: 10.1 % (ref 5–12)
NEUTROPHILS NFR BLD AUTO: 3.76 10*3/MM3 (ref 1.7–7)
NEUTROPHILS NFR BLD AUTO: 65.1 % (ref 42.7–76)
NRBC BLD AUTO-RTO: 0 /100 WBC (ref 0–0.2)
PLATELET # BLD AUTO: 278 10*3/MM3 (ref 140–450)
PMV BLD AUTO: 9.5 FL (ref 6–12)
POTASSIUM SERPL-SCNC: 4.1 MMOL/L (ref 3.5–5.2)
PROT SERPL-MCNC: 7.5 G/DL (ref 6–8.5)
RBC # BLD AUTO: 4.72 10*6/MM3 (ref 4.14–5.8)
SODIUM SERPL-SCNC: 141 MMOL/L (ref 136–145)
WBC # BLD AUTO: 5.77 10*3/MM3 (ref 3.4–10.8)

## 2021-01-21 PROCEDURE — 85025 COMPLETE CBC W/AUTO DIFF WBC: CPT | Performed by: INTERNAL MEDICINE

## 2021-01-21 PROCEDURE — 85652 RBC SED RATE AUTOMATED: CPT | Performed by: INTERNAL MEDICINE

## 2021-01-21 PROCEDURE — 36415 COLL VENOUS BLD VENIPUNCTURE: CPT | Performed by: INTERNAL MEDICINE

## 2021-01-21 PROCEDURE — 86140 C-REACTIVE PROTEIN: CPT | Performed by: INTERNAL MEDICINE

## 2021-01-21 PROCEDURE — 80053 COMPREHEN METABOLIC PANEL: CPT | Performed by: INTERNAL MEDICINE

## 2021-01-29 ENCOUNTER — OFFICE VISIT (OUTPATIENT)
Dept: ORTHOPEDIC SURGERY | Facility: CLINIC | Age: 71
End: 2021-01-29

## 2021-01-29 VITALS — WEIGHT: 314 LBS | HEIGHT: 72 IN | BODY MASS INDEX: 42.53 KG/M2 | HEART RATE: 91 BPM | OXYGEN SATURATION: 98 %

## 2021-01-29 DIAGNOSIS — G62.9 NEUROPATHY: ICD-10-CM

## 2021-01-29 DIAGNOSIS — Z47.89 ORTHOPEDIC AFTERCARE: ICD-10-CM

## 2021-01-29 DIAGNOSIS — I87.8 VENOUS STASIS: ICD-10-CM

## 2021-01-29 DIAGNOSIS — M19.072 OSTEOARTHRITIS OF LEFT ANKLE OR FOOT: Primary | ICD-10-CM

## 2021-01-29 PROCEDURE — 99213 OFFICE O/P EST LOW 20 MIN: CPT | Performed by: ORTHOPAEDIC SURGERY

## 2021-01-29 NOTE — PROGRESS NOTES
ESTABLISHED PATIENT    Patient: Obi Jackman Jr.  : 1950    Primary Care Provider: Cammy Oneal PA    Requesting Provider: As above    Follow-up (6 month recheck - 15 months s/p (L) triple fusion, ICBG, achilles lengthening 10/15/2019)      History    Chief Complaint: f/u left triple fusion    History of Present Illness: he returns for f/u of left triple fusion.  He has underlying neuropathy, severe venous stasis.  He reports significant improvement in foot pain compared to pre op.   He has wearing his compression stockings.  He has custom inserts.    Current Outpatient Medications on File Prior to Visit   Medication Sig Dispense Refill   • acetaminophen (TYLENOL) 325 MG tablet Take 325 mg by mouth As Needed for Mild Pain .     • ALPRAZolam (XANAX) 0.25 MG tablet Take 1 tablet by mouth Daily As Needed for Anxiety (when flying). 10 tablet 0   • amLODIPine (NORVASC) 5 MG tablet Take 1 tablet by mouth Daily. 90 tablet 1   • Ascorbic Acid (VITAMIN C PO) Take  by mouth Daily.     • aspirin 325 MG EC tablet Take 1 tablet by mouth Daily. For 1 month 30 tablet 0   • atenolol (TENORMIN) 50 MG tablet Take 1 tablet by mouth Daily. 90 tablet 1   • atorvastatin (LIPITOR) 10 MG tablet Take 1 tablet by mouth Daily. Resume when not on Dapto anymore 90 tablet 3   • buPROPion XL (WELLBUTRIN XL) 150 MG 24 hr tablet Take 1 tablet by mouth Daily. 90 tablet 1   • CEFTRIAXONE 2 G/20ML IV PUSH SYRINGE KIT (PAD) 2 g Infuse 2 g into a venous catheter.     • clotrimazole (LOTRIMIN) 1 % cream Apply  topically to the appropriate area as directed 2 (Two) Times a Day. 15 g 1   • Cyanocobalamin (VITAMIN B-12 ER PO) Take 1 tablet by mouth Daily.     • cyclobenzaprine (FLEXERIL) 10 MG tablet TAKE ONE TABLET BY MOUTH THREE TIMES A DAY AS NEEDED FOR MUSCLE SPASMS. 30 tablet 5   • Dietary Management Product (Vasculera) tablet Take 1 tablet by mouth every day 90 tablet 4   • docusate sodium 100 MG capsule Take 1 capsule by mouth 2 (Two)  Times a Day. 40 capsule 0   • Ergocalciferol (VITAMIN D2 PO) Take  by mouth Daily.     • gabapentin (NEURONTIN) 300 MG capsule Take 300 mg by mouth 3 (Three) Times a Day.     • hydroxychloroquine (PLAQUENIL) 200 MG tablet Take 200 mg by mouth 2 (Two) Times a Day.     • lisinopril-hydrochlorothiazide (PRINZIDE,ZESTORETIC) 20-12.5 MG per tablet Take 1 tablet by mouth Daily. 90 tablet 1   • meclizine (ANTIVERT) 25 MG tablet 25 mg Every 8 (Eight) Hours As Needed.     • ondansetron (ZOFRAN) 4 MG tablet Take 1 tablet by mouth Every 6 (Six) Hours As Needed for Nausea or Vomiting. 30 tablet 0   • oxaprozin (DAYPRO) 600 MG tablet Take 1,200 mg by mouth Daily. Take 2 po every day      • polyethylene glycol (MIRALAX) 17 g packet Take 17 g by mouth Daily. 15 packet 0   • promethazine (PHENERGAN) 25 MG tablet Take 1 tablet by mouth Every 6 (Six) Hours As Needed for Nausea or Vomiting. 20 tablet 0   • SUMAtriptan (IMITREX) 100 MG tablet TAKE 1 TABLET BY MOUTH AT ONSET OF HEADACHE. MAY REPEAT DOSE ONE TIME IN 2 HOURS IF HEADACHE NOT RELIEVED. 9 tablet 3   • traZODone (DESYREL) 50 MG tablet TAKE 1 TABLET BY MOUTH EVERY NIGHT. (Patient taking differently: Take 50 mg by mouth At Night As Needed.) 60 tablet 5     No current facility-administered medications on file prior to visit.       No Known Allergies   Past Medical History:   Diagnosis Date   • Anxiety and depression    • Bulging of lumbar intervertebral disc    • CPAP (continuous positive airway pressure) dependence    • Hyperlipidemia    • Hypertension    • Migraine    • CHRISTOPHE on CPAP    • Rheumatoid arthritis (CMS/HCC)    • Swelling of left ear    • Wears contact lenses    • Wears contact lenses      Past Surgical History:   Procedure Laterality Date   • ACHILLES TENDON SURGERY Left 10/15/2019    Procedure: ACHILLES TENDON LENGTHENING LEFT;  Surgeon: Elodia Guzman MD;  Location: [facility];  Service: Orthopedics   • CATARACT EXTRACTION Right    • COLONOSCOPY  2015   • EYE  SURGERY Bilateral     cornea transplant    • FOOT SURGERY Left 10/15/2019    triple arthrodesis and achilles tendon lengthening- [facility]   • HERNIA REPAIR     • ILIAC CREST BONE GRAFT Left 10/15/2019    Procedure: ILIAC CREST BONE GRAFT LEFT;  Surgeon: Elodia Guzman MD;  Location:  [facility];  Service: Orthopedics   • INCISION AND DRAINAGE LEG Right 12/4/2020    Procedure: INCISION AND DRAINAGE WITH COMPONENT EXCHANGE, HEAD AND LINER RIGHT HIP;  Surgeon: Osbaldo Olivas MD;  Location:  [facility];  Service: Orthopedics;  Laterality: Right;   • TOE FUSION Left 10/15/2019    Procedure: TRIPLE ARTHODESIS LEFT;  Surgeon: Elodia Guzman MD;  Location:  [facility];  Service: Orthopedics   • TOTAL HIP ARTHROPLASTY Right 8/4/2020    Procedure: TOTAL HIP ARTHROPLASTY RIGHT;  Surgeon: Osbaldo Olivas MD;  Location:  [facility];  Service: Orthopedics;  Laterality: Right;     Family History   Problem Relation Age of Onset   • No Known Problems Mother    • No Known Problems Father       Social History     Socioeconomic History   • Marital status:      Spouse name: Not on file   • Number of children: Not on file   • Years of education: Not on file   • Highest education level: Not on file   Tobacco Use   • Smoking status: Never Smoker   • Smokeless tobacco: Never Used   Substance and Sexual Activity   • Alcohol use: Yes     Comment: rarely   • Drug use: No   • Sexual activity: Defer        Review of Systems   Constitutional: Negative.    HENT: Negative.    Eyes: Negative.    Respiratory: Negative.    Cardiovascular: Negative.    Gastrointestinal: Negative.    Endocrine: Negative.    Genitourinary: Negative.    Musculoskeletal: Positive for arthralgias.   Skin: Negative.    Allergic/Immunologic: Negative.    Neurological: Negative.    Hematological: Negative.    Psychiatric/Behavioral: Negative.        The following portions of the patient's history were reviewed and updated as appropriate: allergies, current medications,  "past family history, past medical history, past social history, past surgical history and problem list.    Physical Exam:   Pulse 91   Ht 182.9 cm (72\")   Wt (!) 142 kg (314 lb)   SpO2 98%   BMI 42.59 kg/m²   GENERAL: Body habitus: morbidly obese    Lower extremity edema: Left: 3+ pitting; Right: 3+ pitting    Gait: antalgic     Mental Status:  awake and alert; oriented to person, place, and time  MSK:  Tibia:  Right:  non tender; Left:  non tender        Ankle:  Right: non tender; Left:  non tender        Foot:  Right:  non tender; Left:  Left foot incisions healed, ankle dorsiflexion 10, plantarflexion 30, no inversion eversion which is to be expected    NEURO Sensation:  diminished    Medical Decision Making    Data Review:   ordered and reviewed x-rays today    Assessment/Plan/Diagnosis/Treatment Options:   1. Osteoarthritis of left ankle or foot  Triple fusion is healed.  I will see him in a year for an x-ray.  Continue orthotics.    2. Venous stasis  He has severe underlying venous stasis.  It is extremely important that he wear compression stockings daily.  The combination of neuropathy and venous stasis puts him in a high risk category for amputation.  We discussed this again in detail.    3. Neuropathy  As above          Elodia Guzman MD                      "

## 2021-01-29 NOTE — PATIENT INSTRUCTIONS
"Education    Diabetic Foot Care / Neuropathic Foot Care    SKIN  · Remove shoes and socks and look at the feet every morning and night.         · Blisters - clean it with peroxide or alcohol.  DO NOT put a shoe back on until it is healed.  DO NOT WALK ON THE FOOT.  If it is red or looks infected, call your doctor.    · Callus - sand calluses daily, it works better on dry skin.  A PED EGG is the best tool, or file or a pumice stone ([facility]) using a back and forth motion over the callus.    · Ingrown nail - soak it in soapy water and call your doctor.    · Ulcers or sores on the foot - DO NOT PUT A SHOE ON, DO NOT WALK ON THE FOOT, go to the doctor who looks at your feet.    · Moisturize the feet every night.  An easy method: The foot with a thin layer of Vaseline or Bag [place] (you can find this at [employer], any feed store) and then a sock    NAILS  · Trim or file your nails straight across and leave them a little long.  If you have thick fungal nails, a nail  tool or dremel tool works well.    · If you have an ingrown nail with reddened skin, soak it (as mentioned above and call your doctor).    SHOES  · Keep your shoes in good repair and wear any special inserts or diabetic shoes as prescribed.    · If you have a problem with a special diabetic shoe and/or insert, such as rubbing, go back to where you got the diabetic shoe/insert as soon as possible.    · If you are in \"regular\" shoes, make sure they fit.  Shoes with soft, padded soles, rounded toes, and good support are best.    THINGS NOT TO DO  · DO NOT go barefoot    · DO NOT soak feet in very hot water.    · DO NOT soak feet in anything other than water with soap or Epsom Salts (NO bleach, turpentine, gasoline, etc.).          "

## 2021-02-02 ENCOUNTER — OFFICE VISIT (OUTPATIENT)
Dept: INTERNAL MEDICINE | Facility: CLINIC | Age: 71
End: 2021-02-02

## 2021-02-02 VITALS
BODY MASS INDEX: 43.18 KG/M2 | OXYGEN SATURATION: 96 % | DIASTOLIC BLOOD PRESSURE: 76 MMHG | TEMPERATURE: 96.8 F | SYSTOLIC BLOOD PRESSURE: 132 MMHG | WEIGHT: 315 LBS | HEART RATE: 83 BPM

## 2021-02-02 DIAGNOSIS — F32.A ANXIETY AND DEPRESSION: ICD-10-CM

## 2021-02-02 DIAGNOSIS — F41.9 ANXIETY AND DEPRESSION: ICD-10-CM

## 2021-02-02 DIAGNOSIS — I15.9 SECONDARY HYPERTENSION: ICD-10-CM

## 2021-02-02 DIAGNOSIS — Z12.5 PROSTATE CANCER SCREENING: Primary | ICD-10-CM

## 2021-02-02 DIAGNOSIS — E78.5 HYPERLIPIDEMIA, UNSPECIFIED HYPERLIPIDEMIA TYPE: ICD-10-CM

## 2021-02-02 PROCEDURE — 99213 OFFICE O/P EST LOW 20 MIN: CPT | Performed by: PHYSICIAN ASSISTANT

## 2021-02-02 RX ORDER — DOXYCYCLINE HYCLATE 100 MG/1
CAPSULE ORAL
COMMUNITY
Start: 2021-01-28 | End: 2022-04-25

## 2021-02-02 NOTE — PROGRESS NOTES
Chief Complaint   Patient presents with   • Depression     Follow Up   • Hypertension       Subjective     Obi Jackman Jr. is a 70 y.o. male.        History of Present Illness     Since last visit, pt developed infection right hip, had I&D and started on OV abx. He has been on IV abx until this week. Now on doxycycline BID for an indefinite period of time. He will follow up with ID in 3 weeks. Sed rate has been elevated, CRP is back to normal.    Saw Dr Olivas a couple weeks ago and he is monitoring his incision due to some redness at one end.    His blood pressure has been well controlled at home.     Had a recent visit with Dr Castanon, she did not do any labs.       Current Outpatient Medications:   •  acetaminophen (TYLENOL) 325 MG tablet, Take 325 mg by mouth As Needed for Mild Pain ., Disp: , Rfl:   •  ALPRAZolam (XANAX) 0.25 MG tablet, Take 1 tablet by mouth Daily As Needed for Anxiety (when flying)., Disp: 10 tablet, Rfl: 0  •  Ascorbic Acid (VITAMIN C PO), Take  by mouth Daily., Disp: , Rfl:   •  atenolol (TENORMIN) 50 MG tablet, Take 1 tablet by mouth Daily., Disp: 90 tablet, Rfl: 1  •  atorvastatin (LIPITOR) 10 MG tablet, Take 1 tablet by mouth Daily. Resume when not on Dapto anymore, Disp: 90 tablet, Rfl: 3  •  buPROPion XL (WELLBUTRIN XL) 150 MG 24 hr tablet, Take 1 tablet by mouth Daily., Disp: 90 tablet, Rfl: 1  •  clotrimazole (LOTRIMIN) 1 % cream, Apply  topically to the appropriate area as directed 2 (Two) Times a Day., Disp: 15 g, Rfl: 1  •  Cyanocobalamin (VITAMIN B-12 ER PO), Take 1 tablet by mouth Daily., Disp: , Rfl:   •  cyclobenzaprine (FLEXERIL) 10 MG tablet, TAKE ONE TABLET BY MOUTH THREE TIMES A DAY AS NEEDED FOR MUSCLE SPASMS., Disp: 30 tablet, Rfl: 5  •  Dietary Management Product (Vasculera) tablet, Take 1 tablet by mouth every day, Disp: 90 tablet, Rfl: 4  •  docusate sodium 100 MG capsule, Take 1 capsule by mouth 2 (Two) Times a Day., Disp: 40 capsule, Rfl: 0  •  doxycycline  (VIBRAMYCIN) 100 MG capsule, , Disp: , Rfl:   •  Ergocalciferol (VITAMIN D2 PO), Take  by mouth Daily., Disp: , Rfl:   •  gabapentin (NEURONTIN) 300 MG capsule, Take 300 mg by mouth 3 (Three) Times a Day., Disp: , Rfl:   •  hydroxychloroquine (PLAQUENIL) 200 MG tablet, Take 200 mg by mouth 2 (Two) Times a Day., Disp: , Rfl:   •  lisinopril-hydrochlorothiazide (PRINZIDE,ZESTORETIC) 20-12.5 MG per tablet, Take 1 tablet by mouth Daily., Disp: 90 tablet, Rfl: 1  •  meclizine (ANTIVERT) 25 MG tablet, 25 mg Every 8 (Eight) Hours As Needed., Disp: , Rfl:   •  ondansetron (ZOFRAN) 4 MG tablet, Take 1 tablet by mouth Every 6 (Six) Hours As Needed for Nausea or Vomiting., Disp: 30 tablet, Rfl: 0  •  oxaprozin (DAYPRO) 600 MG tablet, Take 1,200 mg by mouth Daily. Take 2 po every day , Disp: , Rfl:   •  promethazine (PHENERGAN) 25 MG tablet, Take 1 tablet by mouth Every 6 (Six) Hours As Needed for Nausea or Vomiting., Disp: 20 tablet, Rfl: 0  •  SUMAtriptan (IMITREX) 100 MG tablet, TAKE 1 TABLET BY MOUTH AT ONSET OF HEADACHE. MAY REPEAT DOSE ONE TIME IN 2 HOURS IF HEADACHE NOT RELIEVED., Disp: 9 tablet, Rfl: 3  •  amLODIPine (NORVASC) 5 MG tablet, Take 1 tablet by mouth Daily., Disp: 90 tablet, Rfl: 1  •  polyethylene glycol (MIRALAX) 17 g packet, Take 17 g by mouth Daily., Disp: 15 packet, Rfl: 0  •  traZODone (DESYREL) 50 MG tablet, TAKE 1 TABLET BY MOUTH EVERY NIGHT. (Patient taking differently: Take 50 mg by mouth At Night As Needed.), Disp: 60 tablet, Rfl: 5     PMFSH  The following portions of the patient's history were reviewed and updated as appropriate: allergies, current medications, past family history, past medical history, past social history, past surgical history and problem list.    Review of Systems   Constitutional: Negative for activity change, appetite change and fatigue.   HENT: Negative for congestion and rhinorrhea.    Respiratory: Negative for chest tightness and shortness of breath.    Cardiovascular:  Negative for chest pain and palpitations.   Gastrointestinal: Negative for abdominal pain.   Genitourinary: Negative for dysuria.   Musculoskeletal: Positive for arthralgias and gait problem. Negative for myalgias.   Neurological: Negative for dizziness, weakness, light-headedness and headaches.   Psychiatric/Behavioral: Negative for dysphoric mood. The patient is not nervous/anxious.        Objective   /76   Pulse 83   Temp 96.8 °F (36 °C)   Wt (!) 144 kg (318 lb 6.4 oz)   SpO2 96%   BMI 43.18 kg/m²     Physical Exam  Vitals signs and nursing note reviewed.   Constitutional:       Appearance: He is well-developed.   HENT:      Head: Normocephalic.      Right Ear: Hearing, tympanic membrane, ear canal and external ear normal.      Left Ear: Hearing, tympanic membrane, ear canal and external ear normal.      Nose: Nose normal.   Eyes:      Conjunctiva/sclera: Conjunctivae normal.      Pupils: Pupils are equal, round, and reactive to light.   Neck:      Musculoskeletal: Normal range of motion.   Cardiovascular:      Rate and Rhythm: Normal rate and regular rhythm.      Heart sounds: Normal heart sounds.   Pulmonary:      Effort: Pulmonary effort is normal.      Breath sounds: Normal breath sounds. No decreased breath sounds, wheezing, rhonchi or rales.   Musculoskeletal: Normal range of motion.   Skin:     General: Skin is warm and dry.   Neurological:      Mental Status: He is alert.   Psychiatric:         Behavior: Behavior normal.              ASSESSMENT/PLAN    Diagnoses and all orders for this visit:    1. Prostate cancer screening (Primary)  -     PSA Screen; Future    2. Hyperlipidemia, unspecified hyperlipidemia type  Assessment & Plan:  Check lipid profile. Further recommendations based on results.    Orders:  -     Lipid Panel; Future    3. Secondary hypertension  Assessment & Plan:  Hypertension is improving with treatment.  Continue current treatment regimen.  Dietary sodium restriction.  Weight  loss.  Regular aerobic exercise.  Continue current medications.  Blood pressure will be reassessed at the next regular appointment.      4. Anxiety and depression  Assessment & Plan:  Psychological condition is unchanged.  Continue current treatment regimen.  Regular aerobic exercise.  Psychological condition  will be reassessed at the next regular appointment.             Return for Medicare Wellness.  Answers for HPI/ROS submitted by the patient on 1/29/2021   What is the primary reason for your visit?: Other  Please describe your symptoms.: Annual Health Physical  Have you had these symptoms before?: No  How long have you been having these symptoms?: 1-4 days  Please list any medications you are currently taking for this condition.: N/A  Please describe any probable cause for these symptoms. : N/A

## 2021-02-04 ENCOUNTER — IMMUNIZATION (OUTPATIENT)
Dept: VACCINE CLINIC | Facility: HOSPITAL | Age: 71
End: 2021-02-04

## 2021-02-04 PROCEDURE — 0002A: CPT | Performed by: INTERNAL MEDICINE

## 2021-02-04 PROCEDURE — 91300 HC SARSCOV02 VAC 30MCG/0.3ML IM: CPT | Performed by: INTERNAL MEDICINE

## 2021-02-05 ENCOUNTER — LAB (OUTPATIENT)
Dept: LAB | Facility: HOSPITAL | Age: 71
End: 2021-02-05

## 2021-02-05 DIAGNOSIS — E78.5 HYPERLIPIDEMIA, UNSPECIFIED HYPERLIPIDEMIA TYPE: ICD-10-CM

## 2021-02-05 DIAGNOSIS — Z12.5 PROSTATE CANCER SCREENING: ICD-10-CM

## 2021-02-05 LAB
CHOLEST SERPL-MCNC: 199 MG/DL (ref 0–200)
HDLC SERPL-MCNC: 40 MG/DL (ref 40–60)
LDLC SERPL CALC-MCNC: 128 MG/DL (ref 0–100)
LDLC/HDLC SERPL: 3.11 {RATIO}
PSA SERPL-MCNC: 0.46 NG/ML (ref 0–4)
TRIGL SERPL-MCNC: 174 MG/DL (ref 0–150)
VLDLC SERPL-MCNC: 31 MG/DL (ref 5–40)

## 2021-02-05 PROCEDURE — G0103 PSA SCREENING: HCPCS

## 2021-02-05 PROCEDURE — 80061 LIPID PANEL: CPT

## 2021-02-09 NOTE — PROGRESS NOTES
Your cholesterol has increased since last year but it looks like you have been off your Lipitor due to medication interactions. Your levels will likely come down when this is restarted.     Your PSA is within normal levels.

## 2021-02-25 ENCOUNTER — APPOINTMENT (OUTPATIENT)
Dept: LAB | Facility: HOSPITAL | Age: 71
End: 2021-02-25

## 2021-02-25 ENCOUNTER — TRANSCRIBE ORDERS (OUTPATIENT)
Dept: LAB | Facility: HOSPITAL | Age: 71
End: 2021-02-25

## 2021-02-25 DIAGNOSIS — T84.51XD INFLAMMATORY REACTION DUE TO INTERNAL PROSTHESIS OF RIGHT HIP, SUBSEQUENT ENCOUNTER: Primary | ICD-10-CM

## 2021-02-25 LAB
ALBUMIN SERPL-MCNC: 4.2 G/DL (ref 3.5–5.2)
ALBUMIN/GLOB SERPL: 1.5 G/DL
ALP SERPL-CCNC: 75 U/L (ref 39–117)
ALT SERPL W P-5'-P-CCNC: 15 U/L (ref 1–41)
ANION GAP SERPL CALCULATED.3IONS-SCNC: 9 MMOL/L (ref 5–15)
AST SERPL-CCNC: 20 U/L (ref 1–40)
BASOPHILS # BLD AUTO: 0.02 10*3/MM3 (ref 0–0.2)
BASOPHILS NFR BLD AUTO: 0.3 % (ref 0–1.5)
BILIRUB SERPL-MCNC: 0.4 MG/DL (ref 0–1.2)
BUN SERPL-MCNC: 14 MG/DL (ref 8–23)
BUN/CREAT SERPL: 17.5 (ref 7–25)
CALCIUM SPEC-SCNC: 9.3 MG/DL (ref 8.6–10.5)
CHLORIDE SERPL-SCNC: 102 MMOL/L (ref 98–107)
CO2 SERPL-SCNC: 30 MMOL/L (ref 22–29)
CREAT SERPL-MCNC: 0.8 MG/DL (ref 0.76–1.27)
CRP SERPL-MCNC: <0.3 MG/DL (ref 0–0.5)
DEPRECATED RDW RBC AUTO: 41.5 FL (ref 37–54)
EOSINOPHIL # BLD AUTO: 0.17 10*3/MM3 (ref 0–0.4)
EOSINOPHIL NFR BLD AUTO: 2.7 % (ref 0.3–6.2)
ERYTHROCYTE [DISTWIDTH] IN BLOOD BY AUTOMATED COUNT: 12.7 % (ref 12.3–15.4)
ERYTHROCYTE [SEDIMENTATION RATE] IN BLOOD: 8 MM/HR (ref 0–20)
GFR SERPL CREATININE-BSD FRML MDRD: 96 ML/MIN/1.73
GLOBULIN UR ELPH-MCNC: 2.8 GM/DL
GLUCOSE SERPL-MCNC: 99 MG/DL (ref 65–99)
HCT VFR BLD AUTO: 44.7 % (ref 37.5–51)
HGB BLD-MCNC: 14.6 G/DL (ref 13–17.7)
IMM GRANULOCYTES # BLD AUTO: 0.02 10*3/MM3 (ref 0–0.05)
IMM GRANULOCYTES NFR BLD AUTO: 0.3 % (ref 0–0.5)
LYMPHOCYTES # BLD AUTO: 1.42 10*3/MM3 (ref 0.7–3.1)
LYMPHOCYTES NFR BLD AUTO: 22.9 % (ref 19.6–45.3)
MCH RBC QN AUTO: 29.7 PG (ref 26.6–33)
MCHC RBC AUTO-ENTMCNC: 32.7 G/DL (ref 31.5–35.7)
MCV RBC AUTO: 90.9 FL (ref 79–97)
MONOCYTES # BLD AUTO: 0.8 10*3/MM3 (ref 0.1–0.9)
MONOCYTES NFR BLD AUTO: 12.9 % (ref 5–12)
NEUTROPHILS NFR BLD AUTO: 3.77 10*3/MM3 (ref 1.7–7)
NEUTROPHILS NFR BLD AUTO: 60.9 % (ref 42.7–76)
NRBC BLD AUTO-RTO: 0 /100 WBC (ref 0–0.2)
PLATELET # BLD AUTO: 280 10*3/MM3 (ref 140–450)
PMV BLD AUTO: 10.3 FL (ref 6–12)
POTASSIUM SERPL-SCNC: 4.1 MMOL/L (ref 3.5–5.2)
PROT SERPL-MCNC: 7 G/DL (ref 6–8.5)
RBC # BLD AUTO: 4.92 10*6/MM3 (ref 4.14–5.8)
SODIUM SERPL-SCNC: 141 MMOL/L (ref 136–145)
WBC # BLD AUTO: 6.2 10*3/MM3 (ref 3.4–10.8)

## 2021-02-25 PROCEDURE — 85025 COMPLETE CBC W/AUTO DIFF WBC: CPT | Performed by: INTERNAL MEDICINE

## 2021-02-25 PROCEDURE — 80053 COMPREHEN METABOLIC PANEL: CPT | Performed by: INTERNAL MEDICINE

## 2021-02-25 PROCEDURE — 86140 C-REACTIVE PROTEIN: CPT | Performed by: INTERNAL MEDICINE

## 2021-02-25 PROCEDURE — 85652 RBC SED RATE AUTOMATED: CPT | Performed by: INTERNAL MEDICINE

## 2021-02-25 PROCEDURE — 36415 COLL VENOUS BLD VENIPUNCTURE: CPT | Performed by: INTERNAL MEDICINE

## 2021-03-03 ENCOUNTER — OFFICE VISIT (OUTPATIENT)
Dept: ORTHOPEDIC SURGERY | Facility: CLINIC | Age: 71
End: 2021-03-03

## 2021-03-03 DIAGNOSIS — Z96.641 HISTORY OF REVISION OF TOTAL REPLACEMENT OF RIGHT HIP JOINT: Primary | ICD-10-CM

## 2021-03-03 DIAGNOSIS — Z09 POSTOPERATIVE EXAMINATION: ICD-10-CM

## 2021-03-03 PROCEDURE — 99024 POSTOP FOLLOW-UP VISIT: CPT | Performed by: ORTHOPAEDIC SURGERY

## 2021-03-03 NOTE — PROGRESS NOTES
[facility] [place] [facility] Note    Subjective     Chief Complaint   Patient presents with   • Post-op Follow-up     6 week follow up; 12 weeks status post Incision And Drainage With Component Exchange, Head And Liner Right Hip 12/4/20        HPI    Obi Jackman Jr. is a 70 y.o. male who follows up for his right total hip arthroplasty. The surgery was in 08/2020. He did have a liner exchange on 12/04/2020 and is on suppressive antibiotics. He is on oral antibiotics twice a day now. He reports doing well and rates his improvement at 90 percent as compared to before surgery.    I have reviewed the following portions of the patient's history and agree with: History of Present Illness and Review of Systems    Patient Active Problem List   Diagnosis   • Allergic rhinitis   • Anxiety disorder   • Benign paroxysmal positional vertigo   • Chronic tension type headache   • Depression   • ED (erectile dysfunction) of non-organic origin   • Hyperlipidemia   • Hypertension   • LFTs abnormal   • CHRISTOPHE (obstructive sleep apnea)   • Elevated glucose   • Health care maintenance   • Morbid obesity due to excess calories (CMS/[facility])   • Onychomycosis of left great toe   • Osteoarthritis of ankle or foot   • Venous stasis   • Neuropathy   • Class 3 severe obesity due to excess calories with serious comorbidity and body mass index (BMI) of 40.0 to 44.9 in adult (CMS/[facility])   • Pre-op evaluation   • Arthritis of foot, left   • S/P left ankle triple arthrodesis   • Acute blood loss anemia, mild, asymptomatic    • Acute postoperative pain   • Wound infection   • Primary osteoarthritis of right hip   • Status post total replacement of right hip   • Postoperative wound infection of right hip, s/p I and D, head and liner exchange.     Past Medical History:   Diagnosis Date   • Anxiety and depression    • Bulging of lumbar intervertebral disc    • CPAP (continuous positive airway pressure) dependence    • Hyperlipidemia    •  Hypertension    • Migraine    • CHRISTOPHE on CPAP    • Rheumatoid arthritis (CMS/HCC)    • Swelling of left ear    • Wears contact lenses    • Wears contact lenses       Past Surgical History:   Procedure Laterality Date   • ACHILLES TENDON SURGERY Left 10/15/2019    Procedure: ACHILLES TENDON LENGTHENING LEFT;  Surgeon: Elodia Guzman MD;  Location: [facility] OR;  Service: Orthopedics   • CATARACT EXTRACTION Right    • COLONOSCOPY  2015   • EYE SURGERY Bilateral     cornea transplant    • FOOT SURGERY Left 10/15/2019    triple arthrodesis and achilles tendon lengthening- [place]   • HERNIA REPAIR     • ILIAC CREST BONE GRAFT Left 10/15/2019    Procedure: ILIAC CREST BONE GRAFT LEFT;  Surgeon: Elodia Guzman MD;  Location: [facility] OR;  Service: Orthopedics   • INCISION AND DRAINAGE LEG Right 12/4/2020    Procedure: INCISION AND DRAINAGE WITH COMPONENT EXCHANGE, HEAD AND LINER RIGHT HIP;  Surgeon: Osbaldo Olivas MD;  Location: [facility] OR;  Service: Orthopedics;  Laterality: Right;   • TOE FUSION Left 10/15/2019    Procedure: TRIPLE ARTHODESIS LEFT;  Surgeon: Elodia Guzman MD;  Location: [facility] OR;  Service: Orthopedics   • TOTAL HIP ARTHROPLASTY Right 8/4/2020    Procedure: TOTAL HIP ARTHROPLASTY RIGHT;  Surgeon: Osbaldo Olivas MD;  Location: [facility];  Service: Orthopedics;  Laterality: Right;      Family History   Problem Relation Age of Onset   • No Known Problems Mother    • No Known Problems Father      Social History     Socioeconomic History   • Marital status:      Spouse name: Not on file   • Number of children: Not on file   • Years of education: Not on file   • Highest education level: Not on file   Tobacco Use   • Smoking status: Never Smoker   • Smokeless tobacco: Never Used   Substance and Sexual Activity   • Alcohol use: Yes     Comment: rarely   • Drug use: No   • Sexual activity: Defer      Current Outpatient Medications on File Prior to Visit   Medication Sig Dispense Refill   •  acetaminophen (TYLENOL) 325 MG tablet Take 325 mg by mouth As Needed for Mild Pain .     • ALPRAZolam (XANAX) 0.25 MG tablet Take 1 tablet by mouth Daily As Needed for Anxiety (when flying). 10 tablet 0   • amLODIPine (NORVASC) 5 MG tablet Take 1 tablet by mouth Daily. 90 tablet 1   • Ascorbic Acid (VITAMIN C PO) Take  by mouth Daily.     • atenolol (TENORMIN) 50 MG tablet Take 1 tablet by mouth Daily. 90 tablet 1   • atorvastatin (LIPITOR) 10 MG tablet Take 1 tablet by mouth Daily. Resume when not on Dapto anymore 90 tablet 3   • buPROPion XL (WELLBUTRIN XL) 150 MG 24 hr tablet Take 1 tablet by mouth Daily. 90 tablet 1   • clotrimazole (LOTRIMIN) 1 % cream Apply  topically to the appropriate area as directed 2 (Two) Times a Day. 15 g 1   • Cyanocobalamin (VITAMIN B-12 ER PO) Take 1 tablet by mouth Daily.     • cyclobenzaprine (FLEXERIL) 10 MG tablet TAKE ONE TABLET BY MOUTH THREE TIMES A DAY AS NEEDED FOR MUSCLE SPASMS. 30 tablet 5   • Dietary Management Product (Vasculera) tablet Take 1 tablet by mouth every day 90 tablet 4   • docusate sodium 100 MG capsule Take 1 capsule by mouth 2 (Two) Times a Day. 40 capsule 0   • doxycycline (VIBRAMYCIN) 100 MG capsule      • Ergocalciferol (VITAMIN D2 PO) Take  by mouth Daily.     • gabapentin (NEURONTIN) 300 MG capsule Take 300 mg by mouth 3 (Three) Times a Day.     • hydroxychloroquine (PLAQUENIL) 200 MG tablet Take 200 mg by mouth 2 (Two) Times a Day.     • lisinopril-hydrochlorothiazide (PRINZIDE,ZESTORETIC) 20-12.5 MG per tablet Take 1 tablet by mouth Daily. 90 tablet 1   • meclizine (ANTIVERT) 25 MG tablet 25 mg Every 8 (Eight) Hours As Needed.     • ondansetron (ZOFRAN) 4 MG tablet Take 1 tablet by mouth Every 6 (Six) Hours As Needed for Nausea or Vomiting. 30 tablet 0   • oxaprozin (DAYPRO) 600 MG tablet Take 1,200 mg by mouth Daily. Take 2 po every day      • promethazine (PHENERGAN) 25 MG tablet Take 1 tablet by mouth Every 6 (Six) Hours As Needed for Nausea or  Vomiting. 20 tablet 0   • SUMAtriptan (IMITREX) 100 MG tablet TAKE 1 TABLET BY MOUTH AT ONSET OF HEADACHE. MAY REPEAT DOSE ONE TIME IN 2 HOURS IF HEADACHE NOT RELIEVED. 9 tablet 3     No current facility-administered medications on file prior to visit.       No Known Allergies     Review of Systems   Constitutional: Negative.    HENT: Negative.    Eyes: Negative.    Respiratory: Negative.    Cardiovascular: Negative.    Gastrointestinal: Negative.    Endocrine: Negative.    Genitourinary: Negative.    Musculoskeletal: Positive for arthralgias.   Skin: Negative.    Allergic/Immunologic: Negative.    Neurological: Negative.    Hematological: Negative.    Psychiatric/Behavioral: Negative.         Objective      Physical Exam  There were no vitals taken for this visit.    There is no height or weight on file to calculate BMI.    General:   Mental Status:  Alert   Appearance: Cooperative, in no acute distress   Build and Nutrition: Obese male   Orientation: Alert and oriented to person, place and time   Posture: Normal   Gait: Mild limp on the right.    Integument       • Right hip: Wound is well-healed with no signs of infection     Lower Extremities  • Right Hip:        • Tenderness: None       • Swelling: None       • Crepitus: None       • Range of motion:             • External rotation: 30° with no pain             • Internal rotation: 30° with no pain             • Flexion: 100°             • Extension: 0°       • Deformities: None       • Functional Testing:             • Stinchfield Test: Negative             • No leg length discrepancy.    Imaging/Studies  Imaging Results (Last 24 Hours)     Procedure Component Value Units Date/Time    XR Hip With or Without Pelvis 2 - 3 View Right [[id number]] Resulted: 03/03/21 1528     Updated: 03/03/21 1529    Narrative:      Right Hip Radiographs  Indication: status-post right total hip arthroplasty  Views: low AP pelvis and lateral of the right hip    Comparison: no change  compared to prior study, 12/16/2020    Findings:   The components are well aligned, with no signs of loosening or failure.            Assessment and Plan     Diagnoses and all orders for this visit:    1. History of revision of total replacement of right hip joint (Primary)  -     XR Hip With or Without Pelvis 2 - 3 View Right    2. Postoperative examination        1. History of revision of total replacement of right hip joint    2. Postoperative examination        I assured the patient that he was recovering well postoperatively.  He will remain on suppressive antibiotics lifetime secondary to the complexity of his hip.    Return in about 5 months (around 8/3/2021).      Scribed for Osbaldo Olivas MD by SARA QUINTANILLA.  03/04/21   10:50 EST    I have personally performed the services described in this document as scribed by the above individual, and it is both accurate and complete.  Osbaldo Olivas MD  3/4/2021  13:22 EST

## 2021-04-11 ENCOUNTER — PATIENT MESSAGE (OUTPATIENT)
Dept: INTERNAL MEDICINE | Facility: CLINIC | Age: 71
End: 2021-04-11

## 2021-04-13 RX ORDER — CLOTRIMAZOLE AND BETAMETHASONE DIPROPIONATE 10; .64 MG/G; MG/G
CREAM TOPICAL 2 TIMES DAILY
Qty: 45 G | Refills: 0 | Status: SHIPPED | OUTPATIENT
Start: 2021-04-13 | End: 2021-10-07

## 2021-04-22 ENCOUNTER — LAB (OUTPATIENT)
Dept: LAB | Facility: HOSPITAL | Age: 71
End: 2021-04-22

## 2021-04-22 ENCOUNTER — OFFICE VISIT (OUTPATIENT)
Dept: INTERNAL MEDICINE | Facility: CLINIC | Age: 71
End: 2021-04-22

## 2021-04-22 VITALS
BODY MASS INDEX: 43.45 KG/M2 | SYSTOLIC BLOOD PRESSURE: 130 MMHG | TEMPERATURE: 97.1 F | HEART RATE: 66 BPM | OXYGEN SATURATION: 94 % | DIASTOLIC BLOOD PRESSURE: 80 MMHG | WEIGHT: 315 LBS

## 2021-04-22 DIAGNOSIS — E78.5 HYPERLIPIDEMIA, UNSPECIFIED HYPERLIPIDEMIA TYPE: ICD-10-CM

## 2021-04-22 DIAGNOSIS — R73.09 ELEVATED GLUCOSE: ICD-10-CM

## 2021-04-22 DIAGNOSIS — F32.A DEPRESSION, UNSPECIFIED DEPRESSION TYPE: ICD-10-CM

## 2021-04-22 DIAGNOSIS — J30.9 ALLERGIC RHINITIS, UNSPECIFIED SEASONALITY, UNSPECIFIED TRIGGER: ICD-10-CM

## 2021-04-22 DIAGNOSIS — Z00.00 ENCOUNTER FOR MEDICARE ANNUAL WELLNESS EXAM: Primary | ICD-10-CM

## 2021-04-22 DIAGNOSIS — I15.9 SECONDARY HYPERTENSION: ICD-10-CM

## 2021-04-22 LAB
ALBUMIN SERPL-MCNC: 4.3 G/DL (ref 3.5–5.2)
ALBUMIN/GLOB SERPL: 1.4 G/DL
ALP SERPL-CCNC: 60 U/L (ref 39–117)
ALT SERPL W P-5'-P-CCNC: 12 U/L (ref 1–41)
ANION GAP SERPL CALCULATED.3IONS-SCNC: 11.1 MMOL/L (ref 5–15)
AST SERPL-CCNC: 19 U/L (ref 1–40)
BASOPHILS # BLD AUTO: 0.02 10*3/MM3 (ref 0–0.2)
BASOPHILS NFR BLD AUTO: 0.4 % (ref 0–1.5)
BILIRUB SERPL-MCNC: 0.7 MG/DL (ref 0–1.2)
BUN SERPL-MCNC: 16 MG/DL (ref 8–23)
BUN/CREAT SERPL: 17.4 (ref 7–25)
CALCIUM SPEC-SCNC: 9.9 MG/DL (ref 8.6–10.5)
CHLORIDE SERPL-SCNC: 99 MMOL/L (ref 98–107)
CHOLEST SERPL-MCNC: 219 MG/DL (ref 0–200)
CO2 SERPL-SCNC: 26.9 MMOL/L (ref 22–29)
CREAT SERPL-MCNC: 0.92 MG/DL (ref 0.76–1.27)
DEPRECATED RDW RBC AUTO: 41.8 FL (ref 37–54)
EOSINOPHIL # BLD AUTO: 0.14 10*3/MM3 (ref 0–0.4)
EOSINOPHIL NFR BLD AUTO: 2.6 % (ref 0.3–6.2)
ERYTHROCYTE [DISTWIDTH] IN BLOOD BY AUTOMATED COUNT: 12.8 % (ref 12.3–15.4)
GFR SERPL CREATININE-BSD FRML MDRD: 82 ML/MIN/1.73
GLOBULIN UR ELPH-MCNC: 3 GM/DL
GLUCOSE SERPL-MCNC: 103 MG/DL (ref 65–99)
HBA1C MFR BLD: 5.57 % (ref 4.8–5.6)
HCT VFR BLD AUTO: 47.8 % (ref 37.5–51)
HDLC SERPL-MCNC: 40 MG/DL (ref 40–60)
HGB BLD-MCNC: 16.1 G/DL (ref 13–17.7)
IMM GRANULOCYTES # BLD AUTO: 0.01 10*3/MM3 (ref 0–0.05)
IMM GRANULOCYTES NFR BLD AUTO: 0.2 % (ref 0–0.5)
LDLC SERPL CALC-MCNC: 150 MG/DL (ref 0–100)
LDLC/HDLC SERPL: 3.68 {RATIO}
LYMPHOCYTES # BLD AUTO: 1.12 10*3/MM3 (ref 0.7–3.1)
LYMPHOCYTES NFR BLD AUTO: 20.7 % (ref 19.6–45.3)
MCH RBC QN AUTO: 30.1 PG (ref 26.6–33)
MCHC RBC AUTO-ENTMCNC: 33.7 G/DL (ref 31.5–35.7)
MCV RBC AUTO: 89.5 FL (ref 79–97)
MONOCYTES # BLD AUTO: 0.67 10*3/MM3 (ref 0.1–0.9)
MONOCYTES NFR BLD AUTO: 12.4 % (ref 5–12)
NEUTROPHILS NFR BLD AUTO: 3.46 10*3/MM3 (ref 1.7–7)
NEUTROPHILS NFR BLD AUTO: 63.7 % (ref 42.7–76)
NRBC BLD AUTO-RTO: 0 /100 WBC (ref 0–0.2)
PLATELET # BLD AUTO: 272 10*3/MM3 (ref 140–450)
PMV BLD AUTO: 10.5 FL (ref 6–12)
POTASSIUM SERPL-SCNC: 4.1 MMOL/L (ref 3.5–5.2)
PROT SERPL-MCNC: 7.3 G/DL (ref 6–8.5)
RBC # BLD AUTO: 5.34 10*6/MM3 (ref 4.14–5.8)
SODIUM SERPL-SCNC: 137 MMOL/L (ref 136–145)
TRIGL SERPL-MCNC: 159 MG/DL (ref 0–150)
VLDLC SERPL-MCNC: 29 MG/DL (ref 5–40)
WBC # BLD AUTO: 5.42 10*3/MM3 (ref 3.4–10.8)

## 2021-04-22 PROCEDURE — 85025 COMPLETE CBC W/AUTO DIFF WBC: CPT

## 2021-04-22 PROCEDURE — 1125F AMNT PAIN NOTED PAIN PRSNT: CPT | Performed by: PHYSICIAN ASSISTANT

## 2021-04-22 PROCEDURE — 80061 LIPID PANEL: CPT

## 2021-04-22 PROCEDURE — 1170F FXNL STATUS ASSESSED: CPT | Performed by: PHYSICIAN ASSISTANT

## 2021-04-22 PROCEDURE — G0439 PPPS, SUBSEQ VISIT: HCPCS | Performed by: PHYSICIAN ASSISTANT

## 2021-04-22 PROCEDURE — 80053 COMPREHEN METABOLIC PANEL: CPT

## 2021-04-22 PROCEDURE — 83036 HEMOGLOBIN GLYCOSYLATED A1C: CPT

## 2021-04-22 RX ORDER — ERGOCALCIFEROL 1.25 MG/1
CAPSULE ORAL
COMMUNITY
End: 2022-10-10

## 2021-04-22 RX ORDER — LISINOPRIL AND HYDROCHLOROTHIAZIDE 20; 12.5 MG/1; MG/1
1 TABLET ORAL DAILY
Qty: 90 TABLET | Refills: 3 | Status: SHIPPED | OUTPATIENT
Start: 2021-04-22 | End: 2022-05-14

## 2021-04-22 RX ORDER — BUPROPION HYDROCHLORIDE 150 MG/1
150 TABLET ORAL DAILY
Qty: 90 TABLET | Refills: 3 | Status: SHIPPED | OUTPATIENT
Start: 2021-04-22 | End: 2022-05-14

## 2021-04-22 RX ORDER — AMLODIPINE BESYLATE 5 MG/1
5 TABLET ORAL DAILY
Qty: 90 TABLET | Refills: 3 | Status: SHIPPED | OUTPATIENT
Start: 2021-04-22 | End: 2021-04-26 | Stop reason: SDUPTHER

## 2021-04-22 RX ORDER — ATENOLOL 50 MG/1
50 TABLET ORAL DAILY
Qty: 90 TABLET | Refills: 3 | Status: SHIPPED | OUTPATIENT
Start: 2021-04-22 | End: 2022-05-14

## 2021-04-22 NOTE — PROGRESS NOTES
The ABCs of the Annual Wellness Visit  Subsequent Medicare Wellness Visit    Chief Complaint   Patient presents with   • Medicare Wellness-subsequent       Subjective   History of Present Illness:  Obi Jackman Jr. is a 70 y.o. male who presents for a Subsequent Medicare Wellness Visit.    HEALTH RISK ASSESSMENT    Recent Hospitalizations:  Recently treated at the following:  [facility]    Current Medical Providers:  Patient Care Team:  Cammy Oneal PA as PCP - General (Internal Medicine)  Chanda Castanon MD as Consulting Physician (Rheumatology)    Smoking Status:  Social History     Tobacco Use   Smoking Status Never Smoker   Smokeless Tobacco Never Used       Alcohol Consumption:  Social History     Substance and Sexual Activity   Alcohol Use Yes    Comment: rarely       Depression Screen:   PHQ-2/PHQ-9 Depression Screening 4/22/2021   Little interest or pleasure in doing things 0   Feeling down, depressed, or hopeless 0   Trouble falling or staying asleep, or sleeping too much -   Feeling tired or having little energy -   Poor appetite or overeating -   Feeling bad about yourself - or that you are a failure or have let yourself or your family down -   Trouble concentrating on things, such as reading the newspaper or watching television -   Moving or speaking so slowly that other people could have noticed. Or the opposite - being so fidgety or restless that you have been moving around a lot more than usual -   Thoughts that you would be better off dead, or of hurting yourself in some way -   Total Score 0   If you checked off any problems, how difficult have these problems made it for you to do your work, take care of things at home, or get along with other people? -       Fall Risk Screen:  STEADI Fall Risk Assessment was completed, and patient is at MODERATE risk for falls. Assessment completed on:4/22/2021    Health Habits and Functional and Cognitive Screening:  Functional & Cognitive  Status 4/22/2021   Do you have difficulty preparing food and eating? No   Do you have difficulty bathing yourself, getting dressed or grooming yourself? No   Do you have difficulty using the toilet? No   Do you have difficulty moving around from place to place? No   Do you have trouble with steps or getting out of a bed or a chair? No   Current Diet Well Balanced Diet        Current Diet Comment low salt    Dental Exam Up to date   Eye Exam Up to date   Exercise (times per week) 3 times per week   Current Exercise Activities Include Walking   Do you need help using the phone?  No   Are you deaf or do you have serious difficulty hearing?  No   Do you need help with transportation? No   Do you need help shopping? No   Do you need help preparing meals?  No   Do you need help with housework?  No   Do you need help with laundry? No   Do you need help taking your medications? No   Do you need help managing money? No   Do you ever drive or ride in a car without wearing a seat belt? No   Have you felt unusual stress, anger or loneliness in the last month? No   Who do you live with? Spouse   If you need help, do you have trouble finding someone available to you? No   Have you been bothered in the last four weeks by sexual problems? No   Do you have difficulty concentrating, remembering or making decisions? No         Does the patient have evidence of cognitive impairment? No    Asprin use counseling:Does not need ASA (and currently is not on it)    Age-appropriate Screening Schedule:  Refer to the list below for future screening recommendations based on patient's age, sex and/or medical conditions. Orders for these recommended tests are listed in the plan section. The patient has been provided with a written plan.    Health Maintenance   Topic Date Due   • INFLUENZA VACCINE  08/01/2021   • COLONOSCOPY  12/14/2021   • LIPID PANEL  04/22/2022   • TDAP/TD VACCINES (2 - Td) 01/01/2024   • ZOSTER VACCINE  Completed          The  following portions of the patient's history were reviewed and updated as appropriate: allergies, current medications, past family history, past medical history, past social history, past surgical history and problem list.    Outpatient Medications Prior to Visit   Medication Sig Dispense Refill   • acetaminophen (TYLENOL) 325 MG tablet Take 325 mg by mouth As Needed for Mild Pain .     • ALPRAZolam (XANAX) 0.25 MG tablet Take 1 tablet by mouth Daily As Needed for Anxiety (when flying). 10 tablet 0   • Ascorbic Acid (VITAMIN C PO) Take  by mouth Daily.     • atorvastatin (LIPITOR) 10 MG tablet Take 1 tablet by mouth Daily. Resume when not on Dapto anymore 90 tablet 3   • clotrimazole (LOTRIMIN) 1 % cream Apply  topically to the appropriate area as directed 2 (Two) Times a Day. 15 g 1   • clotrimazole-betamethasone (Lotrisone) 1-0.05 % cream Apply  topically to the appropriate area as directed 2 (Two) Times a Day. 45 g 0   • Cyanocobalamin (VITAMIN B-12 ER PO) Take 1 tablet by mouth Daily.     • cyclobenzaprine (FLEXERIL) 10 MG tablet TAKE ONE TABLET BY MOUTH THREE TIMES A DAY AS NEEDED FOR MUSCLE SPASMS. 30 tablet 5   • Dietary Management Product (Vasculera) tablet Take 1 tablet by mouth every day 90 tablet 4   • docusate sodium 100 MG capsule Take 1 capsule by mouth 2 (Two) Times a Day. 40 capsule 0   • doxycycline (VIBRAMYCIN) 100 MG capsule      • Ergocalciferol (VITAMIN D2 PO) Take  by mouth Daily.     • gabapentin (NEURONTIN) 300 MG capsule Take 300 mg by mouth 3 (Three) Times a Day.     • hydroxychloroquine (PLAQUENIL) 200 MG tablet Take 200 mg by mouth 2 (Two) Times a Day.     • meclizine (ANTIVERT) 25 MG tablet 25 mg Every 8 (Eight) Hours As Needed.     • ondansetron (ZOFRAN) 4 MG tablet Take 1 tablet by mouth Every 6 (Six) Hours As Needed for Nausea or Vomiting. 30 tablet 0   • oxaprozin (DAYPRO) 600 MG tablet Take 1,200 mg by mouth Daily. Take 2 po every day      • promethazine (PHENERGAN) 25 MG tablet Take  1 tablet by mouth Every 6 (Six) Hours As Needed for Nausea or Vomiting. 20 tablet 0   • SUMAtriptan (IMITREX) 100 MG tablet TAKE 1 TABLET BY MOUTH AT ONSET OF HEADACHE. MAY REPEAT DOSE ONE TIME IN 2 HOURS IF HEADACHE NOT RELIEVED. 9 tablet 3   • amLODIPine (NORVASC) 5 MG tablet Take 1 tablet by mouth Daily. 90 tablet 1   • atenolol (TENORMIN) 50 MG tablet Take 1 tablet by mouth Daily. 90 tablet 1   • buPROPion XL (WELLBUTRIN XL) 150 MG 24 hr tablet Take 1 tablet by mouth Daily. 90 tablet 1   • ergocalciferol (ERGOCALCIFEROL) 1.25 MG (52189 UT) capsule Take one po every week     • lisinopril-hydrochlorothiazide (PRINZIDE,ZESTORETIC) 20-12.5 MG per tablet Take 1 tablet by mouth Daily. 90 tablet 1     No facility-administered medications prior to visit.       Patient Active Problem List   Diagnosis   • Allergic rhinitis   • Anxiety disorder   • Benign paroxysmal positional vertigo   • Chronic tension type headache   • Depression   • ED (erectile dysfunction) of non-organic origin   • Hyperlipidemia   • Hypertension   • LFTs abnormal   • CHRISTOPHE (obstructive sleep apnea)   • Elevated glucose   • Health care maintenance   • Morbid obesity due to excess calories (CMS/[facility])   • Onychomycosis of left great toe   • Osteoarthritis of ankle or foot   • Venous stasis   • Neuropathy   • Class 3 severe obesity due to excess calories with serious comorbidity and body mass index (BMI) of 40.0 to 44.9 in adult (CMS/[facility])   • Pre-op evaluation   • Arthritis of foot, left   • S/P left ankle triple arthrodesis   • Acute blood loss anemia, mild, asymptomatic    • Acute postoperative pain   • Wound infection   • Primary osteoarthritis of right hip   • Status post total replacement of right hip   • Postoperative wound infection of right hip, s/p I and D, head and liner exchange.       Advanced Care Planning:  ACP discussion was held with the patient during this visit. Patient has an advance directive in EMR which is still valid.     Review of  Systems   Constitutional: Negative for activity change, appetite change and fatigue.   HENT: Negative for congestion and rhinorrhea.    Respiratory: Negative for chest tightness and shortness of breath.    Cardiovascular: Negative for chest pain and palpitations.   Gastrointestinal: Negative for abdominal pain.   Genitourinary: Negative for dysuria.   Musculoskeletal: Negative for arthralgias and myalgias.   Neurological: Negative for dizziness, weakness, light-headedness and headaches.   Psychiatric/Behavioral: Negative for dysphoric mood. The patient is not nervous/anxious.        Compared to one year ago, the patient feels his physical health is better.  Compared to one year ago, the patient feels his mental health is the same.    Reviewed chart for potential of high risk medication in the elderly: yes  Reviewed chart for potential of harmful drug interactions in the elderly:yes    Objective         Vitals:    04/22/21 0828   BP: 130/80   Pulse: 66   Temp: 97.1 °F (36.2 °C)   SpO2: 94%   Weight: (!) 145 kg (320 lb 6.4 oz)   PainSc:   3   PainLoc: Hip       Body mass index is 43.45 kg/m².  Discussed the patient's BMI with him. The BMI is above average; BMI management plan is completed.    Physical Exam  Vitals and nursing note reviewed.   Constitutional:       Appearance: He is well-developed.   HENT:      Head: Normocephalic.      Right Ear: Hearing, tympanic membrane, ear canal and external ear normal.      Left Ear: Hearing, tympanic membrane, ear canal and external ear normal.      Nose: Nose normal.   Eyes:      Conjunctiva/sclera: Conjunctivae normal.      Pupils: Pupils are equal, round, and reactive to light.   Cardiovascular:      Rate and Rhythm: Normal rate and regular rhythm.      Heart sounds: Normal heart sounds.   Pulmonary:      Effort: Pulmonary effort is normal.      Breath sounds: Normal breath sounds. No decreased breath sounds, wheezing, rhonchi or rales.   Musculoskeletal:         General:  Normal range of motion.      Cervical back: Normal range of motion.   Skin:     General: Skin is warm and dry.   Neurological:      Mental Status: He is alert.   Psychiatric:         Behavior: Behavior normal.         Lab Results   Component Value Date    TRIG 159 (H) 04/22/2021    HDL 40 04/22/2021     (H) 04/22/2021    VLDL 29 04/22/2021    HGBA1C 5.57 04/22/2021        Assessment/Plan   Medicare Risks and Personalized Health Plan  CMS Preventative Services Quick Reference  Advance Directive Discussion  Depression/Dysphoria  Diabetic Lab Screening   Inactivity/Sedentary  Obesity/Overweight     The above risks/problems have been discussed with the patient.  Pertinent information has been shared with the patient in the After Visit Summary.  Follow up plans and orders are seen below in the Assessment/Plan Section.    Diagnoses and all orders for this visit:    1. Encounter for Medicare annual wellness exam (Primary)    2. Secondary hypertension  Assessment & Plan:  Hypertension is improving with treatment.  Continue current treatment regimen.  Dietary sodium restriction.  Weight loss.  Regular aerobic exercise.  Continue current medications.  Ambulatory blood pressure monitoring.  Blood pressure will be reassessed at the next regular appointment.    Orders:  -     atenolol (TENORMIN) 50 MG tablet; Take 1 tablet by mouth Daily.  Dispense: 90 tablet; Refill: 3  -     lisinopril-hydrochlorothiazide (PRINZIDE,ZESTORETIC) 20-12.5 MG per tablet; Take 1 tablet by mouth Daily.  Dispense: 90 tablet; Refill: 3  -     amLODIPine (NORVASC) 5 MG tablet; Take 1 tablet by mouth Daily.  Dispense: 90 tablet; Refill: 3    3. Hyperlipidemia, unspecified hyperlipidemia type  -     Comprehensive Metabolic Panel; Future  -     Lipid Panel; Future    4. Elevated glucose  -     Hemoglobin A1c; Future    5. Allergic rhinitis, unspecified seasonality, unspecified trigger  -     CBC & Differential; Future    6. Depression, unspecified  depression type  Assessment & Plan:  Improved and stable with current dose of wellbutrin  mg daily.    Orders:  -     buPROPion XL (WELLBUTRIN XL) 150 MG 24 hr tablet; Take 1 tablet by mouth Daily.  Dispense: 90 tablet; Refill: 3    Other orders  -     Discontinue: amLODIPine (NORVASC) 5 MG tablet; Take 1 tablet by mouth Daily.  Dispense: 90 tablet; Refill: 3    Follow Up:  Return in about 1 year (around 4/22/2022) for Medicare Wellness.     An After Visit Summary and PPPS were given to the patient.

## 2021-04-26 RX ORDER — AMLODIPINE BESYLATE 5 MG/1
5 TABLET ORAL DAILY
Qty: 90 TABLET | Refills: 3 | Status: SHIPPED | OUTPATIENT
Start: 2021-04-26 | End: 2022-05-14

## 2021-04-26 NOTE — PROGRESS NOTES
As expected, your labs show that your cholesterol has worsened since last year without your lipitor. Please find out the concern your Rheumatologist has with you taking liptor and Daypro together- I am not aware of any interactions. We will need to find an alternative for your cholesterol if this is going to be an issue for the long term. Let me know!    Everything else is stable and looks fine!

## 2021-04-29 ENCOUNTER — LAB (OUTPATIENT)
Dept: LAB | Facility: HOSPITAL | Age: 71
End: 2021-04-29

## 2021-04-29 ENCOUNTER — TRANSCRIBE ORDERS (OUTPATIENT)
Dept: LAB | Facility: HOSPITAL | Age: 71
End: 2021-04-29

## 2021-04-29 DIAGNOSIS — B95.7 CHRONIC GRANULOMATOUS INFECTION DUE MOSTLY TO STAPHYLOCOCCUS AUREUS: ICD-10-CM

## 2021-04-29 DIAGNOSIS — D72.823 NEUTROPHILIC LEUKEMOID REACTION: ICD-10-CM

## 2021-04-29 DIAGNOSIS — L03.115 CELLULITIS OF RIGHT FOOT: ICD-10-CM

## 2021-04-29 DIAGNOSIS — T84.51XD INFLAMMATORY REACTION DUE TO INTERNAL PROSTHESIS OF RIGHT HIP, SUBSEQUENT ENCOUNTER: Primary | ICD-10-CM

## 2021-04-29 DIAGNOSIS — T84.51XD INFLAMMATORY REACTION DUE TO INTERNAL PROSTHESIS OF RIGHT HIP, SUBSEQUENT ENCOUNTER: ICD-10-CM

## 2021-04-29 LAB
ALBUMIN SERPL-MCNC: 4.1 G/DL (ref 3.5–5.2)
ALBUMIN/GLOB SERPL: 1.4 G/DL
ALP SERPL-CCNC: 66 U/L (ref 39–117)
ALT SERPL W P-5'-P-CCNC: 14 U/L (ref 1–41)
ANION GAP SERPL CALCULATED.3IONS-SCNC: 7 MMOL/L (ref 5–15)
AST SERPL-CCNC: 17 U/L (ref 1–40)
BASOPHILS # BLD AUTO: 0.02 10*3/MM3 (ref 0–0.2)
BASOPHILS NFR BLD AUTO: 0.4 % (ref 0–1.5)
BILIRUB SERPL-MCNC: 0.6 MG/DL (ref 0–1.2)
BUN SERPL-MCNC: 18 MG/DL (ref 8–23)
BUN/CREAT SERPL: 19.1 (ref 7–25)
CALCIUM SPEC-SCNC: 9.2 MG/DL (ref 8.6–10.5)
CHLORIDE SERPL-SCNC: 103 MMOL/L (ref 98–107)
CO2 SERPL-SCNC: 30 MMOL/L (ref 22–29)
CREAT SERPL-MCNC: 0.94 MG/DL (ref 0.76–1.27)
CRP SERPL-MCNC: <0.3 MG/DL (ref 0–0.5)
DEPRECATED RDW RBC AUTO: 42.5 FL (ref 37–54)
EOSINOPHIL # BLD AUTO: 0.13 10*3/MM3 (ref 0–0.4)
EOSINOPHIL NFR BLD AUTO: 2.4 % (ref 0.3–6.2)
ERYTHROCYTE [DISTWIDTH] IN BLOOD BY AUTOMATED COUNT: 13 % (ref 12.3–15.4)
ERYTHROCYTE [SEDIMENTATION RATE] IN BLOOD: 11 MM/HR (ref 0–20)
GFR SERPL CREATININE-BSD FRML MDRD: 80 ML/MIN/1.73
GLOBULIN UR ELPH-MCNC: 3 GM/DL
GLUCOSE SERPL-MCNC: 106 MG/DL (ref 65–99)
HCT VFR BLD AUTO: 46.1 % (ref 37.5–51)
HGB BLD-MCNC: 14.9 G/DL (ref 13–17.7)
IMM GRANULOCYTES # BLD AUTO: 0.02 10*3/MM3 (ref 0–0.05)
IMM GRANULOCYTES NFR BLD AUTO: 0.4 % (ref 0–0.5)
LYMPHOCYTES # BLD AUTO: 1.19 10*3/MM3 (ref 0.7–3.1)
LYMPHOCYTES NFR BLD AUTO: 22.2 % (ref 19.6–45.3)
MCH RBC QN AUTO: 29.2 PG (ref 26.6–33)
MCHC RBC AUTO-ENTMCNC: 32.3 G/DL (ref 31.5–35.7)
MCV RBC AUTO: 90.4 FL (ref 79–97)
MONOCYTES # BLD AUTO: 0.78 10*3/MM3 (ref 0.1–0.9)
MONOCYTES NFR BLD AUTO: 14.6 % (ref 5–12)
NEUTROPHILS NFR BLD AUTO: 3.22 10*3/MM3 (ref 1.7–7)
NEUTROPHILS NFR BLD AUTO: 60 % (ref 42.7–76)
NRBC BLD AUTO-RTO: 0 /100 WBC (ref 0–0.2)
PLATELET # BLD AUTO: 265 10*3/MM3 (ref 140–450)
PMV BLD AUTO: 10.2 FL (ref 6–12)
POTASSIUM SERPL-SCNC: 4.4 MMOL/L (ref 3.5–5.2)
PROT SERPL-MCNC: 7.1 G/DL (ref 6–8.5)
RBC # BLD AUTO: 5.1 10*6/MM3 (ref 4.14–5.8)
SODIUM SERPL-SCNC: 140 MMOL/L (ref 136–145)
WBC # BLD AUTO: 5.36 10*3/MM3 (ref 3.4–10.8)

## 2021-04-29 PROCEDURE — 86140 C-REACTIVE PROTEIN: CPT

## 2021-04-29 PROCEDURE — 85652 RBC SED RATE AUTOMATED: CPT

## 2021-04-29 PROCEDURE — 80053 COMPREHEN METABOLIC PANEL: CPT

## 2021-04-29 PROCEDURE — 36415 COLL VENOUS BLD VENIPUNCTURE: CPT

## 2021-04-29 PROCEDURE — 85025 COMPLETE CBC W/AUTO DIFF WBC: CPT

## 2021-05-06 RX ORDER — SUMATRIPTAN 100 MG/1
TABLET, FILM COATED ORAL
Qty: 9 TABLET | Refills: 5 | Status: SHIPPED | OUTPATIENT
Start: 2021-05-06 | End: 2022-01-05 | Stop reason: SDUPTHER

## 2021-05-12 ENCOUNTER — PATIENT MESSAGE (OUTPATIENT)
Dept: INTERNAL MEDICINE | Facility: CLINIC | Age: 71
End: 2021-05-12

## 2021-05-12 DIAGNOSIS — E78.5 HYPERLIPIDEMIA, UNSPECIFIED HYPERLIPIDEMIA TYPE: ICD-10-CM

## 2021-05-13 RX ORDER — ATORVASTATIN CALCIUM 10 MG/1
10 TABLET, FILM COATED ORAL DAILY
Qty: 90 TABLET | Refills: 3 | Status: SHIPPED | OUTPATIENT
Start: 2021-05-13 | End: 2022-05-14

## 2021-06-17 ENCOUNTER — TELEPHONE (OUTPATIENT)
Dept: ORTHOPEDIC SURGERY | Facility: CLINIC | Age: 71
End: 2021-06-17

## 2021-06-17 DIAGNOSIS — Z96.641 HISTORY OF REVISION OF TOTAL REPLACEMENT OF RIGHT HIP JOINT: Primary | ICD-10-CM

## 2021-06-17 NOTE — TELEPHONE ENCOUNTER
"----- Message from Obi Jackman Jr. sent at 6/17/2021 12:13 PM EDT -----  Regarding: Non-Urgent Medical Question  Contact: [phone]  Dr. Olivas:  I am generally improving from my \"re-do\" right hip replacement on 12/4/20.  However, I have developed a pain in my right groin area that the [facility] folks think you should look at.  It's not quite the same constant pain from my previous infection, but is hindering my full recovery.  Do you have time to see me in the upcoming two weeks?  Maybe some blood work too?  Thank you.   Boy Jackman  [phone]  "

## 2021-06-17 NOTE — TELEPHONE ENCOUNTER
Do you want to put any orders in for patient to have before he comes in to be seen?    Ok to make an appointment next week?    Tanesha Valencia

## 2021-06-18 ENCOUNTER — LAB (OUTPATIENT)
Dept: LAB | Facility: HOSPITAL | Age: 71
End: 2021-06-18

## 2021-06-18 DIAGNOSIS — Z96.641 HISTORY OF REVISION OF TOTAL REPLACEMENT OF RIGHT HIP JOINT: ICD-10-CM

## 2021-06-18 LAB
CRP SERPL-MCNC: <0.3 MG/DL (ref 0–0.5)
ERYTHROCYTE [SEDIMENTATION RATE] IN BLOOD: 12 MM/HR (ref 0–20)

## 2021-06-18 PROCEDURE — 85652 RBC SED RATE AUTOMATED: CPT

## 2021-06-18 PROCEDURE — 86140 C-REACTIVE PROTEIN: CPT

## 2021-06-18 PROCEDURE — 36415 COLL VENOUS BLD VENIPUNCTURE: CPT

## 2021-06-18 NOTE — TELEPHONE ENCOUNTER
Sent message to patient and ask that he let me know which day works best for him at 8:00.     I also told him to go to any [facility] facility to have labs drawn today, so we can have them for his appointment.    Tanesha Valencia

## 2021-06-23 ENCOUNTER — OFFICE VISIT (OUTPATIENT)
Dept: ORTHOPEDIC SURGERY | Facility: CLINIC | Age: 71
End: 2021-06-23

## 2021-06-23 VITALS
WEIGHT: 315 LBS | SYSTOLIC BLOOD PRESSURE: 167 MMHG | HEIGHT: 72 IN | DIASTOLIC BLOOD PRESSURE: 87 MMHG | HEART RATE: 74 BPM | BODY MASS INDEX: 42.66 KG/M2

## 2021-06-23 DIAGNOSIS — Z96.641 HISTORY OF REVISION OF TOTAL REPLACEMENT OF RIGHT HIP JOINT: Primary | ICD-10-CM

## 2021-06-23 PROCEDURE — 99214 OFFICE O/P EST MOD 30 MIN: CPT | Performed by: ORTHOPAEDIC SURGERY

## 2021-06-23 NOTE — PROGRESS NOTES
[facility] Note    Subjective     Chief Complaint   Patient presents with   • Follow-up     3.5 month recheck - status post Incision And Drainage With Component Exchange, Head And Liner Right Hip 12/4/20        HPI    Obi Jackman Jr. is a 70 y.o. male who follows up for his right total hip arthroplasty on 8/4/2020, followed by head and liner exchange for acute infection on 12/04/2020. He is on lifetime suppressive antibiotics as his wound culture grew out Staphylococcus lugdunensis. He is having some pain and contacted the office to come in sooner than expected. He is fully ambulatory without external aids.    He is having some pain and wanted to have it checked out. It does not feel like the same deep groin pain that he felt when he had the infection. The pain has been ongoing for a couple of months and it is impeding his physical therapy. The pain can be 2 to 5 out of 10, depending on what he is doing. The pain is exacerbated when getting in and out of a car, getting in and out of bed, and swinging his right lower extremity. He is on doxycycline twice daily, prescribed by Dr. Delaney. He denies having any fevers, chills, or night sweats. He has to stop and rest after ambulating approximately 100 feet, and then he is able to move on. His right hip feels above 80% better overall compared to before surgery.    He had an umbilical hernia with mesh repair years ago via Dr. Silva.     I have reviewed the following portions of the patient's history and agree with: History of Present Illness and Review of Systems    Patient Active Problem List   Diagnosis   • Allergic rhinitis   • Anxiety disorder   • Benign paroxysmal positional vertigo   • Chronic tension type headache   • Depression   • ED (erectile dysfunction) of non-organic origin   • Hyperlipidemia   • Hypertension   • LFTs abnormal   • CHRISTOPHE (obstructive sleep apnea)   • Elevated glucose   • Health care maintenance   • Morbid  obesity due to excess calories (CMS/[facility])   • Onychomycosis of left great toe   • Osteoarthritis of ankle or foot   • Venous stasis   • Neuropathy   • Class 3 severe obesity due to excess calories with serious comorbidity and body mass index (BMI) of 40.0 to 44.9 in adult (CMS/[facility])   • Pre-op evaluation   • Arthritis of foot, left   • S/P left ankle triple arthrodesis   • Acute blood loss anemia, mild, asymptomatic    • Acute postoperative pain   • Wound infection   • Primary osteoarthritis of right hip   • Status post total replacement of right hip   • Postoperative wound infection of right hip, s/p I and D, head and liner exchange.     Past Medical History:   Diagnosis Date   • Anxiety and depression    • Bulging of lumbar intervertebral disc    • CPAP (continuous positive airway pressure) dependence    • Hyperlipidemia    • Hypertension    • Migraine    • CHRISTOPHE on CPAP    • Rheumatoid arthritis (CMS/[facility])    • Swelling of left ear    • Wears contact lenses    • Wears contact lenses       Past Surgical History:   Procedure Laterality Date   • ACHILLES TENDON SURGERY Left 10/15/2019    Procedure: ACHILLES TENDON LENGTHENING LEFT;  Surgeon: Elodia Guzman MD;  Location: [facility];  Service: Orthopedics   • CATARACT EXTRACTION Right    • COLONOSCOPY  2015   • EYE SURGERY Bilateral     cornea transplant    • FOOT SURGERY Left 10/15/2019    triple arthrodesis and achilles tendon lengthening- [facility]   • HERNIA REPAIR     • ILIAC CREST BONE GRAFT Left 10/15/2019    Procedure: ILIAC CREST BONE GRAFT LEFT;  Surgeon: Elodia Guzman MD;  Location: [facility];  Service: Orthopedics   • INCISION AND DRAINAGE LEG Right 12/4/2020    Procedure: INCISION AND DRAINAGE WITH COMPONENT EXCHANGE, HEAD AND LINER RIGHT HIP;  Surgeon: Osbaldo Olivas MD;  Location: [facility];  Service: Orthopedics;  Laterality: Right;   • TOE FUSION Left 10/15/2019    Procedure: TRIPLE ARTHODESIS LEFT;  Surgeon: Elodia Guzman MD;  Location:  [facility];   Service: Orthopedics   • TOTAL HIP ARTHROPLASTY Right 8/4/2020    Procedure: TOTAL HIP ARTHROPLASTY RIGHT;  Surgeon: Osbaldo Olivas MD;  Location: [facility];  Service: Orthopedics;  Laterality: Right;      Family History   Problem Relation Age of Onset   • No Known Problems Mother    • No Known Problems Father      Social History     Socioeconomic History   • Marital status:      Spouse name: Not on file   • Number of children: Not on file   • Years of education: Not on file   • Highest education level: Not on file   Tobacco Use   • Smoking status: Never Smoker   • Smokeless tobacco: Never Used   Vaping Use   • Vaping Use: Never used   Substance and Sexual Activity   • Alcohol use: Yes     Comment: rarely   • Drug use: No   • Sexual activity: Defer      Current Outpatient Medications on File Prior to Visit   Medication Sig Dispense Refill   • acetaminophen (TYLENOL) 325 MG tablet Take 325 mg by mouth As Needed for Mild Pain .     • ALPRAZolam (XANAX) 0.25 MG tablet Take 1 tablet by mouth Daily As Needed for Anxiety (when flying). 10 tablet 0   • amLODIPine (NORVASC) 5 MG tablet Take 1 tablet by mouth Daily. 90 tablet 3   • Ascorbic Acid (VITAMIN C PO) Take  by mouth Daily.     • atenolol (TENORMIN) 50 MG tablet Take 1 tablet by mouth Daily. 90 tablet 3   • atorvastatin (LIPITOR) 10 MG tablet Take 1 tablet by mouth Daily. Resume when not on Dapto anymore 90 tablet 3   • buPROPion XL (WELLBUTRIN XL) 150 MG 24 hr tablet Take 1 tablet by mouth Daily. 90 tablet 3   • clotrimazole (LOTRIMIN) 1 % cream Apply  topically to the appropriate area as directed 2 (Two) Times a Day. 15 g 1   • clotrimazole-betamethasone (Lotrisone) 1-0.05 % cream Apply  topically to the appropriate area as directed 2 (Two) Times a Day. 45 g 0   • Cyanocobalamin (VITAMIN B-12 ER PO) Take 1 tablet by mouth Daily.     • cyclobenzaprine (FLEXERIL) 10 MG tablet TAKE ONE TABLET BY MOUTH THREE TIMES A DAY AS NEEDED FOR MUSCLE SPASMS. 30 tablet  5   • Dietary Management Product (Vasculera) tablet Take 1 tablet by mouth every day 90 tablet 4   • docusate sodium 100 MG capsule Take 1 capsule by mouth 2 (Two) Times a Day. 40 capsule 0   • doxycycline (VIBRAMYCIN) 100 MG capsule      • ergocalciferol (ERGOCALCIFEROL) 1.25 MG (61773 UT) capsule Take one po every week     • Ergocalciferol (VITAMIN D2 PO) Take  by mouth Daily.     • gabapentin (NEURONTIN) 300 MG capsule Take 300 mg by mouth 3 (Three) Times a Day.     • hydroxychloroquine (PLAQUENIL) 200 MG tablet Take 200 mg by mouth 2 (Two) Times a Day.     • lisinopril-hydrochlorothiazide (PRINZIDE,ZESTORETIC) 20-12.5 MG per tablet Take 1 tablet by mouth Daily. 90 tablet 3   • meclizine (ANTIVERT) 25 MG tablet 25 mg Every 8 (Eight) Hours As Needed.     • ondansetron (ZOFRAN) 4 MG tablet Take 1 tablet by mouth Every 6 (Six) Hours As Needed for Nausea or Vomiting. 30 tablet 0   • oxaprozin (DAYPRO) 600 MG tablet Take 1,200 mg by mouth Daily. Take 2 po every day      • promethazine (PHENERGAN) 25 MG tablet Take 1 tablet by mouth Every 6 (Six) Hours As Needed for Nausea or Vomiting. 20 tablet 0   • SUMAtriptan (IMITREX) 100 MG tablet TAKE 1 TABLET BY MOUTH AT ONSET OF HEADACHE. MAY REPEAT DOSE ONE TIME IN 2 HOURS IF HEADACHE NOT RELIEVED. 9 tablet 5     No current facility-administered medications on file prior to visit.      No Known Allergies     Review of Systems   Constitutional: Negative for activity change, appetite change, chills, diaphoresis, fatigue, fever and unexpected weight change.   HENT: Negative for congestion, dental problem, drooling, ear discharge, ear pain, facial swelling, hearing loss, mouth sores, nosebleeds, postnasal drip, rhinorrhea, sinus pressure, sneezing, sore throat, tinnitus, trouble swallowing and voice change.    Eyes: Negative for photophobia, pain, discharge, redness, itching and visual disturbance.   Respiratory: Negative for apnea, cough, choking, chest tightness, shortness of  "breath, wheezing and stridor.    Cardiovascular: Negative for chest pain, palpitations and leg swelling.   Gastrointestinal: Negative for abdominal distention, abdominal pain, anal bleeding, blood in stool, constipation, diarrhea, nausea, rectal pain and vomiting.   Endocrine: Negative for cold intolerance, heat intolerance, polydipsia, polyphagia and polyuria.   Genitourinary: Negative for decreased urine volume, difficulty urinating, dysuria, enuresis, flank pain, frequency, genital sores, hematuria and urgency.   Musculoskeletal: Positive for arthralgias. Negative for back pain, gait problem, joint swelling, myalgias, neck pain and neck stiffness.   Skin: Negative for color change, pallor, rash and wound.   Allergic/Immunologic: Negative for environmental allergies, food allergies and immunocompromised state.   Neurological: Negative for dizziness, tremors, seizures, syncope, facial asymmetry, speech difficulty, weakness, light-headedness, numbness and headaches.   Hematological: Negative for adenopathy. Does not bruise/bleed easily.   Psychiatric/Behavioral: Negative for agitation, behavioral problems, confusion, decreased concentration, dysphoric mood, hallucinations, self-injury, sleep disturbance and suicidal ideas. The patient is not nervous/anxious and is not hyperactive.         Objective      Physical Exam  /87   Pulse 74   Ht 182.9 cm (72.01\")   Wt (!) 146 kg (321 lb)   BMI 43.53 kg/m²     Body mass index is 43.53 kg/m².    General:   Mental Status:  Alert   Appearance: Cooperative, in no acute distress   Build and Nutrition: Obese by BMI male   Orientation: Alert and oriented to person, place and time   Posture: Normal   Gait: Normal    Integument  • Right hip: Wound is well-healed with no signs of infection    Lower Extremities  • Right Hip:  • Tenderness: No lateral tenderness. Pain in the inguinal area; however, nothing anteriorly. He localizes the pain to the far medial side of his " thigh.  • Swelling: None  • Crepitus: None  • Range of motion:  • External rotation: 90° without pain  • Internal rotation: 30° without pain  • Flexion: 100°  • Extension: 0°  • Deformities: None  • Functional Testing:  • Stinchfield Test: Positive  • No leg length discrepancy.    Imaging/Studies  Imaging Results (Last 24 Hours)     Procedure Component Value Units Date/Time    XR Hip With or Without Pelvis 2 - 3 View Right [[id number]] Resulted: 06/23/21 0821     Updated: 06/23/21 0821    Narrative:      Right Hip Radiographs  Indication: status-post right total hip arthroplasty  Views: low AP pelvis and lateral of the right hip    Comparison: no change compared to prior study, 3/3/2021    Findings:   The components are well aligned, with no signs of loosening or failure.          Lab Results   Component Value Date    SEDRATE 12 06/18/2021    WBC 5.36 04/29/2021    CRP <0.30 06/18/2021           Assessment and Plan     Diagnoses and all orders for this visit:    1. History of revision of total replacement of right hip joint (Primary)  -     XR Hip With or Without Pelvis 2 - 3 View Right        1. History of revision of total replacement of right hip joint        I have reviewed my findings with the patient, including the x-rays and blood work, with no signs of loosening nor infection. I recommended that he see a general surgeon, Dr. Johnson or Dr. Héctor Verde, to evaluate for a possible hernia. I also recommended for him to keep his regular follow-up appointment in 08/2021.    Return in about 2 months (around 8/23/2021) for Recheck with X-Rays.      Scribed for Osbaldo Olivas MD by Km Mcclain.  06/23/21   10:19 EDT    I have personally performed the services described in this document as scribed by the above individual, and it is both accurate and complete.  Osbaldo Olivas MD  6/23/2021  12:26 EDT

## 2021-06-28 ENCOUNTER — OFFICE VISIT (OUTPATIENT)
Dept: INTERNAL MEDICINE | Facility: CLINIC | Age: 71
End: 2021-06-28

## 2021-06-28 VITALS
SYSTOLIC BLOOD PRESSURE: 132 MMHG | OXYGEN SATURATION: 97 % | BODY MASS INDEX: 44.77 KG/M2 | HEART RATE: 78 BPM | WEIGHT: 315 LBS | DIASTOLIC BLOOD PRESSURE: 82 MMHG

## 2021-06-28 DIAGNOSIS — I15.9 SECONDARY HYPERTENSION: ICD-10-CM

## 2021-06-28 DIAGNOSIS — R10.31 GROIN PAIN, RIGHT: Primary | ICD-10-CM

## 2021-06-28 DIAGNOSIS — G44.229 CHRONIC TENSION-TYPE HEADACHE, NOT INTRACTABLE: ICD-10-CM

## 2021-06-28 DIAGNOSIS — F40.243 FEAR OF FLYING: ICD-10-CM

## 2021-06-28 PROCEDURE — 99214 OFFICE O/P EST MOD 30 MIN: CPT | Performed by: PHYSICIAN ASSISTANT

## 2021-06-28 RX ORDER — CYCLOBENZAPRINE HCL 10 MG
10 TABLET ORAL 3 TIMES DAILY PRN
Qty: 30 TABLET | Refills: 5 | Status: SHIPPED | OUTPATIENT
Start: 2021-06-28 | End: 2022-07-01

## 2021-06-28 RX ORDER — AMLODIPINE BESYLATE 5 MG/1
5 TABLET ORAL DAILY
Qty: 90 TABLET | Refills: 3 | Status: CANCELLED | OUTPATIENT
Start: 2021-06-28

## 2021-06-28 RX ORDER — ALPRAZOLAM 0.25 MG/1
0.25 TABLET ORAL DAILY PRN
Qty: 10 TABLET | Refills: 0 | Status: CANCELLED
Start: 2021-06-28

## 2021-06-28 NOTE — PROGRESS NOTES
Chief Complaint   Patient presents with   • Referral for possible hernia     Acute        Subjective     Obi Jackman Jr. is a 70 y.o. male.        History of Present Illness     For the past couple of months pt has developed a pain in his right groin. Worse with certain movements of rotating his leg to get in and out of bed or the car. He has been doing therapy to help with hip recovery, they have done some exercises to treat his groin.. Did not see any improvement. Suggested he should rule out hernia due to location of pain. Saw Dr Olivas and he said that he needed to see a general surgeon for evaluation of possible hernia. Pt has not felt any bulging in his groin.    Pt also needs refill of alprazolam he takes prn when flying. Has a few trips coming up later this summer.         Current Outpatient Medications:   •  acetaminophen (TYLENOL) 325 MG tablet, Take 325 mg by mouth As Needed for Mild Pain ., Disp: , Rfl:   •  ALPRAZolam (XANAX) 0.25 MG tablet, Take 1 tablet by mouth Daily As Needed for Anxiety (when flying)., Disp: 10 tablet, Rfl: 0  •  amLODIPine (NORVASC) 5 MG tablet, Take 1 tablet by mouth Daily., Disp: 90 tablet, Rfl: 3  •  Ascorbic Acid (VITAMIN C PO), Take  by mouth Daily., Disp: , Rfl:   •  atenolol (TENORMIN) 50 MG tablet, Take 1 tablet by mouth Daily., Disp: 90 tablet, Rfl: 3  •  atorvastatin (LIPITOR) 10 MG tablet, Take 1 tablet by mouth Daily. Resume when not on Dapto anymore, Disp: 90 tablet, Rfl: 3  •  buPROPion XL (WELLBUTRIN XL) 150 MG 24 hr tablet, Take 1 tablet by mouth Daily., Disp: 90 tablet, Rfl: 3  •  clotrimazole (LOTRIMIN) 1 % cream, Apply  topically to the appropriate area as directed 2 (Two) Times a Day., Disp: 15 g, Rfl: 1  •  clotrimazole-betamethasone (Lotrisone) 1-0.05 % cream, Apply  topically to the appropriate area as directed 2 (Two) Times a Day., Disp: 45 g, Rfl: 0  •  Cyanocobalamin (VITAMIN B-12 ER PO), Take 1 tablet by mouth Daily., Disp: , Rfl:   •  cyclobenzaprine  (FLEXERIL) 10 MG tablet, Take 1 tablet by mouth 3 (Three) Times a Day As Needed for Muscle Spasms., Disp: 30 tablet, Rfl: 5  •  Dietary Management Product (Vasculera) tablet, Take 1 tablet by mouth every day, Disp: 90 tablet, Rfl: 4  •  docusate sodium 100 MG capsule, Take 1 capsule by mouth 2 (Two) Times a Day., Disp: 40 capsule, Rfl: 0  •  doxycycline (VIBRAMYCIN) 100 MG capsule, , Disp: , Rfl:   •  ergocalciferol (ERGOCALCIFEROL) 1.25 MG (93983 UT) capsule, Take one po every week, Disp: , Rfl:   •  Ergocalciferol (VITAMIN D2 PO), Take  by mouth Daily., Disp: , Rfl:   •  gabapentin (NEURONTIN) 300 MG capsule, Take 300 mg by mouth 3 (Three) Times a Day., Disp: , Rfl:   •  hydroxychloroquine (PLAQUENIL) 200 MG tablet, Take 200 mg by mouth 2 (Two) Times a Day., Disp: , Rfl:   •  lisinopril-hydrochlorothiazide (PRINZIDE,ZESTORETIC) 20-12.5 MG per tablet, Take 1 tablet by mouth Daily., Disp: 90 tablet, Rfl: 3  •  meclizine (ANTIVERT) 25 MG tablet, 25 mg Every 8 (Eight) Hours As Needed., Disp: , Rfl:   •  ondansetron (ZOFRAN) 4 MG tablet, Take 1 tablet by mouth Every 6 (Six) Hours As Needed for Nausea or Vomiting., Disp: 30 tablet, Rfl: 0  •  oxaprozin (DAYPRO) 600 MG tablet, Take 1,200 mg by mouth Daily. Take 2 po every day , Disp: , Rfl:   •  promethazine (PHENERGAN) 25 MG tablet, Take 1 tablet by mouth Every 6 (Six) Hours As Needed for Nausea or Vomiting., Disp: 20 tablet, Rfl: 0  •  SUMAtriptan (IMITREX) 100 MG tablet, TAKE 1 TABLET BY MOUTH AT ONSET OF HEADACHE. MAY REPEAT DOSE ONE TIME IN 2 HOURS IF HEADACHE NOT RELIEVED., Disp: 9 tablet, Rfl: 5     PMFSH  The following portions of the patient's history were reviewed and updated as appropriate: allergies, current medications, past family history, past medical history, past social history, past surgical history and problem list.    Review of Systems   Constitutional: Negative for activity change, appetite change and fatigue.   HENT: Negative for congestion and  rhinorrhea.    Respiratory: Negative for chest tightness and shortness of breath.    Cardiovascular: Negative for chest pain and palpitations.   Gastrointestinal: Negative for abdominal pain.   Genitourinary: Negative for dysuria.   Musculoskeletal: Positive for myalgias. Negative for arthralgias.   Neurological: Negative for dizziness, weakness, light-headedness and headaches.   Psychiatric/Behavioral: Negative for dysphoric mood. The patient is not nervous/anxious.        Objective   /82   Pulse 78   Wt (!) 150 kg (330 lb 3.2 oz)   SpO2 97%   BMI 44.77 kg/m²     Physical Exam  Constitutional:       Appearance: He is well-developed.   HENT:      Head: Normocephalic and atraumatic.      Right Ear: External ear normal.      Left Ear: External ear normal.   Eyes:      Conjunctiva/sclera: Conjunctivae normal.   Cardiovascular:      Rate and Rhythm: Normal rate and regular rhythm.   Pulmonary:      Effort: Pulmonary effort is normal.      Breath sounds: Normal breath sounds.   Abdominal:      Hernia: There is no hernia in the left inguinal area or right inguinal area.   Musculoskeletal:         General: Normal range of motion.      Cervical back: Normal range of motion.   Lymphadenopathy:      Lower Body: No right inguinal adenopathy.   Skin:     General: Skin is warm and dry.   Psychiatric:         Behavior: Behavior normal.         Results for orders placed or performed in visit on 06/18/21   C-reactive Protein    Specimen: Blood   Result Value Ref Range    C-Reactive Protein <0.30 0.00 - 0.50 mg/dL   Sedimentation Rate    Specimen: Blood   Result Value Ref Range    Sed Rate 12 0 - 20 mm/hr        ASSESSMENT/PLAN    Diagnoses and all orders for this visit:    1. Groin pain, right (Primary)  Comments:  Refer for imaging of groin to check for hernia. Refer to surgeon vs back to PT based on results.  Orders:  -     US nonvascular extremity limited; Future    2. Secondary hypertension    3. Chronic tension-type  headache, not intractable  -     cyclobenzaprine (FLEXERIL) 10 MG tablet; Take 1 tablet by mouth 3 (Three) Times a Day As Needed for Muscle Spasms.  Dispense: 30 tablet; Refill: 5    4. Fear of flying  Assessment & Plan:  Gave pt refill of xanax 0.25 mg 1 po prn on flying #10, 0RF.    The patient has read and signed the [facility] Controlled Substance Contract.  I will continue to see patient for regular follow up appointments.  They are well controlled on their medication.  TAJ is updated every 3 months. The patient is aware of the potential for addiction and dependence.      Other orders  -     Cancel: amLODIPine (NORVASC) 5 MG tablet; Take 1 tablet by mouth Daily.  Dispense: 90 tablet; Refill: 3  -     Cancel: ALPRAZolam (XANAX) 0.25 MG tablet; Take 1 tablet by mouth Daily As Needed for Anxiety (when flying).  Dispense: 10 tablet; Refill: 0           Return for Next scheduled follow up.  Answers for HPI/ROS submitted by the patient on 6/23/2021  What is the primary reason for your visit?: Other  Please describe your symptoms.: Pain in right groin.  Have you had these symptoms before?: No  How long have you been having these symptoms?: Greater than 2 weeks  Please list any medications you are currently taking for this condition.: None  Please describe any probable cause for these symptoms. : Unkown.  Pain develiped during PT following hip replacement surgery.

## 2021-06-30 DIAGNOSIS — F43.9 SITUATIONAL STRESS: ICD-10-CM

## 2021-06-30 RX ORDER — ALPRAZOLAM 0.25 MG/1
0.25 TABLET ORAL DAILY PRN
Qty: 10 TABLET | Refills: 0 | Status: SHIPPED | OUTPATIENT
Start: 2021-06-30 | End: 2022-04-27 | Stop reason: SDUPTHER

## 2021-06-30 NOTE — ASSESSMENT & PLAN NOTE
Gave pt refill of xanax 0.25 mg 1 po prn on flying #10, 0RF.    The patient has read and signed the [facility] Controlled Substance Contract.  I will continue to see patient for regular follow up appointments.  They are well controlled on their medication.  TAJ is updated every 3 months. The patient is aware of the potential for addiction and dependence.

## 2021-06-30 NOTE — TELEPHONE ENCOUNTER
Obi Jackman Jr. was seen for follow up and is due for routine refill of alprazolam 0.25 mg that he takes prn for flying. BRYAN Topete and CS agreement are up to date. If you agree, please send in the attached prescription as ordered. Thank you

## 2021-07-02 ENCOUNTER — TELEPHONE (OUTPATIENT)
Dept: INTERNAL MEDICINE | Facility: CLINIC | Age: 71
End: 2021-07-02

## 2021-07-02 NOTE — TELEPHONE ENCOUNTER
Caller: ISAIAH    Relationship: [facility]    Best call back number: [phone] REFERENCE NUMBER [id number]    Who are you requesting to speak with (clinical staff, provider,  specific staff member): CLINICAL STAFF    What was the call regarding: ISAIAH WITH [facility] WOULD LIKE TO KNOW IF JONH SMILEY HAS SUBMITTED A PRIOR AUTHORIZATION FOR THE PATIENT'S MRI AND CT SCAN. ISAIAH WOULD ALSO LIKE TO KNOW IF SOMEONE CAN REACH OUT TO THE PATIENT AND LET HIM KNOW ABOUT THIS AS WELL.    Do you require a callback: YES

## 2021-07-08 ENCOUNTER — HOSPITAL ENCOUNTER (OUTPATIENT)
Dept: ULTRASOUND IMAGING | Facility: HOSPITAL | Age: 71
Discharge: HOME | End: 2021-07-08
Admitting: PHYSICIAN ASSISTANT

## 2021-07-08 DIAGNOSIS — R10.31 GROIN PAIN, RIGHT: ICD-10-CM

## 2021-07-08 PROCEDURE — 76882 US LMTD JT/FCL EVL NVASC XTR: CPT

## 2021-07-16 NOTE — PROGRESS NOTES
Your ultrasound does not show any evidence of a hernia. I think I understood that you could return to physical therapy after a hernia was ruled out. Let me know if you need a referral.

## 2021-08-16 ENCOUNTER — OFFICE VISIT (OUTPATIENT)
Dept: ORTHOPEDIC SURGERY | Facility: CLINIC | Age: 71
End: 2021-08-16

## 2021-08-16 VITALS
WEIGHT: 315 LBS | HEART RATE: 75 BPM | HEIGHT: 72 IN | SYSTOLIC BLOOD PRESSURE: 169 MMHG | BODY MASS INDEX: 42.66 KG/M2 | DIASTOLIC BLOOD PRESSURE: 83 MMHG

## 2021-08-16 DIAGNOSIS — Z09 POSTOPERATIVE EXAMINATION: ICD-10-CM

## 2021-08-16 DIAGNOSIS — Z96.641 HISTORY OF REVISION OF TOTAL REPLACEMENT OF RIGHT HIP JOINT: Primary | ICD-10-CM

## 2021-08-16 PROCEDURE — 99213 OFFICE O/P EST LOW 20 MIN: CPT | Performed by: ORTHOPAEDIC SURGERY

## 2021-08-16 ASSESSMENT — HOOS JR
HOOS JR SCORE: 70.426
HOOS JR SCORE: 6

## 2021-08-16 NOTE — PROGRESS NOTES
[facility] [place]    Subjective     Chief Complaint   Patient presents with   • Follow-up     2 month follow up - 8 months status post Incision And Drainage With Component Exchange, Head And Liner Right Hip 12/4/20 and 1 year status post total hip arthroplasty, right 08/04/20        HPI    Obi Jackman Jr. is a 70 y.o. male who follows up for his right total hip arthroplasty, which was performed on 08/04/2020. He did have an I&D of his hip on 12/04/2020. He is on chronic suppression with doxycycline.    Today, the patient reports that his hip feels 90% better than it did prior to surgery. He states that he still feels a stinging sensation occasionally over his surgical scar. He reports that the groin pain he was experiencing on his last visit is significantly improved. He was evaluated for this, and it was confirmed not to be a hernia. He stopped doing his physical therapy, because he felt it may have been irritating a muscle or ligament or tendon, and his pain has since improved. He still finds stairs to be challenging, and he mostly takes the stairs one at a time. He reports he is ambulating with a slight limp.     He is still on the doxycycline and Plaquenil for his rheumatoid arthritis.    I have reviewed the following portions of the patient's history and agree with: History of Present Illness and Review of Systems    Patient Active Problem List   Diagnosis   • Allergic rhinitis   • Anxiety disorder   • Benign paroxysmal positional vertigo   • Chronic tension type headache   • Depression   • ED (erectile dysfunction) of non-organic origin   • Hyperlipidemia   • Hypertension   • LFTs abnormal   • CHRISTOPHE (obstructive sleep apnea)   • Elevated glucose   • Health care maintenance   • Morbid obesity due to excess calories (CMS/[facility])   • Onychomycosis of left great toe   • Osteoarthritis of ankle or foot   • Venous stasis   • Neuropathy   • Class 3 severe obesity due to excess calories with  serious comorbidity and body mass index (BMI) of 40.0 to 44.9 in adult (CMS/[facility])   • Pre-op evaluation   • Arthritis of foot, left   • S/P left ankle triple arthrodesis   • Acute blood loss anemia, mild, asymptomatic    • Acute postoperative pain   • Wound infection   • Primary osteoarthritis of right hip   • Status post total replacement of right hip   • Postoperative wound infection of right hip, s/p I and D, head and liner exchange.     Past Medical History:   Diagnosis Date   • Anxiety and depression    • Bulging of lumbar intervertebral disc    • CPAP (continuous positive airway pressure) dependence    • Hyperlipidemia    • Hypertension    • Migraine    • CHRISTOPHE on CPAP    • Rheumatoid arthritis (CMS/[facility])    • Swelling of left ear    • Wears contact lenses    • Wears contact lenses       Past Surgical History:   Procedure Laterality Date   • ACHILLES TENDON SURGERY Left 10/15/2019    Procedure: ACHILLES TENDON LENGTHENING LEFT;  Surgeon: Elodia Guzman MD;  Location:  [facility] OR;  Service: Orthopedics   • CATARACT EXTRACTION Right    • COLONOSCOPY  2015   • EYE SURGERY Bilateral     cornea transplant    • FOOT SURGERY Left 10/15/2019    triple arthrodesis and achilles tendon lengthening- [facility]   • HERNIA REPAIR     • ILIAC CREST BONE GRAFT Left 10/15/2019    Procedure: ILIAC CREST BONE GRAFT LEFT;  Surgeon: Elodia Guzman MD;  Location: [facility] OR;  Service: Orthopedics   • INCISION AND DRAINAGE LEG Right 12/4/2020    Procedure: INCISION AND DRAINAGE WITH COMPONENT EXCHANGE, HEAD AND LINER RIGHT HIP;  Surgeon: Osbaldo Olivas MD;  Location:  [facility] OR;  Service: Orthopedics;  Laterality: Right;   • TOE FUSION Left 10/15/2019    Procedure: TRIPLE ARTHODESIS LEFT;  Surgeon: Elodia Guzman MD;  Location: [facility] OR;  Service: Orthopedics   • TOTAL HIP ARTHROPLASTY Right 8/4/2020    Procedure: TOTAL HIP ARTHROPLASTY RIGHT;  Surgeon: Osbaldo Olivas MD;  Location: [facility] OR;  Service: Orthopedics;  Laterality:  Right;      Family History   Problem Relation Age of Onset   • No Known Problems Mother    • No Known Problems Father      Social History     Socioeconomic History   • Marital status:      Spouse name: Not on file   • Number of children: Not on file   • Years of education: Not on file   • Highest education level: Not on file   Tobacco Use   • Smoking status: Never Smoker   • Smokeless tobacco: Never Used   Vaping Use   • Vaping Use: Never used   Substance and Sexual Activity   • Alcohol use: Yes     Comment: rarely   • Drug use: No   • Sexual activity: Defer      Current Outpatient Medications on File Prior to Visit   Medication Sig Dispense Refill   • acetaminophen (TYLENOL) 325 MG tablet Take 325 mg by mouth As Needed for Mild Pain .     • ALPRAZolam (XANAX) 0.25 MG tablet Take 1 tablet by mouth Daily As Needed for Anxiety (when flying). 10 tablet 0   • amLODIPine (NORVASC) 5 MG tablet Take 1 tablet by mouth Daily. 90 tablet 3   • Ascorbic Acid (VITAMIN C PO) Take  by mouth Daily.     • atenolol (TENORMIN) 50 MG tablet Take 1 tablet by mouth Daily. 90 tablet 3   • atorvastatin (LIPITOR) 10 MG tablet Take 1 tablet by mouth Daily. Resume when not on Dapto anymore 90 tablet 3   • buPROPion XL (WELLBUTRIN XL) 150 MG 24 hr tablet Take 1 tablet by mouth Daily. 90 tablet 3   • clotrimazole (LOTRIMIN) 1 % cream Apply  topically to the appropriate area as directed 2 (Two) Times a Day. 15 g 1   • clotrimazole-betamethasone (Lotrisone) 1-0.05 % cream Apply  topically to the appropriate area as directed 2 (Two) Times a Day. 45 g 0   • Cyanocobalamin (VITAMIN B-12 ER PO) Take 1 tablet by mouth Daily.     • cyclobenzaprine (FLEXERIL) 10 MG tablet Take 1 tablet by mouth 3 (Three) Times a Day As Needed for Muscle Spasms. 30 tablet 5   • Dietary Management Product (Vasculera) tablet Take 1 tablet by mouth every day 90 tablet 4   • docusate sodium 100 MG capsule Take 1 capsule by mouth 2 (Two) Times a Day. 40 capsule 0   •  doxycycline (VIBRAMYCIN) 100 MG capsule      • ergocalciferol (ERGOCALCIFEROL) 1.25 MG (89574 UT) capsule Take one po every week     • Ergocalciferol (VITAMIN D2 PO) Take  by mouth Daily.     • gabapentin (NEURONTIN) 300 MG capsule Take 300 mg by mouth 3 (Three) Times a Day.     • hydroxychloroquine (PLAQUENIL) 200 MG tablet Take 200 mg by mouth 2 (Two) Times a Day.     • lisinopril-hydrochlorothiazide (PRINZIDE,ZESTORETIC) 20-12.5 MG per tablet Take 1 tablet by mouth Daily. 90 tablet 3   • meclizine (ANTIVERT) 25 MG tablet 25 mg Every 8 (Eight) Hours As Needed.     • ondansetron (ZOFRAN) 4 MG tablet Take 1 tablet by mouth Every 6 (Six) Hours As Needed for Nausea or Vomiting. 30 tablet 0   • oxaprozin (DAYPRO) 600 MG tablet Take 1,200 mg by mouth Daily. Take 2 po every day      • promethazine (PHENERGAN) 25 MG tablet Take 1 tablet by mouth Every 6 (Six) Hours As Needed for Nausea or Vomiting. 20 tablet 0   • SUMAtriptan (IMITREX) 100 MG tablet TAKE 1 TABLET BY MOUTH AT ONSET OF HEADACHE. MAY REPEAT DOSE ONE TIME IN 2 HOURS IF HEADACHE NOT RELIEVED. 9 tablet 5     No current facility-administered medications on file prior to visit.      No Known Allergies     Review of Systems   Constitutional: Negative for activity change, appetite change, chills, diaphoresis, fatigue, fever and unexpected weight change.   HENT: Negative for congestion, dental problem, drooling, ear discharge, ear pain, facial swelling, hearing loss, mouth sores, nosebleeds, postnasal drip, rhinorrhea, sinus pressure, sneezing, sore throat, tinnitus, trouble swallowing and voice change.    Eyes: Negative for photophobia, pain, discharge, redness, itching and visual disturbance.   Respiratory: Negative for apnea, cough, choking, chest tightness, shortness of breath, wheezing and stridor.    Cardiovascular: Positive for leg swelling. Negative for chest pain and palpitations.   Gastrointestinal: Negative for abdominal distention, abdominal pain, anal  "bleeding, blood in stool, constipation, diarrhea, nausea, rectal pain and vomiting.   Endocrine: Negative for cold intolerance, heat intolerance, polydipsia, polyphagia and polyuria.   Genitourinary: Negative for decreased urine volume, difficulty urinating, dysuria, enuresis, flank pain, frequency, genital sores, hematuria and urgency.   Musculoskeletal: Positive for arthralgias and joint swelling. Negative for back pain, gait problem, myalgias, neck pain and neck stiffness.   Skin: Negative for color change, pallor, rash and wound.   Allergic/Immunologic: Negative for environmental allergies, food allergies and immunocompromised state.   Neurological: Negative for dizziness, tremors, seizures, syncope, facial asymmetry, speech difficulty, weakness, light-headedness, numbness and headaches.   Hematological: Negative for adenopathy. Does not bruise/bleed easily.   Psychiatric/Behavioral: Negative for agitation, behavioral problems, confusion, decreased concentration, dysphoric mood, hallucinations, self-injury, sleep disturbance and suicidal ideas. The patient is not nervous/anxious and is not hyperactive.         Objective      Physical Exam  /83   Pulse 75   Ht 182.9 cm (72.01\")   Wt (!) 148 kg (326 lb)   BMI 44.20 kg/m²     Body mass index is 44.2 kg/m².    General:   Mental Status:  Alert   Appearance: Cooperative, in no acute distress   Build and Nutrition: Obese by BMI male   Orientation: Alert and oriented to person, place and time   Posture: Normal   Gait: Nonantalgic    Integument  • Right hip: Wound is well-healed with no signs of infection    Lower Extremities  • Right Hip:  • Tenderness: None  • Swelling: None  • Crepitus: None  • Range of motion:     • External rotation: 30° without pain     • Internal rotation: 30° without pain     • Flexion: 100°     • Extension: 0°  • Deformities: None  • Functional Testing:  • Stinchfield Test: Negative  • No leg length " discrepancy.    Imaging/Studies  Imaging Results (Last 24 Hours)     Procedure Component Value Units Date/Time    XR Hip With or Without Pelvis 2 - 3 View Right [[id number]] Resulted: 08/16/21 1427     Updated: 08/16/21 1428    Narrative:      Right Hip Radiographs  Indication: status-post right total hip arthroplasty  Views: low AP pelvis and lateral of the right hip    Comparison: 6/23/2021    Findings:   The components are well aligned, with no signs of loosening or failure.    Small area of heterotopic bone seen anteriorly on the lateral view.            Assessment and Plan     Diagnoses and all orders for this visit:    1. History of revision of total replacement of right hip joint (Primary)  -     XR Hip With or Without Pelvis 2 - 3 View Right    2. Postoperative examination        1. History of revision of total replacement of right hip joint    2. Postoperative examination        The patient is doing well 1 year status post right total hip arthroplasty and reports 90 percent improvement in his right hip compared to before surgery. He continues on doxycycline, with the plan for chronic suppression.    Return in about 1 year (around 8/16/2022) for Recheck with X-Rays.      Transcribed from ambient dictation for Osbaldo Olivas MD by Jocelyne Mulligan.  08/16/21   16:34 EDT    I have personally performed the services described in this document as transcribed by the above individual, and it is both accurate and complete.  Osbaldo Olivas MD  8/17/2021  08:59 EDT

## 2021-09-14 ENCOUNTER — TRANSCRIBE ORDERS (OUTPATIENT)
Dept: LAB | Facility: HOSPITAL | Age: 71
End: 2021-09-14

## 2021-09-14 ENCOUNTER — LAB (OUTPATIENT)
Dept: LAB | Facility: HOSPITAL | Age: 71
End: 2021-09-14

## 2021-09-14 DIAGNOSIS — L03.115 CELLULITIS OF RIGHT FOOT: ICD-10-CM

## 2021-09-14 DIAGNOSIS — T84.51XD INFLAMMATORY REACTION DUE TO INTERNAL PROSTHESIS OF RIGHT HIP, SUBSEQUENT ENCOUNTER: Primary | ICD-10-CM

## 2021-09-14 DIAGNOSIS — B95.7 CHRONIC GRANULOMATOUS INFECTION DUE MOSTLY TO STAPHYLOCOCCUS AUREUS: ICD-10-CM

## 2021-09-14 DIAGNOSIS — D72.823 NEUTROPHILIC LEUKEMOID REACTION: ICD-10-CM

## 2021-09-14 LAB
ALBUMIN SERPL-MCNC: 4.4 G/DL (ref 3.5–5.2)
ALBUMIN/GLOB SERPL: 1.6 G/DL
ALP SERPL-CCNC: 60 U/L (ref 39–117)
ALT SERPL W P-5'-P-CCNC: 15 U/L (ref 1–41)
ANION GAP SERPL CALCULATED.3IONS-SCNC: 12 MMOL/L (ref 5–15)
AST SERPL-CCNC: 16 U/L (ref 1–40)
BASOPHILS # BLD AUTO: 0.02 10*3/MM3 (ref 0–0.2)
BASOPHILS NFR BLD AUTO: 0.4 % (ref 0–1.5)
BILIRUB SERPL-MCNC: 0.5 MG/DL (ref 0–1.2)
BUN SERPL-MCNC: 13 MG/DL (ref 8–23)
BUN/CREAT SERPL: 15.1 (ref 7–25)
CALCIUM SPEC-SCNC: 9.3 MG/DL (ref 8.6–10.5)
CHLORIDE SERPL-SCNC: 103 MMOL/L (ref 98–107)
CO2 SERPL-SCNC: 25 MMOL/L (ref 22–29)
CREAT SERPL-MCNC: 0.86 MG/DL (ref 0.76–1.27)
CRP SERPL-MCNC: <0.3 MG/DL (ref 0–0.5)
DEPRECATED RDW RBC AUTO: 42.1 FL (ref 37–54)
EOSINOPHIL # BLD AUTO: 0.16 10*3/MM3 (ref 0–0.4)
EOSINOPHIL NFR BLD AUTO: 3 % (ref 0.3–6.2)
ERYTHROCYTE [DISTWIDTH] IN BLOOD BY AUTOMATED COUNT: 12.5 % (ref 12.3–15.4)
ERYTHROCYTE [SEDIMENTATION RATE] IN BLOOD: 16 MM/HR (ref 0–20)
GFR SERPL CREATININE-BSD FRML MDRD: 88 ML/MIN/1.73
GLOBULIN UR ELPH-MCNC: 2.7 GM/DL
GLUCOSE SERPL-MCNC: 114 MG/DL (ref 65–99)
HCT VFR BLD AUTO: 43.8 % (ref 37.5–51)
HGB BLD-MCNC: 14.7 G/DL (ref 13–17.7)
IMM GRANULOCYTES # BLD AUTO: 0.02 10*3/MM3 (ref 0–0.05)
IMM GRANULOCYTES NFR BLD AUTO: 0.4 % (ref 0–0.5)
LYMPHOCYTES # BLD AUTO: 1.1 10*3/MM3 (ref 0.7–3.1)
LYMPHOCYTES NFR BLD AUTO: 20.9 % (ref 19.6–45.3)
MCH RBC QN AUTO: 30.9 PG (ref 26.6–33)
MCHC RBC AUTO-ENTMCNC: 33.6 G/DL (ref 31.5–35.7)
MCV RBC AUTO: 92 FL (ref 79–97)
MONOCYTES # BLD AUTO: 0.54 10*3/MM3 (ref 0.1–0.9)
MONOCYTES NFR BLD AUTO: 10.2 % (ref 5–12)
NEUTROPHILS NFR BLD AUTO: 3.43 10*3/MM3 (ref 1.7–7)
NEUTROPHILS NFR BLD AUTO: 65.1 % (ref 42.7–76)
NRBC BLD AUTO-RTO: 0 /100 WBC (ref 0–0.2)
PLATELET # BLD AUTO: 241 10*3/MM3 (ref 140–450)
PMV BLD AUTO: 10 FL (ref 6–12)
POTASSIUM SERPL-SCNC: 3.8 MMOL/L (ref 3.5–5.2)
PROT SERPL-MCNC: 7.1 G/DL (ref 6–8.5)
RBC # BLD AUTO: 4.76 10*6/MM3 (ref 4.14–5.8)
SODIUM SERPL-SCNC: 140 MMOL/L (ref 136–145)
WBC # BLD AUTO: 5.27 10*3/MM3 (ref 3.4–10.8)

## 2021-09-14 PROCEDURE — 86140 C-REACTIVE PROTEIN: CPT

## 2021-09-14 PROCEDURE — 36415 COLL VENOUS BLD VENIPUNCTURE: CPT

## 2021-09-14 PROCEDURE — 80053 COMPREHEN METABOLIC PANEL: CPT

## 2021-09-14 PROCEDURE — 85652 RBC SED RATE AUTOMATED: CPT

## 2021-09-14 PROCEDURE — 85025 COMPLETE CBC W/AUTO DIFF WBC: CPT

## 2021-10-05 ENCOUNTER — TELEPHONE (OUTPATIENT)
Dept: ORTHOPEDIC SURGERY | Facility: CLINIC | Age: 71
End: 2021-10-05

## 2021-10-05 NOTE — TELEPHONE ENCOUNTER
"----- Message from Obi Jackman Jr. sent at 10/4/2021  7:16 PM EDT -----  Regarding: Prescription Question  Contact: [phone]  Dr. Olivas:  for my upcoming teeth cleaning on Thursday, Dr. Leonides Ortega has prescribed amoxicillin in advance of the procedure. Dr. Delaney advises me that there is \"risk with no benefit\" and cites the attachment.  I currently take doxycycline prophylactically. What do you advise?  Thank you.   Boy Jackman  [email or web address]/[email or web address]  "

## 2021-10-05 NOTE — TELEPHONE ENCOUNTER
From Dr. Olivas:    Let him know that the recommendations fluctuate between recommending antibiotics and not over several years.  If Dr. Delaney has recommended not to, I think he provides solid evidence in this situation.  That would be fine with me.       Sent message to patient via [employer].    Tanesha Valencia

## 2021-10-07 RX ORDER — CLOTRIMAZOLE AND BETAMETHASONE DIPROPIONATE 10; .64 MG/G; MG/G
CREAM TOPICAL 2 TIMES DAILY
Qty: 45 G | Refills: 0 | Status: SHIPPED | OUTPATIENT
Start: 2021-10-07

## 2021-11-01 ENCOUNTER — PATIENT MESSAGE (OUTPATIENT)
Dept: INTERNAL MEDICINE | Facility: CLINIC | Age: 71
End: 2021-11-01

## 2021-11-01 RX ORDER — PROMETHAZINE HYDROCHLORIDE 25 MG/1
25 TABLET ORAL EVERY 6 HOURS PRN
Qty: 20 TABLET | Refills: 0 | Status: SHIPPED | OUTPATIENT
Start: 2021-11-01 | End: 2022-12-14 | Stop reason: SDUPTHER

## 2021-11-01 RX ORDER — ONDANSETRON 4 MG/1
4 TABLET, FILM COATED ORAL EVERY 6 HOURS PRN
Qty: 30 TABLET | Refills: 0 | OUTPATIENT
Start: 2021-11-01

## 2021-12-17 DIAGNOSIS — Z12.11 SCREENING FOR COLON CANCER: Primary | ICD-10-CM

## 2022-01-05 ENCOUNTER — TELEPHONE (OUTPATIENT)
Dept: INTERNAL MEDICINE | Facility: CLINIC | Age: 72
End: 2022-01-05

## 2022-01-05 RX ORDER — SUMATRIPTAN 100 MG/1
TABLET, FILM COATED ORAL
Qty: 9 TABLET | Refills: 5 | Status: SHIPPED | OUTPATIENT
Start: 2022-01-05 | End: 2022-11-19

## 2022-01-05 NOTE — TELEPHONE ENCOUNTER
----- Message from Obi Jackman Jr. sent at 1/5/2022  1:02 PM EST -----  Regarding: Imitrex Refill  Dear Cammy:  would you please call in a refill for my Imitrex for me?  I have 3 pills left from my original Rx of 6/24/21. My next scheduled appointment with you is my annual Wellness in April.  The pharmacy is [facility] at [phone].  Thank you very much.   Boy Jackman

## 2022-01-13 ENCOUNTER — OUTSIDE FACILITY SERVICE (OUTPATIENT)
Dept: GASTROENTEROLOGY | Facility: CLINIC | Age: 72
End: 2022-01-13

## 2022-01-13 PROCEDURE — G0500 MOD SEDAT ENDO SERVICE >5YRS: HCPCS | Performed by: INTERNAL MEDICINE

## 2022-01-13 PROCEDURE — G0105 COLORECTAL SCRN; HI RISK IND: HCPCS | Performed by: INTERNAL MEDICINE

## 2022-01-31 ENCOUNTER — OFFICE VISIT (OUTPATIENT)
Dept: ORTHOPEDIC SURGERY | Facility: CLINIC | Age: 72
End: 2022-01-31

## 2022-01-31 VITALS
BODY MASS INDEX: 42.66 KG/M2 | SYSTOLIC BLOOD PRESSURE: 151 MMHG | DIASTOLIC BLOOD PRESSURE: 78 MMHG | HEIGHT: 72 IN | WEIGHT: 315 LBS

## 2022-01-31 DIAGNOSIS — M19.072 OSTEOARTHRITIS OF LEFT ANKLE OR FOOT: Primary | ICD-10-CM

## 2022-01-31 DIAGNOSIS — I87.8 VENOUS STASIS: ICD-10-CM

## 2022-01-31 DIAGNOSIS — G62.9 NEUROPATHY: ICD-10-CM

## 2022-01-31 PROCEDURE — 99213 OFFICE O/P EST LOW 20 MIN: CPT | Performed by: ORTHOPAEDIC SURGERY

## 2022-01-31 RX ORDER — IBUPROFEN 600 MG/1
TABLET ORAL
COMMUNITY
Start: 2021-11-15 | End: 2022-10-17

## 2022-01-31 RX ORDER — HYDROCODONE BITARTRATE AND ACETAMINOPHEN 5; 325 MG/1; MG/1
TABLET ORAL
COMMUNITY
Start: 2021-11-15 | End: 2022-04-25

## 2022-01-31 RX ORDER — TRAMADOL HYDROCHLORIDE 50 MG/1
50 TABLET ORAL DAILY
COMMUNITY
Start: 2022-01-21 | End: 2023-03-11 | Stop reason: HOSPADM

## 2022-01-31 RX ORDER — POLYETHYLENE GLYCOL-3350 AND ELECTROLYTES 236; 6.74; 5.86; 2.97; 22.74 G/274.31G; G/274.31G; G/274.31G; G/274.31G; G/274.31G
POWDER, FOR SOLUTION ORAL
COMMUNITY
Start: 2021-12-17 | End: 2022-04-25

## 2022-01-31 NOTE — PROGRESS NOTES
ESTABLISHED PATIENT    Patient: Obi Jackman Jr.  : 1950    Primary Care Provider: Cammy Oneal PA    Requesting Provider: As above    Follow-up (1 year f/u; 2 years s/p (L) triple fusion, ICBG, achilles lengthening 10/15/2019)      History    Chief Complaint: Left triple fusion    History of Present Illness: He returns 2 and half years status post left triple fusion.  He reports he is doing very well.  No pain in the foot.  He is happy that he had the surgery.  He recently had some saphenous vein ablations and that is very useful.  I reminded him however he must still wear his compression socks.    Current Outpatient Medications on File Prior to Visit   Medication Sig Dispense Refill   • acetaminophen (TYLENOL) 325 MG tablet Take 325 mg by mouth As Needed for Mild Pain .     • ALPRAZolam (XANAX) 0.25 MG tablet Take 1 tablet by mouth Daily As Needed for Anxiety (when flying). 10 tablet 0   • amLODIPine (NORVASC) 5 MG tablet Take 1 tablet by mouth Daily. 90 tablet 3   • Ascorbic Acid (VITAMIN C PO) Take  by mouth Daily.     • atenolol (TENORMIN) 50 MG tablet Take 1 tablet by mouth Daily. 90 tablet 3   • atorvastatin (LIPITOR) 10 MG tablet Take 1 tablet by mouth Daily. Resume when not on Dapto anymore 90 tablet 3   • buPROPion XL (WELLBUTRIN XL) 150 MG 24 hr tablet Take 1 tablet by mouth Daily. 90 tablet 3   • clotrimazole (LOTRIMIN) 1 % cream Apply  topically to the appropriate area as directed 2 (Two) Times a Day. 15 g 1   • clotrimazole-betamethasone (LOTRISONE) 1-0.05 % cream APPLY TOPICALLY TO THE APPROPRIATE AREA AS DIRECTED 2 (TWO) TIMES A DAY. 45 g 0   • Cyanocobalamin (VITAMIN B-12 ER PO) Take 1 tablet by mouth Daily.     • cyclobenzaprine (FLEXERIL) 10 MG tablet Take 1 tablet by mouth 3 (Three) Times a Day As Needed for Muscle Spasms. 30 tablet 5   • Dietary Management Product (Vasculera) tablet Take 1 tablet by mouth every day 90 tablet 4   • docusate sodium 100 MG capsule Take 1 capsule by  MRI and CT @ [facility] on Tuesday 3-9-2021 at 8:30pm. Patient to arrive to [facility]. MRI will be at 9:30pm and CT to follow at 10pm. Prep: no metal. Informed at appt today. mouth 2 (Two) Times a Day. 40 capsule 0   • doxycycline (VIBRAMYCIN) 100 MG capsule      • ergocalciferol (ERGOCALCIFEROL) 1.25 MG (76114 UT) capsule Take one po every week     • Ergocalciferol (VITAMIN D2 PO) Take  by mouth Daily.     • gabapentin (NEURONTIN) 300 MG capsule Take 300 mg by mouth 3 (Three) Times a Day.     • GaviLyte-G 236 g solution      • HYDROcodone-acetaminophen (NORCO) 5-325 MG per tablet      • hydroxychloroquine (PLAQUENIL) 200 MG tablet Take 200 mg by mouth 2 (Two) Times a Day.     • ibuprofen (ADVIL,MOTRIN) 600 MG tablet      • lisinopril-hydrochlorothiazide (PRINZIDE,ZESTORETIC) 20-12.5 MG per tablet Take 1 tablet by mouth Daily. 90 tablet 3   • meclizine (ANTIVERT) 25 MG tablet 25 mg Every 8 (Eight) Hours As Needed.     • ondansetron (ZOFRAN) 4 MG tablet Take 1 tablet by mouth Every 6 (Six) Hours As Needed for Nausea or Vomiting. 30 tablet 0   • oxaprozin (DAYPRO) 600 MG tablet Take 1,200 mg by mouth Daily. Take 2 po every day      • promethazine (PHENERGAN) 25 MG tablet Take 1 tablet by mouth Every 6 (Six) Hours As Needed for Nausea or Vomiting. 20 tablet 0   • Sod Picosulfate-Mag Ox-Cit Acd 10-3.5-12 MG-GM -GM/160ML solution Take 160 mL by mouth Take As Directed for 2 doses. 320 mL 0   • SUMAtriptan (IMITREX) 100 MG tablet Take one tablet at onset of headache. May repeat dose one time in 2 hours if headache not relieved. 9 tablet 5   • traMADol (ULTRAM) 50 MG tablet        No current facility-administered medications on file prior to visit.      No Known Allergies   Past Medical History:   Diagnosis Date   • Anxiety and depression    • Arthritis of neck ?   • Bulging of lumbar intervertebral disc    • Cervical disc disorder ?   • CPAP (continuous positive airway pressure) dependence    • Hip arthrosis Herb    Subsequent staph infection   • Hyperlipidemia    • Hypertension    • Migraine    • Neuroma of foot     ?   • CHRISTOPHE on CPAP    • Periarthritis of shoulder ?   • Rheumatoid arthritis  ([facility])    • Swelling of left ear    • Wears contact lenses    • Wears contact lenses      Past Surgical History:   Procedure Laterality Date   • ACHILLES TENDON SURGERY Left 10/15/2019    Procedure: ACHILLES TENDON LENGTHENING LEFT;  Surgeon: Elodia Guzman MD;  Location: [facility];  Service: Orthopedics   • CATARACT EXTRACTION Right    • COLONOSCOPY  2015   • EYE SURGERY Bilateral     cornea transplant    • FOOT SURGERY Left 10/15/2019    triple arthrodesis and achilles tendon lengthening- [facility]   • HERNIA REPAIR     • ILIAC CREST BONE GRAFT Left 10/15/2019    Procedure: ILIAC CREST BONE GRAFT LEFT;  Surgeon: Elodia Guzman MD;  Location: [facility];  Service: Orthopedics   • INCISION AND DRAINAGE LEG Right 12/4/2020    Procedure: INCISION AND DRAINAGE WITH COMPONENT EXCHANGE, HEAD AND LINER RIGHT HIP;  Surgeon: Osbaldo Olivas MD;  Location: [facility];  Service: Orthopedics;  Laterality: Right;   • JOINT REPLACEMENT  8/4/20 and 12/4/20    Right hip replacement   • TOE FUSION Left 10/15/2019    Procedure: TRIPLE ARTHODESIS LEFT;  Surgeon: Elodia Guzman MD;  Location: [facility];  Service: Orthopedics   • TOTAL HIP ARTHROPLASTY Right 8/4/2020    Procedure: TOTAL HIP ARTHROPLASTY RIGHT;  Surgeon: Osbaldo Olivas MD;  Location: [facility];  Service: Orthopedics;  Laterality: Right;   • VENOUS ABLATION Left 01/21/2022    Endovenous laser ablation of left great sapheuos vein for venous insufficiency     Family History   Problem Relation Age of Onset   • No Known Problems Mother    • No Known Problems Father    • Cancer Sister         Abdominal      Social History     Socioeconomic History   • Marital status:    Tobacco Use   • Smoking status: Never Smoker   • Smokeless tobacco: Never Used   Vaping Use   • Vaping Use: Never used   Substance and Sexual Activity   • Alcohol use: Not Currently     Alcohol/week: 0.0 standard drinks     Comment: One drink 1-2x month   • Drug use: No   • Sexual activity: Yes      "Partners: Female        Review of Systems   Constitutional: Negative.    HENT: Negative.    Eyes: Negative.    Respiratory: Negative.    Cardiovascular: Negative.    Gastrointestinal: Negative.    Endocrine: Negative.    Genitourinary: Negative.    Musculoskeletal: Positive for arthralgias.   Skin: Negative.    Allergic/Immunologic: Negative.    Neurological: Negative.    Hematological: Negative.    Psychiatric/Behavioral: Negative.        The following portions of the patient's history were reviewed and updated as appropriate: allergies, current medications, past family history, past medical history, past social history, past surgical history and problem list.    Physical Exam:   /78   Ht 182.9 cm (72.01\")   Wt (!) 152 kg (335 lb 12.8 oz)   BMI 45.53 kg/m²   GENERAL: Body habitus: morbidly obese    Lower extremity edema: Left: 3+ pitting; Right: 3+ pitting    Gait: normal     Mental Status:  awake and alert; oriented to person, place, and time  MSK:          Foot: ; Left:  No tenderness in the left foot, good alignment, incisions are well-healed, ankle dorsiflexion 10, plantarflexion 30, 0 degrees inversion eversion which is to be expected    NEURO Sensation:  diminished    Medical Decision Making    Data Review:   ordered and reviewed x-rays today    Assessment/Plan/Diagnosis/Treatment Options:   1. Osteoarthritis of left ankle or foot  Doing well with pain relief status post triple fusion.  I will see him in 2 years with an x-ray of the foot.  - XR Foot 2 View Left    2. Venous stasis  Definitely needs to continue to wear compression socks, he has severe venous stasis changes    3. Neuropathy  No change        Elodia Guzman MD                      "

## 2022-03-15 ENCOUNTER — LAB (OUTPATIENT)
Dept: LAB | Facility: HOSPITAL | Age: 72
End: 2022-03-15

## 2022-03-15 ENCOUNTER — TRANSCRIBE ORDERS (OUTPATIENT)
Dept: LAB | Facility: HOSPITAL | Age: 72
End: 2022-03-15

## 2022-03-15 DIAGNOSIS — T84.51XD INFLAMMATORY REACTION DUE TO INTERNAL PROSTHESIS OF RIGHT HIP, SUBSEQUENT ENCOUNTER: Primary | ICD-10-CM

## 2022-03-15 DIAGNOSIS — L03.115 CELLULITIS OF RIGHT FOOT: ICD-10-CM

## 2022-03-15 DIAGNOSIS — T84.51XD INFLAMMATORY REACTION DUE TO INTERNAL PROSTHESIS OF RIGHT HIP, SUBSEQUENT ENCOUNTER: ICD-10-CM

## 2022-03-15 LAB
ALBUMIN SERPL-MCNC: 4.4 G/DL (ref 3.5–5.2)
ALBUMIN/GLOB SERPL: 1.8 G/DL
ALP SERPL-CCNC: 61 U/L (ref 39–117)
ALT SERPL W P-5'-P-CCNC: 17 U/L (ref 1–41)
ANION GAP SERPL CALCULATED.3IONS-SCNC: 10 MMOL/L (ref 5–15)
AST SERPL-CCNC: 17 U/L (ref 1–40)
BASOPHILS # BLD AUTO: 0.02 10*3/MM3 (ref 0–0.2)
BASOPHILS NFR BLD AUTO: 0.4 % (ref 0–1.5)
BILIRUB SERPL-MCNC: 0.7 MG/DL (ref 0–1.2)
BUN SERPL-MCNC: 14 MG/DL (ref 8–23)
BUN/CREAT SERPL: 14.4 (ref 7–25)
CALCIUM SPEC-SCNC: 9.5 MG/DL (ref 8.6–10.5)
CHLORIDE SERPL-SCNC: 102 MMOL/L (ref 98–107)
CO2 SERPL-SCNC: 27 MMOL/L (ref 22–29)
CREAT SERPL-MCNC: 0.97 MG/DL (ref 0.76–1.27)
CRP SERPL-MCNC: <0.3 MG/DL (ref 0–0.5)
DEPRECATED RDW RBC AUTO: 41.8 FL (ref 37–54)
EGFRCR SERPLBLD CKD-EPI 2021: 84 ML/MIN/1.73
EOSINOPHIL # BLD AUTO: 0.13 10*3/MM3 (ref 0–0.4)
EOSINOPHIL NFR BLD AUTO: 2.6 % (ref 0.3–6.2)
ERYTHROCYTE [DISTWIDTH] IN BLOOD BY AUTOMATED COUNT: 12.6 % (ref 12.3–15.4)
ERYTHROCYTE [SEDIMENTATION RATE] IN BLOOD: 13 MM/HR (ref 0–20)
GLOBULIN UR ELPH-MCNC: 2.5 GM/DL
GLUCOSE SERPL-MCNC: 116 MG/DL (ref 65–99)
HCT VFR BLD AUTO: 43.7 % (ref 37.5–51)
HGB BLD-MCNC: 14.5 G/DL (ref 13–17.7)
IMM GRANULOCYTES # BLD AUTO: 0.01 10*3/MM3 (ref 0–0.05)
IMM GRANULOCYTES NFR BLD AUTO: 0.2 % (ref 0–0.5)
LYMPHOCYTES # BLD AUTO: 1.12 10*3/MM3 (ref 0.7–3.1)
LYMPHOCYTES NFR BLD AUTO: 22.5 % (ref 19.6–45.3)
MCH RBC QN AUTO: 30.2 PG (ref 26.6–33)
MCHC RBC AUTO-ENTMCNC: 33.2 G/DL (ref 31.5–35.7)
MCV RBC AUTO: 91 FL (ref 79–97)
MONOCYTES # BLD AUTO: 0.67 10*3/MM3 (ref 0.1–0.9)
MONOCYTES NFR BLD AUTO: 13.5 % (ref 5–12)
NEUTROPHILS NFR BLD AUTO: 3.03 10*3/MM3 (ref 1.7–7)
NEUTROPHILS NFR BLD AUTO: 60.8 % (ref 42.7–76)
NRBC BLD AUTO-RTO: 0 /100 WBC (ref 0–0.2)
PLATELET # BLD AUTO: 253 10*3/MM3 (ref 140–450)
PMV BLD AUTO: 10.1 FL (ref 6–12)
POTASSIUM SERPL-SCNC: 4.5 MMOL/L (ref 3.5–5.2)
PROT SERPL-MCNC: 6.9 G/DL (ref 6–8.5)
RBC # BLD AUTO: 4.8 10*6/MM3 (ref 4.14–5.8)
SODIUM SERPL-SCNC: 139 MMOL/L (ref 136–145)
WBC NRBC COR # BLD: 4.98 10*3/MM3 (ref 3.4–10.8)

## 2022-03-15 PROCEDURE — 36415 COLL VENOUS BLD VENIPUNCTURE: CPT

## 2022-03-15 PROCEDURE — 80053 COMPREHEN METABOLIC PANEL: CPT

## 2022-03-15 PROCEDURE — 85652 RBC SED RATE AUTOMATED: CPT

## 2022-03-15 PROCEDURE — 85025 COMPLETE CBC W/AUTO DIFF WBC: CPT

## 2022-03-15 PROCEDURE — 86140 C-REACTIVE PROTEIN: CPT

## 2022-04-25 ENCOUNTER — OFFICE VISIT (OUTPATIENT)
Dept: INTERNAL MEDICINE | Facility: CLINIC | Age: 72
End: 2022-04-25

## 2022-04-25 VITALS
HEART RATE: 65 BPM | WEIGHT: 315 LBS | SYSTOLIC BLOOD PRESSURE: 138 MMHG | OXYGEN SATURATION: 99 % | RESPIRATION RATE: 12 BRPM | BODY MASS INDEX: 42.66 KG/M2 | HEIGHT: 72 IN | DIASTOLIC BLOOD PRESSURE: 84 MMHG

## 2022-04-25 DIAGNOSIS — E66.01 CLASS 3 SEVERE OBESITY DUE TO EXCESS CALORIES WITH SERIOUS COMORBIDITY AND BODY MASS INDEX (BMI) OF 40.0 TO 44.9 IN ADULT: ICD-10-CM

## 2022-04-25 DIAGNOSIS — F40.243 FEAR OF FLYING: ICD-10-CM

## 2022-04-25 DIAGNOSIS — Z12.5 PROSTATE CANCER SCREENING: ICD-10-CM

## 2022-04-25 DIAGNOSIS — M54.50 ACUTE LEFT-SIDED LOW BACK PAIN WITHOUT SCIATICA: ICD-10-CM

## 2022-04-25 DIAGNOSIS — Z79.899 ENCOUNTER FOR LONG-TERM (CURRENT) USE OF OTHER MEDICATIONS: ICD-10-CM

## 2022-04-25 DIAGNOSIS — Z00.00 ENCOUNTER FOR MEDICARE ANNUAL WELLNESS EXAM: Primary | ICD-10-CM

## 2022-04-25 DIAGNOSIS — R73.9 HYPERGLYCEMIA: ICD-10-CM

## 2022-04-25 DIAGNOSIS — E78.5 HYPERLIPIDEMIA, UNSPECIFIED HYPERLIPIDEMIA TYPE: ICD-10-CM

## 2022-04-25 PROCEDURE — G0439 PPPS, SUBSEQ VISIT: HCPCS | Performed by: PHYSICIAN ASSISTANT

## 2022-04-25 PROCEDURE — 1170F FXNL STATUS ASSESSED: CPT | Performed by: PHYSICIAN ASSISTANT

## 2022-04-25 PROCEDURE — 99214 OFFICE O/P EST MOD 30 MIN: CPT | Performed by: PHYSICIAN ASSISTANT

## 2022-04-25 PROCEDURE — 1160F RVW MEDS BY RX/DR IN RCRD: CPT | Performed by: PHYSICIAN ASSISTANT

## 2022-04-25 RX ORDER — ALPRAZOLAM 0.25 MG/1
0.25 TABLET ORAL DAILY PRN
Qty: 10 TABLET | Refills: 0 | Status: CANCELLED | OUTPATIENT
Start: 2022-04-25

## 2022-04-25 NOTE — PROGRESS NOTES
"The ABCs of the Annual Wellness Visit  Subsequent Medicare Wellness Visit    Chief Complaint   Patient presents with   • Medicare Wellness-subsequent      Subjective    History of Present Illness:  Obi Jackman Jr. is a 71 y.o. male who presents for a Subsequent Medicare Wellness Visit.    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 Still wearing compression stockings.    He has done fine since his hip revision. On doxycycline.    Sees Dr. Castanon about every 6 months, she prescribes plaquenil and daypro. Ran out of it a week ago and can tell a difference.    Pt had Covid in October/November, had it again in January. No other family members were sick.     Pt has been trying to walk more but cannot walk more than 1/4 mile because he gets pain in his left lower back that makes him unable to continue. His hip does not prevent him from walking, his foot does not bother him.     The following portions of the patient's history were reviewed and updated as appropriate: allergies, current medications, past family history, past medical history, past social history, past surgical history and problem list.    Compared to one year ago, the patient feels his physical   health is better.    Compared to one year ago, the patient feels his mental   health is the same.    Recent Hospitalizations:  He was not admitted to the hospital during the last year.       Current Medical Providers:  Patient Care Team:  Cammy Oneal PA as PCP - General (Internal Medicine)  Chanda Castanon MD as Consulting Physician (Rheumatology)    Outpatient Medications Prior to Visit   Medication Sig Dispense Refill   • acetaminophen (TYLENOL) 325 MG tablet Take 325 mg by mouth As Needed for Mild Pain .     • amLODIPine (NORVASC) 5 MG tablet Take 1 tablet by mouth Daily. 90 tablet 3   • Ascorbic Acid (VITAMIN C PO) Take  by mouth Daily.     • atorvastatin (LIPITOR) 10 MG tablet Take 1 tablet by " mouth Daily. Resume when not on Dapto anymore 90 tablet 3   • buPROPion XL (WELLBUTRIN XL) 150 MG 24 hr tablet Take 1 tablet by mouth Daily. 90 tablet 3   • clotrimazole (LOTRIMIN) 1 % cream Apply  topically to the appropriate area as directed 2 (Two) Times a Day. 15 g 1   • clotrimazole-betamethasone (LOTRISONE) 1-0.05 % cream APPLY TOPICALLY TO THE APPROPRIATE AREA AS DIRECTED 2 (TWO) TIMES A DAY. 45 g 0   • Cyanocobalamin (VITAMIN B-12 ER PO) Take 1 tablet by mouth Daily.     • cyclobenzaprine (FLEXERIL) 10 MG tablet Take 1 tablet by mouth 3 (Three) Times a Day As Needed for Muscle Spasms. 30 tablet 5   • Dietary Management Product (Vasculera) tablet Take 1 tablet by mouth every day 90 tablet 4   • ergocalciferol (ERGOCALCIFEROL) 1.25 MG (90944 UT) capsule Take one po every week     • Ergocalciferol (VITAMIN D2 PO) Take  by mouth Daily.     • gabapentin (NEURONTIN) 300 MG capsule Take 300 mg by mouth 3 (Three) Times a Day.     • hydroxychloroquine (PLAQUENIL) 200 MG tablet Take 200 mg by mouth 2 (Two) Times a Day.     • ibuprofen (ADVIL,MOTRIN) 600 MG tablet      • lisinopril-hydrochlorothiazide (PRINZIDE,ZESTORETIC) 20-12.5 MG per tablet Take 1 tablet by mouth Daily. 90 tablet 3   • meclizine (ANTIVERT) 25 MG tablet 25 mg Every 8 (Eight) Hours As Needed.     • oxaprozin (DAYPRO) 600 MG tablet Take 1,200 mg by mouth Daily. Take 2 po every day     • promethazine (PHENERGAN) 25 MG tablet Take 1 tablet by mouth Every 6 (Six) Hours As Needed for Nausea or Vomiting. 20 tablet 0   • SUMAtriptan (IMITREX) 100 MG tablet Take one tablet at onset of headache. May repeat dose one time in 2 hours if headache not relieved. 9 tablet 5   • traMADol (ULTRAM) 50 MG tablet      • ALPRAZolam (XANAX) 0.25 MG tablet Take 1 tablet by mouth Daily As Needed for Anxiety (when flying). 10 tablet 0   • HYDROcodone-acetaminophen (NORCO) 5-325 MG per tablet      • docusate sodium 100 MG capsule Take 1 capsule by mouth 2 (Two) Times a Day. 40  capsule 0   • doxycycline (VIBRAMYCIN) 100 MG capsule      • GaviLyte-G 236 g solution      • ondansetron (ZOFRAN) 4 MG tablet Take 1 tablet by mouth Every 6 (Six) Hours As Needed for Nausea or Vomiting. 30 tablet 0   • Sod Picosulfate-Mag Ox-Cit Acd 10-3.5-12 MG-GM -GM/160ML solution Take 160 mL by mouth Take As Directed for 2 doses. 320 mL 0     No facility-administered medications prior to visit.       Opioid medication/s are on active medication list.  and I have evaluated his active treatment plan and pain score trends (see table).  There were no vitals filed for this visit.  I have reviewed the chart for potential of high risk medication and harmful drug interactions in the elderly.            Aspirin is not on active medication list.  Aspirin use is not indicated based on review of current medical condition/s. Risk of harm outweighs potential benefits.  .    Patient Active Problem List   Diagnosis   • Allergic rhinitis   • Anxiety disorder   • Benign paroxysmal positional vertigo   • Chronic tension type headache   • Depression   • ED (erectile dysfunction) of non-organic origin   • Hyperlipidemia   • Hypertension   • LFTs abnormal   • CHRISTOPHE (obstructive sleep apnea)   • Elevated glucose   • Health care maintenance   • Morbid obesity due to excess calories ([facility])   • Onychomycosis of left great toe   • Osteoarthritis of ankle or foot   • Venous stasis   • Neuropathy   • Class 3 severe obesity due to excess calories with serious comorbidity and body mass index (BMI) of 40.0 to 44.9 in adult ([facility])   • Pre-op evaluation   • Arthritis of foot, left   • S/P left ankle triple arthrodesis   • Acute blood loss anemia, mild, asymptomatic    • Acute postoperative pain   • Wound infection   • Primary osteoarthritis of right hip   • Status post total replacement of right hip   • Postoperative wound infection of right hip, s/p I and D, head and liner exchange.     Advance Care Planning  Advance Directive is on file.  ACP  "discussion was held with the patient during this visit. Patient has an advance directive in EMR which is still valid.     Review of Systems   Constitutional: Negative for activity change, appetite change, diaphoresis and fatigue.   HENT: Negative for congestion, ear discharge, postnasal drip and rhinorrhea.    Respiratory: Negative for chest tightness, shortness of breath and wheezing.    Cardiovascular: Negative for chest pain and palpitations.   Gastrointestinal: Negative for abdominal pain.   Genitourinary: Negative for dysuria.   Musculoskeletal: Negative for arthralgias and myalgias.   Neurological: Negative for dizziness, weakness and light-headedness.   Psychiatric/Behavioral: Negative for dysphoric mood. The patient is not nervous/anxious.         Objective    Vitals:    04/25/22 1115   BP: 138/84   Pulse: 65   Resp: 12   SpO2: 99%   Weight: (!) 151 kg (332 lb 6.4 oz)   Height: 182.9 cm (72.01\")     BMI Readings from Last 1 Encounters:   04/25/22 45.07 kg/m²   BMI is above normal parameters. Recommendations include: exercise counseling and nutrition counseling    Does the patient have evidence of cognitive impairment? No    Physical Exam  Vitals and nursing note reviewed.   Constitutional:       General: He is not in acute distress.     Appearance: He is well-developed. He is not diaphoretic.   HENT:      Head: Normocephalic and atraumatic. Hair is normal.      Right Ear: Hearing, tympanic membrane, ear canal and external ear normal.      Left Ear: Hearing, tympanic membrane, ear canal and external ear normal.      Nose: No nasal deformity.      Mouth/Throat:      Mouth: No oral lesions.      Pharynx: Uvula midline. No uvula swelling.   Eyes:      General: Lids are normal. No scleral icterus.        Right eye: No discharge.         Left eye: No discharge.      Extraocular Movements:      Right eye: Normal extraocular motion and no nystagmus.      Left eye: Normal extraocular motion and no nystagmus.      " Conjunctiva/sclera: Conjunctivae normal.      Pupils: Pupils are equal, round, and reactive to light.   Neck:      Thyroid: No thyromegaly.      Vascular: No JVD.      Trachea: No tracheal deviation.   Cardiovascular:      Rate and Rhythm: Normal rate and regular rhythm.      Pulses: Normal pulses.      Heart sounds: Normal heart sounds. No murmur heard.    No gallop.   Pulmonary:      Effort: Pulmonary effort is normal. No respiratory distress.      Breath sounds: Normal breath sounds. No wheezing or rales.   Chest:      Chest wall: No tenderness.   Abdominal:      General: Bowel sounds are normal. There is no distension.      Palpations: Abdomen is soft. There is no mass.      Tenderness: There is no abdominal tenderness. There is no guarding.      Hernia: No hernia is present.   Genitourinary:     Comments: Declined chaperone for  exam  Musculoskeletal:         General: No tenderness or deformity. Normal range of motion.      Cervical back: Normal range of motion and neck supple.   Lymphadenopathy:      Cervical: No cervical adenopathy.   Skin:     General: Skin is warm and dry.      Findings: No rash.   Neurological:      Mental Status: He is alert and oriented to person, place, and time.      Cranial Nerves: No cranial nerve deficit.      Motor: No abnormal muscle tone.      Coordination: Coordination normal.      Deep Tendon Reflexes: Reflexes are normal and symmetric. Reflexes normal.   Psychiatric:         Behavior: Behavior normal.         Thought Content: Thought content normal.         Judgment: Judgment normal.       Lab Results   Component Value Date    TRIG 324 (H) 04/26/2022    HDL 30 (L) 04/26/2022    LDL 50 04/26/2022    VLDL 50 (H) 04/26/2022    HGBA1C 5.60 04/26/2022            HEALTH RISK ASSESSMENT    Smoking Status:  Social History     Tobacco Use   Smoking Status Never Smoker   Smokeless Tobacco Never Used     Alcohol Consumption:  Social History     Substance and Sexual Activity   Alcohol  Use Not Currently    Comment: One drink 1-2x month     Fall Risk Screen:    KRISHADI Fall Risk Assessment was completed, and patient is at LOW risk for falls.Assessment completed on:4/25/2022    Depression Screening:  PHQ-2/PHQ-9 Depression Screening 4/25/2022   Retired PHQ-9 Total Score -   Retired Total Score -   Little Interest or Pleasure in Doing Things 0-->not at all   Feeling Down, Depressed or Hopeless 0-->not at all   PHQ-9: Brief Depression Severity Measure Score 0       Health Habits and Functional and Cognitive Screening:  Functional & Cognitive Status 4/25/2022   Do you have difficulty preparing food and eating? No   Do you have difficulty bathing yourself, getting dressed or grooming yourself? No   Do you have difficulty using the toilet? No   Do you have difficulty moving around from place to place? No   Do you have trouble with steps or getting out of a bed or a chair? No   Current Diet Well Balanced Diet        Current Diet Comment -   Dental Exam Up to date   Eye Exam Up to date   Exercise (times per week) 3 times per week   Current Exercises Include Walking   Current Exercise Activities Include -   Do you need help using the phone?  No   Are you deaf or do you have serious difficulty hearing?  No   Do you need help with transportation? No   Do you need help shopping? No   Do you need help preparing meals?  No   Do you need help with housework?  No   Do you need help with laundry? No   Do you need help taking your medications? No   Do you need help managing money? No   Do you ever drive or ride in a car without wearing a seat belt? No   Have you felt unusual stress, anger or loneliness in the last month? No   Who do you live with? Spouse   If you need help, do you have trouble finding someone available to you? No   Have you been bothered in the last four weeks by sexual problems? No   Do you have difficulty concentrating, remembering or making decisions? No       Age-appropriate Screening  Schedule:  Refer to the list below for future screening recommendations based on patient's age, sex and/or medical conditions. Orders for these recommended tests are listed in the plan section. The patient has been provided with a written plan.    Health Maintenance   Topic Date Due   • INFLUENZA VACCINE  08/01/2022   • LIPID PANEL  04/26/2023   • TDAP/TD VACCINES (2 - Td or Tdap) 01/01/2024   • ZOSTER VACCINE  Completed              Assessment/Plan   CMS Preventative Services Quick Reference  Risk Factors Identified During Encounter  Chronic Pain   Immunizations Discussed/Encouraged (specific Immunizations; COVID19  Inactivity/Sedentary  Obesity/Overweight   The above risks/problems have been discussed with the patient.  Follow up actions/plans if indicated are seen below in the Assessment/Plan Section.  Pertinent information has been shared with the patient in the After Visit Summary.    Diagnoses and all orders for this visit:    1. Encounter for Medicare annual wellness exam (Primary)    2. Acute left-sided low back pain without sciatica  Comments:  Check lumbar spine xray and refer to Physical Therapy.  Orders:  -     XR Spine Lumbar 2 or 3 View; Future  -     Ambulatory Referral to Physical Therapy Evaluate and treat    3. Hyperlipidemia, unspecified hyperlipidemia type  -     Lipid Panel; Future    4. Hyperglycemia  -     Hemoglobin A1c; Future    5. Prostate cancer screening  -     PSA Screen; Future    6. Class 3 severe obesity due to excess calories with serious comorbidity and body mass index (BMI) of 40.0 to 44.9 in adult ([facility])  -     naltrexone-bupropion ER (CONTRAVE) 8-90 MG tablet; Wk 1: 1 tab daily, Wk 2: 1 tab twice a day, Wk 3: 2 tabs in AM, 1 tab in PM, Wk 4: 2 tabs twice a day, Maintenance dose: 2 tabs twice daily.  Dispense: 120 tablet; Refill: 3    7. Encounter for long-term (current) use of other medications  -     Urine Drug Screen - Urine, Clean Catch; Future  -     Urine Drug Screen -  Urine, Clean Catch    8. Fear of flying  Assessment & Plan:  Requested refill of xanax 0.25 mg 1 po prn on flying #10, 0RF.    The patient has read and signed the [facility] Controlled Substance Contract.  I will continue to see patient for regular follow up appointments.  They are well controlled on their medication.  TAJ is updated every 3 months. The patient is aware of the potential for addiction and dependence.        Follow Up:   Return in about 1 year (around 4/25/2023) for Medicare Wellness.     An After Visit Summary and PPPS were made available to the patient.

## 2022-04-26 ENCOUNTER — LAB (OUTPATIENT)
Dept: LAB | Facility: HOSPITAL | Age: 72
End: 2022-04-26

## 2022-04-26 ENCOUNTER — HOSPITAL ENCOUNTER (OUTPATIENT)
Dept: GENERAL RADIOLOGY | Facility: HOSPITAL | Age: 72
Discharge: HOME | End: 2022-04-26

## 2022-04-26 DIAGNOSIS — M54.50 ACUTE LEFT-SIDED LOW BACK PAIN WITHOUT SCIATICA: ICD-10-CM

## 2022-04-26 DIAGNOSIS — Z12.5 PROSTATE CANCER SCREENING: ICD-10-CM

## 2022-04-26 DIAGNOSIS — E78.5 HYPERLIPIDEMIA, UNSPECIFIED HYPERLIPIDEMIA TYPE: ICD-10-CM

## 2022-04-26 DIAGNOSIS — R73.9 HYPERGLYCEMIA: ICD-10-CM

## 2022-04-26 LAB
AMPHETAMINES UR QL SCN: NEGATIVE NG/ML
BARBITURATES UR QL SCN: NEGATIVE NG/ML
BENZODIAZ UR QL SCN: NEGATIVE NG/ML
BZE UR QL SCN: NEGATIVE NG/ML
CANNABINOIDS UR QL SCN: NEGATIVE NG/ML
CHOLEST SERPL-MCNC: 130 MG/DL (ref 0–200)
CREAT UR-MCNC: 276.9 MG/DL (ref 20–300)
HDLC SERPL-MCNC: 30 MG/DL (ref 40–60)
LABORATORY COMMENT REPORT: NORMAL
LDLC SERPL CALC-MCNC: 50 MG/DL (ref 0–100)
LDLC/HDLC SERPL: 1.17 {RATIO}
METHADONE UR QL SCN: NEGATIVE NG/ML
OPIATES UR QL SCN: NEGATIVE NG/ML
OXYCODONE+OXYMORPHONE UR QL SCN: NEGATIVE NG/ML
PCP UR QL: NEGATIVE NG/ML
PH UR: 5.3 [PH] (ref 4.5–8.9)
PROPOXYPH UR QL SCN: NEGATIVE NG/ML
TRIGL SERPL-MCNC: 324 MG/DL (ref 0–150)
VLDLC SERPL-MCNC: 50 MG/DL (ref 5–40)

## 2022-04-26 PROCEDURE — 83036 HEMOGLOBIN GLYCOSYLATED A1C: CPT

## 2022-04-26 PROCEDURE — 72100 X-RAY EXAM L-S SPINE 2/3 VWS: CPT

## 2022-04-26 PROCEDURE — 80061 LIPID PANEL: CPT

## 2022-04-26 PROCEDURE — 36415 COLL VENOUS BLD VENIPUNCTURE: CPT

## 2022-04-26 PROCEDURE — G0103 PSA SCREENING: HCPCS

## 2022-04-27 DIAGNOSIS — F43.9 SITUATIONAL STRESS: ICD-10-CM

## 2022-04-27 LAB
HBA1C MFR BLD: 5.6 % (ref 4.8–5.6)
PSA SERPL-MCNC: 1.01 NG/ML (ref 0–4)

## 2022-04-27 RX ORDER — ALPRAZOLAM 0.25 MG/1
0.25 TABLET ORAL DAILY PRN
Qty: 10 TABLET | Refills: 0 | OUTPATIENT
Start: 2022-04-27 | End: 2022-04-27 | Stop reason: SDUPTHER

## 2022-04-27 NOTE — TELEPHONE ENCOUNTER
Please call in refill of alprazolam 0.25 mg 1/2-1 tablet prn when flying #10, 0RF. Efrem, PAULINAS and contract are up to date. My ENRIQUE is [id number]. Thank you

## 2022-04-28 NOTE — PROGRESS NOTES
The xray of your lumbar spine shows significant arthritis. You can still go ahead with the physical therapy as we discussed. Let me know if your symptoms are not improving.

## 2022-04-28 NOTE — PROGRESS NOTES
Your labs show that your triglycerides have increased since they were last checked. Please keep working on lowering the carbohydrates in your diet. You can also start an over the counter omega-3 fish oil 1,000 mg twice a day to bring this down.    Everything else looks fine.

## 2022-04-29 NOTE — ASSESSMENT & PLAN NOTE
Requested refill of xanax 0.25 mg 1 po prn on flying #10, 0RF.    The patient has read and signed the [facility] Controlled Substance Contract.  I will continue to see patient for regular follow up appointments.  They are well controlled on their medication.  TAJ is updated every 3 months. The patient is aware of the potential for addiction and dependence.

## 2022-05-02 DIAGNOSIS — E78.5 HYPERLIPIDEMIA, UNSPECIFIED HYPERLIPIDEMIA TYPE: Primary | ICD-10-CM

## 2022-05-10 RX ORDER — ALPRAZOLAM 0.25 MG/1
TABLET ORAL
Qty: 10 TABLET | Refills: 0 | OUTPATIENT
Start: 2022-05-10 | End: 2022-05-10 | Stop reason: SDUPTHER

## 2022-05-10 RX ORDER — ALPRAZOLAM 0.25 MG/1
TABLET ORAL
Qty: 10 TABLET | Refills: 0 | Status: SHIPPED | OUTPATIENT
Start: 2022-05-10

## 2022-05-14 DIAGNOSIS — E78.5 HYPERLIPIDEMIA, UNSPECIFIED HYPERLIPIDEMIA TYPE: ICD-10-CM

## 2022-05-14 DIAGNOSIS — I15.9 SECONDARY HYPERTENSION: ICD-10-CM

## 2022-05-14 DIAGNOSIS — F32.A DEPRESSION, UNSPECIFIED DEPRESSION TYPE: ICD-10-CM

## 2022-05-14 RX ORDER — AMLODIPINE BESYLATE 5 MG/1
5 TABLET ORAL DAILY
Qty: 30 TABLET | Refills: 3 | Status: SHIPPED | OUTPATIENT
Start: 2022-05-14 | End: 2022-10-03

## 2022-05-14 RX ORDER — ATENOLOL 50 MG/1
50 TABLET ORAL DAILY
Qty: 30 TABLET | Refills: 3 | Status: SHIPPED | OUTPATIENT
Start: 2022-05-14 | End: 2022-09-30

## 2022-05-14 RX ORDER — LISINOPRIL AND HYDROCHLOROTHIAZIDE 20; 12.5 MG/1; MG/1
1 TABLET ORAL DAILY
Qty: 30 TABLET | Refills: 3 | Status: SHIPPED | OUTPATIENT
Start: 2022-05-14 | End: 2022-09-30

## 2022-05-14 RX ORDER — BUPROPION HYDROCHLORIDE 150 MG/1
150 TABLET ORAL DAILY
Qty: 30 TABLET | Refills: 3 | Status: SHIPPED | OUTPATIENT
Start: 2022-05-14 | End: 2022-10-03

## 2022-05-14 RX ORDER — ATORVASTATIN CALCIUM 10 MG/1
10 TABLET, FILM COATED ORAL DAILY
Qty: 30 TABLET | Refills: 3 | Status: SHIPPED | OUTPATIENT
Start: 2022-05-14 | End: 2022-09-30

## 2022-06-14 ENCOUNTER — LAB (OUTPATIENT)
Dept: LAB | Facility: HOSPITAL | Age: 72
End: 2022-06-14

## 2022-06-14 ENCOUNTER — TRANSCRIBE ORDERS (OUTPATIENT)
Dept: LAB | Facility: HOSPITAL | Age: 72
End: 2022-06-14

## 2022-06-14 DIAGNOSIS — L03.115 CELLULITIS OF RIGHT FOOT: ICD-10-CM

## 2022-06-14 DIAGNOSIS — T84.51XD INFLAMMATORY REACTION DUE TO INTERNAL PROSTHESIS OF RIGHT HIP, SUBSEQUENT ENCOUNTER: ICD-10-CM

## 2022-06-14 DIAGNOSIS — D72.823 NEUTROPHILIC LEUKEMOID REACTION: ICD-10-CM

## 2022-06-14 DIAGNOSIS — T84.51XD INFLAMMATORY REACTION DUE TO INTERNAL PROSTHESIS OF RIGHT HIP, SUBSEQUENT ENCOUNTER: Primary | ICD-10-CM

## 2022-06-14 DIAGNOSIS — B95.7 CHRONIC GRANULOMATOUS INFECTION DUE MOSTLY TO STAPHYLOCOCCUS AUREUS: ICD-10-CM

## 2022-06-14 LAB
ALBUMIN SERPL-MCNC: 4.2 G/DL (ref 3.5–5.2)
ALBUMIN/GLOB SERPL: 1.8 G/DL
ALP SERPL-CCNC: 57 U/L (ref 39–117)
ALT SERPL W P-5'-P-CCNC: 16 U/L (ref 1–41)
ANION GAP SERPL CALCULATED.3IONS-SCNC: 8 MMOL/L (ref 5–15)
AST SERPL-CCNC: 18 U/L (ref 1–40)
BASOPHILS # BLD AUTO: 0.02 10*3/MM3 (ref 0–0.2)
BASOPHILS NFR BLD AUTO: 0.3 % (ref 0–1.5)
BILIRUB SERPL-MCNC: 0.6 MG/DL (ref 0–1.2)
BUN SERPL-MCNC: 17 MG/DL (ref 8–23)
BUN/CREAT SERPL: 15.9 (ref 7–25)
CALCIUM SPEC-SCNC: 9.3 MG/DL (ref 8.6–10.5)
CHLORIDE SERPL-SCNC: 105 MMOL/L (ref 98–107)
CO2 SERPL-SCNC: 31 MMOL/L (ref 22–29)
CREAT SERPL-MCNC: 1.07 MG/DL (ref 0.76–1.27)
CRP SERPL-MCNC: <0.3 MG/DL (ref 0–0.5)
DEPRECATED RDW RBC AUTO: 42.5 FL (ref 37–54)
EGFRCR SERPLBLD CKD-EPI 2021: 74.7 ML/MIN/1.73
EOSINOPHIL # BLD AUTO: 0.23 10*3/MM3 (ref 0–0.4)
EOSINOPHIL NFR BLD AUTO: 3.9 % (ref 0.3–6.2)
ERYTHROCYTE [DISTWIDTH] IN BLOOD BY AUTOMATED COUNT: 13 % (ref 12.3–15.4)
ERYTHROCYTE [SEDIMENTATION RATE] IN BLOOD: 15 MM/HR (ref 0–20)
GLOBULIN UR ELPH-MCNC: 2.4 GM/DL
GLUCOSE SERPL-MCNC: 91 MG/DL (ref 65–99)
HCT VFR BLD AUTO: 40.8 % (ref 37.5–51)
HGB BLD-MCNC: 14 G/DL (ref 13–17.7)
IMM GRANULOCYTES # BLD AUTO: 0.05 10*3/MM3 (ref 0–0.05)
IMM GRANULOCYTES NFR BLD AUTO: 0.8 % (ref 0–0.5)
LYMPHOCYTES # BLD AUTO: 1.17 10*3/MM3 (ref 0.7–3.1)
LYMPHOCYTES NFR BLD AUTO: 19.7 % (ref 19.6–45.3)
MCH RBC QN AUTO: 30.7 PG (ref 26.6–33)
MCHC RBC AUTO-ENTMCNC: 34.3 G/DL (ref 31.5–35.7)
MCV RBC AUTO: 89.5 FL (ref 79–97)
MONOCYTES # BLD AUTO: 0.73 10*3/MM3 (ref 0.1–0.9)
MONOCYTES NFR BLD AUTO: 12.3 % (ref 5–12)
NEUTROPHILS NFR BLD AUTO: 3.74 10*3/MM3 (ref 1.7–7)
NEUTROPHILS NFR BLD AUTO: 63 % (ref 42.7–76)
NRBC BLD AUTO-RTO: 0 /100 WBC (ref 0–0.2)
PLATELET # BLD AUTO: 265 10*3/MM3 (ref 140–450)
PMV BLD AUTO: 10 FL (ref 6–12)
POTASSIUM SERPL-SCNC: 4 MMOL/L (ref 3.5–5.2)
PROT SERPL-MCNC: 6.6 G/DL (ref 6–8.5)
RBC # BLD AUTO: 4.56 10*6/MM3 (ref 4.14–5.8)
SODIUM SERPL-SCNC: 144 MMOL/L (ref 136–145)
WBC NRBC COR # BLD: 5.94 10*3/MM3 (ref 3.4–10.8)

## 2022-06-14 PROCEDURE — 80053 COMPREHEN METABOLIC PANEL: CPT

## 2022-06-14 PROCEDURE — 86140 C-REACTIVE PROTEIN: CPT

## 2022-06-14 PROCEDURE — 85025 COMPLETE CBC W/AUTO DIFF WBC: CPT

## 2022-06-14 PROCEDURE — 36415 COLL VENOUS BLD VENIPUNCTURE: CPT

## 2022-06-14 PROCEDURE — 85652 RBC SED RATE AUTOMATED: CPT

## 2022-07-01 DIAGNOSIS — G44.229 CHRONIC TENSION-TYPE HEADACHE, NOT INTRACTABLE: ICD-10-CM

## 2022-07-01 RX ORDER — CYCLOBENZAPRINE HCL 10 MG
TABLET ORAL
Qty: 30 TABLET | Refills: 5 | Status: SHIPPED | OUTPATIENT
Start: 2022-07-01

## 2022-07-01 NOTE — TELEPHONE ENCOUNTER
Last office visit              4/25/22  Next office visit              4/27/23    Lab completed in past 6 months? Yes  Labs completed in past year? Yes    Last refill Date:                6/218/21           Quantity:                          30  Refill                                 5    Pharmacy:     [facility], [facility]. [place], [place] - [facility] [address].  [phone]  - [phone] FX      Please review pended refill request for any changes needed on refills or quantities.  Thank you!

## 2022-07-28 ENCOUNTER — TELEPHONE (OUTPATIENT)
Dept: INTERNAL MEDICINE | Facility: CLINIC | Age: 72
End: 2022-07-28

## 2022-07-28 NOTE — TELEPHONE ENCOUNTER
Caller: Obi Jackman Jr.    Relationship to patient: Self    Best call back number: [phone]    Chief complaint: RASH ON LEFT SIDE OF BODY     Type of visit: OFFICE VISIT     Requested date: 7/29/22    Additional notes:PATIENT THINKS HE HAS SHINGLES AND WANTS TO CONFIRM AND GET MEDICATION

## 2022-07-29 ENCOUNTER — OFFICE VISIT (OUTPATIENT)
Dept: INTERNAL MEDICINE | Facility: CLINIC | Age: 72
End: 2022-07-29

## 2022-07-29 VITALS
WEIGHT: 315 LBS | DIASTOLIC BLOOD PRESSURE: 88 MMHG | OXYGEN SATURATION: 98 % | HEART RATE: 87 BPM | TEMPERATURE: 96.9 F | BODY MASS INDEX: 45.56 KG/M2 | SYSTOLIC BLOOD PRESSURE: 138 MMHG

## 2022-07-29 DIAGNOSIS — R21 RASH: Primary | ICD-10-CM

## 2022-07-29 PROCEDURE — 99213 OFFICE O/P EST LOW 20 MIN: CPT | Performed by: NURSE PRACTITIONER

## 2022-07-29 RX ORDER — DIAPER,BRIEF,INFANT-TODD,DISP
1 EACH MISCELLANEOUS 2 TIMES DAILY
Qty: 56 G | Refills: 0 | Status: SHIPPED | OUTPATIENT
Start: 2022-07-29

## 2022-07-29 RX ORDER — HYDROXYCHLOROQUINE SULFATE 200 MG/1
1 TABLET, FILM COATED ORAL EVERY 12 HOURS
COMMUNITY
Start: 2022-05-17

## 2022-07-29 RX ORDER — PREDNISOLONE ACETATE 10 MG/ML
SUSPENSION/ DROPS OPHTHALMIC
COMMUNITY
Start: 2022-07-18 | End: 2022-07-29 | Stop reason: SDUPTHER

## 2022-07-29 RX ORDER — GABAPENTIN 300 MG/1
1 CAPSULE ORAL EVERY 8 HOURS
COMMUNITY
Start: 2022-05-17 | End: 2022-10-17

## 2022-07-29 RX ORDER — LATANOPROST 50 UG/ML
1 SOLUTION/ DROPS OPHTHALMIC
COMMUNITY
Start: 2022-07-07

## 2022-07-29 RX ORDER — LATANOPROST 50 UG/ML
SOLUTION/ DROPS OPHTHALMIC
COMMUNITY
Start: 2022-07-08 | End: 2022-07-29 | Stop reason: SDUPTHER

## 2022-07-29 RX ORDER — DOXYCYCLINE HYCLATE 100 MG/1
100 CAPSULE ORAL DAILY
COMMUNITY
Start: 2022-07-19

## 2022-07-29 RX ORDER — PREDNISOLONE ACETATE 10 MG/ML
1 SUSPENSION/ DROPS OPHTHALMIC 4 TIMES DAILY
COMMUNITY
Start: 2022-07-07

## 2022-07-29 NOTE — PROGRESS NOTES
Follow Up Office Visit      Date: 2022   Patient Name: Obi Jackman Jr.  : 1950   MRN: [id number]     Chief Complaint:    Chief Complaint   Patient presents with   • Rash     Rash on both arms and left flank       History of Present Illness: Obi Jackman Jr. is a 71 y.o. male who is here today to follow up. He reports that about one month ago he developed a rash on left side. He denies any prior change in diet, medication, detergent, soap, ect. Then last week he developed rash on his forearms bilaterally. Neither skin eruption is pruritic or painful.       Subjective      Review of Systems:   Review of Systems   Constitutional: Negative for chills, fatigue and fever.   HENT: Negative for congestion, rhinorrhea, sneezing and sore throat.    Respiratory: Negative for cough, shortness of breath and wheezing.    Cardiovascular: Negative for chest pain, palpitations and leg swelling.   Gastrointestinal: Negative for constipation, diarrhea, nausea and vomiting.   Skin: Positive for rash.       I have reviewed the patients family history, social history, past medical history, past surgical history and have updated it as appropriate.     Medications:     Current Outpatient Medications:   •  acetaminophen (TYLENOL) 325 MG tablet, Take 325 mg by mouth As Needed for Mild Pain ., Disp: , Rfl:   •  ALPRAZolam (XANAX) 0.25 MG tablet, Take 1/2 to 1 tablet prn when flying, Disp: 10 tablet, Rfl: 0  •  amLODIPine (NORVASC) 5 MG tablet, TAKE 1 TABLET BY MOUTH DAILY., Disp: 30 tablet, Rfl: 3  •  Ascorbic Acid (VITAMIN C PO), Take  by mouth Daily., Disp: , Rfl:   •  atenolol (TENORMIN) 50 MG tablet, TAKE 1 TABLET BY MOUTH DAILY., Disp: 30 tablet, Rfl: 3  •  atorvastatin (LIPITOR) 10 MG tablet, TAKE 1 TABLET BY MOUTH DAILY. RESUME WHEN NOT ON DAPTO ANYMORE, Disp: 30 tablet, Rfl: 3  •  buPROPion XL (WELLBUTRIN XL) 150 MG 24 hr tablet, TAKE 1 TABLET BY MOUTH DAILY., Disp: 30 tablet, Rfl: 3  •   clotrimazole-betamethasone (LOTRISONE) 1-0.05 % cream, APPLY TOPICALLY TO THE APPROPRIATE AREA AS DIRECTED 2 (TWO) TIMES A DAY., Disp: 45 g, Rfl: 0  •  Cyanocobalamin (VITAMIN B-12 ER PO), Take 1 tablet by mouth Daily., Disp: , Rfl:   •  cyclobenzaprine (FLEXERIL) 10 MG tablet, TAKE ONE TABLET BY MOUTH THREE TIMES A DAY AS NEEDED FOR MUSCLE SPASMS, Disp: 30 tablet, Rfl: 5  •  doxycycline (VIBRAMYCIN) 100 MG capsule, , Disp: , Rfl:   •  Ergocalciferol (VITAMIN D2 PO), Take  by mouth Daily., Disp: , Rfl:   •  gabapentin (NEURONTIN) 300 MG capsule, Take 300 mg by mouth 3 (Three) Times a Day., Disp: , Rfl:   •  gabapentin (NEURONTIN) 300 MG capsule, Take 1 capsule by mouth Every 8 (Eight) Hours., Disp: , Rfl:   •  hydroxychloroquine (PLAQUENIL) 200 MG tablet, Take 1 tablet by mouth Every 12 (Twelve) Hours., Disp: , Rfl:   •  ibuprofen (ADVIL,MOTRIN) 600 MG tablet, , Disp: , Rfl:   •  latanoprost (XALATAN) 0.005 % ophthalmic solution, Apply 1 drop to eye(s) as directed by provider., Disp: , Rfl:   •  lisinopril-hydrochlorothiazide (PRINZIDE,ZESTORETIC) 20-12.5 MG per tablet, TAKE 1 TABLET BY MOUTH DAILY., Disp: 30 tablet, Rfl: 3  •  meclizine (ANTIVERT) 25 MG tablet, 25 mg Every 8 (Eight) Hours As Needed., Disp: , Rfl:   •  oxaprozin (DAYPRO) 600 MG tablet, Take 1,200 mg by mouth Daily. Take 2 po every day, Disp: , Rfl:   •  prednisoLONE acetate (PRED FORTE) 1 % ophthalmic suspension, Apply 1 drop to eye(s) as directed by provider 4 (Four) Times a Day., Disp: , Rfl:   •  promethazine (PHENERGAN) 25 MG tablet, Take 1 tablet by mouth Every 6 (Six) Hours As Needed for Nausea or Vomiting., Disp: 20 tablet, Rfl: 0  •  SUMAtriptan (IMITREX) 100 MG tablet, Take one tablet at onset of headache. May repeat dose one time in 2 hours if headache not relieved., Disp: 9 tablet, Rfl: 5  •  traMADol (ULTRAM) 50 MG tablet, , Disp: , Rfl:   •  clotrimazole (LOTRIMIN) 1 % cream, Apply  topically to the appropriate area as directed 2 (Two)  Times a Day., Disp: 15 g, Rfl: 1  •  Dietary Management Product (Vasculera) tablet, Take 1 tablet by mouth every day, Disp: 90 tablet, Rfl: 4  •  ergocalciferol (ERGOCALCIFEROL) 1.25 MG (66854 UT) capsule, Take one po every week, Disp: , Rfl:   •  hydrocortisone 1 % ointment, Apply 1 application topically to the appropriate area as directed 2 (Two) Times a Day., Disp: 56 g, Rfl: 0  •  naltrexone-bupropion ER (CONTRAVE) 8-90 MG tablet, Wk 1: 1 tab daily, Wk 2: 1 tab twice a day, Wk 3: 2 tabs in AM, 1 tab in PM, Wk 4: 2 tabs twice a day, Maintenance dose: 2 tabs twice daily., Disp: 120 tablet, Rfl: 3    Allergies:   No Known Allergies    Objective     Physical Exam: Please see above  Vital Signs:   Vitals:    07/29/22 1409   BP: 138/88   Pulse: 87   Temp: 96.9 °F (36.1 °C)   SpO2: 98%   Weight: (!) 152 kg (336 lb)   PainSc: 0-No pain     Body mass index is 45.56 kg/m².    Physical Exam  Constitutional:       General: He is not in acute distress.     Appearance: Normal appearance. He is obese. He is not ill-appearing.   HENT:      Head: Normocephalic and atraumatic.   Cardiovascular:      Rate and Rhythm: Normal rate and regular rhythm.      Pulses: Normal pulses.   Pulmonary:      Effort: Pulmonary effort is normal. No respiratory distress.      Breath sounds: Normal breath sounds.   Skin:     Findings: Rash present. Rash is macular and papular.          Neurological:      Mental Status: He is alert.         Procedures    Results:   Imaging:     Labs:        Assessment / Plan      Assessment/Plan:   Diagnoses and all orders for this visit:    1. Rash (Primary)  -     hydrocortisone 1 % ointment; Apply 1 application topically to the appropriate area as directed 2 (Two) Times a Day.  Dispense: 56 g; Refill: 0  -benadryl 25mg PO at bedtime x2-3 days   -if no improvement after 1 week, refer to dermatology        Follow Up:   No follow-ups on file.    YASMIN Rivas  [facility] [place]

## 2022-08-08 DIAGNOSIS — R21 RASH: Primary | ICD-10-CM

## 2022-08-10 ENCOUNTER — OFFICE VISIT (OUTPATIENT)
Dept: ORTHOPEDIC SURGERY | Facility: CLINIC | Age: 72
End: 2022-08-10

## 2022-08-10 VITALS
SYSTOLIC BLOOD PRESSURE: 148 MMHG | DIASTOLIC BLOOD PRESSURE: 88 MMHG | WEIGHT: 315 LBS | BODY MASS INDEX: 42.66 KG/M2 | HEIGHT: 72 IN

## 2022-08-10 DIAGNOSIS — M17.0 PRIMARY OSTEOARTHRITIS OF BOTH KNEES: Primary | ICD-10-CM

## 2022-08-10 PROCEDURE — 20610 DRAIN/INJ JOINT/BURSA W/O US: CPT | Performed by: ORTHOPAEDIC SURGERY

## 2022-08-10 PROCEDURE — 99214 OFFICE O/P EST MOD 30 MIN: CPT | Performed by: ORTHOPAEDIC SURGERY

## 2022-08-10 RX ORDER — TRIAMCINOLONE ACETONIDE 40 MG/ML
40 INJECTION, SUSPENSION INTRA-ARTICULAR; INTRAMUSCULAR
Status: COMPLETED | OUTPATIENT
Start: 2022-08-10 | End: 2022-08-10

## 2022-08-10 RX ORDER — ROPIVACAINE HYDROCHLORIDE 5 MG/ML
4 INJECTION, SOLUTION EPIDURAL; INFILTRATION; PERINEURAL
Status: COMPLETED | OUTPATIENT
Start: 2022-08-10 | End: 2022-08-10

## 2022-08-10 RX ADMIN — TRIAMCINOLONE ACETONIDE 40 MG: 40 INJECTION, SUSPENSION INTRA-ARTICULAR; INTRAMUSCULAR at 10:44

## 2022-08-10 RX ADMIN — ROPIVACAINE HYDROCHLORIDE 4 ML: 5 INJECTION, SOLUTION EPIDURAL; INFILTRATION; PERINEURAL at 10:44

## 2022-08-10 NOTE — PROGRESS NOTES
Procedure   - Large Joint Arthrocentesis: bilateral knee on 8/10/2022 10:44 AM  Indications: pain  Details: 22 G needle, anterolateral approach  Medications (Right): 4 mL ropivacaine 0.5 %; 40 mg triamcinolone acetonide 40 MG/ML  Medications (Left): 4 mL ropivacaine 0.5 %; 40 mg triamcinolone acetonide 40 MG/ML  Outcome: tolerated well, no immediate complications  Procedure, treatment alternatives, risks and benefits explained, specific risks discussed. Consent was given by the patient. Immediately prior to procedure a time out was called to verify the correct patient, procedure, equipment, support staff and site/side marked as required. Patient was prepped and draped in the usual sterile fashion.

## 2022-08-10 NOTE — PROGRESS NOTES
[facility] Note    Subjective     Chief Complaint   Patient presents with   • Right Knee - Pain   • Left Knee - Pain        HPI    Obi Jackman Jr. is a 71 y.o. male who presents with new problem of: bilateral knee pain.  Onset: atraumatic and gradual in nature. The issue has been ongoing for 4 month(s). Pain is a 6/10 on the pain scale. Pain is described as stabbing and shooting. Associated symptoms include pain, grinding, stiffness and giving way/buckling. The pain is worse with walking, standing, sitting and climbing stairs; sleeping improve the pain. Previous treatments have included: cane/walker and NSAIDS.  Left knee bothers him more than the right.  No previous injections.    I have reviewed the following portions of the patient's history and agree with: History of Present Illness and Review of Systems    Patient Active Problem List   Diagnosis   • Allergic rhinitis   • Anxiety disorder   • Benign paroxysmal positional vertigo   • Chronic tension type headache   • Depression   • ED (erectile dysfunction) of non-organic origin   • Hyperlipidemia   • Hypertension   • LFTs abnormal   • CHRISTOPHE (obstructive sleep apnea)   • Elevated glucose   • Health care maintenance   • Morbid obesity due to excess calories ([facility])   • Onychomycosis of left great toe   • Osteoarthritis of ankle or foot   • Venous stasis   • Neuropathy   • Class 3 severe obesity due to excess calories with serious comorbidity and body mass index (BMI) of 40.0 to 44.9 in adult ([facility])   • Pre-op evaluation   • Arthritis of foot, left   • S/P left ankle triple arthrodesis   • Acute blood loss anemia, mild, asymptomatic    • Acute postoperative pain   • Wound infection   • Primary osteoarthritis of right hip   • Status post total replacement of right hip   • Postoperative wound infection of right hip, s/p I and D, head and liner exchange.     Past Medical History:   Diagnosis Date   • Anxiety and depression    • Arthritis of  neck ?   • Bulging of lumbar intervertebral disc    • Cataract     Lens implated in left eye.   • Cervical disc disorder ?   • CPAP (continuous positive airway pressure) dependence    • Hip arthrosis Herb    Subsequent staph infection   • Hyperlipidemia    • Hypertension    • Migraine    • Neuroma of foot     ?   • CHRISTOPHE on CPAP    • Periarthritis of shoulder ?   • Rheumatoid arthritis (HCC)    • Swelling of left ear    • Wears contact lenses    • Wears contact lenses       Past Surgical History:   Procedure Laterality Date   • ACHILLES TENDON SURGERY Left 10/15/2019    Procedure: ACHILLES TENDON LENGTHENING LEFT;  Surgeon: Elodia Guzman MD;  Location: [facility] OR;  Service: Orthopedics   • CATARACT EXTRACTION Right    • COLONOSCOPY  2015   • EYE SURGERY Bilateral     cornea transplant    • FOOT SURGERY Left 10/15/2019    triple arthrodesis and achilles tendon lengthening- [place]   • HERNIA REPAIR     • ILIAC CREST BONE GRAFT Left 10/15/2019    Procedure: ILIAC CREST BONE GRAFT LEFT;  Surgeon: Elodia Guzman MD;  Location: [facility] OR;  Service: Orthopedics   • INCISION AND DRAINAGE LEG Right 12/04/2020    Procedure: INCISION AND DRAINAGE WITH COMPONENT EXCHANGE, HEAD AND LINER RIGHT HIP;  Surgeon: Osbaldo Olivas MD;  Location: [facility] OR;  Service: Orthopedics;  Laterality: Right;   • JOINT REPLACEMENT  8/4/20 and 12/4/20    Right hip replacement   • TOE FUSION Left 10/15/2019    Procedure: TRIPLE ARTHODESIS LEFT;  Surgeon: Elodia Guzman MD;  Location: [facility] OR;  Service: Orthopedics   • TOTAL HIP ARTHROPLASTY Right 08/04/2020    Procedure: TOTAL HIP ARTHROPLASTY RIGHT;  Surgeon: Osbaldo Olivas MD;  Location: [facility] OR;  Service: Orthopedics;  Laterality: Right;   • VENOUS ABLATION Left 01/21/2022    Endovenous laser ablation of left great sapheuos vein for venous insufficiency      Family History   Problem Relation Age of Onset   • No Known Problems Mother    • No Known Problems Father    • Cancer Sister          Abdominal     Social History     Socioeconomic History   • Marital status:    Tobacco Use   • Smoking status: Never Smoker   • Smokeless tobacco: Never Used   Vaping Use   • Vaping Use: Never used   Substance and Sexual Activity   • Alcohol use: Not Currently     Comment: One drink 1-2x month   • Drug use: No   • Sexual activity: Yes     Partners: Female      Current Outpatient Medications on File Prior to Visit   Medication Sig Dispense Refill   • acetaminophen (TYLENOL) 325 MG tablet Take 325 mg by mouth As Needed for Mild Pain .     • ALPRAZolam (XANAX) 0.25 MG tablet Take 1/2 to 1 tablet prn when flying 10 tablet 0   • amLODIPine (NORVASC) 5 MG tablet TAKE 1 TABLET BY MOUTH DAILY. 30 tablet 3   • Ascorbic Acid (VITAMIN C PO) Take  by mouth Daily.     • atenolol (TENORMIN) 50 MG tablet TAKE 1 TABLET BY MOUTH DAILY. 30 tablet 3   • atorvastatin (LIPITOR) 10 MG tablet TAKE 1 TABLET BY MOUTH DAILY. RESUME WHEN NOT ON DAPTO ANYMORE 30 tablet 3   • buPROPion XL (WELLBUTRIN XL) 150 MG 24 hr tablet TAKE 1 TABLET BY MOUTH DAILY. 30 tablet 3   • clotrimazole (LOTRIMIN) 1 % cream Apply  topically to the appropriate area as directed 2 (Two) Times a Day. 15 g 1   • clotrimazole-betamethasone (LOTRISONE) 1-0.05 % cream APPLY TOPICALLY TO THE APPROPRIATE AREA AS DIRECTED 2 (TWO) TIMES A DAY. 45 g 0   • Cyanocobalamin (VITAMIN B-12 ER PO) Take 1 tablet by mouth Daily.     • cyclobenzaprine (FLEXERIL) 10 MG tablet TAKE ONE TABLET BY MOUTH THREE TIMES A DAY AS NEEDED FOR MUSCLE SPASMS 30 tablet 5   • doxycycline (VIBRAMYCIN) 100 MG capsule      • ergocalciferol (ERGOCALCIFEROL) 1.25 MG (86671 UT) capsule Take one po every week     • Ergocalciferol (VITAMIN D2 PO) Take  by mouth Daily.     • gabapentin (NEURONTIN) 300 MG capsule Take 300 mg by mouth 3 (Three) Times a Day.     • gabapentin (NEURONTIN) 300 MG capsule Take 1 capsule by mouth Every 8 (Eight) Hours.     • hydrocortisone 1 % ointment Apply 1 application  topically to the appropriate area as directed 2 (Two) Times a Day. 56 g 0   • hydroxychloroquine (PLAQUENIL) 200 MG tablet Take 1 tablet by mouth Every 12 (Twelve) Hours.     • ibuprofen (ADVIL,MOTRIN) 600 MG tablet      • latanoprost (XALATAN) 0.005 % ophthalmic solution Apply 1 drop to eye(s) as directed by provider.     • lisinopril-hydrochlorothiazide (PRINZIDE,ZESTORETIC) 20-12.5 MG per tablet TAKE 1 TABLET BY MOUTH DAILY. 30 tablet 3   • meclizine (ANTIVERT) 25 MG tablet 25 mg Every 8 (Eight) Hours As Needed.     • oxaprozin (DAYPRO) 600 MG tablet Take 1,200 mg by mouth Daily. Take 2 po every day     • prednisoLONE acetate (PRED FORTE) 1 % ophthalmic suspension Apply 1 drop to eye(s) as directed by provider 4 (Four) Times a Day.     • promethazine (PHENERGAN) 25 MG tablet Take 1 tablet by mouth Every 6 (Six) Hours As Needed for Nausea or Vomiting. 20 tablet 0   • SUMAtriptan (IMITREX) 100 MG tablet Take one tablet at onset of headache. May repeat dose one time in 2 hours if headache not relieved. 9 tablet 5   • traMADol (ULTRAM) 50 MG tablet      • [DISCONTINUED] Dietary Management Product (Vasculera) tablet Take 1 tablet by mouth every day 90 tablet 4   • [DISCONTINUED] naltrexone-bupropion ER (CONTRAVE) 8-90 MG tablet Wk 1: 1 tab daily, Wk 2: 1 tab twice a day, Wk 3: 2 tabs in AM, 1 tab in PM, Wk 4: 2 tabs twice a day, Maintenance dose: 2 tabs twice daily. 120 tablet 3     No current facility-administered medications on file prior to visit.      No Known Allergies     Review of Systems   Constitutional: Negative for activity change, appetite change, chills, diaphoresis, fatigue, fever and unexpected weight change.   HENT: Negative for congestion, dental problem, drooling, ear discharge, ear pain, facial swelling, hearing loss, mouth sores, nosebleeds, postnasal drip, rhinorrhea, sinus pressure, sneezing, sore throat, tinnitus, trouble swallowing and voice change.    Eyes: Positive for visual disturbance.  "Negative for photophobia, pain, discharge, redness and itching.   Respiratory: Positive for apnea. Negative for cough, choking, chest tightness, shortness of breath, wheezing and stridor.    Cardiovascular: Positive for leg swelling. Negative for chest pain and palpitations.   Gastrointestinal: Negative for abdominal distention, abdominal pain, anal bleeding, blood in stool, constipation, diarrhea, nausea, rectal pain and vomiting.   Endocrine: Negative for cold intolerance, heat intolerance, polydipsia, polyphagia and polyuria.   Genitourinary: Negative for decreased urine volume, difficulty urinating, dysuria, enuresis, flank pain, frequency, genital sores, hematuria and urgency.   Musculoskeletal: Positive for arthralgias. Negative for back pain, gait problem, joint swelling, myalgias, neck pain and neck stiffness.   Skin: Positive for rash. Negative for color change, pallor and wound.   Allergic/Immunologic: Negative for environmental allergies, food allergies and immunocompromised state.   Neurological: Negative for dizziness, tremors, seizures, syncope, facial asymmetry, speech difficulty, weakness, light-headedness, numbness and headaches.   Hematological: Negative for adenopathy. Does not bruise/bleed easily.   Psychiatric/Behavioral: Negative for agitation, behavioral problems, confusion, decreased concentration, dysphoric mood, hallucinations, self-injury, sleep disturbance and suicidal ideas. The patient is not nervous/anxious and is not hyperactive.         Objective      Physical Exam  /88   Ht 182.9 cm (72.01\")   Wt (!) 152 kg (336 lb)   BMI 45.56 kg/m²     Body mass index is 45.56 kg/m².    General:   Mental Status:  Alert   Appearance: Cooperative, in no acute distress   Build and Nutrition: Obese by BMI male   Orientation: Alert and oriented to person, place and time   Posture: Normal   Gait: Limping on the left    Integument:   Right knee: no skin lesions, no rash, no ecchymosis   Left " knee: no skin lesions, no rash, no ecchymosis    Neurologic:   Sensation:    Right foot: intact to light touch on the dorsal and plantar aspect    Left foot: intact to light touch on the dorsal and plantar aspect   Motor:  Right lower extremity: 5/5 quadriceps, hamstrings, ankle dorsiflexors, and ankle plantar flexors  Left lower extremity: 5/5 quadriceps, hamstrings, ankle dorsiflexors, and ankle plantar flexors  Vascular:   Right lower extremity: 2+ dorsalis pedis pulse, prompt capillary refill   Left lower extremity: 2+ dorsalis pedis pulse, prompt capillary refill    Lower Extremities:   Right Knee:    Tenderness:  Medial and lateral joint line tenderness    Effusion:  None    Swelling:  None    Crepitus:  Positive    Atrophy:  None    Range of motion:  Extension: 0°       Flexion: 120°  Instability:  No varus laxity, no valgus laxity, negative anterior drawer  Deformities:  None   Left Knee:    Tenderness:  Medial and lateral joint line tenderness    Effusion:  None    Swelling:  None    Crepitus: Positive    Atrophy:  None    Range of motion:  Extension: 0°       Flexion: 120°  Instability:  No varus laxity, no valgus laxity, negative anterior drawer  Deformities:  None      Imaging/Studies      Imaging Results (Last 24 Hours)     Procedure Component Value Units Date/Time    XR Knee 4+ View Bilateral [[id number]] Resulted: 08/10/22 1036     Updated: 08/10/22 1037    Narrative:      Right Knee Radiographs  Indication: right knee pain  Views: Standing AP's and skiers of both knees, with lateral and sunrise   views of the right knee    Comparison: no prior studies available    Findings:   Bone-on-bone contact medial compartment, tricompartmental degeneration, no   acute bony abnormalities.  No unusual bony features.    Left Knee Radiographs  Indication: left knee pain  Views: Standing AP's and skiers of both knees, with lateral and sunrise   views of the left knee    Comparison: no prior studies  available    Findings:   Bone-on-bone contact medial compartment, tricompartmental degeneration, no   acute bony abnormalities.  No unusual bony features.          Assessment and Plan     Diagnoses and all orders for this visit:    1. Primary osteoarthritis of both knees (Primary)  -     XR Knee 4+ View Bilateral  -     - Large Joint Arthrocentesis: bilateral knee        1. Primary osteoarthritis of both knees        I reviewed my findings with the patient.  He has bilateral knee arthritis, we discussed treatment options today.  We will proceed with intra-articular knee injections today, and see him back in 3 months, but sooner for any problems.  He may be a candidate for viscosupplementation injections in the future.    Procedure Note:  The potential benefits of performing a therapeutic bilateral knee joint injections, as well as potential risks (including, but not limited to infection, swelling, pain, bleeding, bruising, nerve/blood vessel damage, skin color changes, transient elevation in blood glucose levels, and fat atrophy) were discussed with the patient.  After informed consent, timeout procedure was performed, and the skin on the right and left knees was prepped with chlorhexidine soap and alcohol, after which ethyl chloride was applied to the skin at the injection site. Via the anterolateral approach, 1ml of Kenalog 40mg/ml mixed with 4ml 0.5% ropivacaine plain was injected into the knee joints.  The patient tolerated the procedures well, experiencing 30% improvement in the right knee and 30% improvement in the left knee a few minutes following the injections. There were no complications.  Band-Aids were applied to the injection sites. Post-procedural instructions were given to the patient and/or their caregiver.      Return in about 3 months (around 11/10/2022).      Osbaldo Olivas MD  08/10/22  11:05 EDT

## 2022-08-19 ENCOUNTER — TELEPHONE (OUTPATIENT)
Dept: INTERNAL MEDICINE | Facility: CLINIC | Age: 72
End: 2022-08-19

## 2022-08-19 NOTE — TELEPHONE ENCOUNTER
Caller: Obi Jackman Jr.    Relationship: Self    Best call back number: [phone]    What medications are you currently taking:   Current Outpatient Medications on File Prior to Visit   Medication Sig Dispense Refill   • acetaminophen (TYLENOL) 325 MG tablet Take 325 mg by mouth As Needed for Mild Pain .     • ALPRAZolam (XANAX) 0.25 MG tablet Take 1/2 to 1 tablet prn when flying 10 tablet 0   • amLODIPine (NORVASC) 5 MG tablet TAKE 1 TABLET BY MOUTH DAILY. 30 tablet 3   • Ascorbic Acid (VITAMIN C PO) Take  by mouth Daily.     • atenolol (TENORMIN) 50 MG tablet TAKE 1 TABLET BY MOUTH DAILY. 30 tablet 3   • atorvastatin (LIPITOR) 10 MG tablet TAKE 1 TABLET BY MOUTH DAILY. RESUME WHEN NOT ON DAPTO ANYMORE 30 tablet 3   • buPROPion XL (WELLBUTRIN XL) 150 MG 24 hr tablet TAKE 1 TABLET BY MOUTH DAILY. 30 tablet 3   • clotrimazole (LOTRIMIN) 1 % cream Apply  topically to the appropriate area as directed 2 (Two) Times a Day. 15 g 1   • clotrimazole-betamethasone (LOTRISONE) 1-0.05 % cream APPLY TOPICALLY TO THE APPROPRIATE AREA AS DIRECTED 2 (TWO) TIMES A DAY. 45 g 0   • Cyanocobalamin (VITAMIN B-12 ER PO) Take 1 tablet by mouth Daily.     • cyclobenzaprine (FLEXERIL) 10 MG tablet TAKE ONE TABLET BY MOUTH THREE TIMES A DAY AS NEEDED FOR MUSCLE SPASMS 30 tablet 5   • doxycycline (VIBRAMYCIN) 100 MG capsule      • ergocalciferol (ERGOCALCIFEROL) 1.25 MG (62656 UT) capsule Take one po every week     • Ergocalciferol (VITAMIN D2 PO) Take  by mouth Daily.     • gabapentin (NEURONTIN) 300 MG capsule Take 300 mg by mouth 3 (Three) Times a Day.     • gabapentin (NEURONTIN) 300 MG capsule Take 1 capsule by mouth Every 8 (Eight) Hours.     • hydrocortisone 1 % ointment Apply 1 application topically to the appropriate area as directed 2 (Two) Times a Day. 56 g 0   • hydroxychloroquine (PLAQUENIL) 200 MG tablet Take 1 tablet by mouth Every 12 (Twelve) Hours.     • ibuprofen (ADVIL,MOTRIN) 600 MG tablet      • latanoprost  (XALATAN) 0.005 % ophthalmic solution Apply 1 drop to eye(s) as directed by provider.     • lisinopril-hydrochlorothiazide (PRINZIDE,ZESTORETIC) 20-12.5 MG per tablet TAKE 1 TABLET BY MOUTH DAILY. 30 tablet 3   • meclizine (ANTIVERT) 25 MG tablet 25 mg Every 8 (Eight) Hours As Needed.     • oxaprozin (DAYPRO) 600 MG tablet Take 1,200 mg by mouth Daily. Take 2 po every day     • prednisoLONE acetate (PRED FORTE) 1 % ophthalmic suspension Apply 1 drop to eye(s) as directed by provider 4 (Four) Times a Day.     • promethazine (PHENERGAN) 25 MG tablet Take 1 tablet by mouth Every 6 (Six) Hours As Needed for Nausea or Vomiting. 20 tablet 0   • SUMAtriptan (IMITREX) 100 MG tablet Take one tablet at onset of headache. May repeat dose one time in 2 hours if headache not relieved. 9 tablet 5   • traMADol (ULTRAM) 50 MG tablet        No current facility-administered medications on file prior to visit.     Which medication are you concerned about: buPROPion XL (WELLBUTRIN XL) 150 MG 24 hr tablet, CONTRAVE     Who prescribed you this medication: BALJIT    What are your concerns: PATIENT STATED THAT THE CONTRAVE HAS WELLBUTRIN IN IT.  THE PHARMACY WANTED TO BE SURE THAT JOSIAS SMILEY WAS AWARE

## 2022-08-22 ENCOUNTER — OFFICE VISIT (OUTPATIENT)
Dept: ORTHOPEDIC SURGERY | Facility: CLINIC | Age: 72
End: 2022-08-22

## 2022-08-22 VITALS
SYSTOLIC BLOOD PRESSURE: 126 MMHG | WEIGHT: 315 LBS | BODY MASS INDEX: 42.66 KG/M2 | HEIGHT: 72 IN | DIASTOLIC BLOOD PRESSURE: 84 MMHG

## 2022-08-22 DIAGNOSIS — Z09 POSTOPERATIVE EXAMINATION: ICD-10-CM

## 2022-08-22 DIAGNOSIS — M17.12 PRIMARY OSTEOARTHRITIS OF LEFT KNEE: ICD-10-CM

## 2022-08-22 DIAGNOSIS — Z96.641 HISTORY OF REVISION OF TOTAL REPLACEMENT OF RIGHT HIP JOINT: Primary | ICD-10-CM

## 2022-08-22 PROCEDURE — 99212 OFFICE O/P EST SF 10 MIN: CPT | Performed by: ORTHOPAEDIC SURGERY

## 2022-08-22 NOTE — PROGRESS NOTES
[facility] Note    Subjective     Chief Complaint   Patient presents with   • Follow-up     1 year recheck- 2 years status post (R) total hip arthroplasty 08/04/20, I&D with Component Exchange, Head And Liner Right Hip 12/4/20         HPI    It has been 1  year(s) since Mr. Jackman's last visit. He returns to clinic today for postoperative follow-up of right hip arthroplasty. The issue has been ongoing for 2 year(s). He rates his pain a 2/10 on the pain scale. Previous/current treatments: cane/walker, NSAIDS, physical therapy and weight loss. Current symptoms: same as prior visit. The pain is worse with walking, climbing stairs and lying on affected side; resting and assistive device (cane/walker) improve the pain. Overall, he is doing better.  90% improvement compared to his preoperative symptoms.    I have reviewed the following portions of the patient's history and agree with: History of Present Illness and Review of Systems    Patient Active Problem List   Diagnosis   • Allergic rhinitis   • Anxiety disorder   • Benign paroxysmal positional vertigo   • Chronic tension type headache   • Depression   • ED (erectile dysfunction) of non-organic origin   • Hyperlipidemia   • Hypertension   • LFTs abnormal   • CHRISTOPHE (obstructive sleep apnea)   • Elevated glucose   • Health care maintenance   • Morbid obesity due to excess calories ([facility])   • Onychomycosis of left great toe   • Osteoarthritis of ankle or foot   • Venous stasis   • Neuropathy   • Class 3 severe obesity due to excess calories with serious comorbidity and body mass index (BMI) of 40.0 to 44.9 in adult ([facility])   • Pre-op evaluation   • Arthritis of foot, left   • S/P left ankle triple arthrodesis   • Acute blood loss anemia, mild, asymptomatic    • Acute postoperative pain   • Wound infection   • Primary osteoarthritis of right hip   • Status post total replacement of right hip   • Postoperative wound infection of right hip, s/p I and D,  head and liner exchange.     Past Medical History:   Diagnosis Date   • Anxiety and depression    • Arthritis of neck ?   • Bulging of lumbar intervertebral disc    • Cataract     Lens implated in left eye.   • Cervical disc disorder ?   • CPAP (continuous positive airway pressure) dependence    • Hip arthrosis Herb    Subsequent staph infection   • Hyperlipidemia    • Hypertension    • Migraine    • Neuroma of foot     ?   • CHRISTOPHE on CPAP    • Periarthritis of shoulder ?   • Rheumatoid arthritis (HCC)    • Swelling of left ear    • Wears contact lenses    • Wears contact lenses       Past Surgical History:   Procedure Laterality Date   • ACHILLES TENDON SURGERY Left 10/15/2019    Procedure: ACHILLES TENDON LENGTHENING LEFT;  Surgeon: Elodia Guzman MD;  Location: [facility];  Service: Orthopedics   • CATARACT EXTRACTION Right    • COLONOSCOPY  2015   • EYE SURGERY Bilateral     cornea transplant    • FOOT SURGERY Left 10/15/2019    triple arthrodesis and achilles tendon lengthening- [facility]   • HERNIA REPAIR     • ILIAC CREST BONE GRAFT Left 10/15/2019    Procedure: ILIAC CREST BONE GRAFT LEFT;  Surgeon: Elodia Guzman MD;  Location:  [facility];  Service: Orthopedics   • INCISION AND DRAINAGE LEG Right 12/04/2020    Procedure: INCISION AND DRAINAGE WITH COMPONENT EXCHANGE, HEAD AND LINER RIGHT HIP;  Surgeon: Osbadlo Olivas MD;  Location: [facility];  Service: Orthopedics;  Laterality: Right;   • JOINT REPLACEMENT  8/4/20 and 12/4/20    Right hip replacement   • TOE FUSION Left 10/15/2019    Procedure: TRIPLE ARTHODESIS LEFT;  Surgeon: Elodia Guzman MD;  Location: [facility];  Service: Orthopedics   • TOTAL HIP ARTHROPLASTY Right 08/04/2020    Procedure: TOTAL HIP ARTHROPLASTY RIGHT;  Surgeon: Osbaldo Olivas MD;  Location:  [facility];  Service: Orthopedics;  Laterality: Right;   • VENOUS ABLATION Left 01/21/2022    Endovenous laser ablation of left great sapheuos vein for venous insufficiency      Family History    Problem Relation Age of Onset   • No Known Problems Mother    • No Known Problems Father    • Cancer Sister         Abdominal     Social History     Socioeconomic History   • Marital status:    Tobacco Use   • Smoking status: Never Smoker   • Smokeless tobacco: Never Used   Vaping Use   • Vaping Use: Never used   Substance and Sexual Activity   • Alcohol use: Not Currently     Comment: One drink 1-2x month   • Drug use: No   • Sexual activity: Yes     Partners: Female      Current Outpatient Medications on File Prior to Visit   Medication Sig Dispense Refill   • acetaminophen (TYLENOL) 325 MG tablet Take 325 mg by mouth As Needed for Mild Pain .     • ALPRAZolam (XANAX) 0.25 MG tablet Take 1/2 to 1 tablet prn when flying 10 tablet 0   • amLODIPine (NORVASC) 5 MG tablet TAKE 1 TABLET BY MOUTH DAILY. 30 tablet 3   • Ascorbic Acid (VITAMIN C PO) Take  by mouth Daily.     • atenolol (TENORMIN) 50 MG tablet TAKE 1 TABLET BY MOUTH DAILY. 30 tablet 3   • atorvastatin (LIPITOR) 10 MG tablet TAKE 1 TABLET BY MOUTH DAILY. RESUME WHEN NOT ON DAPTO ANYMORE 30 tablet 3   • buPROPion XL (WELLBUTRIN XL) 150 MG 24 hr tablet TAKE 1 TABLET BY MOUTH DAILY. 30 tablet 3   • clotrimazole (LOTRIMIN) 1 % cream Apply  topically to the appropriate area as directed 2 (Two) Times a Day. 15 g 1   • clotrimazole-betamethasone (LOTRISONE) 1-0.05 % cream APPLY TOPICALLY TO THE APPROPRIATE AREA AS DIRECTED 2 (TWO) TIMES A DAY. 45 g 0   • Cyanocobalamin (VITAMIN B-12 ER PO) Take 1 tablet by mouth Daily.     • cyclobenzaprine (FLEXERIL) 10 MG tablet TAKE ONE TABLET BY MOUTH THREE TIMES A DAY AS NEEDED FOR MUSCLE SPASMS 30 tablet 5   • doxycycline (VIBRAMYCIN) 100 MG capsule      • ergocalciferol (ERGOCALCIFEROL) 1.25 MG (61853 UT) capsule Take one po every week     • Ergocalciferol (VITAMIN D2 PO) Take  by mouth Daily.     • gabapentin (NEURONTIN) 300 MG capsule Take 300 mg by mouth 3 (Three) Times a Day.     • gabapentin (NEURONTIN) 300 MG  capsule Take 1 capsule by mouth Every 8 (Eight) Hours.     • hydrocortisone 1 % ointment Apply 1 application topically to the appropriate area as directed 2 (Two) Times a Day. 56 g 0   • hydroxychloroquine (PLAQUENIL) 200 MG tablet Take 1 tablet by mouth Every 12 (Twelve) Hours.     • ibuprofen (ADVIL,MOTRIN) 600 MG tablet      • latanoprost (XALATAN) 0.005 % ophthalmic solution Apply 1 drop to eye(s) as directed by provider.     • lisinopril-hydrochlorothiazide (PRINZIDE,ZESTORETIC) 20-12.5 MG per tablet TAKE 1 TABLET BY MOUTH DAILY. 30 tablet 3   • meclizine (ANTIVERT) 25 MG tablet 25 mg Every 8 (Eight) Hours As Needed.     • oxaprozin (DAYPRO) 600 MG tablet Take 1,200 mg by mouth Daily. Take 2 po every day     • prednisoLONE acetate (PRED FORTE) 1 % ophthalmic suspension Apply 1 drop to eye(s) as directed by provider 4 (Four) Times a Day.     • promethazine (PHENERGAN) 25 MG tablet Take 1 tablet by mouth Every 6 (Six) Hours As Needed for Nausea or Vomiting. 20 tablet 0   • SUMAtriptan (IMITREX) 100 MG tablet Take one tablet at onset of headache. May repeat dose one time in 2 hours if headache not relieved. 9 tablet 5   • traMADol (ULTRAM) 50 MG tablet        No current facility-administered medications on file prior to visit.      No Known Allergies     Review of Systems   Constitutional: Negative for activity change, appetite change, chills, diaphoresis, fatigue, fever and unexpected weight change.   HENT: Negative for congestion, dental problem, drooling, ear discharge, ear pain, facial swelling, hearing loss, mouth sores, nosebleeds, postnasal drip, rhinorrhea, sinus pressure, sneezing, sore throat, tinnitus, trouble swallowing and voice change.    Eyes: Negative for photophobia, pain, discharge, redness, itching and visual disturbance.   Respiratory: Negative for apnea, cough, choking, chest tightness, shortness of breath, wheezing and stridor.    Cardiovascular: Negative for chest pain, palpitations and leg  "swelling.   Gastrointestinal: Negative for abdominal distention, abdominal pain, anal bleeding, blood in stool, constipation, diarrhea, nausea, rectal pain and vomiting.   Endocrine: Negative for cold intolerance, heat intolerance, polydipsia, polyphagia and polyuria.   Genitourinary: Negative for decreased urine volume, difficulty urinating, dysuria, enuresis, flank pain, frequency, genital sores, hematuria and urgency.   Musculoskeletal: Positive for arthralgias. Negative for back pain, gait problem, joint swelling, myalgias, neck pain and neck stiffness.   Skin: Negative for color change, pallor, rash and wound.   Allergic/Immunologic: Negative for environmental allergies, food allergies and immunocompromised state.   Neurological: Negative for dizziness, tremors, seizures, syncope, facial asymmetry, speech difficulty, weakness, light-headedness, numbness and headaches.   Hematological: Negative for adenopathy. Does not bruise/bleed easily.   Psychiatric/Behavioral: Negative for agitation, behavioral problems, confusion, decreased concentration, dysphoric mood, hallucinations, self-injury, sleep disturbance and suicidal ideas. The patient is not nervous/anxious and is not hyperactive.         Objective      Physical Exam  /84   Ht 182.9 cm (72.01\")   Wt (!) 152 kg (335 lb 1.6 oz)   BMI 45.44 kg/m²     Body mass index is 45.44 kg/m².    General:   Mental Status:  Alert   Appearance: Cooperative, in no acute distress   Build and Nutrition: Obese by BMI male   Orientation: Alert and oriented to person, place and time   Posture: Normal   Gait: With a cane for knee pain    Integument:   Right hip: Wound is well-healed with no signs of infection    Lower Extremity:   Right Hip:    Tenderness:  None    Swelling:  None    Crepitus:  None    Range of motion:  External Rotation: 30°       Internal Rotation: 30°       Flexion:  100°       Extension:  0°    Deformities:  None  Functional testing: Negative " Jane    No leg length discrepancy      Imaging/Studies  Imaging Results (Last 24 Hours)     Procedure Component Value Units Date/Time    XR Hip With or Without Pelvis 2 - 3 View Right [[id number]] Resulted: 08/22/22 1339     Updated: 08/22/22 1340    Narrative:      Right Hip Radiographs  Indication: status-post right total hip arthroplasty  Views: low AP pelvis and lateral of the right hip    Comparison: no change compared to prior study, 8/16/2021    Findings:   The components are well aligned, with no signs of loosening or failure.            Assessment and Plan     Diagnoses and all orders for this visit:    1. History of revision of total replacement of right hip joint (Primary)  -     XR Hip With or Without Pelvis 2 - 3 View Right    2. Postoperative examination    3. Primary osteoarthritis of left knee  -     Large Joint Arthrocentesis        1. History of revision of total replacement of right hip joint    2. Postoperative examination    3. Primary osteoarthritis of left knee        I reviewed my findings with the patient.  His right total hip arthroplasty is functioning well.  I will see him back in 2 years for the hip.    We recently also saw him for both of his knees, and the right knee improved with the steroid injection, but his left knee remains painful.  He would like to try viscosupplementation injections for his left knee, we will submit for approval.  I will see him back once they are ready for administration.    Return in about 3 years (around 8/22/2025) for Recheck with X-Rays, and after approval of the visco injection for the left knee.      Osbaldo Olivas MD  08/22/22  13:55 EDT

## 2022-08-22 NOTE — TELEPHONE ENCOUNTER
Patient did not start the Contrave at the time it was prescribed but is wanting to get started now, the pharmacist told him the level of concern for the wellbutrin is 450 mg, so he states he can take both or have it reduced, the Contrave he thought has 90 mg in it, or just not take it at all while on the Contrave, please advise

## 2022-08-23 ENCOUNTER — TELEPHONE (OUTPATIENT)
Dept: INTERNAL MEDICINE | Facility: CLINIC | Age: 72
End: 2022-08-23

## 2022-08-23 NOTE — TELEPHONE ENCOUNTER
Pharmacy Name:      [facility]    Pharmacy representative name:     LUIS MIGUEL    Pharmacy representative phone number:     (Pharmacy) [phone]     What medication are you calling in regards to:     PHARMACY CALLED TO RECEIVE A VERBAL FROM JOSIAS SMILEY IN REGARD TO THE FOLLOWING:    PHARMACY STATED ON 4/25/22 THEY RECEIVED A PRESCRIPTION FOR MEDICATION, BRAND NAME, CONTRAVE    PHARMACY REQUESTED VERBAL TO CONFIRM SINCE PATIENT ALREADY TAKES BUPROPION, WOULD COREY CONSIDER ADDING NALTREXONE FOR FILLING

## 2022-09-01 ENCOUNTER — TELEPHONE (OUTPATIENT)
Dept: INTERNAL MEDICINE | Facility: CLINIC | Age: 72
End: 2022-09-01

## 2022-09-01 NOTE — TELEPHONE ENCOUNTER
Patient advised if he is nauseated he may not be on the right medication but it may get better for him but to stay on the lower dose until it improves, he has an appointment on Tuesday if not better he will discuss at appointment

## 2022-09-01 NOTE — TELEPHONE ENCOUNTER
Patient has started taking Contrave and would like to continue but it is causing him to be nauseated, can you prescribe some nausea medication to take along with Contrave or should he stop it or come in for an office visit

## 2022-09-01 NOTE — TELEPHONE ENCOUNTER
Caller: Obi Jackman Jr.    Relationship: Self    Best call back number: [phone]    What medication are you requesting: NAUSEA MEDICATION    If a prescription is needed, what is your preferred pharmacy and phone number: [facility]. - [place], [place] - [address] - [phone]  - [phone] FX     Additional notes: PATIENT WOULD ALSO LIKE A CALL TO DISCUSS ISSUES HE IS HAVING WITH MEDICATION.

## 2022-09-01 NOTE — TELEPHONE ENCOUNTER
Unfortunately if it causes him nausea, it may not be the right medication for him. It may get better but he should make sure to stay on the lower dose until it improves.

## 2022-09-06 ENCOUNTER — OFFICE VISIT (OUTPATIENT)
Dept: INTERNAL MEDICINE | Facility: CLINIC | Age: 72
End: 2022-09-06

## 2022-09-06 VITALS
SYSTOLIC BLOOD PRESSURE: 140 MMHG | OXYGEN SATURATION: 95 % | DIASTOLIC BLOOD PRESSURE: 92 MMHG | WEIGHT: 315 LBS | HEART RATE: 74 BPM | BODY MASS INDEX: 44.12 KG/M2 | TEMPERATURE: 97.2 F

## 2022-09-06 DIAGNOSIS — R73.09 ELEVATED GLUCOSE: Primary | ICD-10-CM

## 2022-09-06 DIAGNOSIS — E66.01 CLASS 3 SEVERE OBESITY DUE TO EXCESS CALORIES WITH SERIOUS COMORBIDITY AND BODY MASS INDEX (BMI) OF 40.0 TO 44.9 IN ADULT: ICD-10-CM

## 2022-09-06 LAB
EXPIRATION DATE: NORMAL
GLUCOSE BLDC GLUCOMTR-MCNC: 108 MG/DL (ref 70–130)
HBA1C MFR BLD: 5.6 %
Lab: NORMAL

## 2022-09-06 PROCEDURE — 83036 HEMOGLOBIN GLYCOSYLATED A1C: CPT | Performed by: PHYSICIAN ASSISTANT

## 2022-09-06 PROCEDURE — 3044F HG A1C LEVEL LT 7.0%: CPT | Performed by: PHYSICIAN ASSISTANT

## 2022-09-06 PROCEDURE — 99214 OFFICE O/P EST MOD 30 MIN: CPT | Performed by: PHYSICIAN ASSISTANT

## 2022-09-06 PROCEDURE — 82947 ASSAY GLUCOSE BLOOD QUANT: CPT | Performed by: PHYSICIAN ASSISTANT

## 2022-09-06 RX ORDER — DORZOLAMIDE HYDROCHLORIDE AND TIMOLOL MALEATE 20; 5 MG/ML; MG/ML
SOLUTION/ DROPS OPHTHALMIC
COMMUNITY
Start: 2022-08-18 | End: 2022-12-14

## 2022-09-06 NOTE — PROGRESS NOTES
Chief Complaint   Patient presents with   • Discuss weight loss medications     Follow Up       Subjective     Obi Jackman Jr. is a 71 y.o. male.        History of Present Illness     Mr. Jackman presents today for a follow-up.    Th patient reports he started the Contrave about 2 weeks ago. He states he is still on the once a day, because it was making him feel nauseous all day. He notes he cut them in half and it still made him nauseous. He reports that no matter if he took it with or without food, it was the same. The patient states he had taken something prior and that was making him nauseous. He notes that is when Saxenda was recommended. The patient reports he has a BMI goal so he can get his knee replacement done in 2023. He states he has lost 20 pounds since 06/2022, and is trying to eat healthier. He notes he has tried intermittent fasting. He reports he needs to lose another 20 to 25 pounds. He states he wants his BMI to be under 40. His A1c in 04/2022 was 5.6. He notes he will start a gel treatment in a few weeks for his knees. The patient reports pain in the back calf area of his left leg, and states he is going to make an appointment with Dr. Sanford for venous duplex. He states the tissue has gotten hard. He denies any redness or warmth. He notes this has been going on for about 2 weeks. His A1c is 5.6 today. He reports he has not lost any weight is 3 weeks.      Current Outpatient Medications:   •  acetaminophen (TYLENOL) 325 MG tablet, Take 325 mg by mouth As Needed for Mild Pain ., Disp: , Rfl:   •  ALPRAZolam (XANAX) 0.25 MG tablet, Take 1/2 to 1 tablet prn when flying, Disp: 10 tablet, Rfl: 0  •  amLODIPine (NORVASC) 5 MG tablet, TAKE 1 TABLET BY MOUTH DAILY., Disp: 30 tablet, Rfl: 3  •  Ascorbic Acid (VITAMIN C PO), Take  by mouth Daily., Disp: , Rfl:   •  atenolol (TENORMIN) 50 MG tablet, TAKE 1 TABLET BY MOUTH DAILY., Disp: 30 tablet, Rfl: 3  •  atorvastatin (LIPITOR) 10 MG tablet, TAKE 1  TABLET BY MOUTH DAILY. RESUME WHEN NOT ON DAPTO ANYMORE, Disp: 30 tablet, Rfl: 3  •  buPROPion XL (WELLBUTRIN XL) 150 MG 24 hr tablet, TAKE 1 TABLET BY MOUTH DAILY., Disp: 30 tablet, Rfl: 3  •  clotrimazole (LOTRIMIN) 1 % cream, Apply  topically to the appropriate area as directed 2 (Two) Times a Day., Disp: 15 g, Rfl: 1  •  clotrimazole-betamethasone (LOTRISONE) 1-0.05 % cream, APPLY TOPICALLY TO THE APPROPRIATE AREA AS DIRECTED 2 (TWO) TIMES A DAY., Disp: 45 g, Rfl: 0  •  Cyanocobalamin (VITAMIN B-12 ER PO), Take 1 tablet by mouth Daily., Disp: , Rfl:   •  cyclobenzaprine (FLEXERIL) 10 MG tablet, TAKE ONE TABLET BY MOUTH THREE TIMES A DAY AS NEEDED FOR MUSCLE SPASMS, Disp: 30 tablet, Rfl: 5  •  doxycycline (VIBRAMYCIN) 100 MG capsule, , Disp: , Rfl:   •  ergocalciferol (ERGOCALCIFEROL) 1.25 MG (58584 UT) capsule, Take one po every week, Disp: , Rfl:   •  Ergocalciferol (VITAMIN D2 PO), Take  by mouth Daily., Disp: , Rfl:   •  gabapentin (NEURONTIN) 300 MG capsule, Take 300 mg by mouth 3 (Three) Times a Day., Disp: , Rfl:   •  gabapentin (NEURONTIN) 300 MG capsule, Take 1 capsule by mouth Every 8 (Eight) Hours., Disp: , Rfl:   •  hydrocortisone 1 % ointment, Apply 1 application topically to the appropriate area as directed 2 (Two) Times a Day., Disp: 56 g, Rfl: 0  •  hydroxychloroquine (PLAQUENIL) 200 MG tablet, Take 1 tablet by mouth Every 12 (Twelve) Hours., Disp: , Rfl:   •  ibuprofen (ADVIL,MOTRIN) 600 MG tablet, , Disp: , Rfl:   •  latanoprost (XALATAN) 0.005 % ophthalmic solution, Apply 1 drop to eye(s) as directed by provider., Disp: , Rfl:   •  lisinopril-hydrochlorothiazide (PRINZIDE,ZESTORETIC) 20-12.5 MG per tablet, TAKE 1 TABLET BY MOUTH DAILY., Disp: 30 tablet, Rfl: 3  •  meclizine (ANTIVERT) 25 MG tablet, 25 mg Every 8 (Eight) Hours As Needed., Disp: , Rfl:   •  oxaprozin (DAYPRO) 600 MG tablet, Take 1,200 mg by mouth Daily. Take 2 po every day, Disp: , Rfl:   •  prednisoLONE acetate (PRED FORTE) 1 %  ophthalmic suspension, Apply 1 drop to eye(s) as directed by provider 4 (Four) Times a Day., Disp: , Rfl:   •  promethazine (PHENERGAN) 25 MG tablet, Take 1 tablet by mouth Every 6 (Six) Hours As Needed for Nausea or Vomiting., Disp: 20 tablet, Rfl: 0  •  SUMAtriptan (IMITREX) 100 MG tablet, Take one tablet at onset of headache. May repeat dose one time in 2 hours if headache not relieved., Disp: 9 tablet, Rfl: 5  •  traMADol (ULTRAM) 50 MG tablet, , Disp: , Rfl:   •  dorzolamide-timolol (COSOPT) 22.3-6.8 MG/ML ophthalmic solution, , Disp: , Rfl:   •  Semaglutide-Weight Management 0.25 MG/0.5ML solution auto-injector, Inject 0.25 mg under the skin into the appropriate area as directed 1 (One) Time Per Week., Disp: 2 mL, Rfl: 0     PMFSH  The following portions of the patient's history were reviewed and updated as appropriate: allergies, current medications, past family history, past medical history, past social history, past surgical history and problem list.    Review of Systems   Constitutional: Negative for activity change, appetite change and fatigue.   HENT: Negative for congestion and rhinorrhea.    Respiratory: Negative for chest tightness and shortness of breath.    Cardiovascular: Negative for chest pain and palpitations.   Gastrointestinal: Negative for abdominal pain.   Genitourinary: Negative for dysuria.   Musculoskeletal: Negative for arthralgias and myalgias.   Skin: Positive for color change.   Neurological: Negative for dizziness, weakness, light-headedness and headaches.   Psychiatric/Behavioral: Negative for dysphoric mood. The patient is not nervous/anxious.        Objective   /92   Pulse 74   Temp 97.2 °F (36.2 °C)   Wt (!) 148 kg (325 lb 6.4 oz)   SpO2 95%   BMI 44.12 kg/m²     Physical Exam  Vitals and nursing note reviewed.   Constitutional:       Appearance: He is well-developed.   HENT:      Head: Normocephalic.      Right Ear: Hearing, tympanic membrane, ear canal and external  ear normal.      Left Ear: Hearing, tympanic membrane, ear canal and external ear normal.      Nose: Nose normal.   Eyes:      Conjunctiva/sclera: Conjunctivae normal.      Pupils: Pupils are equal, round, and reactive to light.   Cardiovascular:      Rate and Rhythm: Normal rate and regular rhythm.      Heart sounds: Normal heart sounds.   Pulmonary:      Effort: Pulmonary effort is normal.      Breath sounds: Normal breath sounds. No decreased breath sounds, wheezing, rhonchi or rales.   Musculoskeletal:         General: Normal range of motion.      Cervical back: Normal range of motion.      Left lower leg: Tenderness present. No swelling or deformity. No edema.   Skin:     General: Skin is warm and dry.   Neurological:      Mental Status: He is alert.   Psychiatric:         Behavior: Behavior normal.         ASSESSMENT/PLAN    Diagnoses and all orders for this visit:    1. Elevated glucose (Primary)  -     POC Glucose  -     POC Glycosylated Hemoglobin (Hb A1C)    2. Class 3 severe obesity due to excess calories with serious comorbidity and body mass index (BMI) of 40.0 to 44.9 in adult ([facility])  Assessment & Plan:  I am sending in [facility] to see if it is covered by his insurance.      Orders:  -     Semaglutide-Weight Management 0.25 MG/0.5ML solution auto-injector; Inject 0.25 mg under the skin into the appropriate area as directed 1 (One) Time Per Week.  Dispense: 2 mL; Refill: 0    3. Leg pain  He is going to get in with Dr. Sanford today, if he can not get in he will let us know and we can send him for an ultrasound tomorrow.       Return if symptoms worsen or fail to improve, for Next scheduled follow up.  Answers for HPI/ROS submitted by the patient on 9/1/2022  What is the primary reason for your visit?: Other  Please describe your symptoms.: Nausea with use of Contrave Rx. , Also general malaise and headache.  Have you had these symptoms before?: No  How long have you been having these symptoms?: 5-7  days  Please list any medications you are currently taking for this condition.: None  Please describe any probable cause for these symptoms. : Use of Contrave?, Viral infection?    Transcribed from ambient dictation for PEGGY Olmos by Luz Bustamante.  09/06/22   15:23 EDT    Patient verbalized consent to the visit recording.  I have personally performed the services described in this document as transcribed by the above individual, and it is both accurate and complete.  PEGGY Olmos  9/6/2022  21:47 EDT

## 2022-09-20 ENCOUNTER — FLU SHOT (OUTPATIENT)
Dept: INTERNAL MEDICINE | Facility: CLINIC | Age: 72
End: 2022-09-20

## 2022-09-20 DIAGNOSIS — Z23 NEED FOR INFLUENZA VACCINATION: Primary | ICD-10-CM

## 2022-09-20 PROCEDURE — 90662 IIV NO PRSV INCREASED AG IM: CPT | Performed by: PHYSICIAN ASSISTANT

## 2022-09-20 PROCEDURE — G0008 ADMIN INFLUENZA VIRUS VAC: HCPCS | Performed by: PHYSICIAN ASSISTANT

## 2022-09-26 ENCOUNTER — CLINICAL SUPPORT (OUTPATIENT)
Dept: ORTHOPEDIC SURGERY | Facility: CLINIC | Age: 72
End: 2022-09-26

## 2022-09-26 DIAGNOSIS — M17.12 PRIMARY OSTEOARTHRITIS OF LEFT KNEE: Primary | ICD-10-CM

## 2022-09-26 DIAGNOSIS — M17.11 PRIMARY OSTEOARTHRITIS OF RIGHT KNEE: ICD-10-CM

## 2022-09-26 PROCEDURE — 20610 DRAIN/INJ JOINT/BURSA W/O US: CPT | Performed by: ORTHOPAEDIC SURGERY

## 2022-09-26 NOTE — PROGRESS NOTES
[facility] Note    Subjective     Chief Complaint   Patient presents with   • Injections     SYNVISC injection #1 bilateral knees        HPI    Obi Jackman Jr. is a 72 y.o. male who presents for the first Synvisc injections into both knees.    Patient Active Problem List   Diagnosis   • Allergic rhinitis   • Anxiety disorder   • Benign paroxysmal positional vertigo   • Chronic tension type headache   • Depression   • ED (erectile dysfunction) of non-organic origin   • Hyperlipidemia   • Hypertension   • LFTs abnormal   • CHRISTOPHE (obstructive sleep apnea)   • Elevated glucose   • Health care maintenance   • Morbid obesity due to excess calories ([facility])   • Onychomycosis of left great toe   • Osteoarthritis of ankle or foot   • Venous stasis   • Neuropathy   • Class 3 severe obesity due to excess calories with serious comorbidity and body mass index (BMI) of 40.0 to 44.9 in adult ([facility])   • Pre-op evaluation   • Arthritis of foot, left   • S/P left ankle triple arthrodesis   • Acute blood loss anemia, mild, asymptomatic    • Acute postoperative pain   • Wound infection   • Primary osteoarthritis of right hip   • Status post total replacement of right hip   • Postoperative wound infection of right hip, s/p I and D, head and liner exchange.     Past Medical History:   Diagnosis Date   • Anxiety and depression    • Arthritis of neck ?   • Bulging of lumbar intervertebral disc    • Cataract     Lens implated in left eye.   • Cervical disc disorder ?   • CPAP (continuous positive airway pressure) dependence    • Hip arthrosis Herb    Subsequent staph infection   • Hyperlipidemia    • Hypertension    • Knee swelling    • Migraine    • Neuroma of foot     ?   • CHRISTOPHE on CPAP    • Periarthritis of shoulder ?   • Rheumatoid arthritis ([facility])    • Swelling of left ear    • Wears contact lenses    • Wears contact lenses       Past Surgical History:   Procedure Laterality Date   • ACHILLES TENDON SURGERY Left  10/15/2019    Procedure: ACHILLES TENDON LENGTHENING LEFT;  Surgeon: Elodia Guzman MD;  Location: [facility];  Service: Orthopedics   • CATARACT EXTRACTION Right    • COLONOSCOPY  2015   • EYE SURGERY Bilateral     cornea transplant    • FOOT SURGERY Left 10/15/2019    triple arthrodesis and achilles tendon lengthening- [place]   • HERNIA REPAIR     • ILIAC CREST BONE GRAFT Left 10/15/2019    Procedure: ILIAC CREST BONE GRAFT LEFT;  Surgeon: Elodia Guzman MD;  Location: [facility];  Service: Orthopedics   • INCISION AND DRAINAGE LEG Right 12/04/2020    Procedure: INCISION AND DRAINAGE WITH COMPONENT EXCHANGE, HEAD AND LINER RIGHT HIP;  Surgeon: Osbaldo Olivas MD;  Location: [facility];  Service: Orthopedics;  Laterality: Right;   • JOINT REPLACEMENT  8/4/20 and 12/4/20    Right hip replacement   • TOE FUSION Left 10/15/2019    Procedure: TRIPLE ARTHODESIS LEFT;  Surgeon: Elodia Guzman MD;  Location: [facility];  Service: Orthopedics   • TOTAL HIP ARTHROPLASTY Right 08/04/2020    Procedure: TOTAL HIP ARTHROPLASTY RIGHT;  Surgeon: Osbaldo Olivas MD;  Location: [facility];  Service: Orthopedics;  Laterality: Right;   • TRIGGER POINT INJECTION     • VENOUS ABLATION Left 01/21/2022    Endovenous laser ablation of left great sapheuos vein for venous insufficiency      Family History   Problem Relation Age of Onset   • No Known Problems Mother    • No Known Problems Father    • Cancer Sister         Abdominal     Social History     Socioeconomic History   • Marital status:    Tobacco Use   • Smoking status: Never Smoker   • Smokeless tobacco: Never Used   Vaping Use   • Vaping Use: Never used   Substance and Sexual Activity   • Alcohol use: Not Currently     Comment: One drink 1-2x month   • Drug use: No   • Sexual activity: Yes     Partners: Female      Current Outpatient Medications on File Prior to Visit   Medication Sig Dispense Refill   • acetaminophen (TYLENOL) 325 MG tablet Take 325 mg by mouth As  Needed for Mild Pain .     • ALPRAZolam (XANAX) 0.25 MG tablet Take 1/2 to 1 tablet prn when flying 10 tablet 0   • amLODIPine (NORVASC) 5 MG tablet TAKE 1 TABLET BY MOUTH DAILY. 30 tablet 3   • Ascorbic Acid (VITAMIN C PO) Take  by mouth Daily.     • atenolol (TENORMIN) 50 MG tablet TAKE 1 TABLET BY MOUTH DAILY. 30 tablet 3   • atorvastatin (LIPITOR) 10 MG tablet TAKE 1 TABLET BY MOUTH DAILY. RESUME WHEN NOT ON DAPTO ANYMORE 30 tablet 3   • buPROPion XL (WELLBUTRIN XL) 150 MG 24 hr tablet TAKE 1 TABLET BY MOUTH DAILY. 30 tablet 3   • clotrimazole (LOTRIMIN) 1 % cream Apply  topically to the appropriate area as directed 2 (Two) Times a Day. 15 g 1   • clotrimazole-betamethasone (LOTRISONE) 1-0.05 % cream APPLY TOPICALLY TO THE APPROPRIATE AREA AS DIRECTED 2 (TWO) TIMES A DAY. 45 g 0   • Cyanocobalamin (VITAMIN B-12 ER PO) Take 1 tablet by mouth Daily.     • cyclobenzaprine (FLEXERIL) 10 MG tablet TAKE ONE TABLET BY MOUTH THREE TIMES A DAY AS NEEDED FOR MUSCLE SPASMS 30 tablet 5   • dorzolamide-timolol (COSOPT) 22.3-6.8 MG/ML ophthalmic solution      • doxycycline (VIBRAMYCIN) 100 MG capsule      • ergocalciferol (ERGOCALCIFEROL) 1.25 MG (87579 UT) capsule Take one po every week     • Ergocalciferol (VITAMIN D2 PO) Take  by mouth Daily.     • gabapentin (NEURONTIN) 300 MG capsule Take 300 mg by mouth 3 (Three) Times a Day.     • gabapentin (NEURONTIN) 300 MG capsule Take 1 capsule by mouth Every 8 (Eight) Hours.     • hydrocortisone 1 % ointment Apply 1 application topically to the appropriate area as directed 2 (Two) Times a Day. 56 g 0   • hydroxychloroquine (PLAQUENIL) 200 MG tablet Take 1 tablet by mouth Every 12 (Twelve) Hours.     • ibuprofen (ADVIL,MOTRIN) 600 MG tablet      • latanoprost (XALATAN) 0.005 % ophthalmic solution Apply 1 drop to eye(s) as directed by provider.     • lisinopril-hydrochlorothiazide (PRINZIDE,ZESTORETIC) 20-12.5 MG per tablet TAKE 1 TABLET BY MOUTH DAILY. 30 tablet 3   • meclizine  (ANTIVERT) 25 MG tablet 25 mg Every 8 (Eight) Hours As Needed.     • oxaprozin (DAYPRO) 600 MG tablet Take 1,200 mg by mouth Daily. Take 2 po every day     • prednisoLONE acetate (PRED FORTE) 1 % ophthalmic suspension Apply 1 drop to eye(s) as directed by provider 4 (Four) Times a Day.     • promethazine (PHENERGAN) 25 MG tablet Take 1 tablet by mouth Every 6 (Six) Hours As Needed for Nausea or Vomiting. 20 tablet 0   • Semaglutide-Weight Management 0.25 MG/0.5ML solution auto-injector Inject 0.25 mg under the skin into the appropriate area as directed 1 (One) Time Per Week. 2 mL 0   • SUMAtriptan (IMITREX) 100 MG tablet Take one tablet at onset of headache. May repeat dose one time in 2 hours if headache not relieved. 9 tablet 5   • traMADol (ULTRAM) 50 MG tablet        No current facility-administered medications on file prior to visit.      No Known Allergies     Review of Systems     Objective      Physical Exam  There were no vitals taken for this visit.    There is no height or weight on file to calculate BMI.    General:   Mental Status:  Alert   Appearance: Cooperative, in no acute distress   Posture: Normal    Assessment and Plan     Diagnoses and all orders for this visit:    1. Primary osteoarthritis of left knee (Primary)  -     Large Joint Arthrocentesis: L knee    2. Primary osteoarthritis of right knee  -     Large Joint Arthrocentesis: R knee  -     Large Joint Arthrocentesis        1. Primary osteoarthritis of left knee    2. Primary osteoarthritis of right knee        Procedure Note:  The potential benefits of performing a therapeutic knee joint visco supplementation injection, as well as potential risks (including, but not limited to infection, swelling, pain, bleeding, bruising, nerve/blood vessel damage, and pseudoseptic reaction) have been discussed with the patient.  After informed consent, timeout procedure was performed, and the skin on the right and left knee was prepped with chlorhexidine  soap and alcohol, after which ethyl chloride was applied to the skin at the injection site. Via the anterolateral approach, Synvisc was injected into the knee joint.  The patient tolerated the procedure well. There were no complications.  Band-Aid was applied to the injection site. Post-procedural instructions discussed with the patient and/or their caregiver.    Return in about 1 week (around 10/3/2022) for Injection.    Osbaldo Olivas MD  09/26/22  14:36 EDT

## 2022-09-26 NOTE — PROGRESS NOTES
Procedure   Large Joint Arthrocentesis: L knee  Date/Time: 9/26/2022 2:01 PM  Consent given by: patient  Site marked: site marked  Timeout: Immediately prior to procedure a time out was called to verify the correct patient, procedure, equipment, support staff and site/side marked as required   Supporting Documentation  Indications: pain   Procedure Details  Location: knee - L knee  Preparation: Patient was prepped and draped in the usual sterile fashion  Needle size: 22 G  Approach: anterolateral  Medications administered: 8 mg/mL Hylan 16 MG/2ML  Patient tolerance: patient tolerated the procedure well with no immediate complications    Large Joint Arthrocentesis: R knee  Date/Time: 9/26/2022 2:29 PM  Consent given by: patient  Site marked: site marked  Timeout: Immediately prior to procedure a time out was called to verify the correct patient, procedure, equipment, support staff and site/side marked as required   Supporting Documentation  Indications: pain   Procedure Details  Location: knee - R knee  Preparation: Patient was prepped and draped in the usual sterile fashion  Needle size: 22 G  Approach: anterolateral  Medications administered: 8 mg/mL Hylan 16 MG/2ML  Patient tolerance: patient tolerated the procedure well with no immediate complications

## 2022-09-30 ENCOUNTER — HOSPITAL ENCOUNTER (OUTPATIENT)
Dept: GENERAL RADIOLOGY | Facility: HOSPITAL | Age: 72
Discharge: HOME | End: 2022-09-30
Admitting: INTERNAL MEDICINE

## 2022-09-30 ENCOUNTER — TRANSCRIBE ORDERS (OUTPATIENT)
Dept: ADMINISTRATIVE | Facility: HOSPITAL | Age: 72
End: 2022-09-30

## 2022-09-30 DIAGNOSIS — M06.4 INFLAMMATORY POLYARTHROPATHY: Primary | ICD-10-CM

## 2022-09-30 DIAGNOSIS — I15.9 SECONDARY HYPERTENSION: ICD-10-CM

## 2022-09-30 DIAGNOSIS — E78.5 HYPERLIPIDEMIA, UNSPECIFIED HYPERLIPIDEMIA TYPE: ICD-10-CM

## 2022-09-30 DIAGNOSIS — R76.9 ABNORMAL IMMUNOLOGICAL FINDING IN SERUM: ICD-10-CM

## 2022-09-30 PROCEDURE — 73130 X-RAY EXAM OF HAND: CPT

## 2022-09-30 RX ORDER — LISINOPRIL AND HYDROCHLOROTHIAZIDE 20; 12.5 MG/1; MG/1
1 TABLET ORAL DAILY
Qty: 30 TABLET | Refills: 3 | Status: SHIPPED | OUTPATIENT
Start: 2022-09-30 | End: 2023-02-04

## 2022-09-30 RX ORDER — ATENOLOL 50 MG/1
50 TABLET ORAL DAILY
Qty: 30 TABLET | Refills: 3 | Status: SHIPPED | OUTPATIENT
Start: 2022-09-30 | End: 2023-01-16

## 2022-09-30 RX ORDER — ATORVASTATIN CALCIUM 10 MG/1
10 TABLET, FILM COATED ORAL DAILY
Qty: 30 TABLET | Refills: 3 | Status: ON HOLD | OUTPATIENT
Start: 2022-09-30 | End: 2023-03-10

## 2022-10-03 DIAGNOSIS — F32.A DEPRESSION, UNSPECIFIED DEPRESSION TYPE: ICD-10-CM

## 2022-10-03 DIAGNOSIS — I15.9 SECONDARY HYPERTENSION: ICD-10-CM

## 2022-10-03 RX ORDER — AMLODIPINE BESYLATE 5 MG/1
5 TABLET ORAL DAILY
Qty: 90 TABLET | Refills: 1 | Status: SHIPPED | OUTPATIENT
Start: 2022-10-03

## 2022-10-03 RX ORDER — BUPROPION HYDROCHLORIDE 150 MG/1
150 TABLET ORAL DAILY
Qty: 90 TABLET | Refills: 1 | Status: SHIPPED | OUTPATIENT
Start: 2022-10-03

## 2022-10-10 ENCOUNTER — CLINICAL SUPPORT (OUTPATIENT)
Dept: ORTHOPEDIC SURGERY | Facility: CLINIC | Age: 72
End: 2022-10-10

## 2022-10-10 DIAGNOSIS — M17.0 PRIMARY OSTEOARTHRITIS OF BOTH KNEES: Primary | ICD-10-CM

## 2022-10-10 PROCEDURE — 20610 DRAIN/INJ JOINT/BURSA W/O US: CPT | Performed by: ORTHOPAEDIC SURGERY

## 2022-10-10 NOTE — PROGRESS NOTES
Procedure   - Large Joint Arthrocentesis: bilateral knee on 10/10/2022 3:17 PM  Indications: pain  Details: 22 G needle, anterolateral approach  Medications (Right): 8 mg/mL Hylan 16 MG/2ML  Medications (Left): 8 mg/mL Hylan 16 MG/2ML  Outcome: tolerated well, no immediate complications  Procedure, treatment alternatives, risks and benefits explained, specific risks discussed. Consent was given by the patient. Immediately prior to procedure a time out was called to verify the correct patient, procedure, equipment, support staff and site/side marked as required. Patient was prepped and draped in the usual sterile fashion.

## 2022-10-10 NOTE — PROGRESS NOTES
[facility] [place]    Subjective     Chief Complaint   Patient presents with   • Injections     Bilateral knee synvisc injection #2         HPI    Obi Jackman Jr. is a 72 y.o. male who presents for the second Synvisc injection into both knees.  Of note, he has seen some relief so far.    Patient Active Problem List   Diagnosis   • Allergic rhinitis   • Anxiety disorder   • Benign paroxysmal positional vertigo   • Chronic tension type headache   • Depression   • ED (erectile dysfunction) of non-organic origin   • Hyperlipidemia   • Hypertension   • LFTs abnormal   • CHRISTOPHE (obstructive sleep apnea)   • Elevated glucose   • Health care maintenance   • Morbid obesity due to excess calories ([facility])   • Onychomycosis of left great toe   • Osteoarthritis of ankle or foot   • Venous stasis   • Neuropathy   • Class 3 severe obesity due to excess calories with serious comorbidity and body mass index (BMI) of 40.0 to 44.9 in adult ([facility])   • Pre-op evaluation   • Arthritis of foot, left   • S/P left ankle triple arthrodesis   • Acute blood loss anemia, mild, asymptomatic    • Acute postoperative pain   • Wound infection   • Primary osteoarthritis of right hip   • Status post total replacement of right hip   • Postoperative wound infection of right hip, s/p I and D, head and liner exchange.     Past Medical History:   Diagnosis Date   • Anxiety and depression    • Arthritis of neck ?   • Bulging of lumbar intervertebral disc    • Cataract     Lens implated in left eye.   • Cervical disc disorder ?   • CPAP (continuous positive airway pressure) dependence    • Hip arthrosis Herb    Subsequent staph infection   • Hyperlipidemia    • Hypertension    • Knee swelling    • Migraine    • Neuroma of foot     ?   • CHRISTOPHE on CPAP    • Periarthritis of shoulder ?   • Rheumatoid arthritis ([facility])    • Swelling of left ear    • Wears contact lenses    • Wears contact lenses       Past Surgical History:   Procedure  Laterality Date   • ACHILLES TENDON SURGERY Left 10/15/2019    Procedure: ACHILLES TENDON LENGTHENING LEFT;  Surgeon: Elodia Guzman MD;  Location: [facility];  Service: Orthopedics   • CATARACT EXTRACTION Right    • COLONOSCOPY  2015   • EYE SURGERY Bilateral     cornea transplant    • FOOT SURGERY Left 10/15/2019    triple arthrodesis and achilles tendon lengthening- [place]   • HERNIA REPAIR     • ILIAC CREST BONE GRAFT Left 10/15/2019    Procedure: ILIAC CREST BONE GRAFT LEFT;  Surgeon: Elodia Guzman MD;  Location: [facility];  Service: Orthopedics   • INCISION AND DRAINAGE LEG Right 12/04/2020    Procedure: INCISION AND DRAINAGE WITH COMPONENT EXCHANGE, HEAD AND LINER RIGHT HIP;  Surgeon: Osbaldo Olivas MD;  Location: [facility];  Service: Orthopedics;  Laterality: Right;   • JOINT REPLACEMENT  8/4/20 and 12/4/20    Right hip replacement   • TOE FUSION Left 10/15/2019    Procedure: TRIPLE ARTHODESIS LEFT;  Surgeon: Elodia Guzman MD;  Location: [facility];  Service: Orthopedics   • TOTAL HIP ARTHROPLASTY Right 08/04/2020    Procedure: TOTAL HIP ARTHROPLASTY RIGHT;  Surgeon: Osbaldo Olivas MD;  Location: [facility];  Service: Orthopedics;  Laterality: Right;   • TRIGGER POINT INJECTION     • VENOUS ABLATION Left 01/21/2022    Endovenous laser ablation of left great sapheuos vein for venous insufficiency      Family History   Problem Relation Age of Onset   • No Known Problems Mother    • No Known Problems Father    • Cancer Sister         Abdominal     Social History     Socioeconomic History   • Marital status:    Tobacco Use   • Smoking status: Never   • Smokeless tobacco: Never   Vaping Use   • Vaping Use: Never used   Substance and Sexual Activity   • Alcohol use: Not Currently     Comment: One drink 1-2x month   • Drug use: No   • Sexual activity: Yes     Partners: Female      Current Outpatient Medications on File Prior to Visit   Medication Sig Dispense Refill   • acetaminophen (TYLENOL) 325  MG tablet Take 325 mg by mouth As Needed for Mild Pain .     • ALPRAZolam (XANAX) 0.25 MG tablet Take 1/2 to 1 tablet prn when flying 10 tablet 0   • amLODIPine (NORVASC) 5 MG tablet TAKE 1 TABLET BY MOUTH DAILY. 90 tablet 1   • Ascorbic Acid (VITAMIN C PO) Take  by mouth Daily.     • atenolol (TENORMIN) 50 MG tablet TAKE 1 TABLET BY MOUTH DAILY. 30 tablet 3   • atorvastatin (LIPITOR) 10 MG tablet TAKE 1 TABLET BY MOUTH DAILY. RESUME WHEN NOT ON DAPTO ANYMORE 30 tablet 3   • buPROPion XL (WELLBUTRIN XL) 150 MG 24 hr tablet TAKE 1 TABLET BY MOUTH DAILY 90 tablet 1   • clotrimazole (LOTRIMIN) 1 % cream Apply  topically to the appropriate area as directed 2 (Two) Times a Day. 15 g 1   • clotrimazole-betamethasone (LOTRISONE) 1-0.05 % cream APPLY TOPICALLY TO THE APPROPRIATE AREA AS DIRECTED 2 (TWO) TIMES A DAY. 45 g 0   • Cyanocobalamin (VITAMIN B-12 ER PO) Take 1 tablet by mouth Daily.     • cyclobenzaprine (FLEXERIL) 10 MG tablet TAKE ONE TABLET BY MOUTH THREE TIMES A DAY AS NEEDED FOR MUSCLE SPASMS 30 tablet 5   • dorzolamide-timolol (COSOPT) 22.3-6.8 MG/ML ophthalmic solution      • doxycycline (VIBRAMYCIN) 100 MG capsule      • Ergocalciferol (VITAMIN D2 PO) Take  by mouth Daily.     • gabapentin (NEURONTIN) 300 MG capsule Take 300 mg by mouth 3 (Three) Times a Day.     • gabapentin (NEURONTIN) 300 MG capsule Take 1 capsule by mouth Every 8 (Eight) Hours.     • hydrocortisone 1 % ointment Apply 1 application topically to the appropriate area as directed 2 (Two) Times a Day. 56 g 0   • hydroxychloroquine (PLAQUENIL) 200 MG tablet Take 1 tablet by mouth Every 12 (Twelve) Hours.     • ibuprofen (ADVIL,MOTRIN) 600 MG tablet      • latanoprost (XALATAN) 0.005 % ophthalmic solution Apply 1 drop to eye(s) as directed by provider.     • lisinopril-hydrochlorothiazide (PRINZIDE,ZESTORETIC) 20-12.5 MG per tablet TAKE 1 TABLET BY MOUTH DAILY 30 tablet 3   • meclizine (ANTIVERT) 25 MG tablet 25 mg Every 8 (Eight) Hours As  Needed.     • oxaprozin (DAYPRO) 600 MG tablet Take 1,200 mg by mouth Daily. Take 2 po every day     • prednisoLONE acetate (PRED FORTE) 1 % ophthalmic suspension Apply 1 drop to eye(s) as directed by provider 4 (Four) Times a Day.     • promethazine (PHENERGAN) 25 MG tablet Take 1 tablet by mouth Every 6 (Six) Hours As Needed for Nausea or Vomiting. 20 tablet 0   • SUMAtriptan (IMITREX) 100 MG tablet Take one tablet at onset of headache. May repeat dose one time in 2 hours if headache not relieved. 9 tablet 5   • traMADol (ULTRAM) 50 MG tablet      • [DISCONTINUED] ergocalciferol (ERGOCALCIFEROL) 1.25 MG (23270 UT) capsule Take one po every week     • [DISCONTINUED] Semaglutide-Weight Management 0.25 MG/0.5ML solution auto-injector Inject 0.25 mg under the skin into the appropriate area as directed 1 (One) Time Per Week. 2 mL 0     No current facility-administered medications on file prior to visit.      No Known Allergies     Review of Systems   Constitutional: Negative for activity change, appetite change, chills, diaphoresis, fatigue, fever and unexpected weight change.   HENT: Negative for congestion, dental problem, drooling, ear discharge, ear pain, facial swelling, hearing loss, mouth sores, nosebleeds, postnasal drip, rhinorrhea, sinus pressure, sneezing, sore throat, tinnitus, trouble swallowing and voice change.    Eyes: Negative for photophobia, pain, discharge, redness, itching and visual disturbance.   Respiratory: Negative for apnea, cough, choking, chest tightness, shortness of breath, wheezing and stridor.    Cardiovascular: Negative for chest pain, palpitations and leg swelling.   Gastrointestinal: Negative for abdominal distention, abdominal pain, anal bleeding, blood in stool, constipation, diarrhea, nausea, rectal pain and vomiting.   Endocrine: Negative for cold intolerance, heat intolerance, polydipsia, polyphagia and polyuria.   Genitourinary: Negative for decreased urine volume, difficulty  urinating, dysuria, enuresis, flank pain, frequency, genital sores, hematuria and urgency.   Musculoskeletal: Positive for arthralgias. Negative for back pain, gait problem, joint swelling, myalgias, neck pain and neck stiffness.   Skin: Negative for color change, pallor, rash and wound.   Allergic/Immunologic: Negative for environmental allergies, food allergies and immunocompromised state.   Neurological: Negative for dizziness, tremors, seizures, syncope, facial asymmetry, speech difficulty, weakness, light-headedness, numbness and headaches.   Hematological: Negative for adenopathy. Does not bruise/bleed easily.   Psychiatric/Behavioral: Negative for agitation, behavioral problems, confusion, decreased concentration, dysphoric mood, hallucinations, self-injury, sleep disturbance and suicidal ideas. The patient is not nervous/anxious and is not hyperactive.         Objective      Physical Exam  There were no vitals taken for this visit.    There is no height or weight on file to calculate BMI.    General:   Mental Status:  Alert   Appearance: Cooperative, in no acute distress   Posture: Normal    Assessment and Plan     Diagnoses and all orders for this visit:    1. Primary osteoarthritis of both knees (Primary)  -     - Large Joint Arthrocentesis: bilateral knee        1. Primary osteoarthritis of both knees        Procedure Note:  The potential benefits of performing a therapeutic knee joint visco supplementation injection, as well as potential risks (including, but not limited to infection, swelling, pain, bleeding, bruising, nerve/blood vessel damage, and pseudoseptic reaction) have been discussed with the patient.  After informed consent, timeout procedure was performed, and the skin on the right and left knee was prepped with chlorhexidine soap and alcohol, after which ethyl chloride was applied to the skin at the injection site. Via the anterolateral approach, Synvisc was injected into the knee joint.  The  patient tolerated the procedure well. There were no complications.  Band-Aid was applied to the injection site. Post-procedural instructions discussed with the patient and/or their caregiver.    Return in about 1 week (around 10/17/2022) for Injection.    Osbaldo Olivas MD  10/10/22  15:33 EDT

## 2022-10-17 ENCOUNTER — CLINICAL SUPPORT (OUTPATIENT)
Dept: ORTHOPEDIC SURGERY | Facility: CLINIC | Age: 72
End: 2022-10-17

## 2022-10-17 DIAGNOSIS — M17.0 PRIMARY OSTEOARTHRITIS OF BOTH KNEES: Primary | ICD-10-CM

## 2022-10-17 PROCEDURE — 20610 DRAIN/INJ JOINT/BURSA W/O US: CPT | Performed by: ORTHOPAEDIC SURGERY

## 2022-10-17 NOTE — PROGRESS NOTES
[facility] Note    Subjective     Chief Complaint   Patient presents with   • Injections     Synvisc # 3 Bilat Knees         HPI    Obi Jackman Jr. is a 72 y.o. male who presents for the third and final Synvisc injections for both knees.  Of note, minimal relief so far.    Patient Active Problem List   Diagnosis   • Allergic rhinitis   • Anxiety disorder   • Benign paroxysmal positional vertigo   • Chronic tension type headache   • Depression   • ED (erectile dysfunction) of non-organic origin   • Hyperlipidemia   • Hypertension   • LFTs abnormal   • CHRISTOPHE (obstructive sleep apnea)   • Elevated glucose   • Health care maintenance   • Morbid obesity due to excess calories (Formerly [facility])   • Onychomycosis of left great toe   • Osteoarthritis of ankle or foot   • Venous stasis   • Neuropathy   • Class 3 severe obesity due to excess calories with serious comorbidity and body mass index (BMI) of 40.0 to 44.9 in adult (Formerly [facility])   • Pre-op evaluation   • Arthritis of foot, left   • S/P left ankle triple arthrodesis   • Acute blood loss anemia, mild, asymptomatic    • Acute postoperative pain   • Wound infection   • Primary osteoarthritis of right hip   • Status post total replacement of right hip   • Postoperative wound infection of right hip, s/p I and D, head and liner exchange.     Past Medical History:   Diagnosis Date   • Anxiety and depression    • Arthritis of neck ?   • Bulging of lumbar intervertebral disc    • Cataract     Lens implated in left eye.   • Cervical disc disorder ?   • CPAP (continuous positive airway pressure) dependence    • Hip arthrosis Herb    Subsequent staph infection   • Hyperlipidemia    • Hypertension    • Knee swelling    • Migraine    • Neuroma of foot     ?   • CHRISTOPHE on CPAP    • Periarthritis of shoulder ?   • Rheumatoid arthritis ([facility])    • Rotator cuff syndrome 09/15/2022    Dr. Chanda Castanon   • Swelling of left ear    • Wears contact lenses    • Wears contact lenses        Past Surgical History:   Procedure Laterality Date   • ACHILLES TENDON SURGERY Left 10/15/2019    Procedure: ACHILLES TENDON LENGTHENING LEFT;  Surgeon: Elodia Guzman MD;  Location: [facility];  Service: Orthopedics   • CATARACT EXTRACTION Right    • COLONOSCOPY  2015   • EYE SURGERY Bilateral     cornea transplant    • FOOT SURGERY Left 10/15/2019    triple arthrodesis and achilles tendon lengthening- [place]   • HERNIA REPAIR     • ILIAC CREST BONE GRAFT Left 10/15/2019    Procedure: ILIAC CREST BONE GRAFT LEFT;  Surgeon: Elodia Guzman MD;  Location: [facility];  Service: Orthopedics   • INCISION AND DRAINAGE LEG Right 12/04/2020    Procedure: INCISION AND DRAINAGE WITH COMPONENT EXCHANGE, HEAD AND LINER RIGHT HIP;  Surgeon: Osbaldo Olivas MD;  Location: [facility];  Service: Orthopedics;  Laterality: Right;   • JOINT REPLACEMENT  8/4/20 and 12/4/20    Right hip replacement   • TOE FUSION Left 10/15/2019    Procedure: TRIPLE ARTHODESIS LEFT;  Surgeon: Elodia Guzman MD;  Location: [facility];  Service: Orthopedics   • TOTAL HIP ARTHROPLASTY Right 08/04/2020    Procedure: TOTAL HIP ARTHROPLASTY RIGHT;  Surgeon: Osbaldo Olivas MD;  Location: [facility];  Service: Orthopedics;  Laterality: Right;   • TRIGGER POINT INJECTION     • VENOUS ABLATION Left 01/21/2022    Endovenous laser ablation of left great sapheuos vein for venous insufficiency      Family History   Problem Relation Age of Onset   • No Known Problems Mother    • No Known Problems Father    • Cancer Sister         Abdominal     Social History     Socioeconomic History   • Marital status:    Tobacco Use   • Smoking status: Never   • Smokeless tobacco: Never   Vaping Use   • Vaping Use: Never used   Substance and Sexual Activity   • Alcohol use: Not Currently     Comment: One drink 1-2x month   • Drug use: No   • Sexual activity: Yes     Partners: Female      Current Outpatient Medications on File Prior to Visit   Medication Sig Dispense  Refill   • acetaminophen (TYLENOL) 325 MG tablet Take 325 mg by mouth As Needed for Mild Pain .     • ALPRAZolam (XANAX) 0.25 MG tablet Take 1/2 to 1 tablet prn when flying 10 tablet 0   • amLODIPine (NORVASC) 5 MG tablet TAKE 1 TABLET BY MOUTH DAILY. 90 tablet 1   • Ascorbic Acid (VITAMIN C PO) Take  by mouth Daily.     • atenolol (TENORMIN) 50 MG tablet TAKE 1 TABLET BY MOUTH DAILY. 30 tablet 3   • atorvastatin (LIPITOR) 10 MG tablet TAKE 1 TABLET BY MOUTH DAILY. RESUME WHEN NOT ON DAPTO ANYMORE 30 tablet 3   • buPROPion XL (WELLBUTRIN XL) 150 MG 24 hr tablet TAKE 1 TABLET BY MOUTH DAILY 90 tablet 1   • clotrimazole (LOTRIMIN) 1 % cream Apply  topically to the appropriate area as directed 2 (Two) Times a Day. 15 g 1   • clotrimazole-betamethasone (LOTRISONE) 1-0.05 % cream APPLY TOPICALLY TO THE APPROPRIATE AREA AS DIRECTED 2 (TWO) TIMES A DAY. 45 g 0   • Cyanocobalamin (VITAMIN B-12 ER PO) Take 1 tablet by mouth Daily.     • cyclobenzaprine (FLEXERIL) 10 MG tablet TAKE ONE TABLET BY MOUTH THREE TIMES A DAY AS NEEDED FOR MUSCLE SPASMS 30 tablet 5   • dorzolamide-timolol (COSOPT) 22.3-6.8 MG/ML ophthalmic solution      • doxycycline (VIBRAMYCIN) 100 MG capsule      • Ergocalciferol (VITAMIN D2 PO) Take  by mouth Daily.     • gabapentin (NEURONTIN) 300 MG capsule Take 300 mg by mouth 3 (Three) Times a Day.     • hydrocortisone 1 % ointment Apply 1 application topically to the appropriate area as directed 2 (Two) Times a Day. 56 g 0   • hydroxychloroquine (PLAQUENIL) 200 MG tablet Take 1 tablet by mouth Every 12 (Twelve) Hours.     • latanoprost (XALATAN) 0.005 % ophthalmic solution Apply 1 drop to eye(s) as directed by provider.     • lisinopril-hydrochlorothiazide (PRINZIDE,ZESTORETIC) 20-12.5 MG per tablet TAKE 1 TABLET BY MOUTH DAILY 30 tablet 3   • meclizine (ANTIVERT) 25 MG tablet 25 mg Every 8 (Eight) Hours As Needed.     • oxaprozin (DAYPRO) 600 MG tablet Take 1,200 mg by mouth Daily. Take 2 po every day      • prednisoLONE acetate (PRED FORTE) 1 % ophthalmic suspension Apply 1 drop to eye(s) as directed by provider 4 (Four) Times a Day.     • promethazine (PHENERGAN) 25 MG tablet Take 1 tablet by mouth Every 6 (Six) Hours As Needed for Nausea or Vomiting. 20 tablet 0   • SUMAtriptan (IMITREX) 100 MG tablet Take one tablet at onset of headache. May repeat dose one time in 2 hours if headache not relieved. 9 tablet 5   • traMADol (ULTRAM) 50 MG tablet      • [DISCONTINUED] gabapentin (NEURONTIN) 300 MG capsule Take 1 capsule by mouth Every 8 (Eight) Hours.     • [DISCONTINUED] ibuprofen (ADVIL,MOTRIN) 600 MG tablet        No current facility-administered medications on file prior to visit.      No Known Allergies     Review of Systems   Musculoskeletal: Positive for arthralgias.   All other systems reviewed and are negative.       Objective      Physical Exam  There were no vitals taken for this visit.    There is no height or weight on file to calculate BMI.    General:   Mental Status:  Alert   Appearance: Cooperative, in no acute distress   Posture: Normal    Assessment and Plan     Diagnoses and all orders for this visit:    1. Primary osteoarthritis of both knees (Primary)  -     - Large Joint Arthrocentesis: bilateral knee        1. Primary osteoarthritis of both knees        Procedure Note:  The potential benefits of performing a therapeutic knee joint visco supplementation injection, as well as potential risks (including, but not limited to infection, swelling, pain, bleeding, bruising, nerve/blood vessel damage, and pseudoseptic reaction) have been discussed with the patient.  After informed consent, timeout procedure was performed, and the skin on the right and left knee was prepped with chlorhexidine soap and alcohol, after which ethyl chloride was applied to the skin at the injection site. Via the anterolateral approach, Synvisc was injected into the knee joint.  The patient tolerated the procedure well.  There were no complications.  Band-Aid was applied to the injection site. Post-procedural instructions discussed with the patient and/or their caregiver.    Return in about 6 months (around 4/17/2023).    Osbaldo Olivas MD  10/17/22  17:04 EDT

## 2022-10-17 NOTE — PROGRESS NOTES
Procedure   - Large Joint Arthrocentesis: bilateral knee on 10/17/2022 4:12 PM  Indications: pain  Details: 22 G needle, anterolateral approach  Medications (Right): 8 mg/mL Hylan 16 MG/2ML  Medications (Left): 8 mg/mL Hylan 16 MG/2ML  Outcome: tolerated well, no immediate complications  Procedure, treatment alternatives, risks and benefits explained, specific risks discussed. Consent was given by the patient. Immediately prior to procedure a time out was called to verify the correct patient, procedure, equipment, support staff and site/side marked as required. Patient was prepped and draped in the usual sterile fashion.

## 2022-10-21 ENCOUNTER — PATIENT MESSAGE (OUTPATIENT)
Dept: INTERNAL MEDICINE | Facility: CLINIC | Age: 72
End: 2022-10-21

## 2022-10-21 RX ORDER — NYSTATIN 100000 [USP'U]/G
POWDER TOPICAL 3 TIMES DAILY
Qty: 30 G | Refills: 1 | Status: SHIPPED | OUTPATIENT
Start: 2022-10-21 | End: 2023-02-24

## 2022-10-21 RX ORDER — NYSTATIN 100000 U/G
1 CREAM TOPICAL 2 TIMES DAILY
Qty: 30 G | Refills: 1 | Status: SHIPPED | OUTPATIENT
Start: 2022-10-21 | End: 2023-02-24

## 2022-11-19 RX ORDER — SUMATRIPTAN 100 MG/1
TABLET, FILM COATED ORAL
Qty: 9 TABLET | Refills: 5 | Status: SHIPPED | OUTPATIENT
Start: 2022-11-19

## 2022-12-13 ENCOUNTER — TRANSCRIBE ORDERS (OUTPATIENT)
Dept: LAB | Facility: HOSPITAL | Age: 72
End: 2022-12-13

## 2022-12-13 ENCOUNTER — LAB (OUTPATIENT)
Dept: LAB | Facility: HOSPITAL | Age: 72
End: 2022-12-13

## 2022-12-13 DIAGNOSIS — L03.115 CELLULITIS OF RIGHT FOOT: ICD-10-CM

## 2022-12-13 DIAGNOSIS — T84.51XD INFLAMMATORY REACTION DUE TO INTERNAL PROSTHESIS OF RIGHT HIP, SUBSEQUENT ENCOUNTER: Primary | ICD-10-CM

## 2022-12-13 DIAGNOSIS — B95.7 CHRONIC GRANULOMATOUS INFECTION DUE MOSTLY TO STAPHYLOCOCCUS AUREUS: ICD-10-CM

## 2022-12-13 LAB
ALBUMIN SERPL-MCNC: 3.7 G/DL (ref 3.5–5.2)
ALBUMIN/GLOB SERPL: 1.1 G/DL
ALP SERPL-CCNC: 77 U/L (ref 39–117)
ALT SERPL W P-5'-P-CCNC: 11 U/L (ref 1–41)
ANION GAP SERPL CALCULATED.3IONS-SCNC: 10 MMOL/L (ref 5–15)
AST SERPL-CCNC: 15 U/L (ref 1–40)
BASOPHILS # BLD AUTO: 0.01 10*3/MM3 (ref 0–0.2)
BASOPHILS NFR BLD AUTO: 0.1 % (ref 0–1.5)
BILIRUB SERPL-MCNC: 0.6 MG/DL (ref 0–1.2)
BUN SERPL-MCNC: 12 MG/DL (ref 8–23)
BUN/CREAT SERPL: 14.1 (ref 7–25)
CALCIUM SPEC-SCNC: 9 MG/DL (ref 8.6–10.5)
CHLORIDE SERPL-SCNC: 100 MMOL/L (ref 98–107)
CO2 SERPL-SCNC: 29 MMOL/L (ref 22–29)
CREAT SERPL-MCNC: 0.85 MG/DL (ref 0.76–1.27)
CRP SERPL-MCNC: 24.7 MG/DL (ref 0–0.5)
DEPRECATED RDW RBC AUTO: 46.5 FL (ref 37–54)
EGFRCR SERPLBLD CKD-EPI 2021: 92.9 ML/MIN/1.73
EOSINOPHIL # BLD AUTO: 0.05 10*3/MM3 (ref 0–0.4)
EOSINOPHIL NFR BLD AUTO: 0.7 % (ref 0.3–6.2)
ERYTHROCYTE [DISTWIDTH] IN BLOOD BY AUTOMATED COUNT: 13.3 % (ref 12.3–15.4)
ERYTHROCYTE [SEDIMENTATION RATE] IN BLOOD: 59 MM/HR (ref 0–20)
GLOBULIN UR ELPH-MCNC: 3.3 GM/DL
GLUCOSE SERPL-MCNC: 115 MG/DL (ref 65–99)
HCT VFR BLD AUTO: 39.8 % (ref 37.5–51)
HGB BLD-MCNC: 13.1 G/DL (ref 13–17.7)
IMM GRANULOCYTES # BLD AUTO: 0.04 10*3/MM3 (ref 0–0.05)
IMM GRANULOCYTES NFR BLD AUTO: 0.6 % (ref 0–0.5)
LYMPHOCYTES # BLD AUTO: 0.45 10*3/MM3 (ref 0.7–3.1)
LYMPHOCYTES NFR BLD AUTO: 6.4 % (ref 19.6–45.3)
MCH RBC QN AUTO: 31.3 PG (ref 26.6–33)
MCHC RBC AUTO-ENTMCNC: 32.9 G/DL (ref 31.5–35.7)
MCV RBC AUTO: 95 FL (ref 79–97)
MONOCYTES # BLD AUTO: 0.77 10*3/MM3 (ref 0.1–0.9)
MONOCYTES NFR BLD AUTO: 10.9 % (ref 5–12)
NEUTROPHILS NFR BLD AUTO: 5.72 10*3/MM3 (ref 1.7–7)
NEUTROPHILS NFR BLD AUTO: 81.3 % (ref 42.7–76)
NRBC BLD AUTO-RTO: 0 /100 WBC (ref 0–0.2)
PLATELET # BLD AUTO: 196 10*3/MM3 (ref 140–450)
PMV BLD AUTO: 9.8 FL (ref 6–12)
POTASSIUM SERPL-SCNC: 3.2 MMOL/L (ref 3.5–5.2)
PROT SERPL-MCNC: 7 G/DL (ref 6–8.5)
RBC # BLD AUTO: 4.19 10*6/MM3 (ref 4.14–5.8)
SODIUM SERPL-SCNC: 139 MMOL/L (ref 136–145)
WBC NRBC COR # BLD: 7.04 10*3/MM3 (ref 3.4–10.8)

## 2022-12-13 PROCEDURE — 85652 RBC SED RATE AUTOMATED: CPT | Performed by: INTERNAL MEDICINE

## 2022-12-13 PROCEDURE — 80053 COMPREHEN METABOLIC PANEL: CPT | Performed by: INTERNAL MEDICINE

## 2022-12-13 PROCEDURE — 86140 C-REACTIVE PROTEIN: CPT | Performed by: INTERNAL MEDICINE

## 2022-12-13 PROCEDURE — 36415 COLL VENOUS BLD VENIPUNCTURE: CPT | Performed by: INTERNAL MEDICINE

## 2022-12-13 PROCEDURE — 85025 COMPLETE CBC W/AUTO DIFF WBC: CPT | Performed by: INTERNAL MEDICINE

## 2022-12-14 ENCOUNTER — OFFICE VISIT (OUTPATIENT)
Dept: INTERNAL MEDICINE | Facility: CLINIC | Age: 72
End: 2022-12-14

## 2022-12-14 VITALS
BODY MASS INDEX: 42.34 KG/M2 | SYSTOLIC BLOOD PRESSURE: 148 MMHG | WEIGHT: 312.6 LBS | OXYGEN SATURATION: 98 % | TEMPERATURE: 97.5 F | HEIGHT: 72 IN | DIASTOLIC BLOOD PRESSURE: 84 MMHG | HEART RATE: 76 BPM

## 2022-12-14 DIAGNOSIS — J02.9 SORE THROAT: Primary | ICD-10-CM

## 2022-12-14 LAB
EXPIRATION DATE: NORMAL
INTERNAL CONTROL: NORMAL
Lab: NORMAL
S PYO AG THROAT QL: NEGATIVE

## 2022-12-14 PROCEDURE — 87880 STREP A ASSAY W/OPTIC: CPT | Performed by: PHYSICIAN ASSISTANT

## 2022-12-14 PROCEDURE — 99213 OFFICE O/P EST LOW 20 MIN: CPT | Performed by: PHYSICIAN ASSISTANT

## 2022-12-14 RX ORDER — PROMETHAZINE HYDROCHLORIDE 25 MG/1
25 TABLET ORAL EVERY 6 HOURS PRN
Qty: 20 TABLET | Refills: 0 | Status: SHIPPED | OUTPATIENT
Start: 2022-12-14

## 2022-12-14 NOTE — PROGRESS NOTES
"Chief Complaint   Patient presents with   • Sore Throat   • GI Problem       Subjective     Obi Jackman Jr. is a 72 y.o. male.        History of Present Illness     Here for complaints of a sore throat.    States he had a virus or flu last week and took a home COVID-19 test on Saturday, 12/10/2022, which was negative. Symptoms started on Thursday, 12/09/2022. His symptoms have resolved except for a sore throat and a \" rock\" in his stomach. Does not eat much and does not think it is anything he is eating. They are keeping their grandson this weekend and thought he should come in for a strep test. Did not take a influenza test. Did have a fever on Friday, 12/10/2022, but it went away by Sunday, 12/12/2022. Has been alternating Tylenol and ibuprofen since last week. Denies ever having an ulcer or any burning. He states it is better today. Does not feel any different when he eats or drinks. Has been having normal bowel movements. Abdomen is not tender on the outside at all. Has lost 38 pounds in the past year. Completed blood work done yesterday, 12/13/2022, which showed his potassium was a little low. His inflammatory markers were up, but he was sick. States he has arthritis. States he takes Phenergan occasionally when he is nauseous. It is not a common thing for him to be nauseated. This only happens occasionally.      Current Outpatient Medications:   •  acetaminophen (TYLENOL) 325 MG tablet, Take 325 mg by mouth As Needed for Mild Pain ., Disp: , Rfl:   •  ALPRAZolam (XANAX) 0.25 MG tablet, Take 1/2 to 1 tablet prn when flying, Disp: 10 tablet, Rfl: 0  •  amLODIPine (NORVASC) 5 MG tablet, TAKE 1 TABLET BY MOUTH DAILY., Disp: 90 tablet, Rfl: 1  •  Ascorbic Acid (VITAMIN C PO), Take  by mouth Daily., Disp: , Rfl:   •  atenolol (TENORMIN) 50 MG tablet, TAKE 1 TABLET BY MOUTH DAILY., Disp: 30 tablet, Rfl: 3  •  atorvastatin (LIPITOR) 10 MG tablet, TAKE 1 TABLET BY MOUTH DAILY. RESUME WHEN NOT ON DAPTO ANYMORE, " Disp: 30 tablet, Rfl: 3  •  buPROPion XL (WELLBUTRIN XL) 150 MG 24 hr tablet, TAKE 1 TABLET BY MOUTH DAILY, Disp: 90 tablet, Rfl: 1  •  clotrimazole (LOTRIMIN) 1 % cream, Apply  topically to the appropriate area as directed 2 (Two) Times a Day., Disp: 15 g, Rfl: 1  •  clotrimazole-betamethasone (LOTRISONE) 1-0.05 % cream, APPLY TOPICALLY TO THE APPROPRIATE AREA AS DIRECTED 2 (TWO) TIMES A DAY., Disp: 45 g, Rfl: 0  •  Cyanocobalamin (VITAMIN B-12 ER PO), Take 1 tablet by mouth Daily., Disp: , Rfl:   •  cyclobenzaprine (FLEXERIL) 10 MG tablet, TAKE ONE TABLET BY MOUTH THREE TIMES A DAY AS NEEDED FOR MUSCLE SPASMS, Disp: 30 tablet, Rfl: 5  •  doxycycline (VIBRAMYCIN) 100 MG capsule, , Disp: , Rfl:   •  Ergocalciferol (VITAMIN D2 PO), Take  by mouth Daily., Disp: , Rfl:   •  gabapentin (NEURONTIN) 300 MG capsule, Take 300 mg by mouth 3 (Three) Times a Day., Disp: , Rfl:   •  hydrocortisone 1 % ointment, Apply 1 application topically to the appropriate area as directed 2 (Two) Times a Day., Disp: 56 g, Rfl: 0  •  hydroxychloroquine (PLAQUENIL) 200 MG tablet, Take 1 tablet by mouth Every 12 (Twelve) Hours., Disp: , Rfl:   •  latanoprost (XALATAN) 0.005 % ophthalmic solution, Apply 1 drop to eye(s) as directed by provider., Disp: , Rfl:   •  lisinopril-hydrochlorothiazide (PRINZIDE,ZESTORETIC) 20-12.5 MG per tablet, TAKE 1 TABLET BY MOUTH DAILY, Disp: 30 tablet, Rfl: 3  •  meclizine (ANTIVERT) 25 MG tablet, 25 mg Every 8 (Eight) Hours As Needed., Disp: , Rfl:   •  nystatin (MYCOSTATIN) 898427 UNIT/GM cream, Apply 1 application topically to the appropriate area as directed 2 (Two) Times a Day., Disp: 30 g, Rfl: 1  •  nystatin (MYCOSTATIN) 759882 UNIT/GM powder, Apply  topically to the appropriate area as directed 3 (Three) Times a Day., Disp: 30 g, Rfl: 1  •  oxaprozin (DAYPRO) 600 MG tablet, Take 1,200 mg by mouth Daily. Take 2 po every day, Disp: , Rfl:   •  prednisoLONE acetate (PRED FORTE) 1 % ophthalmic suspension,  "Apply 1 drop to eye(s) as directed by provider 4 (Four) Times a Day., Disp: , Rfl:   •  promethazine (PHENERGAN) 25 MG tablet, Take 1 tablet by mouth Every 6 (Six) Hours As Needed for Nausea or Vomiting., Disp: 20 tablet, Rfl: 0  •  SUMAtriptan (IMITREX) 100 MG tablet, TAKE ONE TABLET AT ONSET OF HEADACHE. MAY REPEAT DOSE ONE TIME IN 2 HOURS IF HEADACHE NOT RELIEVED., Disp: 9 tablet, Rfl: 5  •  traMADol (ULTRAM) 50 MG tablet, , Disp: , Rfl:      PMFSH  The following portions of the patient's history were reviewed and updated as appropriate: allergies, current medications, past family history, past medical history, past social history, past surgical history and problem list.    Review of Systems   Constitutional: Positive for fatigue. Negative for activity change and unexpected weight change.   HENT: Positive for ear pain, postnasal drip, sore throat and trouble swallowing. Negative for congestion, drooling and ear discharge.    Eyes: Negative for pain and discharge.   Respiratory: Negative for cough, chest tightness, shortness of breath, wheezing and stridor.    Cardiovascular: Negative for chest pain and palpitations.   Gastrointestinal: Positive for abdominal pain. Negative for diarrhea and vomiting.   Endocrine: Negative.    Genitourinary: Negative.    Musculoskeletal: Positive for neck pain. Negative for joint swelling.   Skin: Negative for color change, rash and wound.   Allergic/Immunologic: Negative.    Neurological: Positive for headaches. Negative for seizures and syncope.   Psychiatric/Behavioral: Negative.        Objective   /84   Pulse 76   Temp 97.5 °F (36.4 °C)   Ht 182.9 cm (72\")   Wt (!) 142 kg (312 lb 9.6 oz)   SpO2 98%   BMI 42.40 kg/m²     Physical Exam  Vitals and nursing note reviewed.   Constitutional:       General: He is not in acute distress.     Appearance: He is well-developed. He is not toxic-appearing or diaphoretic.   HENT:      Head: Normocephalic and atraumatic. Hair is " normal.      Right Ear: External ear normal. No drainage, swelling or tenderness. Tympanic membrane is retracted.      Left Ear: External ear normal. No drainage, swelling or tenderness. Tympanic membrane is retracted.      Nose: Mucosal edema present.      Mouth/Throat:      Mouth: No oral lesions.      Pharynx: Uvula midline. No oropharyngeal exudate, posterior oropharyngeal erythema or uvula swelling.   Eyes:      General: No scleral icterus.        Right eye: No discharge.         Left eye: No discharge.      Conjunctiva/sclera: Conjunctivae normal.      Pupils: Pupils are equal, round, and reactive to light.   Cardiovascular:      Rate and Rhythm: Normal rate and regular rhythm.      Heart sounds: Normal heart sounds. No murmur heard.    No gallop.   Pulmonary:      Effort: No respiratory distress.      Breath sounds: Normal breath sounds. No stridor. No wheezing or rales.   Chest:      Chest wall: No tenderness.   Abdominal:      Palpations: Abdomen is soft.      Tenderness: There is no abdominal tenderness.   Musculoskeletal:      Cervical back: Normal range of motion and neck supple.   Lymphadenopathy:      Cervical: No cervical adenopathy.   Skin:     General: Skin is warm and dry.      Findings: No rash.   Neurological:      Mental Status: He is alert and oriented to person, place, and time.      Motor: No abnormal muscle tone.   Psychiatric:         Behavior: Behavior normal.         Thought Content: Thought content normal.         Judgment: Judgment normal.         Results for orders placed or performed in visit on 12/14/22   POCT rapid strep A    Specimen: Swab   Result Value Ref Range    Rapid Strep A Screen Negative Negative, VALID, INVALID, Not Performed    Internal Control Passed Passed    Lot Number [id number]     Expiration Date 5/31/2024         ASSESSMENT/PLAN    Diagnoses and all orders for this visit:    1. Sore throat (Primary)  Comments:  Improving today. Testing negative. Continue  symptomatic care. Call if worsening or no better.  Orders:  -     POCT rapid strep A    Other orders  -     promethazine (PHENERGAN) 25 MG tablet; Take 1 tablet by mouth Every 6 (Six) Hours As Needed for Nausea or Vomiting.  Dispense: 20 tablet; Refill: 0             No follow-ups on file.  Answers for HPI/ROS submitted by the patient on 12/13/2022  What is the primary reason for your visit?: Sore Throat    Transcribed from ambient dictation for PEGGY Olmos by Karin Lawrence.  12/14/22   13:56 EST    Patient or patient representative verbalized consent to the visit recording.  I have personally performed the services described in this document as transcribed by the above individual, and it is both accurate and complete.

## 2022-12-20 ENCOUNTER — TRANSCRIBE ORDERS (OUTPATIENT)
Dept: LAB | Facility: HOSPITAL | Age: 72
End: 2022-12-20

## 2022-12-20 ENCOUNTER — LAB (OUTPATIENT)
Dept: LAB | Facility: HOSPITAL | Age: 72
End: 2022-12-20

## 2022-12-20 ENCOUNTER — TELEPHONE (OUTPATIENT)
Dept: ORTHOPEDIC SURGERY | Facility: CLINIC | Age: 72
End: 2022-12-20

## 2022-12-20 DIAGNOSIS — E87.6 HYPOPOTASSEMIA: Primary | ICD-10-CM

## 2022-12-20 DIAGNOSIS — L03.115 CELLULITIS OF RIGHT FOOT: ICD-10-CM

## 2022-12-20 DIAGNOSIS — M05.79 SEROPOSITIVE RHEUMATOID ARTHRITIS OF MULTIPLE SITES: ICD-10-CM

## 2022-12-20 LAB
ALBUMIN SERPL-MCNC: 3.5 G/DL (ref 3.5–5.2)
ALBUMIN/GLOB SERPL: 1 G/DL
ALP SERPL-CCNC: 76 U/L (ref 39–117)
ALT SERPL W P-5'-P-CCNC: 14 U/L (ref 1–41)
ANION GAP SERPL CALCULATED.3IONS-SCNC: 8 MMOL/L (ref 5–15)
AST SERPL-CCNC: 17 U/L (ref 1–40)
BASOPHILS # BLD AUTO: 0.01 10*3/MM3 (ref 0–0.2)
BASOPHILS NFR BLD AUTO: 0.1 % (ref 0–1.5)
BILIRUB SERPL-MCNC: 0.4 MG/DL (ref 0–1.2)
BUN SERPL-MCNC: 9 MG/DL (ref 8–23)
BUN/CREAT SERPL: 11.8 (ref 7–25)
CALCIUM SPEC-SCNC: 9.2 MG/DL (ref 8.6–10.5)
CHLORIDE SERPL-SCNC: 102 MMOL/L (ref 98–107)
CO2 SERPL-SCNC: 30 MMOL/L (ref 22–29)
CREAT SERPL-MCNC: 0.76 MG/DL (ref 0.76–1.27)
CRP SERPL-MCNC: 3.11 MG/DL (ref 0–0.5)
DEPRECATED RDW RBC AUTO: 45.4 FL (ref 37–54)
EGFRCR SERPLBLD CKD-EPI 2021: 96.1 ML/MIN/1.73
EOSINOPHIL # BLD AUTO: 0.25 10*3/MM3 (ref 0–0.4)
EOSINOPHIL NFR BLD AUTO: 2.9 % (ref 0.3–6.2)
ERYTHROCYTE [DISTWIDTH] IN BLOOD BY AUTOMATED COUNT: 13.2 % (ref 12.3–15.4)
ERYTHROCYTE [SEDIMENTATION RATE] IN BLOOD: 66 MM/HR (ref 0–20)
GLOBULIN UR ELPH-MCNC: 3.6 GM/DL
GLUCOSE SERPL-MCNC: 105 MG/DL (ref 65–99)
HCT VFR BLD AUTO: 39.2 % (ref 37.5–51)
HGB BLD-MCNC: 13 G/DL (ref 13–17.7)
IMM GRANULOCYTES # BLD AUTO: 0.09 10*3/MM3 (ref 0–0.05)
IMM GRANULOCYTES NFR BLD AUTO: 1 % (ref 0–0.5)
LYMPHOCYTES # BLD AUTO: 1.03 10*3/MM3 (ref 0.7–3.1)
LYMPHOCYTES NFR BLD AUTO: 11.9 % (ref 19.6–45.3)
MCH RBC QN AUTO: 31.1 PG (ref 26.6–33)
MCHC RBC AUTO-ENTMCNC: 33.2 G/DL (ref 31.5–35.7)
MCV RBC AUTO: 93.8 FL (ref 79–97)
MONOCYTES # BLD AUTO: 0.82 10*3/MM3 (ref 0.1–0.9)
MONOCYTES NFR BLD AUTO: 9.5 % (ref 5–12)
NEUTROPHILS NFR BLD AUTO: 6.45 10*3/MM3 (ref 1.7–7)
NEUTROPHILS NFR BLD AUTO: 74.6 % (ref 42.7–76)
NRBC BLD AUTO-RTO: 0 /100 WBC (ref 0–0.2)
PLATELET # BLD AUTO: 402 10*3/MM3 (ref 140–450)
PMV BLD AUTO: 9.2 FL (ref 6–12)
POTASSIUM SERPL-SCNC: 4 MMOL/L (ref 3.5–5.2)
PROT SERPL-MCNC: 7.1 G/DL (ref 6–8.5)
RBC # BLD AUTO: 4.18 10*6/MM3 (ref 4.14–5.8)
SODIUM SERPL-SCNC: 140 MMOL/L (ref 136–145)
WBC NRBC COR # BLD: 8.65 10*3/MM3 (ref 3.4–10.8)

## 2022-12-20 PROCEDURE — 85025 COMPLETE CBC W/AUTO DIFF WBC: CPT | Performed by: INTERNAL MEDICINE

## 2022-12-20 PROCEDURE — 36415 COLL VENOUS BLD VENIPUNCTURE: CPT | Performed by: INTERNAL MEDICINE

## 2022-12-20 PROCEDURE — 80053 COMPREHEN METABOLIC PANEL: CPT | Performed by: INTERNAL MEDICINE

## 2022-12-20 PROCEDURE — 85652 RBC SED RATE AUTOMATED: CPT | Performed by: INTERNAL MEDICINE

## 2022-12-20 PROCEDURE — 86140 C-REACTIVE PROTEIN: CPT | Performed by: INTERNAL MEDICINE

## 2022-12-20 NOTE — TELEPHONE ENCOUNTER
----- Message from Obi Jackman Jr. sent at 12/19/2022  8:30 PM EST -----  Regarding: Boy Jackman knees  Contact: [phone]  Hi Dr. Olivas: the last of my 3 gel injections was November 10th. I’m walking with a cane at about 10-20% improvement from prior to the injections. There is NO improvement for up and down stairs. Squatting and stairs are the most painful. Should I make an appointment?  More injections?  Replacement?  Thank you.   Boy Jackman

## 2022-12-20 NOTE — TELEPHONE ENCOUNTER
Sorry.. last gel injection on October 17, not November 10.    Boy Jackman        ----- Message from Obi Jackman Jr. sent at 12/19/2022  8:30 PM EST -----  Regarding: Boy Jackman knees  Contact: [phone]  Hi Dr. Olivas: the last of my 3 gel injections was November 10th. I’m walking with a cane at about 10-20% improvement from prior to the injections. There is NO improvement for up and down stairs. Squatting and stairs are the most painful. Should I make an appointment?  More injections?  Replacement?  Thank you.   Boy Jackman

## 2022-12-20 NOTE — TELEPHONE ENCOUNTER
Patient can be scheduled for corticosteroid injection.    If he wants to discuss surgery please schedule with Dr. Olivas.

## 2022-12-28 ENCOUNTER — OFFICE VISIT (OUTPATIENT)
Dept: ORTHOPEDIC SURGERY | Facility: CLINIC | Age: 72
End: 2022-12-28

## 2022-12-28 VITALS
SYSTOLIC BLOOD PRESSURE: 150 MMHG | WEIGHT: 301.8 LBS | DIASTOLIC BLOOD PRESSURE: 86 MMHG | BODY MASS INDEX: 40.88 KG/M2 | HEIGHT: 72 IN

## 2022-12-28 DIAGNOSIS — E66.01 OBESITY, MORBID, BMI 40.0-49.9: ICD-10-CM

## 2022-12-28 DIAGNOSIS — M17.11 PRIMARY OSTEOARTHRITIS OF RIGHT KNEE: ICD-10-CM

## 2022-12-28 DIAGNOSIS — M17.12 PRIMARY OSTEOARTHRITIS OF LEFT KNEE: Primary | ICD-10-CM

## 2022-12-28 PROCEDURE — 99214 OFFICE O/P EST MOD 30 MIN: CPT | Performed by: ORTHOPAEDIC SURGERY

## 2022-12-28 RX ORDER — MELOXICAM 7.5 MG/1
15 TABLET ORAL ONCE
Status: CANCELLED | OUTPATIENT
Start: 2022-12-28 | End: 2022-12-28

## 2022-12-28 RX ORDER — ACETAMINOPHEN 325 MG/1
1000 TABLET ORAL ONCE
Status: CANCELLED | OUTPATIENT
Start: 2022-12-28 | End: 2022-12-28

## 2022-12-28 RX ORDER — PREGABALIN 150 MG/1
150 CAPSULE ORAL ONCE
Status: CANCELLED | OUTPATIENT
Start: 2022-12-28 | End: 2022-12-28

## 2022-12-28 NOTE — PROGRESS NOTES
[facility] [place] [facility] [place]    Subjective     Chief Complaint   Patient presents with   • Follow-up     4 month follow up -- Primary osteoarthritis of both knees; visco completed 10/17/22        HPI    It has been 4  month(s) since Mr. Jackman's last visit. He returns to clinic today for follow-up of bilateral knee arthritis. The issue has been ongoing for several  year(s). He rates his pain a 9/10 on the pain scale. Previous/current treatments: cane/walker, NSAIDS, physical therapy and weight loss. Current symptoms: pain, grinding and giving way/buckling. The pain is worse with walking, standing, climbing stairs and rising from seated position; resting, sitting, assistive device (cane/walker) and lying down improve the pain. Overall, he is doing the same.  The left knee bothers him more than the right.  Brief relief with the viscosupplementation injections.  No relief with previous steroid injections.  He is interested in considering surgical intervention if appropriate.    No history of clots or clotting disorders.  No blood thinners.  His wife is able to help out postoperatively.  He sees Dr. Hiltons in infectious diseases, and he is planning for decontamination prior to surgery in light of his previous hip infection, and he remains on prophylactic antibiotics.  No diabetes.    I have reviewed the following portions of the patient's history and agree with: History of Present Illness and Review of Systems    Patient Active Problem List   Diagnosis   • Allergic rhinitis   • Anxiety disorder   • Benign paroxysmal positional vertigo   • Chronic tension type headache   • Depression   • ED (erectile dysfunction) of non-organic origin   • Hyperlipidemia   • Hypertension   • LFTs abnormal   • CHRISTOPHE (obstructive sleep apnea)   • Elevated glucose   • Health care maintenance   • Morbid obesity due to excess calories (HCC)   • Onychomycosis of left great toe   • Osteoarthritis of ankle or foot   • Venous stasis    • Neuropathy   • Class 3 severe obesity due to excess calories with serious comorbidity and body mass index (BMI) of 40.0 to 44.9 in adult ([facility])   • Pre-op evaluation   • Arthritis of foot, left   • S/P left ankle triple arthrodesis   • Acute blood loss anemia, mild, asymptomatic    • Acute postoperative pain   • Wound infection   • Primary osteoarthritis of right hip   • Status post total replacement of right hip   • Postoperative wound infection of right hip, s/p I and D, head and liner exchange.   • Primary osteoarthritis of left knee     Past Medical History:   Diagnosis Date   • Anxiety and depression    • Arthritis of neck ?   • Bulging of lumbar intervertebral disc    • Cataract     Lens implated in left eye.   • Cervical disc disorder ?   • CPAP (continuous positive airway pressure) dependence    • Hip arthrosis Herb    Subsequent staph infection   • Hyperlipidemia    • Hypertension    • Knee swelling    • Migraine    • Neuroma of foot     ?   • CHRISTOPHE on CPAP    • Periarthritis of shoulder ?   • Rheumatoid arthritis ([facility])    • Rotator cuff syndrome 09/15/2022    Dr. Chanda Castanon   • Swelling of left ear    • Wears contact lenses    • Wears contact lenses       Past Surgical History:   Procedure Laterality Date   • ACHILLES TENDON SURGERY Left 10/15/2019    Procedure: ACHILLES TENDON LENGTHENING LEFT;  Surgeon: Elodia Guzman MD;  Location:  [facility];  Service: Orthopedics   • CATARACT EXTRACTION Right    • COLONOSCOPY  2015   • EYE SURGERY Bilateral     cornea transplant    • FOOT SURGERY Left 10/15/2019    triple arthrodesis and achilles tendon lengthening- [place]   • HERNIA REPAIR     • ILIAC CREST BONE GRAFT Left 10/15/2019    Procedure: ILIAC CREST BONE GRAFT LEFT;  Surgeon: Elodia Guzman MD;  Location: [facility] OR;  Service: Orthopedics   • INCISION AND DRAINAGE LEG Right 12/04/2020    Procedure: INCISION AND DRAINAGE WITH COMPONENT EXCHANGE, HEAD AND LINER RIGHT HIP;  Surgeon: Osbaldo Olivas MD;   Location:  [facility];  Service: Orthopedics;  Laterality: Right;   • JOINT REPLACEMENT  8/4/20 and 12/4/20    Right hip replacement   • TOE FUSION Left 10/15/2019    Procedure: TRIPLE ARTHODESIS LEFT;  Surgeon: Elodia Guzman MD;  Location:  [facility];  Service: Orthopedics   • TOTAL HIP ARTHROPLASTY Right 08/04/2020    Procedure: TOTAL HIP ARTHROPLASTY RIGHT;  Surgeon: Osbaldo Olivas MD;  Location:  [facility];  Service: Orthopedics;  Laterality: Right;   • TRIGGER POINT INJECTION     • VENOUS ABLATION Left 01/21/2022    Endovenous laser ablation of left great sapheuos vein for venous insufficiency      Family History   Problem Relation Age of Onset   • No Known Problems Mother    • No Known Problems Father    • Cancer Sister         Abdominal     Social History     Socioeconomic History   • Marital status:    Tobacco Use   • Smoking status: Never   • Smokeless tobacco: Never   Vaping Use   • Vaping Use: Never used   Substance and Sexual Activity   • Alcohol use: Not Currently     Comment: One drink 1-2x month   • Drug use: No   • Sexual activity: Yes     Partners: Female      Current Outpatient Medications on File Prior to Visit   Medication Sig Dispense Refill   • acetaminophen (TYLENOL) 325 MG tablet Take 325 mg by mouth As Needed for Mild Pain .     • ALPRAZolam (XANAX) 0.25 MG tablet Take 1/2 to 1 tablet prn when flying 10 tablet 0   • amLODIPine (NORVASC) 5 MG tablet TAKE 1 TABLET BY MOUTH DAILY. 90 tablet 1   • Ascorbic Acid (VITAMIN C PO) Take  by mouth Daily.     • atenolol (TENORMIN) 50 MG tablet TAKE 1 TABLET BY MOUTH DAILY. 30 tablet 3   • atorvastatin (LIPITOR) 10 MG tablet TAKE 1 TABLET BY MOUTH DAILY. RESUME WHEN NOT ON DAPTO ANYMORE 30 tablet 3   • buPROPion XL (WELLBUTRIN XL) 150 MG 24 hr tablet TAKE 1 TABLET BY MOUTH DAILY 90 tablet 1   • clotrimazole (LOTRIMIN) 1 % cream Apply  topically to the appropriate area as directed 2 (Two) Times a Day. 15 g 1   • clotrimazole-betamethasone  (LOTRISONE) 1-0.05 % cream APPLY TOPICALLY TO THE APPROPRIATE AREA AS DIRECTED 2 (TWO) TIMES A DAY. 45 g 0   • Cyanocobalamin (VITAMIN B-12 ER PO) Take 1 tablet by mouth Daily.     • cyclobenzaprine (FLEXERIL) 10 MG tablet TAKE ONE TABLET BY MOUTH THREE TIMES A DAY AS NEEDED FOR MUSCLE SPASMS 30 tablet 5   • doxycycline (VIBRAMYCIN) 100 MG capsule      • Ergocalciferol (VITAMIN D2 PO) Take  by mouth Daily.     • gabapentin (NEURONTIN) 300 MG capsule Take 300 mg by mouth 3 (Three) Times a Day.     • hydrocortisone 1 % ointment Apply 1 application topically to the appropriate area as directed 2 (Two) Times a Day. 56 g 0   • hydroxychloroquine (PLAQUENIL) 200 MG tablet Take 1 tablet by mouth Every 12 (Twelve) Hours.     • latanoprost (XALATAN) 0.005 % ophthalmic solution Apply 1 drop to eye(s) as directed by provider.     • lisinopril-hydrochlorothiazide (PRINZIDE,ZESTORETIC) 20-12.5 MG per tablet TAKE 1 TABLET BY MOUTH DAILY 30 tablet 3   • meclizine (ANTIVERT) 25 MG tablet 25 mg Every 8 (Eight) Hours As Needed.     • nystatin (MYCOSTATIN) 619900 UNIT/GM cream Apply 1 application topically to the appropriate area as directed 2 (Two) Times a Day. 30 g 1   • nystatin (MYCOSTATIN) 371776 UNIT/GM powder Apply  topically to the appropriate area as directed 3 (Three) Times a Day. 30 g 1   • oxaprozin (DAYPRO) 600 MG tablet Take 1,200 mg by mouth Daily. Take 2 po every day     • prednisoLONE acetate (PRED FORTE) 1 % ophthalmic suspension Apply 1 drop to eye(s) as directed by provider 4 (Four) Times a Day.     • promethazine (PHENERGAN) 25 MG tablet Take 1 tablet by mouth Every 6 (Six) Hours As Needed for Nausea or Vomiting. 20 tablet 0   • SUMAtriptan (IMITREX) 100 MG tablet TAKE ONE TABLET AT ONSET OF HEADACHE. MAY REPEAT DOSE ONE TIME IN 2 HOURS IF HEADACHE NOT RELIEVED. 9 tablet 5   • traMADol (ULTRAM) 50 MG tablet        No current facility-administered medications on file prior to visit.      No Known Allergies      Review of Systems   Constitutional: Negative for activity change, appetite change, chills, diaphoresis, fatigue, fever and unexpected weight change.   HENT: Negative for congestion, dental problem, drooling, ear discharge, ear pain, facial swelling, hearing loss, mouth sores, nosebleeds, postnasal drip, rhinorrhea, sinus pressure, sneezing, sore throat, tinnitus, trouble swallowing and voice change.    Eyes: Negative for photophobia, pain, discharge, redness, itching and visual disturbance.   Respiratory: Negative for apnea, cough, choking, chest tightness, shortness of breath, wheezing and stridor.    Cardiovascular: Negative for chest pain, palpitations and leg swelling.   Gastrointestinal: Negative for abdominal distention, abdominal pain, anal bleeding, blood in stool, constipation, diarrhea, nausea, rectal pain and vomiting.   Endocrine: Negative for cold intolerance, heat intolerance, polydipsia, polyphagia and polyuria.   Genitourinary: Negative for decreased urine volume, difficulty urinating, dysuria, enuresis, flank pain, frequency, genital sores, hematuria and urgency.   Musculoskeletal: Positive for arthralgias. Negative for back pain, gait problem, joint swelling, myalgias, neck pain and neck stiffness.   Skin: Negative for color change, pallor, rash and wound.   Allergic/Immunologic: Negative for environmental allergies, food allergies and immunocompromised state.   Neurological: Negative for dizziness, tremors, seizures, syncope, facial asymmetry, speech difficulty, weakness, light-headedness, numbness and headaches.   Hematological: Negative for adenopathy. Does not bruise/bleed easily.   Psychiatric/Behavioral: Negative for agitation, behavioral problems, confusion, decreased concentration, dysphoric mood, hallucinations, self-injury, sleep disturbance and suicidal ideas. The patient is not nervous/anxious and is not hyperactive.         Objective      Physical Exam  /86   Ht 182.9 cm  "(72.01\")   Wt (!) 137 kg (301 lb 12.8 oz)   BMI 40.92 kg/m²     Body mass index is 40.92 kg/m².    General:   Mental Status:  Alert   Appearance: Cooperative, in no acute distress   Build and Nutrition: Obese by BMI male   Orientation: Alert and oriented to person, place and time   Posture: Normal   Gait: Antalgic on both lower extremities    Integument:   Right knee: no skin lesions, no rash, no ecchymosis   Left knee: no skin lesions, no rash, no ecchymosis    Lower Extremities:   Right Knee:    Tenderness:  None    Effusion:  None    Swelling:  None    Crepitus:  Positive    Atrophy:  None    Range of motion:  Extension: 0°       Flexion: 120°  Instability:  No varus laxity, no valgus laxity, negative anterior drawer  Deformities:  None   Left Knee:    Tenderness:  Medial joint line tenderness    Effusion:  None    Swelling:  None    Crepitus: Positive    Atrophy:  None    Range of motion:  Extension: 0°       Flexion: 120°  Instability:  No varus laxity, no valgus laxity, negative anterior drawer  Deformities:  None      Imaging/Studies  Imaging Results (Last 24 Hours)     Procedure Component Value Units Date/Time    XR Knee 4+ View Bilateral [[id number]] Resulted: 12/28/22 0946     Updated: 12/28/22 0947    Narrative:      Right Knee Radiographs  Indication: right knee pain  Views: Standing AP's and skiers of both knees, with lateral and sunrise   views of the right knee    Comparison: 8/10/2022    Findings:   Bone-on-bone contact medial compartment, no acute bony abnormalities.  No   unusual bony features.  Worsening compared to the previous imaging.    Left Knee Radiographs  Indication: left knee pain  Views: Standing AP's and skiers of both knees, with lateral and sunrise   views of the left knee    Comparison: 8/10/2022    Findings:   Bone-on-bone contact medial compartment, no acute bony abnormalities.  No   unusual bony features.  Worsening compared to the previous imaging.            Assessment and Plan "     Diagnoses and all orders for this visit:    1. Primary osteoarthritis of left knee (Primary)  -     XR Knee 4+ View Bilateral  -     Case Request; Standing  -     Instructions on coughing, deep breathing, and incentive spirometry.; Future  -     CBC and Differential; Future  -     Basic metabolic panel; Future  -     Protime-INR; Future  -     APTT; Future  -     Hemoglobin A1c; Future  -     Sedimentation rate; Future  -     C-reactive protein; Future  -     Tranexamic Acid 1,000 mg in sodium chloride 0.9 % 100 mL  -     Tranexamic Acid 1,000 mg in sodium chloride 0.9 % 100 mL  -     ethyl alcohol 62 % 2 each  -     ceFAZolin (ANCEF) 2 g in sodium chloride 0.9 % 100 mL IVPB  -     acetaminophen (TYLENOL) tablet 975 mg  -     meloxicam (MOBIC) tablet 15 mg  -     pregabalin (LYRICA) capsule 150 mg  -     Case Request    2. Primary osteoarthritis of right knee  -     XR Knee 4+ View Bilateral    3. Obesity, morbid, BMI 40.0-49.9 ([facility])    Other orders  -     Outpatient In A Bed; Standing  -     Follow Anesthesia Guidelines / Protocol; Future  -     Obtain informed consent  -     Provide instructions to patient regarding NPO status  -     Chlorhexidine Skin Prep - Educate and Review With Patient; Future  -     Provide Patient With ERAS Hydration Instructions  -     Provide Patient With Enhanced Recovery Booklet(s) or Handout  -     Provide Instructions/Handout For Benzoyl Peroxide 5% Wash If Having Shoulder/Arm Surgery (If Prescribed)  -     Provide Instructions/Handout For Bactroban And Chlorhexidine Shower (If Prescribed)  -     Perform A Memory Screening On All Hip/Knee Replacement Patients >Or Equal To 65 Years Or Older  -     Complete A PROMIS And HOOS Or KOOS Survey If Having Hip Or Knee Replacement  -     Follow Anesthesia Guidelines / Protocol; Standing  -     Nerve Block; Standing  -     Verify NPO Status; Standing  -     Verify The Time Patient Completed ERAS Hydration Drink; Standing  -     SCD  (sequential compression device)- to be placed on patient in Pre-op; Standing  -     Clip operative site; Standing  -     Obtain informed consent (if not collected inpatient or PAT); Standing  -     Notify Physician - Standard; Standing  -     Provide Patient With Carbo Loading Instructions  -     Provide Patient With ERAS Booklet(s)/Handout  -     chlorhexidine (HIBICLENS) 4 % external liquid; Apply  topically to the appropriate area as directed Daily. Shower with hibiclens solution as directed for 5 days prior to surgery  Dispense: 236 mL; Refill: 0        1. Primary osteoarthritis of left knee    2. Primary osteoarthritis of right knee    3. Obesity, morbid, BMI 40.0-49.9 ([facility])        I reviewed my findings with the patient.  He has exhausted conservative treatment, and he is interested in discussing knee replacement surgery at this time.  We discussed the risk, benefits, and alternatives to surgery today, and he understands.  He also understands increased risks in light of his previous hip infection as well as his weight.  Please see my counseling note for details.  He would like to proceed with left total knee arthroplasty surgery sometime in the spring, after a wedding in early March.  He is going to continue to work on weight loss in the meantime.    Surgical Counseling     I have informed the patient of the diagnosis and the prognosis.  Exhaustive conservative treatment modalities have not resulted in long term pain relief.  The symptoms have progressed to the point of daily pain and inability to perform activities of daily living without significant pain. The patient has reached the point of desiring to proceed with total knee arthroplasty after discussing the risks, benefits and alternatives to the procedure.  The surgical procedure itself was discussed in detail. Risks of the procedure were discussed, which included but are not limited to, bleeding, infection, damage to blood vessels and nerves, incomplete  pain relief, loosening of the prosthesis (early or late), deep infection (early or late), need for further surgery, loss of limb, deep venous thrombosis, pulmonary embolus, death, heart attack, stroke, kidney failure, liver failure, and anesthetic complications.  In addition, the potential for deep infection developing in the future was discussed, which could require further surgery.  The knee would have to be re-opened, debrided, and potentially remove the prosthesis, which may or may not be replaced in the future.  Also, the possibility for loosening of the prosthesis has been mentioned.  If the prosthesis loosened, a revision arthroplasty could be performed, with results that are not as predictable compared to the original procedure.  The typical rehabilitative course has also been discussed, and full recovery may take up to a year to see the maximum benefit.  The importance of patient cooperation in the rehabilitative efforts has also been discussed.  No guarantees were given.  The patient understands the potential risks versus the benefits and desires to proceed with total knee arthroplasty at a mutually convenient time.    Return for surgery.      Osbaldo Olivas MD  12/28/22  10:47 EST

## 2023-01-16 DIAGNOSIS — I15.9 SECONDARY HYPERTENSION: ICD-10-CM

## 2023-01-16 RX ORDER — ATENOLOL 50 MG/1
50 TABLET ORAL DAILY
Qty: 30 TABLET | Refills: 3 | Status: SHIPPED | OUTPATIENT
Start: 2023-01-16 | End: 2024-01-16

## 2023-01-18 ENCOUNTER — TRANSCRIBE ORDERS (OUTPATIENT)
Dept: LAB | Facility: HOSPITAL | Age: 73
End: 2023-01-18
Payer: MEDICARE

## 2023-01-18 ENCOUNTER — LAB (OUTPATIENT)
Dept: LAB | Facility: HOSPITAL | Age: 73
End: 2023-01-18
Payer: MEDICARE

## 2023-01-18 DIAGNOSIS — L03.115 CELLULITIS OF RIGHT FOOT: ICD-10-CM

## 2023-01-18 DIAGNOSIS — T84.51XD INFLAMMATORY REACTION DUE TO INTERNAL PROSTHESIS OF RIGHT HIP, SUBSEQUENT ENCOUNTER: Primary | ICD-10-CM

## 2023-01-18 DIAGNOSIS — B95.7 CHRONIC GRANULOMATOUS INFECTION DUE MOSTLY TO STAPHYLOCOCCUS AUREUS: ICD-10-CM

## 2023-01-18 LAB
ALBUMIN SERPL-MCNC: 4.2 G/DL (ref 3.5–5.2)
ALBUMIN/GLOB SERPL: 1.6 G/DL
ALP SERPL-CCNC: 63 U/L (ref 39–117)
ALT SERPL W P-5'-P-CCNC: 10 U/L (ref 1–41)
ANION GAP SERPL CALCULATED.3IONS-SCNC: 9 MMOL/L (ref 5–15)
AST SERPL-CCNC: 14 U/L (ref 1–40)
BASOPHILS # BLD AUTO: 0.02 10*3/MM3 (ref 0–0.2)
BASOPHILS NFR BLD AUTO: 0.3 % (ref 0–1.5)
BILIRUB SERPL-MCNC: 0.5 MG/DL (ref 0–1.2)
BUN SERPL-MCNC: 11 MG/DL (ref 8–23)
BUN/CREAT SERPL: 14.1 (ref 7–25)
CALCIUM SPEC-SCNC: 9.3 MG/DL (ref 8.6–10.5)
CHLORIDE SERPL-SCNC: 104 MMOL/L (ref 98–107)
CO2 SERPL-SCNC: 29 MMOL/L (ref 22–29)
CREAT SERPL-MCNC: 0.78 MG/DL (ref 0.76–1.27)
CRP SERPL-MCNC: <0.3 MG/DL (ref 0–0.5)
DEPRECATED RDW RBC AUTO: 44.2 FL (ref 37–54)
EGFRCR SERPLBLD CKD-EPI 2021: 95.3 ML/MIN/1.73
EOSINOPHIL # BLD AUTO: 0.26 10*3/MM3 (ref 0–0.4)
EOSINOPHIL NFR BLD AUTO: 4.3 % (ref 0.3–6.2)
ERYTHROCYTE [DISTWIDTH] IN BLOOD BY AUTOMATED COUNT: 12.8 % (ref 12.3–15.4)
ERYTHROCYTE [SEDIMENTATION RATE] IN BLOOD: 25 MM/HR (ref 0–20)
GLOBULIN UR ELPH-MCNC: 2.7 GM/DL
GLUCOSE SERPL-MCNC: 94 MG/DL (ref 65–99)
HCT VFR BLD AUTO: 41.4 % (ref 37.5–51)
HGB BLD-MCNC: 13.5 G/DL (ref 13–17.7)
IMM GRANULOCYTES # BLD AUTO: 0.01 10*3/MM3 (ref 0–0.05)
IMM GRANULOCYTES NFR BLD AUTO: 0.2 % (ref 0–0.5)
LYMPHOCYTES # BLD AUTO: 1.21 10*3/MM3 (ref 0.7–3.1)
LYMPHOCYTES NFR BLD AUTO: 19.8 % (ref 19.6–45.3)
MCH RBC QN AUTO: 30.5 PG (ref 26.6–33)
MCHC RBC AUTO-ENTMCNC: 32.6 G/DL (ref 31.5–35.7)
MCV RBC AUTO: 93.7 FL (ref 79–97)
MONOCYTES # BLD AUTO: 0.58 10*3/MM3 (ref 0.1–0.9)
MONOCYTES NFR BLD AUTO: 9.5 % (ref 5–12)
NEUTROPHILS NFR BLD AUTO: 4.03 10*3/MM3 (ref 1.7–7)
NEUTROPHILS NFR BLD AUTO: 65.9 % (ref 42.7–76)
NRBC BLD AUTO-RTO: 0 /100 WBC (ref 0–0.2)
PLATELET # BLD AUTO: 247 10*3/MM3 (ref 140–450)
PMV BLD AUTO: 10 FL (ref 6–12)
POTASSIUM SERPL-SCNC: 4.3 MMOL/L (ref 3.5–5.2)
PROT SERPL-MCNC: 6.9 G/DL (ref 6–8.5)
RBC # BLD AUTO: 4.42 10*6/MM3 (ref 4.14–5.8)
SODIUM SERPL-SCNC: 142 MMOL/L (ref 136–145)
WBC NRBC COR # BLD: 6.11 10*3/MM3 (ref 3.4–10.8)

## 2023-01-18 PROCEDURE — 80053 COMPREHEN METABOLIC PANEL: CPT | Performed by: INTERNAL MEDICINE

## 2023-01-18 PROCEDURE — 85025 COMPLETE CBC W/AUTO DIFF WBC: CPT | Performed by: INTERNAL MEDICINE

## 2023-01-18 PROCEDURE — 85652 RBC SED RATE AUTOMATED: CPT | Performed by: INTERNAL MEDICINE

## 2023-01-18 PROCEDURE — 86140 C-REACTIVE PROTEIN: CPT | Performed by: INTERNAL MEDICINE

## 2023-01-18 PROCEDURE — 36415 COLL VENOUS BLD VENIPUNCTURE: CPT | Performed by: INTERNAL MEDICINE

## 2023-02-04 DIAGNOSIS — I15.9 SECONDARY HYPERTENSION: ICD-10-CM

## 2023-02-04 RX ORDER — LISINOPRIL AND HYDROCHLOROTHIAZIDE 20; 12.5 MG/1; MG/1
1 TABLET ORAL DAILY
Qty: 30 TABLET | Refills: 1 | Status: SHIPPED | OUTPATIENT
Start: 2023-02-04

## 2023-02-24 ENCOUNTER — PRE-ADMISSION TESTING (OUTPATIENT)
Dept: PREADMISSION TESTING | Facility: HOSPITAL | Age: 73
End: 2023-02-24
Payer: MEDICARE

## 2023-02-24 VITALS — HEIGHT: 72 IN | WEIGHT: 311.29 LBS | BODY MASS INDEX: 42.16 KG/M2

## 2023-02-24 DIAGNOSIS — M17.12 PRIMARY OSTEOARTHRITIS OF LEFT KNEE: ICD-10-CM

## 2023-02-24 LAB
ANION GAP SERPL CALCULATED.3IONS-SCNC: 8 MMOL/L (ref 5–15)
APTT PPP: 33 SECONDS (ref 22–39)
BASOPHILS # BLD AUTO: 0.01 10*3/MM3 (ref 0–0.2)
BASOPHILS NFR BLD AUTO: 0.2 % (ref 0–1.5)
BUN SERPL-MCNC: 15 MG/DL (ref 8–23)
BUN/CREAT SERPL: 19.7 (ref 7–25)
CALCIUM SPEC-SCNC: 9.3 MG/DL (ref 8.6–10.5)
CHLORIDE SERPL-SCNC: 105 MMOL/L (ref 98–107)
CO2 SERPL-SCNC: 30 MMOL/L (ref 22–29)
CREAT SERPL-MCNC: 0.76 MG/DL (ref 0.76–1.27)
CRP SERPL-MCNC: <0.3 MG/DL (ref 0–0.5)
DEPRECATED RDW RBC AUTO: 42.4 FL (ref 37–54)
EGFRCR SERPLBLD CKD-EPI 2021: 96.1 ML/MIN/1.73
EOSINOPHIL # BLD AUTO: 0.12 10*3/MM3 (ref 0–0.4)
EOSINOPHIL NFR BLD AUTO: 2.6 % (ref 0.3–6.2)
ERYTHROCYTE [DISTWIDTH] IN BLOOD BY AUTOMATED COUNT: 12.5 % (ref 12.3–15.4)
ERYTHROCYTE [SEDIMENTATION RATE] IN BLOOD: 24 MM/HR (ref 0–20)
GLUCOSE SERPL-MCNC: 103 MG/DL (ref 65–99)
HBA1C MFR BLD: 5.2 % (ref 4.8–5.6)
HCT VFR BLD AUTO: 42.5 % (ref 37.5–51)
HGB BLD-MCNC: 14.2 G/DL (ref 13–17.7)
IMM GRANULOCYTES # BLD AUTO: 0.01 10*3/MM3 (ref 0–0.05)
IMM GRANULOCYTES NFR BLD AUTO: 0.2 % (ref 0–0.5)
INR PPP: 1.01 (ref 0.84–1.13)
LYMPHOCYTES # BLD AUTO: 0.85 10*3/MM3 (ref 0.7–3.1)
LYMPHOCYTES NFR BLD AUTO: 18.7 % (ref 19.6–45.3)
MCH RBC QN AUTO: 31.1 PG (ref 26.6–33)
MCHC RBC AUTO-ENTMCNC: 33.4 G/DL (ref 31.5–35.7)
MCV RBC AUTO: 93 FL (ref 79–97)
MONOCYTES # BLD AUTO: 0.4 10*3/MM3 (ref 0.1–0.9)
MONOCYTES NFR BLD AUTO: 8.8 % (ref 5–12)
NEUTROPHILS NFR BLD AUTO: 3.15 10*3/MM3 (ref 1.7–7)
NEUTROPHILS NFR BLD AUTO: 69.5 % (ref 42.7–76)
NRBC BLD AUTO-RTO: 0 /100 WBC (ref 0–0.2)
PLATELET # BLD AUTO: 253 10*3/MM3 (ref 140–450)
PMV BLD AUTO: 10.2 FL (ref 6–12)
POTASSIUM SERPL-SCNC: 3.9 MMOL/L (ref 3.5–5.2)
PROTHROMBIN TIME: 13.2 SECONDS (ref 11.4–14.4)
QT INTERVAL: 442 MS
QTC INTERVAL: 455 MS
RBC # BLD AUTO: 4.57 10*6/MM3 (ref 4.14–5.8)
SODIUM SERPL-SCNC: 143 MMOL/L (ref 136–145)
WBC NRBC COR # BLD: 4.54 10*3/MM3 (ref 3.4–10.8)

## 2023-02-24 PROCEDURE — 93005 ELECTROCARDIOGRAM TRACING: CPT

## 2023-02-24 PROCEDURE — 85025 COMPLETE CBC W/AUTO DIFF WBC: CPT

## 2023-02-24 PROCEDURE — 85610 PROTHROMBIN TIME: CPT

## 2023-02-24 PROCEDURE — 93010 ELECTROCARDIOGRAM REPORT: CPT | Performed by: INTERNAL MEDICINE

## 2023-02-24 PROCEDURE — 85730 THROMBOPLASTIN TIME PARTIAL: CPT

## 2023-02-24 PROCEDURE — 80048 BASIC METABOLIC PNL TOTAL CA: CPT

## 2023-02-24 PROCEDURE — 86140 C-REACTIVE PROTEIN: CPT

## 2023-02-24 PROCEDURE — 85652 RBC SED RATE AUTOMATED: CPT

## 2023-02-24 PROCEDURE — 83036 HEMOGLOBIN GLYCOSYLATED A1C: CPT

## 2023-02-24 PROCEDURE — 36415 COLL VENOUS BLD VENIPUNCTURE: CPT

## 2023-02-24 RX ORDER — LATANOPROST 50 UG/ML
1 SOLUTION/ DROPS OPHTHALMIC
COMMUNITY
Start: 2023-02-20 | End: 2023-04-03 | Stop reason: SDUPTHER

## 2023-02-24 RX ORDER — DORZOLAMIDE HYDROCHLORIDE AND TIMOLOL MALEATE 20; 5 MG/ML; MG/ML
SOLUTION/ DROPS OPHTHALMIC
COMMUNITY
Start: 2023-01-16

## 2023-02-24 RX ORDER — PREDNISOLONE ACETATE 10 MG/ML
1 SUSPENSION/ DROPS OPHTHALMIC DAILY
COMMUNITY
Start: 2023-02-20 | End: 2023-04-03 | Stop reason: SDUPTHER

## 2023-02-24 NOTE — PAT
An arrival time for procedure was not provided during PAT visit. If patient had any questions or concerns about their arrival time, they were instructed to contact their surgeon/physician.  Additionally, if the patient referred to an arrival time that was acquired from their my chart account, patient was encouraged to verify that time with their surgeon/physician. Arrival times are NOT provided in Pre Admission Testing Department.    Patient viewed general PAT education video as instructed in their preoperative information received from their surgeon.  Patient stated the general PAT education video was viewed in its entirety and survey completed.  Copies of PAT general education handouts (Incentive Spirometry, Meds to Beds Program, Patient Belongings, Pre-op skin preparation instructions, Blood Glucose testing, Visitor policy, Surgery FAQ, Code H) distributed to patient if not printed. Education related to the PAT pass and skin preparation for surgery (if applicable) completed in PAT as a reinforcement to PAT education video. Patient instructed to return PAT pass provided today as well as completed skin preparation sheet (if applicable) on the day of procedure.     Additionally if patient had not viewed video yet but intended to view it at home or in our waiting area, then referred them to the handout with QR code/link provided during PAT visit.  Instructed patient to complete survey after viewing the video in its entirety.  Encouraged patient/family to read PAT general education handouts thoroughly and notify PAT staff with any questions or concerns. Patient verbalized understanding of all information and priority content.    Patient denies any current skin issues.     Patient to apply Chlorhexadine wipes  to surgical area (as instructed) the night before procedure and the AM of procedure. Wipes provided.    Per Anesthesia Request, patient instructed not to take their ACE/ARB medications on the AM of  surgery.    Patient instructed to drink 20 ounces of Gatorade and it needs to be completed 1 hour (for Main OR patients) or 2 hours (scheduled  section & [facility] patients) before given arrival time for procedure (NO RED Gatorade)    Patient verbalized understanding.    Patient instructed to bring CPAP mask and tubing to the hospital for overnight stay.  Explained that it is not necessary to bring their CPAP machine to the hospital instead a CPAP machine will be provided for use by the hospital. If patient knows their CPAP settings, those settings will be implemented.  If not, the CPAP machine will be utilized on the auto setting using their mask and tubing.    Patient verbalized understanding.    Prescription for Bactroban (if prescribed) and Chlorhexidine shower called into patient's pharmacy or [facility] pharmacy by patient's surgeon.  Reinforced with patient to  the prescription from applicable pharmacy if they haven't already.  Verbal and written instructions given regarding proper use of the Bactroban (if prescribed) and Chlorhexidine to patient and/or famlily during PAT visit. Patient/family also instructed to complete checklist and return it to Pre-op on the day of surgery.  Patient and/or family verbalized understanding.    Discussed with patient options for receiving total joint replacement education and assessed patient's ability and preference. Joint Replacement Guide given to patient during PAT visit since not received a copy within the last year. Encouraged patient/family to read guide thoroughly and notify PAT staff with any questions or concerns. Handout provided directing patient to links to watch online videos related to joint replacement surgery on the [facility] website. The handout gives detailed instructions for joining an online joint replacement class through Zoom or phone conference offered on . Patient agreed to participate by watching videos online. Patient  verbalized understanding of instructions and to complete the online learning tool survey. Encouraged to share information with family and/or . An overview of the joint replacement education was provided during the visit including general perioperative instructions that are routine for all surgical patients (PAT PASS, wipes, directions to pre-op, etc.).    In[employer] (by [employer]) pain pump patient informational handout given to patient.  Instructed patient to watch [employer] Patient Education Video regarding Peripheral Nerve Catheter that will be in place for upcoming surgery unless contraindicated. The video can be accessed using QR code noted on handout.  Patient agreed to watch video.  Stressed to patient to call [facility] Nursing Hotline 24/7 if patient has any questions or concerns after discharge.     Post-Surgery Information Instruction Sheet given to patient during Pre-Admission Testing Visit with verbal instructions to patient to return with PAT PASS on the day of surgery. Additionally, encouraged patient to review the information provided.    Clean catch urinalysis not indicated because patient denied recent urinary frequency, urinary urgency, burning/pain upon urination, or flank pain. No recent UTIs.

## 2023-03-09 ENCOUNTER — ANESTHESIA EVENT (OUTPATIENT)
Dept: PERIOP | Facility: HOSPITAL | Age: 73
End: 2023-03-09
Payer: MEDICARE

## 2023-03-09 RX ORDER — SODIUM CHLORIDE 9 MG/ML
40 INJECTION, SOLUTION INTRAVENOUS AS NEEDED
Status: CANCELLED | OUTPATIENT
Start: 2023-03-09

## 2023-03-09 RX ORDER — SODIUM CHLORIDE 0.9 % (FLUSH) 0.9 %
10 SYRINGE (ML) INJECTION EVERY 12 HOURS SCHEDULED
Status: CANCELLED | OUTPATIENT
Start: 2023-03-09

## 2023-03-09 RX ORDER — FAMOTIDINE 10 MG/ML
20 INJECTION, SOLUTION INTRAVENOUS ONCE
Status: CANCELLED | OUTPATIENT
Start: 2023-03-09 | End: 2023-03-09

## 2023-03-09 RX ORDER — SODIUM CHLORIDE 0.9 % (FLUSH) 0.9 %
10 SYRINGE (ML) INJECTION AS NEEDED
Status: CANCELLED | OUTPATIENT
Start: 2023-03-09

## 2023-03-10 ENCOUNTER — APPOINTMENT (OUTPATIENT)
Dept: GENERAL RADIOLOGY | Facility: HOSPITAL | Age: 73
End: 2023-03-10
Payer: MEDICARE

## 2023-03-10 ENCOUNTER — ANESTHESIA (OUTPATIENT)
Dept: PERIOP | Facility: HOSPITAL | Age: 73
End: 2023-03-10
Payer: MEDICARE

## 2023-03-10 ENCOUNTER — ANESTHESIA EVENT CONVERTED (OUTPATIENT)
Dept: ANESTHESIOLOGY | Facility: HOSPITAL | Age: 73
End: 2023-03-10
Payer: MEDICARE

## 2023-03-10 ENCOUNTER — HOSPITAL ENCOUNTER (OUTPATIENT)
Facility: HOSPITAL | Age: 73
Discharge: HOME | End: 2023-03-11
Attending: ORTHOPAEDIC SURGERY | Admitting: ORTHOPAEDIC SURGERY
Payer: MEDICARE

## 2023-03-10 DIAGNOSIS — Z96.652 S/P TOTAL KNEE ARTHROPLASTY, LEFT: Primary | ICD-10-CM

## 2023-03-10 DIAGNOSIS — M17.12 PRIMARY OSTEOARTHRITIS OF LEFT KNEE: ICD-10-CM

## 2023-03-10 DIAGNOSIS — E78.5 HYPERLIPIDEMIA, UNSPECIFIED HYPERLIPIDEMIA TYPE: ICD-10-CM

## 2023-03-10 LAB — POTASSIUM SERPL-SCNC: 3.8 MMOL/L (ref 3.5–5.2)

## 2023-03-10 PROCEDURE — 27447 TOTAL KNEE ARTHROPLASTY: CPT | Performed by: ORTHOPAEDIC SURGERY

## 2023-03-10 PROCEDURE — 84132 ASSAY OF SERUM POTASSIUM: CPT | Performed by: ANESTHESIOLOGY

## 2023-03-10 PROCEDURE — 20985 CPTR-ASST DIR MS PX: CPT | Performed by: ORTHOPAEDIC SURGERY

## 2023-03-10 PROCEDURE — 25010000002 DEXAMETHASONE PER 1 MG: Performed by: NURSE ANESTHETIST, CERTIFIED REGISTERED

## 2023-03-10 PROCEDURE — 73560 X-RAY EXAM OF KNEE 1 OR 2: CPT

## 2023-03-10 PROCEDURE — C1755 CATHETER, INTRASPINAL: HCPCS | Performed by: ORTHOPAEDIC SURGERY

## 2023-03-10 PROCEDURE — 97116 GAIT TRAINING THERAPY: CPT

## 2023-03-10 PROCEDURE — 25010000002 FENTANYL CITRATE (PF) 50 MCG/ML SOLUTION

## 2023-03-10 PROCEDURE — S0260 H&P FOR SURGERY: HCPCS | Performed by: ORTHOPAEDIC SURGERY

## 2023-03-10 PROCEDURE — 25010000002 CEFAZOLIN PER 500 MG: Performed by: ORTHOPAEDIC SURGERY

## 2023-03-10 PROCEDURE — 25010000002 VANCOMYCIN 1 G RECONSTITUTED SOLUTION: Performed by: ORTHOPAEDIC SURGERY

## 2023-03-10 PROCEDURE — 27447 TOTAL KNEE ARTHROPLASTY: CPT | Performed by: PHYSICIAN ASSISTANT

## 2023-03-10 PROCEDURE — 25010000002 CEFAZOLIN IN DEXTROSE 2-4 GM/100ML-% SOLUTION: Performed by: ORTHOPAEDIC SURGERY

## 2023-03-10 PROCEDURE — 25010000002 ROPIVACAINE PER 1 MG: Performed by: ORTHOPAEDIC SURGERY

## 2023-03-10 PROCEDURE — 97110 THERAPEUTIC EXERCISES: CPT

## 2023-03-10 PROCEDURE — 97162 PT EVAL MOD COMPLEX 30 MIN: CPT

## 2023-03-10 PROCEDURE — 25010000002 ONDANSETRON PER 1 MG: Performed by: NURSE ANESTHETIST, CERTIFIED REGISTERED

## 2023-03-10 PROCEDURE — 20985 CPTR-ASST DIR MS PX: CPT | Performed by: PHYSICIAN ASSISTANT

## 2023-03-10 PROCEDURE — C1776 JOINT DEVICE (IMPLANTABLE): HCPCS | Performed by: ORTHOPAEDIC SURGERY

## 2023-03-10 PROCEDURE — 25010000002 PROPOFOL 10 MG/ML EMULSION: Performed by: NURSE ANESTHETIST, CERTIFIED REGISTERED

## 2023-03-10 PROCEDURE — C1713 ANCHOR/SCREW BN/BN,TIS/BN: HCPCS | Performed by: ORTHOPAEDIC SURGERY

## 2023-03-10 PROCEDURE — 25010000002 ROPIVACAINE PER 1 MG: Performed by: NURSE ANESTHETIST, CERTIFIED REGISTERED

## 2023-03-10 PROCEDURE — 25010000002 HYDROMORPHONE 1 MG/ML SOLUTION: Performed by: ORTHOPAEDIC SURGERY

## 2023-03-10 DEVICE — GENESIS II NON-POROUS TIBIAL                                    BASEPLATE SIZE 6 LT
Type: IMPLANTABLE DEVICE | Site: KNEE | Status: FUNCTIONAL
Brand: GENESIS II

## 2023-03-10 DEVICE — LEGION CRUCIATE RETAINING OXINIUM                                    FEMORAL SIZE 7 LEFT
Type: IMPLANTABLE DEVICE | Site: KNEE | Status: FUNCTIONAL
Brand: LEGION

## 2023-03-10 DEVICE — DEV CONTRL TISS STRATAFIX SYMM PDS PLUS VIL CT-1 45CM: Type: IMPLANTABLE DEVICE | Site: KNEE | Status: FUNCTIONAL

## 2023-03-10 DEVICE — DEV CONTRL TISS STRATAFIX SPIRAL MNCRYL UD 3/0 PLS 60CM: Type: IMPLANTABLE DEVICE | Site: KNEE | Status: FUNCTIONAL

## 2023-03-10 DEVICE — IMPLANTABLE DEVICE: Type: IMPLANTABLE DEVICE | Site: KNEE | Status: FUNCTIONAL

## 2023-03-10 DEVICE — LEGION CRUCIATE RETAINING HIGH                                    FLEX HIGHLY CROSS LINKED                                    POLYETHYLENE SIZE 5-6 11MM
Type: IMPLANTABLE DEVICE | Site: KNEE | Status: FUNCTIONAL
Brand: LEGION

## 2023-03-10 DEVICE — GENESIS II RESURFACING PATELLAR 35MM
Type: IMPLANTABLE DEVICE | Site: KNEE | Status: FUNCTIONAL
Brand: GENESIS II

## 2023-03-10 DEVICE — CMT BONE PALACOS R HI/VISC 1X40: Type: IMPLANTABLE DEVICE | Site: KNEE | Status: FUNCTIONAL

## 2023-03-10 RX ORDER — ONDANSETRON 4 MG/1
4 TABLET, FILM COATED ORAL EVERY 6 HOURS PRN
Status: DISCONTINUED | OUTPATIENT
Start: 2023-03-10 | End: 2023-03-11 | Stop reason: HOSPADM

## 2023-03-10 RX ORDER — DOXYCYCLINE 100 MG/1
100 CAPSULE ORAL
Status: DISCONTINUED | OUTPATIENT
Start: 2023-03-11 | End: 2023-03-11 | Stop reason: HOSPADM

## 2023-03-10 RX ORDER — CYCLOBENZAPRINE HCL 10 MG
10 TABLET ORAL 3 TIMES DAILY PRN
Status: DISCONTINUED | OUTPATIENT
Start: 2023-03-10 | End: 2023-03-11 | Stop reason: HOSPADM

## 2023-03-10 RX ORDER — ATENOLOL 50 MG/1
50 TABLET ORAL DAILY
Status: DISCONTINUED | OUTPATIENT
Start: 2023-03-11 | End: 2023-03-11 | Stop reason: HOSPADM

## 2023-03-10 RX ORDER — GABAPENTIN 300 MG/1
300 CAPSULE ORAL 3 TIMES DAILY
Status: DISCONTINUED | OUTPATIENT
Start: 2023-03-10 | End: 2023-03-11 | Stop reason: HOSPADM

## 2023-03-10 RX ORDER — NALOXONE HCL 0.4 MG/ML
0.1 VIAL (ML) INJECTION
Status: DISCONTINUED | OUTPATIENT
Start: 2023-03-10 | End: 2023-03-11 | Stop reason: HOSPADM

## 2023-03-10 RX ORDER — PROPOFOL 10 MG/ML
VIAL (ML) INTRAVENOUS AS NEEDED
Status: DISCONTINUED | OUTPATIENT
Start: 2023-03-10 | End: 2023-03-10 | Stop reason: SURG

## 2023-03-10 RX ORDER — MAGNESIUM HYDROXIDE 1200 MG/15ML
LIQUID ORAL AS NEEDED
Status: DISCONTINUED | OUTPATIENT
Start: 2023-03-10 | End: 2023-03-10 | Stop reason: HOSPADM

## 2023-03-10 RX ORDER — DOXYCYCLINE 100 MG/1
100 CAPSULE ORAL EVERY 12 HOURS SCHEDULED
Status: DISCONTINUED | OUTPATIENT
Start: 2023-03-10 | End: 2023-03-10

## 2023-03-10 RX ORDER — LATANOPROST 50 UG/ML
1 SOLUTION/ DROPS OPHTHALMIC NIGHTLY
Status: DISCONTINUED | OUTPATIENT
Start: 2023-03-10 | End: 2023-03-10

## 2023-03-10 RX ORDER — FAMOTIDINE 20 MG/1
20 TABLET, FILM COATED ORAL ONCE
Status: COMPLETED | OUTPATIENT
Start: 2023-03-10 | End: 2023-03-10

## 2023-03-10 RX ORDER — SODIUM CHLORIDE 0.9 % (FLUSH) 0.9 %
10 SYRINGE (ML) INJECTION EVERY 12 HOURS SCHEDULED
Status: DISCONTINUED | OUTPATIENT
Start: 2023-03-10 | End: 2023-03-11 | Stop reason: HOSPADM

## 2023-03-10 RX ORDER — BUPROPION HYDROCHLORIDE 150 MG/1
150 TABLET ORAL DAILY
Status: DISCONTINUED | OUTPATIENT
Start: 2023-03-10 | End: 2023-03-11 | Stop reason: HOSPADM

## 2023-03-10 RX ORDER — SODIUM CHLORIDE 0.9 % (FLUSH) 0.9 %
1-10 SYRINGE (ML) INJECTION AS NEEDED
Status: DISCONTINUED | OUTPATIENT
Start: 2023-03-10 | End: 2023-03-11 | Stop reason: HOSPADM

## 2023-03-10 RX ORDER — BUPIVACAINE HYDROCHLORIDE 2.5 MG/ML
INJECTION, SOLUTION EPIDURAL; INFILTRATION; INTRACAUDAL
Status: DISCONTINUED | OUTPATIENT
Start: 2023-03-10 | End: 2023-03-10 | Stop reason: SURG

## 2023-03-10 RX ORDER — ONDANSETRON 2 MG/ML
INJECTION INTRAMUSCULAR; INTRAVENOUS AS NEEDED
Status: DISCONTINUED | OUTPATIENT
Start: 2023-03-10 | End: 2023-03-10 | Stop reason: SURG

## 2023-03-10 RX ORDER — FENTANYL CITRATE 50 UG/ML
50 INJECTION, SOLUTION INTRAMUSCULAR; INTRAVENOUS
Status: DISCONTINUED | OUTPATIENT
Start: 2023-03-10 | End: 2023-03-10 | Stop reason: HOSPADM

## 2023-03-10 RX ORDER — SODIUM CHLORIDE 9 MG/ML
40 INJECTION, SOLUTION INTRAVENOUS AS NEEDED
Status: DISCONTINUED | OUTPATIENT
Start: 2023-03-10 | End: 2023-03-11 | Stop reason: HOSPADM

## 2023-03-10 RX ORDER — MELOXICAM 15 MG/1
15 TABLET ORAL ONCE
Status: COMPLETED | OUTPATIENT
Start: 2023-03-10 | End: 2023-03-10

## 2023-03-10 RX ORDER — FENTANYL CITRATE 50 UG/ML
INJECTION, SOLUTION INTRAMUSCULAR; INTRAVENOUS
Status: COMPLETED
Start: 2023-03-10 | End: 2023-03-10

## 2023-03-10 RX ORDER — LISINOPRIL 10 MG/1
10 TABLET ORAL
Status: DISCONTINUED | OUTPATIENT
Start: 2023-03-11 | End: 2023-03-11 | Stop reason: HOSPADM

## 2023-03-10 RX ORDER — SUMATRIPTAN 50 MG/1
50 TABLET, FILM COATED ORAL
Status: DISCONTINUED | OUTPATIENT
Start: 2023-03-10 | End: 2023-03-11 | Stop reason: HOSPADM

## 2023-03-10 RX ORDER — DEXAMETHASONE SODIUM PHOSPHATE 4 MG/ML
INJECTION, SOLUTION INTRA-ARTICULAR; INTRALESIONAL; INTRAMUSCULAR; INTRAVENOUS; SOFT TISSUE AS NEEDED
Status: DISCONTINUED | OUTPATIENT
Start: 2023-03-10 | End: 2023-03-10 | Stop reason: SURG

## 2023-03-10 RX ORDER — PREDNISOLONE ACETATE 10 MG/ML
1 SUSPENSION/ DROPS OPHTHALMIC DAILY
Status: DISCONTINUED | OUTPATIENT
Start: 2023-03-10 | End: 2023-03-11 | Stop reason: HOSPADM

## 2023-03-10 RX ORDER — LIDOCAINE HYDROCHLORIDE 10 MG/ML
INJECTION, SOLUTION EPIDURAL; INFILTRATION; INTRACAUDAL; PERINEURAL AS NEEDED
Status: DISCONTINUED | OUTPATIENT
Start: 2023-03-10 | End: 2023-03-10 | Stop reason: SURG

## 2023-03-10 RX ORDER — CEFAZOLIN SODIUM 2 G/100ML
2 INJECTION, SOLUTION INTRAVENOUS ONCE
Status: COMPLETED | OUTPATIENT
Start: 2023-03-10 | End: 2023-03-10

## 2023-03-10 RX ORDER — VANCOMYCIN HYDROCHLORIDE 1 G/20ML
INJECTION, POWDER, LYOPHILIZED, FOR SOLUTION INTRAVENOUS AS NEEDED
Status: DISCONTINUED | OUTPATIENT
Start: 2023-03-10 | End: 2023-03-10 | Stop reason: HOSPADM

## 2023-03-10 RX ORDER — LATANOPROST 50 UG/ML
1 SOLUTION/ DROPS OPHTHALMIC NIGHTLY
Status: DISCONTINUED | OUTPATIENT
Start: 2023-03-10 | End: 2023-03-11 | Stop reason: HOSPADM

## 2023-03-10 RX ORDER — MORPHINE SULFATE 4 MG/ML
4 INJECTION, SOLUTION INTRAMUSCULAR; INTRAVENOUS
Status: DISCONTINUED | OUTPATIENT
Start: 2023-03-10 | End: 2023-03-11 | Stop reason: HOSPADM

## 2023-03-10 RX ORDER — SODIUM CHLORIDE, SODIUM LACTATE, POTASSIUM CHLORIDE, CALCIUM CHLORIDE 600; 310; 30; 20 MG/100ML; MG/100ML; MG/100ML; MG/100ML
9 INJECTION, SOLUTION INTRAVENOUS CONTINUOUS
Status: DISCONTINUED | OUTPATIENT
Start: 2023-03-10 | End: 2023-03-11 | Stop reason: HOSPADM

## 2023-03-10 RX ORDER — ROPIVACAINE HYDROCHLORIDE 5 MG/ML
INJECTION, SOLUTION EPIDURAL; INFILTRATION; PERINEURAL AS NEEDED
Status: DISCONTINUED | OUTPATIENT
Start: 2023-03-10 | End: 2023-03-10 | Stop reason: HOSPADM

## 2023-03-10 RX ORDER — OXYCODONE HYDROCHLORIDE 5 MG/1
5 TABLET ORAL EVERY 4 HOURS PRN
Status: DISCONTINUED | OUTPATIENT
Start: 2023-03-10 | End: 2023-03-11 | Stop reason: HOSPADM

## 2023-03-10 RX ORDER — LABETALOL HYDROCHLORIDE 5 MG/ML
10 INJECTION, SOLUTION INTRAVENOUS EVERY 4 HOURS PRN
Status: DISCONTINUED | OUTPATIENT
Start: 2023-03-10 | End: 2023-03-11 | Stop reason: HOSPADM

## 2023-03-10 RX ORDER — NEOMYCIN SULFATE, POLYMYXIN B SULFATE AND DEXAMETHASONE 3.5; 10000; 1 MG/ML; [USP'U]/ML; MG/ML
1 SUSPENSION/ DROPS OPHTHALMIC 2 TIMES DAILY
Status: DISCONTINUED | OUTPATIENT
Start: 2023-03-10 | End: 2023-03-11 | Stop reason: HOSPADM

## 2023-03-10 RX ORDER — ATORVASTATIN CALCIUM 10 MG/1
10 TABLET, FILM COATED ORAL NIGHTLY
Status: DISCONTINUED | OUTPATIENT
Start: 2023-03-10 | End: 2023-03-11 | Stop reason: HOSPADM

## 2023-03-10 RX ORDER — LABETALOL HYDROCHLORIDE 5 MG/ML
5 INJECTION, SOLUTION INTRAVENOUS
Status: DISCONTINUED | OUTPATIENT
Start: 2023-03-10 | End: 2023-03-10 | Stop reason: HOSPADM

## 2023-03-10 RX ORDER — ROPIVACAINE HYDROCHLORIDE 2 MG/ML
INJECTION, SOLUTION EPIDURAL; INFILTRATION; PERINEURAL CONTINUOUS
Status: DISCONTINUED | OUTPATIENT
Start: 2023-03-10 | End: 2023-03-11 | Stop reason: HOSPADM

## 2023-03-10 RX ORDER — SODIUM CHLORIDE 9 MG/ML
120 INJECTION, SOLUTION INTRAVENOUS CONTINUOUS
Status: DISCONTINUED | OUTPATIENT
Start: 2023-03-10 | End: 2023-03-11 | Stop reason: HOSPADM

## 2023-03-10 RX ORDER — ONDANSETRON 2 MG/ML
4 INJECTION INTRAMUSCULAR; INTRAVENOUS EVERY 6 HOURS PRN
Status: DISCONTINUED | OUTPATIENT
Start: 2023-03-10 | End: 2023-03-11 | Stop reason: HOSPADM

## 2023-03-10 RX ORDER — CEFAZOLIN SODIUM IN 0.9 % NACL 3 G/100 ML
3 INTRAVENOUS SOLUTION, PIGGYBACK (ML) INTRAVENOUS EVERY 8 HOURS
Status: COMPLETED | OUTPATIENT
Start: 2023-03-10 | End: 2023-03-11

## 2023-03-10 RX ORDER — NALOXONE HCL 0.4 MG/ML
0.4 VIAL (ML) INJECTION
Status: DISCONTINUED | OUTPATIENT
Start: 2023-03-10 | End: 2023-03-11 | Stop reason: HOSPADM

## 2023-03-10 RX ORDER — ASPIRIN 325 MG
325 TABLET, DELAYED RELEASE (ENTERIC COATED) ORAL DAILY
Status: DISCONTINUED | OUTPATIENT
Start: 2023-03-11 | End: 2023-03-11 | Stop reason: HOSPADM

## 2023-03-10 RX ORDER — MIDAZOLAM HYDROCHLORIDE 1 MG/ML
0.5 INJECTION INTRAMUSCULAR; INTRAVENOUS
Status: DISCONTINUED | OUTPATIENT
Start: 2023-03-10 | End: 2023-03-10 | Stop reason: HOSPADM

## 2023-03-10 RX ORDER — ONDANSETRON 2 MG/ML
4 INJECTION INTRAMUSCULAR; INTRAVENOUS ONCE AS NEEDED
Status: DISCONTINUED | OUTPATIENT
Start: 2023-03-10 | End: 2023-03-10 | Stop reason: HOSPADM

## 2023-03-10 RX ORDER — ACETAMINOPHEN 500 MG
1000 TABLET ORAL ONCE
Status: COMPLETED | OUTPATIENT
Start: 2023-03-10 | End: 2023-03-10

## 2023-03-10 RX ORDER — PREGABALIN 150 MG/1
150 CAPSULE ORAL ONCE
Status: COMPLETED | OUTPATIENT
Start: 2023-03-10 | End: 2023-03-10

## 2023-03-10 RX ORDER — ATORVASTATIN CALCIUM 10 MG/1
10 TABLET, FILM COATED ORAL DAILY
Qty: 30 TABLET | Refills: 3 | Status: SHIPPED | OUTPATIENT
Start: 2023-03-10

## 2023-03-10 RX ORDER — BUPIVACAINE HYDROCHLORIDE 5 MG/ML
INJECTION, SOLUTION PERINEURAL
Status: COMPLETED | OUTPATIENT
Start: 2023-03-10 | End: 2023-03-10

## 2023-03-10 RX ORDER — MELOXICAM 15 MG/1
15 TABLET ORAL DAILY
Status: DISCONTINUED | OUTPATIENT
Start: 2023-03-11 | End: 2023-03-11 | Stop reason: HOSPADM

## 2023-03-10 RX ORDER — LIDOCAINE HYDROCHLORIDE 10 MG/ML
0.5 INJECTION, SOLUTION EPIDURAL; INFILTRATION; INTRACAUDAL; PERINEURAL ONCE AS NEEDED
Status: COMPLETED | OUTPATIENT
Start: 2023-03-10 | End: 2023-03-10

## 2023-03-10 RX ADMIN — LIDOCAINE HYDROCHLORIDE 0.5 ML: 10 INJECTION, SOLUTION EPIDURAL; INFILTRATION; INTRACAUDAL; PERINEURAL at 07:51

## 2023-03-10 RX ADMIN — ONDANSETRON 4 MG: 2 INJECTION INTRAMUSCULAR; INTRAVENOUS at 10:28

## 2023-03-10 RX ADMIN — PROPOFOL 80 MCG/KG/MIN: 10 INJECTION, EMULSION INTRAVENOUS at 09:38

## 2023-03-10 RX ADMIN — ACETAMINOPHEN 1000 MG: 500 TABLET ORAL at 07:51

## 2023-03-10 RX ADMIN — FENTANYL CITRATE 50 MCG: 50 INJECTION, SOLUTION INTRAMUSCULAR; INTRAVENOUS at 11:20

## 2023-03-10 RX ADMIN — FENTANYL CITRATE 50 MCG: 50 INJECTION, SOLUTION INTRAMUSCULAR; INTRAVENOUS at 12:05

## 2023-03-10 RX ADMIN — NEOMYCIN SULFATE, POLYMYXIN B SULFATE AND DEXAMETHASONE 1 DROP: 3.5; 10000; 1 SUSPENSION/ DROPS OPHTHALMIC at 20:43

## 2023-03-10 RX ADMIN — GABAPENTIN 300 MG: 300 CAPSULE ORAL at 20:43

## 2023-03-10 RX ADMIN — BUPIVACAINE HYDROCHLORIDE 2 ML: 5 INJECTION, SOLUTION PERINEURAL at 08:19

## 2023-03-10 RX ADMIN — PREGABALIN 150 MG: 150 CAPSULE ORAL at 07:51

## 2023-03-10 RX ADMIN — CEFAZOLIN 3 G: 10 INJECTION, POWDER, FOR SOLUTION INTRAVENOUS at 17:42

## 2023-03-10 RX ADMIN — OXYCODONE HYDROCHLORIDE 5 MG: 5 TABLET ORAL at 17:42

## 2023-03-10 RX ADMIN — BUPIVACAINE HYDROCHLORIDE 20 ML: 2.5 INJECTION, SOLUTION EPIDURAL; INFILTRATION; INTRACAUDAL at 10:53

## 2023-03-10 RX ADMIN — OXYCODONE HYDROCHLORIDE 5 MG: 5 TABLET ORAL at 23:07

## 2023-03-10 RX ADMIN — MELOXICAM 15 MG: 15 TABLET ORAL at 07:51

## 2023-03-10 RX ADMIN — PROPOFOL 100 MG: 10 INJECTION, EMULSION INTRAVENOUS at 08:17

## 2023-03-10 RX ADMIN — LATANOPROST 1 DROP: 50 SOLUTION OPHTHALMIC at 20:43

## 2023-03-10 RX ADMIN — CEFAZOLIN SODIUM 3 G: 2 INJECTION, SOLUTION INTRAVENOUS at 08:18

## 2023-03-10 RX ADMIN — GABAPENTIN 300 MG: 300 CAPSULE ORAL at 17:42

## 2023-03-10 RX ADMIN — SODIUM CHLORIDE, POTASSIUM CHLORIDE, SODIUM LACTATE AND CALCIUM CHLORIDE: 600; 310; 30; 20 INJECTION, SOLUTION INTRAVENOUS at 10:30

## 2023-03-10 RX ADMIN — LIDOCAINE HYDROCHLORIDE 50 MG: 10 INJECTION, SOLUTION EPIDURAL; INFILTRATION; INTRACAUDAL; PERINEURAL at 08:17

## 2023-03-10 RX ADMIN — Medication 1000 MG: at 11:03

## 2023-03-10 RX ADMIN — ATORVASTATIN CALCIUM 10 MG: 10 TABLET, FILM COATED ORAL at 20:43

## 2023-03-10 RX ADMIN — HYDROMORPHONE HYDROCHLORIDE 0.5 MG: 1 INJECTION, SOLUTION INTRAMUSCULAR; INTRAVENOUS; SUBCUTANEOUS at 19:04

## 2023-03-10 RX ADMIN — DEXAMETHASONE SODIUM PHOSPHATE 8 MG: 4 INJECTION, SOLUTION INTRAMUSCULAR; INTRAVENOUS at 08:32

## 2023-03-10 RX ADMIN — FAMOTIDINE 20 MG: 20 TABLET ORAL at 07:51

## 2023-03-10 RX ADMIN — CYCLOBENZAPRINE 10 MG: 10 TABLET, FILM COATED ORAL at 20:43

## 2023-03-10 RX ADMIN — SODIUM CHLORIDE, POTASSIUM CHLORIDE, SODIUM LACTATE AND CALCIUM CHLORIDE 9 ML/HR: 600; 310; 30; 20 INJECTION, SOLUTION INTRAVENOUS at 07:51

## 2023-03-10 RX ADMIN — PROPOFOL 100 MCG/KG/MIN: 10 INJECTION, EMULSION INTRAVENOUS at 08:26

## 2023-03-10 NOTE — H&P
Pre-Op H&P  Obi Jackman Jr.  [id number]  1950      Chief complaint: Left knee pain      Subjective:  Patient is a 72 y.o.male presents for scheduled surgery by Dr. Olivas. He anticipates a TOTAL KNEE ARTHROPLASTY WITH CORI ROBOT - LEFT today. His knee has been painful for several years. He uses a cane for ambulation. He denies recent falls. He tried cortisone and gel injections without benefit.      Review of Systems:  Constitutional-- No fever, chills or sweats. No fatigue.  CV-- No chest pain, palpitation or syncope. +HTN, HLD  Resp-- No cough, hemoptysis. +SOB, CHRISTOPHE on cpap   Skin--No rashes or lesions      Allergies: No Known Allergies      Home Meds:  Medications Prior to Admission   Medication Sig Dispense Refill Last Dose    acetaminophen (TYLENOL) 325 MG tablet Take 1 tablet by mouth As Needed for Mild Pain.   Past Week    amLODIPine (NORVASC) 5 MG tablet TAKE 1 TABLET BY MOUTH DAILY. 90 tablet 1 Past Month    Ascorbic Acid (VITAMIN C PO) Take  by mouth Daily.   3/9/2023    atenolol (TENORMIN) 50 MG tablet TAKE 1 TABLET BY MOUTH DAILY. 30 tablet 3 3/10/2023 at 0600    atorvastatin (LIPITOR) 10 MG tablet TAKE 1 TABLET BY MOUTH DAILY. RESUME WHEN NOT ON DAPTO ANYMORE 30 tablet 3 Past Week    buPROPion XL (WELLBUTRIN XL) 150 MG 24 hr tablet TAKE 1 TABLET BY MOUTH DAILY 90 tablet 1 3/9/2023 at 1000    Chlorhexidine Gluconate 4 % solution Apply  topically to the appropriate area as directed Daily. Shower with hibiclens solution as directed for 5 days prior to surgery 237 mL 0 3/9/2023    clotrimazole (LOTRIMIN) 1 % cream Apply  topically to the appropriate area as directed 2 (Two) Times a Day. 15 g 1 Past Week    clotrimazole-betamethasone (LOTRISONE) 1-0.05 % cream APPLY TOPICALLY TO THE APPROPRIATE AREA AS DIRECTED 2 (TWO) TIMES A DAY. 45 g 0 Past Week    Cyanocobalamin (VITAMIN B-12 ER PO) Take 1 tablet by mouth Daily.   3/9/2023    dorzolamide-timolol (COSOPT) 22.3-6.8 MG/ML ophthalmic solution     3/10/2023 at 0600    doxycycline (VIBRAMYCIN) 100 MG capsule Take 1 capsule by mouth Daily.   3/10/2023 at 0600    gabapentin (NEURONTIN) 300 MG capsule Take 1 capsule by mouth 3 (Three) Times a Day.   3/10/2023 at 0600    hydrocortisone 1 % ointment Apply 1 application topically to the appropriate area as directed 2 (Two) Times a Day. (Patient taking differently: Apply 1 application topically to the appropriate area as directed 2 (Two) Times a Day As Needed.) 56 g 0 Past Week    hydroxychloroquine (PLAQUENIL) 200 MG tablet Take 1 tablet by mouth Every 12 (Twelve) Hours.   Past Month    latanoprost (XALATAN) 0.005 % ophthalmic solution Apply 1 drop to eye(s) as directed by provider.   3/10/2023 at 0600    latanoprost (XALATAN) 0.005 % ophthalmic solution Apply 1 drop to eye(s) as directed by provider.   3/10/2023 at 0600    lisinopril-hydrochlorothiazide (PRINZIDE,ZESTORETIC) 20-12.5 MG per tablet TAKE 1 TABLET BY MOUTH DAILY 30 tablet 1 Past Month    oxaprozin (DAYPRO) 600 MG tablet Take 2 tablets by mouth Daily. Take 2 po every day   3/10/2023 at 0600    prednisoLONE acetate (PRED FORTE) 1 % ophthalmic suspension Apply 1 drop to eye(s) as directed by provider 4 (Four) Times a Day.   3/10/2023 at 0600    prednisoLONE acetate (PRED FORTE) 1 % ophthalmic suspension Apply 1 drop to eye(s) as directed by provider Daily.   3/10/2023 at 0600    SUMAtriptan (IMITREX) 100 MG tablet TAKE ONE TABLET AT ONSET OF HEADACHE. MAY REPEAT DOSE ONE TIME IN 2 HOURS IF HEADACHE NOT RELIEVED. 9 tablet 5 Past Week    traMADol (ULTRAM) 50 MG tablet Take 1 tablet by mouth Daily.   3/9/2023 at 1800    ALPRAZolam (XANAX) 0.25 MG tablet Take 1/2 to 1 tablet prn when flying 10 tablet 0 More than a month    cyclobenzaprine (FLEXERIL) 10 MG tablet TAKE ONE TABLET BY MOUTH THREE TIMES A DAY AS NEEDED FOR MUSCLE SPASMS 30 tablet 5 More than a month    meclizine (ANTIVERT) 25 MG tablet 1 tablet Every 8 (Eight) Hours As Needed.   More than a month     promethazine (PHENERGAN) 25 MG tablet Take 1 tablet by mouth Every 6 (Six) Hours As Needed for Nausea or Vomiting. 20 tablet 0 More than a month         PMH:   Past Medical History:   Diagnosis Date    Anxiety and depression     Arthritis of neck ?    Bulging of lumbar intervertebral disc     Cataract     Lens implated in left eye.    Cervical disc disorder ?    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    CHRISTOPHE on CPAP     Periarthritis of shoulder ?    Rheumatoid arthritis (HCC)     Rotator cuff syndrome 09/15/2022    Dr. Chanda Castanon    Swelling of left ear     Wears contact lenses     Wears contact lenses     Wears contact lenses      PSH:    Past Surgical History:   Procedure Laterality Date    ACHILLES TENDON SURGERY Left 10/15/2019    Procedure: ACHILLES TENDON LENGTHENING LEFT;  Surgeon: Elodia Guzman MD;  Location:  [facility];  Service: Orthopedics    CATARACT EXTRACTION Right     COLONOSCOPY  2015    EYE SURGERY Bilateral     cornea transplant     FOOT SURGERY Left 10/15/2019    triple arthrodesis and achilles tendon lengthening- [facility]    HERNIA REPAIR      ILIAC CREST BONE GRAFT Left 10/15/2019    Procedure: ILIAC CREST BONE GRAFT LEFT;  Surgeon: Elodia Guzman MD;  Location:  [facility];  Service: Orthopedics    INCISION AND DRAINAGE LEG Right 12/04/2020    Procedure: INCISION AND DRAINAGE WITH COMPONENT EXCHANGE, HEAD AND LINER RIGHT HIP;  Surgeon: Osbaldo Olivas MD;  Location:  [facility];  Service: Orthopedics;  Laterality: Right;    JOINT REPLACEMENT  8/4/20 and 12/4/20    Right hip replacement    TOE FUSION Left 10/15/2019    Procedure: TRIPLE ARTHODESIS LEFT;  Surgeon: Elodia Guzman MD;   Location:  [facility];  Service: Orthopedics    TOTAL HIP ARTHROPLASTY Right 08/04/2020    Procedure: TOTAL HIP ARTHROPLASTY RIGHT;  Surgeon: Osbaldo Olivas MD;  Location:  [facility];  Service: Orthopedics;  Laterality: Right;    TRIGGER POINT INJECTION      VENOUS ABLATION Left 01/21/2022    Endovenous laser ablation of left great sapheuos vein for venous insufficiency       Immunization History:  Influenza: 2022  Pneumococcal: UTD  Tetanus: UTD  Covid : x3    Social History:   Tobacco:   Social History     Tobacco Use   Smoking Status Never   Smokeless Tobacco Never      Alcohol:     Social History     Substance and Sexual Activity   Alcohol Use Never         Physical Exam:/90 (BP Location: Right arm, Patient Position: Lying)   Pulse 68   Temp 97.6 °F (36.4 °C) (Temporal)   Resp 18   SpO2 95%       General Appearance:    Alert, cooperative, no distress, appears stated age   Head:    Normocephalic, without obvious abnormality, atraumatic   Lungs:     Clear to auscultation bilaterally, respirations unlabored    Heart:   Regular rate and rhythm, S1 and S2 normal    Abdomen:    Soft without tenderness. Obese    Extremities:   Extremities normal, atraumatic, no cyanosis or edema   Skin:   Skin color, texture, turgor normal, no rashes or lesions   Neurologic:   Grossly intact     Results Review:     LABS:  Lab Results   Component Value Date    WBC 4.54 02/24/2023    HGB 14.2 02/24/2023    HCT 42.5 02/24/2023    MCV 93.0 02/24/2023     02/24/2023    NEUTROABS 3.15 02/24/2023    GLUCOSE 103 (H) 02/24/2023    BUN 15 02/24/2023    CREATININE 0.76 02/24/2023    EGFRIFNONA 88 09/14/2021     02/24/2023    K 3.9 02/24/2023     02/24/2023    CO2 30.0 (H) 02/24/2023    CALCIUM 9.3 02/24/2023    ALBUMIN 4.2 01/18/2023    AST 14 01/18/2023    ALT 10 01/18/2023    BILITOT 0.5 01/18/2023       RADIOLOGY:  Imaging Results (Last 72 Hours)       ** No results found for the last 72 hours. **            I  reviewed the patient's new clinical results.    Cancer Staging (if applicable)  Cancer Patient: __ yes __no __unknown; If yes, clinical stage T:__ N:__M:__, stage group or __N/A      Impression: Primary osteoarthritis of left knee      Plan: TOTAL KNEE ARTHROPLASTY WITH CORI ROBOT - LEFT      Winter Mckeon, YASMIN   3/10/2023   07:50 EST    Agree with above - plan for left TKA    Osbaldo Olivas MD  03/10/23  08:05 EST

## 2023-03-10 NOTE — ANESTHESIA PROCEDURE NOTES
Left Adductor Canal Catheter      Patient reassessed immediately prior to procedure    Patient location during procedure: post-op  Reason for block: at surgeon's request and post-op pain management  Performed by  CRNA/CAA: Chet Soliz CRNA  Assisted by: Chely Vo RN  Preanesthetic Checklist  Completed: patient identified, IV checked, site marked, risks and benefits discussed, surgical consent, monitors and equipment checked, pre-op evaluation and timeout performed  Prep:  Pt Position: supine  Sterile barriers:cap, gloves, mask, sterile barriers and washed/disinfected hands  Prep: ChloraPrep  Patient monitoring: blood pressure monitoring, continuous pulse oximetry and EKG  Procedure  Performed under: spinal  Guidance:ultrasound guided    ULTRASOUND INTERPRETATION.  Using ultrasound guidance a 20 G gauge needle was placed in close proximity to the nerve, at which point, under ultrasound guidance anesthetic was injected in the area of the nerve and spread of the anesthesia was seen on ultrasound in close proximity thereto.  There were no abnormalities seen on ultrasound; a digital image was taken; and the patient tolerated the procedure with no complications. Images:still images obtained, printed/placed on chart    Laterality:left  Block Type:adductor canal block  Injection Technique:catheter  Needle Type:Tuohy and echogenic  Needle Gauge:18 G  Resistance on Injection: none  Catheter Size:20 G (20g)  Cath Depth at skin: 13 cm    Medications Used: bupivacaine PF (MARCAINE) 0.25 % injection - Injection, Adductor Canal   20 mL - 3/10/2023 10:53:00 AM      Post Assessment  Injection Assessment: negative aspiration for heme, incremental injection and no paresthesia on injection  Patient Tolerance:comfortable throughout block  Complications:no  Additional Notes  CATHETER   A high-frequency linear transducer, with sterile cover, was placed on the anterior mid-thigh (between the anterior superior iliac spine and  "patella). The transducer was then moved medially to identify the Sartorius muscle (Chaim), Vastus Medialis muscle (VMM), Superficial Femoral Artery (SFA) and Vein. The transducer was then moved cephalad or caudad to position the SFA in the middle of the Chaim. The insertion site was prepped and draped in sterile fashion. Skin and cutaneous tissue was infiltrated with 2-5 ml of 1% Lidocaine. Using ultrasound-guidance, an 18-gauge Contiplex Ultra 360 Touhy needle was advanced in plane from lateral to medial. Preservative-free normal saline was utilized for hydro-dissection of tissue, advancement of Touhy, and to confirm needle placement below the fascial plane of the Chaim where the Nerve to the VMM is located. Local anesthetic (LA) 5 ml deposited here. The Touhy needle continues its path lateral to the SFA at the level of the Saphenous Nerve. The remainder of the LA was deposited at the 10-11 o'clock position of the SFA. This injection created a space between the Chaim and the SFA. Aspiration every 5 ml to prevent intravascular injection. Injection was completed with negative aspiration of blood and negative intravascular injection. Injection pressures were normal with minimal resistance. A 20-gauge Contiplex Echo catheter was placed through the needle and advance out the tip of the Touhy 3-5 cm anterior to the SFA. The Touhy needle was then removed, and final catheter position verified at the 12 o'clock position to the SFA. The catheter was secured in the usual fashion with skin glue, benzoin, steri-strips, CHG tegaderm and label noting \"Nerve Block Catheter\". Jerk tape applied at yellow connector and catheter connection.           "

## 2023-03-10 NOTE — PLAN OF CARE
Goal Outcome Evaluation:  Plan of Care Reviewed With: patient, family        Progress: improving  Outcome Evaluation: Patient was able to ambulate 120 feet with walker on bahena. He is below his baseline and limited by pain, some vertigo and a visual dificit. I recommend continued skilled PT untill he is able to negotiate steps safely. Then he will be home with transtional outpatient PT.

## 2023-03-10 NOTE — ANESTHESIA PROCEDURE NOTES
Spinal Block      Patient reassessed immediately prior to procedure    Patient location during procedure: OR  Indication:at surgeon's request  Performed By  NISH/CAA: Erika Xie CRNA  Preanesthetic Checklist  Completed: patient identified, IV checked, site marked, risks and benefits discussed, surgical consent, monitors and equipment checked, pre-op evaluation and timeout performed  Spinal Block Prep:  Patient Position:sitting  Sterile Tech:cap, gloves, sterile barriers and mask  Prep:Chloraprep  Patient Monitoring:continuous pulse oximetry    Spinal Block Procedure  Approach:midline  Guidance:landmark technique and palpation technique  Location:L3-L4  Needle Type:Quincke  Needle Gauge:22 G  Placement of Spinal needle event:cerebrospinal fluid aspirated  Paresthesia: no  Fluid Appearance:clear  Medications: bupivacaine (MARCAINE) 0.5 % injection - Injection   2 mL - 3/10/2023 8:19:00 AM   Post Assessment  Patient Tolerance:patient tolerated the procedure well with no apparent complications  Complications no  Additional Notes  Procedure:  Pt assisted to sitting position, with legs in position of comfort over side of bed.  Pt. instructed in optimal spine presentation, the spine was prepped/ Draped and the skin at insertion site was anesthetized with 1% Lidocaine 2 ml.  The spinal needle was then advanced until CSF flow was obtained and LA was injected:

## 2023-03-10 NOTE — OP NOTE
DATE OF PROCEDURE: 03/10/23    PREOPERATIVE DIAGNOSIS: left knee arthritis      POSTOPERATIVE DIAGNOSIS:  left knee arthritis    PROCEDURES PERFORMED:   left total knee arthroplasty with [employer] components (#7 cruciate retaining femur, #6 tibia, 11 mm polyethylene, with 35 three peg patella) with CORI robotic assitance    SURGEON: Osbaldo Olivas MD    ASSISTANT: Brittany Salas PA-C  (Brittany Salas PA-C was present and necessary for positioning, draping, retraction, instrumentation and closure.)    SPECIMENS: None    IMPLANTS:   Implant Name Type Inv. Item Serial No.  Lot No. LRB No. Used Action   CMT BONE PALACOS R HI/VISC 1X40 - [id number] Implant CMT BONE PALACOS R HI/VISC 1X40  [facility] [id number] Left 1 Implanted   DEV CONTRL TISS STRATAFIX SPIRAL MNCRYL UD 3/0 PLS 60CM - [id number] Implant DEV CONTRL TISS STRATAFIX SPIRAL MNCRYL UD 3/0 PLS 60CM  [employer]  DIV [employer] [id number] Left 1 Implanted   DEV CONTRL TISS STRATAFIX SYMM PDS PLUS DOROTHEA CT-1 45CM - [id number] Implant DEV CONTRL TISS STRATAFIX SYMM PDS PLUS DOROTHEA CT-1 45CM  [employer]  DIV OF J AND J [id number] Left 1 Implanted   BASE TIB/KN GEN2 NONPOR TI SZ6 LT - [id number] Implant BASE TIB/KN GEN2 NONPOR TI SZ6 LT  [employer] [id number] Left 1 Implanted   PAT GEN2 RESRF 35MM - [id number] Implant PAT GEN2 RESRF 35MM  [employer] [id number] Left 1 Implanted   COMP FEM LEGION OXINIUM CR SZ7 LT - [id number] Implant COMP FEM LEGION OXINIUM CR SZ7 LT  [employer] [id number] Left 1 Implanted   INSRT ART/KN LEGION CR HF XLPE SZ5TO6 11MM - [id number] Implant INSRT ART/KN LEGION CR HF XLPE SZ5TO6 11MM  [employer] [id number] Left 1 Implanted         ANESTHESIA:  Spinal    STAFF:  Circulator: Sandro Kaplan RN; Nasim De La Paz RN; Dominik Euceda RN  Scrub Person: Ward Bolden  Vendor Representative: Koko Roberto ([employer])  Nursing Assistant: Javier Roe PCT  Assistant: Brittany Salas  FREDO    TOURNIQUET TIME: 19 minutes    ESTIMATED BLOOD LOSS: 150 mL     COMPLICATIONS: None    PREOPERATIVE ANTIBIOTICS: Ancef 3 g    INDICATIONS: The patient is a 72 y.o. male with debilitating left knee pain secondary to osteoarthritis that failed to improve in spite of conservative treatment .  Options have been discussed at length with the patient and the patient has had an extended course of conservative treatment without long-term benefit. The patient has reached the point where the patient desires total knee arthroplasty surgery and understands the risks, benefits, and alternatives. Consent was obtained. Please see my office notes for details with regard to preoperative counseling and operative rationale.     DESCRIPTION OF PROCEDURE: The patient was positively identified in the preoperative holding area and brought to the operating suite and placed in a supine position. After adequate spinal anesthetic had been achieved, the left lower extremity was prepped and draped in the usual sterile fashion.  After application of a tourniquet to the left upper thigh, which was used during the procedure for a total 19 minutes during the cementation process only. Landmarks of the knee were identified and timeout procedure was performed to confirm the operative site, as well as other parameters. Following the sterile prep and drape, a skin incision was made just off the medial aspect of midline for a medial parapatellar approach. Following a sharp skin incision, dissection was carried down to the level of the extensor mechanism and a medial parapatellar arthrotomy was made and the patella was tucked into the lateral gutter.  Anterior horns of the medial and lateral menisci were removed, and ACL transected.  Osteophytes were also removed.  Description of arthritis: Bone-on-bone contact medial compartment, tricompartmental degeneration, with mild varus alignment..      [employer] robotic system was then employed to assist with  preparation of the femoral and tibial bone surfaces.  Femoral and tibial arrays were placed, as well as markers in both the femur and tibia.  System was registered in a systematic fashion, and 3D models created of both the femoral and tibial aspects.  Range of motion and gap assessments were also performed, and implants were selected and appropriately sized and oriented both the femur and tibial aspects starting with the flexion gap, and then progressing to the extension gap.  Planning was made for a 7 cruciate retaining femoral component and a 6 tibial component with a 9 mm insert.  Once balancing had been achieved, distal femoral surface was then prepared with the CORI thao, followed by preparation for the 4-in-1 cutting block.  Proximal tibial cut was then performed in a guided fashion, posterior condyles were freed of osteophytes, and trial implants placed, namely a 7 cruciate retaining femur with a 6 tibia and a 10 mm trial insert.  Range of motion and gap assessments were again performed, and excellent balancing was noted.  The patella was then prepared for a 35 three peg patella which had excellent tracking.      Therefore the trial components were removed, and preparations made for final placement, and final components were cemented in place with namely a #6 tibia, #7 cruciate retaining femur, and a 35 three peg patella with a trial 11 mm insert for cement compression. All the excess cement was removed from the bone implant interface and allowed to harden. Tourniquet was deflated. Hemostasis was obtained with electrocautery. There was no brisk bleeding noted in the popliteal fossa in particular. Therefore, the knee was copiously irrigated as it was between major steps, and the final 11 mm insert was placed as this was deemed appropriate for the patient's anatomy with full flexion and extension and no instability and attention was then directed towards closure. The medial parapatellar arthrotomy was closed  with #1 Vicryl in an interrupted figure-of-eight fashion in 4 strategic locations followed by oversewing this from proximal to distal with a #1 StrataFix symmetric, which nicely sealed the joint, followed by closure of the deep fascial layer with #1 Vicryl in a buried interrupted fashion, followed by closure of the subcutaneous layer with 2-0 Vicryl and the skin with 3-0 Stratafix in a running subcuticular fashion.  Adhesive wound closure dressing was applied followed by a sterile dressing with 4 x 4's, abdominal pad, soft roll and Ace wrap. The patient tolerated the procedure well and was brought to the recovery room in good condition.     PLAN:  1.  The patient will begin early range of motion and weight-bearing per the post total knee arthroplasty protocol.   2.  I anticipate brief hospitalization for initial rehabilitation and pain control followed by continued rehabilitation home health/outpatient physical therapy setting.  Patient likely ready for discharge tomorrow as long as he is cleared medically and by therapy.  Follow-up in 3 weeks as planned.   3.  Postoperative medical management with Dr. Patel.  4.  Aspirin will be utilized for DVT prophylaxis.  Patient may resume his doxycycline postoperatively also.      Osbaldo Olivas MD  03/10/23  10:43 EST

## 2023-03-10 NOTE — PLAN OF CARE
Problem: Adult Inpatient Plan of Care  Goal: Absence of Hospital-Acquired Illness or Injury  Intervention: Identify and Manage Fall Risk  Recent Flowsheet Documentation  Taken 3/10/2023 1812 by Fallon Rapp RN  Safety Promotion/Fall Prevention:   activity supervised   assistive device/personal items within reach   safety round/check completed  Taken 3/10/2023 1657 by Fallon Rapp RN  Safety Promotion/Fall Prevention:   activity supervised   assistive device/personal items within reach   safety round/check completed  Taken 3/10/2023 1415 by Fallon Rapp RN  Safety Promotion/Fall Prevention:   activity supervised   assistive device/personal items within reach   safety round/check completed  Taken 3/10/2023 1315 by Fallon Rapp RN  Safety Promotion/Fall Prevention:   activity supervised   assistive device/personal items within reach   clutter free environment maintained   fall prevention program maintained   lighting adjusted   muscle strengthening facilitated   nonskid shoes/slippers when out of bed   room organization consistent   safety round/check completed  Intervention: Prevent Skin Injury  Recent Flowsheet Documentation  Taken 3/10/2023 1812 by Fallon Rapp RN  Body Position: supine, legs elevated  Taken 3/10/2023 1657 by Fallon Rapp RN  Body Position: sitting up in bed  Taken 3/10/2023 1415 by Fallon aRpp RN  Body Position: sitting up in bed  Taken 3/10/2023 1315 by Fallon Rapp RN  Body Position: supine  Intervention: Prevent and Manage VTE (Venous Thromboembolism) Risk  Recent Flowsheet Documentation  Taken 3/10/2023 1812 by Fallon Rapp RN  Activity Management: up in chair  VTE Prevention/Management:   bilateral   foot pump device on  Taken 3/10/2023 1657 by Fallon Rapp RN  Activity Management: activity adjusted per tolerance  VTE Prevention/Management:   bilateral   foot pump device on  Range of Motion: active ROM (range of motion) encouraged  Taken 3/10/2023  1415 by Fallon Rapp RN  Activity Management: activity adjusted per tolerance  VTE Prevention/Management:   bilateral   foot pump device on  Taken 3/10/2023 1315 by Fallon Rapp RN  Activity Management: activity adjusted per tolerance  VTE Prevention/Management:   bilateral   foot pump device on  Range of Motion: active ROM (range of motion) encouraged  Intervention: Prevent Infection  Recent Flowsheet Documentation  Taken 3/10/2023 1812 by Fallon Rapp RN  Infection Prevention:   hand hygiene promoted   rest/sleep promoted  Taken 3/10/2023 1657 by Fallon Rapp RN  Infection Prevention:   hand hygiene promoted   rest/sleep promoted  Taken 3/10/2023 1415 by Fallon Rapp RN  Infection Prevention:   hand hygiene promoted   rest/sleep promoted  Taken 3/10/2023 1315 by Fallon Rapp RN  Infection Prevention:   hand hygiene promoted   rest/sleep promoted  Goal: Optimal Comfort and Wellbeing  Intervention: Monitor Pain and Promote Comfort  Recent Flowsheet Documentation  Taken 3/10/2023 1812 by Fallon Rapp RN  Pain Management Interventions:   pain pump in use   see MAR  Taken 3/10/2023 1415 by Fallon Rapp RN  Pain Management Interventions: pain pump in use  Taken 3/10/2023 1315 by Fallon Rapp RN  Pain Management Interventions: pain pump in use  Intervention: Provide Person-Centered Care  Recent Flowsheet Documentation  Taken 3/10/2023 1812 by Fallon Rapp RN  Trust Relationship/Rapport: care explained  Taken 3/10/2023 1657 by Fallon Rapp RN  Trust Relationship/Rapport: care explained  Taken 3/10/2023 1415 by Fallon Rapp RN  Trust Relationship/Rapport: care explained  Taken 3/10/2023 1315 by Fallon Rapp RN  Trust Relationship/Rapport:   care explained   choices provided   questions answered   questions encouraged   reassurance provided     Problem: Adjustment to Decreased Mobility and [place] (Orthopaedic Rehabilitation)  Goal: Optimal Coping  Intervention:  Support Psychosocial Response to Injury  Recent Flowsheet Documentation  Taken 3/10/2023 1315 by Fallon Rapp RN  Family/Support System Care:   self-care encouraged   support provided     Problem: Bleeding (Knee Arthroplasty)  Goal: Absence of Bleeding  Intervention: Monitor and Manage Bleeding  Recent Flowsheet Documentation  Taken 3/10/2023 1315 by Fallon Rapp RN  Bleeding Management: dressing monitored     Problem: Functional Ability Impaired (Knee Arthroplasty)  Goal: Optimal Functional Ability  Intervention: Promote Optimal Functional Status  Recent Flowsheet Documentation  Taken 3/10/2023 1812 by Fallon Rapp RN  Activity Management: up in chair  Taken 3/10/2023 1657 by Fallon Rapp RN  Activity Management: activity adjusted per tolerance  Taken 3/10/2023 1415 by Fallon Rapp RN  Activity Management: activity adjusted per tolerance  Taken 3/10/2023 1315 by Fallon Rapp RN  Activity Management: activity adjusted per tolerance     Problem: Infection (Knee Arthroplasty)  Goal: Absence of Infection Signs and Symptoms  Intervention: Prevent or Manage Infection  Recent Flowsheet Documentation  Taken 3/10/2023 1812 by Fallon Rapp RN  Infection Prevention:   hand hygiene promoted   rest/sleep promoted  Taken 3/10/2023 1657 by Fallon Rapp RN  Infection Prevention:   hand hygiene promoted   rest/sleep promoted  Taken 3/10/2023 1415 by Fallon Rapp RN  Infection Prevention:   hand hygiene promoted   rest/sleep promoted  Taken 3/10/2023 1315 by Fallon Rapp RN  Infection Prevention:   hand hygiene promoted   rest/sleep promoted     Problem: Ongoing Anesthesia Effects (Knee Arthroplasty)  Goal: Anesthesia/Sedation Recovery  Intervention: Optimize Anesthesia Recovery  Recent Flowsheet Documentation  Taken 3/10/2023 1812 by Fallon Rapp RN  Safety Promotion/Fall Prevention:   activity supervised   assistive device/personal items within reach   safety round/check completed  Taken  3/10/2023 1657 by Fallon Rapp RN  Safety Promotion/Fall Prevention:   activity supervised   assistive device/personal items within reach   safety round/check completed  Taken 3/10/2023 1415 by Fallon Rapp RN  Safety Promotion/Fall Prevention:   activity supervised   assistive device/personal items within reach   safety round/check completed  Taken 3/10/2023 1315 by Fallon Rapp RN  Patient Tolerance (IS): good  Safety Promotion/Fall Prevention:   activity supervised   assistive device/personal items within reach   clutter free environment maintained   fall prevention program maintained   lighting adjusted   muscle strengthening facilitated   nonskid shoes/slippers when out of bed   room organization consistent   safety round/check completed  Administration (IS): instruction provided, initial     Problem: Pain (Knee Arthroplasty)  Goal: Acceptable Pain Control  Intervention: Prevent or Manage Pain  Recent Flowsheet Documentation  Taken 3/10/2023 1812 by Fallon Rapp RN  Pain Management Interventions:   pain pump in use   see MAR  Taken 3/10/2023 1415 by Fallon Rapp RN  Pain Management Interventions: pain pump in use  Taken 3/10/2023 1315 by Fallon Rapp RN  Pain Management Interventions: pain pump in use  Diversional Activities: television     Problem: Respiratory Compromise (Knee Arthroplasty)  Goal: Effective Oxygenation and Ventilation  Intervention: Optimize Oxygenation and Ventilation  Recent Flowsheet Documentation  Taken 3/10/2023 1812 by Fallon Rapp RN  Head of Bed (HOB) Positioning: HOB elevated  Taken 3/10/2023 1657 by Fallon Rapp RN  Head of Bed (HOB) Positioning: HOB elevated  Taken 3/10/2023 1415 by Fallon Rapp RN  Head of Bed (HOB) Positioning: HOB elevated  Taken 3/10/2023 1315 by Fallon Rapp RN  Patient Tolerance (IS): good  Administration (IS): instruction provided, initial  Head of Bed (HOB) Positioning: HOB elevated   Goal Outcome Evaluation:      Pt  currently in chair resting [facility]. Complaints of pain to anterior knee cap. Nerve block in place. Bolus and oral MAR utilized for pain relief. Pt ambulated with PT today. Neuro checks WNL. Vitals WNL on RA. No other observations at this time. Will continue to monitor, call bell in reach.

## 2023-03-10 NOTE — THERAPY EVALUATION
Patient Name: bOi Jackman Jr.  : 1950    MRN: [id number]                              Today's Date: 3/10/2023       Admit Date: 3/10/2023    Visit Dx:     ICD-10-CM ICD-9-CM   1. Primary osteoarthritis of left knee  M17.12 715.16     Patient Active Problem List   Diagnosis   • Allergic rhinitis   • Anxiety disorder   • Benign paroxysmal positional vertigo   • Chronic tension type headache   • Depression   • ED (erectile dysfunction) of non-organic origin   • Hyperlipidemia   • Hypertension   • LFTs abnormal   • CHRISTOPHE (obstructive sleep apnea)   • Elevated glucose   • Health care maintenance   • Morbid obesity due to excess calories ([facility])   • Onychomycosis of left great toe   • Osteoarthritis of ankle or foot   • Venous stasis   • Neuropathy   • Class 3 severe obesity due to excess calories with serious comorbidity and body mass index (BMI) of 40.0 to 44.9 in adult ([facility])   • Pre-op evaluation   • Arthritis of foot, left   • S/P left ankle triple arthrodesis   • Acute blood loss anemia, mild, asymptomatic    • Acute postoperative pain   • Wound infection   • Primary osteoarthritis of right hip   • Status post total replacement of right hip   • Postoperative wound infection of right hip, s/p I and D, head and liner exchange.   • Primary osteoarthritis of left knee   • S/P total knee arthroplasty, left     Past Medical History:   Diagnosis Date   • Anxiety and depression    • Arthritis of neck ?   • Bulging of lumbar intervertebral disc    • Cataract     Lens implated in left eye.   • Cervical disc disorder ?   • Cornea transplant recipient     BILATERAL   • CPAP (continuous positive airway pressure) dependence    • Elevated cholesterol    • Hip arthrosis Herb    Subsequent staph infection   • Hyperlipidemia    • Hypertension    • Infection associated with internal hip prosthesis ([facility]) 2020    RIGHT HIP   • Knee swelling    • Migraine    • Neuroma of foot     ?   • CHRISTOPHE on CPAP    • Periarthritis of  shoulder ?   • Rheumatoid arthritis (HCC)    • Rotator cuff syndrome 09/15/2022    Dr. Chanda Castanon   • Swelling of left ear    • Wears contact lenses    • Wears contact lenses    • Wears contact lenses      Past Surgical History:   Procedure Laterality Date   • ACHILLES TENDON SURGERY Left 10/15/2019    Procedure: ACHILLES TENDON LENGTHENING LEFT;  Surgeon: Elodia Guzman MD;  Location:  [facility] OR;  Service: Orthopedics   • CATARACT EXTRACTION Right    • COLONOSCOPY  2015   • EYE SURGERY Bilateral     cornea transplant    • FOOT SURGERY Left 10/15/2019    triple arthrodesis and achilles tendon lengthening- [facility]   • HERNIA REPAIR     • ILIAC CREST BONE GRAFT Left 10/15/2019    Procedure: ILIAC CREST BONE GRAFT LEFT;  Surgeon: Elodia Guzman MD;  Location: [facility] OR;  Service: Orthopedics   • INCISION AND DRAINAGE LEG Right 12/04/2020    Procedure: INCISION AND DRAINAGE WITH COMPONENT EXCHANGE, HEAD AND LINER RIGHT HIP;  Surgeon: Osbaldo Olivas MD;  Location: [facility] OR;  Service: Orthopedics;  Laterality: Right;   • JOINT REPLACEMENT  8/4/20 and 12/4/20    Right hip replacement   • TOE FUSION Left 10/15/2019    Procedure: TRIPLE ARTHODESIS LEFT;  Surgeon: Elodia Guzman MD;  Location: [facility] OR;  Service: Orthopedics   • TOTAL HIP ARTHROPLASTY Right 08/04/2020    Procedure: TOTAL HIP ARTHROPLASTY RIGHT;  Surgeon: Osbaldo Olivas MD;  Location:  [facility] OR;  Service: Orthopedics;  Laterality: Right;   • TRIGGER POINT INJECTION     • VENOUS ABLATION Left 01/21/2022    Endovenous laser ablation of left great sapheuos vein for venous insufficiency      General Information     Row Name 03/10/23 1523          Physical Therapy Time and Intention    Document Type evaluation  -SC     Mode of Treatment physical therapy  -SC     Row Name 03/10/23 1523          General Information    Patient Profile Reviewed yes  -SC     Prior Level of Function independent:;gait  uses a cane  -SC     Existing Precautions/Restrictions fall   nerve cath, visual deficit  -SC     Row Name 03/10/23 1523          Living Environment    People in Home spouse  -SC     Row Name 03/10/23 1523          Home Main Entrance    Number of Stairs, Main Entrance one  -SC     Row Name 03/10/23 1523          Stairs Within Home, Primary    Stairs, Within Home, Primary 2  uses a door frame to pull on  -SC     Number of Stairs, Within Home, Primary two  -SC     Row Name 03/10/23 1523          Cognition    Orientation Status (Cognition) oriented x 4  -SC     Row Name 03/10/23 1523          Safety Issues, Functional Mobility    Impairments Affecting Function (Mobility) motor control;pain;sensation/sensory awareness;strength;other (see comments)  vision- no contact available today to assist with poor vision  -SC     Comment, Safety Issues/Impairments (Mobility) alert, following commends, had one episove of vertigo trasitioning from supine to sitting EOB  -SC           User Key  (r) = Recorded By, (t) = Taken By, (c) = Cosigned By    Initials Name Provider Type    SC Leighton Donnelly PT Physical Therapist               Mobility     Row Name 03/10/23 1528          Bed Mobility    Bed Mobility supine-sit;scooting/bridging  -SC     Scooting/Bridging [place] (Bed Mobility) independent  -SC     Supine-Sit [place] (Bed Mobility) 1 person assist;verbal cues;supervision  -SC     Assistive Device (Bed Mobility) leg   -SC     Comment, (Bed Mobility) verbal cues for getting up to EOB using UE for scooting. Patient has vertigo spell on sitting that took about 30 sec to pass  -[facility] Name 03/10/23 1528          Transfers    Comment, (Transfers) cues for sequencing transitions using B UE on chair rail and bed  -[facility] Name 03/10/23 1528          Sit-Stand Transfer    Sit-Stand [place] (Transfers) 2 person assist;verbal cues;minimum assist (75% patient effort)  -SC     Assistive Device (Sit-Stand Transfers) walker, front-wheeled  -[facility] Name 03/10/23 1528           Gait/Stairs (Locomotion)    [place] Level (Gait) 1 person assist;1 person to manage equipment;contact guard;verbal cues  -SC     Assistive Device (Gait) walker, front-wheeled  -SC     Distance in Feet (Gait) 120  -SC     Deviations/Abnormal Patterns (Gait) left sided deviations;antalgic;stride length decreased;weight shifting decreased  -SC     Bilateral Gait Deviations forward flexed posture  -SC     Comment, (Gait/Stairs) Gt training focused on sequencing step through gait pattern with upright posture. Required a few cues to correct posture. No buckling noted. NO dizziness in standing reported.  -[facility] Name 03/10/23 1528          Mobility    Extremity Weight-bearing Status left lower extremity  -SC     Left Lower Extremity (Weight-bearing Status) weight-bearing as tolerated (WBAT)  -SC           User Key  (r) = Recorded By, (t) = Taken By, (c) = Cosigned By    Initials Name Provider Type    SC Leighton Donnelly, PT Physical Therapist               Obj/Interventions     [facility] Name 03/10/23 1532          Range of Motion Comprehensive    General Range of Motion lower extremity range of motion deficits identified  -SC     Comment, General Range of Motion L knee limited aprox 30%  -[facility] Name 03/10/23 1532          Strength Comprehensive (MMT)    General Manual Muscle Testing (MMT) Assessment lower extremity strength deficits identified  -SC     Comment, General Manual Muscle Testing (MMT) Assessment R LE grossly 4/5 L LE tib ant 4/5, quads 3+/5  -[facility] Name 03/10/23 1532          Motor Skills    Therapeutic Exercise knee;ankle  -SC     Row Name 03/10/23 1532          Knee (Therapeutic Exercise)    Knee (Therapeutic Exercise) AROM (active range of motion);isometric exercises;strengthening exercise  -SC     Knee Isometrics (Therapeutic Exercise) left;quad sets;10 repetitions  -SC     Knee Strengthening (Therapeutic Exercise) left;heel slides;SLR (straight leg raise);5 repetitions;SAQ (short arc  quad)  -SC     Row Name 03/10/23 1532          Ankle (Therapeutic Exercise)    Ankle (Therapeutic Exercise) AROM (active range of motion)  -SC     Ankle AROM (Therapeutic Exercise) bilateral;dorsiflexion;plantarflexion;10 repetitions  -SC     Row Name 03/10/23 1532          Balance    Balance Assessment standing dynamic balance  -SC     Dynamic Standing Balance 1-person assist;contact guard  -SC     Position/Device Used, Standing Balance supported  -SC     Comment, Balance slow gait with cues for negotiating objects due to visual defitis  -SC     Row Name 03/10/23 1532          Sensory Assessment (Somatosensory)    Sensory Assessment (Somatosensory) other (see comments)  R upper thigh diminished to light touch, L ant thigh diminished to LT due to nerve cath  -SC           User Key  (r) = Recorded By, (t) = Taken By, (c) = Cosigned By    Initials Name Provider Type    SC Leighton Donnelly, PT Physical Therapist               Goals/Plan     Row Name 03/10/23 1539          Bed Mobility Goal 1 (PT)    Activity/Assistive Device (Bed Mobility Goal 1, PT) bed mobility activities, all  -SC     [place] Level/Cues Needed (Bed Mobility Goal 1, PT) modified independence  -SC     Time Frame (Bed Mobility Goal 1, PT) long term goal (LTG);10 days  -SC     Row Name 03/10/23 1539          Transfer Goal 1 (PT)    Activity/Assistive Device (Transfer Goal 1, PT) sit-to-stand/stand-to-sit;bed-to-chair/chair-to-bed  -SC     [place] Level/Cues Needed (Transfer Goal 1, PT) standby assist  -SC     Time Frame (Transfer Goal 1, PT) long term goal (LTG);10 days  -SC     Row Name 03/10/23 1539          Gait Training Goal 1 (PT)    Activity/Assistive Device (Gait Training Goal 1, PT) gait (walking locomotion);walker, rolling  -SC     [place] Level (Gait Training Goal 1, PT) standby assist  -SC     Distance (Gait Training Goal 1, PT) 350  -SC     Time Frame (Gait Training Goal 1, PT) long term goal (LTG);10 days  -SC     Row Name  03/10/23 1539          ROM Goal 1 (PT)    ROM Goal 1 (PT) l knee 10-90 deg  -SC     Time Frame (ROM Goal 1, PT) long-term goal (LTG);10 days  -SC     Row Name 03/10/23 1539          Stairs Goal 1 (PT)    Activity/Assistive Device (Stairs Goal 1, PT) stairs, all skills;cane, straight  -SC     [place] Level/Cues Needed (Stairs Goal 1, PT) contact guard required  -SC     Number of Stairs (Stairs Goal 1, PT) 2  -SC     Time Frame (Stairs Goal 1, PT) long term goal (LTG);10 days  -SC     Row Name 03/10/23 1539          Therapy Assessment/Plan (PT)    Planned Therapy Interventions (PT) bed mobility training;gait training;home exercise program;strengthening;stair training;ROM (range of motion);postural re-education;transfer training  -SC           User Key  (r) = Recorded By, (t) = Taken By, (c) = Cosigned By    Initials Name Provider Type    SC Leighton Donnelly, PT Physical Therapist               Clinical Impression     Row Name 03/10/23 1535          Pain    Additional Documentation Pain Scale: FACES Pre/Post-Treatment (Group)  -[facility] Name 03/10/23 1535          Pain Scale: FACES Pre/Post-Treatment    Pain: FACES Scale, Pretreatment 8-->hurts whole lot  -SC     Posttreatment Pain Rating 8-->hurts whole lot  -SC     Pain Location - Side/Orientation Left  -SC     Pain Location - knee  -[facility] Name 03/10/23 1535          Plan of Care Review    Plan of Care Reviewed With patient;family  -SC     Progress improving  -SC     Outcome Evaluation Patient was able to ambulate 120 feet with walker on ba[place]a. He is below his baseline and limited by pain, some vertigo and a visual dificit. I recommend continued skilled PT untill he is able to negotiate steps safely. Then he will be home with transtional outpatient PT.  -[facility] Name 03/10/23 1535          Therapy Assessment/Plan (PT)    Patient/Family Therapy Goals Statement (PT) walk  -SC     Rehab Potential (PT) good, to achieve stated therapy goals  -SC     Criteria  for Skilled Interventions Met (PT) yes;meets criteria  -SC     Therapy Frequency (PT) 2 times/day  -SC     Row Name 03/10/23 1535          Vital Signs    Pre Systolic BP Rehab 155  -SC     Pre Treatment Diastolic BP 80  -SC     Row Name 03/10/23 1535          Positioning and Restraints    Pre-Treatment Position in bed  -SC     Post Treatment Position chair  -SC     In Chair notified nsg;reclined;sitting;call light within reach;encouraged to call for assist;exit alarm on;with family/caregiver  -SC           User Key  (r) = Recorded By, (t) = Taken By, (c) = Cosigned By    Initials Name Provider Type    SC Leighton Donnelly, PT Physical Therapist               Outcome Measures     Row Name 03/10/23 1540          How much help from another person do you currently need...    Turning from your back to your side while in flat bed without using bedrails? 3  -SC     Moving from lying on back to sitting on the side of a flat bed without bedrails? 3  -SC     Moving to and from a bed to a chair (including a wheelchair)? 3  -SC     Standing up from a chair using your arms (e.g., wheelchair, bedside chair)? 3  -SC     Climbing 3-5 steps with a railing? 2  -SC     To walk in hospital room? 3  -SC     AM-PAC 6 Clicks Score (PT) 17  -SC     Highest level of mobility 5 --> Static standing  -SC     Row Name 03/10/23 1540          Functional Assessment    Outcome Measure Options AM-PAC 6 Clicks Basic Mobility (PT)  -SC           User Key  (r) = Recorded By, (t) = Taken By, (c) = Cosigned By    Initials Name Provider Type    SC Leighton Donnelly PT Physical Therapist                             Physical Therapy Education     Title: PT OT SLP Therapies (In Progress)     Topic: Physical Therapy (Done)     Point: Mobility training (Done)     Learning Progress Summary           Patient LAURA Abel VU by SC at 3/10/2023 1541    Comment: reviewed HEP                   Point: Home exercise program (Done)     Learning Progress Summary            Patient LAURA Abel VU by SC at 3/10/2023 1541    Comment: reviewed HEP                   Point: Body mechanics (Done)     Learning Progress Summary           Patient LAURA Abel VU by SC at 3/10/2023 1541    Comment: reviewed HEP                   Point: Precautions (Done)     Learning Progress Summary           Patient LAURA Abel VU by SC at 3/10/2023 1541    Comment: reviewed HEP                               User Key     Initials Effective Dates Name Provider Type Discipline    SC 02/03/23 -  Leighton Donnelly PT Physical Therapist PT              PT Recommendation and Plan  Planned Therapy Interventions (PT): bed mobility training, gait training, home exercise program, strengthening, stair training, ROM (range of motion), postural re-education, transfer training  Plan of Care Reviewed With: patient, family  Progress: improving  Outcome Evaluation: Patient was able to ambulate 120 feet with walker on bahena. He is below his baseline and limited by pain, some vertigo and a visual dificit. I recommend continued skilled PT untill he is able to negotiate steps safely. Then he will be home with transtional outpatient PT.     Time Calculation:    PT Charges     Row Name 03/10/23 1445             Time Calculation    Start Time 1445  -SC      PT Received On 03/10/23  -SC      PT Goal Re-Cert Due Date 03/20/23  -SC         Time Calculation- PT    Total Timed Code Minutes- PT 30 minute(s)  -SC         Timed Charges    97355 - PT Therapeutic Exercise Minutes 10  -SC      65682 - Gait Training Minutes  10  -SC      97225 - PT Therapeutic Activity Minutes 10  -SC         Untimed Charges    PT Eval/Re-eval Minutes 30  -SC         Total Minutes    Timed Charges Total Minutes 30  -SC      Untimed Charges Total Minutes 30  -SC       Total Minutes 60  -SC            User Key  (r) = Recorded By, (t) = Taken By, (c) = Cosigned By    Initials Name Provider Type    SC Leighton Donnelly, PT Physical Therapist              Therapy Charges for  Today     Code Description Service Date Service Provider Modifiers Qty    02946107221 HC PT THER PROC EA 15 MIN 3/10/2023 Cony, Leighton HANSON, PT GP 1    32846607102 HC GAIT TRAINING EA 15 MIN 3/10/2023 Leighton Donnelly, PT GP 1    22561252624 HC PT EVAL MOD COMPLEXITY 2 3/10/2023 Cony, Leighton HANSON, PT GP 1    [id number] HC PT THER SUPP EA 15 MIN 3/10/2023 Leighton Donnelly, PT GP 3          PT G-Codes  Outcome Measure Options: AM-PAC 6 Clicks Basic Mobility (PT)  AM-PAC 6 Clicks Score (PT): 17  PT Discharge Summary  Anticipated Discharge Disposition (PT): home with outpatient therapy services    Leighton Donnelly, PT  3/10/2023

## 2023-03-10 NOTE — H&P
Patient Name: Obi Jackman Jr.  MRN: [id number]  : 1950  DOS: 3/10/2023    Attending: Osbaldo Olivas MD    Primary Care Provider: Cammy Oneal PA      Chief complaint: Left knee Pain.    Subjective   Patient is a pleasant 72 y.o. male presented for scheduled surgery by Dr. Olivas.    Per his note (The patient is a 72 y.o. male with debilitating left knee pain secondary to osteoarthritis that failed to improve in spite of conservative treatment .  Options have been discussed at length with the patient and the patient has had an extended course of conservative treatment without long-term benefit. The patient has reached the point where the patient desires total knee arthroplasty surgery and understands the risks, benefits, and alternatives. Consent was obtained. Please see my office notes for details with regard to preoperative counseling and operative rationale.).    Patient underwent Left total knee arthroplasty under spinal anesthesia, tolerated surgery well, is admitted for further management.  Adductor canal nerve block catheter/infuse pump was placed by acute pain service.    Seen in PACU, drowsy but appropriate, no complains of nausea, vomiting, or shortness of breath.  He has a patch on his right eye following recent cataract surgery.    Patient is known to me from prior hospitalizations most recently in 2020 when he had I&D and head and liner exchange of infected right hip wound followed by antibiotic regimen.  Prior to that he had right total hip arthroplasty in 2020 and left ankle arthrodesis in 2019 and that procedure was done by Dr. Carolina.    Reviewed with patient his past medical history and medications.    Allergies:  No Known Allergies    Meds:  Medications Prior to Admission   Medication Sig Dispense Refill Last Dose   • acetaminophen (TYLENOL) 325 MG tablet Take 1 tablet by mouth As Needed for Mild Pain.   Past Week   • amLODIPine (NORVASC) 5 MG tablet TAKE 1  TABLET BY MOUTH DAILY. 90 tablet 1 Past Month   • Ascorbic Acid (VITAMIN C PO) Take  by mouth Daily.   3/9/2023   • atenolol (TENORMIN) 50 MG tablet TAKE 1 TABLET BY MOUTH DAILY. 30 tablet 3 3/10/2023 at 0600   • atorvastatin (LIPITOR) 10 MG tablet TAKE 1 TABLET BY MOUTH DAILY. RESUME WHEN NOT ON DAPTO ANYMORE 30 tablet 3 Past Week   • buPROPion XL (WELLBUTRIN XL) 150 MG 24 hr tablet TAKE 1 TABLET BY MOUTH DAILY 90 tablet 1 3/9/2023 at 1000   • Chlorhexidine Gluconate 4 % solution Apply  topically to the appropriate area as directed Daily. Shower with hibiclens solution as directed for 5 days prior to surgery 237 mL 0 3/9/2023   • clotrimazole (LOTRIMIN) 1 % cream Apply  topically to the appropriate area as directed 2 (Two) Times a Day. 15 g 1 Past Week   • clotrimazole-betamethasone (LOTRISONE) 1-0.05 % cream APPLY TOPICALLY TO THE APPROPRIATE AREA AS DIRECTED 2 (TWO) TIMES A DAY. 45 g 0 Past Week   • Cyanocobalamin (VITAMIN B-12 ER PO) Take 1 tablet by mouth Daily.   3/9/2023   • dorzolamide-timolol (COSOPT) 22.3-6.8 MG/ML ophthalmic solution    3/10/2023 at 0600   • doxycycline (VIBRAMYCIN) 100 MG capsule Take 1 capsule by mouth Daily.   3/10/2023 at 0600   • gabapentin (NEURONTIN) 300 MG capsule Take 1 capsule by mouth 3 (Three) Times a Day.   3/10/2023 at 0600   • hydrocortisone 1 % ointment Apply 1 application topically to the appropriate area as directed 2 (Two) Times a Day. (Patient taking differently: Apply 1 application topically to the appropriate area as directed 2 (Two) Times a Day As Needed.) 56 g 0 Past Week   • hydroxychloroquine (PLAQUENIL) 200 MG tablet Take 1 tablet by mouth Every 12 (Twelve) Hours.   Past Month   • latanoprost (XALATAN) 0.005 % ophthalmic solution Apply 1 drop to eye(s) as directed by provider.   3/10/2023 at 0600   • latanoprost (XALATAN) 0.005 % ophthalmic solution Apply 1 drop to eye(s) as directed by provider.   3/10/2023 at 0600   • lisinopril-hydrochlorothiazide  (PRINZIDE,ZESTORETIC) 20-12.5 MG per tablet TAKE 1 TABLET BY MOUTH DAILY 30 tablet 1 Past Month   • oxaprozin (DAYPRO) 600 MG tablet Take 2 tablets by mouth Daily. Take 2 po every day   3/10/2023 at 0600   • prednisoLONE acetate (PRED FORTE) 1 % ophthalmic suspension Apply 1 drop to eye(s) as directed by provider 4 (Four) Times a Day.   3/10/2023 at 0600   • prednisoLONE acetate (PRED FORTE) 1 % ophthalmic suspension Apply 1 drop to eye(s) as directed by provider Daily.   3/10/2023 at 0600   • SUMAtriptan (IMITREX) 100 MG tablet TAKE ONE TABLET AT ONSET OF HEADACHE. MAY REPEAT DOSE ONE TIME IN 2 HOURS IF HEADACHE NOT RELIEVED. 9 tablet 5 Past Week   • traMADol (ULTRAM) 50 MG tablet Take 1 tablet by mouth Daily.   3/9/2023 at 1800   • ALPRAZolam (XANAX) 0.25 MG tablet Take 1/2 to 1 tablet prn when flying 10 tablet 0 More than a month   • cyclobenzaprine (FLEXERIL) 10 MG tablet TAKE ONE TABLET BY MOUTH THREE TIMES A DAY AS NEEDED FOR MUSCLE SPASMS 30 tablet 5 More than a month   • meclizine (ANTIVERT) 25 MG tablet 1 tablet Every 8 (Eight) Hours As Needed.   More than a month   • promethazine (PHENERGAN) 25 MG tablet Take 1 tablet by mouth Every 6 (Six) Hours As Needed for Nausea or Vomiting. 20 tablet 0 More than a month         History:   Past Medical History:   Diagnosis Date   • Anxiety and depression    • Arthritis of neck ?   • Bulging of lumbar intervertebral disc    • Cataract     Lens implated in left eye.   • Cervical disc disorder ?   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   • CHRISTOPHE on CPAP    • Periarthritis of shoulder ?   • Rheumatoid arthritis (HCC)    • Rotator cuff syndrome 09/15/2022     Dr. Chanda Castanon   • Swelling of left ear    • Wears contact lenses    • Wears contact lenses    • Wears contact lenses      Past Surgical History:   Procedure Laterality Date   • ACHILLES TENDON SURGERY Left 10/15/2019    Procedure: ACHILLES TENDON LENGTHENING LEFT;  Surgeon: Elodia Guzman MD;  Location: [facility] OR;  Service: Orthopedics   • CATARACT EXTRACTION Right    • COLONOSCOPY  2015   • EYE SURGERY Bilateral     cornea transplant    • FOOT SURGERY Left 10/15/2019    triple arthrodesis and achilles tendon lengthening- [place]   • HERNIA REPAIR     • ILIAC CREST BONE GRAFT Left 10/15/2019    Procedure: ILIAC CREST BONE GRAFT LEFT;  Surgeon: Elodia Guzman MD;  Location: [facility] OR;  Service: Orthopedics   • INCISION AND DRAINAGE LEG Right 12/04/2020    Procedure: INCISION AND DRAINAGE WITH COMPONENT EXCHANGE, HEAD AND LINER RIGHT HIP;  Surgeon: Osbaldo Olivas MD;  Location: [facility] OR;  Service: Orthopedics;  Laterality: Right;   • JOINT REPLACEMENT  8/4/20 and 12/4/20    Right hip replacement   • TOE FUSION Left 10/15/2019    Procedure: TRIPLE ARTHODESIS LEFT;  Surgeon: Elodia Guzman MD;  Location: [facility] OR;  Service: Orthopedics   • TOTAL HIP ARTHROPLASTY Right 08/04/2020    Procedure: TOTAL HIP ARTHROPLASTY RIGHT;  Surgeon: Osbaldo Olivas MD;  Location: [facility] OR;  Service: Orthopedics;  Laterality: Right;   • TRIGGER POINT INJECTION     • VENOUS ABLATION Left 01/21/2022    Endovenous laser ablation of left great sapheuos vein for venous insufficiency     Family History   Problem Relation Age of Onset   • No Known Problems Mother    • No Known Problems Father    • Cancer Sister         Abdominal     Social History     Tobacco Use   • Smoking status: Never   • Smokeless tobacco: Never   Vaping Use   • Vaping Use: Never used   Substance Use Topics   • Alcohol use: Never   • Drug use: No       Review of Systems  Pertinent items are noted in HPI, all other systems reviewed and negative    Vital  Signs  /87 (BP Location: Right arm)   Pulse 68   Temp 98 °F (36.7 °C) (Temporal)   Resp 12   SpO2 100%     Physical Exam:    General Appearance:    Alert, cooperative, in no acute distress   Head:    Normocephalic, without obvious abnormality, atraumatic   Eyes:          Right eye with patch on.  Left: Lids and lashes normal, conjunctiva and sclera normal, no   icterus, no pallor, corneas clear    Ears:    Ears appear intact with no abnormalities noted   Throat:   No oral lesions, no thrush, oral mucosa moist   Neck:   No adenopathy, supple, trachea midline, no thyromegaly         Lungs:     Clear to auscultation,respirations regular, even and                   unlabored    Heart:    Regular rhythm and normal rate, normal S1 and S2, no       murmur, no gallop   Abdomen:     Normal bowel sounds, no masses, no organomegaly, soft        non-tender, non-distended, no guarding, no rebound                 tenderness   Genitalia:    Deferred   Extremities:  Left LE, CDI dressing on knee, PNB cath present.    Pulses:   Pulses palpable and equal bilaterally   Skin:   No bleeding, bruising or rash   Neurologic:   Cranial nerves 2 - 12 grossly intact, decreased movement in the lower extremities under the effects of spinal anesthesia when seen.      I reviewed the patient's new clinical results.             Invalid input(s): NEUTOPHILPCT,  EOSPCT  Results from last 7 days   Lab Units 03/10/23  0749   POTASSIUM mmol/L 3.8     Lab Results   Component Value Date    HGBA1C 5.20 02/24/2023        Latest Reference Range & Units 02/24/23 12:47   Glucose 65 - 99 mg/dL 103 (H)   Sodium 136 - 145 mmol/L 143   Potassium 3.5 - 5.2 mmol/L 3.9   CO2 22.0 - 29.0 mmol/L 30.0 (H)   Chloride 98 - 107 mmol/L 105   Anion Gap 5.0 - 15.0 mmol/L 8.0   Creatinine 0.76 - 1.27 mg/dL 0.76   BUN 8 - 23 mg/dL 15   BUN/Creatinine Ratio 7.0 - 25.0  19.7   Calcium 8.6 - 10.5 mg/dL 9.3   eGFR >60.0 mL/min/1.73 96.1   Hemoglobin A1C 4.80 - 5.60 % 5.20    C-Reactive Protein 0.00 - 0.50 mg/dL <0.30   Protime 11.4 - 14.4 Seconds 13.2   INR 0.84 - 1.13  1.01   PTT 22.0 - 39.0 seconds 33.0   WBC 3.40 - 10.80 10*3/mm3 4.54   RBC 4.14 - 5.80 10*6/mm3 4.57   Hemoglobin 13.0 - 17.7 g/dL 14.2   Hematocrit 37.5 - 51.0 % 42.5   RDW 12.3 - 15.4 % 12.5   MCV 79.0 - 97.0 fL 93.0   MCH 26.6 - 33.0 pg 31.1   MCHC 31.5 - 35.7 g/dL 33.4   MPV 6.0 - 12.0 fL 10.2   Platelets 140 - 450 10*3/mm3 253   (H): Data is abnormally high    Assessment and Plan:       S/P total knee arthroplasty, left    Anxiety disorder    Depression    Hyperlipidemia    Hypertension    CHRISTOPHE (obstructive sleep apnea)    Primary osteoarthritis of left knee      Plan  1. PT/OT,  Weight bearing as tolerated left LE  2. Pain control-prns, ACB cath with ropivacaine infusion.  3. IS-encourage  4. DVT proph- Mechanicals and aspirin  5. Bowel regimen  6. Resume home medications as appropriate  7. Monitor post-op labs  8. DC planning for home       HTN, HLD  - Continue home atenolol, statin and lisinopril  - Hold HCTZ  - Monitor BP   - Holding parameters for BP meds  - Labetalol PRN for SBP>170     CHRISTOPHE  - CPAP at night     Obesity  - complicates all aspects of care    Dragon disclaimer:  Part of this encounter note is an electronic transcription/translation of spoken language to printed text. The electronic translation of spoken language may permit erroneous, or at times, nonsensical words or phrases to be inadvertently transcribed; Although I have reviewed the note for such errors, some may still exist.    Joselyn Patel MD  03/10/23  11:30 EST

## 2023-03-10 NOTE — CASE MANAGEMENT/SOCIAL WORK
Case Management Discharge Note      Final Note: Mr. Jackman will use [facility] for his outpatient physical therapy. He has a rolling walker at home. He will be transported home by family via private vehicle. No additional discharge needs identified.         Selected Continued Care - Admitted Since 3/10/2023     Destination    No services have been selected for the patient.              Durable Medical Equipment    No services have been selected for the patient.              Dialysis/Infusion    No services have been selected for the patient.              Home Medical Care    No services have been selected for the patient.              Therapy Coordination complete.    Service Provider Selected Services Address Phone Fax Patient Preferred    [facility] [address] [id number] [phone] --          Community Resources    No services have been selected for the patient.              Community & DME    No services have been selected for the patient.                  Transportation Services  Private: Car    Final Discharge Disposition Code: 01 - home or self-care

## 2023-03-10 NOTE — ANESTHESIA POSTPROCEDURE EVALUATION
Patient: Obi Jackman Jr.    Procedure Summary     Date: 03/10/23 Room / Location:  [facility] /  [facility]    Anesthesia Start: 0812 Anesthesia Stop:     Procedure: TOTAL KNEE ARTHROPLASTY WITH CORI ROBOT - LEFT (Left: Knee) Diagnosis:       Primary osteoarthritis of left knee      (Primary osteoarthritis of left knee [M17.12])    Surgeons: Osbaldo Olivas MD Provider: Lanette Guerra MD    Anesthesia Type: spinal ASA Status: 3          Anesthesia Type: spinal    Vitals  Vitals Value Taken Time   /87 03/10/23 1046   Temp 98 °F (36.7 °C) 03/10/23 1046   Pulse 68 03/10/23 1046   Resp 12 03/10/23 1046   SpO2 100 % 03/10/23 1046           Post Anesthesia Care and Evaluation    Patient location during evaluation: PACU  Patient participation: complete - patient cannot participate  Level of consciousness: responsive to physical stimuli  Pain score: 0  Pain management: adequate    Airway patency: patent  Anesthetic complications: No anesthetic complications    Cardiovascular status: stable  Respiratory status: acceptable, nasal cannula, oral airway and spontaneous ventilation  Hydration status: stable    Comments: Pt transferred to PACU with O2. Vital signs stable. Report to PACU RN and care accepted.

## 2023-03-11 VITALS
TEMPERATURE: 98.2 F | SYSTOLIC BLOOD PRESSURE: 159 MMHG | OXYGEN SATURATION: 98 % | DIASTOLIC BLOOD PRESSURE: 86 MMHG | HEART RATE: 94 BPM | RESPIRATION RATE: 18 BRPM

## 2023-03-11 LAB
ANION GAP SERPL CALCULATED.3IONS-SCNC: 10 MMOL/L (ref 5–15)
BUN SERPL-MCNC: 13 MG/DL (ref 8–23)
BUN/CREAT SERPL: 16.7 (ref 7–25)
CALCIUM SPEC-SCNC: 8.7 MG/DL (ref 8.6–10.5)
CHLORIDE SERPL-SCNC: 103 MMOL/L (ref 98–107)
CO2 SERPL-SCNC: 28 MMOL/L (ref 22–29)
CREAT SERPL-MCNC: 0.78 MG/DL (ref 0.76–1.27)
DEPRECATED RDW RBC AUTO: 43 FL (ref 37–54)
EGFRCR SERPLBLD CKD-EPI 2021: 95.3 ML/MIN/1.73
ERYTHROCYTE [DISTWIDTH] IN BLOOD BY AUTOMATED COUNT: 12.6 % (ref 12.3–15.4)
GLUCOSE SERPL-MCNC: 147 MG/DL (ref 65–99)
HCT VFR BLD AUTO: 36.7 % (ref 37.5–51)
HGB BLD-MCNC: 11.9 G/DL (ref 13–17.7)
MCH RBC QN AUTO: 30.3 PG (ref 26.6–33)
MCHC RBC AUTO-ENTMCNC: 32.4 G/DL (ref 31.5–35.7)
MCV RBC AUTO: 93.4 FL (ref 79–97)
PLATELET # BLD AUTO: 278 10*3/MM3 (ref 140–450)
PMV BLD AUTO: 10 FL (ref 6–12)
POTASSIUM SERPL-SCNC: 4 MMOL/L (ref 3.5–5.2)
RBC # BLD AUTO: 3.93 10*6/MM3 (ref 4.14–5.8)
SODIUM SERPL-SCNC: 141 MMOL/L (ref 136–145)
WBC NRBC COR # BLD: 10.12 10*3/MM3 (ref 3.4–10.8)

## 2023-03-11 PROCEDURE — 97110 THERAPEUTIC EXERCISES: CPT

## 2023-03-11 PROCEDURE — 85027 COMPLETE CBC AUTOMATED: CPT | Performed by: ORTHOPAEDIC SURGERY

## 2023-03-11 PROCEDURE — 80048 BASIC METABOLIC PNL TOTAL CA: CPT | Performed by: INTERNAL MEDICINE

## 2023-03-11 PROCEDURE — 97116 GAIT TRAINING THERAPY: CPT

## 2023-03-11 PROCEDURE — 25010000002 CEFAZOLIN PER 500 MG: Performed by: ORTHOPAEDIC SURGERY

## 2023-03-11 PROCEDURE — 99024 POSTOP FOLLOW-UP VISIT: CPT | Performed by: ORTHOPAEDIC SURGERY

## 2023-03-11 PROCEDURE — 97535 SELF CARE MNGMENT TRAINING: CPT

## 2023-03-11 PROCEDURE — 97165 OT EVAL LOW COMPLEX 30 MIN: CPT

## 2023-03-11 PROCEDURE — 25010000002 HYDROMORPHONE 1 MG/ML SOLUTION: Performed by: ORTHOPAEDIC SURGERY

## 2023-03-11 RX ORDER — OXYCODONE HYDROCHLORIDE 5 MG/1
5 TABLET ORAL EVERY 4 HOURS PRN
Qty: 40 TABLET | Refills: 0 | Status: SHIPPED | OUTPATIENT
Start: 2023-03-11 | End: 2023-03-28 | Stop reason: SDUPTHER

## 2023-03-11 RX ORDER — ASPIRIN 325 MG
325 TABLET, DELAYED RELEASE (ENTERIC COATED) ORAL DAILY
Qty: 30 TABLET | Refills: 0 | Status: SHIPPED | OUTPATIENT
Start: 2023-03-12 | End: 2023-04-11

## 2023-03-11 RX ORDER — ROPIVACAINE HYDROCHLORIDE 2 MG/ML
1 INJECTION, SOLUTION EPIDURAL; INFILTRATION; PERINEURAL CONTINUOUS
Start: 2023-03-11 | End: 2023-04-03

## 2023-03-11 RX ORDER — POLYETHYLENE GLYCOL 3350 17 G/17G
17 POWDER, FOR SOLUTION ORAL DAILY
Qty: 238 G | Refills: 0 | Status: SHIPPED | OUTPATIENT
Start: 2023-03-11 | End: 2023-03-26

## 2023-03-11 RX ADMIN — BUPROPION HYDROCHLORIDE 150 MG: 150 TABLET, FILM COATED, EXTENDED RELEASE ORAL at 08:14

## 2023-03-11 RX ADMIN — OXYCODONE HYDROCHLORIDE 5 MG: 5 TABLET ORAL at 13:08

## 2023-03-11 RX ADMIN — PREDNISOLONE ACETATE 1 DROP: 10 SUSPENSION/ DROPS OPHTHALMIC at 08:12

## 2023-03-11 RX ADMIN — CYCLOBENZAPRINE 10 MG: 10 TABLET, FILM COATED ORAL at 04:46

## 2023-03-11 RX ADMIN — Medication 10 ML: at 08:12

## 2023-03-11 RX ADMIN — ASPIRIN 325 MG: 325 TABLET, COATED ORAL at 08:14

## 2023-03-11 RX ADMIN — LISINOPRIL 10 MG: 10 TABLET ORAL at 08:14

## 2023-03-11 RX ADMIN — OXYCODONE HYDROCHLORIDE 5 MG: 5 TABLET ORAL at 08:14

## 2023-03-11 RX ADMIN — GABAPENTIN 300 MG: 300 CAPSULE ORAL at 08:13

## 2023-03-11 RX ADMIN — CEFAZOLIN 3 G: 10 INJECTION, POWDER, FOR SOLUTION INTRAVENOUS at 01:01

## 2023-03-11 RX ADMIN — CYCLOBENZAPRINE 10 MG: 10 TABLET, FILM COATED ORAL at 13:08

## 2023-03-11 RX ADMIN — NEOMYCIN SULFATE, POLYMYXIN B SULFATE AND DEXAMETHASONE 1 DROP: 3.5; 10000; 1 SUSPENSION/ DROPS OPHTHALMIC at 09:18

## 2023-03-11 RX ADMIN — ATENOLOL 50 MG: 50 TABLET ORAL at 08:14

## 2023-03-11 RX ADMIN — DOXYCYCLINE 100 MG: 100 CAPSULE ORAL at 08:13

## 2023-03-11 RX ADMIN — OXYCODONE HYDROCHLORIDE 5 MG: 5 TABLET ORAL at 03:17

## 2023-03-11 RX ADMIN — MELOXICAM 15 MG: 15 TABLET ORAL at 08:14

## 2023-03-11 RX ADMIN — HYDROMORPHONE HYDROCHLORIDE 0.5 MG: 1 INJECTION, SOLUTION INTRAMUSCULAR; INTRAVENOUS; SUBCUTANEOUS at 09:33

## 2023-03-11 NOTE — PLAN OF CARE
Goal Outcome Evaluation:  Plan of Care Reviewed With: patient        Progress: no change   VSS, on RA, CPAP at night. Voiding well. Pain somewhat controlled with PO meds. Dressing is CDI. Nerve cath running as ordered. Slept some.

## 2023-03-11 NOTE — PLAN OF CARE
Problem: Adult Inpatient Plan of Care  Goal: Plan of Care Review  Outcome: Ongoing, Progressing  Flowsheets (Taken 3/11/2023 1233)  Progress: improving  Plan of Care Reviewed With:   patient   spouse  Goal: Patient-Specific Goal (Individualized)  Outcome: Ongoing, Progressing  Goal: Absence of Hospital-Acquired Illness or Injury  Outcome: Ongoing, Progressing  Intervention: Identify and Manage Fall Risk  Recent Flowsheet Documentation  Taken 3/11/2023 1231 by Fallon Rapp RN  Safety Promotion/Fall Prevention:   activity supervised   assistive device/personal items within reach   safety round/check completed  Taken 3/11/2023 1003 by Fallon Rapp RN  Safety Promotion/Fall Prevention:   activity supervised   assistive device/personal items within reach   safety round/check completed  Taken 3/11/2023 0815 by Fallon Rapp RN  Safety Promotion/Fall Prevention:   activity supervised   assistive device/personal items within reach   clutter free environment maintained   fall prevention program maintained   lighting adjusted   muscle strengthening facilitated   nonskid shoes/slippers when out of bed   room organization consistent   safety round/check completed  Intervention: Prevent Skin Injury  Recent Flowsheet Documentation  Taken 3/11/2023 1231 by Fallon Rapp RN  Body Position: supine, legs elevated  Taken 3/11/2023 1003 by Fallon Rapp RN  Body Position: supine, legs elevated  Taken 3/11/2023 0815 by Fallon Rapp RN  Body Position: supine  Intervention: Prevent and Manage VTE (Venous Thromboembolism) Risk  Recent Flowsheet Documentation  Taken 3/11/2023 1231 by Fallon Rapp RN  Activity Management: up in chair  Taken 3/11/2023 1003 by Fallon Rapp RN  Activity Management: activity adjusted per tolerance  VTE Prevention/Management:   bilateral   foot pump device off  Taken 3/11/2023 0815 by Fallon Rapp RN  Activity Management: ambulated to bathroom  VTE Prevention/Management:    bilateral   foot pump device off  Range of Motion: active ROM (range of motion) encouraged  Intervention: Prevent Infection  Recent Flowsheet Documentation  Taken 3/11/2023 1231 by Fallon Rapp RN  Infection Prevention:   hand hygiene promoted   rest/sleep promoted  Taken 3/11/2023 1003 by Fallon aRpp RN  Infection Prevention:   hand hygiene promoted   rest/sleep promoted  Taken 3/11/2023 0815 by Fallon Rapp RN  Infection Prevention:   hand hygiene promoted   rest/sleep promoted  Goal: Optimal Comfort and Wellbeing  Outcome: Ongoing, Progressing  Intervention: Monitor Pain and Promote Comfort  Recent Flowsheet Documentation  Taken 3/11/2023 0815 by Fallon Rapp RN  Pain Management Interventions:   see MAR   pain pump in use   pillow support provided   position adjusted   cold applied  Intervention: Provide Person-Centered Care  Recent Flowsheet Documentation  Taken 3/11/2023 1231 by Fallon Rapp RN  Trust Relationship/Rapport: care explained  Taken 3/11/2023 1003 by Fallon Rapp RN  Trust Relationship/Rapport: care explained  Taken 3/11/2023 0815 by Fallon Rapp RN  Trust Relationship/Rapport:   care explained   choices provided   questions answered   questions encouraged   reassurance provided  Goal: Readiness for Transition of Care  Outcome: Ongoing, Progressing     Problem: Adjustment to Decreased Mobility and Loving (Orthopaedic Rehabilitation)  Goal: Optimal Coping  Outcome: Ongoing, Progressing  Intervention: Support Psychosocial Response to Injury  Recent Flowsheet Documentation  Taken 3/11/2023 0815 by Fallon Rapp RN  Family/Support System Care:   self-care encouraged   support provided     Problem: BADL (Basic Activity of Daily Living) Impairment (Orthopaedic Rehabilitation)  Goal: Optimal Safe BADL Performance  Outcome: Ongoing, Progressing     Problem: Bowel Elimination Impairment (Orthopaedic Rehabilitation)  Goal: Effective Bowel Elimination  Outcome: Ongoing,  Progressing     Problem: IADL (Instrumental Activity of Daily Living) Impairment (Orthopaedic Rehabilitation)  Goal: Optimal Safe IADL Performance  Outcome: Ongoing, Progressing     Problem: Infection (Orthopaedic Rehabilitation)  Goal: Absence of Infection Signs/Symptoms  Outcome: Ongoing, Progressing  Intervention: Prevent or Manage Infection  Recent Flowsheet Documentation  Taken 3/11/2023 0815 by Fallon Rapp RN  Infection Management: aseptic technique maintained     Problem: Mobility Impairment (Orthopaedic Rehabilitation)  Goal: Optimal Mobility Anderson and Safety  Outcome: Ongoing, Progressing     Problem: Adjustment to Surgery (Knee Arthroplasty)  Goal: Optimal Coping  Outcome: Ongoing, Progressing     Problem: Bleeding (Knee Arthroplasty)  Goal: Absence of Bleeding  Outcome: Ongoing, Progressing  Intervention: Monitor and Manage Bleeding  Recent Flowsheet Documentation  Taken 3/11/2023 0815 by Fallon Rapp RN  Bleeding Management: dressing monitored     Problem: Bowel Motility Impaired (Knee Arthroplasty)  Goal: Effective Bowel Elimination  Outcome: Ongoing, Progressing     Problem: Fluid and Electrolyte Imbalance (Knee Arthroplasty)  Goal: Fluid and Electrolyte Balance  Outcome: Ongoing, Progressing     Problem: Functional Ability Impaired (Knee Arthroplasty)  Goal: Optimal Functional Ability  Outcome: Ongoing, Progressing  Intervention: Promote Optimal Functional Status  Recent Flowsheet Documentation  Taken 3/11/2023 1231 by Fallon Rapp RN  Activity Management: up in chair  Self-Care Promotion: independence encouraged  Taken 3/11/2023 1003 by Fallon Rapp RN  Activity Management: activity adjusted per tolerance  Self-Care Promotion: independence encouraged  Taken 3/11/2023 0815 by Fallon Rapp RN  Activity Management: ambulated to bathroom  Assistive Device Utilized:   gait belt   walker  Self-Care Promotion: independence encouraged     Problem: Infection (Knee  Arthroplasty)  Goal: Absence of Infection Signs and Symptoms  Outcome: Ongoing, Progressing  Intervention: Prevent or Manage Infection  Recent Flowsheet Documentation  Taken 3/11/2023 1231 by Fallon Rapp RN  Infection Prevention:   hand hygiene promoted   rest/sleep promoted  Taken 3/11/2023 1003 by Fallon Rapp RN  Infection Prevention:   hand hygiene promoted   rest/sleep promoted  Taken 3/11/2023 0815 by Fallon Rapp RN  Infection Management: aseptic technique maintained  Infection Prevention:   hand hygiene promoted   rest/sleep promoted     Problem: Neurovascular Compromise (Knee Arthroplasty)  Goal: Intact Neurovascular Status  Outcome: Ongoing, Progressing     Problem: Ongoing Anesthesia Effects (Knee Arthroplasty)  Goal: Anesthesia/Sedation Recovery  Outcome: Ongoing, Progressing  Intervention: Optimize Anesthesia Recovery  Recent Flowsheet Documentation  Taken 3/11/2023 1231 by Fallon Rapp RN  Safety Promotion/Fall Prevention:   activity supervised   assistive device/personal items within reach   safety round/check completed  Taken 3/11/2023 1003 by Fallon Rapp RN  Safety Promotion/Fall Prevention:   activity supervised   assistive device/personal items within reach   safety round/check completed  Taken 3/11/2023 0815 by Fallon Rapp RN  Patient Tolerance (IS): good  Safety Promotion/Fall Prevention:   activity supervised   assistive device/personal items within reach   clutter free environment maintained   fall prevention program maintained   lighting adjusted   muscle strengthening facilitated   nonskid shoes/slippers when out of bed   room organization consistent   safety round/check completed  Administration (IS): self-administered     Problem: Pain (Knee Arthroplasty)  Goal: Acceptable Pain Control  Outcome: Ongoing, Progressing  Intervention: Prevent or Manage Pain  Recent Flowsheet Documentation  Taken 3/11/2023 0815 by Fallon Rapp RN  Pain Management Interventions:    see MAR   pain pump in use   pillow support provided   position adjusted   cold applied  Diversional Activities: television     Problem: Postoperative Nausea and Vomiting (Knee Arthroplasty)  Goal: Nausea and Vomiting Relief  Outcome: Ongoing, Progressing     Problem: Postoperative Urinary Retention (Knee Arthroplasty)  Goal: Effective Urinary Elimination  Outcome: Ongoing, Progressing     Problem: Respiratory Compromise (Knee Arthroplasty)  Goal: Effective Oxygenation and Ventilation  Outcome: Ongoing, Progressing  Intervention: Optimize Oxygenation and Ventilation  Recent Flowsheet Documentation  Taken 3/11/2023 1231 by Fallon Rapp RN  Head of Bed (HOB) Positioning: HOB elevated  Taken 3/11/2023 1003 by Fallon Rapp RN  Head of Bed (HOB) Positioning: HOB elevated  Taken 3/11/2023 0815 by Fallon Rapp RN  Patient Tolerance (IS): good  Administration (IS): self-administered  Head of Bed (HOB) Positioning: HOB elevated     Problem: Fall Injury Risk  Goal: Absence of Fall and Fall-Related Injury  Outcome: Ongoing, Progressing  Intervention: Identify and Manage Contributors  Recent Flowsheet Documentation  Taken 3/11/2023 1231 by Fallon Rapp RN  Self-Care Promotion: independence encouraged  Taken 3/11/2023 1003 by Fallon Rapp RN  Self-Care Promotion: independence encouraged  Taken 3/11/2023 0815 by Fallon Rapp RN  Medication Review/Management: medications reviewed  Self-Care Promotion: independence encouraged  Intervention: Promote Injury-Free Environment  Recent Flowsheet Documentation  Taken 3/11/2023 1231 by Fallon Rapp RN  Safety Promotion/Fall Prevention:   activity supervised   assistive device/personal items within reach   safety round/check completed  Taken 3/11/2023 1003 by Fallon Rapp RN  Safety Promotion/Fall Prevention:   activity supervised   assistive device/personal items within reach   safety round/check completed  Taken 3/11/2023 0815 by Fallon Rapp RN  Safety  Promotion/Fall Prevention:   activity supervised   assistive device/personal items within reach   clutter free environment maintained   fall prevention program maintained   lighting adjusted   muscle strengthening facilitated   nonskid shoes/slippers when out of bed   room organization consistent   safety round/check completed     Problem: Pain Acute  Goal: Acceptable Pain Control and Functional Ability  Outcome: Ongoing, Progressing  Intervention: Prevent or Manage Pain  Recent Flowsheet Documentation  Taken 3/11/2023 0815 by Fallon Rapp RN  Medication Review/Management: medications reviewed  Intervention: Develop Pain Management Plan  Recent Flowsheet Documentation  Taken 3/11/2023 0815 by Fallon aRpp RN  Pain Management Interventions:   see MAR   pain pump in use   pillow support provided   position adjusted   cold applied  Intervention: Optimize Psychosocial Wellbeing  Recent Flowsheet Documentation  Taken 3/11/2023 0815 by Fallon Rapp RN  Diversional Activities: television   Goal Outcome Evaluation:  Plan of Care Reviewed With: patient, spouse      Pt currently in bed resting quietly. No complaints of pain or discomfort at this time. Most complaints of pain come from the area to posterior knee. PRN MAR utilized. Cold pack applied. Ropivicaine in place. Incision to LLE CDI. Neuro checks WNL. Vitals WNL. Pt ambulatory on the unit with success. Voiding well. Eating and drinking well. To discharge home with the assistance of spouse. No other observations at this time. Will continue to monitor, call bell in reach.  Progress: improving

## 2023-03-11 NOTE — THERAPY TREATMENT NOTE
Patient Name: Obi Jackman Jr.  : 1950    MRN: [id number]                              Today's Date: 3/11/2023       Admit Date: 3/10/2023    Visit Dx:     ICD-10-CM ICD-9-CM   1. Primary osteoarthritis of left knee  M17.12 715.16     Patient Active Problem List   Diagnosis   • Allergic rhinitis   • Anxiety disorder   • Benign paroxysmal positional vertigo   • Chronic tension type headache   • Depression   • ED (erectile dysfunction) of non-organic origin   • Hyperlipidemia   • Hypertension   • LFTs abnormal   • CHRISTOPHE (obstructive sleep apnea)   • Elevated glucose   • Health care maintenance   • Morbid obesity due to excess calories ([facility])   • Onychomycosis of left great toe   • Osteoarthritis of ankle or foot   • Venous stasis   • Neuropathy   • Class 3 severe obesity due to excess calories with serious comorbidity and body mass index (BMI) of 40.0 to 44.9 in adult ([facility])   • Pre-op evaluation   • Arthritis of foot, left   • S/P left ankle triple arthrodesis   • Acute blood loss anemia, mild, asymptomatic    • Acute postoperative pain   • Wound infection   • Primary osteoarthritis of right hip   • Status post total replacement of right hip   • Postoperative wound infection of right hip, s/p I and D, head and liner exchange.   • Primary osteoarthritis of left knee   • S/P total knee arthroplasty, left     Past Medical History:   Diagnosis Date   • Anxiety and depression    • Arthritis of neck ?   • Bulging of lumbar intervertebral disc    • Cataract     Lens implated in left eye.   • Cervical disc disorder ?   • Cornea transplant recipient     BILATERAL   • CPAP (continuous positive airway pressure) dependence    • Elevated cholesterol    • Hip arthrosis Herb    Subsequent staph infection   • Hyperlipidemia    • Hypertension    • Infection associated with internal hip prosthesis ([facility]) 2020    RIGHT HIP   • Knee swelling    • Migraine    • Neuroma of foot     ?   • CHRISTOPHE on CPAP    • Periarthritis of  shoulder ?   • Rheumatoid arthritis (HCC)    • Rotator cuff syndrome 09/15/2022    Dr. Chanda Castanon   • Swelling of left ear    • Wears contact lenses    • Wears contact lenses    • Wears contact lenses      Past Surgical History:   Procedure Laterality Date   • ACHILLES TENDON SURGERY Left 10/15/2019    Procedure: ACHILLES TENDON LENGTHENING LEFT;  Surgeon: Elodia Guzman MD;  Location: [facility];  Service: Orthopedics   • CATARACT EXTRACTION Right    • COLONOSCOPY  2015   • EYE SURGERY Bilateral     cornea transplant    • FOOT SURGERY Left 10/15/2019    triple arthrodesis and achilles tendon lengthening- [facility]   • HERNIA REPAIR     • ILIAC CREST BONE GRAFT Left 10/15/2019    Procedure: ILIAC CREST BONE GRAFT LEFT;  Surgeon: Elodia Guzman MD;  Location: [facility];  Service: Orthopedics   • INCISION AND DRAINAGE LEG Right 12/04/2020    Procedure: INCISION AND DRAINAGE WITH COMPONENT EXCHANGE, HEAD AND LINER RIGHT HIP;  Surgeon: Osbaldo Olivas MD;  Location: [facility];  Service: Orthopedics;  Laterality: Right;   • JOINT REPLACEMENT  8/4/20 and 12/4/20    Right hip replacement   • TOE FUSION Left 10/15/2019    Procedure: TRIPLE ARTHODESIS LEFT;  Surgeon: Elodia Guzman MD;  Location: [facility];  Service: Orthopedics   • TOTAL HIP ARTHROPLASTY Right 08/04/2020    Procedure: TOTAL HIP ARTHROPLASTY RIGHT;  Surgeon: Osbaldo Olivas MD;  Location:  [facility];  Service: Orthopedics;  Laterality: Right;   • TRIGGER POINT INJECTION     • VENOUS ABLATION Left 01/21/2022    Endovenous laser ablation of left great sapheuos vein for venous insufficiency      General Information     Row Name 03/11/23 1017          Physical Therapy Time and Intention    Document Type therapy note (daily note)  -CT     Mode of Treatment physical therapy  -CT     Row Name 03/11/23 1017          General Information    Patient Profile Reviewed yes  -CT     Barriers to Rehab physical barrier;previous functional deficit  -CT     Row Name  03/11/23 1017          Home Main Entrance    Number of Stairs, Main Entrance two  -CT     Row Name 03/11/23 1017          Cognition    Orientation Status (Cognition) oriented x 4  -CT     Row Name 03/11/23 1017          Safety Issues, Functional Mobility    Safety Issues Affecting Function (Mobility) sequencing abilities  -CT     Impairments Affecting Function (Mobility) balance;coordination;endurance/activity tolerance;pain;range of motion (ROM)  -CT           User Key  (r) = Recorded By, (t) = Taken By, (c) = Cosigned By    Initials Name Provider Type    CT Demarcus Adorno, PT Physical Therapist               Mobility     Row Name 03/11/23 1018          Bed Mobility    Bed Mobility bed mobility (all) activities  -CT     All Activities, [place] (Bed Mobility) independent;verbal cues  -CT     Scooting/Bridging [place] (Bed Mobility) supervision  -CT     Row Name 03/11/23 1018          Bed-Chair Transfer    Bed-Chair [place] (Transfers) independent;verbal cues  -CT     Assistive Device (Bed-Chair Transfers) walker, front-wheeled  -CT     Row Name 03/11/23 1018          Sit-Stand Transfer    Sit-Stand [place] (Transfers) independent;verbal cues  -CT     Assistive Device (Sit-Stand Transfers) walker, front-wheeled  -CT     Row Name 03/11/23 1018          Gait/Stairs (Locomotion)    [place] Level (Gait) independent;verbal cues  -CT     Assistive Device (Gait) walker, front-wheeled  -CT     Distance in Feet (Gait) 200 ft with RW, able to transfer with IND and VC to position LE.  OOB in chair.  Dr Olivas in to change dressing  -CT     Deviations/Abnormal Patterns (Gait) base of support, wide  -CT     Bilateral Gait Deviations heel strike decreased  -CT     Left Sided Gait Deviations heel strike decreased  -CT     Right Sided Gait Deviations heel strike decreased  -CT     Assistive Device (Stairs) walker, front-wheeled  -CT     Row Name 03/11/23 1018          Mobility    Extremity  Weight-bearing Status left lower extremity  -CT     Left Lower Extremity (Weight-bearing Status) weight-bearing as tolerated (WBAT)  -CT           User Key  (r) = Recorded By, (t) = Taken By, (c) = Cosigned By    Initials Name Provider Type    CT Demarcus Adorno, PT Physical Therapist               Obj/Interventions     Row Name 03/11/23 1020          Range of Motion Comprehensive    General Range of Motion lower extremity range of motion deficits identified  -CT     Comment, General Range of Motion 45 flexion 0 ext  -CT     Row Name 03/11/23 1020          Strength Comprehensive (MMT)    General Manual Muscle Testing (MMT) Assessment lower extremity strength deficits identified  -CT     Comment, General Manual Muscle Testing (MMT) Assessment functionally 4- in LLE  -CT     Row Name 03/11/23 1020          Motor Skills    Motor Skills functional endurance;muscle tone;motor control/coordination interventions  -CT     Motor Control/Coordination Interventions weight-bearing activities;neuro-muscular re-education  -CT     Therapeutic Exercise hip;knee  -CT     Row Name 03/11/23 1020          Hip (Therapeutic Exercise)    Hip (Therapeutic Exercise) AROM (active range of motion)  -CT     Hip AROM (Therapeutic Exercise) bilateral;10 repetitions;flexion  -CT     Row Name 03/11/23 1020          Knee (Therapeutic Exercise)    Knee (Therapeutic Exercise) AROM (active range of motion)  -CT     Knee AROM (Therapeutic Exercise) SLR (straight leg raise);heel slides  -CT     Row Name 03/11/23 1020          Balance    Balance Assessment sitting static balance;sitting dynamic balance;standing static balance;standing dynamic balance  -CT     Static Sitting Balance independent  -CT     Dynamic Sitting Balance modified independence  -CT     Static Standing Balance independent  -CT     Dynamic Standing Balance modified independence  -CT     Position/Device Used, Standing Balance supported;unsupported  -CT     Balance Interventions  sitting;standing;sit to stand;supported;static;dynamic;weight shifting activity  -CT     Row Name 03/11/23 1020          General Lower Extremity Assessment (Range of Motion)    Lower Extremity: Range of Motion knee, left: LE ROM  -CT     Row Name 03/11/23 1020          Lower Extremity (Manual Muscle Testing)    Comment, MMT: Lower Extremity 45 flex 0 erxt  -CT           User Key  (r) = Recorded By, (t) = Taken By, (c) = Cosigned By    Initials Name Provider Type    CT Demarcus Adorno, PT Physical Therapist               Goals/Plan    No documentation.                Clinical Impression     Row Name 03/11/23 1025          Pain    Pretreatment Pain Rating 4/10  -CT     Posttreatment Pain Rating 4/10  -CT     Pain Location - Side/Orientation Left  -CT     Pain Location incisional  -CT     Pain Location - knee  -CT     Pain Intervention(s) Medication (See MAR);Repositioned;Ambulation/increased activity;Elevated;Nursing Notified  -CT     Row Name 03/11/23 1025          Plan of Care Review    Plan of Care Reviewed With patient  -CT     Progress improving  -CT     Outcome Evaluation Gait to 200 ft IND transfers with VC OOB in chair HEP issued and pt verbalized understanding.  MD in to change dressing.  Pt will DC home.  -CT     Row Name 03/11/23 1025          Therapy Assessment/Plan (PT)    Rehab Potential (PT) good, to achieve stated therapy goals  -CT     Criteria for Skilled Interventions Met (PT) yes  -CT     Therapy Frequency (PT) daily  -CT     Row Name 03/11/23 1025          Vital Signs    Pre Patient Position Sitting  -CT     Intra Patient Position Sitting  -CT     Post Patient Position Sitting  -CT     Row Name 03/11/23 1025          Positioning and Restraints    Pre-Treatment Position sitting in chair/recliner  -CT     Post Treatment Position chair  -CT     In Chair notified nsg;reclined;sitting;call light within reach;exit alarm on;legs elevated  -CT           User Key  (r) = Recorded By, (t) = Taken By,  (c) = Cosigned By    Initials Name Provider Type    CT Demarcus Adorno, PT Physical Therapist               Outcome Measures     Row Name 03/11/23 1027          How much help from another person do you currently need...    Turning from your back to your side while in flat bed without using bedrails? 4  -CT     Moving from lying on back to sitting on the side of a flat bed without bedrails? 4  -CT     Moving to and from a bed to a chair (including a wheelchair)? 4  -CT     Standing up from a chair using your arms (e.g., wheelchair, bedside chair)? 4  -CT     Climbing 3-5 steps with a railing? 3  -CT     To walk in hospital room? 4  -CT     AM-PAC 6 Clicks Score (PT) 23  -CT     Highest level of mobility 7 --> Walked 25 feet or more  -CT     Row Name 03/11/23 1027 03/11/23 0947       Functional Assessment    Outcome Measure Options AM-PAC 6 Clicks Basic Mobility (PT)  -CT AM-PAC 6 Clicks Daily Activity (OT)  -          User Key  (r) = Recorded By, (t) = Taken By, (c) = Cosigned By    Initials Name Provider Type    CT Demarcus Adorno, PT Physical Therapist    LC Antonella Solorzano, ABDI Occupational Therapist                             Physical Therapy Education     Title: PT OT SLP Therapies (In Progress)     Topic: Physical Therapy (Done)     Point: Mobility training (Done)     Learning Progress Summary           Patient Acceptance, E,TB,D, DU,VU by CT at 3/11/2023 1028    Eager, E, VU by SC at 3/10/2023 1541    Comment: reviewed HEP                   Point: Home exercise program (Done)     Learning Progress Summary           Patient Acceptance, E,TB,D, DU,VU by CT at 3/11/2023 1028    Eager, E, VU by SC at 3/10/2023 1541    Comment: reviewed HEP                   Point: Body mechanics (Done)     Learning Progress Summary           Patient Acceptance, E,TB,D, DU,VU by CT at 3/11/2023 1028    Eager, E, VU by SC at 3/10/2023 1541    Comment: reviewed HEP                   Point: Precautions (Done)      Learning Progress Summary           Patient Acceptance, E,TB,D, DU,VU by CT at 3/11/2023 1028    LAURA Abel, VU by SC at 3/10/2023 1541    Comment: reviewed HEP                               User Key     Initials Effective Dates Name Provider Type Discipline    SC 02/03/23 -  Leighton Donnelly, PT Physical Therapist PT    CT 02/03/23 -  Demarcus Adorno PT Physical Therapist PT              PT Recommendation and Plan     Plan of Care Reviewed With: patient  Progress: improving  Outcome Evaluation: Gait to 200 ft IND transfers with VC OOB in chair HEP issued and pt verbalized understanding.  MD in to change dressing.  Pt will DC home.     Time Calculation:    PT Charges     Row Name 03/11/23 1028             Time Calculation    Start Time 0925  -CT      PT Received On 03/11/23  -CT      PT Goal Re-Cert Due Date 03/15/23  -CT         Time Calculation- PT    Total Timed Code Minutes- PT 55 minute(s)  -CT            User Key  (r) = Recorded By, (t) = Taken By, (c) = Cosigned By    Initials Name Provider Type    CT Demarcus Adorno, PT Physical Therapist              Therapy Charges for Today     Code Description Service Date Service Provider Modifiers Qty    22525995765 HC PT THER PROC EA 15 MIN 3/11/2023 Demarcus Adorno, PT GP 2    28055868852 HC GAIT TRAINING EA 15 MIN 3/11/2023 Demarcus Adorno, PT GP 2          PT G-Codes  Outcome Measure Options: AM-PAC 6 Clicks Basic Mobility (PT)  AM-PAC 6 Clicks Score (PT): 23  AM-PAC 6 Clicks Score (OT): 19  PT Discharge Summary  Anticipated Discharge Disposition (PT): home, home with home health    Demarcus Adorno PT  3/11/2023

## 2023-03-11 NOTE — PLAN OF CARE
Goal Outcome Evaluation:  Plan of Care Reviewed With: patient        Progress: improving  Outcome Evaluation: Gait to 200 ft IND transfers with VC OOB in chair HEP issued and pt verbalized understanding.  MD in to change dressing.  Pt will DC home.

## 2023-03-11 NOTE — DISCHARGE INSTRUCTIONS
InfuBLOCK - Patient Information    What is a pain pump?  InfuBLOCK is a postoperative, non-narcotic pain relief system that delivers local anesthetic to or near the surgical site. This is a pain minimizing therapy that delivers an anesthetic (numbing) medicine to the nerve.    The InfuBLOCK pain pump will continuously deliver a local anesthetic medication to block the pain in the area of your procedure.    Where can I find information about my pain pump?           For more information about your pain pump, scan the QR code.  For additional patient resources, visit [email or web address].                                                                                             The [employer] Nursing Hotline is Here for You 24/7.     Call [phone] for Assistance.  While your physician is your primary source for information about your treatment., there may be times during your treatment that you need assistance with your infusion pump. Our team of compassionate and knowledgeable Registered Nursed (RN) is here to assist every step of the way.    Answers to questions about your infusion pump                 Tubing disconnect  Assistance with pump alarms                                                      Dislodged catheter  Excessive leakage noted from pump                                         Inadequate pain control   SMI COLD THERAPY - PATIENT INSTRUCTION SHEET    Cold Compression Therapy for your comfort and rehabilitation  Your caregivers want you to be productive in your rehab and comfortable during your stay. In keeping with those goals, you will be receiving an SMI Cold Therapy Wrap to help ease post-operative pain and swelling that might keep you from getting back on track! Your SMI Cold Therapy Wrap is effective and simple-to-use, and you will be encouraged to apply it throughout your hospital stay and at home through the duration of your recovery.    When you are ready to go  home  Be sure to take your SMI Cold Therapy Wrap and both sets of Gel Bags with you for continued comfort and use throughout your rehabilitation. If you don't already have them, ask your nurse or aide to retrieve your SMI Gel Bags from the patient freezer.    Home use precautions  Always follow your medical professional's application instructions upon discharge. Your SMI Cold Therapy Wrap and Gel Bags are designed to last for months following your surgery. Never heat the Gel Bags unless specified by your healthcare provider. Supervision is advised when using this product on children or geriatric patients. To avoid danger of suffocation, please keep the outer plastic packaging away from children & pets.    Cold Therapy Instructions  Place Gel Bags in a freezer set ¾ of the way to max temperature for at least (4) hours. For best results, lay the Gel Bags flat and [id number] in the freezer. Once frozen, slide Gel Bags into the gel pouch and secure your wrap to the affected area with the straps.  Gel wraps that have been stored in a freezer for an extended period of time may require a (10) minute period of softening up in a room temperature environment before application.  The gel pouch acts as a protective barrier. NEVER place frozen bags directly onto skin, as this may cause frostbite injury.  The SMI Cold Therapy Wrap is designed to be able to be worm while ambulating. The compression straps can be secured well enough so that the Wrap won't fall off while moving.  Wrap Application Videos can be viewed at [email or web address].  An additional protective barrier such as clothing, a washcloth, hand-towel or pillowcase may be used during prolonged treatment applications.  The Gel-Pouch and Wrap are both Latex-Free and the Gel Bag ingredients are non toxic.    SMI Wrap care instructions  The SMI Cold Therapy Wrap may be hand washed and hung to dry when needed.    [facility] re-order information  Additional SMI body specific  wraps and/or Gel Bags can be re-ordered from [employer] or call 256-ICE-WRAP ([phone])   Nerve Catheter Removal Instructions  When your device is empty:    Remove your catheter by pulling the dressing off slowly (like you would remove a regular bandage). The catheter should pull right out of the skin.  Check that the BLUE tip is intact.                                                                                     If the catheter is stuck, reposition your   extremity and pull slowly until removed.  *If catheter is HURTING and WON'T come out, stop and call [phone] for further assistance.    Remove medication bag from the black carrying case.  Cut the tubing on right and left side of pump, and discard the medication bag and tubing into garbage.  Place the pump and black carrying case into the plastic bag and then place this into the return box.  Seal box with blue stickers and return to [employer] postal service.    THIS IS PRE-PAID POSTAGE.

## 2023-03-11 NOTE — PLAN OF CARE
Goal Outcome Evaluation:  Plan of Care Reviewed With: patient        Progress: improving  Outcome Evaluation: Pt. educated on perez dressing technique and adductor nerve catheter mgmt during T/Fs, LBD, bathing, and toileting tasks. Verbalized understanding. Completes T/Fs with CGA and LBD with Mod A. Skilled OT services warranted to promote return to PLOF. Recommend home with assist and home health at discharge.

## 2023-03-11 NOTE — PROGRESS NOTES
[facility] [place] Orthopaedic Surgery Progress Note    Subjective      LOS: 0 days   Patient Care Team:  Cammy Oneal PA as PCP - General (Internal Medicine)  Chanda Castanon MD as Consulting Physician (Rheumatology)    CC: Left knee pain    Interval History:   Patient resting comfortably in a chair.  Complaining of some pain in the posterior aspect of the knee.  Was up walking with physical therapy prior to me seeing him today.  Seems to be doing well.    Objective      Vital Signs  Temp (24hrs), Av.7 °F (36.5 °C), Min:96.8 °F (36 °C), Max:98.2 °F (36.8 °C)      BP (!) 182/92 (BP Location: Right arm, Patient Position: Lying)   Pulse 95   Temp 98.2 °F (36.8 °C) (Oral)   Resp 18   SpO2 98%     Examination:   Examination of the left knee: The wound is clean, dry, and intact.  Ankle dorsiflexion, ankle plantar flexion, and EHL are intact.  Sensation intact in the foot to light touch.  2+ dorsalis pedis pulse.  Straight leg raise is intact.      Labs:  Results from last 7 days   Lab Units 23  0353   WBC 10*3/mm3 10.12   HEMOGLOBIN g/dL 11.9*   HEMATOCRIT % 36.7*   MCV fL 93.4   PLATELETS 10*3/mm3 278       Radiology:  Imaging Results (Last 24 Hours)     Procedure Component Value Units Date/Time    XR Knee 1 or 2 View Left [[id number]] Collected: 03/10/23 1143     Updated: 03/10/23 114    Narrative:      XR KNEE 1 OR 2 VW LEFT    Date of Exam: 3/10/2023 11:37 AM EST    Indication: post-op.    Comparison: 2022    Findings:  Patient is status post total knee arthroplasty with patellar resurfacing. The orthopedic hardware appears intact. Complex air and fluid collection within the joint space and soft tissue compatible with postoperative state.      Impression:      Impression:  Expected postoperative changes from total knee arthroplasty with patellar resurfacing    Electronically Signed: Sami Hopper    3/10/2023 11:44 AM EST    Workstation ID: [id number]          PT:  Physical Therapy - Plan of Care  Review - Outcome Summary:  Outcome Evaluation: Gait to 200 ft IND transfers with VC OOB in chair HEP issued and pt verbalized understanding.  MD in to change dressing.  Pt will DC home. (03/11/23 1025)]       Results Review:     I reviewed the patient's new clinical results.    Assessment and Plan     Assessment:   Status post left total knee arthroplasty      S/P total knee arthroplasty, left    Anxiety disorder    Depression    Hyperlipidemia    Hypertension    CHRISTOPHE (obstructive sleep apnea)    Venous stasis    Neuropathy    Primary osteoarthritis of left knee      Plan for disposition: Plan for discharge when cleared medically physical therapy.  Follow-up in 3 weeks as planned.      Future Appointments   Date Time Provider Department Center   4/3/2023 12:40 PM Osbaldo Olivas MD [facility]   4/27/2023  9:45 AM Cammy Oneal PA E [facility] Olivas MD  03/11/23  10:27 EST

## 2023-03-11 NOTE — DISCHARGE SUMMARY
Patient Name: Obi Jackman Jr.  MRN: [id number]  : 1950  DOS: 3/11/2023    Attending: Osbaldo Olivas MD;Sandra*    Primary Care Provider: Cammy Oneal PA    Date of Admission:.3/10/2023  6:11 AM    Date of Discharge:  3/11/2023    Discharge Diagnosis:   S/P total knee arthroplasty, left    Anxiety disorder    Depression    Hyperlipidemia    Hypertension    CHRISTOPHE (obstructive sleep apnea)    Venous stasis    Neuropathy    Acute postoperative pain    Primary osteoarthritis of left knee      Hospital Course    At admit:  Patient is a pleasant 72 y.o. male presented for scheduled surgery by Dr. Olivas.     Per his note (The patient is a 72 y.o. male with debilitating left knee pain secondary to osteoarthritis that failed to improve in spite of conservative treatment .  Options have been discussed at length with the patient and the patient has had an extended course of conservative treatment without long-term benefit. The patient has reached the point where the patient desires total knee arthroplasty surgery and understands the risks, benefits, and alternatives. Consent was obtained. Please see my office notes for details with regard to preoperative counseling and operative rationale.).     Patient underwent Left total knee arthroplasty under spinal anesthesia, tolerated surgery well, is admitted for further management.  Adductor canal nerve block catheter/infuse pump was placed by acute pain service.     Seen in PACU, drowsy but appropriate, no complains of nausea, vomiting, or shortness of breath.  He has a patch on his right eye following recent cataract surgery.     Patient is known to me from prior hospitalizations most recently in 2020 when he had I&D and head and liner exchange of infected right hip wound followed by antibiotic regimen.  Prior to that he had right total hip arthroplasty in 2020 and left ankle arthrodesis in 2019 and that procedure was done by   [facility].     Reviewed with patient his past medical history and medications.     After admit:    Patient was provided pain medications as needed for pain control, along with adductor canal nerve block infusion of Ropivacaine.      Adjustments were made to pain medications to optimize postop pain management. Risks and benefits of opiate medications discussed with patient. PRASANTH report was reviewed.    He was seen by PT and OT and has progressed well over his stay.    Pt used an IS for atelectasis prophylaxis and asa along with mechanicals for DVT prophylaxis.    Home medications were resumed as appropriate, and labs were monitored and remained fairly stable.     With the progress he has made,  is ready for DC home today.      He will have an Infupump ( instructed on it during this admit).    Discussed with patient regarding plan and he shows understanding and agreement.      Patient will have  PT following discharge.        Procedures Performed  Procedure(s):  TOTAL KNEE ARTHROPLASTY WITH CORI ROBOT - LEFT       Pertinent Test Results:    I reviewed the patient's new clinical results.   Results from last 7 days   Lab Units 03/11/23  0353   WBC 10*3/mm3 10.12   HEMOGLOBIN g/dL 11.9*   HEMATOCRIT % 36.7*   PLATELETS 10*3/mm3 278     Results from last 7 days   Lab Units 03/11/23  0353 03/10/23  0749   SODIUM mmol/L 141  --    POTASSIUM mmol/L 4.0 3.8   CHLORIDE mmol/L 103  --    CO2 mmol/L 28.0  --    BUN mg/dL 13  --    CREATININE mg/dL 0.78  --    CALCIUM mg/dL 8.7  --    GLUCOSE mg/dL 147*  --      I reviewed the patient's new imaging including images and reports.      Physical therapy  Progress: improving  Outcome Evaluation: Gait to 200 ft IND transfers with VC OOB in chair HEP issued and pt verbalized understanding.  MD in to change dressing.  Pt will DC home.  Discharge Assessment:       Visit Vitals  BP (!) 182/92 (BP Location: Right arm, Patient Position: Lying)   Pulse 95   Temp 98.2 °F (36.8 °C)  (Oral)   Resp 18   SpO2 98%     Temp (24hrs), Av.7 °F (36.5 °C), Min:96.8 °F (36 °C), Max:98.2 °F (36.8 °C)    /86  General Appearance:    Alert, cooperative, in no acute distress   Lungs:     Clear to auscultation,respirations regular, even and                   unlabored    Heart:    Regular rhythm and normal rate, normal S1 and S2   Abdomen:     Normal bowel sounds, no masses, no organomegaly, soft        non-tender, non-distended, no guarding, no rebound                 tenderness   Extremities:   CDI dressing surgical knee, left . ACB cath present, infupump.   Pulses:   Pulses palpable and equal bilaterally   Skin:   No bleeding, bruising or rash   Neurologic:   Cranial nerves 2 - 12 grossly intact, sensation intact, Flexion and dorsiflexion intact bilateral feet.         Discharge Disposition: home.           Discharge Medications      New Medications      Instructions Start Date   aspirin  MG tablet   325 mg, Oral, Daily   Start Date: 2023     oxyCODONE 5 MG immediate release tablet  Commonly known as: Roxicodone   5 mg, Oral, Every 4 Hours PRN      polyethylene glycol 17 GM/SCOOP powder  Commonly known as: MIRALAX   17 g, Oral, Daily      ropivacaine 0.2 % infusion (INFUSYSTEM)  Commonly known as: NAROPIN   1 mL/hr (2 mg/hr), Peripheral Nerve, Continuous         Changes to Medications      Instructions Start Date   hydrocortisone 1 % ointment  What changed:   · when to take this  · reasons to take this   1 application, Topical, 2 Times Daily         Continue These Medications      Instructions Start Date   acetaminophen 325 MG tablet  Commonly known as: TYLENOL   325 mg, Oral, As Needed      ALPRAZolam 0.25 MG tablet  Commonly known as: XANAX   Take 1/2 to 1 tablet prn when flying      amLODIPine 5 MG tablet  Commonly known as: NORVASC   5 mg, Oral, Daily      atenolol 50 MG tablet  Commonly known as: TENORMIN   50 mg, Oral, Daily      atorvastatin 10 MG tablet  Commonly known as:  LIPITOR   10 mg, Oral, Daily, Resume when not on Dapto anymore      buPROPion  MG 24 hr tablet  Commonly known as: WELLBUTRIN XL   150 mg, Oral, Daily      clotrimazole 1 % cream  Commonly known as: LOTRIMIN   Topical, 2 Times Daily      clotrimazole-betamethasone 1-0.05 % cream  Commonly known as: LOTRISONE   Topical, 2 Times Daily      cyclobenzaprine 10 MG tablet  Commonly known as: FLEXERIL   TAKE ONE TABLET BY MOUTH THREE TIMES A DAY AS NEEDED FOR MUSCLE SPASMS      dorzolamide-timolol 22.3-6.8 MG/ML ophthalmic solution  Commonly known as: COSOPT   No dose, route, or frequency recorded.      doxycycline 100 MG capsule  Commonly known as: VIBRAMYCIN   100 mg, Oral, Daily      gabapentin 300 MG capsule  Commonly known as: NEURONTIN   300 mg, Oral, 3 Times Daily      hydroxychloroquine 200 MG tablet  Commonly known as: PLAQUENIL   1 tablet, Oral, Every 12 Hours      latanoprost 0.005 % ophthalmic solution  Commonly known as: XALATAN   1 drop, Ophthalmic      latanoprost 0.005 % ophthalmic solution  Commonly known as: XALATAN   1 drop, Ophthalmic      lisinopril-hydrochlorothiazide 20-12.5 MG per tablet  Commonly known as: PRINZIDE,ZESTORETIC   1 tablet, Oral, Daily      meclizine 25 MG tablet  Commonly known as: ANTIVERT   25 mg, Every 8 Hours PRN      oxaprozin 600 MG tablet  Commonly known as: DAYPRO   1,200 mg, Oral, Daily, Take 2 po every day       prednisoLONE acetate 1 % ophthalmic suspension  Commonly known as: PRED FORTE   1 drop, Ophthalmic, 4 Times Daily      prednisoLONE acetate 1 % ophthalmic suspension  Commonly known as: PRED FORTE   1 drop, Ophthalmic, Daily      promethazine 25 MG tablet  Commonly known as: PHENERGAN   25 mg, Oral, Every 6 Hours PRN      SUMAtriptan 100 MG tablet  Commonly known as: IMITREX   TAKE ONE TABLET AT ONSET OF HEADACHE. MAY REPEAT DOSE ONE TIME IN 2 HOURS IF HEADACHE NOT RELIEVED.      VITAMIN B-12 ER PO   1 tablet, Oral, Daily      VITAMIN C PO   Oral, Daily          Stop These Medications    Antiseptic Skin Cleanser 4 % solution  Generic drug: Chlorhexidine Gluconate     traMADol 50 MG tablet  Commonly known as: ULTRAM            Discharge Diet: resume prior.     Activity at Discharge:   Weight bearing as tolerated       Future Appointments   Date Time Provider Department Center   4/3/2023 12:40 PM Osbaldo Olivas MD [facility]   4/27/2023  9:45 AM Cammy Oneal PA [facility]         Dragon disclaimer:  Part of this encounter note is an electronic transcription/translation of spoken language to printed text. The electronic translation of spoken language may permit erroneous, or at times, nonsensical words or phrases to be inadvertently transcribed; Although I have reviewed the note for such errors, some may still exist.       Joselyn Patel MD  03/11/23  11:57 EST

## 2023-03-11 NOTE — THERAPY EVALUATION
Patient Name: Obi Jackman Jr.  : 1950    MRN: [id number]                              Today's Date: 3/11/2023       Admit Date: 3/10/2023    Visit Dx:     ICD-10-CM ICD-9-CM   1. Primary osteoarthritis of left knee  M17.12 715.16     Patient Active Problem List   Diagnosis   • Allergic rhinitis   • Anxiety disorder   • Benign paroxysmal positional vertigo   • Chronic tension type headache   • Depression   • ED (erectile dysfunction) of non-organic origin   • Hyperlipidemia   • Hypertension   • LFTs abnormal   • CHRISTOPHE (obstructive sleep apnea)   • Elevated glucose   • Health care maintenance   • Morbid obesity due to excess calories ([facility])   • Onychomycosis of left great toe   • Osteoarthritis of ankle or foot   • Venous stasis   • Neuropathy   • Class 3 severe obesity due to excess calories with serious comorbidity and body mass index (BMI) of 40.0 to 44.9 in adult ([facility])   • Pre-op evaluation   • Arthritis of foot, left   • S/P left ankle triple arthrodesis   • Acute blood loss anemia, mild, asymptomatic    • Acute postoperative pain   • Wound infection   • Primary osteoarthritis of right hip   • Status post total replacement of right hip   • Postoperative wound infection of right hip, s/p I and D, head and liner exchange.   • Primary osteoarthritis of left knee   • S/P total knee arthroplasty, left     Past Medical History:   Diagnosis Date   • Anxiety and depression    • Arthritis of neck ?   • Bulging of lumbar intervertebral disc    • Cataract     Lens implated in left eye.   • Cervical disc disorder ?   • Cornea transplant recipient     BILATERAL   • CPAP (continuous positive airway pressure) dependence    • Elevated cholesterol    • Hip arthrosis Herb    Subsequent staph infection   • Hyperlipidemia    • Hypertension    • Infection associated with internal hip prosthesis ([facility]) 2020    RIGHT HIP   • Knee swelling    • Migraine    • Neuroma of foot     ?   • CHRISTOPHE on CPAP    • Periarthritis of  shoulder ?   • Rheumatoid arthritis (HCC)    • Rotator cuff syndrome 09/15/2022    Dr. Chanda Castanon   • Swelling of left ear    • Wears contact lenses    • Wears contact lenses    • Wears contact lenses      Past Surgical History:   Procedure Laterality Date   • ACHILLES TENDON SURGERY Left 10/15/2019    Procedure: ACHILLES TENDON LENGTHENING LEFT;  Surgeon: Elodia Guzman MD;  [facility];  Service: Orthopedics   • CATARACT EXTRACTION Right    • COLONOSCOPY  2015   • EYE SURGERY Bilateral     cornea transplant    • FOOT SURGERY Left 10/15/2019    triple arthrodesis and achilles tendon lengthening- [facility]   • HERNIA REPAIR     • ILIAC CREST BONE GRAFT Left 10/15/2019    Procedure: ILIAC CREST BONE GRAFT LEFT;  Surgeon: Elodia Guzman MD;  [facility];  Service: Orthopedics   • INCISION AND DRAINAGE LEG Right 12/04/2020    Procedure: INCISION AND DRAINAGE WITH COMPONENT EXCHANGE, HEAD AND LINER RIGHT HIP;  Surgeon: Osbaldo Olivas MD;  [facility];  Service: Orthopedics;  Laterality: Right;   • JOINT REPLACEMENT  8/4/20 and 12/4/20    Right hip replacement   • TOE FUSION Left 10/15/2019    Procedure: TRIPLE ARTHODESIS LEFT;  Surgeon: Elodia Guzman MD;  [facility];  Service: Orthopedics   • TOTAL HIP ARTHROPLASTY Right 08/04/2020    Procedure: TOTAL HIP ARTHROPLASTY RIGHT;  Surgeon: Osbaldo Olivas MD;  [facility];  Service: Orthopedics;  Laterality: Right;   • TRIGGER POINT INJECTION     • VENOUS ABLATION Left 01/21/2022    Endovenous laser ablation of left great sapheuos vein for venous insufficiency      General Information     Row Name 03/11/23 0935          OT Time and Intention    Document Type evaluation  -LC     Mode of Treatment occupational therapy  -     Row Name 03/11/23 0935          General Information    Patient Profile Reviewed yes  -LC     Prior Level of Function independent:;all household mobility;transfer  -LC     Existing Precautions/Restrictions  fall  L adductor nerve catheter  -     Barriers to Rehab none identified  -     Row Name 03/11/23 0935          Living Environment    People in Home spouse  -     Row Name 03/11/23 0935          Home Main Entrance    Number of Stairs, Main Entrance three  -     Row Name 03/11/23 0935          Stairs Within Home, Primary    Number of Stairs, Within Home, Primary none  -     Row Name 03/11/23 0935          Cognition    Orientation Status (Cognition) oriented x 4  -     Row Name 03/11/23 0935          Safety Issues, Functional Mobility    Safety Issues Affecting Function (Mobility) awareness of need for assistance;safety precaution awareness;safety precautions follow-through/compliance  -     Impairments Affecting Function (Mobility) endurance/activity tolerance;pain;postural/trunk control;strength  -           User Key  (r) = Recorded By, (t) = Taken By, (c) = Cosigned By    Initials Name Provider Type     Antonella Solorzano OT Occupational Therapist                 Mobility/ADL's     Row Name 03/11/23 0936          Bed Mobility    Comment, (Bed Mobility) Up in bathroom on arrival with nsg. Educated pt. on use of leg  to assist with bed mobility to improve comfort and increased independence with task. verbalized understanding.  -     Row Name 03/11/23 0936          Transfers    Transfers sit-stand transfer  -     Comment, (Transfers) VC's for hand placement, sequencing, and to step L LE forward during T/F to improve comfort. Educated pt. on adductor nerve catheter mgmt during T/Fs to prevent dislodgement.  -     Row Name 03/11/23 0936          Sit-Stand Transfer    Sit-Stand [place] (Transfers) contact guard;verbal cues  -     Assistive Device (Sit-Stand Transfers) walker, front-wheeled  -     Row Name 03/11/23 0936          Functional Mobility    Functional Mobility- Ind. Level contact guard assist  -     Functional Mobility- Device walker, front-wheeled  -     Functional  Mobility-Distance (Feet) 20  -     Functional Mobility- Comment Ambulated from bathroom to recliner, slow pace, no LOB. CGA for safety.  -     Row Name 03/11/23 09          Activities of Daily Living    BADL Assessment/Intervention bathing;upper body dressing;lower body dressing;toileting;grooming  -     Row Name 03/11/23 09          Mobility    Extremity Weight-bearing Status left lower extremity  -     Left Lower Extremity (Weight-bearing Status) weight-bearing as tolerated (WBAT)  -     Row Name 03/11/23 09          Bathing Assessment/Intervention    Comment, (Bathing) Educated pt. to not shower until adductor nerve catheter is discontinued and cleared with MD. Verbalized understanding.  -     Row Name 03/11/23 09          Upper Body Dressing Assessment/Training    [place] Level (Upper Body Dressing) don;pull-over garment;independent  -     Position (Upper Body Dressing) unsupported sitting  -     Row Name 03/11/23 09          Lower Body Dressing Assessment/Training    [place] Level (Lower Body Dressing) don;pants/bottoms;moderate assist (50% patient effort)  -     Position (Lower Body Dressing) unsupported sitting;supported standing  -     Comment, (Lower Body Dressing) Educated pt. on perez dressing technique and adductor nerve cathether mgmt during LBD to prevent dislodgement. Pt. demonstrated understanding  -[facility] Name 03/11/23 09          Toileting Assessment/Training    Comment, (Toileting) Educated pt. on adductor nerve catheter mgmt during clothing mgmt portion of toileting to prevent dislodgement. Pt. verbalized understanding.  -     Row Name 03/11/23 09          Grooming Assessment/Training    [place] Level (Grooming) wash face, hands;set up  -     Position (Grooming) supported sitting  -           User Key  (r) = Recorded By, (t) = Taken By, (c) = Cosigned By    Initials Name Provider Type    Antonella Tompkins OT Occupational Therapist                Obj/Interventions     Row Name 03/11/23 0942          Sensory Assessment (Somatosensory)    Sensory Assessment (Somatosensory) UE sensation intact  -[facility] Name 03/11/23 0942          Range of Motion Comprehensive    General Range of Motion bilateral upper extremity ROM WFL  -[facility] Name 03/11/23 0942          Strength Comprehensive (MMT)    General Manual Muscle Testing (MMT) Assessment upper extremity strength deficits identified  -[facility] Name 03/11/23 0942          Upper Extremity (Manual Muscle Testing)    Upper Extremity: Manual Muscle Testing (MMT) left UE strength is WFL;right UE strength is WFL  -LC     [facility] Name 03/11/23 0942          Balance    Balance Assessment sitting static balance;sitting dynamic balance;standing static balance;standing dynamic balance  -     Static Sitting Balance standby assist;verbal cues  -     Dynamic Sitting Balance standby assist;verbal cues  -     Position, Sitting Balance supported;unsupported;sitting in chair  -     Static Standing Balance contact guard;verbal cues  -     Dynamic Standing Balance contact guard;verbal cues  -     Position/Device Used, Standing Balance supported;walker, front-wheeled  -     Balance Interventions sitting;standing;sit to stand;occupation based/functional task;weight shifting activity  -     Comment, Balance CGA for safety  -           User Key  (r) = Recorded By, (t) = Taken By, (c) = Cosigned By    Initials Name Provider Type     Antonella Solorzano OT Occupational Therapist               Goals/Plan     Row Name 03/11/23 0947          Transfer Goal 1 (OT)    Activity/Assistive Device (Transfer Goal 1, OT) sit-to-stand/stand-to-sit;bed-to-chair/chair-to-bed;walker, rolling  -     [place] Level/Cues Needed (Transfer Goal 1, OT) standby assist;verbal cues required  -     Time Frame (Transfer Goal 1, OT) long term goal (LTG);by discharge  -     Progress/Outcome (Transfer Goal 1, OT) goal ongoing  -      Row Name 03/11/23 0947          Dressing Goal 1 (OT)    Activity/Device (Dressing Goal 1, OT) lower body dressing;reacher;long-handled shoe horn;sock-aid  -     [place]/Cues Needed (Dressing Goal 1, OT) minimum assist (75% or more patient effort);verbal cues required  -     Time Frame (Dressing Goal 1, OT) long term goal (LTG);by discharge  -     Progress/Outcome (Dressing Goal 1, OT) goal ongoing  -     Row Name 03/11/23 0947          Toileting Goal 1 (OT)    Activity/Device (Toileting Goal 1, OT) adjust/manage clothing;perform perineal hygiene;commode;grab bar/safety frame  -     [place] Level/Cues Needed (Toileting Goal 1, OT) contact guard required;verbal cues required  -     Time Frame (Toileting Goal 1, OT) long term goal (LTG);by discharge  -     Progress/Outcome (Toileting Goal 1, OT) goal ongoing  -           User Key  (r) = Recorded By, (t) = Taken By, (c) = Cosigned By    Initials Name Provider Type     Antonella Solorzano, ABDI Occupational Therapist               Clinical Impression     Row Name 03/11/23 0944          Pain Assessment    Pretreatment Pain Rating 6/10  -     Posttreatment Pain Rating 6/10  -     Pain Location - Side/Orientation Left  -     Pain Location incisional  -     Pain Location - knee  -     Pain Intervention(s) Repositioned;Cold applied;Ambulation/increased activity;Nursing Notified  -     Additional Documentation Pain Scale: Word Pre/Post-Treatment (Group)  -     Row Name 03/11/23 0944          Plan of Care Review    Plan of Care Reviewed With patient  -     Progress improving  -     Outcome Evaluation Pt. educated on perez dressing technique and adductor nerve catheter mgmt during T/Fs, LBD, bathing, and toileting tasks. Verbalized understanding. Completes T/Fs with CGA and LBD with Mod A. Skilled OT services warranted to promote return to PLOF. Recommend home with assist and home health at discharge.  -     Row Name 03/11/23 0944           Therapy Assessment/Plan (OT)    Patient/Family Therapy Goal Statement (OT) To take care of self  -LC     Therapy Frequency (OT) daily  -     Row Name 03/11/23 0944          Therapy Plan Review/Discharge Plan (OT)    Anticipated Discharge Disposition (OT) home with assist;home with home health  -LC     Row Name 03/11/23 0944          Vital Signs    Pre Systolic BP Rehab --  VSS  -LC     Pre Patient Position Standing  -LC     Intra Patient Position Sitting  -LC     Post Patient Position Sitting  -     Row Name 03/11/23 0944          Positioning and Restraints    Pre-Treatment Position bathroom  -LC     Post Treatment Position chair  -LC     In Chair notified nsg;reclined;call light within reach;encouraged to call for assist;exit alarm on;waffle cushion;legs elevated  -           User Key  (r) = Recorded By, (t) = Taken By, (c) = Cosigned By    Initials Name Provider Type    Antonella Tmopkins OT Occupational Therapist               Outcome Measures     Row Name 03/11/23 0947          How much help from another is currently needed...    Putting on and taking off regular lower body clothing? 2  -LC     Bathing (including washing, rinsing, and drying) 2  -LC     Toileting (which includes using toilet bed pan or urinal) 3  -LC     Putting on and taking off regular upper body clothing 4  -LC     Taking care of personal grooming (such as brushing teeth) 4  -LC     Eating meals 4  -LC     AM-PAC 6 Clicks Score (OT) 19  -LC     Row Name 03/11/23 0947          Functional Assessment    Outcome Measure Options AM-PAC 6 Clicks Daily Activity (OT)  -           User Key  (r) = Recorded By, (t) = Taken By, (c) = Cosigned By    Initials Name Provider Type    Antonella Tompkins OT Occupational Therapist                Occupational Therapy Education     Title: PT OT SLP Therapies (In Progress)     Topic: Occupational Therapy (In Progress)     Point: ADL training (In Progress)     Description:   Instruct learner(s) on proper  safety adaptation and remediation techniques during self care or transfers.   Instruct in proper use of assistive devices.              Learning Progress Summary           Patient Acceptance, E, NR by  at 3/11/2023 0849                   Point: Home exercise program (Not Started)     Description:   Instruct learner(s) on appropriate technique for monitoring, assisting and/or progressing therapeutic exercises/activities.              Learner Progress:  Not documented in this visit.          Point: Precautions (In Progress)     Description:   Instruct learner(s) on prescribed precautions during self-care and functional transfers.              Learning Progress Summary           Patient Acceptance, E, NR by  at 3/11/2023 0849                   Point: Body mechanics (In Progress)     Description:   Instruct learner(s) on proper positioning and spine alignment during self-care, functional mobility activities and/or exercises.              Learning Progress Summary           Patient Acceptance, E, NR by  at 3/11/2023 0849                               User Key     Initials Effective Dates Name Provider Type Discipline     06/16/21 -  Antonella Solorzano, OT Occupational Therapist OT              OT Recommendation and Plan  Therapy Frequency (OT): daily  Plan of Care Review  Plan of Care Reviewed With: patient  Progress: improving  Outcome Evaluation: Pt. educated on perez dressing technique and adductor nerve catheter mgmt during T/Fs, LBD, bathing, and toileting tasks. Verbalized understanding. Completes T/Fs with CGA and LBD with Mod A. Skilled OT services warranted to promote return to PLOF. Recommend home with assist and home health at discharge.     Time Calculation:    Time Calculation- OT     Row Name 03/11/23 0948             Time Calculation- OT    OT Start Time 0849  -      OT Received On 03/11/23  [facility]      OT Goal Re-Cert Due Date 03/21/23  -         Timed Charges    60063 - OT Self Care/Mgmt Minutes 20   -LC         Untimed Charges    OT Eval/Re-eval Minutes 47  -LC         Total Minutes    Timed Charges Total Minutes 20  -LC      Untimed Charges Total Minutes 47  -LC       Total Minutes 67  -LC            User Key  (r) = Recorded By, (t) = Taken By, (c) = Cosigned By    Initials Name Provider Type    Antonella Tompkins OT Occupational Therapist              Therapy Charges for Today     Code Description Service Date Service Provider Modifiers Qty    [id number]  OT SELF CARE/MGMT/TRAIN EA 15 MIN 3/11/2023 Antonella Solorzano OT GO 1    [id number]  OT EVAL LOW COMPLEXITY 4 3/11/2023 Antonella Solorzano OT GO 1               Antonella Solorzano OT  3/11/2023

## 2023-03-17 ENCOUNTER — APPOINTMENT (OUTPATIENT)
Dept: CARDIOLOGY | Facility: HOSPITAL | Age: 73
End: 2023-03-17
Payer: MEDICARE

## 2023-03-17 ENCOUNTER — HOSPITAL ENCOUNTER (EMERGENCY)
Facility: HOSPITAL | Age: 73
Discharge: HOME | End: 2023-03-17
Attending: EMERGENCY MEDICINE | Admitting: EMERGENCY MEDICINE
Payer: MEDICARE

## 2023-03-17 VITALS
TEMPERATURE: 99.2 F | RESPIRATION RATE: 18 BRPM | OXYGEN SATURATION: 97 % | SYSTOLIC BLOOD PRESSURE: 123 MMHG | HEART RATE: 82 BPM | WEIGHT: 308 LBS | BODY MASS INDEX: 41.72 KG/M2 | DIASTOLIC BLOOD PRESSURE: 65 MMHG | HEIGHT: 72 IN

## 2023-03-17 DIAGNOSIS — G89.18 POSTOPERATIVE PAIN OF LEFT KNEE: Primary | ICD-10-CM

## 2023-03-17 DIAGNOSIS — M25.562 POSTOPERATIVE PAIN OF LEFT KNEE: Primary | ICD-10-CM

## 2023-03-17 LAB
ANION GAP SERPL CALCULATED.3IONS-SCNC: 8 MMOL/L (ref 5–15)
BASOPHILS # BLD AUTO: 0.02 10*3/MM3 (ref 0–0.2)
BASOPHILS NFR BLD AUTO: 0.3 % (ref 0–1.5)
BH CV LOWER VASCULAR LEFT COMMON FEMORAL AUGMENT: NORMAL
BH CV LOWER VASCULAR LEFT COMMON FEMORAL COMPRESS: NORMAL
BH CV LOWER VASCULAR LEFT COMMON FEMORAL PHASIC: NORMAL
BH CV LOWER VASCULAR LEFT COMMON FEMORAL SPONT: NORMAL
BH CV LOWER VASCULAR LEFT DISTAL FEMORAL COMPRESS: NORMAL
BH CV LOWER VASCULAR LEFT GASTRONEMIUS COMPRESS: NORMAL
BH CV LOWER VASCULAR LEFT GREATER SAPH AK COMPRESS: NORMAL
BH CV LOWER VASCULAR LEFT LESSER SAPH COMPRESS: NORMAL
BH CV LOWER VASCULAR LEFT MID FEMORAL AUGMENT: NORMAL
BH CV LOWER VASCULAR LEFT MID FEMORAL COMPRESS: NORMAL
BH CV LOWER VASCULAR LEFT MID FEMORAL PHASIC: NORMAL
BH CV LOWER VASCULAR LEFT MID FEMORAL SPONT: NORMAL
BH CV LOWER VASCULAR LEFT PERONEAL COMPRESS: NORMAL
BH CV LOWER VASCULAR LEFT POPLITEAL AUGMENT: NORMAL
BH CV LOWER VASCULAR LEFT POPLITEAL COMPRESS: NORMAL
BH CV LOWER VASCULAR LEFT POPLITEAL PHASIC: NORMAL
BH CV LOWER VASCULAR LEFT POPLITEAL SPONT: NORMAL
BH CV LOWER VASCULAR LEFT POSTERIOR TIBIAL COMPRESS: NORMAL
BH CV LOWER VASCULAR LEFT PROFUNDA FEMORAL COMPRESS: NORMAL
BH CV LOWER VASCULAR LEFT PROXIMAL FEMORAL COMPRESS: NORMAL
BH CV LOWER VASCULAR LEFT SAPHENOFEMORAL JUNCTION COMPRESS: NORMAL
BH CV LOWER VASCULAR RIGHT COMMON FEMORAL AUGMENT: NORMAL
BH CV LOWER VASCULAR RIGHT COMMON FEMORAL COMPRESS: NORMAL
BH CV LOWER VASCULAR RIGHT COMMON FEMORAL PHASIC: NORMAL
BH CV LOWER VASCULAR RIGHT COMMON FEMORAL SPONT: NORMAL
BUN SERPL-MCNC: 14 MG/DL (ref 8–23)
BUN/CREAT SERPL: 19.4 (ref 7–25)
CALCIUM SPEC-SCNC: 9.3 MG/DL (ref 8.6–10.5)
CHLORIDE SERPL-SCNC: 99 MMOL/L (ref 98–107)
CO2 SERPL-SCNC: 29 MMOL/L (ref 22–29)
CREAT SERPL-MCNC: 0.72 MG/DL (ref 0.76–1.27)
CRP SERPL-MCNC: 7.54 MG/DL (ref 0–0.5)
D-LACTATE SERPL-SCNC: 1 MMOL/L (ref 0.5–2)
DEPRECATED RDW RBC AUTO: 43.1 FL (ref 37–54)
EGFRCR SERPLBLD CKD-EPI 2021: 97.7 ML/MIN/1.73
EOSINOPHIL # BLD AUTO: 0.22 10*3/MM3 (ref 0–0.4)
EOSINOPHIL NFR BLD AUTO: 2.8 % (ref 0.3–6.2)
ERYTHROCYTE [DISTWIDTH] IN BLOOD BY AUTOMATED COUNT: 12.9 % (ref 12.3–15.4)
ERYTHROCYTE [SEDIMENTATION RATE] IN BLOOD: 48 MM/HR (ref 0–20)
GLUCOSE SERPL-MCNC: 111 MG/DL (ref 65–99)
HCT VFR BLD AUTO: 34.4 % (ref 37.5–51)
HGB BLD-MCNC: 11.3 G/DL (ref 13–17.7)
IMM GRANULOCYTES # BLD AUTO: 0.02 10*3/MM3 (ref 0–0.05)
IMM GRANULOCYTES NFR BLD AUTO: 0.3 % (ref 0–0.5)
LYMPHOCYTES # BLD AUTO: 0.98 10*3/MM3 (ref 0.7–3.1)
LYMPHOCYTES NFR BLD AUTO: 12.6 % (ref 19.6–45.3)
MAXIMAL PREDICTED HEART RATE: 149 BPM
MCH RBC QN AUTO: 30.3 PG (ref 26.6–33)
MCHC RBC AUTO-ENTMCNC: 32.8 G/DL (ref 31.5–35.7)
MCV RBC AUTO: 92.2 FL (ref 79–97)
MONOCYTES # BLD AUTO: 0.87 10*3/MM3 (ref 0.1–0.9)
MONOCYTES NFR BLD AUTO: 11.2 % (ref 5–12)
NEUTROPHILS NFR BLD AUTO: 5.66 10*3/MM3 (ref 1.7–7)
NEUTROPHILS NFR BLD AUTO: 72.8 % (ref 42.7–76)
NRBC BLD AUTO-RTO: 0 /100 WBC (ref 0–0.2)
PLATELET # BLD AUTO: 423 10*3/MM3 (ref 140–450)
PMV BLD AUTO: 9.4 FL (ref 6–12)
POTASSIUM SERPL-SCNC: 3.7 MMOL/L (ref 3.5–5.2)
PROCALCITONIN SERPL-MCNC: 0.04 NG/ML (ref 0–0.25)
RBC # BLD AUTO: 3.73 10*6/MM3 (ref 4.14–5.8)
SODIUM SERPL-SCNC: 136 MMOL/L (ref 136–145)
STRESS TARGET HR: 127 BPM
WBC NRBC COR # BLD: 7.77 10*3/MM3 (ref 3.4–10.8)

## 2023-03-17 PROCEDURE — 36415 COLL VENOUS BLD VENIPUNCTURE: CPT

## 2023-03-17 PROCEDURE — 93971 EXTREMITY STUDY: CPT

## 2023-03-17 PROCEDURE — 83605 ASSAY OF LACTIC ACID: CPT | Performed by: PHYSICIAN ASSISTANT

## 2023-03-17 PROCEDURE — 99283 EMERGENCY DEPT VISIT LOW MDM: CPT

## 2023-03-17 PROCEDURE — 99282 EMERGENCY DEPT VISIT SF MDM: CPT

## 2023-03-17 PROCEDURE — 80048 BASIC METABOLIC PNL TOTAL CA: CPT | Performed by: PHYSICIAN ASSISTANT

## 2023-03-17 PROCEDURE — 87040 BLOOD CULTURE FOR BACTERIA: CPT | Performed by: PHYSICIAN ASSISTANT

## 2023-03-17 PROCEDURE — 85652 RBC SED RATE AUTOMATED: CPT | Performed by: PHYSICIAN ASSISTANT

## 2023-03-17 PROCEDURE — 93971 EXTREMITY STUDY: CPT | Performed by: INTERNAL MEDICINE

## 2023-03-17 PROCEDURE — 86140 C-REACTIVE PROTEIN: CPT | Performed by: PHYSICIAN ASSISTANT

## 2023-03-17 PROCEDURE — 84145 PROCALCITONIN (PCT): CPT | Performed by: PHYSICIAN ASSISTANT

## 2023-03-17 PROCEDURE — 85025 COMPLETE CBC W/AUTO DIFF WBC: CPT | Performed by: PHYSICIAN ASSISTANT

## 2023-03-17 NOTE — DISCHARGE INSTRUCTIONS
Continue your postoperative regimen as directed by Dr. Olivas.  Follow-up with Dr. Olivas as scheduled.  Return to the emergency department immediately if any change or worsening of symptoms

## 2023-03-17 NOTE — ED PROVIDER NOTES
EMERGENCY DEPARTMENT ENCOUNTER    Pt Name: Obi Jackman Jr.  MRN: [id number]  Pt :   1950  Room Number:  32/32  Date of encounter:  3/17/2023  PCP: Cammy Oneal PA  ED Provider: Nick Mantilla PA-C    Historian: Patient      HPI:  Chief Complaint: Left knee pain and swelling        Context: Obi Jackman Jr. is a 72 y.o. male who presents to the ED c/o pain and swelling to the left popliteal area for the past couple days.  Patient is status post left TKR per Dr. Olivas on 3/10.  He was discharged on 3/11.  Patient states he has been convalescing well until this morning when his physical therapist came for his home health visit and noted his left knee is about 8 cm. larger in diameter than his right.  He does have noticeable size discrepancy in the left lower leg compared to the right as well.  He is not anticoagulated, but he is immunomodulators with Plaquenil for rheumatoid arthritis.  He states his oral temp is usually around 97 Fahrenheit, today it was 99.2.  He denies chills sweats or rigors.          PAST MEDICAL HISTORY  Past Medical History:   Diagnosis Date   • Anxiety and depression    • Arthritis of neck ?   • Bulging of lumbar intervertebral disc    • Cataract     Lens implated in left eye.   • Cervical disc disorder ?   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   • CHRISTOPHE on CPAP    • Periarthritis of shoulder ?   • Rheumatoid arthritis (HCC)    • Rotator cuff syndrome 09/15/2022    Dr. Chanda Castanon   • Swelling of left ear    • Wears contact lenses    • Wears contact lenses    • Wears contact lenses          PAST SURGICAL HISTORY  Past Surgical History:   Procedure  Laterality Date   • ACHILLES TENDON SURGERY Left 10/15/2019    Procedure: ACHILLES TENDON LENGTHENING LEFT;  Surgeon: Elodia Guzman MD;  Location: [facility] OR;  Service: Orthopedics   • CATARACT EXTRACTION Right    • COLONOSCOPY  2015   • EYE SURGERY Bilateral     cornea transplant    • FOOT SURGERY Left 10/15/2019    triple arthrodesis and achilles tendon lengthening- [facility]   • HERNIA REPAIR     • ILIAC CREST BONE GRAFT Left 10/15/2019    Procedure: ILIAC CREST BONE GRAFT LEFT;  Surgeon: Elodia Guzman MD;  Location: [facility] OR;  Service: Orthopedics   • INCISION AND DRAINAGE LEG Right 12/04/2020    Procedure: INCISION AND DRAINAGE WITH COMPONENT EXCHANGE, HEAD AND LINER RIGHT HIP;  Surgeon: Osbaldo Olivas MD;  Location: [facility] OR;  Service: Orthopedics;  Laterality: Right;   • JOINT REPLACEMENT  8/4/20 and 12/4/20    Right hip replacement   • TOE FUSION Left 10/15/2019    Procedure: TRIPLE ARTHODESIS LEFT;  Surgeon: Elodia Guzman MD;  Location: [facility] OR;  Service: Orthopedics   • TOTAL HIP ARTHROPLASTY Right 08/04/2020    Procedure: TOTAL HIP ARTHROPLASTY RIGHT;  Surgeon: Osbaldo Olivas MD;  Location: [facility] OR;  Service: Orthopedics;  Laterality: Right;   • TOTAL KNEE ARTHROPLASTY Left 3/10/2023    Procedure: TOTAL KNEE ARTHROPLASTY WITH CORI ROBOT - LEFT;  Surgeon: Osbaldo Olivas MD;  Location: [facility] OR;  Service: Robotics - Ortho;  Laterality: Left;   • TRIGGER POINT INJECTION     • VENOUS ABLATION Left 01/21/2022    Endovenous laser ablation of left great sapheuos vein for venous insufficiency         FAMILY HISTORY  Family History   Problem Relation Age of Onset   • No Known Problems Mother    • No Known Problems Father    • Cancer Sister         Abdominal         SOCIAL HISTORY  Social History     Socioeconomic History   • Marital status:    Tobacco Use   • Smoking status: Never   • Smokeless tobacco: Never   Vaping Use   • Vaping Use: Never used   Substance and Sexual Activity   •  Alcohol use: Never   • Drug use: No   • Sexual activity: Defer         ALLERGIES  Patient has no known allergies.        REVIEW OF SYSTEMS  Review of Systems   Constitutional: Positive for activity change. Negative for appetite change, chills and diaphoresis.   HENT: Negative for congestion.    Respiratory: Negative for cough, shortness of breath and wheezing.    Cardiovascular: Negative for chest pain and palpitations.   Musculoskeletal: Positive for arthralgias (Left knee pain) and joint swelling. Negative for myalgias.   All other systems reviewed and are negative.         All systems reviewed and negative except for those discussed in HPI.       PHYSICAL EXAM    I have reviewed the triage vital signs and nursing notes.    ED Triage Vitals   Temp Pulse Resp BP SpO2   -- -- -- -- --      Temp src Heart Rate Source Patient Position BP Location FiO2 (%)   -- -- -- -- --       Physical Exam  GENERAL:   Appears in no acute distress.  Pleasant awake alert and oriented nontoxic-appearing afebrile hemodynamic stable, not in acute distress.  HENT: Nares patent.  EYES: No scleral icterus.  CV: Regular rhythm, regular rate.  RESPIRATORY: Normal effort.  No audible wheezes, rales or rhonchi.  ABDOMEN: Soft, nontender  MUSCULOSKELETAL:   Left knee appears to be healing well, there is minimal erythema to the prepatellar area on both sides of the incision.  The incision is dry, there is no drainage.  He does have tenderness palpation to the knee diffusely.  He has a venous stasis changes to his lower legs bilaterally.  L knee circ:  53 cm  L calf circ.  55 cm    R knee circ 51 cm  R calf circ 43 cm      NEURO: Alert, moves all extremities, follows commands.  SKIN: Warm, dry, no rash visualized.      LAB RESULTS  No results found for this or any previous visit (from the past 24 hour(s)).    If labs were ordered, I independently reviewed the results and considered them in treating the patient.        RADIOLOGY  No Radiology Exams  Resulted Within Past 24 Hours           PROCEDURES    Procedures    No orders to display       MEDICATIONS GIVEN IN ER    Medications - No data to display      MEDICAL DECISION MAKING, PROGRESS, and CONSULTS    All labs have been independently reviewed by me.  All radiology studies have been reviewed by me and the radiologist dictating the report.  All EKG's have been independently viewed and interpreted by me.      Discussion below represents my analysis of pertinent findings related to patient's condition, differential diagnosis, treatment plan and final disposition.      Differential diagnosis:    DVT, SVT, hemarthrosis, joint infection, cellulitis, normal postoperative course      Additional sources:    - Discussed/ obtained information from independent historians: Patient provided his history    - External (non-ED) record review: Reviewed op note,  progress notes, and discharge summary    - Chronic or social conditions impacting care:   Immunosuppressed with Plaquenil, prior history of infection with internal hardware, CHRISTOPHE    - Shared decision making: Reviewed findings with Dr. Olivas, who believes this is a normal postoperative course.  He is on chronic prophylactic doxycycline.  CRP and sed rate can be misleading and can be normal in the postoperative setting.  Blood cultures have been ordered.  White blood cell count is normal.  Patient is able to bear weight with a walker without pain.      Orders placed during this visit:  Orders Placed This Encounter   Procedures   • Blood Culture - Blood,   • Blood Culture - Blood,   • Basic Metabolic Panel   • C-reactive Protein   • Sedimentation Rate   • Lactic Acid, Plasma   • CBC Auto Differential   • Procalcitonin   • CBC & Differential         Additional orders considered but not ordered:      ED Course:  Patient remained stable throughout course of stay in emergency department.  Patient's duplex is negative for DVT.  White blood cell count is normal.  CRP and sed  rate are elevated, but this can be misleading and can be normal in the postoperative setting.Reviewed findings with Dr. Olivas, who believes this is a normal postoperative course.  He is on chronic prophylactic doxycycline.  CRP and sed rate can be misleading and can be normal in the postoperative setting.  Blood cultures have been ordered.  White blood cell count is normal.  Patient is able to bear weight with a walker without pain.    We will discharge to home with continuing his normal postoperative regimen and follow-up.  He is to call with any questions and return if any change or worsening.  He and his wife verbalized understanding and are agreeable to plan.  Consultants:      ED Course as of 03/28/23 2228   Fri Mar 17, 2023   1702 Sed Rate(!): 48 [TG]   1706 C-Reactive Protein(!): 7.54 [TG]   1706 WBC: 7.77 [TG]      ED Course User Index  [TG] Nick Mantilla PA-C                  AS OF 22:28 EDT VITALS:    BP - 123/65  HR - 82  TEMP - 99.2 °F (37.3 °C) (Oral)  O2 SATS - 97%                  DIAGNOSIS  Final diagnoses:   Postoperative pain of left knee         DISPOSITION  DISCHARGE    Patient discharged in stable condition.    Reviewed implications of results, diagnosis, meds, responsibility to follow up, warning signs and symptoms of possible worsening, potential complications and reasons to return to ER.    Patient/Family voiced understanding of above instructions.    Discussed plan for discharge, as there is no emergent indication for admission.  Pt/family is agreeable and understands need for follow up and possible repeat testing.  Pt/family is aware that discharge does not mean that nothing is wrong but that it indicates no emergency is currently present that requires admission and they must continue care with follow-up as given below or with a physician of their choice.     FOLLOW-UP  Osbaldo Olivas MD  [address] [address] [phone]    Go to   As  scheduled.         Medication List      ASK your doctor about these medications    polyethylene glycol 17 GM/SCOOP powder  Commonly known as: MIRALAX  Mix 17 grams in 8 ounces of non-carbonated water and drink once Daily for 15 days.  Ask about: Should I take this medication?                Please note that portions of this document were completed with voice recognition software.      Nick Mantilla PA-C  03/17/23 [id number]       Nick Mantilla PA-C  03/28/23 [id number]

## 2023-03-21 ENCOUNTER — PATIENT OUTREACH (OUTPATIENT)
Dept: CASE MANAGEMENT | Facility: OTHER | Age: 73
End: 2023-03-21
Payer: MEDICARE

## 2023-03-21 NOTE — OUTREACH NOTE
"AMBULATORY CASE MANAGEMENT NOTE    Name and Relationship of Patient/Support Person: Obi Jackman MARGIE Wong. \"Herb\" - Self    Patient Outreach    Spoke with patient as an ER follow up call. He stated he is doing well. He reports continued swelling from knee to ankle. Patient is post left TKR. Patient is scheduled to see PT this afternoon, encouraged him to discuss a cold therapy system with them. Patient stated he is watching his salt intake and keeping his leg elevated. Patient reports he has good support at home and is able to bear weight with some discomfort. He denied further questions/needs. Encouraged patient to reach out if he needs assistance. He voiced understanding. Immediate needs met, HRCM closed.     Adult Patient Profile  Questions/Answers    Flowsheet Row Most Recent Value   How to be Addressed Herb   How Well Do You Speak English? very well   Source of Information patient   Patient Aware of Diagnosis yes   Admission in Past 90 Days hospital   Hearing Difficulty or Deaf no   Wear Glasses or Blind yes   Vision Management Glasses   Concentrating, Remembering or Making Decisions Difficulty no   Difficulty Communicating no   Difficulty Eating/Swallowing no   Walking or Climbing Stairs Difficulty yes   Walking or Climbing Stairs ambulation difficulty, requires equipment   Dressing/Bathing Difficulty no   Doing Errands Independently Difficulty (such as shopping) yes   Errands Management Family   Equipment Currently Used at Home walker, rolling   Change in Functional Status Since Onset of Current Illness/Injury yes  [Patient is current with PT]   Fall Risk Category High   Primary Source of Support/Comfort spouse   People in Home spouse   Family Caregiver if Needed spouse   Primary Roles/Responsibilities retired   Current Living Arrangements home   Resource/Environmental Concerns none        SDOH updated and reviewed with the patient during this program:  Financial Resource Strain: Low Risk    • Difficulty of " Paying Living Expenses: Not hard at all      Food Insecurity: No Food Insecurity   • Worried About Running Out of Food in the Last Year: Never true   • Ran Out of Food in the Last Year: Never true      Transportation Needs: No Transportation Needs   • Lack of Transportation (Medical): No   • Lack of Transportation (Non-Medical): No           Leonora SULLIVAN  Ambulatory Case Management    3/21/2023, 11:25 EDT

## 2023-03-22 LAB
BACTERIA SPEC AEROBE CULT: NORMAL
BACTERIA SPEC AEROBE CULT: NORMAL

## 2023-03-27 ENCOUNTER — TELEPHONE (OUTPATIENT)
Dept: ORTHOPEDIC SURGERY | Facility: CLINIC | Age: 73
End: 2023-03-27
Payer: MEDICARE

## 2023-03-27 DIAGNOSIS — Z96.652 S/P TOTAL KNEE ARTHROPLASTY, LEFT: ICD-10-CM

## 2023-03-27 NOTE — TELEPHONE ENCOUNTER
Pt is requesting a refill on the following:  oxyCODONE (Roxicodone) 5 MG immediate release tablet [[id number]] (Order [id number])    He an appointment on Monday next week but is requesting some more medication until said appointment. Please send prescription to [facility] on [address].    Also, some swelling still occurring, please let me know what can be done for that as well.

## 2023-03-28 RX ORDER — OXYCODONE HYDROCHLORIDE 5 MG/1
5 TABLET ORAL EVERY 6 HOURS PRN
Qty: 30 TABLET | Refills: 0 | Status: SHIPPED | OUTPATIENT
Start: 2023-03-28 | End: 2023-04-03

## 2023-03-28 NOTE — TELEPHONE ENCOUNTER
I spoke with the patient to let him know we sent in the medication to his pharmacy. I also let him know what  stated about the swelling. the patient stated the swelling has gotten better not worse, he is still elevating. I let the patient know if anything starts getting worse to call our office back. The patient verbalized understanding.     Karen Rooney

## 2023-03-28 NOTE — TELEPHONE ENCOUNTER
Osbaldo Olivas MD  You 4 hours ago (9:24 AM)     I sent a refill of oxycodone to the pharmacy.  As far as his swelling goes, it looks like he was seen in the emergency room on 3/17/2023 and had a normal duplex scan.  He should continue with elevation.  If it has been increasing, and he may need a repeat duplex scan, but if it is stable continue with conservative treatment and contact us for any worsening.            LVM to call our office back can ask to speak with Karen.     Karen Rooney

## 2023-04-03 ENCOUNTER — OFFICE VISIT (OUTPATIENT)
Dept: ORTHOPEDIC SURGERY | Facility: CLINIC | Age: 73
End: 2023-04-03
Payer: MEDICARE

## 2023-04-03 VITALS — TEMPERATURE: 97.1 F

## 2023-04-03 DIAGNOSIS — Z47.89 ORTHOPEDIC AFTERCARE: ICD-10-CM

## 2023-04-03 DIAGNOSIS — Z96.652 S/P TOTAL KNEE ARTHROPLASTY, LEFT: Primary | ICD-10-CM

## 2023-04-03 PROCEDURE — 99024 POSTOP FOLLOW-UP VISIT: CPT | Performed by: ORTHOPAEDIC SURGERY

## 2023-04-03 PROCEDURE — 1159F MED LIST DOCD IN RCRD: CPT | Performed by: ORTHOPAEDIC SURGERY

## 2023-04-03 PROCEDURE — 1160F RVW MEDS BY RX/DR IN RCRD: CPT | Performed by: ORTHOPAEDIC SURGERY

## 2023-04-03 RX ORDER — TRAMADOL HYDROCHLORIDE 50 MG/1
TABLET ORAL
COMMUNITY
Start: 2023-03-27

## 2023-04-03 NOTE — PROGRESS NOTES
[facility] Note    Subjective     Chief Complaint   Patient presents with   • Post-op     3 weeks status post left total knee arthroplasty with cori robot (3/10/23)         HPI    It has been 3  month(s) since Mr. Jackman's last visit. He returns to clinic today for postoperative follow-up of left knee arthroplasty. The issue has been ongoing for 3 week(s). He rates his pain a 4/10 on the pain scale. Previous/current treatments: cane/walker, physical therapy and weight loss. Current symptoms: popping. The pain is worse with climbing stairs; resting, sitting and pain medication and/or NSAID improve the pain. Overall, he is doing better.  80 to 90% improvement compared to his preoperative symptoms.  Ambulatory with the aid of a cane today.    I have reviewed the following portions of the patient's history and agree with: History of Present Illness and Review of Systems    Patient Active Problem List   Diagnosis   • Allergic rhinitis   • Anxiety disorder   • Benign paroxysmal positional vertigo   • Chronic tension type headache   • Depression   • ED (erectile dysfunction) of non-organic origin   • Hyperlipidemia   • Hypertension   • LFTs abnormal   • CHRISTOPHE (obstructive sleep apnea)   • Elevated glucose   • Health care maintenance   • Morbid obesity due to excess calories   • Onychomycosis of left great toe   • Osteoarthritis of ankle or foot   • Venous stasis   • Neuropathy   • Class 3 severe obesity due to excess calories with serious comorbidity and body mass index (BMI) of 40.0 to 44.9 in adult   • Pre-op evaluation   • Arthritis of foot, left   • S/P left ankle triple arthrodesis   • Acute blood loss anemia, mild, asymptomatic    • Acute postoperative pain   • Wound infection   • Primary osteoarthritis of right hip   • Status post total replacement of right hip   • Postoperative wound infection of right hip, s/p I and D, head and liner exchange.   • Primary osteoarthritis of left knee   • S/P  total knee arthroplasty, left     Past Medical History:   Diagnosis Date   • Anxiety and depression    • Arthritis of neck ?   • Bulging of lumbar intervertebral disc    • Cataract     Lens implated in left eye.   • Cervical disc disorder ?   • Cornea transplant recipient     BILATERAL   • CPAP (continuous positive airway pressure) dependence    • CTS (carpal tunnel syndrome) Slight case; Dr. 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   • CHRISTOPHE on CPAP    • Periarthritis of shoulder ?   • Rheumatoid arthritis    • Rotator cuff syndrome 09/15/2022    Dr. Chanda Castanon   • Swelling of left ear    • Wears contact lenses    • Wears contact lenses    • Wears contact lenses       Past Surgical History:   Procedure Laterality Date   • ACHILLES TENDON SURGERY Left 10/15/2019    Procedure: ACHILLES TENDON LENGTHENING LEFT;  Surgeon: Elodia Guzman MD;  Location:  [facility];  Service: Orthopedics   • CATARACT EXTRACTION Right    • COLONOSCOPY  2015   • EYE SURGERY Bilateral     cornea transplant    • FOOT SURGERY Left 10/15/2019    triple arthrodesis and achilles tendon lengthening- [facility]   • HERNIA REPAIR     • ILIAC CREST BONE GRAFT Left 10/15/2019    Procedure: ILIAC CREST BONE GRAFT LEFT;  Surgeon: Elodia Guzman MD;  Location:  [facility];  Service: Orthopedics   • INCISION AND DRAINAGE LEG Right 12/04/2020    Procedure: INCISION AND DRAINAGE WITH COMPONENT EXCHANGE, HEAD AND LINER RIGHT HIP;  Surgeon: Osbaldo Olivas MD;  Location:  [facility];  Service: Orthopedics;  Laterality: Right;   • JOINT REPLACEMENT  8/4/20 and 12/4/20    Right hip replacement   • TOE FUSION Left 10/15/2019    Procedure: TRIPLE ARTHODESIS LEFT;  Surgeon: Elodia Guzman MD;  Location:  [facility];  Service: Orthopedics   • TOTAL HIP  ARTHROPLASTY Right 08/04/2020    Procedure: TOTAL HIP ARTHROPLASTY RIGHT;  Surgeon: Osbaldo Olivas MD;  Location:  [facility];  Service: Orthopedics;  Laterality: Right;   • TOTAL KNEE ARTHROPLASTY Left 3/10/2023    Procedure: TOTAL KNEE ARTHROPLASTY WITH CORI ROBOT - LEFT;  Surgeon: Osbaldo Olivas MD;  Location:  [facility];  Service: Robotics - Ortho;  Laterality: Left;   • TRIGGER POINT INJECTION     • VENOUS ABLATION Left 01/21/2022    Endovenous laser ablation of left great sapheuos vein for venous insufficiency      Family History   Problem Relation Age of Onset   • No Known Problems Mother    • No Known Problems Father    • Cancer Sister         Abdominal   • Cancer Brother         Kidney removed 4/16/23     Social History     Socioeconomic History   • Marital status:    Tobacco Use   • Smoking status: Never   • Smokeless tobacco: Never   Vaping Use   • Vaping Use: Never used   Substance and Sexual Activity   • Alcohol use: Not Currently     Comment: One drink 1-2x month   • Drug use: No   • Sexual activity: Yes     Partners: Female      Current Outpatient Medications on File Prior to Visit   Medication Sig Dispense Refill   • acetaminophen (TYLENOL) 325 MG tablet Take 1 tablet by mouth As Needed for Mild Pain.     • ALPRAZolam (XANAX) 0.25 MG tablet Take 1/2 to 1 tablet prn when flying 10 tablet 0   • amLODIPine (NORVASC) 5 MG tablet TAKE 1 TABLET BY MOUTH DAILY. 90 tablet 1   • Ascorbic Acid (VITAMIN C PO) Take  by mouth Daily.     • aspirin EC (aspirin) 325 MG tablet Take 1 tablet by mouth Daily for 30 days. 30 tablet 0   • atenolol (TENORMIN) 50 MG tablet TAKE 1 TABLET BY MOUTH DAILY. 30 tablet 3   • atorvastatin (LIPITOR) 10 MG tablet TAKE 1 TABLET BY MOUTH DAILY. RESUME WHEN NOT ON DAPTO ANYMORE 30 tablet 3   • buPROPion XL (WELLBUTRIN XL) 150 MG 24 hr tablet TAKE 1 TABLET BY MOUTH DAILY 90 tablet 1   • clotrimazole (LOTRIMIN) 1 % cream Apply  topically to the appropriate area as directed 2 (Two)  Times a Day. 15 g 1   • clotrimazole-betamethasone (LOTRISONE) 1-0.05 % cream APPLY TOPICALLY TO THE APPROPRIATE AREA AS DIRECTED 2 (TWO) TIMES A DAY. 45 g 0   • Cyanocobalamin (VITAMIN B-12 ER PO) Take 1 tablet by mouth Daily.     • cyclobenzaprine (FLEXERIL) 10 MG tablet TAKE ONE TABLET BY MOUTH THREE TIMES A DAY AS NEEDED FOR MUSCLE SPASMS 30 tablet 5   • dorzolamide-timolol (COSOPT) 22.3-6.8 MG/ML ophthalmic solution      • doxycycline (VIBRAMYCIN) 100 MG capsule Take 1 capsule by mouth Daily.     • gabapentin (NEURONTIN) 300 MG capsule Take 1 capsule by mouth 3 (Three) Times a Day.     • hydrocortisone 1 % ointment Apply 1 application topically to the appropriate area as directed 2 (Two) Times a Day. 56 g 0   • hydroxychloroquine (PLAQUENIL) 200 MG tablet Take 1 tablet by mouth Every 12 (Twelve) Hours.     • latanoprost (XALATAN) 0.005 % ophthalmic solution Apply 1 drop to eye(s) as directed by provider.     • lisinopril-hydrochlorothiazide (PRINZIDE,ZESTORETIC) 20-12.5 MG per tablet TAKE 1 TABLET BY MOUTH DAILY 30 tablet 1   • meclizine (ANTIVERT) 25 MG tablet 1 tablet Every 8 (Eight) Hours As Needed.     • oxaprozin (DAYPRO) 600 MG tablet Take 2 tablets by mouth Daily. Take 2 po every day     • prednisoLONE acetate (PRED FORTE) 1 % ophthalmic suspension Apply 1 drop to eye(s) as directed by provider 4 (Four) Times a Day.     • promethazine (PHENERGAN) 25 MG tablet Take 1 tablet by mouth Every 6 (Six) Hours As Needed for Nausea or Vomiting. 20 tablet 0   • SUMAtriptan (IMITREX) 100 MG tablet TAKE ONE TABLET AT ONSET OF HEADACHE. MAY REPEAT DOSE ONE TIME IN 2 HOURS IF HEADACHE NOT RELIEVED. 9 tablet 5   • traMADol (ULTRAM) 50 MG tablet      • [DISCONTINUED] latanoprost (XALATAN) 0.005 % ophthalmic solution Apply 1 drop to eye(s) as directed by provider.     • [DISCONTINUED] oxyCODONE (Roxicodone) 5 MG immediate release tablet Take 1 tablet by mouth Every 6 (Six) Hours As Needed for Moderate Pain. 30 tablet 0    • [DISCONTINUED] prednisoLONE acetate (PRED FORTE) 1 % ophthalmic suspension Apply 1 drop to eye(s) as directed by provider Daily.     • [DISCONTINUED] ropivacaine (NAROPIN) 0.2 % infusion (INFUSYSTEM) 2 mg/hr by Peripheral Nerve route Continuous.       No current facility-administered medications on file prior to visit.      No Known Allergies     Review of Systems   Constitutional: Negative for activity change, appetite change, chills, diaphoresis, fatigue, fever and unexpected weight change.   HENT: Negative for congestion, dental problem, drooling, ear discharge, ear pain, facial swelling, hearing loss, mouth sores, nosebleeds, postnasal drip, rhinorrhea, sinus pressure, sneezing, sore throat, tinnitus, trouble swallowing and voice change.    Eyes: Negative for photophobia, pain, discharge, redness, itching and visual disturbance.   Respiratory: Negative for apnea, cough, choking, chest tightness, shortness of breath, wheezing and stridor.    Cardiovascular: Negative for chest pain, palpitations and leg swelling.   Gastrointestinal: Negative for abdominal distention, abdominal pain, anal bleeding, blood in stool, constipation, diarrhea, nausea, rectal pain and vomiting.   Endocrine: Negative for cold intolerance, heat intolerance, polydipsia, polyphagia and polyuria.   Genitourinary: Negative for decreased urine volume, difficulty urinating, dysuria, enuresis, flank pain, frequency, genital sores, hematuria and urgency.   Musculoskeletal: Positive for arthralgias. Negative for back pain, gait problem, joint swelling, myalgias, neck pain and neck stiffness.   Skin: Negative for color change, pallor, rash and wound.   Allergic/Immunologic: Negative for environmental allergies, food allergies and immunocompromised state.   Neurological: Negative for dizziness, tremors, seizures, syncope, facial asymmetry, speech difficulty, weakness, light-headedness, numbness and headaches.   Hematological: Negative for  adenopathy. Does not bruise/bleed easily.   Psychiatric/Behavioral: Negative for agitation, behavioral problems, confusion, decreased concentration, dysphoric mood, hallucinations, self-injury, sleep disturbance and suicidal ideas. The patient is not nervous/anxious and is not hyperactive.         Objective      Physical Exam  Temp 97.1 °F (36.2 °C)     There is no height or weight on file to calculate BMI.    General:   Mental Status:  Alert   Appearance: Cooperative, in no acute distress   Build and Nutrition: Obese by BMI male   Orientation: Alert and oriented to person, place and time   Posture: Normal   Gait: With a cane    Integument:   Left knee: Wound is well-healed with no signs of infection    Lower Extremities:   Left Knee:    Tenderness:  None    Effusion:  None    Swelling: None    Crepitus:  None    Range of motion:  Extension: 3°       Flexion: 110°  Instability:  No varus laxity, no valgus laxity, negative anterior drawer  Deformities:  None      Imaging/Studies  Imaging Results (Last 24 Hours)     Procedure Component Value Units Date/Time    XR Knee 3+ View With Thomson Left [[id number]] Resulted: 04/03/23 1553     Updated: 04/03/23 1553    Narrative:      Left Knee Radiographs  Indication: status-post left total knee arthroplasty  Views: AP, lateral, and sunrise views of the left knee    Comparison: no change compared to prior study, 3/10/2023    Findings:   The components are well aligned, with no signs of loosening or failure.            Assessment and Plan     Diagnoses and all orders for this visit:    1. S/P total knee arthroplasty, left (Primary)  -     XR Knee 3+ View With Thomson Left    2. Orthopedic aftercare        1. S/P total knee arthroplasty, left    2. Orthopedic aftercare        I reviewed my findings with the patient.  His left total knee arthroplasty is functioning well at this point, and I will see him back in 6 weeks for checkup.  I will see him back sooner for any  problems.    Return in about 6 weeks (around 5/15/2023).      Osbaldo Olivas MD  04/03/23  16:07 EDT

## 2023-04-07 ENCOUNTER — OFFICE VISIT (OUTPATIENT)
Dept: INTERNAL MEDICINE | Facility: CLINIC | Age: 73
End: 2023-04-07
Payer: MEDICARE

## 2023-04-07 VITALS
OXYGEN SATURATION: 97 % | HEIGHT: 72 IN | TEMPERATURE: 97.3 F | BODY MASS INDEX: 40.09 KG/M2 | WEIGHT: 296 LBS | DIASTOLIC BLOOD PRESSURE: 88 MMHG | HEART RATE: 103 BPM | SYSTOLIC BLOOD PRESSURE: 136 MMHG

## 2023-04-07 DIAGNOSIS — A05.9 FOOD POISONING: Primary | ICD-10-CM

## 2023-04-07 NOTE — PROGRESS NOTES
"Chief Complaint  Vomiting, Diarrhea, and Nausea    Subjective      History of Present Illness  Obi is a 72 y.o. male who presents to the clinic today for diarrhea, nausea, and vomiting.    Objective   Vital Signs:  /88   Pulse 103   Temp 97.3 °F (36.3 °C)   Ht 182.9 cm (72\")   Wt 134 kg (296 lb)   SpO2 97%   BMI 40.14 kg/m²   Estimated body mass index is 40.14 kg/m² as calculated from the following:    Height as of this encounter: 182.9 cm (72\").    Weight as of this encounter: 134 kg (296 lb).          Physical Exam  Constitutional:       General: He is not in acute distress.  HENT:      Head: Normocephalic and atraumatic.   Abdominal:      General: Bowel sounds are increased.   Skin:     General: Skin is warm and dry.   Neurological:      General: No focal deficit present.      Mental Status: He is alert.   Psychiatric:         Mood and Affect: Mood normal.         Behavior: Behavior normal.         Thought Content: Thought content normal.         Judgment: Judgment normal.         Result Review                    Assessment and Plan  Diagnoses and all orders for this visit:    1. Food poisoning (Primary)  Assessment & Plan:  Subjective    Obi Jackman Jr. is a 72 y.o. male who presents for evaluation of diarrhea. Onset of diarrhea was 2 days ago. Diarrhea is occurring approximately several times per day. Patient describes diarrhea as having unusual odor. Diarrhea has been associated with suspicious food/drink and vomiting occurring several times. Patient denies blood in stool, fever, recent antibiotic use, recent camping, recent travel, significant abdominal pain, unintentional weight loss. Previous visits for diarrhea: none. Evaluation to date: none.  Treatment to date: Phenergan.    The following portions of the patient's history were reviewed and updated as appropriate: allergies, current medications, past family history, past medical history, past social history, past surgical history and " problem list.    Review of Systems  Pertinent items are noted in HPI.      Assessment & Plan   Food poisoning    Recommend OTC electrolyte replacement.  Appropriate educational material discussed and distributed.  Discussed the appropriate management of diarrhea.  Follow up as needed.                       Follow Up  Return if symptoms worsen or fail to improve.  Patient was given instructions and counseling regarding his condition or for health maintenance advice. Please see specific information pulled into the AVS if appropriate.    Part of this note may be an electronic transcription/translation of spoken language to printed text using the Dragon Dictation System.

## 2023-04-07 NOTE — ASSESSMENT & PLAN NOTE
Subjective   Obi Jackman Jr. is a 72 y.o. male who presents for evaluation of diarrhea. Onset of diarrhea was 2 days ago. Diarrhea is occurring approximately several times per day. Patient describes diarrhea as having unusual odor. Diarrhea has been associated with suspicious food/drink and vomiting occurring several times. Patient denies blood in stool, fever, recent antibiotic use, recent camping, recent travel, significant abdominal pain, unintentional weight loss. Previous visits for diarrhea: none. Evaluation to date: none.  Treatment to date: Phenergan.    The following portions of the patient's history were reviewed and updated as appropriate: allergies, current medications, past family history, past medical history, past social history, past surgical history and problem list.    Review of Systems  Pertinent items are noted in HPI.      Assessment & Plan   Food poisoning    Recommend OTC electrolyte replacement.  Appropriate educational material discussed and distributed.  Discussed the appropriate management of diarrhea.  Follow up as needed.

## 2023-04-08 RX ORDER — SUMATRIPTAN 100 MG/1
TABLET, FILM COATED ORAL
Qty: 9 TABLET | Refills: 5 | OUTPATIENT
Start: 2023-04-08

## 2023-04-18 ENCOUNTER — LAB (OUTPATIENT)
Dept: LAB | Facility: HOSPITAL | Age: 73
End: 2023-04-18
Payer: MEDICARE

## 2023-04-18 DIAGNOSIS — E78.5 HYPERLIPIDEMIA, UNSPECIFIED HYPERLIPIDEMIA TYPE: ICD-10-CM

## 2023-04-18 PROCEDURE — 80061 LIPID PANEL: CPT

## 2023-04-19 LAB
CHOLEST SERPL-MCNC: 132 MG/DL (ref 0–200)
HDLC SERPL-MCNC: 39 MG/DL (ref 40–60)
LDLC SERPL CALC-MCNC: 70 MG/DL (ref 0–100)
LDLC/HDLC SERPL: 1.73 {RATIO}
TRIGL SERPL-MCNC: 127 MG/DL (ref 0–150)
VLDLC SERPL-MCNC: 23 MG/DL (ref 5–40)

## 2023-04-19 NOTE — PROGRESS NOTES
Your cholesterol looks good! Keep working on increasing exercise (as able) to improve your HDL. Otherwise, continue your current dose of lipitor.

## 2023-04-24 DIAGNOSIS — I15.9 SECONDARY HYPERTENSION: ICD-10-CM

## 2023-04-24 RX ORDER — LISINOPRIL AND HYDROCHLOROTHIAZIDE 20; 12.5 MG/1; MG/1
1 TABLET ORAL DAILY
Qty: 30 TABLET | Refills: 0 | Status: SHIPPED | OUTPATIENT
Start: 2023-04-24 | End: 2023-04-27 | Stop reason: SDUPTHER

## 2023-04-27 ENCOUNTER — OFFICE VISIT (OUTPATIENT)
Dept: INTERNAL MEDICINE | Facility: CLINIC | Age: 73
End: 2023-04-27
Payer: MEDICARE

## 2023-04-27 ENCOUNTER — APPOINTMENT (OUTPATIENT)
Dept: LAB | Facility: HOSPITAL | Age: 73
End: 2023-04-27
Payer: MEDICARE

## 2023-04-27 VITALS
OXYGEN SATURATION: 97 % | HEIGHT: 74 IN | SYSTOLIC BLOOD PRESSURE: 140 MMHG | DIASTOLIC BLOOD PRESSURE: 100 MMHG | BODY MASS INDEX: 37.81 KG/M2 | HEART RATE: 75 BPM | WEIGHT: 294.6 LBS

## 2023-04-27 DIAGNOSIS — F40.243 FEAR OF FLYING: ICD-10-CM

## 2023-04-27 DIAGNOSIS — Z79.899 HIGH RISK MEDICATION USE: ICD-10-CM

## 2023-04-27 DIAGNOSIS — I15.9 SECONDARY HYPERTENSION: ICD-10-CM

## 2023-04-27 DIAGNOSIS — F43.9 SITUATIONAL STRESS: ICD-10-CM

## 2023-04-27 DIAGNOSIS — Z12.5 PROSTATE CANCER SCREENING: ICD-10-CM

## 2023-04-27 DIAGNOSIS — Z00.00 ENCOUNTER FOR MEDICARE ANNUAL WELLNESS EXAM: Primary | ICD-10-CM

## 2023-04-27 LAB — PSA SERPL-MCNC: 0.74 NG/ML (ref 0–4)

## 2023-04-27 PROCEDURE — G0103 PSA SCREENING: HCPCS | Performed by: PHYSICIAN ASSISTANT

## 2023-04-27 RX ORDER — LISINOPRIL AND HYDROCHLOROTHIAZIDE 20; 12.5 MG/1; MG/1
1 TABLET ORAL DAILY
Qty: 90 TABLET | Refills: 3 | Status: SHIPPED | OUTPATIENT
Start: 2023-04-27

## 2023-04-27 RX ORDER — SUMATRIPTAN 100 MG/1
TABLET, FILM COATED ORAL
Qty: 9 TABLET | Refills: 5 | Status: SHIPPED | OUTPATIENT
Start: 2023-04-27

## 2023-04-27 NOTE — PROGRESS NOTES
The ABCs of the Annual Wellness Visit  Subsequent Medicare Wellness Visit    Subjective    Obi Jackman Jr. is a 72 y.o. male who presents for a Subsequent Medicare Wellness Visit.    The following portions of the patient's history were reviewed and   updated as appropriate: allergies, current medications, past family history, past medical history, past social history, past surgical history and problem list.    Compared to one year ago, the patient feels his physical   health is better.    Compared to one year ago, the patient feels his mental   health is the same.    Recent Hospitalizations:  He was admitted within the past 365 days at [facility] knee replacement [facility].       Current Medical Providers:  Patient Care Team:  Cammy Oneal PA as PCP - General (Internal Medicine)  Chanda Castanon MD as Consulting Physician (Rheumatology)    Outpatient Medications Prior to Visit   Medication Sig Dispense Refill   • acetaminophen (TYLENOL) 325 MG tablet Take 1 tablet by mouth As Needed for Mild Pain.     • amLODIPine (NORVASC) 5 MG tablet TAKE 1 TABLET BY MOUTH DAILY. 90 tablet 1   • Ascorbic Acid (VITAMIN C PO) Take  by mouth Daily.     • atenolol (TENORMIN) 50 MG tablet TAKE 1 TABLET BY MOUTH DAILY. 30 tablet 3   • atorvastatin (LIPITOR) 10 MG tablet TAKE 1 TABLET BY MOUTH DAILY. RESUME WHEN NOT ON DAPTO ANYMORE 30 tablet 3   • buPROPion XL (WELLBUTRIN XL) 150 MG 24 hr tablet TAKE 1 TABLET BY MOUTH DAILY 90 tablet 1   • clotrimazole (LOTRIMIN) 1 % cream Apply  topically to the appropriate area as directed 2 (Two) Times a Day. 15 g 1   • clotrimazole-betamethasone (LOTRISONE) 1-0.05 % cream APPLY TOPICALLY TO THE APPROPRIATE AREA AS DIRECTED 2 (TWO) TIMES A DAY. 45 g 0   • Cyanocobalamin (VITAMIN B-12 ER PO) Take 1 tablet by mouth Daily.     • cyclobenzaprine (FLEXERIL) 10 MG tablet TAKE ONE TABLET BY MOUTH THREE TIMES A DAY AS NEEDED FOR MUSCLE SPASMS 30 tablet 5   • dorzolamide-timolol (COSOPT) 22.3-6.8  MG/ML ophthalmic solution      • doxycycline (VIBRAMYCIN) 100 MG capsule Take 1 capsule by mouth Daily.     • gabapentin (NEURONTIN) 300 MG capsule Take 1 capsule by mouth 3 (Three) Times a Day.     • hydrocortisone 1 % ointment Apply 1 application topically to the appropriate area as directed 2 (Two) Times a Day. 56 g 0   • hydroxychloroquine (PLAQUENIL) 200 MG tablet Take 1 tablet by mouth Every 12 (Twelve) Hours.     • latanoprost (XALATAN) 0.005 % ophthalmic solution Apply 1 drop to eye(s) as directed by provider.     • meclizine (ANTIVERT) 25 MG tablet 1 tablet Every 8 (Eight) Hours As Needed.     • oxaprozin (DAYPRO) 600 MG tablet Take 2 tablets by mouth Daily. Take 2 po every day     • prednisoLONE acetate (PRED FORTE) 1 % ophthalmic suspension Apply 1 drop to eye(s) as directed by provider 4 (Four) Times a Day.     • promethazine (PHENERGAN) 25 MG tablet Take 1 tablet by mouth Every 6 (Six) Hours As Needed for Nausea or Vomiting. 20 tablet 0   • traMADol (ULTRAM) 50 MG tablet      • ALPRAZolam (XANAX) 0.25 MG tablet Take 1/2 to 1 tablet prn when flying 10 tablet 0   • lisinopril-hydrochlorothiazide (PRINZIDE,ZESTORETIC) 20-12.5 MG per tablet TAKE 1 TABLET BY MOUTH DAILY 30 tablet 0   • SUMAtriptan (IMITREX) 100 MG tablet TAKE ONE TABLET AT ONSET OF HEADACHE. MAY REPEAT DOSE ONE TIME IN 2 HOURS IF HEADACHE NOT RELIEVED. 9 tablet 5     No facility-administered medications prior to visit.       Opioid medication/s are on active medication list.  and I have evaluated his active treatment plan and pain score trends (see table).  Vitals:    04/27/23 0952   PainSc:   2   PainLoc: Knee     I have reviewed the chart for potential of high risk medication and harmful drug interactions in the elderly.            Aspirin is not on active medication list.  Aspirin use is not indicated based on review of current medical condition/s. Risk of harm outweighs potential benefits.  .    Patient Active Problem List   Diagnosis  "  • Allergic rhinitis   • Anxiety disorder   • Benign paroxysmal positional vertigo   • Chronic tension type headache   • Depression   • ED (erectile dysfunction) of non-organic origin   • Hyperlipidemia   • Hypertension   • LFTs abnormal   • CHRISTOPHE (obstructive sleep apnea)   • Elevated glucose   • Health care maintenance   • Morbid obesity due to excess calories   • Onychomycosis of left great toe   • Osteoarthritis of ankle or foot   • Venous stasis   • Neuropathy   • Class 3 severe obesity due to excess calories with serious comorbidity and body mass index (BMI) of 40.0 to 44.9 in adult   • Pre-op evaluation   • Arthritis of foot, left   • S/P left ankle triple arthrodesis   • Acute blood loss anemia, mild, asymptomatic    • Acute postoperative pain   • Wound infection   • Primary osteoarthritis of right hip   • Status post total replacement of right hip   • Postoperative wound infection of right hip, s/p I and D, head and liner exchange.   • Primary osteoarthritis of left knee   • S/P total knee arthroplasty, left   • Food poisoning     Advance Care Planning   Advance Care Planning     Advance Directive is on file.  ACP discussion was held with the patient during this visit. Patient has an advance directive in EMR which is still valid.      Objective    Vitals:    04/27/23 0952   BP: 140/100   Pulse: 75   SpO2: 97%   Weight: 134 kg (294 lb 9.6 oz)   Height: 188 cm (74\")   PainSc:   2   PainLoc: Knee     Estimated body mass index is 37.82 kg/m² as calculated from the following:    Height as of this encounter: 188 cm (74\").    Weight as of this encounter: 134 kg (294 lb 9.6 oz).    Class 2 Severe Obesity (BMI >=35 and <=39.9). Obesity-related health conditions include the following: hypertension and osteoarthritis. Obesity is improving with lifestyle modifications. BMI is is above average; BMI management plan is completed. We discussed portion control and increasing exercise.      Does the patient have evidence of " cognitive impairment? No    Lab Results   Component Value Date    TRIG 127 2023    HDL 39 (L) 2023    LDL 70 2023    VLDL 23 2023    HGBA1C 5.20 2023        HEALTH RISK ASSESSMENT    Smoking Status:  Social History     Tobacco Use   Smoking Status Never   Smokeless Tobacco Never     Alcohol Consumption:  Social History     Substance and Sexual Activity   Alcohol Use Not Currently    Comment: One drink 1-2x month     Fall Risk Screen:    KRISHADI Fall Risk Assessment was completed, and patient is at MODERATE risk for falls. Assessment completed on:2023    Depression Screenin/27/2023    10:02 AM   PHQ-2/PHQ-9 Depression Screening   Little Interest or Pleasure in Doing Things 0-->not at all   Feeling Down, Depressed or Hopeless 0-->not at all   PHQ-9: Brief Depression Severity Measure Score 0       Health Habits and Functional and Cognitive Screenin/27/2023    10:00 AM   Functional & Cognitive Status   Do you have difficulty preparing food and eating? No   Do you have difficulty bathing yourself, getting dressed or grooming yourself? No   Do you have difficulty using the toilet? No   Do you have difficulty moving around from place to place? Yes   Do you have trouble with steps or getting out of a bed or a chair? Yes   Current Diet Well Balanced Diet   Dental Exam Up to date        Dental Exam Comment    Eye Exam Up to date        Eye Exam Comment    Exercise (times per week) 0 times per week   Current Exercises Include No Regular Exercise   Do you need help using the phone?  No   Are you deaf or do you have serious difficulty hearing?  No   Do you need help with transportation? No   Do you need help shopping? No   Do you need help preparing meals?  No   Do you need help with housework?  No   Do you need help with laundry? No   Do you need help taking your medications? No   Do you need help managing money? No   Do you ever drive or ride in a car without wearing  a seat belt? No   Have you felt unusual stress, anger or loneliness in the last month? No   Who do you live with? Spouse   If you need help, do you have trouble finding someone available to you? No   Have you been bothered in the last four weeks by sexual problems? No       Age-appropriate Screening Schedule:  Refer to the list below for future screening recommendations based on patient's age, sex and/or medical conditions. Orders for these recommended tests are listed in the plan section. The patient has been provided with a written plan.    Health Maintenance   Topic Date Due   • INFLUENZA VACCINE  08/01/2023   • TDAP/TD VACCINES (2 - Td or Tdap) 01/01/2024   • LIPID PANEL  04/18/2024   • ANNUAL WELLNESS VISIT  04/27/2024   • COLORECTAL CANCER SCREENING  01/13/2027   • HEPATITIS C SCREENING  Completed   • COVID-19 Vaccine  Completed   • Pneumococcal Vaccine 65+  Completed   • ZOSTER VACCINE  Completed                  CMS Preventative Services Quick Reference  Risk Factors Identified During Encounter  Fall Risk-High or Moderate: Discussed Fall Prevention in the home  Inactivity/Sedentary: Patient was advised to exercise at least 150 minutes a week per CDC recommendations.  The above risks/problems have been discussed with the patient.  Pertinent information has been shared with the patient in the After Visit Summary.  An After Visit Summary and PPPS were made available to the patient.    Follow Up:   Next Medicare Wellness visit to be scheduled in 1 year.       Additional E&M Note during same encounter follows:  Patient has multiple medical problems which are significant and separately identifiable that require additional work above and beyond the Medicare Wellness Visit.      Chief Complaint  Medicare Wellness-subsequent, Hyperlipidemia, and Hypertension    Subjective        HPI  Obi Jackman  is also being seen today for     Mr. Jackman is a 72-year-old male presents today for a Medicare wellness visit.            He had a left knee arthroplasty about 6 weeks ago. He is recovering well. He completes physical therapy 3 times a week. He expresses concern about the scar tissue hardening. He can extend his left knee almost 0 degrees straight and flex it 112 degrees bending back. He has started driving again. His pain level is 2/10. Most of the pain is in his left knee. He went to see a musical in [place] about 2 weeks ago, and he had to get up and stand. His goal is to be able to complete tasks like sitting in an airplane seat. The physical therapist does not feel like he needs the cane. He uses it mostly for stabilization and reinforcement when he is going up and down stairs.      He saw Dr. Olivas a week after surgery for evaluation of severe swelling. He was worried about a blood clot. His physical therapist suggested that he visit the ER. He went to the ER and his results came back normal. He confirms that he has a follow-up appointment with Dr. Olivas in 05/2023. He visits Dr. Chanda Castanon twice a year for arthritis treatment.     The cataract lens in his right eye dislocated. He went in 4 days before his surgery to have it removed and replaced. He denies any pain. His sight was cloudy and fuzzy following the procedure.            He has lost almost 50 pounds in 1 year. He denies taking any medication. He is eating less and eating better. He denies completing exercise after his most recent surgery. His goal is to lose another 20 or 25 pounds. He has normal bowel movements and normal urination.    Review of Systems   Constitutional: Negative for activity change, appetite change, diaphoresis and fatigue.   HENT: Negative for congestion, postnasal drip, rhinorrhea and sinus pressure.    Respiratory: Negative for chest tightness, shortness of breath and wheezing.    Gastrointestinal: Negative for constipation and diarrhea.   Genitourinary: Negative for dysuria.   Musculoskeletal: Negative for arthralgias and myalgias.  "  Neurological: Negative for dizziness, syncope and light-headedness.   Psychiatric/Behavioral: Negative for dysphoric mood. The patient is not nervous/anxious.        Objective   Vital Signs:  /100   Pulse 75   Ht 188 cm (74\")   Wt 134 kg (294 lb 9.6 oz)   SpO2 97%   BMI 37.82 kg/m²     Physical Exam  Vitals and nursing note reviewed.   Constitutional:       Appearance: He is well-developed.   HENT:      Head: Normocephalic.      Right Ear: Hearing, tympanic membrane, ear canal and external ear normal.      Left Ear: Hearing, tympanic membrane, ear canal and external ear normal.      Nose: Nose normal.   Eyes:      Conjunctiva/sclera: Conjunctivae normal.      Pupils: Pupils are equal, round, and reactive to light.   Cardiovascular:      Rate and Rhythm: Normal rate and regular rhythm.      Heart sounds: Normal heart sounds.   Pulmonary:      Effort: Pulmonary effort is normal.      Breath sounds: Normal breath sounds. No decreased breath sounds, wheezing, rhonchi or rales.   Musculoskeletal:         General: Normal range of motion.      Cervical back: Normal range of motion.   Skin:     General: Skin is warm and dry.   Neurological:      Mental Status: He is alert.   Psychiatric:         Behavior: Behavior normal.                         Assessment and Plan   Diagnoses and all orders for this visit:    1. Encounter for Medicare annual wellness exam (Primary)    2. Secondary hypertension  Assessment & Plan:  Hypertension is unchanged.  Continue current treatment regimen.  Dietary sodium restriction.  Weight loss.  Regular aerobic exercise.  Continue current medications.  Blood pressure will be reassessed at the next regular appointment.    Orders:  -     lisinopril-hydrochlorothiazide (PRINZIDE,ZESTORETIC) 20-12.5 MG per tablet; Take 1 tablet by mouth Daily.  Dispense: 90 tablet; Refill: 3    3. Prostate cancer screening  -     PSA Screen    4. High risk medication use  -     Compliance Drug Analysis, Ur " - Urine, Clean Catch; Future  -     Compliance Drug Analysis, Ur - Urine, Clean Catch    5. Fear of flying  Assessment & Plan:  Refill of xanax 0.25 mg 1 po prn on flying #10, 0RF.    The patient has read and signed the [facility] Controlled Substance Contract.  I will continue to see patient for regular follow up appointments.  They are well controlled on their medication.  TAJ is updated every 3 months. The patient is aware of the potential for addiction and dependence.      6. Situational stress  -     ALPRAZolam (XANAX) 0.25 MG tablet; Take 1/2 to 1 tablet prn when flying  Dispense: 10 tablet; Refill: 0    Other orders  -     SUMAtriptan (IMITREX) 100 MG tablet; Take one tablet at onset of headache. May repeat dose one time in 2 hours if headache not relieved.  Dispense: 9 tablet; Refill: 5             Follow Up   No follow-ups on file.  Patient was given instructions and counseling regarding his condition or for health maintenance advice. Please see specific information pulled into the AVS if appropriate.         Transcribed from ambient dictation for PEGGY Olmos by Venkata Washington.  04/27/23   13:27 EDT    Patient or patient representative verbalized consent to the visit recording.  I have personally performed the services described in this document as transcribed by the above individual, and it is both accurate and complete.

## 2023-04-28 RX ORDER — ALPRAZOLAM 0.25 MG/1
TABLET ORAL
Qty: 10 TABLET | Refills: 0 | Status: SHIPPED | OUTPATIENT
Start: 2023-04-28

## 2023-04-29 NOTE — ASSESSMENT & PLAN NOTE
Refill of xanax 0.25 mg 1 po prn on flying #10, 0RF.    The patient has read and signed the [facility] Controlled Substance Contract.  I will continue to see patient for regular follow up appointments.  They are well controlled on their medication.  TAJ is updated every 3 months. The patient is aware of the potential for addiction and dependence.

## 2023-05-04 LAB — DRUGS UR: NORMAL

## 2023-05-08 ENCOUNTER — LAB (OUTPATIENT)
Dept: LAB | Facility: HOSPITAL | Age: 73
End: 2023-05-08
Payer: MEDICARE

## 2023-05-08 ENCOUNTER — TRANSCRIBE ORDERS (OUTPATIENT)
Dept: LAB | Facility: HOSPITAL | Age: 73
End: 2023-05-08
Payer: MEDICARE

## 2023-05-08 DIAGNOSIS — T84.51XD INFLAMMATORY REACTION DUE TO INTERNAL PROSTHESIS OF RIGHT HIP, SUBSEQUENT ENCOUNTER: ICD-10-CM

## 2023-05-08 DIAGNOSIS — L03.115 CELLULITIS OF RIGHT FOOT: ICD-10-CM

## 2023-05-08 DIAGNOSIS — B95.7 CHRONIC GRANULOMATOUS INFECTION DUE MOSTLY TO STAPHYLOCOCCUS AUREUS: ICD-10-CM

## 2023-05-08 DIAGNOSIS — T84.51XD INFLAMMATORY REACTION DUE TO INTERNAL PROSTHESIS OF RIGHT HIP, SUBSEQUENT ENCOUNTER: Primary | ICD-10-CM

## 2023-05-08 LAB
ALBUMIN SERPL-MCNC: 4.1 G/DL (ref 3.5–5.2)
ALBUMIN/GLOB SERPL: 1.4 G/DL
ALP SERPL-CCNC: 70 U/L (ref 39–117)
ALT SERPL W P-5'-P-CCNC: 12 U/L (ref 1–41)
ANION GAP SERPL CALCULATED.3IONS-SCNC: 11 MMOL/L (ref 5–15)
AST SERPL-CCNC: 16 U/L (ref 1–40)
BASOPHILS # BLD AUTO: 0.02 10*3/MM3 (ref 0–0.2)
BASOPHILS NFR BLD AUTO: 0.3 % (ref 0–1.5)
BILIRUB SERPL-MCNC: 0.5 MG/DL (ref 0–1.2)
BUN SERPL-MCNC: 13 MG/DL (ref 8–23)
BUN/CREAT SERPL: 17.1 (ref 7–25)
CALCIUM SPEC-SCNC: 9.4 MG/DL (ref 8.6–10.5)
CHLORIDE SERPL-SCNC: 102 MMOL/L (ref 98–107)
CO2 SERPL-SCNC: 26 MMOL/L (ref 22–29)
CREAT SERPL-MCNC: 0.76 MG/DL (ref 0.76–1.27)
CRP SERPL-MCNC: <0.3 MG/DL (ref 0–0.5)
DEPRECATED RDW RBC AUTO: 40.8 FL (ref 37–54)
EGFRCR SERPLBLD CKD-EPI 2021: 96.1 ML/MIN/1.73
EOSINOPHIL # BLD AUTO: 0.14 10*3/MM3 (ref 0–0.4)
EOSINOPHIL NFR BLD AUTO: 2 % (ref 0.3–6.2)
ERYTHROCYTE [DISTWIDTH] IN BLOOD BY AUTOMATED COUNT: 12.4 % (ref 12.3–15.4)
ERYTHROCYTE [SEDIMENTATION RATE] IN BLOOD: 24 MM/HR (ref 0–20)
GLOBULIN UR ELPH-MCNC: 2.9 GM/DL
GLUCOSE SERPL-MCNC: 105 MG/DL (ref 65–99)
HCT VFR BLD AUTO: 44 % (ref 37.5–51)
HGB BLD-MCNC: 14.2 G/DL (ref 13–17.7)
IMM GRANULOCYTES # BLD AUTO: 0.02 10*3/MM3 (ref 0–0.05)
IMM GRANULOCYTES NFR BLD AUTO: 0.3 % (ref 0–0.5)
LYMPHOCYTES # BLD AUTO: 1.29 10*3/MM3 (ref 0.7–3.1)
LYMPHOCYTES NFR BLD AUTO: 18.6 % (ref 19.6–45.3)
MCH RBC QN AUTO: 28.9 PG (ref 26.6–33)
MCHC RBC AUTO-ENTMCNC: 32.3 G/DL (ref 31.5–35.7)
MCV RBC AUTO: 89.6 FL (ref 79–97)
MONOCYTES # BLD AUTO: 0.64 10*3/MM3 (ref 0.1–0.9)
MONOCYTES NFR BLD AUTO: 9.2 % (ref 5–12)
NEUTROPHILS NFR BLD AUTO: 4.82 10*3/MM3 (ref 1.7–7)
NEUTROPHILS NFR BLD AUTO: 69.6 % (ref 42.7–76)
NRBC BLD AUTO-RTO: 0 /100 WBC (ref 0–0.2)
PLATELET # BLD AUTO: 289 10*3/MM3 (ref 140–450)
PMV BLD AUTO: 9.9 FL (ref 6–12)
POTASSIUM SERPL-SCNC: 4.1 MMOL/L (ref 3.5–5.2)
PROT SERPL-MCNC: 7 G/DL (ref 6–8.5)
RBC # BLD AUTO: 4.91 10*6/MM3 (ref 4.14–5.8)
SODIUM SERPL-SCNC: 139 MMOL/L (ref 136–145)
WBC NRBC COR # BLD: 6.93 10*3/MM3 (ref 3.4–10.8)

## 2023-05-08 PROCEDURE — 80053 COMPREHEN METABOLIC PANEL: CPT | Performed by: INTERNAL MEDICINE

## 2023-05-08 PROCEDURE — 85025 COMPLETE CBC W/AUTO DIFF WBC: CPT | Performed by: INTERNAL MEDICINE

## 2023-05-08 PROCEDURE — 85652 RBC SED RATE AUTOMATED: CPT

## 2023-05-08 PROCEDURE — 36415 COLL VENOUS BLD VENIPUNCTURE: CPT | Performed by: INTERNAL MEDICINE

## 2023-05-08 PROCEDURE — 86140 C-REACTIVE PROTEIN: CPT | Performed by: INTERNAL MEDICINE

## 2023-05-15 ENCOUNTER — OFFICE VISIT (OUTPATIENT)
Dept: ORTHOPEDIC SURGERY | Facility: CLINIC | Age: 73
End: 2023-05-15
Payer: MEDICARE

## 2023-05-15 DIAGNOSIS — Z47.89 ORTHOPEDIC AFTERCARE: ICD-10-CM

## 2023-05-15 DIAGNOSIS — Z96.652 S/P TOTAL KNEE ARTHROPLASTY, LEFT: Primary | ICD-10-CM

## 2023-05-15 PROCEDURE — 1160F RVW MEDS BY RX/DR IN RCRD: CPT | Performed by: ORTHOPAEDIC SURGERY

## 2023-05-15 PROCEDURE — 99024 POSTOP FOLLOW-UP VISIT: CPT | Performed by: ORTHOPAEDIC SURGERY

## 2023-05-15 PROCEDURE — 1159F MED LIST DOCD IN RCRD: CPT | Performed by: ORTHOPAEDIC SURGERY

## 2023-05-15 NOTE — PROGRESS NOTES
Obese    [facility] [place] [facility] Note    Subjective     Chief Complaint   Patient presents with   • Post-op     6 week follow up; 9 weeks status post left total knee arthroplasty with cori robot (3/10/23)         HPI    It has been 6  week(s) since Mr. Jackman's last visit. He returns to clinic today for postoperative follow-up of left knee arthroplasty. The issue has been ongoing for 9 week(s). He rates his pain a 2/10 on the pain scale. Previous/current treatments: cane/walker, NSAIDS, physical therapy and weight loss. Current symptoms: popping and grinding. The pain is worse with walking, climbing stairs and rising from seated position; resting, sitting and pain medication and/or NSAID improve the pain. Overall, he is doing better.  80% improvement compared to his preoperative symptoms.    I have reviewed the following portions of the patient's history and agree with: History of Present Illness and Review of Systems    Patient Active Problem List   Diagnosis   • Allergic rhinitis   • Anxiety disorder   • Benign paroxysmal positional vertigo   • Chronic tension type headache   • Depression   • ED (erectile dysfunction) of non-organic origin   • Hyperlipidemia   • Hypertension   • LFTs abnormal   • CHRISTOPHE (obstructive sleep apnea)   • Elevated glucose   • Health care maintenance   • Morbid obesity due to excess calories   • Onychomycosis of left great toe   • Osteoarthritis of ankle or foot   • Venous stasis   • Neuropathy   • Class 3 severe obesity due to excess calories with serious comorbidity and body mass index (BMI) of 40.0 to 44.9 in adult   • Pre-op evaluation   • Arthritis of foot, left   • S/P left ankle triple arthrodesis   • Acute blood loss anemia, mild, asymptomatic    • Acute postoperative pain   • Wound infection   • Primary osteoarthritis of right hip   • Status post total replacement of right hip   • Postoperative wound infection of right hip, s/p I and D, head and liner exchange.   • Primary  osteoarthritis of left knee   • S/P total knee arthroplasty, left   • Food poisoning     Past Medical History:   Diagnosis Date   • Anxiety and depression    • Arthritis of neck ?   • Bulging of lumbar intervertebral disc    • Cataract     Lens implated in left eye.   • Cervical disc disorder ?   • Cornea transplant recipient     BILATERAL   • CPAP (continuous positive airway pressure) dependence    • CTS (carpal tunnel syndrome) Slight case; Dr. Chanda Castanon   • Elevated cholesterol    • Glaucoma July 2022    Drops daily.   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   • Obesity    • CHRISTOPHE on CPAP    • Periarthritis of shoulder ?   • Rheumatoid arthritis    • Rotator cuff syndrome 09/15/2022    Dr. Chanda Castanon   • Swelling of left ear    • Visual impairment     Cornea grafts both eyes; Cataracts.   • Wears contact lenses    • Wears contact lenses    • Wears contact lenses       Past Surgical History:   Procedure Laterality Date   • ACHILLES TENDON SURGERY Left 10/15/2019    Procedure: ACHILLES TENDON LENGTHENING LEFT;  Surgeon: Elodia Guzman MD;  Location:  [facility];  Service: Orthopedics   • CATARACT EXTRACTION Right    • COLONOSCOPY  2015   • EYE SURGERY Bilateral     cornea transplant    • FOOT SURGERY Left 10/15/2019    triple arthrodesis and achilles tendon lengthening- [facility]   • HERNIA REPAIR     • ILIAC CREST BONE GRAFT Left 10/15/2019    Procedure: ILIAC CREST BONE GRAFT LEFT;  Surgeon: Elodia Guzman MD;  Location:  [facility];  Service: Orthopedics   • INCISION AND DRAINAGE LEG Right 12/04/2020    Procedure: INCISION AND DRAINAGE WITH COMPONENT EXCHANGE, HEAD AND LINER RIGHT HIP;  Surgeon: Osbaldo Olivas MD;  Location:  [facility];  Service: Orthopedics;  Laterality: Right;   • JOINT REPLACEMENT  8/4/20 and 12/4/20    Right hip  replacement   • KNEE SURGERY     • TOE FUSION Left 10/15/2019    Procedure: TRIPLE ARTHODESIS LEFT;  Surgeon: Elodia Guzman MD;  Location:  [facility];  Service: Orthopedics   • TOTAL HIP ARTHROPLASTY Right 08/04/2020    Procedure: TOTAL HIP ARTHROPLASTY RIGHT;  Surgeon: Osbaldo Olivas MD;  Location:  [facility];  Service: Orthopedics;  Laterality: Right;   • TOTAL KNEE ARTHROPLASTY Left 03/10/2023    Procedure: TOTAL KNEE ARTHROPLASTY WITH CORI ROBOT - LEFT;  Surgeon: Osbaldo Olivas MD;  Location:  [facility];  Service: Robotics - Ortho;  Laterality: Left;   • TRIGGER POINT INJECTION     • UMBILICAL HERNIA REPAIR      Repaired over 15 years ago.   • VENOUS ABLATION Left 01/21/2022    Endovenous laser ablation of left great sapheuos vein for venous insufficiency      Family History   Problem Relation Age of Onset   • No Known Problems Mother    • No Known Problems Father    • Cancer Sister         Stomach cancer   • Cancer Brother         Kidney removed 4/16/23 due renal cell  carcenoma     Social History     Socioeconomic History   • Marital status:    Tobacco Use   • Smoking status: Never   • Smokeless tobacco: Never   Vaping Use   • Vaping Use: Never used   Substance and Sexual Activity   • Alcohol use: Not Currently     Comment: One drink 2-3x per year.   • Drug use: No   • Sexual activity: Yes     Partners: Female      Current Outpatient Medications on File Prior to Visit   Medication Sig Dispense Refill   • acetaminophen (TYLENOL) 325 MG tablet Take 1 tablet by mouth As Needed for Mild Pain.     • ALPRAZolam (XANAX) 0.25 MG tablet Take 1/2 to 1 tablet prn when flying 10 tablet 0   • amLODIPine (NORVASC) 5 MG tablet TAKE 1 TABLET BY MOUTH DAILY. 90 tablet 1   • Ascorbic Acid (VITAMIN C PO) Take  by mouth Daily.     • atenolol (TENORMIN) 50 MG tablet TAKE 1 TABLET BY MOUTH DAILY. 30 tablet 3   • atorvastatin (LIPITOR) 10 MG tablet TAKE 1 TABLET BY MOUTH DAILY. RESUME WHEN NOT ON DAPTO ANYMORE 30 tablet 3    • buPROPion XL (WELLBUTRIN XL) 150 MG 24 hr tablet TAKE 1 TABLET BY MOUTH DAILY 90 tablet 1   • clotrimazole (LOTRIMIN) 1 % cream Apply  topically to the appropriate area as directed 2 (Two) Times a Day. 15 g 1   • clotrimazole-betamethasone (LOTRISONE) 1-0.05 % cream APPLY TOPICALLY TO THE APPROPRIATE AREA AS DIRECTED 2 (TWO) TIMES A DAY. 45 g 0   • Cyanocobalamin (VITAMIN B-12 ER PO) Take 1 tablet by mouth Daily.     • cyclobenzaprine (FLEXERIL) 10 MG tablet TAKE ONE TABLET BY MOUTH THREE TIMES A DAY AS NEEDED FOR MUSCLE SPASMS 30 tablet 5   • dorzolamide-timolol (COSOPT) 22.3-6.8 MG/ML ophthalmic solution      • doxycycline (VIBRAMYCIN) 100 MG capsule Take 1 capsule by mouth Daily.     • gabapentin (NEURONTIN) 300 MG capsule Take 1 capsule by mouth 3 (Three) Times a Day.     • hydrocortisone 1 % ointment Apply 1 application topically to the appropriate area as directed 2 (Two) Times a Day. 56 g 0   • hydroxychloroquine (PLAQUENIL) 200 MG tablet Take 1 tablet by mouth Every 12 (Twelve) Hours.     • latanoprost (XALATAN) 0.005 % ophthalmic solution Apply 1 drop to eye(s) as directed by provider.     • lisinopril-hydrochlorothiazide (PRINZIDE,ZESTORETIC) 20-12.5 MG per tablet Take 1 tablet by mouth Daily. 90 tablet 3   • meclizine (ANTIVERT) 25 MG tablet 1 tablet Every 8 (Eight) Hours As Needed.     • oxaprozin (DAYPRO) 600 MG tablet Take 2 tablets by mouth Daily. Take 2 po every day     • prednisoLONE acetate (PRED FORTE) 1 % ophthalmic suspension Apply 1 drop to eye(s) as directed by provider 4 (Four) Times a Day.     • promethazine (PHENERGAN) 25 MG tablet Take 1 tablet by mouth Every 6 (Six) Hours As Needed for Nausea or Vomiting. 20 tablet 0   • SUMAtriptan (IMITREX) 100 MG tablet Take one tablet at onset of headache. May repeat dose one time in 2 hours if headache not relieved. 9 tablet 5   • traMADol (ULTRAM) 50 MG tablet        No current facility-administered medications on file prior to visit.      No  Known Allergies     Review of Systems   Constitutional: Negative for activity change, appetite change, chills, diaphoresis, fatigue, fever and unexpected weight change.   HENT: Negative for congestion, dental problem, drooling, ear discharge, ear pain, facial swelling, hearing loss, mouth sores, nosebleeds, postnasal drip, rhinorrhea, sinus pressure, sneezing, sore throat, tinnitus, trouble swallowing and voice change.    Eyes: Positive for visual disturbance. Negative for photophobia, pain, discharge, redness and itching.   Respiratory: Positive for apnea. Negative for cough, choking, chest tightness, shortness of breath, wheezing and stridor.    Cardiovascular: Positive for leg swelling. Negative for chest pain and palpitations.   Gastrointestinal: Negative for abdominal distention, abdominal pain, anal bleeding, blood in stool, constipation, diarrhea, nausea, rectal pain and vomiting.   Endocrine: Negative for cold intolerance, heat intolerance, polydipsia, polyphagia and polyuria.   Genitourinary: Negative for decreased urine volume, difficulty urinating, dysuria, enuresis, flank pain, frequency, genital sores, hematuria and urgency.   Musculoskeletal: Positive for arthralgias and joint swelling. Negative for back pain, gait problem, myalgias, neck pain and neck stiffness.   Skin: Negative for color change, pallor, rash and wound.   Allergic/Immunologic: Negative for environmental allergies, food allergies and immunocompromised state.   Neurological: Positive for numbness and headaches. Negative for dizziness, tremors, seizures, syncope, facial asymmetry, speech difficulty, weakness and light-headedness.   Hematological: Negative for adenopathy. Does not bruise/bleed easily.   Psychiatric/Behavioral: Negative for agitation, behavioral problems, confusion, decreased concentration, dysphoric mood, hallucinations, self-injury, sleep disturbance and suicidal ideas. The patient is not nervous/anxious and is not  hyperactive.         Objective      Physical Exam  There were no vitals taken for this visit.    There is no height or weight on file to calculate BMI.    General:   Mental Status:  Alert   Appearance: Cooperative, in no acute distress   Build and Nutrition: BMI male   Orientation: Alert and oriented to person, place and time   Posture: Normal   Gait: Nonantalgic    Integument:   Left knee: Wound is well-healed with no signs of infection    Lower Extremities:   Left Knee:    Tenderness:  None    Effusion:  None    Swelling: None    Crepitus:  None    Range of motion:  Extension: 0°       Flexion: 125°  Instability:  No varus laxity, no valgus laxity, negative anterior drawer  Deformities:  None      Imaging/Studies  Imaging Results (Last 24 Hours)     ** No results found for the last 24 hours. **        No new imaging today.    Assessment and Plan     Diagnoses and all orders for this visit:    1. S/P total knee arthroplasty, left (Primary)    2. Orthopedic aftercare        1. S/P total knee arthroplasty, left    2. Orthopedic aftercare        I reviewed my findings with the patient.  His left total knee arthroplasty is functioning well.  I will see him back in 10 months, which will be a 1 year checkup with x-rays.  I will see him back sooner for any problems.    Return in about 10 months (around 3/15/2024) for Recheck with X-Rays.      Osbaldo Olivas MD  05/15/23  14:57 EDT

## 2023-06-14 DIAGNOSIS — F32.A DEPRESSION, UNSPECIFIED DEPRESSION TYPE: ICD-10-CM

## 2023-06-14 RX ORDER — BUPROPION HYDROCHLORIDE 150 MG/1
150 TABLET ORAL DAILY
Qty: 30 TABLET | Refills: 2 | Status: SHIPPED | OUTPATIENT
Start: 2023-06-14

## 2023-07-26 DIAGNOSIS — I15.9 SECONDARY HYPERTENSION: ICD-10-CM

## 2023-07-26 DIAGNOSIS — E78.5 HYPERLIPIDEMIA, UNSPECIFIED HYPERLIPIDEMIA TYPE: ICD-10-CM

## 2023-07-26 RX ORDER — ATENOLOL 50 MG/1
50 TABLET ORAL DAILY
Qty: 30 TABLET | Refills: 0 | Status: SHIPPED | OUTPATIENT
Start: 2023-07-26 | End: 2024-07-25

## 2023-07-26 RX ORDER — ATORVASTATIN CALCIUM 10 MG/1
10 TABLET, FILM COATED ORAL DAILY
Qty: 30 TABLET | Refills: 0 | Status: SHIPPED | OUTPATIENT
Start: 2023-07-26

## 2023-08-28 DIAGNOSIS — I15.9 SECONDARY HYPERTENSION: ICD-10-CM

## 2023-08-28 RX ORDER — ATENOLOL 50 MG/1
50 TABLET ORAL DAILY
Qty: 30 TABLET | Refills: 5 | Status: SHIPPED | OUTPATIENT
Start: 2023-08-28 | End: 2024-08-27

## 2023-09-28 DIAGNOSIS — E78.5 HYPERLIPIDEMIA, UNSPECIFIED HYPERLIPIDEMIA TYPE: ICD-10-CM

## 2023-09-28 RX ORDER — ATORVASTATIN CALCIUM 10 MG/1
10 TABLET, FILM COATED ORAL DAILY
Qty: 90 TABLET | Refills: 0 | Status: SHIPPED | OUTPATIENT
Start: 2023-09-28

## 2023-10-02 ENCOUNTER — TELEPHONE (OUTPATIENT)
Dept: INTERNAL MEDICINE | Facility: CLINIC | Age: 73
End: 2023-10-02
Payer: MEDICARE

## 2023-10-02 NOTE — TELEPHONE ENCOUNTER
Mr. Jackman spoke to me at his wife's appointment for transfer of care. Please have his wellness moved to my schedule around that time if possible and notify him of the appointment.   Thank you

## 2023-10-05 RX ORDER — SUMATRIPTAN 100 MG/1
TABLET, FILM COATED ORAL
Qty: 9 TABLET | Refills: 5 | OUTPATIENT
Start: 2023-10-05

## 2023-10-23 RX ORDER — SUMATRIPTAN 100 MG/1
TABLET, FILM COATED ORAL
Qty: 9 TABLET | Refills: 5 | Status: SHIPPED | OUTPATIENT
Start: 2023-10-23

## 2023-11-09 ENCOUNTER — TRANSCRIBE ORDERS (OUTPATIENT)
Dept: LAB | Facility: HOSPITAL | Age: 73
End: 2023-11-09
Payer: MEDICARE

## 2023-11-09 ENCOUNTER — LAB (OUTPATIENT)
Dept: LAB | Facility: HOSPITAL | Age: 73
End: 2023-11-09
Payer: MEDICARE

## 2023-11-09 DIAGNOSIS — E87.6 HYPOPOTASSEMIA: ICD-10-CM

## 2023-11-09 DIAGNOSIS — L03.115 CELLULITIS OF RIGHT LEG: ICD-10-CM

## 2023-11-09 DIAGNOSIS — Z79.2 ENCOUNTER FOR LONG-TERM (CURRENT) USE OF ANTIBIOTICS: ICD-10-CM

## 2023-11-09 DIAGNOSIS — Z79.2 ENCOUNTER FOR LONG-TERM (CURRENT) USE OF ANTIBIOTICS: Primary | ICD-10-CM

## 2023-11-09 LAB
ALBUMIN SERPL-MCNC: 4.1 G/DL (ref 3.5–5.2)
ALBUMIN/GLOB SERPL: 1.5 G/DL
ALP SERPL-CCNC: 66 U/L (ref 39–117)
ALT SERPL W P-5'-P-CCNC: 18 U/L (ref 1–41)
ANION GAP SERPL CALCULATED.3IONS-SCNC: 9 MMOL/L (ref 5–15)
AST SERPL-CCNC: 20 U/L (ref 1–40)
BASOPHILS # BLD AUTO: 0.01 10*3/MM3 (ref 0–0.2)
BASOPHILS NFR BLD AUTO: 0.2 % (ref 0–1.5)
BILIRUB SERPL-MCNC: 0.6 MG/DL (ref 0–1.2)
BUN SERPL-MCNC: 16 MG/DL (ref 8–23)
BUN/CREAT SERPL: 19 (ref 7–25)
CALCIUM SPEC-SCNC: 9.3 MG/DL (ref 8.6–10.5)
CHLORIDE SERPL-SCNC: 103 MMOL/L (ref 98–107)
CO2 SERPL-SCNC: 29 MMOL/L (ref 22–29)
CREAT SERPL-MCNC: 0.84 MG/DL (ref 0.76–1.27)
CRP SERPL-MCNC: <0.3 MG/DL (ref 0–0.5)
DEPRECATED RDW RBC AUTO: 41.6 FL (ref 37–54)
EGFRCR SERPLBLD CKD-EPI 2021: 92.7 ML/MIN/1.73
EOSINOPHIL # BLD AUTO: 0.18 10*3/MM3 (ref 0–0.4)
EOSINOPHIL NFR BLD AUTO: 3.9 % (ref 0.3–6.2)
ERYTHROCYTE [DISTWIDTH] IN BLOOD BY AUTOMATED COUNT: 12.4 % (ref 12.3–15.4)
ERYTHROCYTE [SEDIMENTATION RATE] IN BLOOD: 16 MM/HR (ref 0–20)
GLOBULIN UR ELPH-MCNC: 2.8 GM/DL
GLUCOSE SERPL-MCNC: 105 MG/DL (ref 65–99)
HCT VFR BLD AUTO: 42.7 % (ref 37.5–51)
HGB BLD-MCNC: 14 G/DL (ref 13–17.7)
IMM GRANULOCYTES # BLD AUTO: 0.01 10*3/MM3 (ref 0–0.05)
IMM GRANULOCYTES NFR BLD AUTO: 0.2 % (ref 0–0.5)
LYMPHOCYTES # BLD AUTO: 0.97 10*3/MM3 (ref 0.7–3.1)
LYMPHOCYTES NFR BLD AUTO: 20.9 % (ref 19.6–45.3)
MCH RBC QN AUTO: 29.9 PG (ref 26.6–33)
MCHC RBC AUTO-ENTMCNC: 32.8 G/DL (ref 31.5–35.7)
MCV RBC AUTO: 91.2 FL (ref 79–97)
MONOCYTES # BLD AUTO: 0.49 10*3/MM3 (ref 0.1–0.9)
MONOCYTES NFR BLD AUTO: 10.6 % (ref 5–12)
NEUTROPHILS NFR BLD AUTO: 2.98 10*3/MM3 (ref 1.7–7)
NEUTROPHILS NFR BLD AUTO: 64.2 % (ref 42.7–76)
NRBC BLD AUTO-RTO: 0 /100 WBC (ref 0–0.2)
PLATELET # BLD AUTO: 235 10*3/MM3 (ref 140–450)
PMV BLD AUTO: 9.9 FL (ref 6–12)
POTASSIUM SERPL-SCNC: 4.1 MMOL/L (ref 3.5–5.2)
PROT SERPL-MCNC: 6.9 G/DL (ref 6–8.5)
RBC # BLD AUTO: 4.68 10*6/MM3 (ref 4.14–5.8)
SODIUM SERPL-SCNC: 141 MMOL/L (ref 136–145)
WBC NRBC COR # BLD: 4.64 10*3/MM3 (ref 3.4–10.8)

## 2023-11-09 PROCEDURE — 86140 C-REACTIVE PROTEIN: CPT

## 2023-11-09 PROCEDURE — 85025 COMPLETE CBC W/AUTO DIFF WBC: CPT

## 2023-11-09 PROCEDURE — 36415 COLL VENOUS BLD VENIPUNCTURE: CPT

## 2023-11-09 PROCEDURE — 85652 RBC SED RATE AUTOMATED: CPT

## 2023-11-09 PROCEDURE — 80053 COMPREHEN METABOLIC PANEL: CPT

## 2023-11-21 ENCOUNTER — OFFICE VISIT (OUTPATIENT)
Dept: INTERNAL MEDICINE | Facility: CLINIC | Age: 73
End: 2023-11-21
Payer: MEDICARE

## 2023-11-21 VITALS
SYSTOLIC BLOOD PRESSURE: 124 MMHG | BODY MASS INDEX: 39.66 KG/M2 | HEART RATE: 78 BPM | OXYGEN SATURATION: 96 % | WEIGHT: 309 LBS | HEIGHT: 74 IN | DIASTOLIC BLOOD PRESSURE: 78 MMHG | TEMPERATURE: 97.4 F

## 2023-11-21 DIAGNOSIS — F32.A DEPRESSION, UNSPECIFIED DEPRESSION TYPE: ICD-10-CM

## 2023-11-21 DIAGNOSIS — E66.01 CLASS 2 SEVERE OBESITY DUE TO EXCESS CALORIES WITH SERIOUS COMORBIDITY AND BODY MASS INDEX (BMI) OF 39.0 TO 39.9 IN ADULT: Primary | ICD-10-CM

## 2023-11-21 PROCEDURE — 99213 OFFICE O/P EST LOW 20 MIN: CPT | Performed by: INTERNAL MEDICINE

## 2023-11-21 PROCEDURE — 3074F SYST BP LT 130 MM HG: CPT | Performed by: INTERNAL MEDICINE

## 2023-11-21 PROCEDURE — 3078F DIAST BP <80 MM HG: CPT | Performed by: INTERNAL MEDICINE

## 2023-11-21 RX ORDER — BUPROPION HYDROCHLORIDE 150 MG/1
150 TABLET ORAL DAILY
Qty: 90 TABLET | Refills: 1 | Status: SHIPPED | OUTPATIENT
Start: 2023-11-21

## 2023-11-21 RX ORDER — PROMETHAZINE HYDROCHLORIDE 25 MG/1
25 TABLET ORAL EVERY 6 HOURS PRN
Qty: 20 TABLET | Refills: 1 | Status: SHIPPED | OUTPATIENT
Start: 2023-11-21

## 2023-11-21 NOTE — PROGRESS NOTES
Med Management    Subjective   Obi Jackman Jr. is a 73 y.o. male is here today for follow-up.    History of Present Illness  Herb is here to establish , brings his labs from  who he sees for his chronic hip infection that he devp 4 mos postop.  He had lost appx 50 lbs then and has since gained appx 10 back, would like to lose 50 more lbs.  Asking if he would qualify for Tirzapatide.. has coupons for mounjaro.      Answers submitted by the patient for this visit:  Primary Reason for Visit (Submitted on 11/19/2023)  What is the primary reason for your visit?: Other  Other (Submitted on 11/19/2023)  Please describe your symptoms.: New patient visit. Review of prescriptions. Obesity.  Have you had these symptoms before?: No  How long have you been having these symptoms?: Greater than 2 weeks  Please list any medications you are currently taking for this condition.: Listed in My Chart.  Please describe any probable cause for these symptoms. : HBP, RA, Obesity      Current Outpatient Medications:     acetaminophen (TYLENOL) 325 MG tablet, Take 1 tablet by mouth As Needed for Mild Pain., Disp: , Rfl:     ALPRAZolam (XANAX) 0.25 MG tablet, Take 1/2 to 1 tablet prn when flying, Disp: 10 tablet, Rfl: 0    Ascorbic Acid (VITAMIN C PO), Take  by mouth Daily., Disp: , Rfl:     atenolol (TENORMIN) 50 MG tablet, TAKE 1 TABLET BY MOUTH DAILY., Disp: 30 tablet, Rfl: 5    atorvastatin (LIPITOR) 10 MG tablet, TAKE 1 TABLET BY MOUTH DAILY. RESUME WHEN NOT ON DAPTO ANYMORE, Disp: 90 tablet, Rfl: 0    buPROPion XL (WELLBUTRIN XL) 150 MG 24 hr tablet, Take 1 tablet by mouth Daily., Disp: 90 tablet, Rfl: 1    clotrimazole-betamethasone (LOTRISONE) 1-0.05 % cream, APPLY TOPICALLY TO THE APPROPRIATE AREA AS DIRECTED 2 (TWO) TIMES A DAY., Disp: 45 g, Rfl: 0    Cyanocobalamin (VITAMIN B-12 ER PO), Take 1 tablet by mouth Daily., Disp: , Rfl:     cyclobenzaprine (FLEXERIL) 10 MG tablet, TAKE ONE TABLET BY MOUTH THREE TIMES A DAY  AS NEEDED FOR MUSCLE SPASMS, Disp: 30 tablet, Rfl: 5    doxycycline (VIBRAMYCIN) 100 MG capsule, Take 1 capsule by mouth Daily., Disp: , Rfl:     gabapentin (NEURONTIN) 300 MG capsule, Take 1 capsule by mouth 3 (Three) Times a Day., Disp: , Rfl:     hydrocortisone 1 % ointment, Apply 1 application topically to the appropriate area as directed 2 (Two) Times a Day., Disp: 56 g, Rfl: 0    hydroxychloroquine (PLAQUENIL) 200 MG tablet, Take 1 tablet by mouth Every 12 (Twelve) Hours., Disp: , Rfl:     lisinopril-hydrochlorothiazide (PRINZIDE,ZESTORETIC) 20-12.5 MG per tablet, Take 1 tablet by mouth Daily., Disp: 90 tablet, Rfl: 3    meclizine (ANTIVERT) 25 MG tablet, 1 tablet Every 8 (Eight) Hours As Needed., Disp: , Rfl:     oxaprozin (DAYPRO) 600 MG tablet, Take 2 tablets by mouth Daily. Take 2 po every day, Disp: , Rfl:     prednisoLONE acetate (PRED FORTE) 1 % ophthalmic suspension, Apply 1 drop to eye(s) as directed by provider 4 (Four) Times a Day., Disp: , Rfl:     promethazine (PHENERGAN) 25 MG tablet, Take 1 tablet by mouth Every 6 (Six) Hours As Needed for Nausea or Vomiting., Disp: 20 tablet, Rfl: 1    SUMAtriptan (IMITREX) 100 MG tablet, TAKE ONE TABLET AT ONSET OF HEADACHE. MAY REPEAT DOSE ONE TIME IN 2 HOURS IF HEADACHE NOT RELIEVED., Disp: 9 tablet, Rfl: 5    traMADol (ULTRAM) 50 MG tablet, , Disp: , Rfl:       The following portions of the patient's history were reviewed and updated as appropriate: allergies, current medications, past family history, past medical history, past social history, past surgical history and problem list.    Review of Systems   Constitutional:  Positive for unexpected weight change. Negative for chills and fever.   HENT:  Negative for ear discharge, ear pain, sinus pressure and sore throat.    Respiratory:  Negative for cough, chest tightness and shortness of breath.    Cardiovascular:  Negative for chest pain, palpitations and leg swelling.   Gastrointestinal:  Negative for  "diarrhea, nausea and vomiting.   Musculoskeletal:  Negative for arthralgias, back pain and myalgias.   Neurological:  Negative for dizziness, syncope and headaches.   Psychiatric/Behavioral:  Negative for confusion and sleep disturbance.        Objective   /78   Pulse 78   Temp 97.4 °F (36.3 °C)   Ht 188 cm (74\")   Wt (!) 140 kg (309 lb)   SpO2 96%   BMI 39.67 kg/m²   Physical Exam  Vitals and nursing note reviewed.   Constitutional:       Appearance: He is well-developed.   HENT:      Head: Normocephalic and atraumatic.      Right Ear: External ear normal.      Left Ear: External ear normal.      Mouth/Throat:      Pharynx: No oropharyngeal exudate.   Eyes:      Conjunctiva/sclera: Conjunctivae normal.      Pupils: Pupils are equal, round, and reactive to light.   Neck:      Thyroid: No thyromegaly.   Cardiovascular:      Rate and Rhythm: Normal rate and regular rhythm.      Pulses: Normal pulses.      Heart sounds: Normal heart sounds. No murmur heard.     No friction rub. No gallop.   Pulmonary:      Effort: Pulmonary effort is normal.      Breath sounds: Normal breath sounds.   Abdominal:      General: Bowel sounds are normal. There is no distension.      Palpations: Abdomen is soft.      Tenderness: There is no abdominal tenderness.   Musculoskeletal:      Cervical back: Neck supple.      Right lower leg: Edema present.      Left lower leg: Edema present.   Skin:     General: Skin is warm and dry.   Neurological:      Mental Status: He is alert and oriented to person, place, and time.      Cranial Nerves: No cranial nerve deficit.   Psychiatric:         Judgment: Judgment normal.                 Results for orders placed or performed in visit on 11/09/23   Comprehensive Metabolic Panel    Specimen: Blood   Result Value Ref Range    Glucose 105 (H) 65 - 99 mg/dL    BUN 16 8 - 23 mg/dL    Creatinine 0.84 0.76 - 1.27 mg/dL    Sodium 141 136 - 145 mmol/L    Potassium 4.1 3.5 - 5.2 mmol/L    Chloride 103 " 98 - 107 mmol/L    CO2 29.0 22.0 - 29.0 mmol/L    Calcium 9.3 8.6 - 10.5 mg/dL    Total Protein 6.9 6.0 - 8.5 g/dL    Albumin 4.1 3.5 - 5.2 g/dL    ALT (SGPT) 18 1 - 41 U/L    AST (SGOT) 20 1 - 40 U/L    Alkaline Phosphatase 66 39 - 117 U/L    Total Bilirubin 0.6 0.0 - 1.2 mg/dL    Globulin 2.8 gm/dL    A/G Ratio 1.5 g/dL    BUN/Creatinine Ratio 19.0 7.0 - 25.0    Anion Gap 9.0 5.0 - 15.0 mmol/L    eGFR 92.7 >60.0 mL/min/1.73   Sedimentation Rate    Specimen: Blood   Result Value Ref Range    Sed Rate 16 0 - 20 mm/hr   C-reactive Protein    Specimen: Blood   Result Value Ref Range    C-Reactive Protein <0.30 0.00 - 0.50 mg/dL   CBC Auto Differential    Specimen: Blood   Result Value Ref Range    WBC 4.64 3.40 - 10.80 10*3/mm3    RBC 4.68 4.14 - 5.80 10*6/mm3    Hemoglobin 14.0 13.0 - 17.7 g/dL    Hematocrit 42.7 37.5 - 51.0 %    MCV 91.2 79.0 - 97.0 fL    MCH 29.9 26.6 - 33.0 pg    MCHC 32.8 31.5 - 35.7 g/dL    RDW 12.4 12.3 - 15.4 %    RDW-SD 41.6 37.0 - 54.0 fl    MPV 9.9 6.0 - 12.0 fL    Platelets 235 140 - 450 10*3/mm3    Neutrophil % 64.2 42.7 - 76.0 %    Lymphocyte % 20.9 19.6 - 45.3 %    Monocyte % 10.6 5.0 - 12.0 %    Eosinophil % 3.9 0.3 - 6.2 %    Basophil % 0.2 0.0 - 1.5 %    Immature Grans % 0.2 0.0 - 0.5 %    Neutrophils, Absolute 2.98 1.70 - 7.00 10*3/mm3    Lymphocytes, Absolute 0.97 0.70 - 3.10 10*3/mm3    Monocytes, Absolute 0.49 0.10 - 0.90 10*3/mm3    Eosinophils, Absolute 0.18 0.00 - 0.40 10*3/mm3    Basophils, Absolute 0.01 0.00 - 0.20 10*3/mm3    Immature Grans, Absolute 0.01 0.00 - 0.05 10*3/mm3    nRBC 0.0 0.0 - 0.2 /100 WBC             Assessment & Plan   Diagnoses and all orders for this visit:    Class 2 severe obesity due to excess calories with serious comorbidity and body mass index (BMI) of 39.0 to 39.9 in adult  Comments:  with HTN, DLP, OA,depression and lymphedema. Zepbound 2.5 mg weekly.    Depression, unspecified depression type  -     buPROPion XL (WELLBUTRIN XL) 150 MG 24 hr  tablet; Take 1 tablet by mouth Daily.  -     promethazine (PHENERGAN) 25 MG tablet; Take 1 tablet by mouth Every 6 (Six) Hours As Needed for Nausea or Vomiting.           Call if able to fill Rx will send in next higher dose of 5mg.  Explained Mounjaro not covered as he is not a diabetic.        Return in about 6 months (around 5/21/2024) for Medicare Wellness.    Electronically signed by:    Varsha Hahn MD

## 2024-02-02 DIAGNOSIS — I15.9 SECONDARY HYPERTENSION: ICD-10-CM

## 2024-02-02 RX ORDER — ATENOLOL 50 MG/1
50 TABLET ORAL DAILY
Qty: 90 TABLET | Refills: 1 | Status: SHIPPED | OUTPATIENT
Start: 2024-02-02

## 2024-02-23 DIAGNOSIS — E78.5 HYPERLIPIDEMIA, UNSPECIFIED HYPERLIPIDEMIA TYPE: ICD-10-CM

## 2024-02-23 RX ORDER — ATORVASTATIN CALCIUM 10 MG/1
10 TABLET, FILM COATED ORAL DAILY
Qty: 90 TABLET | Refills: 1 | Status: SHIPPED | OUTPATIENT
Start: 2024-02-23

## 2024-03-06 ENCOUNTER — OFFICE VISIT (OUTPATIENT)
Dept: ORTHOPEDIC SURGERY | Facility: CLINIC | Age: 74
End: 2024-03-06
Payer: MEDICARE

## 2024-03-06 VITALS
BODY MASS INDEX: 44.61 KG/M2 | WEIGHT: 311.6 LBS | SYSTOLIC BLOOD PRESSURE: 140 MMHG | HEIGHT: 70 IN | DIASTOLIC BLOOD PRESSURE: 85 MMHG

## 2024-03-06 DIAGNOSIS — M17.11 PRIMARY OSTEOARTHRITIS OF RIGHT KNEE: ICD-10-CM

## 2024-03-06 DIAGNOSIS — Z96.652 S/P TOTAL KNEE ARTHROPLASTY, LEFT: Primary | ICD-10-CM

## 2024-03-06 RX ORDER — NYSTATIN 100000 U/G
CREAM TOPICAL
COMMUNITY
Start: 2024-01-17

## 2024-03-06 RX ORDER — TRIAMCINOLONE ACETONIDE 0.25 MG/G
CREAM TOPICAL
COMMUNITY
Start: 2024-01-17

## 2024-03-06 RX ORDER — ROPIVACAINE HYDROCHLORIDE 5 MG/ML
4 INJECTION, SOLUTION EPIDURAL; INFILTRATION; PERINEURAL
Status: COMPLETED | OUTPATIENT
Start: 2024-03-06 | End: 2024-03-06

## 2024-03-06 RX ORDER — TRIAMCINOLONE ACETONIDE 40 MG/ML
40 INJECTION, SUSPENSION INTRA-ARTICULAR; INTRAMUSCULAR
Status: COMPLETED | OUTPATIENT
Start: 2024-03-06 | End: 2024-03-06

## 2024-03-06 RX ADMIN — TRIAMCINOLONE ACETONIDE 40 MG: 40 INJECTION, SUSPENSION INTRA-ARTICULAR; INTRAMUSCULAR at 10:16

## 2024-03-06 RX ADMIN — ROPIVACAINE HYDROCHLORIDE 4 ML: 5 INJECTION, SOLUTION EPIDURAL; INFILTRATION; PERINEURAL at 10:16

## 2024-03-06 NOTE — PROGRESS NOTES
Procedure   - Large Joint Arthrocentesis: R knee on 3/6/2024 10:16 AM  Indications: pain  Details: 21 G needle, anterolateral approach  Medications: 4 mL ropivacaine 0.5 %; 40 mg triamcinolone acetonide 40 MG/ML  Outcome: tolerated well, no immediate complications  Procedure, treatment alternatives, risks and benefits explained, specific risks discussed. Consent was given by the patient. Immediately prior to procedure a time out was called to verify the correct patient, procedure, equipment, support staff and site/side marked as required. Patient was prepped and draped in the usual sterile fashion.

## 2024-03-06 NOTE — PROGRESS NOTES
[facility] [facility] [place]    Subjective     Chief Complaint   Patient presents with    Follow-up     10 month follow-up--1 year status post left total knee arthroplasty with cori robot (3/10/23)         HPI    It has been 10  month(s) since Mr. Jackman's last visit. He returns to clinic today for follow-up of left knee arthroplasty He rates his pain a 3/10 on the pain scale. Previous/current treatments: cane/walker, NSAIDS, physical therapy, and weight loss. Current symptoms: grinding and stiffness. The pain is worse with walking and climbing stairs; assistive device (cane/walker) improve the pain. Overall, he is doing better.  90% improvement compared to his preoperative symptoms.    Would also like an injection for his right knee today.  He has a known history of arthritis.  He is taking a trip to [place] in 3 weeks.    I have reviewed the following portions of the patient's history and agree with: History of Present Illness and Review of Systems    Patient Active Problem List   Diagnosis    Allergic rhinitis    Anxiety disorder    Benign paroxysmal positional vertigo    Chronic tension type headache    Depression    ED (erectile dysfunction) of non-organic origin    Hyperlipidemia    Hypertension    LFTs abnormal    CHRISTOPHE (obstructive sleep apnea)    Elevated glucose    Health care maintenance    Morbid obesity due to excess calories    Onychomycosis of left great toe    Osteoarthritis of ankle or foot    Venous stasis    Neuropathy    Class 3 severe obesity due to excess calories with serious comorbidity and body mass index (BMI) of 40.0 to 44.9 in adult    Pre-op evaluation    Arthritis of foot, left    S/P left ankle triple arthrodesis    Acute blood loss anemia, mild, asymptomatic     Acute postoperative pain    Wound infection    Primary osteoarthritis of right hip    Status post total replacement of right hip    Postoperative wound infection of right hip, s/p I and D, head and liner exchange.     Primary osteoarthritis of left knee    S/P total knee arthroplasty, left    Food poisoning     Past Medical History:   Diagnosis Date    Anxiety and depression     Arthritis of neck ?    Bulging of lumbar intervertebral disc     Cataract     Lens implated in left eye.    Cervical disc disorder ?    Cornea transplant recipient     BILATERAL    CPAP (continuous positive airway pressure) dependence     CTS (carpal tunnel syndrome) Slight case; Dr. Chanda Castanon    Elevated cholesterol     Glaucoma July 2022    Drops daily.    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    Obesity     CHRISTOPHE on CPAP     Periarthritis of shoulder ?    Rheumatoid arthritis     Rotator cuff syndrome 09/15/2022    Dr. Chanda Castanon    Swelling of left ear     Visual impairment     Cornea grafts both eyes; Cataracts.    Wears contact lenses     Wears contact lenses     Wears contact lenses       Past Surgical History:   Procedure Laterality Date    ACHILLES TENDON SURGERY Left 10/15/2019    Procedure: ACHILLES TENDON LENGTHENING LEFT;  Surgeon: Elodia Guzman MD;  Location:  [facility] OR;  Service: Orthopedics    CATARACT EXTRACTION Right     COLONOSCOPY  2015    EYE SURGERY Bilateral     cornea transplant     FOOT SURGERY Left 10/15/2019    triple arthrodesis and achilles tendon lengthening- [facility]    HERNIA REPAIR      ILIAC CREST BONE GRAFT Left 10/15/2019    Procedure: ILIAC CREST BONE GRAFT LEFT;  Surgeon: Elodia Guzman MD;  Location:  [facility];  Service: Orthopedics    INCISION AND DRAINAGE LEG Right 12/04/2020    Procedure: INCISION AND DRAINAGE WITH COMPONENT EXCHANGE, HEAD AND LINER RIGHT HIP;  Surgeon: Osbaldo Olivas MD;  Location:  [facility] OR;  Service: Orthopedics;  Laterality: Right;    JOINT REPLACEMENT  8/4/20 and 12/4/20    Right hip replacement    KNEE SURGERY       TOE FUSION Left 10/15/2019    Procedure: TRIPLE ARTHODESIS LEFT;  Surgeon: Elodia Guzman MD;  Location:  [facility];  Service: Orthopedics    TOTAL HIP ARTHROPLASTY Right 08/04/2020    Procedure: TOTAL HIP ARTHROPLASTY RIGHT;  Surgeon: Osbaldo Olivas MD;  Location:  [facility];  Service: Orthopedics;  Laterality: Right;    TOTAL KNEE ARTHROPLASTY Left 03/10/2023    Procedure: TOTAL KNEE ARTHROPLASTY WITH CORI ROBOT - LEFT;  Surgeon: Osbaldo Olivas MD;  Location:  [facility];  Service: Robotics - Ortho;  Laterality: Left;    TRIGGER POINT INJECTION      UMBILICAL HERNIA REPAIR      Repaired over 15 years ago.    VENOUS ABLATION Left 01/21/2022    Endovenous laser ablation of left great sapheuos vein for venous insufficiency      Family History   Problem Relation Age of Onset    No Known Problems Mother     No Known Problems Father     Cancer Sister         Stomach cancer    Cancer Brother         Kidney removed 4/16/23 due renal cell  carcenoma     Social History     Socioeconomic History    Marital status:    Tobacco Use    Smoking status: Never    Smokeless tobacco: Never   Vaping Use    Vaping status: Never Used   Substance and Sexual Activity    Alcohol use: Not Currently     Comment: One drink 2-3x per year.    Drug use: No    Sexual activity: Yes     Partners: Female     Birth control/protection: Hysterectomy      Current Outpatient Medications on File Prior to Visit   Medication Sig Dispense Refill    nystatin (MYCOSTATIN) 126767 UNIT/GM cream       triamcinolone (KENALOG) 0.025 % cream       acetaminophen (TYLENOL) 325 MG tablet Take 1 tablet by mouth As Needed for Mild Pain.      ALPRAZolam (XANAX) 0.25 MG tablet Take 1/2 to 1 tablet prn when flying 10 tablet 0    Ascorbic Acid (VITAMIN C PO) Take  by mouth Daily.      atenolol (TENORMIN) 50 MG tablet Take 1 tablet by mouth Daily. 90 tablet 1    atorvastatin (LIPITOR) 10 MG tablet TAKE 1 TABLET BY MOUTH DAILY. RESUME WHEN NOT ON DAPTO ANYMORE 90  tablet 1    buPROPion XL (WELLBUTRIN XL) 150 MG 24 hr tablet Take 1 tablet by mouth Daily. 90 tablet 1    Cyanocobalamin (VITAMIN B-12 ER PO) Take 1 tablet by mouth Daily.      cyclobenzaprine (FLEXERIL) 10 MG tablet TAKE ONE TABLET BY MOUTH THREE TIMES A DAY AS NEEDED FOR MUSCLE SPASMS 30 tablet 5    doxycycline (VIBRAMYCIN) 100 MG capsule Take 1 capsule by mouth Daily.      gabapentin (NEURONTIN) 300 MG capsule Take 1 capsule by mouth 3 (Three) Times a Day.      hydroxychloroquine (PLAQUENIL) 200 MG tablet Take 1 tablet by mouth Every 12 (Twelve) Hours.      lisinopril-hydrochlorothiazide (PRINZIDE,ZESTORETIC) 20-12.5 MG per tablet Take 1 tablet by mouth Daily. 90 tablet 3    meclizine (ANTIVERT) 25 MG tablet 1 tablet Every 8 (Eight) Hours As Needed.      oxaprozin (DAYPRO) 600 MG tablet Take 2 tablets by mouth Daily. Take 2 po every day      prednisoLONE acetate (PRED FORTE) 1 % ophthalmic suspension Apply 1 drop to eye(s) as directed by provider 4 (Four) Times a Day.      promethazine (PHENERGAN) 25 MG tablet Take 1 tablet by mouth Every 6 (Six) Hours As Needed for Nausea or Vomiting. 20 tablet 1    SUMAtriptan (IMITREX) 100 MG tablet TAKE ONE TABLET AT ONSET OF HEADACHE. MAY REPEAT DOSE ONE TIME IN 2 HOURS IF HEADACHE NOT RELIEVED. 9 tablet 5    traMADol (ULTRAM) 50 MG tablet       [DISCONTINUED] clotrimazole-betamethasone (LOTRISONE) 1-0.05 % cream APPLY TOPICALLY TO THE APPROPRIATE AREA AS DIRECTED 2 (TWO) TIMES A DAY. 45 g 0    [DISCONTINUED] hydrocortisone 1 % ointment Apply 1 application topically to the appropriate area as directed 2 (Two) Times a Day. 56 g 0     No current facility-administered medications on file prior to visit.      No Known Allergies     Review of Systems   Constitutional:  Negative for activity change, appetite change, chills, diaphoresis, fatigue, fever and unexpected weight change.   HENT:  Negative for congestion, dental problem, drooling, ear discharge, ear pain, facial swelling,  "hearing loss, mouth sores, nosebleeds, postnasal drip, rhinorrhea, sinus pressure, sneezing, sore throat, tinnitus, trouble swallowing and voice change.    Eyes:  Negative for photophobia, pain, discharge, redness, itching and visual disturbance.   Respiratory:  Negative for apnea, cough, choking, chest tightness, shortness of breath, wheezing and stridor.    Cardiovascular:  Negative for chest pain, palpitations and leg swelling.   Gastrointestinal:  Negative for abdominal distention, abdominal pain, anal bleeding, blood in stool, constipation, diarrhea, nausea, rectal pain and vomiting.   Endocrine: Negative for cold intolerance, heat intolerance, polydipsia, polyphagia and polyuria.   Genitourinary:  Negative for decreased urine volume, difficulty urinating, dysuria, enuresis, flank pain, frequency, genital sores, hematuria and urgency.   Musculoskeletal:  Positive for arthralgias. Negative for back pain, gait problem, joint swelling, myalgias, neck pain and neck stiffness.   Skin:  Negative for color change, pallor, rash and wound.   Allergic/Immunologic: Negative for environmental allergies, food allergies and immunocompromised state.   Neurological:  Negative for dizziness, tremors, seizures, syncope, facial asymmetry, speech difficulty, weakness, light-headedness, numbness and headaches.   Hematological:  Negative for adenopathy. Does not bruise/bleed easily.   Psychiatric/Behavioral:  Negative for agitation, behavioral problems, confusion, decreased concentration, dysphoric mood, hallucinations, self-injury, sleep disturbance and suicidal ideas. The patient is not nervous/anxious and is not hyperactive.         Objective      Physical Exam  /85   Ht 177.6 cm (69.92\")   Wt (!) 141 kg (311 lb 9.6 oz)   BMI 44.81 kg/m²     Body mass index is 44.81 kg/m².    General:   Mental Status:  Alert   Appearance: Cooperative, in no acute distress   Build and Nutrition: Obese by BMI male   Orientation: Alert and " oriented to person, place and time   Posture: Normal    Integument:   Right knee: no skin lesions, no rash, no ecchymosis   Left knee: Incision well-healed    Lower Extremities:   Right Knee:    Tenderness:  Medial/lateral joint line tenderness    Effusion:  None    Swelling:  None    Crepitus:  Positive    Atrophy:  None    Range of motion:  Extension: 0°       Flexion: 120°  Instability:  No varus laxity, no valgus laxity, negative anterior drawer  Deformities:  Varus   Left Knee:    Tenderness:  Mild joint line tenderness    Effusion:  None    Swelling:  None    Atrophy:  None    Range of motion:  Extension: 0°       Flexion: 130°  Instability:  No varus laxity, no valgus laxity, negative anterior drawer  Deformities:  None      Imaging/Studies  Imaging Results (Last 24 Hours)       Procedure Component Value Units Date/Time    XR Knee 3+ View With [place] Right [[id number]] Resulted: 03/06/24 1006     Updated: 03/06/24 1006    Narrative:      Left Knee Radiographs  Indication: status-post left total knee arthroplasty  Views: AP, lateral, and sunrise views of the left knee    Comparison: no change compared to prior study, 4/3/2023    Findings:   The components are well aligned, with no signs of loosening or failure.                 Assessment and Plan     Diagnoses and all orders for this visit:    1. S/P total knee arthroplasty, left (Primary)  -     XR Knee 3+ View With [place] Right    2. Primary osteoarthritis of right knee  -     - Large Joint Arthrocentesis: R knee        1. S/P total knee arthroplasty, left    2. Primary osteoarthritis of right knee        I reviewed my findings with the patient.  His left total knee arthroplasty is functioning well, and I will see him back in 4 years for his left knee.  I will be happy to see him back sooner for any problems.    Regarding his right knee, he would like to have an injection today.  Injection was provided.    Procedure Note:  The potential benefits of  performing a therapeutic right knee joint injection, as well as potential risks (including, but not limited to infection, swelling, pain, bleeding, bruising, nerve/blood vessel damage, skin color changes, transient elevation in blood glucose levels, and fat atrophy) were discussed with the patient.  After informed consent, timeout procedure was performed, and the skin on the right knee was prepped with chlorhexidine soap and alcohol, after which ethyl chloride was applied to the skin at the injection site. Via the anterolateral approach, 1ml of Kenalog 40mg/ml mixed with 4ml 0.5% ropivacaine plain was injected into the knee joint.  The patient tolerated the procedure well, experiencing 50% improvement a few minutes following the injection. There were no complications.  Band-Aid was applied to the injection site. Post-procedural instructions were given to the patient and/or their caregiver.      Return in about 4 years (around 3/6/2028) for Recheck with X-Rays.      Osbaldo Olivas MD  03/06/24  10:33 EST    Dictated Utilizing Dragon Dictation

## 2024-04-30 ENCOUNTER — OFFICE VISIT (OUTPATIENT)
Dept: INTERNAL MEDICINE | Facility: CLINIC | Age: 74
End: 2024-04-30
Payer: MEDICARE

## 2024-04-30 ENCOUNTER — LAB (OUTPATIENT)
Dept: LAB | Facility: HOSPITAL | Age: 74
End: 2024-04-30
Payer: MEDICARE

## 2024-04-30 VITALS
TEMPERATURE: 97.7 F | HEIGHT: 70 IN | SYSTOLIC BLOOD PRESSURE: 124 MMHG | BODY MASS INDEX: 45.1 KG/M2 | HEART RATE: 75 BPM | WEIGHT: 315 LBS | OXYGEN SATURATION: 94 % | DIASTOLIC BLOOD PRESSURE: 72 MMHG

## 2024-04-30 DIAGNOSIS — R73.09 ELEVATED GLUCOSE: ICD-10-CM

## 2024-04-30 DIAGNOSIS — F43.9 SITUATIONAL STRESS: ICD-10-CM

## 2024-04-30 DIAGNOSIS — Z00.00 MEDICARE ANNUAL WELLNESS VISIT, SUBSEQUENT: Primary | ICD-10-CM

## 2024-04-30 DIAGNOSIS — G47.33 OSA (OBSTRUCTIVE SLEEP APNEA): ICD-10-CM

## 2024-04-30 DIAGNOSIS — N40.0 BENIGN PROSTATIC HYPERPLASIA, UNSPECIFIED WHETHER LOWER URINARY TRACT SYMPTOMS PRESENT: ICD-10-CM

## 2024-04-30 DIAGNOSIS — E78.5 HYPERLIPIDEMIA, UNSPECIFIED HYPERLIPIDEMIA TYPE: ICD-10-CM

## 2024-04-30 DIAGNOSIS — G54.2 CERVICAL NERVE ROOT IMPINGEMENT: ICD-10-CM

## 2024-04-30 DIAGNOSIS — I15.9 SECONDARY HYPERTENSION: ICD-10-CM

## 2024-04-30 DIAGNOSIS — F32.A DEPRESSION, UNSPECIFIED DEPRESSION TYPE: ICD-10-CM

## 2024-04-30 DIAGNOSIS — E55.9 VITAMIN D DEFICIENCY: ICD-10-CM

## 2024-04-30 DIAGNOSIS — G44.229 CHRONIC TENSION-TYPE HEADACHE, NOT INTRACTABLE: ICD-10-CM

## 2024-04-30 DIAGNOSIS — E66.01 CLASS 2 SEVERE OBESITY DUE TO EXCESS CALORIES WITH SERIOUS COMORBIDITY AND BODY MASS INDEX (BMI) OF 39.0 TO 39.9 IN ADULT: ICD-10-CM

## 2024-04-30 LAB
ALBUMIN SERPL-MCNC: 4 G/DL (ref 3.5–5.2)
ALBUMIN/GLOB SERPL: 1.4 G/DL
ALP SERPL-CCNC: 53 U/L (ref 39–117)
ALT SERPL W P-5'-P-CCNC: 19 U/L (ref 1–41)
ANION GAP SERPL CALCULATED.3IONS-SCNC: 12.6 MMOL/L (ref 5–15)
AST SERPL-CCNC: 21 U/L (ref 1–40)
BILIRUB SERPL-MCNC: 0.6 MG/DL (ref 0–1.2)
BUN SERPL-MCNC: 18 MG/DL (ref 8–23)
BUN/CREAT SERPL: 22.5 (ref 7–25)
CALCIUM SPEC-SCNC: 9.2 MG/DL (ref 8.6–10.5)
CHLORIDE SERPL-SCNC: 105 MMOL/L (ref 98–107)
CO2 SERPL-SCNC: 26.4 MMOL/L (ref 22–29)
CREAT SERPL-MCNC: 0.8 MG/DL (ref 0.76–1.27)
DEPRECATED RDW RBC AUTO: 43.4 FL (ref 37–54)
EGFRCR SERPLBLD CKD-EPI 2021: 94 ML/MIN/1.73
ERYTHROCYTE [DISTWIDTH] IN BLOOD BY AUTOMATED COUNT: 12.8 % (ref 12.3–15.4)
GLOBULIN UR ELPH-MCNC: 2.9 GM/DL
GLUCOSE SERPL-MCNC: 89 MG/DL (ref 65–99)
HCT VFR BLD AUTO: 44.5 % (ref 37.5–51)
HGB BLD-MCNC: 14.6 G/DL (ref 13–17.7)
MCH RBC QN AUTO: 30.2 PG (ref 26.6–33)
MCHC RBC AUTO-ENTMCNC: 32.8 G/DL (ref 31.5–35.7)
MCV RBC AUTO: 92.1 FL (ref 79–97)
PLATELET # BLD AUTO: 265 10*3/MM3 (ref 140–450)
PMV BLD AUTO: 10.9 FL (ref 6–12)
POTASSIUM SERPL-SCNC: 3.8 MMOL/L (ref 3.5–5.2)
PROT SERPL-MCNC: 6.9 G/DL (ref 6–8.5)
PSA SERPL-MCNC: 0.81 NG/ML (ref 0–4)
RBC # BLD AUTO: 4.83 10*6/MM3 (ref 4.14–5.8)
SODIUM SERPL-SCNC: 144 MMOL/L (ref 136–145)
TSH SERPL DL<=0.05 MIU/L-ACNC: 1.13 UIU/ML (ref 0.27–4.2)
WBC NRBC COR # BLD AUTO: 5.18 10*3/MM3 (ref 3.4–10.8)

## 2024-04-30 PROCEDURE — 84443 ASSAY THYROID STIM HORMONE: CPT

## 2024-04-30 PROCEDURE — 80053 COMPREHEN METABOLIC PANEL: CPT

## 2024-04-30 PROCEDURE — 84153 ASSAY OF PSA TOTAL: CPT

## 2024-04-30 PROCEDURE — 85027 COMPLETE CBC AUTOMATED: CPT

## 2024-04-30 PROCEDURE — 3078F DIAST BP <80 MM HG: CPT | Performed by: INTERNAL MEDICINE

## 2024-04-30 PROCEDURE — 1159F MED LIST DOCD IN RCRD: CPT | Performed by: INTERNAL MEDICINE

## 2024-04-30 PROCEDURE — 99397 PER PM REEVAL EST PAT 65+ YR: CPT | Performed by: INTERNAL MEDICINE

## 2024-04-30 PROCEDURE — 80061 LIPID PANEL: CPT

## 2024-04-30 PROCEDURE — 83036 HEMOGLOBIN GLYCOSYLATED A1C: CPT

## 2024-04-30 PROCEDURE — 3074F SYST BP LT 130 MM HG: CPT | Performed by: INTERNAL MEDICINE

## 2024-04-30 PROCEDURE — G0439 PPPS, SUBSEQ VISIT: HCPCS | Performed by: INTERNAL MEDICINE

## 2024-04-30 PROCEDURE — 82306 VITAMIN D 25 HYDROXY: CPT

## 2024-04-30 PROCEDURE — 1160F RVW MEDS BY RX/DR IN RCRD: CPT | Performed by: INTERNAL MEDICINE

## 2024-04-30 PROCEDURE — 82607 VITAMIN B-12: CPT

## 2024-04-30 PROCEDURE — G0444 DEPRESSION SCREEN ANNUAL: HCPCS | Performed by: INTERNAL MEDICINE

## 2024-04-30 RX ORDER — CYCLOBENZAPRINE HCL 10 MG
10 TABLET ORAL 3 TIMES DAILY PRN
Qty: 30 TABLET | Refills: 5 | Status: SHIPPED | OUTPATIENT
Start: 2024-04-30

## 2024-04-30 RX ORDER — SUMATRIPTAN 100 MG/1
TABLET, FILM COATED ORAL
Qty: 9 TABLET | Refills: 5 | Status: SHIPPED | OUTPATIENT
Start: 2024-04-30

## 2024-04-30 RX ORDER — ATENOLOL 50 MG/1
50 TABLET ORAL DAILY
Qty: 90 TABLET | Refills: 3 | Status: SHIPPED | OUTPATIENT
Start: 2024-04-30

## 2024-04-30 RX ORDER — ALPRAZOLAM 0.25 MG/1
TABLET ORAL
Qty: 10 TABLET | Refills: 0 | Status: SHIPPED | OUTPATIENT
Start: 2024-04-30

## 2024-04-30 RX ORDER — BUPROPION HYDROCHLORIDE 150 MG/1
150 TABLET ORAL DAILY
Qty: 90 TABLET | Refills: 3 | Status: SHIPPED | OUTPATIENT
Start: 2024-04-30

## 2024-04-30 RX ORDER — LISINOPRIL AND HYDROCHLOROTHIAZIDE 20; 12.5 MG/1; MG/1
1 TABLET ORAL DAILY
Qty: 90 TABLET | Refills: 3 | Status: SHIPPED | OUTPATIENT
Start: 2024-04-30

## 2024-04-30 RX ORDER — ATORVASTATIN CALCIUM 10 MG/1
10 TABLET, FILM COATED ORAL DAILY
Qty: 90 TABLET | Refills: 3 | Status: SHIPPED | OUTPATIENT
Start: 2024-04-30

## 2024-04-30 RX ORDER — PROMETHAZINE HYDROCHLORIDE 25 MG/1
25 TABLET ORAL EVERY 6 HOURS PRN
Qty: 20 TABLET | Refills: 1 | Status: SHIPPED | OUTPATIENT
Start: 2024-04-30

## 2024-04-30 NOTE — PROGRESS NOTES
The ABCs of the Annual Wellness Visit  Subsequent Medicare Wellness Visit    Subjective    Obi Jackman Jr. is a 73 y.o. male who presents for a Subsequent Medicare Wellness Visit.    The following portions of the patient's history were reviewed and   updated as appropriate: allergies, current medications, past family history, past medical history, past social history, past surgical history, and problem list.    Compared to one year ago, the patient feels his physical   health is the same.    Compared to one year ago, the patient feels his mental   health is the same.    Recent Hospitalizations:  He was not admitted to the hospital during the last year.       Current Medical Providers:  Patient Care Team:  Varsha Hahn MD as PCP - General (Internal Medicine)  Chanda Castanon MD as Consulting Physician (Rheumatology)  Christopher Doe MD as Consulting Physician (Ophthalmology)  Osbaldo Olivas MD as Consulting Physician (Orthopedic Surgery)    Outpatient Medications Prior to Visit   Medication Sig Dispense Refill    acetaminophen (TYLENOL) 325 MG tablet Take 1 tablet by mouth As Needed for Mild Pain.      Ascorbic Acid (VITAMIN C PO) Take  by mouth Daily.      Cyanocobalamin (VITAMIN B-12 ER PO) Take 1 tablet by mouth Daily.      doxycycline (VIBRAMYCIN) 100 MG capsule Take 1 capsule by mouth 2 (Two) Times a Day.      gabapentin (NEURONTIN) 300 MG capsule Take 1 capsule by mouth 2 (Two) Times a Day.      hydroxychloroquine (PLAQUENIL) 200 MG tablet Take 1 tablet by mouth Every 12 (Twelve) Hours.      meclizine (ANTIVERT) 25 MG tablet 1 tablet Every 8 (Eight) Hours As Needed.      nystatin (MYCOSTATIN) 524362 UNIT/GM cream       oxaprozin (DAYPRO) 600 MG tablet Take 2 tablets by mouth Daily. Take 2 po every day      prednisoLONE acetate (PRED FORTE) 1 % ophthalmic suspension Apply 1 drop to eye(s) as directed by provider 4 (Four) Times a Day.      traMADol (ULTRAM) 50 MG tablet Take 2 tablets by mouth  Daily.      triamcinolone (KENALOG) 0.025 % cream       ALPRAZolam (XANAX) 0.25 MG tablet Take 1/2 to 1 tablet prn when flying 10 tablet 0    atenolol (TENORMIN) 50 MG tablet Take 1 tablet by mouth Daily. 90 tablet 1    atorvastatin (LIPITOR) 10 MG tablet TAKE 1 TABLET BY MOUTH DAILY. RESUME WHEN NOT ON DAPTO ANYMORE 90 tablet 1    buPROPion XL (WELLBUTRIN XL) 150 MG 24 hr tablet Take 1 tablet by mouth Daily. 90 tablet 1    cyclobenzaprine (FLEXERIL) 10 MG tablet TAKE ONE TABLET BY MOUTH THREE TIMES A DAY AS NEEDED FOR MUSCLE SPASMS 30 tablet 5    lisinopril-hydrochlorothiazide (PRINZIDE,ZESTORETIC) 20-12.5 MG per tablet Take 1 tablet by mouth Daily. 90 tablet 3    promethazine (PHENERGAN) 25 MG tablet Take 1 tablet by mouth Every 6 (Six) Hours As Needed for Nausea or Vomiting. 20 tablet 1    SUMAtriptan (IMITREX) 100 MG tablet TAKE ONE TABLET AT ONSET OF HEADACHE. MAY REPEAT DOSE ONE TIME IN 2 HOURS IF HEADACHE NOT RELIEVED. 9 tablet 5     No facility-administered medications prior to visit.       Opioid medication/s are on active medication list.  and I have evaluated his active treatment plan and pain score trends (see table).  Vitals:    04/30/24 1332   PainSc:   3   PainLoc: Generalized     I have reviewed the chart for potential of high risk medication and harmful drug interactions in the elderly.          Aspirin is not on active medication list.  Aspirin use is not indicated based on review of current medical condition/s. Risk of harm outweighs potential benefits.  .    Patient Active Problem List   Diagnosis    Allergic rhinitis    Anxiety disorder    Benign paroxysmal positional vertigo    Chronic tension type headache    Depression    ED (erectile dysfunction) of non-organic origin    Hyperlipidemia    Hypertension    LFTs abnormal    CHRISTOPHE (obstructive sleep apnea)    Elevated glucose    Health care maintenance    Morbid obesity due to excess calories    Onychomycosis of left great toe    Osteoarthritis of  "ankle or foot    Venous stasis    Neuropathy    Class 3 severe obesity due to excess calories with serious comorbidity and body mass index (BMI) of 40.0 to 44.9 in adult    Pre-op evaluation    Arthritis of foot, left    S/P left ankle triple arthrodesis    Acute blood loss anemia, mild, asymptomatic     Acute postoperative pain    Wound infection    Primary osteoarthritis of right hip    Status post total replacement of right hip    Postoperative wound infection of right hip, s/p I and D, head and liner exchange.    Primary osteoarthritis of left knee    S/P total knee arthroplasty, left    Food poisoning     Advance Care Planning   Advance Care Planning     Advance Directive is on file.  ACP discussion was held with the patient during this visit. Patient has an advance directive in EMR which is still valid.      Objective    Vitals:    04/30/24 1332 04/30/24 1406   BP: 142/84 124/72   Pulse: 75    Temp: 97.7 °F (36.5 °C)    SpO2: 94%  Comment: ra    Weight: (!) 144 kg (316 lb 9.6 oz)    Height: 177.6 cm (69.92\")    PainSc:   3    PainLoc: Generalized      Estimated body mass index is 45.53 kg/m² as calculated from the following:    Height as of this encounter: 177.6 cm (69.92\").    Weight as of this encounter: 144 kg (316 lb 9.6 oz).    Class 3 Severe Obesity (BMI >=40). Obesity-related health conditions include the following: hypertension, dyslipidemias, and GERD. Obesity is unchanged. BMI is is above average; BMI management plan is completed. We discussed portion control, increasing exercise, consulting a Bariatric surgeon, and pharmacologic options including zepbound .      Does the patient have evidence of cognitive impairment? No    Lab Results   Component Value Date    TRIG 138 04/30/2024    HDL 38 (L) 04/30/2024    LDL 65 04/30/2024    VLDL 24 04/30/2024    HGBA1C 5.50 04/30/2024        HEALTH RISK ASSESSMENT    Smoking Status:  Social History     Tobacco Use   Smoking Status Never   Smokeless Tobacco Never "     Alcohol Consumption:  Social History     Substance and Sexual Activity   Alcohol Use Not Currently    Comment: One drink 2-3x per year.     Fall Risk Screen:    KRISHADI Fall Risk Assessment was completed, and patient is at MODERATE risk for falls. Assessment completed on:2024    Depression Screenin/30/2024     1:42 PM   PHQ-2/PHQ-9 Depression Screening   Little Interest or Pleasure in Doing Things 0-->not at all   Feeling Down, Depressed or Hopeless 0-->not at all   PHQ-9: Brief Depression Severity Measure Score 0       Health Habits and Functional and Cognitive Screenin/23/2024     8:34 AM   Functional & Cognitive Status   Do you have difficulty preparing food and eating? No   Do you have difficulty bathing yourself, getting dressed or grooming yourself? No   Do you have difficulty using the toilet? No   Do you have difficulty moving around from place to place? No   Do you have trouble with steps or getting out of a bed or a chair? No   Current Diet Well Balanced Diet   Dental Exam Up to date   Eye Exam Up to date   Exercise (times per week) 4 times per week   Current Exercises Include Home Exercise Program (TV, Computer, Etc.);Other;Walking   Do you need help using the phone?  No   Are you deaf or do you have serious difficulty hearing?  No   Do you need help to go to places out of walking distance? No   Do you need help shopping? No   Do you need help preparing meals?  No   Do you need help with housework?  No   Do you need help with laundry? No   Do you need help taking your medications? No   Do you need help managing money? No   Do you ever drive or ride in a car without wearing a seat belt? No   Have you felt unusual stress, anger or loneliness in the last month? No   Who do you live with? Spouse   If you need help, do you have trouble finding someone available to you? No   Have you been bothered in the last four weeks by sexual problems? No   Do you have difficulty concentrating,  remembering or making decisions? No       Age-appropriate Screening Schedule:  Refer to the list below for future screening recommendations based on patient's age, sex and/or medical conditions. Orders for these recommended tests are listed in the plan section. The patient has been provided with a written plan.    Health Maintenance   Topic Date Due    RSV Vaccine - Adults (1 - 1-dose 60+ series) Never done    COVID-19 Vaccine (5 - 2023-24 season) 11/15/2023    TDAP/TD VACCINES (2 - Td or Tdap) 01/01/2024    INFLUENZA VACCINE  08/01/2024    ANNUAL WELLNESS VISIT  04/30/2025    LIPID PANEL  04/30/2025    BMI FOLLOWUP  04/30/2025    COLORECTAL CANCER SCREENING  01/13/2027    HEPATITIS C SCREENING  Completed    Pneumococcal Vaccine 65+  Completed    ZOSTER VACCINE  Completed    DXA SCAN  Discontinued                  CMS Preventative Services Quick Reference  Risk Factors Identified During Encounter  Immunizations Discussed/Encouraged: COVID19 and RSV (Respiratory Syncytial Virus)  The above risks/problems have been discussed with the patient.  Pertinent information has been shared with the patient in the After Visit Summary.  An After Visit Summary and PPPS were made available to the patient.    Follow Up:   Next Medicare Wellness visit to be scheduled in 1 year.       Additional E&M Note during same encounter follows:  Patient has multiple medical problems which are significant and separately identifiable that require additional work above and beyond the Medicare Wellness Visit.      Chief Complaint  Medicare Wellness-subsequent and Spasms (In neck, would like PT referral)    Subjective        HPI  Obi MARGIE Jackman Jr. is also being seen today for for a follow up onhis htn, hlp, and chronic stress. Still in PT with his rt hip and left knee pain at [facility].  Has not tried the zepbound yet.    Review of Systems   Constitutional:  Negative for chills and fever.   HENT:  Negative for congestion, ear pain and sore throat.   "  Eyes:  Negative for pain, redness and visual disturbance.   Respiratory:  Negative for cough and shortness of breath.    Cardiovascular:  Negative for chest pain, palpitations and leg swelling.   Gastrointestinal:  Negative for abdominal pain, diarrhea and nausea.   Endocrine: Negative for cold intolerance and heat intolerance.   Genitourinary:  Negative for flank pain and urgency.   Musculoskeletal:  Negative for arthralgias and gait problem.   Skin:  Negative for pallor and rash.   Neurological:  Negative for dizziness and weakness.   Psychiatric/Behavioral:  Negative for dysphoric mood and sleep disturbance. The patient is not nervous/anxious.        Objective   Vital Signs:  /72   Pulse 75   Temp 97.7 °F (36.5 °C)   Ht 177.6 cm (69.92\")   Wt (!) 144 kg (316 lb 9.6 oz)   SpO2 94% Comment: ra  BMI 45.53 kg/m²     Physical Exam  Constitutional:       General: He is not in acute distress.     Appearance: Normal appearance. He is obese.   HENT:      Head: Normocephalic and atraumatic.      Right Ear: Tympanic membrane and external ear normal.      Left Ear: Tympanic membrane and external ear normal.      Nose: Nose normal.      Mouth/Throat:      Mouth: Mucous membranes are moist.   Eyes:      General: No scleral icterus.  Neck:      Vascular: No carotid bruit.   Cardiovascular:      Rate and Rhythm: Normal rate and regular rhythm.      Pulses: Normal pulses.      Heart sounds: Normal heart sounds. No murmur heard.     No friction rub. No gallop.   Pulmonary:      Effort: Pulmonary effort is normal.      Breath sounds: Normal breath sounds. No rhonchi or rales.   Abdominal:      General: Bowel sounds are normal. There is no distension.      Palpations: Abdomen is soft.      Tenderness: There is no right CVA tenderness, left CVA tenderness, guarding or rebound.      Hernia: No hernia is present.   Musculoskeletal:         General: No tenderness. Normal range of motion.      Cervical back: Normal range of " motion.      Right lower leg: Edema present.      Left lower leg: Edema present.   Lymphadenopathy:      Cervical: No cervical adenopathy.   Skin:     General: Skin is warm.      Findings: No rash.   Neurological:      General: No focal deficit present.      Mental Status: He is alert and oriented to person, place, and time. Mental status is at baseline.      Cranial Nerves: No cranial nerve deficit.      Sensory: No sensory deficit.      Coordination: Coordination normal.      Gait: Gait normal.      Deep Tendon Reflexes: Reflexes normal.   Psychiatric:         Mood and Affect: Mood normal.         Behavior: Behavior normal.                             Current Outpatient Medications:     acetaminophen (TYLENOL) 325 MG tablet, Take 1 tablet by mouth As Needed for Mild Pain., Disp: , Rfl:     ALPRAZolam (XANAX) 0.25 MG tablet, Take 1/2 to 1 tablet prn when flying, Disp: 10 tablet, Rfl: 0    Ascorbic Acid (VITAMIN C PO), Take  by mouth Daily., Disp: , Rfl:     atenolol (TENORMIN) 50 MG tablet, Take 1 tablet by mouth Daily., Disp: 90 tablet, Rfl: 3    atorvastatin (LIPITOR) 10 MG tablet, Take 1 tablet by mouth Daily. Resume when not on Dapto anymore, Disp: 90 tablet, Rfl: 3    buPROPion XL (WELLBUTRIN XL) 150 MG 24 hr tablet, Take 1 tablet by mouth Daily., Disp: 90 tablet, Rfl: 3    Cyanocobalamin (VITAMIN B-12 ER PO), Take 1 tablet by mouth Daily., Disp: , Rfl:     cyclobenzaprine (FLEXERIL) 10 MG tablet, Take 1 tablet by mouth 3 (Three) Times a Day As Needed for Muscle Spasms., Disp: 30 tablet, Rfl: 5    doxycycline (VIBRAMYCIN) 100 MG capsule, Take 1 capsule by mouth 2 (Two) Times a Day., Disp: , Rfl:     gabapentin (NEURONTIN) 300 MG capsule, Take 1 capsule by mouth 2 (Two) Times a Day., Disp: , Rfl:     hydroxychloroquine (PLAQUENIL) 200 MG tablet, Take 1 tablet by mouth Every 12 (Twelve) Hours., Disp: , Rfl:     lisinopril-hydrochlorothiazide (PRINZIDE,ZESTORETIC) 20-12.5 MG per tablet, Take 1 tablet by mouth  "Daily., Disp: 90 tablet, Rfl: 3    meclizine (ANTIVERT) 25 MG tablet, 1 tablet Every 8 (Eight) Hours As Needed., Disp: , Rfl:     nystatin (MYCOSTATIN) 757454 UNIT/GM cream, , Disp: , Rfl:     oxaprozin (DAYPRO) 600 MG tablet, Take 2 tablets by mouth Daily. Take 2 po every day, Disp: , Rfl:     prednisoLONE acetate (PRED FORTE) 1 % ophthalmic suspension, Apply 1 drop to eye(s) as directed by provider 4 (Four) Times a Day., Disp: , Rfl:     promethazine (PHENERGAN) 25 MG tablet, Take 1 tablet by mouth Every 6 (Six) Hours As Needed for Nausea or Vomiting., Disp: 20 tablet, Rfl: 1    SUMAtriptan (IMITREX) 100 MG tablet, Take one tablet at onset of headache. May repeat dose one time in 2 hours if headache not relieved., Disp: 9 tablet, Rfl: 5    traMADol (ULTRAM) 50 MG tablet, Take 2 tablets by mouth Daily., Disp: , Rfl:     triamcinolone (KENALOG) 0.025 % cream, , Disp: , Rfl:     Tirzepatide-Weight Management (ZEPBOUND) 2.5 MG/0.5ML solution auto-injector, Inject 0.5 mL under the skin into the appropriate area as directed 1 (One) Time Per Week for 4 doses. And move up to the next higher dose, Disp: 2 mL, Rfl: 0    Tirzepatide-Weight Management (ZEPBOUND) 5 MG/0.5ML solution auto-injector, Inject 0.5 mL under the skin into the appropriate area as directed 1 (One) Time Per Week for 4 doses. Then move up to the next higher dose, Disp: 2 mL, Rfl: 0    Tirzepatide-Weight Management (ZEPBOUND) 7.5 MG/0.5ML solution auto-injector, Inject 0.5 mL under the skin into the appropriate area as directed 1 (One) Time Per Week., Disp: 2 mL, Rfl: 2      The following portions of the patient's history were reviewed and updated as appropriate: allergies, current medications, past family history, past medical history, past social history, past surgical history and problem list.        Objective   /72   Pulse 75   Temp 97.7 °F (36.5 °C)   Ht 177.6 cm (69.92\")   Wt (!) 144 kg (316 lb 9.6 oz)   SpO2 94% Comment: ra  BMI 45.53 " kg/m²         Results for orders placed or performed in visit on 11/09/23   Comprehensive Metabolic Panel    Specimen: Blood   Result Value Ref Range    Glucose 105 (H) 65 - 99 mg/dL    BUN 16 8 - 23 mg/dL    Creatinine 0.84 0.76 - 1.27 mg/dL    Sodium 141 136 - 145 mmol/L    Potassium 4.1 3.5 - 5.2 mmol/L    Chloride 103 98 - 107 mmol/L    CO2 29.0 22.0 - 29.0 mmol/L    Calcium 9.3 8.6 - 10.5 mg/dL    Total Protein 6.9 6.0 - 8.5 g/dL    Albumin 4.1 3.5 - 5.2 g/dL    ALT (SGPT) 18 1 - 41 U/L    AST (SGOT) 20 1 - 40 U/L    Alkaline Phosphatase 66 39 - 117 U/L    Total Bilirubin 0.6 0.0 - 1.2 mg/dL    Globulin 2.8 gm/dL    A/G Ratio 1.5 g/dL    BUN/Creatinine Ratio 19.0 7.0 - 25.0    Anion Gap 9.0 5.0 - 15.0 mmol/L    eGFR 92.7 >60.0 mL/min/1.73   Sedimentation Rate    Specimen: Blood   Result Value Ref Range    Sed Rate 16 0 - 20 mm/hr   C-reactive Protein    Specimen: Blood   Result Value Ref Range    C-Reactive Protein <0.30 0.00 - 0.50 mg/dL   CBC Auto Differential    Specimen: Blood   Result Value Ref Range    WBC 4.64 3.40 - 10.80 10*3/mm3    RBC 4.68 4.14 - 5.80 10*6/mm3    Hemoglobin 14.0 13.0 - 17.7 g/dL    Hematocrit 42.7 37.5 - 51.0 %    MCV 91.2 79.0 - 97.0 fL    MCH 29.9 26.6 - 33.0 pg    MCHC 32.8 31.5 - 35.7 g/dL    RDW 12.4 12.3 - 15.4 %    RDW-SD 41.6 37.0 - 54.0 fl    MPV 9.9 6.0 - 12.0 fL    Platelets 235 140 - 450 10*3/mm3    Neutrophil % 64.2 42.7 - 76.0 %    Lymphocyte % 20.9 19.6 - 45.3 %    Monocyte % 10.6 5.0 - 12.0 %    Eosinophil % 3.9 0.3 - 6.2 %    Basophil % 0.2 0.0 - 1.5 %    Immature Grans % 0.2 0.0 - 0.5 %    Neutrophils, Absolute 2.98 1.70 - 7.00 10*3/mm3    Lymphocytes, Absolute 0.97 0.70 - 3.10 10*3/mm3    Monocytes, Absolute 0.49 0.10 - 0.90 10*3/mm3    Eosinophils, Absolute 0.18 0.00 - 0.40 10*3/mm3    Basophils, Absolute 0.01 0.00 - 0.20 10*3/mm3    Immature Grans, Absolute 0.01 0.00 - 0.05 10*3/mm3    nRBC 0.0 0.0 - 0.2 /100 WBC             Assessment & Plan   Diagnoses and all  orders for this visit:    Medicare annual wellness visit, subsequent    Secondary hypertension  -     atenolol (TENORMIN) 50 MG tablet; Take 1 tablet by mouth Daily.  -     lisinopril-hydrochlorothiazide (PRINZIDE,ZESTORETIC) 20-12.5 MG per tablet; Take 1 tablet by mouth Daily.    Hyperlipidemia, unspecified hyperlipidemia type  -     atorvastatin (LIPITOR) 10 MG tablet; Take 1 tablet by mouth Daily. Resume when not on Dapto anymore  -     Comprehensive Metabolic Panel; Future  -     TSH Rfx On Abnormal To Free T4; Future  -     Lipid Panel; Future    Depression, unspecified depression type  -     buPROPion XL (WELLBUTRIN XL) 150 MG 24 hr tablet; Take 1 tablet by mouth Daily.  -     promethazine (PHENERGAN) 25 MG tablet; Take 1 tablet by mouth Every 6 (Six) Hours As Needed for Nausea or Vomiting.    Chronic tension-type headache, not intractable  -     cyclobenzaprine (FLEXERIL) 10 MG tablet; Take 1 tablet by mouth 3 (Three) Times a Day As Needed for Muscle Spasms.  -     SUMAtriptan (IMITREX) 100 MG tablet; Take one tablet at onset of headache. May repeat dose one time in 2 hours if headache not relieved.    Situational stress  -     ALPRAZolam (XANAX) 0.25 MG tablet; Take 1/2 to 1 tablet prn when flying    Cervical nerve root impingement  -     Ambulatory Referral to Physical Therapy    Class 2 severe obesity due to excess calories with serious comorbidity and body mass index (BMI) of 39.0 to 39.9 in adult  -     Tirzepatide-Weight Management (ZEPBOUND) 2.5 MG/0.5ML solution auto-injector; Inject 0.5 mL under the skin into the appropriate area as directed 1 (One) Time Per Week for 4 doses. And move up to the next higher dose  -     Tirzepatide-Weight Management (ZEPBOUND) 5 MG/0.5ML solution auto-injector; Inject 0.5 mL under the skin into the appropriate area as directed 1 (One) Time Per Week for 4 doses. Then move up to the next higher dose  -     Tirzepatide-Weight Management (ZEPBOUND) 7.5 MG/0.5ML solution  auto-injector; Inject 0.5 mL under the skin into the appropriate area as directed 1 (One) Time Per Week.    Vitamin D deficiency  -     Vitamin D,25-Hydroxy; Future  -     Vitamin B12; Future    Elevated glucose  -     Hemoglobin A1c; Future    CHRISTOPHE (obstructive sleep apnea)  -     CBC (No Diff); Future    Benign prostatic hyperplasia, unspecified whether lower urinary tract symptoms present  -     PSA DIAGNOSTIC; Future               PHQ-2/PHQ-9 Depression screening reviewed with patient . Total time spent today for depression screening  with Obi Jackman Jr.  was 15 minutes in counseling, along with plans for any diagnositc work-up and treatment.    Advice and education were given regarding cardiovascular risk reduction, healthy dietary habits, Seatbelt and helmet use and self skin examination.          Electronically signed by:    Varsha Hahn MD           Follow Up   Return in about 6 months (around 10/30/2024) for Next scheduled follow up and wellness in 1 yr.  Patient was given instructions and counseling regarding his condition or for health maintenance advice. Please see specific information pulled into the AVS if appropriate.

## 2024-05-01 LAB
25(OH)D3 SERPL-MCNC: 30.6 NG/ML (ref 30–100)
CHOLEST SERPL-MCNC: 127 MG/DL (ref 0–200)
HBA1C MFR BLD: 5.5 % (ref 4.8–5.6)
HDLC SERPL-MCNC: 38 MG/DL (ref 40–60)
LDLC SERPL CALC-MCNC: 65 MG/DL (ref 0–100)
LDLC/HDLC SERPL: 1.62 {RATIO}
TRIGL SERPL-MCNC: 138 MG/DL (ref 0–150)
VIT B12 BLD-MCNC: 382 PG/ML (ref 211–946)
VLDLC SERPL-MCNC: 24 MG/DL (ref 5–40)

## 2024-05-07 ENCOUNTER — TELEPHONE (OUTPATIENT)
Dept: INTERNAL MEDICINE | Facility: CLINIC | Age: 74
End: 2024-05-07
Payer: MEDICARE

## 2024-05-07 NOTE — TELEPHONE ENCOUNTER
Please check if they have template or sheet which gives us the exact manner in which the tirzepatide needs to be written or ordered.

## 2024-05-07 NOTE — TELEPHONE ENCOUNTER
Pharmacy Name: [facility] - [place], [place] - [address] - [phone]  - [phone]        Pharmacy representative name: LINCOLN     Pharmacy representative phone number: [phone]    What medication are you calling in regards to: TIRZEPATIDE    What question does the pharmacy have: THE PHARMACY STATES THAT IT NEEDS TO BE WRITTEN DIFFERENTLY BECAUSE THEY ARE COMPOUNDING THE PRESCRIPTION     Who is the provider that prescribed the medication: DOCTOR MARIANO

## 2024-05-07 NOTE — TELEPHONE ENCOUNTER
Spoke to Sofía, she does not have anything, but is going to write it up and fax to us.  Office fax given.

## 2024-05-07 NOTE — TELEPHONE ENCOUNTER
Spoke with laya who states this needs to be written as a compounded medication. The dosage for compounding is 25mg/1mL. Pharmacy does have dosing conversion chart available for patient use as this will be injected via insulin needle. Compound not on formulary, pharmacy OK for verbal from provider.

## 2024-05-13 NOTE — TELEPHONE ENCOUNTER
[facility] CALLING TO SEE IF THEY CAN GET A RE WRITE OF THIS PRESCRIPTION. THEY HAVE NOT RECEIVED IT. [phone]  THEY STATED THAT A CALL IN WOULD WORK AS WELL IF THAT IS EASIER.

## 2024-05-16 NOTE — ANESTHESIA PREPROCEDURE EVALUATION
Anesthesia Evaluation     Patient summary reviewed and Nursing notes reviewed   no history of anesthetic complications:  NPO Solid Status: > 8 hours  NPO Liquid Status: > 2 hours           Airway   Mallampati: II  TM distance: >3 FB  Neck ROM: full  No difficulty expected  Dental - normal exam     Pulmonary    (+) sleep apnea on CPAP,   (-) COPD, asthma (mild allergic asthma; PRN MDI Not used for years), shortness of breath, recent URI  Cardiovascular - normal exam    ECG reviewed    (+) hypertension, hyperlipidemia,   (-) past MI, dysrhythmias, angina      Neuro/Psych  (+) headaches, psychiatric history (xanax),    (-) seizures, TIA, CVA  GI/Hepatic/Renal/Endo    (+) morbid obesity,    (-)  obesity, liver disease, no renal disease, diabetes, no thyroid disorder    Musculoskeletal     Abdominal    Substance History      OB/GYN          Other   arthritis,          Phys Exam Other: IIB view with Mac 4  Upper front caps                 Anesthesia Plan    ASA 3     spinal     (Post op block)  intravenous induction     Anesthetic plan, risks, benefits, and alternatives have been provided, discussed and informed consent has been obtained with: patient.    Plan discussed with CRNA.       [Fever] : no fever [Chills] : no chills [Fatigue] : no fatigue [Night Sweats] : no night sweats [Recent Change In Weight] : ~T no recent weight change [Vision Problems] : no vision problems [Sore Throat] : no sore throat [Hoarseness] : no hoarseness [Chest Pain] : no chest pain [Palpitations] : no palpitations [Claudication] : no  leg claudication [Lower Ext Edema] : no lower extremity edema [Orthopena] : no orthopnea [Shortness Of Breath] : no shortness of breath [Wheezing] : no wheezing [Cough] : no cough [Dyspnea on Exertion] : not dyspnea on exertion [Abdominal Pain] : no abdominal pain [Nausea] : no nausea [Constipation] : no constipation [Diarrhea] : no diarrhea [Vomiting] : no vomiting [Dysuria] : no dysuria [Poor Libido] : libido not poor [Joint Pain] : no joint pain [Joint Stiffness] : no joint stiffness [Muscle Pain] : no muscle pain [Muscle Weakness] : no muscle weakness [Back Pain] : no back pain [Joint Swelling] : no joint swelling [Itching] : no itching [Skin Rash] : no skin rash [Headache] : no headache [Dizziness] : no dizziness [Fainting] : no fainting [Confusion] : no confusion [Unsteady Walk] : no ataxia [Memory Loss] : no memory loss [Insomnia] : no insomnia [Anxiety] : no anxiety

## 2024-05-21 ENCOUNTER — TRANSCRIBE ORDERS (OUTPATIENT)
Dept: LAB | Facility: HOSPITAL | Age: 74
End: 2024-05-21
Payer: MEDICARE

## 2024-05-21 ENCOUNTER — LAB (OUTPATIENT)
Dept: LAB | Facility: HOSPITAL | Age: 74
End: 2024-05-21
Payer: MEDICARE

## 2024-05-21 DIAGNOSIS — N39.0 URINARY TRACT INFECTION WITHOUT HEMATURIA, SITE UNSPECIFIED: ICD-10-CM

## 2024-05-21 DIAGNOSIS — Z79.2 ENCOUNTER FOR LONG-TERM (CURRENT) USE OF ANTIBIOTICS: ICD-10-CM

## 2024-05-21 DIAGNOSIS — R70.0 ELEVATED SEDIMENTATION RATE: ICD-10-CM

## 2024-05-21 DIAGNOSIS — E87.6 HYPOPOTASSEMIA: ICD-10-CM

## 2024-05-21 DIAGNOSIS — Z79.2 ENCOUNTER FOR LONG-TERM (CURRENT) USE OF ANTIBIOTICS: Primary | ICD-10-CM

## 2024-05-21 LAB
ALBUMIN SERPL-MCNC: 3.9 G/DL (ref 3.5–5.2)
ALBUMIN/GLOB SERPL: 1.5 G/DL
ALP SERPL-CCNC: 59 U/L (ref 39–117)
ALT SERPL W P-5'-P-CCNC: 21 U/L (ref 1–41)
ANION GAP SERPL CALCULATED.3IONS-SCNC: 8 MMOL/L (ref 5–15)
AST SERPL-CCNC: 22 U/L (ref 1–40)
BASOPHILS # BLD AUTO: 0.02 10*3/MM3 (ref 0–0.2)
BASOPHILS NFR BLD AUTO: 0.4 % (ref 0–1.5)
BILIRUB SERPL-MCNC: 0.7 MG/DL (ref 0–1.2)
BUN SERPL-MCNC: 13 MG/DL (ref 8–23)
BUN/CREAT SERPL: 14 (ref 7–25)
CALCIUM SPEC-SCNC: 9.2 MG/DL (ref 8.6–10.5)
CHLORIDE SERPL-SCNC: 103 MMOL/L (ref 98–107)
CO2 SERPL-SCNC: 30 MMOL/L (ref 22–29)
CREAT SERPL-MCNC: 0.93 MG/DL (ref 0.76–1.27)
CRP SERPL-MCNC: 0.75 MG/DL (ref 0–0.5)
DEPRECATED RDW RBC AUTO: 41.7 FL (ref 37–54)
EGFRCR SERPLBLD CKD-EPI 2021: 87.2 ML/MIN/1.73
EOSINOPHIL # BLD AUTO: 0.13 10*3/MM3 (ref 0–0.4)
EOSINOPHIL NFR BLD AUTO: 2.8 % (ref 0.3–6.2)
ERYTHROCYTE [DISTWIDTH] IN BLOOD BY AUTOMATED COUNT: 12.6 % (ref 12.3–15.4)
ERYTHROCYTE [SEDIMENTATION RATE] IN BLOOD: 21 MM/HR (ref 0–20)
GLOBULIN UR ELPH-MCNC: 2.6 GM/DL
GLUCOSE SERPL-MCNC: 94 MG/DL (ref 65–99)
HCT VFR BLD AUTO: 42.8 % (ref 37.5–51)
HGB BLD-MCNC: 14.2 G/DL (ref 13–17.7)
IMM GRANULOCYTES # BLD AUTO: 0.01 10*3/MM3 (ref 0–0.05)
IMM GRANULOCYTES NFR BLD AUTO: 0.2 % (ref 0–0.5)
LYMPHOCYTES # BLD AUTO: 1.1 10*3/MM3 (ref 0.7–3.1)
LYMPHOCYTES NFR BLD AUTO: 23.9 % (ref 19.6–45.3)
MCH RBC QN AUTO: 29.9 PG (ref 26.6–33)
MCHC RBC AUTO-ENTMCNC: 33.2 G/DL (ref 31.5–35.7)
MCV RBC AUTO: 90.1 FL (ref 79–97)
MONOCYTES # BLD AUTO: 0.69 10*3/MM3 (ref 0.1–0.9)
MONOCYTES NFR BLD AUTO: 15 % (ref 5–12)
NEUTROPHILS NFR BLD AUTO: 2.65 10*3/MM3 (ref 1.7–7)
NEUTROPHILS NFR BLD AUTO: 57.7 % (ref 42.7–76)
NRBC BLD AUTO-RTO: 0 /100 WBC (ref 0–0.2)
PLATELET # BLD AUTO: 226 10*3/MM3 (ref 140–450)
PMV BLD AUTO: 9.6 FL (ref 6–12)
POTASSIUM SERPL-SCNC: 3.7 MMOL/L (ref 3.5–5.2)
PROT SERPL-MCNC: 6.5 G/DL (ref 6–8.5)
RBC # BLD AUTO: 4.75 10*6/MM3 (ref 4.14–5.8)
SODIUM SERPL-SCNC: 141 MMOL/L (ref 136–145)
WBC NRBC COR # BLD AUTO: 4.6 10*3/MM3 (ref 3.4–10.8)

## 2024-05-21 PROCEDURE — 80053 COMPREHEN METABOLIC PANEL: CPT

## 2024-05-21 PROCEDURE — 85652 RBC SED RATE AUTOMATED: CPT

## 2024-05-21 PROCEDURE — 85025 COMPLETE CBC W/AUTO DIFF WBC: CPT

## 2024-05-21 PROCEDURE — 36415 COLL VENOUS BLD VENIPUNCTURE: CPT

## 2024-05-21 PROCEDURE — 86140 C-REACTIVE PROTEIN: CPT

## 2024-06-09 PROBLEM — M25.50 JOINT PAIN: Status: ACTIVE | Noted: 2024-06-09

## 2024-06-10 DIAGNOSIS — G44.229 CHRONIC TENSION-TYPE HEADACHE, NOT INTRACTABLE: ICD-10-CM

## 2024-06-10 DIAGNOSIS — F32.A DEPRESSION, UNSPECIFIED DEPRESSION TYPE: ICD-10-CM

## 2024-06-10 RX ORDER — PROMETHAZINE HYDROCHLORIDE 25 MG/1
25 TABLET ORAL EVERY 6 HOURS PRN
Qty: 20 TABLET | Refills: 1 | OUTPATIENT
Start: 2024-06-10

## 2024-06-10 RX ORDER — CYCLOBENZAPRINE HCL 10 MG
10 TABLET ORAL 3 TIMES DAILY PRN
Qty: 30 TABLET | Refills: 5 | OUTPATIENT
Start: 2024-06-10

## 2024-06-11 ENCOUNTER — OFFICE VISIT (OUTPATIENT)
Age: 74
End: 2024-06-11
Payer: MEDICARE

## 2024-06-11 ENCOUNTER — LAB (OUTPATIENT)
Facility: HOSPITAL | Age: 74
End: 2024-06-11
Payer: MEDICARE

## 2024-06-11 VITALS
HEIGHT: 69 IN | TEMPERATURE: 97.8 F | SYSTOLIC BLOOD PRESSURE: 140 MMHG | DIASTOLIC BLOOD PRESSURE: 84 MMHG | BODY MASS INDEX: 46.21 KG/M2 | HEART RATE: 78 BPM | WEIGHT: 312 LBS

## 2024-06-11 DIAGNOSIS — T84.51XS INFECTION AND INFLAMMATORY REACTION DUE TO INTERNAL RIGHT HIP PROSTHESIS, SEQUELA: ICD-10-CM

## 2024-06-11 DIAGNOSIS — Z79.899 HIGH RISK MEDICATION USE: ICD-10-CM

## 2024-06-11 DIAGNOSIS — M06.09 RHEUMATOID ARTHRITIS OF MULTIPLE SITES WITH NEGATIVE RHEUMATOID FACTOR: Primary | ICD-10-CM

## 2024-06-11 DIAGNOSIS — R52 PAIN MANAGEMENT: ICD-10-CM

## 2024-06-11 LAB
AMPHET+METHAMPHET UR QL: NEGATIVE
AMPHETAMINES UR QL: NEGATIVE
BARBITURATES UR QL SCN: NEGATIVE
BENZODIAZ UR QL SCN: NEGATIVE
BUPRENORPHINE SERPL-MCNC: NEGATIVE NG/ML
CANNABINOIDS SERPL QL: NEGATIVE
COCAINE UR QL: NEGATIVE
FENTANYL UR-MCNC: NEGATIVE NG/ML
METHADONE UR QL SCN: NEGATIVE
OPIATES UR QL: NEGATIVE
OXYCODONE UR QL SCN: NEGATIVE
PCP UR QL SCN: NEGATIVE
TRICYCLICS UR QL SCN: NEGATIVE

## 2024-06-11 PROCEDURE — 80307 DRUG TEST PRSMV CHEM ANLYZR: CPT

## 2024-06-11 RX ORDER — TRAMADOL HYDROCHLORIDE 50 MG/1
100 TABLET ORAL EVERY 12 HOURS PRN
Qty: 60 TABLET | Refills: 5 | Status: SHIPPED | OUTPATIENT
Start: 2024-06-11

## 2024-06-11 RX ORDER — HYDROXYCHLOROQUINE SULFATE 200 MG/1
200 TABLET, FILM COATED ORAL EVERY 12 HOURS
Qty: 60 TABLET | Refills: 5 | Status: SHIPPED | OUTPATIENT
Start: 2024-06-11

## 2024-06-11 NOTE — ASSESSMENT & PLAN NOTE
Started Plaquenil 12/20/19- Well tolerated and seems to be effective.  S/p Covid vaccine 2021 + 1 booster. S/p Bivalent booster. Fall 2023 injection.  Hep negative June 2020.    Plan:  Eye exam Q 12 months with annual -upcoming in 3 weeks.   Cbc,Cmp Q 6 months-  5/24-5/24  C-Reactive Protein Mildly Elevated, Sed Rate Mildly Elevated.CBC/CMP normal

## 2024-06-11 NOTE — PROGRESS NOTES
[facility] Follow Up Visit     Office Follow Up      Date: 06/11/2024   Patient Name: Obi Jackman Jr.  MRN: [id number]  YOB: 1950    Referring Physician: Varsha Hahn MD     Chief Complaint   Patient presents with    Arthritis       History of Present Illness: Obi Jackman Jr. is a 73 y.o. male who is here today for follow up on RA.    His morning stiffness lasts around an 1 hour. His pain level is 8/10 in neck and left shoulder.  About 6 weeks ago he got stabbing pain in left neck and radiated to the left shoulder intermittently.  He has been going to PT for this, and it has improved some.  He may have an MRI in the future.  He think's his joints are doing good, intermittently his right shoulder will have pain for 4-5 days and then goes away.  This is better than two years ago though. Denies any hot, swollen joints.  He is currently taking Plaquenil and Daypro.  He hasn't taken his Gabapentin in 3 months. He still has some issues regaining strength with right hip and left knee.       Result Review :      Data reviewed : 5/24  C-Reactive Protein Mildly Elevated, Sed Rate Mildly Elevated. CBC/CMP normal  4/24 Vitd B12- 382  Vit D 30.6 TSH normal. PSA Normal, A1C Normal       Subjective     No Known Allergies      Current Outpatient Medications:     acetaminophen (TYLENOL) 325 MG tablet, Take 1 tablet by mouth As Needed for Mild Pain., Disp: , Rfl:     acetaminophen (TYLENOL) 500 MG tablet, Take 1 tablet by mouth Every 4 (Four) to 6 (Six) Hours As Needed., Disp: , Rfl:     ALPRAZolam (XANAX) 0.25 MG tablet, Take 1/2 to 1 tablet prn when flying, Disp: 10 tablet, Rfl: 0    Ascorbic Acid (VITAMIN C PO), Take  by mouth Daily., Disp: , Rfl:     atenolol (TENORMIN) 50 MG tablet, Take 1 tablet by mouth Daily., Disp: 90 tablet, Rfl: 3    atorvastatin (LIPITOR) 10 MG tablet, Take 1 tablet by mouth Daily. Resume when not on Dapto anymore, Disp: 90  tablet, Rfl: 3    buPROPion XL (WELLBUTRIN XL) 150 MG 24 hr tablet, Take 1 tablet by mouth Daily., Disp: 90 tablet, Rfl: 3    Cyanocobalamin (VITAMIN B-12 ER PO), Take 1 tablet by mouth Daily., Disp: , Rfl:     cyclobenzaprine (FLEXERIL) 10 MG tablet, Take 1 tablet by mouth 3 (Three) Times a Day As Needed for Muscle Spasms., Disp: 30 tablet, Rfl: 5    doxycycline (VIBRAMYCIN) 100 MG capsule, Take 1 capsule by mouth 2 (Two) Times a Day., Disp: , Rfl:     gabapentin (NEURONTIN) 300 MG capsule, Take 1 capsule by mouth 2 (Two) Times a Day., Disp: , Rfl:     hydroxychloroquine (PLAQUENIL) 200 MG tablet, Take 1 tablet by mouth Every 12 (Twelve) Hours., Disp: 60 tablet, Rfl: 5    LISINOPRIL PO, Take 12.5 tablets by mouth Daily., Disp: , Rfl:     lisinopril-hydrochlorothiazide (PRINZIDE,ZESTORETIC) 20-12.5 MG per tablet, Take 1 tablet by mouth Daily., Disp: 90 tablet, Rfl: 3    meclizine (ANTIVERT) 25 MG tablet, 1 tablet Every 8 (Eight) Hours As Needed., Disp: , Rfl:     multivitamin (THERAGRAN) tablet tablet, , Disp: , Rfl:     nystatin (MYCOSTATIN) 336688 UNIT/GM cream, , Disp: , Rfl:     oxaprozin (DAYPRO) 600 MG tablet, Take 2 tablets by mouth Daily., Disp: 60 tablet, Rfl: 5    prednisoLONE acetate (PRED FORTE) 1 % ophthalmic suspension, Apply 1 drop to eye(s) as directed by provider 4 (Four) Times a Day., Disp: , Rfl:     promethazine (PHENERGAN) 25 MG tablet, Take 1 tablet by mouth Every 6 (Six) Hours As Needed for Nausea or Vomiting., Disp: 20 tablet, Rfl: 1    SUMAtriptan (IMITREX) 100 MG tablet, Take one tablet at onset of headache. May repeat dose one time in 2 hours if headache not relieved., Disp: 9 tablet, Rfl: 5    Tirzepatide-Weight Management (ZEPBOUND) 7.5 MG/0.5ML solution auto-injector, Inject 0.5 mL under the skin into the appropriate area as directed 1 (One) Time Per Week., Disp: 2 mL, Rfl: 2    traMADol (ULTRAM) 50 MG tablet, Take 2 tablets by mouth Every 12 (Twelve) Hours As Needed for Severe Pain.,  Disp: 60 tablet, Rfl: 5    triamcinolone (KENALOG) 0.025 % cream, , Disp: , Rfl:     Past Medical History:   Diagnosis Date    Anxiety and depression     Arthritis of neck ?    Arthrodesis present     lt foot triple    Bulging of lumbar intervertebral disc     Cataract     Lens implated in left eye.    Cervical disc disorder ?    Cornea transplant recipient     BILATERAL    COVID-19 11/2021    S/p MAB    CPAP (continuous positive airway pressure) dependence     CTS (carpal tunnel syndrome) Slight case; Dr. Chanda Castanon    DDD (degenerative disc disease), lumbar     09/09/201s/p injection Dr Abel Abbott    Elevated cholesterol     Glaucoma July 2022    Drops daily.    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    Neuropathy     bilatral feet    Obesity     CHRISTOPHE on CPAP     Osteopenia     Periarthritis of shoulder ?    Posterior tibial tendinitis, left     09/19/2019 pending-10/19 with Dr Carolina    Rheumatoid arthritis     Rotator cuff syndrome 09/15/2022    Dr. Chanda Castanon    Swelling of left ear     Venous stasis 09/09/2019    priti lower extremities    Visual impairment     Cornea grafts both eyes; Cataracts.    Wears contact lenses     Wears contact lenses     Wears contact lenses         Past Surgical History:   Procedure Laterality Date    ACHILLES TENDON SURGERY Left 10/15/2019    Procedure: ACHILLES TENDON LENGTHENING LEFT;  Surgeon: Elodia Carolina MD;  Location: [facility];  Service: Orthopedics    CATARACT EXTRACTION Right     COLONOSCOPY  2015    EYE SURGERY Bilateral     cornea transplant     FOOT SURGERY Left 10/15/2019    triple arthrodesis and achilles tendon lengthening- [place]    HERNIA REPAIR      abdominal    ILIAC CREST BONE GRAFT Left 10/15/2019    Procedure: ILIAC CREST BONE GRAFT LEFT;  Surgeon: Elodia Carolina MD;  Location:   [facility] OR;  Service: Orthopedics    INCISION AND DRAINAGE LEG Right 12/04/2020    Procedure: INCISION AND DRAINAGE WITH COMPONENT EXCHANGE, HEAD AND LINER RIGHT HIP;  Surgeon: Osbaldo Olivas MD;  Location:  [facility] OR;  Service: Orthopedics;  Laterality: Right;    JOINT REPLACEMENT  8/4/20 and 12/4/20    Right hip replacement    KNEE SURGERY      TOE FUSION Left 10/15/2019    Procedure: TRIPLE ARTHODESIS LEFT;  Surgeon: Elodia Guzman MD;  Location:  [facility] OR;  Service: Orthopedics    TONSILLECTOMY      TOTAL HIP ARTHROPLASTY Right 08/04/2020    Procedure: TOTAL HIP ARTHROPLASTY RIGHT;  Surgeon: Osbaldo Olivas MD;  Location:  [facility] OR;  Service: Orthopedics;  Laterality: Right;    TOTAL KNEE ARTHROPLASTY Left 03/10/2023    Procedure: TOTAL KNEE ARTHROPLASTY WITH CORI ROBOT - LEFT;  Surgeon: Osbaldo Olivas MD;  Location:  [facility] OR;  Service: Robotics - Ortho;  Laterality: Left;    TRIGGER POINT INJECTION      UMBILICAL HERNIA REPAIR      Repaired over 15 years ago.    VENOUS ABLATION Left 01/21/2022    Endovenous laser ablation of left great sapheuos vein for venous insufficiency       Family History   Problem Relation Age of Onset    No Known Problems Mother     Alcohol abuse Father     Prostate cancer Father     Other Father         malignant neoplasm of urinary bladder    Cancer Sister         Stomach cancer    Cancer Brother         Kidney removed 4/16/23 due renal cell  carcenoma    Multiple sclerosis Son         Social History     Socioeconomic History    Marital status:     Number of children: 3   Tobacco Use    Smoking status: Never     Passive exposure: Never    Smokeless tobacco: Never   Vaping Use    Vaping status: Never Used   Substance and Sexual Activity    Alcohol use: Not Currently     Comment: One drink 2-3x per year.    Drug use: No    Sexual activity: Yes     Partners: Female     Birth control/protection: None       Review of Systems   Constitutional:  Negative for fatigue and fever.  "  HENT:  Negative for mouth sores, nosebleeds, swollen glands and trouble swallowing.    Eyes:  Negative for blurred vision, double vision, photophobia, pain and visual disturbance.        Vision loss   Respiratory:  Negative for apnea, cough, choking and shortness of breath.    Cardiovascular:  Negative for chest pain, palpitations and leg swelling.   Gastrointestinal:  Negative for abdominal pain, blood in stool, constipation, diarrhea, nausea, vomiting and GERD.   Endocrine: Negative for cold intolerance, heat intolerance, polydipsia, polyphagia and polyuria.   Genitourinary:  Negative for difficulty urinating, dysuria, genital sores, hematuria and urinary incontinence.   Musculoskeletal:  Positive for neck pain and neck stiffness. Negative for arthralgias, back pain, gait problem, joint swelling, myalgias and bursitis.   Skin:  Negative for rash.   Allergic/Immunologic: Negative for environmental allergies and food allergies.   Neurological:  Negative for dizziness, tremors, seizures, syncope, weakness, numbness, headache, memory problem and confusion.   Hematological:  Negative for adenopathy. Does not bruise/bleed easily.   Psychiatric/Behavioral:  Negative for sleep disturbance, suicidal ideas, depressed mood and stress. The patient is not nervous/anxious.         I have reviewed and updated the patient's chief complaint, history of present illness, review of systems, past medical history, surgical history, family history, social history, medications and allergy list as appropriate.     Objective    Vital Signs:   Vitals:    06/11/24 0952   BP: 140/84   Pulse: 78   Temp: 97.8 °F (36.6 °C)   Weight: (!) 142 kg (312 lb)   Height: 175.3 cm (69\")   PainSc:   8   PainLoc: Neck  Comment: left shoulder     Obi Jackman Jr. reports a pain score of 8.  Given his pain assessment as noted, treatment options were discussed and the following options were decided upon as a follow-up plan to address the patient's pain: " continuation of current treatment plan for pain.      Body mass index is 46.07 kg/m².         Physical Exam  Constitutional:       General: He is not in acute distress.     Appearance: He is not ill-appearing, toxic-appearing or diaphoretic.   HENT:      Right Ear: External ear normal.      Left Ear: External ear normal.      Nose: Nose normal. No congestion.      Mouth/Throat:      Pharynx: Oropharynx is clear. No oropharyngeal exudate or posterior oropharyngeal erythema.   Eyes:      Extraocular Movements: Extraocular movements intact.      Conjunctiva/sclera: Conjunctivae normal.      Pupils: Pupils are equal, round, and reactive to light.   Neck:      Comments: Pain with left cervical ROM  Cardiovascular:      Rate and Rhythm: Normal rate and regular rhythm.      Pulses: Normal pulses.      Heart sounds: Normal heart sounds.   Pulmonary:      Effort: Pulmonary effort is normal.      Breath sounds: Normal breath sounds.   Abdominal:      General: Abdomen is flat. Bowel sounds are normal.      Palpations: Abdomen is soft.   Musculoskeletal:         General: Normal range of motion.      Left knee: Crepitus present.      Comments: Heberden's nodes present.   Nodule Medial below to the 2nd PIP on the right.   Lt Knee Replaced.   Rt. Hip Replaced--Feels Weak with extension.    Skin:     General: Skin is warm and dry.      Capillary Refill: Capillary refill takes less than 2 seconds.   Neurological:      General: No focal deficit present.      Mental Status: He is oriented to person, place, and time.      Cranial Nerves: No cranial nerve deficit.      Motor: No weakness.      Deep Tendon Reflexes: Reflexes normal.   Psychiatric:         Mood and Affect: Mood normal.         Behavior: Behavior normal.         Thought Content: Thought content normal.          Physical Exam  There [facility]. Go to the Rheumatology activity and complete the homunculus joint exam.     Results  Review:   Imaging Results (Last 24 Hours)       ** No results found for the last 24 hours. **            Procedures    Assessment / Plan    Assessment/Plan:   Diagnoses and all orders for this visit:    1. Rheumatoid arthritis of multiple sites with negative rheumatoid factor (Primary)  Assessment & Plan:  2019 , Anuradha 1:160, , SSA/ssb and smith negative. MRI neg for synovitis. RNP 28.3 (<25), Dna + 1:10 but crithidia negative with normal C3 and C4.  Pattern is not typical of RA, but he has AM stiffness and is responsive to NSAID. No erosions on hand films. Pain involves 1/2 mcp, pip, wrists bilaterally; right shoulder;   Hand films negative for erosions. 9/19  S/p Methotrexate for a period of time.  H/o Severe stiffness as well as joint pain. H/o  Synovitis in hands . Concerning for inflammatory arthritis - could be RA versus SLE according to labs - overlap.  S/p ibuprofen, Daypro, Lodine, nabumetone.  Hands sx much improved on Plaquenil- Denies joint swelling.  L shoulder film Negative AC and GH joint , Humerus high riding ? Consistent with rotator cuff pathology.  X-ray of the right shoulder 1/23  Degenerative changes of the acromion at the acromioclavicular joint which is not an arrow. There is no narrowing of the glenohumeral joint.  X-ray of both hands 1/23  Normal X-ray of the left hand.  Minor degenerative narrowing of the radial lunate articulation in the right wrist.  Doing well on current meds - Plaquenil.    Plan:    Continue Plaquenil. Seems to be working well.  Sulfasalazine is another option if needed.  Voltaren gel or Biofreeze.  Steroid injection to R shoulder if needed. Can call to schedule.    Orders:  -     oxaprozin (DAYPRO) 600 MG tablet; Take 2 tablets by mouth Daily.  Dispense: 60 tablet; Refill: 5  -     hydroxychloroquine (PLAQUENIL) 200 MG tablet; Take 1 tablet by mouth Every 12 (Twelve) Hours.  Dispense: 60 tablet; Refill: 5  -     Comprehensive Metabolic Panel; Future  -      CBC & Differential; Future  -     Sedimentation Rate; Future  -     C-reactive Protein; Future    2. 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 DJD of the hips R>L; He has joint space narrowing superiorly and subchondral cystic change. They could not see any gross tear of labrum but was limited evaluation.  He did have some inflammation or edema on the Right in the iliopsoas myotendinous  junction. He did have  questionable bursitis/tendonitis in the iliopsoas bursa.  Saw Dr. Amato. S/p injection. Positive OA and Tendonitis.  S/p R total hip replacement 8/4/2020 with infection 12/2020.  S/p hip joint revision.  lc roca. S/p IV Rocephin daily through PICC. Currently on Doxycycline indefinitely.    Plan:    On doxycycline 1 bid as per ID forever.   Recent mild elevation of CRP and Sed Rate, most likely related to RA.       3. Pain management  Assessment & Plan:  Contract signed today  Currently on tramadol 2 tabs bid as needed.   Currently off Gabapentin.     Plan:  UDS today  Continue tramadol.    Orders:  -     traMADol (ULTRAM) 50 MG tablet; Take 2 tablets by mouth Every 12 (Twelve) Hours As Needed for Severe Pain.  Dispense: 60 tablet; Refill: 5  -     oxaprozin (DAYPRO) 600 MG tablet; Take 2 tablets by mouth Daily.  Dispense: 60 tablet; Refill: 5  -     Comprehensive Metabolic Panel; Future  -     CBC & Differential; Future  -     Sedimentation Rate; Future  -     C-reactive Protein; Future    4. High risk medication use  Assessment & Plan:  Started Plaquenil 12/20/19- Well tolerated and seems to be effective.  S/p Covid vaccine 2021 + 1 booster. S/p Bivalent booster. Fall 2023 injection.  Hep negative June 2020.    Plan:  Eye exam Q 12 months with annual -upcoming in 3 weeks.   Cbc,Cmp Q 6 months-  5/24-5/24  C-Reactive Protein Mildly Elevated, Sed Rate Mildly Elevated.CBC/CMP  normal    Orders:  -     traMADol (ULTRAM) 50 MG tablet; Take 2 tablets by mouth Every 12 (Twelve) Hours As Needed for Severe Pain.  Dispense: 60 tablet; Refill: 5  -     Urine Drug Screen - Urine, Clean Catch; Future  -     Comprehensive Metabolic Panel; Future  -     CBC & Differential; Future  -     Sedimentation Rate; Future  -     C-reactive Protein; Future                Follow Up:   Return in about 6 months (around 12/11/2024).        Scribed for Chanda Castanon MD by Sisi Guillaume RN 6/11/2024  18:57 EDT      Chanda Castanon MD  [facility] [place] [facility]

## 2024-06-11 NOTE — ASSESSMENT & PLAN NOTE
2019 , Anuradha 1:160, , SSA/ssb and smith negative. MRI neg for synovitis. RNP 28.3 (<25), Dna + 1:10 but crithidia negative with normal C3 and C4.  Pattern is not typical of RA, but he has AM stiffness and is responsive to NSAID. No erosions on hand films. Pain involves 1/2 mcp, pip, wrists bilaterally; right shoulder;   Hand films negative for erosions. 9/19  S/p Methotrexate for a period of time.  H/o Severe stiffness as well as joint pain. H/o  Synovitis in hands . Concerning for inflammatory arthritis - could be RA versus SLE according to labs - overlap.  S/p ibuprofen, Daypro, Lodine, nabumetone.  Hands sx much improved on Plaquenil- Denies joint swelling.  L shoulder film Negative AC and GH joint , Humerus high riding ? Consistent with rotator cuff pathology.  X-ray of the right shoulder 1/23  Degenerative changes of the acromion at the acromioclavicular joint which is not an arrow. There is no narrowing of the glenohumeral joint.  X-ray of both hands 1/23  Normal X-ray of the left hand.  Minor degenerative narrowing of the radial lunate articulation in the right wrist.  Doing well on current meds - Plaquenil.    Plan:    Continue Plaquenil. Seems to be working well.  Sulfasalazine is another option if needed.  Voltaren gel or Biofreeze.  Steroid injection to R shoulder if needed. Can call to schedule.

## 2024-06-11 NOTE — ASSESSMENT & PLAN NOTE
Contract signed today  Currently on tramadol 2 tabs bid as needed.   Currently off Gabapentin.     Plan:  UDS today  Continue tramadol.

## 2024-06-17 DIAGNOSIS — Z79.899 HIGH RISK MEDICATION USE: ICD-10-CM

## 2024-06-17 DIAGNOSIS — R52 PAIN MANAGEMENT: ICD-10-CM

## 2024-06-17 RX ORDER — TRAMADOL HYDROCHLORIDE 50 MG/1
100 TABLET ORAL 3 TIMES DAILY PRN
Qty: 90 TABLET | Refills: 2 | OUTPATIENT
Start: 2024-06-17

## 2024-06-17 NOTE — TELEPHONE ENCOUNTER
Rx Refill Note  Requested Prescriptions     Pending Prescriptions Disp Refills    traMADol (ULTRAM) 50 MG tablet [Pharmacy Med Name: TRAMADOL HCL 50 MG TABS 50 Tablet] 90 tablet 2     Sig: TAKE 2 TABLETS BY MOUTH THREE TIMES DAILY AS NEEDED      Last office visit with prescribing clinician: 6/11/2024   Last telemedicine visit with prescribing clinician: Visit date not found   Next office visit with prescribing clinician: 12/3/2024                         Would you like a call back once the refill request has been completed: [] Yes [] No    If the office needs to give you a call back, can they leave a voicemail: [] Yes [] No    Cintia Maxwell MA  06/17/24, 10:14 EDT    Refill sent 6/11/24 #60 with 5 rfs

## 2024-07-11 DIAGNOSIS — M06.09 RHEUMATOID ARTHRITIS OF MULTIPLE SITES WITH NEGATIVE RHEUMATOID FACTOR: ICD-10-CM

## 2024-07-11 DIAGNOSIS — G44.229 CHRONIC TENSION-TYPE HEADACHE, NOT INTRACTABLE: ICD-10-CM

## 2024-07-11 RX ORDER — SUMATRIPTAN 100 MG/1
TABLET, FILM COATED ORAL
Qty: 9 TABLET | Refills: 3 | Status: SHIPPED | OUTPATIENT
Start: 2024-07-11

## 2024-07-15 RX ORDER — HYDROXYCHLOROQUINE SULFATE 200 MG/1
TABLET, FILM COATED ORAL
Qty: 60 TABLET | Refills: 2 | Status: SHIPPED | OUTPATIENT
Start: 2024-07-15

## 2024-08-12 ENCOUNTER — LAB (OUTPATIENT)
Dept: LAB | Facility: HOSPITAL | Age: 74
End: 2024-08-12
Payer: MEDICARE

## 2024-08-12 ENCOUNTER — OFFICE VISIT (OUTPATIENT)
Dept: INTERNAL MEDICINE | Facility: CLINIC | Age: 74
End: 2024-08-12
Payer: MEDICARE

## 2024-08-12 VITALS
BODY MASS INDEX: 16.86 KG/M2 | DIASTOLIC BLOOD PRESSURE: 98 MMHG | SYSTOLIC BLOOD PRESSURE: 168 MMHG | OXYGEN SATURATION: 98 % | WEIGHT: 113.8 LBS | HEIGHT: 69 IN | HEART RATE: 72 BPM

## 2024-08-12 DIAGNOSIS — R10.32 LEFT LOWER QUADRANT ABDOMINAL PAIN: Primary | ICD-10-CM

## 2024-08-12 DIAGNOSIS — E78.5 HYPERLIPIDEMIA, UNSPECIFIED HYPERLIPIDEMIA TYPE: ICD-10-CM

## 2024-08-12 DIAGNOSIS — R10.9 SIDE PAIN: ICD-10-CM

## 2024-08-12 DIAGNOSIS — R10.9 FLANK PAIN: ICD-10-CM

## 2024-08-12 DIAGNOSIS — I15.9 SECONDARY HYPERTENSION: ICD-10-CM

## 2024-08-12 LAB
ALBUMIN SERPL-MCNC: 4.1 G/DL (ref 3.5–5.2)
ALBUMIN/GLOB SERPL: 1.4 G/DL
ALP SERPL-CCNC: 54 U/L (ref 39–117)
ALT SERPL W P-5'-P-CCNC: 14 U/L (ref 1–41)
AMYLASE SERPL-CCNC: 30 U/L (ref 28–100)
ANION GAP SERPL CALCULATED.3IONS-SCNC: 11.3 MMOL/L (ref 5–15)
AST SERPL-CCNC: 19 U/L (ref 1–40)
BILIRUB BLD-MCNC: NEGATIVE MG/DL
BILIRUB SERPL-MCNC: 0.5 MG/DL (ref 0–1.2)
BUN SERPL-MCNC: 11 MG/DL (ref 8–23)
BUN/CREAT SERPL: 11.8 (ref 7–25)
CALCIUM SPEC-SCNC: 9.5 MG/DL (ref 8.6–10.5)
CHLORIDE SERPL-SCNC: 102 MMOL/L (ref 98–107)
CLARITY, POC: CLEAR
CO2 SERPL-SCNC: 27.7 MMOL/L (ref 22–29)
COLOR UR: YELLOW
CREAT SERPL-MCNC: 0.93 MG/DL (ref 0.76–1.27)
DEPRECATED RDW RBC AUTO: 40.2 FL (ref 37–54)
EGFRCR SERPLBLD CKD-EPI 2021: 87.2 ML/MIN/1.73
ERYTHROCYTE [DISTWIDTH] IN BLOOD BY AUTOMATED COUNT: 12.1 % (ref 12.3–15.4)
EXPIRATION DATE: ABNORMAL
GLOBULIN UR ELPH-MCNC: 2.9 GM/DL
GLUCOSE SERPL-MCNC: 87 MG/DL (ref 65–99)
GLUCOSE UR STRIP-MCNC: NEGATIVE MG/DL
HCT VFR BLD AUTO: 43 % (ref 37.5–51)
HGB BLD-MCNC: 14.1 G/DL (ref 13–17.7)
KETONES UR QL: NEGATIVE
LEUKOCYTE EST, POC: NEGATIVE
LIPASE SERPL-CCNC: 47 U/L (ref 13–60)
Lab: ABNORMAL
MCH RBC QN AUTO: 29.8 PG (ref 26.6–33)
MCHC RBC AUTO-ENTMCNC: 32.8 G/DL (ref 31.5–35.7)
MCV RBC AUTO: 90.9 FL (ref 79–97)
NITRITE UR-MCNC: NEGATIVE MG/ML
PH UR: 6 [PH] (ref 5–8)
PLATELET # BLD AUTO: 239 10*3/MM3 (ref 140–450)
PMV BLD AUTO: 10.6 FL (ref 6–12)
POTASSIUM SERPL-SCNC: 3.9 MMOL/L (ref 3.5–5.2)
PROT SERPL-MCNC: 7 G/DL (ref 6–8.5)
PROT UR STRIP-MCNC: NEGATIVE MG/DL
RBC # BLD AUTO: 4.73 10*6/MM3 (ref 4.14–5.8)
RBC # UR STRIP: NEGATIVE /UL
SODIUM SERPL-SCNC: 141 MMOL/L (ref 136–145)
SP GR UR: 1.02 (ref 1–1.03)
UROBILINOGEN UR QL: ABNORMAL
WBC NRBC COR # BLD AUTO: 5.35 10*3/MM3 (ref 3.4–10.8)

## 2024-08-12 PROCEDURE — 3077F SYST BP >= 140 MM HG: CPT | Performed by: NURSE PRACTITIONER

## 2024-08-12 PROCEDURE — 87086 URINE CULTURE/COLONY COUNT: CPT | Performed by: NURSE PRACTITIONER

## 2024-08-12 PROCEDURE — 1160F RVW MEDS BY RX/DR IN RCRD: CPT | Performed by: NURSE PRACTITIONER

## 2024-08-12 PROCEDURE — 85027 COMPLETE CBC AUTOMATED: CPT | Performed by: NURSE PRACTITIONER

## 2024-08-12 PROCEDURE — 3080F DIAST BP >= 90 MM HG: CPT | Performed by: NURSE PRACTITIONER

## 2024-08-12 PROCEDURE — 87186 SC STD MICRODIL/AGAR DIL: CPT | Performed by: NURSE PRACTITIONER

## 2024-08-12 PROCEDURE — 36415 COLL VENOUS BLD VENIPUNCTURE: CPT | Performed by: NURSE PRACTITIONER

## 2024-08-12 PROCEDURE — 1159F MED LIST DOCD IN RCRD: CPT | Performed by: NURSE PRACTITIONER

## 2024-08-12 PROCEDURE — 80053 COMPREHEN METABOLIC PANEL: CPT | Performed by: NURSE PRACTITIONER

## 2024-08-12 PROCEDURE — 83690 ASSAY OF LIPASE: CPT | Performed by: NURSE PRACTITIONER

## 2024-08-12 PROCEDURE — 1125F AMNT PAIN NOTED PAIN PRSNT: CPT | Performed by: NURSE PRACTITIONER

## 2024-08-12 PROCEDURE — 99214 OFFICE O/P EST MOD 30 MIN: CPT | Performed by: NURSE PRACTITIONER

## 2024-08-12 PROCEDURE — 81003 URINALYSIS AUTO W/O SCOPE: CPT | Performed by: NURSE PRACTITIONER

## 2024-08-12 PROCEDURE — 82150 ASSAY OF AMYLASE: CPT | Performed by: NURSE PRACTITIONER

## 2024-08-12 RX ORDER — ATENOLOL 50 MG/1
50 TABLET ORAL DAILY
Qty: 30 TABLET | Refills: 1 | OUTPATIENT
Start: 2024-08-12

## 2024-08-12 RX ORDER — ATORVASTATIN CALCIUM 10 MG/1
10 TABLET, FILM COATED ORAL DAILY
Qty: 30 TABLET | Refills: 1 | OUTPATIENT
Start: 2024-08-12

## 2024-08-12 NOTE — PROGRESS NOTES
"    Office Note     Name: Obi Jackman Jr.    : 1950     MRN: [id number]     Chief Complaint  Flank Pain (Patient reports having left side pain going on for around 3-4 weeks.patient states is it consistent with no relief.  )    Subjective     History of Present Illness:  Obi Jackman Jr. is a 73 y.o. male who presents today for evaluation of abdominal and flank pain.  Patient reports onset of symptoms was 4 to 6 weeks ago.  He reports having \"twinges\" of pain to his left lower quadrant.  He reports this pain has happened a few days in a row and then other days he has not experienced any pain in that area.  He has also noticed an increase in duration of the pain.  His pain was worse over the weekend and woke him in the night.  He was able to pinpoint the area of discomfort to his left lower quadrant.  He describes it as strong and pulsating at times.  He would currently rated an 8 or 9 out of 10.  He denies any nausea, vomiting, or fever.  He does have issues with constipation.  He did take some stool softeners earlier this morning.  His last bowel movement was on Saturday.  He is on tirzepatide 7.5 mg weekly injections.  He is due for colonoscopy in .  He has them done every 5 years.  He had a few small polyps removed at the last 1.  He follows with Dr. Hahn for chronic conditions.  No further complaints or concerns at this time.        Past Medical History:   Diagnosis Date    Anxiety and depression     Arthritis of neck ?    Arthrodesis present     lt foot triple    Bulging of lumbar intervertebral disc     Cataract     Lens implated in left eye.    Cervical disc disorder ?    Cornea transplant recipient     BILATERAL    COVID-19 2021    S/p MAB    CPAP (continuous positive airway pressure) dependence     CTS (carpal tunnel syndrome) Slight case; Dr. Chanda Castanon    DDD (degenerative disc disease), lumbar     s/p injection Dr Abel Abbott    Elevated cholesterol     Glaucoma July " 2022    Drops daily.    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    Neuropathy     bilatral feet    Obesity     CHRISTOPHE on CPAP     Osteopenia     Periarthritis of shoulder ?    Posterior tibial tendinitis, left     09/19/2019 pending-10/19 with Dr Carolina    Rheumatoid arthritis     Rotator cuff syndrome 09/15/2022    Dr. Chanda Castanon    Swelling of left ear     Venous stasis 09/09/2019    priti lower extremities    Visual impairment     Cornea grafts both eyes; Cataracts.    Wears contact lenses     Wears contact lenses     Wears contact lenses        Past Surgical History:   Procedure Laterality Date    ACHILLES TENDON SURGERY Left 10/15/2019    Procedure: ACHILLES TENDON LENGTHENING LEFT;  Surgeon: Elodia Carolina MD;  Location: [facility] OR;  Service: Orthopedics    CATARACT EXTRACTION Right     COLONOSCOPY  2015    EYE SURGERY Bilateral     cornea transplant     FOOT SURGERY Left 10/15/2019    triple arthrodesis and achilles tendon lengthening- [facility]    HERNIA REPAIR      abdominal    ILIAC CREST BONE GRAFT Left 10/15/2019    Procedure: ILIAC CREST BONE GRAFT LEFT;  Surgeon: Elodia Carolina MD;  Location: [facility] OR;  Service: Orthopedics    INCISION AND DRAINAGE LEG Right 12/04/2020    Procedure: INCISION AND DRAINAGE WITH COMPONENT EXCHANGE, HEAD AND LINER RIGHT HIP;  Surgeon: Osbaldo Olivas MD;  Location: [facility] OR;  Service: Orthopedics;  Laterality: Right;    JOINT REPLACEMENT  8/4/20 and 12/4/20    Right hip replacement    KNEE SURGERY      TOE FUSION Left 10/15/2019    Procedure: TRIPLE ARTHODESIS LEFT;  Surgeon: Elodia Carolina MD;  Location: [facility] OR;  Service: Orthopedics    TONSILLECTOMY      TOTAL HIP ARTHROPLASTY Right 08/04/2020    Procedure: TOTAL HIP ARTHROPLASTY RIGHT;  Surgeon: Osbaldo Olivas MD;  Location:  [facility] OR;   Service: Orthopedics;  Laterality: Right;    TOTAL KNEE ARTHROPLASTY Left 03/10/2023    Procedure: TOTAL KNEE ARTHROPLASTY WITH CORI ROBOT - LEFT;  Surgeon: Osbaldo Olivas MD;  Location: [facility];  Service: Robotics - Ortho;  Laterality: Left;    TRIGGER POINT INJECTION      UMBILICAL HERNIA REPAIR      Repaired over 15 years ago.    VENOUS ABLATION Left 01/21/2022    Endovenous laser ablation of left great sapheuos vein for venous insufficiency       Social History     Socioeconomic History    Marital status:     Number of children: 3   Tobacco Use    Smoking status: Never     Passive exposure: Never    Smokeless tobacco: Never   Vaping Use    Vaping status: Never Used   Substance and Sexual Activity    Alcohol use: Not Currently     Comment: One drink 2-3x per year.    Drug use: No    Sexual activity: Yes     Partners: Female     Birth control/protection: None         Current Outpatient Medications:     acetaminophen (TYLENOL) 500 MG tablet, Take 1 tablet by mouth Every 4 (Four) to 6 (Six) Hours As Needed., Disp: , Rfl:     ALPRAZolam (XANAX) 0.25 MG tablet, Take 1/2 to 1 tablet prn when flying, Disp: 10 tablet, Rfl: 0    Ascorbic Acid (VITAMIN C PO), Take  by mouth Daily., Disp: , Rfl:     atenolol (TENORMIN) 50 MG tablet, Take 1 tablet by mouth Daily., Disp: 90 tablet, Rfl: 3    atorvastatin (LIPITOR) 10 MG tablet, Take 1 tablet by mouth Daily. Resume when not on Dapto anymore, Disp: 90 tablet, Rfl: 3    buPROPion XL (WELLBUTRIN XL) 150 MG 24 hr tablet, Take 1 tablet by mouth Daily., Disp: 90 tablet, Rfl: 3    Cyanocobalamin (VITAMIN B-12 ER PO), Take 1 tablet by mouth Daily., Disp: , Rfl:     cyclobenzaprine (FLEXERIL) 10 MG tablet, Take 1 tablet by mouth 3 (Three) Times a Day As Needed for Muscle Spasms., Disp: 30 tablet, Rfl: 5    doxycycline (VIBRAMYCIN) 100 MG capsule, Take 1 capsule by mouth 2 (Two) Times a Day., Disp: , Rfl:     gabapentin (NEURONTIN) 300 MG capsule, Take 1 capsule by mouth 2  "(Two) Times a Day., Disp: , Rfl:     hydroxychloroquine (PLAQUENIL) 200 MG tablet, TAKE 1 TABLET BY MOUTH 2 TIMES EVERY DAY, Disp: 60 tablet, Rfl: 2    LISINOPRIL PO, Take 12.5 tablets by mouth Daily., Disp: , Rfl:     lisinopril-hydrochlorothiazide (PRINZIDE,ZESTORETIC) 20-12.5 MG per tablet, Take 1 tablet by mouth Daily., Disp: 90 tablet, Rfl: 3    meclizine (ANTIVERT) 25 MG tablet, 1 tablet Every 8 (Eight) Hours As Needed., Disp: , Rfl:     multivitamin (THERAGRAN) tablet tablet, , Disp: , Rfl:     nystatin (MYCOSTATIN) 944058 UNIT/GM cream, , Disp: , Rfl:     oxaprozin (DAYPRO) 600 MG tablet, Take 2 tablets by mouth Daily., Disp: 60 tablet, Rfl: 5    prednisoLONE acetate (PRED FORTE) 1 % ophthalmic suspension, Apply 1 drop to eye(s) as directed by provider 4 (Four) Times a Day., Disp: , Rfl:     promethazine (PHENERGAN) 25 MG tablet, Take 1 tablet by mouth Every 6 (Six) Hours As Needed for Nausea or Vomiting., Disp: 20 tablet, Rfl: 1    SUMAtriptan (IMITREX) 100 MG tablet, TAKE ONE TABLET AT ONSET OF HEADACHE. MAY REPEAT DOSE ONE TIME IN 2 HOURS IF HEADACHE NOT RELIEVED., Disp: 9 tablet, Rfl: 3    Tirzepatide-Weight Management (ZEPBOUND) 7.5 MG/0.5ML solution auto-injector, Inject 0.5 mL under the skin into the appropriate area as directed 1 (One) Time Per Week., Disp: 2 mL, Rfl: 2    traMADol (ULTRAM) 50 MG tablet, Take 2 tablets by mouth Every 12 (Twelve) Hours As Needed for Severe Pain., Disp: 60 tablet, Rfl: 5    triamcinolone (KENALOG) 0.025 % cream, , Disp: , Rfl:     sulfamethoxazole-trimethoprim (Bactrim DS) 800-160 MG per tablet, Take 1 tablet by mouth 2 (Two) Times a Day for 5 days., Disp: 10 tablet, Rfl: 0    Objective     Vital Signs  /98   Pulse 72   Ht 175.3 cm (69\")   Wt 51.6 kg (113 lb 12.8 oz)   SpO2 98%   BMI 16.81 kg/m²   Estimated body mass index is 16.81 kg/m² as calculated from the following:    Height as of this encounter: 175.3 cm (69\").    Weight as of this encounter: 51.6 kg " (113 lb 12.8 oz).           Physical Exam  Constitutional:       Appearance: Normal appearance.   HENT:      Head: Normocephalic and atraumatic.      Nose: Nose normal.   Eyes:      Extraocular Movements: Extraocular movements intact.      Conjunctiva/sclera: Conjunctivae normal.      Pupils: Pupils are equal, round, and reactive to light.   Cardiovascular:      Rate and Rhythm: Normal rate.   Pulmonary:      Effort: Pulmonary effort is normal. No respiratory distress.   Abdominal:      General: Bowel sounds are normal.      Palpations: Abdomen is soft.      Tenderness: There is abdominal tenderness.      Comments: Tenderness to left lower quadrant   Musculoskeletal:         General: Normal range of motion.      Cervical back: Normal range of motion and neck supple.   Skin:     General: Skin is warm and dry.   Neurological:      General: No focal deficit present.      Mental Status: He is alert and oriented to person, place, and time. Mental status is at baseline.   Psychiatric:         Mood and Affect: Mood normal.         Behavior: Behavior normal.         Thought Content: Thought content normal.         Judgment: Judgment normal.          Assessment and Plan     Diagnoses and all orders for this visit:    1. Left lower quadrant abdominal pain (Primary)  -     CBC (No Diff)  -     Comprehensive metabolic panel  -     Lipase  -     Amylase  -     CT Abdomen Pelvis Without Contrast; Future  -     Urine Culture - Urine, Urine, Clean Catch; Future  -     Urine Culture - Urine, Urine, Clean Catch    2. Side pain  -     POCT urinalysis dipstick, automated  -     CBC (No Diff)  -     Comprehensive metabolic panel  -     Lipase  -     Amylase  -     CT Abdomen Pelvis Without Contrast; Future  -     Urine Culture - Urine, Urine, Clean Catch; Future  -     Urine Culture - Urine, Urine, Clean Catch    3. Flank pain    Plan:  UA in the office today bland.    Will send urine for culture.  Patient will have labs done  today.  Will review lab results with patient once received and reviewed.  Further plan of care based on lab and culture results.  Orders placed for CT of the abdomen and pelvis without to further evaluate left lower quadrant pain.  Continue with adequate oral hydration.  Advised to follow a bland diet.  Follow-up with Dr. Hahn.      Follow Up  Return if symptoms worsen or fail to improve, for Follow up with Dr. Hahn.    YASMIN Singh    Part of this note may be an electronic transcription/translation of spoken language to printed text using the Dragon Dictation System.

## 2024-08-15 DIAGNOSIS — R82.79 POSITIVE URINE CULTURE: Primary | ICD-10-CM

## 2024-08-15 LAB — BACTERIA SPEC AEROBE CULT: ABNORMAL

## 2024-08-15 RX ORDER — SULFAMETHOXAZOLE AND TRIMETHOPRIM 800; 160 MG/1; MG/1
1 TABLET ORAL 2 TIMES DAILY
Qty: 10 TABLET | Refills: 0 | Status: SHIPPED | OUTPATIENT
Start: 2024-08-15 | End: 2024-08-20

## 2024-08-16 ENCOUNTER — TELEPHONE (OUTPATIENT)
Dept: INTERNAL MEDICINE | Facility: CLINIC | Age: 74
End: 2024-08-16
Payer: MEDICARE

## 2024-08-16 NOTE — TELEPHONE ENCOUNTER
----- Message from Merari Thibodeaux sent at 8/15/2024  9:26 PM EDT -----  Please let the patient know his urine culture came back positive for bacteria.  I have sent an antibiotic to his pharmacy.  He will take it twice daily for 5 days.  Please have him continue with good hydration.

## 2024-08-21 ENCOUNTER — HOSPITAL ENCOUNTER (OUTPATIENT)
Dept: CT IMAGING | Facility: HOSPITAL | Age: 74
Discharge: HOME | End: 2024-08-21
Admitting: NURSE PRACTITIONER
Payer: MEDICARE

## 2024-08-21 DIAGNOSIS — R10.32 LEFT LOWER QUADRANT ABDOMINAL PAIN: ICD-10-CM

## 2024-08-21 DIAGNOSIS — R10.9 SIDE PAIN: ICD-10-CM

## 2024-08-21 PROCEDURE — 74176 CT ABD & PELVIS W/O CONTRAST: CPT

## 2024-08-30 ENCOUNTER — OFFICE VISIT (OUTPATIENT)
Dept: INTERNAL MEDICINE | Facility: CLINIC | Age: 74
End: 2024-08-30
Payer: MEDICARE

## 2024-08-30 VITALS
DIASTOLIC BLOOD PRESSURE: 78 MMHG | OXYGEN SATURATION: 95 % | HEIGHT: 69 IN | BODY MASS INDEX: 46.48 KG/M2 | SYSTOLIC BLOOD PRESSURE: 134 MMHG | WEIGHT: 313.8 LBS | TEMPERATURE: 97.2 F

## 2024-08-30 DIAGNOSIS — R10.32 LLQ PAIN: ICD-10-CM

## 2024-08-30 DIAGNOSIS — K57.90 DIVERTICULOSIS: ICD-10-CM

## 2024-08-30 DIAGNOSIS — N30.00 ACUTE CYSTITIS WITHOUT HEMATURIA: Primary | ICD-10-CM

## 2024-08-30 PROBLEM — Z80.42 FAMILY HISTORY OF PROSTATE CANCER IN FATHER: Status: ACTIVE | Noted: 2024-08-30

## 2024-08-30 LAB
BILIRUB BLD-MCNC: NEGATIVE MG/DL
CLARITY, POC: CLEAR
COLOR UR: YELLOW
EXPIRATION DATE: ABNORMAL
GLUCOSE UR STRIP-MCNC: NEGATIVE MG/DL
KETONES UR QL: NEGATIVE
LEUKOCYTE EST, POC: ABNORMAL
Lab: ABNORMAL
NITRITE UR-MCNC: NEGATIVE MG/ML
PH UR: 5.5 [PH] (ref 5–8)
PROT UR STRIP-MCNC: NEGATIVE MG/DL
RBC # UR STRIP: NEGATIVE /UL
SP GR UR: 1.03 (ref 1–1.03)
UROBILINOGEN UR QL: NORMAL

## 2024-08-30 PROCEDURE — 3075F SYST BP GE 130 - 139MM HG: CPT | Performed by: INTERNAL MEDICINE

## 2024-08-30 PROCEDURE — 3078F DIAST BP <80 MM HG: CPT | Performed by: INTERNAL MEDICINE

## 2024-08-30 PROCEDURE — 99214 OFFICE O/P EST MOD 30 MIN: CPT | Performed by: INTERNAL MEDICINE

## 2024-08-30 PROCEDURE — 81003 URINALYSIS AUTO W/O SCOPE: CPT | Performed by: INTERNAL MEDICINE

## 2024-08-30 PROCEDURE — 87086 URINE CULTURE/COLONY COUNT: CPT | Performed by: INTERNAL MEDICINE

## 2024-08-30 PROCEDURE — 1125F AMNT PAIN NOTED PAIN PRSNT: CPT | Performed by: INTERNAL MEDICINE

## 2024-08-30 RX ORDER — LEVOFLOXACIN 500 MG/1
500 TABLET, FILM COATED ORAL DAILY
Qty: 7 TABLET | Refills: 0 | Status: SHIPPED | OUTPATIENT
Start: 2024-08-30

## 2024-08-30 RX ORDER — OMEGA-3S/DHA/EPA/FISH OIL 1000-1400
1 CAPSULE,DELAYED RELEASE (ENTERIC COATED) ORAL DAILY
Qty: 30 TABLET | Refills: 0 | Status: SHIPPED | OUTPATIENT
Start: 2024-08-30 | End: 2024-09-29

## 2024-08-30 RX ORDER — AMOXICILLIN 250 MG
1-2 CAPSULE ORAL DAILY
Qty: 20 TABLET | Refills: 0 | Status: SHIPPED | OUTPATIENT
Start: 2024-08-30

## 2024-08-30 NOTE — PROGRESS NOTES
Abdominal Pain (Left side)    Subjective   Obi Jackman Jr. is a 73 y.o. male is here today for follow-up.    Abdominal Pain  This is a recurrent problem. The current episode started more than 1 month ago. The onset quality is undetermined. The problem occurs 2 to 4 times per day. The most recent episode lasted 12 hours. The problem has been coming and going. The pain is located in the LLQ. The pain is at a severity of 7/10. The quality of the pain is sharp. The abdominal pain radiates to the LLQ and left flank. Pertinent negatives include no anorexia, belching, constipation, diarrhea, dysuria, fever, flatus, frequency, hematochezia, hematuria, melena, myalgias, nausea, vomiting or weight loss. The pain is aggravated by certain positions and palpation. The pain is relieved by Movement and standing. Prior diagnostic workup includes CT scan.     Continues to have LLQ pain. Better, and not as intense as it was a month ago, but when it hits, its hard, lasts about a couple of minutes  UTI- s/p antibiotics.       Current Outpatient Medications:     acetaminophen (TYLENOL) 500 MG tablet, Take 1 tablet by mouth Every 4 (Four) to 6 (Six) Hours As Needed., Disp: , Rfl:     ALPRAZolam (XANAX) 0.25 MG tablet, Take 1/2 to 1 tablet prn when flying, Disp: 10 tablet, Rfl: 0    Ascorbic Acid (VITAMIN C PO), Take  by mouth Daily., Disp: , Rfl:     atenolol (TENORMIN) 50 MG tablet, Take 1 tablet by mouth Daily., Disp: 90 tablet, Rfl: 3    atorvastatin (LIPITOR) 10 MG tablet, Take 1 tablet by mouth Daily. Resume when not on Dapto anymore, Disp: 90 tablet, Rfl: 3    buPROPion XL (WELLBUTRIN XL) 150 MG 24 hr tablet, Take 1 tablet by mouth Daily., Disp: 90 tablet, Rfl: 3    Cyanocobalamin (VITAMIN B-12 ER PO), Take 1 tablet by mouth Daily., Disp: , Rfl:     cyclobenzaprine (FLEXERIL) 10 MG tablet, Take 1 tablet by mouth 3 (Three) Times a Day As Needed for Muscle Spasms., Disp: 30 tablet, Rfl: 5    doxycycline (VIBRAMYCIN) 100 MG  capsule, Take 1 capsule by mouth 2 (Two) Times a Day., Disp: , Rfl:     gabapentin (NEURONTIN) 300 MG capsule, Take 1 capsule by mouth 2 (Two) Times a Day., Disp: , Rfl:     hydroxychloroquine (PLAQUENIL) 200 MG tablet, TAKE 1 TABLET BY MOUTH 2 TIMES EVERY DAY, Disp: 60 tablet, Rfl: 2    LISINOPRIL PO, Take 12.5 tablets by mouth Daily., Disp: , Rfl:     lisinopril-hydrochlorothiazide (PRINZIDE,ZESTORETIC) 20-12.5 MG per tablet, Take 1 tablet by mouth Daily., Disp: 90 tablet, Rfl: 3    meclizine (ANTIVERT) 25 MG tablet, 1 tablet Every 8 (Eight) Hours As Needed., Disp: , Rfl:     multivitamin (THERAGRAN) tablet tablet, , Disp: , Rfl:     nystatin (MYCOSTATIN) 498317 UNIT/GM cream, , Disp: , Rfl:     oxaprozin (DAYPRO) 600 MG tablet, Take 2 tablets by mouth Daily., Disp: 60 tablet, Rfl: 5    prednisoLONE acetate (PRED FORTE) 1 % ophthalmic suspension, Apply 1 drop to eye(s) as directed by provider 4 (Four) Times a Day., Disp: , Rfl:     promethazine (PHENERGAN) 25 MG tablet, Take 1 tablet by mouth Every 6 (Six) Hours As Needed for Nausea or Vomiting., Disp: 20 tablet, Rfl: 1    SUMAtriptan (IMITREX) 100 MG tablet, TAKE ONE TABLET AT ONSET OF HEADACHE. MAY REPEAT DOSE ONE TIME IN 2 HOURS IF HEADACHE NOT RELIEVED., Disp: 9 tablet, Rfl: 3    Tirzepatide-Weight Management (ZEPBOUND) 7.5 MG/0.5ML solution auto-injector, Inject 0.5 mL under the skin into the appropriate area as directed 1 (One) Time Per Week., Disp: 2 mL, Rfl: 2    traMADol (ULTRAM) 50 MG tablet, Take 2 tablets by mouth Every 12 (Twelve) Hours As Needed for Severe Pain., Disp: 60 tablet, Rfl: 5    triamcinolone (KENALOG) 0.025 % cream, , Disp: , Rfl:     Fiber Adult Gummies 2 g chewable tablet, Chew 1 each Daily for 30 days., Disp: 30 tablet, Rfl: 0    levoFLOXacin (Levaquin) 500 MG tablet, Take 1 tablet by mouth Daily., Disp: 7 tablet, Rfl: 0    sennosides-docusate (senna-docusate sodium) 8.6-50 MG per tablet, Take 1-2 tablets by mouth Daily., Disp: 20  "tablet, Rfl: 0      The following portions of the patient's history were reviewed and updated as appropriate: allergies, current medications, past family history, past medical history, past social history, past surgical history and problem list.    Review of Systems   Constitutional:  Negative for fever and weight loss.   Gastrointestinal:  Positive for abdominal pain. Negative for anorexia, constipation, diarrhea, flatus, hematochezia, melena, nausea and vomiting.   Genitourinary:  Negative for dysuria, frequency and hematuria.   Musculoskeletal:  Negative for myalgias.       Objective   /78 (BP Location: Left arm, Patient Position: Sitting, Cuff Size: Adult)   Temp 97.2 °F (36.2 °C) (Temporal)   Ht 175 cm (68.9\")   Wt (!) 142 kg (313 lb 12.8 oz)   SpO2 95%   BMI 46.48 kg/m²   Physical Exam  Vitals and nursing note reviewed.   Constitutional:       Appearance: He is well-developed.   HENT:      Head: Normocephalic and atraumatic.      Right Ear: External ear normal.      Left Ear: External ear normal.      Mouth/Throat:      Pharynx: No oropharyngeal exudate.   Eyes:      Conjunctiva/sclera: Conjunctivae normal.      Pupils: Pupils are equal, round, and reactive to light.   Neck:      Thyroid: No thyromegaly.   Cardiovascular:      Rate and Rhythm: Normal rate and regular rhythm.      Pulses: Normal pulses.      Heart sounds: Normal heart sounds. No murmur heard.     No friction rub. No gallop.   Pulmonary:      Effort: Pulmonary effort is normal.      Breath sounds: Normal breath sounds.   Abdominal:      General: Bowel sounds are normal. Bowel sounds are increased. There is no distension.      Palpations: Abdomen is soft.      Tenderness: There is no abdominal tenderness in the left lower quadrant. There is no guarding or rebound.   Musculoskeletal:      Cervical back: Neck supple.   Skin:     General: Skin is warm and dry.   Neurological:      Mental Status: He is alert and oriented to person, place, " and time.      Cranial Nerves: No cranial nerve deficit.   Psychiatric:         Judgment: Judgment normal.                 Results for orders placed or performed in visit on 08/30/24   Urine Culture - Urine, Urine, Clean Catch    Specimen: Urine, Clean Catch   Result Value Ref Range    Urine Culture <25,000 CFU/mL Mixed Vicki Isolated    POCT urinalysis dipstick, automated    Specimen: Urine   Result Value Ref Range    Color Yellow Yellow, Straw, Dark Yellow, Debbie    Clarity, UA Clear Clear    Specific Gravity  1.030 1.005 - 1.030    pH, Urine 5.5 5.0 - 8.0    Leukocytes Trace (A) Negative    Nitrite, UA Negative Negative    Protein, POC Negative Negative mg/dL    Glucose, UA Negative Negative mg/dL    Ketones, UA Negative Negative    Urobilinogen, UA Normal Normal, 0.2 E.U./dL    Bilirubin Negative Negative    Blood, UA Negative Negative    Lot Number [id number]     Expiration Date 11/08/2025              Assessment & Plan   Diagnoses and all orders for this visit:    Acute cystitis without hematuria  -     POCT urinalysis dipstick, automated  -     Urine Culture - Urine, Urine, Clean Catch; Future  -     Urine Culture - Urine, Urine, Clean Catch  -     levoFLOXacin (Levaquin) 500 MG tablet; Take 1 tablet by mouth Daily.  -     Urinalysis With Culture If Indicated - Urine, Clean Catch; Future    LLQ pain  -     sennosides-docusate (senna-docusate sodium) 8.6-50 MG per tablet; Take 1-2 tablets by mouth Daily.    Diverticulosis  -     Fiber Adult Gummies 2 g chewable tablet; Chew 1 each Daily for 30 days.           Likely UTI as bactrim had a NORA < 20.treat as above.and Recheck UA w/ cx in 2 - 3 weeks    Also adv to increase fluids and add fiber and stool softener to regimen as CT did show retained feces.    Return for Next scheduled follow up.    Electronically signed by:    Varsha Hahn MD   Answers submitted by the patient for this visit:  Primary Reason for Visit (Submitted on 8/28/2024)  What is the  primary reason for your visit?: Abdominal Pain

## 2024-09-01 LAB — BACTERIA SPEC AEROBE CULT: NORMAL

## 2024-09-09 ENCOUNTER — OFFICE VISIT (OUTPATIENT)
Dept: ORTHOPEDIC SURGERY | Facility: CLINIC | Age: 74
End: 2024-09-09
Payer: MEDICARE

## 2024-09-09 VITALS
DIASTOLIC BLOOD PRESSURE: 64 MMHG | HEIGHT: 69 IN | SYSTOLIC BLOOD PRESSURE: 122 MMHG | WEIGHT: 313 LBS | BODY MASS INDEX: 46.36 KG/M2

## 2024-09-09 DIAGNOSIS — M17.11 PRIMARY OSTEOARTHRITIS OF RIGHT KNEE: Primary | ICD-10-CM

## 2024-09-09 PROCEDURE — 3074F SYST BP LT 130 MM HG: CPT | Performed by: ORTHOPAEDIC SURGERY

## 2024-09-09 PROCEDURE — 1160F RVW MEDS BY RX/DR IN RCRD: CPT | Performed by: ORTHOPAEDIC SURGERY

## 2024-09-09 PROCEDURE — 3078F DIAST BP <80 MM HG: CPT | Performed by: ORTHOPAEDIC SURGERY

## 2024-09-09 PROCEDURE — 1159F MED LIST DOCD IN RCRD: CPT | Performed by: ORTHOPAEDIC SURGERY

## 2024-09-09 PROCEDURE — 20610 DRAIN/INJ JOINT/BURSA W/O US: CPT | Performed by: ORTHOPAEDIC SURGERY

## 2024-09-09 RX ORDER — ROPIVACAINE HYDROCHLORIDE 5 MG/ML
4 INJECTION, SOLUTION EPIDURAL; INFILTRATION; PERINEURAL
Status: COMPLETED | OUTPATIENT
Start: 2024-09-09 | End: 2024-09-09

## 2024-09-09 RX ORDER — TRIAMCINOLONE ACETONIDE 40 MG/ML
40 INJECTION, SUSPENSION INTRA-ARTICULAR; INTRAMUSCULAR
Status: COMPLETED | OUTPATIENT
Start: 2024-09-09 | End: 2024-09-09

## 2024-09-09 RX ADMIN — ROPIVACAINE HYDROCHLORIDE 4 ML: 5 INJECTION, SOLUTION EPIDURAL; INFILTRATION; PERINEURAL at 15:55

## 2024-09-09 RX ADMIN — TRIAMCINOLONE ACETONIDE 40 MG: 40 INJECTION, SUSPENSION INTRA-ARTICULAR; INTRAMUSCULAR at 15:55

## 2024-09-09 NOTE — PROGRESS NOTES
[facility] [place] [facility] Note    Subjective     Chief Complaint   Patient presents with    Follow-up     6 month follow up -Primary osteoarthritis of right knee        HPI    It has been 6  month(s) since Mr. Jackman's last visit. He returns to clinic today for follow-up of right knee arthritis. The issue has been ongoing for several  year(s). He rates his pain a 6/10 on the pain scale. Previous/current treatments: bracing, NSAIDS, physical therapy, and steroid injection (last injection 3/6/24). Current symptoms: pain. The pain is worse with walking, standing, and climbing stairs; resting and pain medication and/or NSAID improve the pain. Overall, he is doing better.  He would like to have an injection today.  Previous injection provided relief.      I have reviewed the following portions of the patient's history and agree with: History of Present Illness and Review of Systems    Patient Active Problem List   Diagnosis    Allergic rhinitis    Anxiety disorder    Benign paroxysmal positional vertigo    Chronic tension type headache    Depression    ED (erectile dysfunction) of non-organic origin    Hyperlipidemia    Hypertension    LFTs abnormal    CHRISTOPHE (obstructive sleep apnea)    Elevated glucose    Health care maintenance    Morbid obesity due to excess calories    Onychomycosis of left great toe    Osteoarthritis of ankle or foot    Venous stasis    Neuropathy    Class 3 severe obesity due to excess calories with serious comorbidity and body mass index (BMI) of 40.0 to 44.9 in adult    Pre-op evaluation    Arthritis of foot, left    S/P left ankle triple arthrodesis    Acute blood loss anemia, mild, asymptomatic     Acute postoperative pain    Wound infection    Primary osteoarthritis of right hip    Status post total replacement of right hip    Postoperative wound infection of right hip, s/p I and D, head and liner exchange.    Primary osteoarthritis of left knee    S/P total knee arthroplasty, left     Food poisoning    Joint pain    Rheumatoid arthritis of multiple sites with negative rheumatoid factor    Infection and inflammatory reaction due to internal right hip prosthesis, sequela    Pain management    High risk medication use    Family history of prostate cancer in father     Past Medical History:   Diagnosis Date    Anxiety and depression     Arthritis of neck ?    Arthrodesis present     lt foot triple    Bulging of lumbar intervertebral disc     Cataract     Lens implated in left eye.    Cervical disc disorder ?    Cornea transplant recipient     BILATERAL    COVID-19 11/2021    S/p MAB    CPAP (continuous positive airway pressure) dependence     CTS (carpal tunnel syndrome) Slight case; Dr. Chanda Castanon    DDD (degenerative disc disease), lumbar     09/09/201s/p injection Dr Abel Abbott    Diverticulosis     Elevated cholesterol     Glaucoma July 2022    Drops daily.    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    Neuropathy     bilatral feet    Obesity     CHRISTOPHE on CPAP     Osteopenia     Periarthritis of shoulder ?    Posterior tibial tendinitis, left     09/19/2019 pending-10/19 with Dr Carolina    Rheumatoid arthritis     Rotator cuff syndrome 09/15/2022    Dr. Chanda Castanon    Swelling of left ear     Venous stasis 09/09/2019    priti lower extremities    Visual impairment     Cornea grafts both eyes; Cataracts.    Wears contact lenses     Wears contact lenses     Wears contact lenses       Past Surgical History:   Procedure Laterality Date    ACHILLES TENDON SURGERY Left 10/15/2019    Procedure: ACHILLES TENDON LENGTHENING LEFT;  Surgeon: Elodia Carolina MD;  Location: [facility];  Service: Orthopedics    CATARACT EXTRACTION Right     COLONOSCOPY  2015    EYE SURGERY Bilateral     cornea transplant     FOOT SURGERY Left 10/15/2019     triple arthrodesis and achilles tendon lengthening- [place]    HERNIA REPAIR      abdominal    ILIAC CREST BONE GRAFT Left 10/15/2019    Procedure: ILIAC CREST BONE GRAFT LEFT;  Surgeon: Elodia Guzman MD;  Location:  [facility];  Service: Orthopedics    INCISION AND DRAINAGE LEG Right 2020    Procedure: INCISION AND DRAINAGE WITH COMPONENT EXCHANGE, HEAD AND LINER RIGHT HIP;  Surgeon: Osbaldo Olivas MD;  Location:  [facility];  Service: Orthopedics;  Laterality: Right;    JOINT REPLACEMENT  20 and 20    Right hip replacement    KNEE SURGERY      TOE FUSION Left 10/15/2019    Procedure: TRIPLE ARTHODESIS LEFT;  Surgeon: Elodia Guzman MD;  Location:  [facility];  Service: Orthopedics    TONSILLECTOMY      TOTAL HIP ARTHROPLASTY Right 2020    Procedure: TOTAL HIP ARTHROPLASTY RIGHT;  Surgeon: Osbaldo Olivas MD;  Location:  [facility];  Service: Orthopedics;  Laterality: Right;    TOTAL KNEE ARTHROPLASTY Left 03/10/2023    Procedure: TOTAL KNEE ARTHROPLASTY WITH CORI ROBOT - LEFT;  Surgeon: Osbaldo Olivas MD;  Location:  [facility];  Service: Robotics - Ortho;  Laterality: Left;    TRIGGER POINT INJECTION      UMBILICAL HERNIA REPAIR      Repaired over 15 years ago.    VENOUS ABLATION Left 2022    Endovenous laser ablation of left great sapheuos vein for venous insufficiency      Family History   Problem Relation Age of Onset    No Known Problems Mother     Alcohol abuse Father     Prostate cancer Father     Cancer Father          - prostate/bladder cancer    Cancer Sister         Stomach cancer.    Cancer Brother         Kidney removed 23 due renal cell  carcenoma    Multiple sclerosis Son     Cancer Maternal Grandmother      Social History     Socioeconomic History    Marital status:     Number of children: 3   Tobacco Use    Smoking status: Never     Passive exposure: Never    Smokeless tobacco: Never   Vaping Use    Vaping status: Never Used   Substance and Sexual  Activity    Alcohol use: Not Currently     Comment: One drink 2-3x per year.    Drug use: No    Sexual activity: Yes     Partners: Female     Birth control/protection: None      Current Outpatient Medications on File Prior to Visit   Medication Sig Dispense Refill    acetaminophen (TYLENOL) 500 MG tablet Take 1 tablet by mouth Every 4 (Four) to 6 (Six) Hours As Needed.      ALPRAZolam (XANAX) 0.25 MG tablet Take 1/2 to 1 tablet prn when flying 10 tablet 0    Ascorbic Acid (VITAMIN C PO) Take  by mouth Daily.      atenolol (TENORMIN) 50 MG tablet Take 1 tablet by mouth Daily. 90 tablet 3    atorvastatin (LIPITOR) 10 MG tablet Take 1 tablet by mouth Daily. Resume when not on Dapto anymore 90 tablet 3    buPROPion XL (WELLBUTRIN XL) 150 MG 24 hr tablet Take 1 tablet by mouth Daily. 90 tablet 3    Cyanocobalamin (VITAMIN B-12 ER PO) Take 1 tablet by mouth Daily.      cyclobenzaprine (FLEXERIL) 10 MG tablet Take 1 tablet by mouth 3 (Three) Times a Day As Needed for Muscle Spasms. 30 tablet 5    doxycycline (VIBRAMYCIN) 100 MG capsule Take 1 capsule by mouth 2 (Two) Times a Day.      Fiber Adult Gummies 2 g chewable tablet Chew 1 each Daily for 30 days. 30 tablet 0    gabapentin (NEURONTIN) 300 MG capsule Take 1 capsule by mouth 2 (Two) Times a Day.      hydroxychloroquine (PLAQUENIL) 200 MG tablet TAKE 1 TABLET BY MOUTH 2 TIMES EVERY DAY 60 tablet 2    levoFLOXacin (Levaquin) 500 MG tablet Take 1 tablet by mouth Daily. 7 tablet 0    LISINOPRIL PO Take 12.5 tablets by mouth Daily.      lisinopril-hydrochlorothiazide (PRINZIDE,ZESTORETIC) 20-12.5 MG per tablet Take 1 tablet by mouth Daily. 90 tablet 3    meclizine (ANTIVERT) 25 MG tablet 1 tablet Every 8 (Eight) Hours As Needed.      multivitamin (THERAGRAN) tablet tablet       nystatin (MYCOSTATIN) 600376 UNIT/GM cream       oxaprozin (DAYPRO) 600 MG tablet Take 2 tablets by mouth Daily. 60 tablet 5    prednisoLONE acetate (PRED FORTE) 1 % ophthalmic suspension Apply 1  drop to eye(s) as directed by provider 4 (Four) Times a Day.      promethazine (PHENERGAN) 25 MG tablet Take 1 tablet by mouth Every 6 (Six) Hours As Needed for Nausea or Vomiting. 20 tablet 1    sennosides-docusate (senna-docusate sodium) 8.6-50 MG per tablet Take 1-2 tablets by mouth Daily. 20 tablet 0    SUMAtriptan (IMITREX) 100 MG tablet TAKE ONE TABLET AT ONSET OF HEADACHE. MAY REPEAT DOSE ONE TIME IN 2 HOURS IF HEADACHE NOT RELIEVED. 9 tablet 3    Tirzepatide-Weight Management (ZEPBOUND) 7.5 MG/0.5ML solution auto-injector Inject 0.5 mL under the skin into the appropriate area as directed 1 (One) Time Per Week. 2 mL 2    traMADol (ULTRAM) 50 MG tablet Take 2 tablets by mouth Every 12 (Twelve) Hours As Needed for Severe Pain. 60 tablet 5    triamcinolone (KENALOG) 0.025 % cream        No current facility-administered medications on file prior to visit.      No Known Allergies     Review of Systems   Constitutional:  Negative for activity change, appetite change, chills, diaphoresis, fatigue, fever and unexpected weight change.   HENT:  Negative for congestion, dental problem, drooling, ear discharge, ear pain, facial swelling, hearing loss, mouth sores, nosebleeds, postnasal drip, rhinorrhea, sinus pressure, sneezing, sore throat, tinnitus, trouble swallowing and voice change.    Eyes:  Negative for photophobia, pain, discharge, redness, itching and visual disturbance.   Respiratory:  Negative for apnea, cough, choking, chest tightness, shortness of breath, wheezing and stridor.    Cardiovascular:  Negative for chest pain, palpitations and leg swelling.   Gastrointestinal:  Negative for abdominal distention, abdominal pain, anal bleeding, blood in stool, constipation, diarrhea, nausea, rectal pain and vomiting.   Endocrine: Negative for cold intolerance, heat intolerance, polydipsia, polyphagia and polyuria.   Genitourinary:  Negative for decreased urine volume, difficulty urinating, dysuria, enuresis, flank  "pain, frequency, genital sores, hematuria and urgency.   Musculoskeletal:  Positive for arthralgias. Negative for back pain, gait problem, joint swelling, myalgias, neck pain and neck stiffness.   Skin:  Negative for color change, pallor, rash and wound.   Allergic/Immunologic: Negative for environmental allergies, food allergies and immunocompromised state.   Neurological:  Negative for dizziness, tremors, seizures, syncope, facial asymmetry, speech difficulty, weakness, light-headedness, numbness and headaches.   Hematological:  Negative for adenopathy. Does not bruise/bleed easily.   Psychiatric/Behavioral:  Negative for agitation, behavioral problems, confusion, decreased concentration, dysphoric mood, hallucinations, self-injury, sleep disturbance and suicidal ideas. The patient is not nervous/anxious and is not hyperactive.         Objective      Physical Exam  /64   Ht 175 cm (68.9\")   Wt (!) 142 kg (313 lb)   BMI 46.36 kg/m²     Body mass index is 46.36 kg/m².         General:   Mental Status:  Alert   Appearance: Cooperative, in no acute distress   Build and Nutrition: Obese by BMI male   Orientation: Alert and oriented to person, place and time   Posture: Normal   Gait: Nonantalgic    Integument:              Right knee: no skin lesions, no rash, no ecchymosis     Lower Extremities:              Right Knee:                          Tenderness:    Medial/lateral joint line tenderness                          Effusion:          None                          Swelling:          None                          Crepitus:          Positive                          Atrophy:           None                          Range of motion:        Extension:       0°                                                              Flexion:           125°  Instability:        No varus laxity, no valgus laxity, negative anterior drawer  Deformities:     Varus    Imaging/Studies  Imaging Results (Last 24 Hours)       ** No " results found for the last 24 hours. **          No new imaging today.    Assessment and Plan     Diagnoses and all orders for this visit:    1. Primary osteoarthritis of right knee (Primary)  -     - Large Joint Arthrocentesis: R knee        1. Primary osteoarthritis of right knee          I reviewed my findings with the patient.  He would like an injection for his right knee today, which provided relief previously.  He is going on a trip in about 3 weeks.  I will see him back in 4 months with a new x-ray, but sooner for any problems.  Injection was provided today.    Procedure Note:  The potential benefits of performing a therapeutic right knee joint injection, as well as potential risks (including, but not limited to infection, swelling, pain, bleeding, bruising, nerve/blood vessel damage, skin color changes, transient elevation in blood glucose levels, and fat atrophy) were discussed with the patient.  After informed consent, timeout procedure was performed, and the skin on the right knee was prepped with chlorhexidine soap and alcohol, after which ethyl chloride was applied to the skin at the injection site. Via the anterolateral approach, 1ml of Kenalog 40mg/ml mixed with 4ml 0.5% ropivacaine plain was injected into the knee joint.  The patient tolerated the procedure well, experiencing 85% improvement a few minutes following the injection. There were no complications.  Band-Aid was applied to the injection site. Post-procedural instructions were given to the patient and/or their caregiver.      Return in about 4 months (around 1/9/2025).      Osbaldo Olivas MD  09/09/24  16:30 EDT    Dictated Utilizing Dragon Dictation

## 2024-09-09 NOTE — PROGRESS NOTES
Procedure   - Large Joint Arthrocentesis: R knee on 9/9/2024 3:55 PM  Indications: pain  Details: 21 G needle, anterolateral approach  Medications: 4 mL ropivacaine 0.5 %; 40 mg triamcinolone acetonide 40 MG/ML  Outcome: tolerated well, no immediate complications  Procedure, treatment alternatives, risks and benefits explained, specific risks discussed. Consent was given by the patient. Immediately prior to procedure a time out was called to verify the correct patient, procedure, equipment, support staff and site/side marked as required. Patient was prepped and draped in the usual sterile fashion.

## 2024-09-17 ENCOUNTER — OFFICE VISIT (OUTPATIENT)
Dept: INTERNAL MEDICINE | Facility: CLINIC | Age: 74
End: 2024-09-17
Payer: MEDICARE

## 2024-09-17 VITALS
SYSTOLIC BLOOD PRESSURE: 128 MMHG | DIASTOLIC BLOOD PRESSURE: 66 MMHG | OXYGEN SATURATION: 95 % | BODY MASS INDEX: 46.03 KG/M2 | TEMPERATURE: 97.2 F | HEIGHT: 69 IN | WEIGHT: 310.8 LBS

## 2024-09-17 DIAGNOSIS — Z23 NEED FOR VACCINATION: ICD-10-CM

## 2024-09-17 DIAGNOSIS — Z23 NEED FOR INFLUENZA VACCINATION: ICD-10-CM

## 2024-09-17 DIAGNOSIS — K57.92 DIVERTICULITIS: Primary | ICD-10-CM

## 2024-09-17 DIAGNOSIS — R10.32 LEFT LOWER QUADRANT ABDOMINAL PAIN: ICD-10-CM

## 2024-09-17 PROCEDURE — 90480 ADMN SARSCOV2 VAC 1/ONLY CMP: CPT | Performed by: INTERNAL MEDICINE

## 2024-09-17 PROCEDURE — 1159F MED LIST DOCD IN RCRD: CPT | Performed by: INTERNAL MEDICINE

## 2024-09-17 PROCEDURE — 3074F SYST BP LT 130 MM HG: CPT | Performed by: INTERNAL MEDICINE

## 2024-09-17 PROCEDURE — 90662 IIV NO PRSV INCREASED AG IM: CPT | Performed by: INTERNAL MEDICINE

## 2024-09-17 PROCEDURE — 1160F RVW MEDS BY RX/DR IN RCRD: CPT | Performed by: INTERNAL MEDICINE

## 2024-09-17 PROCEDURE — G0008 ADMIN INFLUENZA VIRUS VAC: HCPCS | Performed by: INTERNAL MEDICINE

## 2024-09-17 PROCEDURE — 3078F DIAST BP <80 MM HG: CPT | Performed by: INTERNAL MEDICINE

## 2024-09-17 PROCEDURE — 1125F AMNT PAIN NOTED PAIN PRSNT: CPT | Performed by: INTERNAL MEDICINE

## 2024-09-17 PROCEDURE — 99213 OFFICE O/P EST LOW 20 MIN: CPT | Performed by: INTERNAL MEDICINE

## 2024-09-17 PROCEDURE — 91320 SARSCV2 VAC 30MCG TRS-SUC IM: CPT | Performed by: INTERNAL MEDICINE

## 2024-10-29 ENCOUNTER — OFFICE VISIT (OUTPATIENT)
Dept: INTERNAL MEDICINE | Facility: CLINIC | Age: 74
End: 2024-10-29
Payer: MEDICARE

## 2024-10-29 VITALS
HEIGHT: 69 IN | OXYGEN SATURATION: 94 % | TEMPERATURE: 97.1 F | HEART RATE: 76 BPM | BODY MASS INDEX: 46.65 KG/M2 | DIASTOLIC BLOOD PRESSURE: 78 MMHG | SYSTOLIC BLOOD PRESSURE: 120 MMHG | WEIGHT: 315 LBS

## 2024-10-29 DIAGNOSIS — E78.2 MIXED HYPERLIPIDEMIA: ICD-10-CM

## 2024-10-29 DIAGNOSIS — F32.A DEPRESSION, UNSPECIFIED DEPRESSION TYPE: ICD-10-CM

## 2024-10-29 DIAGNOSIS — R10.32 LEFT LOWER QUADRANT ABDOMINAL PAIN: Primary | ICD-10-CM

## 2024-10-29 DIAGNOSIS — I10 PRIMARY HYPERTENSION: ICD-10-CM

## 2024-10-29 DIAGNOSIS — K57.92 DIVERTICULITIS: ICD-10-CM

## 2024-10-29 DIAGNOSIS — G44.229 CHRONIC TENSION-TYPE HEADACHE, NOT INTRACTABLE: ICD-10-CM

## 2024-10-29 RX ORDER — ONDANSETRON 4 MG/1
4 TABLET, ORALLY DISINTEGRATING ORAL EVERY 8 HOURS PRN
Qty: 20 TABLET | Refills: 2 | Status: SHIPPED | OUTPATIENT
Start: 2024-10-29

## 2024-10-29 RX ORDER — SUMATRIPTAN 100 MG/1
TABLET, FILM COATED ORAL
Qty: 9 TABLET | Refills: 3 | Status: SHIPPED | OUTPATIENT
Start: 2024-10-29

## 2024-10-29 RX ORDER — PROMETHAZINE HYDROCHLORIDE 25 MG/1
25 TABLET ORAL EVERY 6 HOURS PRN
Qty: 20 TABLET | Refills: 1 | Status: SHIPPED | OUTPATIENT
Start: 2024-10-29

## 2024-10-29 NOTE — PROGRESS NOTES
Abdominal Pain (Left lower, has gotten better)    Subjective   Obi Jackman Jr. is a 74 y.o. male is here today for follow-up.    History of Present Illness  Cut out the citrus fruit, and the zepbound, has been on 4 weeks.here for a f/u on his htn, hlp , obesity and diverticulitis.  Abdominal pain is better, has twinges on occasion. Last was 2-3 days ago, when he ate some salad.  Accompanied by [employer]orn[employer].      Current Outpatient Medications:     acetaminophen (TYLENOL) 500 MG tablet, Take 1 tablet by mouth Every 4 (Four) to 6 (Six) Hours As Needed., Disp: , Rfl:     ALPRAZolam (XANAX) 0.25 MG tablet, Take 1/2 to 1 tablet prn when flying, Disp: 10 tablet, Rfl: 0    Ascorbic Acid (VITAMIN C PO), Take  by mouth Daily., Disp: , Rfl:     atenolol (TENORMIN) 50 MG tablet, Take 1 tablet by mouth Daily., Disp: 90 tablet, Rfl: 3    atorvastatin (LIPITOR) 10 MG tablet, Take 1 tablet by mouth Daily. Resume when not on Dapto anymore, Disp: 90 tablet, Rfl: 3    buPROPion XL (WELLBUTRIN XL) 150 MG 24 hr tablet, Take 1 tablet by mouth Daily., Disp: 90 tablet, Rfl: 3    Cyanocobalamin (VITAMIN B-12 ER PO), Take 1 tablet by mouth Daily., Disp: , Rfl:     cyclobenzaprine (FLEXERIL) 10 MG tablet, Take 1 tablet by mouth 3 (Three) Times a Day As Needed for Muscle Spasms., Disp: 30 tablet, Rfl: 5    doxycycline (VIBRAMYCIN) 100 MG capsule, Take 1 capsule by mouth 2 (Two) Times a Day., Disp: , Rfl:     gabapentin (NEURONTIN) 300 MG capsule, Take 1 capsule by mouth 2 (Two) Times a Day., Disp: , Rfl:     hydroxychloroquine (PLAQUENIL) 200 MG tablet, TAKE 1 TABLET BY MOUTH 2 TIMES EVERY DAY, Disp: 60 tablet, Rfl: 2    LISINOPRIL PO, Take 12.5 tablets by mouth Daily., Disp: , Rfl:     lisinopril-hydrochlorothiazide (PRINZIDE,ZESTORETIC) 20-12.5 MG per tablet, Take 1 tablet by mouth Daily., Disp: 90 tablet, Rfl: 3    meclizine (ANTIVERT) 25 MG tablet, 1 tablet Every 8 (Eight) Hours As Needed., Disp: , Rfl:     multivitamin (THERAGRAN) tablet  tablet, , Disp: , Rfl:     nystatin (MYCOSTATIN) 599726 UNIT/GM cream, , Disp: , Rfl:     oxaprozin (DAYPRO) 600 MG tablet, Take 2 tablets by mouth Daily., Disp: 60 tablet, Rfl: 5    prednisoLONE acetate (PRED FORTE) 1 % ophthalmic suspension, Apply 1 drop to eye(s) as directed by provider 4 (Four) Times a Day., Disp: , Rfl:     promethazine (PHENERGAN) 25 MG tablet, Take 1 tablet by mouth Every 6 (Six) Hours As Needed for Nausea or Vomiting., Disp: 20 tablet, Rfl: 1    sennosides-docusate (senna-docusate sodium) 8.6-50 MG per tablet, Take 1-2 tablets by mouth Daily., Disp: 20 tablet, Rfl: 0    SUMAtriptan (IMITREX) 100 MG tablet, Take one tablet at onset of headache. May repeat dose one time in 2 hours if headache not relieved., Disp: 9 tablet, Rfl: 3    traMADol (ULTRAM) 50 MG tablet, Take 2 tablets by mouth Every 12 (Twelve) Hours As Needed for Severe Pain., Disp: 60 tablet, Rfl: 5    triamcinolone (KENALOG) 0.025 % cream, , Disp: , Rfl:     ondansetron ODT (ZOFRAN-ODT) 4 MG disintegrating tablet, Take 1 tablet by mouth Every 8 (Eight) Hours As Needed for Nausea or Vomiting., Disp: 20 tablet, Rfl: 2    Tirzepatide-Weight Management (ZEPBOUND) 7.5 MG/0.5ML solution auto-injector, Inject 0.5 mL under the skin into the appropriate area as directed 1 (One) Time Per Week. (Patient not taking: Reported on 10/29/2024), Disp: 2 mL, Rfl: 2      The following portions of the patient's history were reviewed and updated as appropriate: allergies, current medications, past family history, past medical history, past social history, past surgical history and problem list.    Review of Systems   Constitutional:  Negative for fever.   Gastrointestinal:  Positive for abdominal pain. Negative for constipation, diarrhea, nausea and vomiting.   Genitourinary:  Negative for dysuria, frequency and hematuria.   Musculoskeletal:  Negative for arthralgias and myalgias.       Objective   /78   Pulse 76   Temp 97.1 °F (36.2 °C)    "Ht 175 cm (68.9\")   Wt (!) 144 kg (317 lb 12.8 oz)   SpO2 94% Comment: ra  BMI 47.07 kg/m²   Physical Exam  Vitals and nursing note reviewed.   Constitutional:       Appearance: He is well-developed.   HENT:      Head: Normocephalic and atraumatic.      Right Ear: External ear normal.      Left Ear: External ear normal.      Mouth/Throat:      Pharynx: No oropharyngeal exudate.   Eyes:      Conjunctiva/sclera: Conjunctivae normal.      Pupils: Pupils are equal, round, and reactive to light.   Neck:      Thyroid: No thyromegaly.   Cardiovascular:      Rate and Rhythm: Normal rate and regular rhythm.      Pulses: Normal pulses.      Heart sounds: Normal heart sounds. No murmur heard.     No friction rub. No gallop.   Pulmonary:      Effort: Pulmonary effort is normal.      Breath sounds: Normal breath sounds.   Abdominal:      General: Bowel sounds are normal. There is no distension.      Palpations: Abdomen is soft.      Tenderness: There is abdominal tenderness (llq).   Musculoskeletal:      Cervical back: Neck supple.   Skin:     General: Skin is warm and dry.   Neurological:      Mental Status: He is alert and oriented to person, place, and time.      Cranial Nerves: No cranial nerve deficit.   Psychiatric:         Judgment: Judgment normal.                 Results for orders placed or performed in visit on 08/30/24   Urine Culture - Urine, Urine, Clean Catch    Collection Time: 08/30/24  3:08 PM    Specimen: Urine, Clean Catch   Result Value Ref Range    Urine Culture <25,000 CFU/mL Mixed Vicki Isolated    POCT urinalysis dipstick, automated    Collection Time: 08/30/24  3:09 PM    Specimen: Urine   Result Value Ref Range    Color Yellow Yellow, Straw, Dark Yellow, Debbie    Clarity, UA Clear Clear    Specific Gravity  1.030 1.005 - 1.030    pH, Urine 5.5 5.0 - 8.0    Leukocytes Trace (A) Negative    Nitrite, UA Negative Negative    Protein, POC Negative Negative mg/dL    Glucose, UA Negative Negative mg/dL    " Ketones, UA Negative Negative    Urobilinogen, UA Normal Normal, 0.2 E.U./dL    Bilirubin Negative Negative    Blood, UA Negative Negative    Lot Number [id number]     Expiration Date 11/08/2025              Assessment & Plan   Diagnoses and all orders for this visit:    Left lower quadrant abdominal pain  -     Cancel: POCT urinalysis dipstick, automated  -     ondansetron ODT (ZOFRAN-ODT) 4 MG disintegrating tablet; Take 1 tablet by mouth Every 8 (Eight) Hours As Needed for Nausea or Vomiting.    Diverticulitis  Comments:  see recs below. better  Orders:  -     Comprehensive Metabolic Panel; Future    Depression, unspecified depression type  -     promethazine (PHENERGAN) 25 MG tablet; Take 1 tablet by mouth Every 6 (Six) Hours As Needed for Nausea or Vomiting.    Chronic tension-type headache, not intractable  -     SUMAtriptan (IMITREX) 100 MG tablet; Take one tablet at onset of headache. May repeat dose one time in 2 hours if headache not relieved.    Mixed hyperlipidemia  -     Comprehensive Metabolic Panel; Future  -     Lipid Panel; Future    Primary hypertension    Other orders  -     Cancel: POCT rapid strep A      Advised to gradually escalate diet.  Reassured, small abdominal twinges can happen when increasing fiber.  Start adding benefiber or metamucil daily.  Increase fluid intake to 120 oz daily and more when having twinges. If pain persists to call here     Cautioned when restarting Mounjaro.      Return for Next scheduled follow up, [employer] [facility].    Electronically signed by:    Varsha Hahn MD   Answers submitted by the patient for this visit:  Primary Reason for Visit (Submitted on 10/28/2024)  What is the primary reason for your visit?: Abdominal Pain

## 2024-11-02 DIAGNOSIS — F32.A DEPRESSION, UNSPECIFIED DEPRESSION TYPE: ICD-10-CM

## 2024-11-04 RX ORDER — PROMETHAZINE HYDROCHLORIDE 25 MG/1
25 TABLET ORAL EVERY 6 HOURS PRN
Qty: 20 TABLET | Refills: 1 | OUTPATIENT
Start: 2024-11-04

## 2024-11-11 DIAGNOSIS — M06.09 RHEUMATOID ARTHRITIS OF MULTIPLE SITES WITH NEGATIVE RHEUMATOID FACTOR: ICD-10-CM

## 2024-11-11 RX ORDER — HYDROXYCHLOROQUINE SULFATE 200 MG/1
TABLET, FILM COATED ORAL
Qty: 60 TABLET | Refills: 2 | Status: SHIPPED | OUTPATIENT
Start: 2024-11-11

## 2024-11-12 ENCOUNTER — TRANSCRIBE ORDERS (OUTPATIENT)
Dept: LAB | Facility: HOSPITAL | Age: 74
End: 2024-11-12
Payer: MEDICARE

## 2024-11-12 ENCOUNTER — LAB (OUTPATIENT)
Dept: LAB | Facility: HOSPITAL | Age: 74
End: 2024-11-12
Payer: MEDICARE

## 2024-11-12 DIAGNOSIS — B95.7 STAPHYLOCOCCUS EPIDERMIDIS BACTEREMIA: ICD-10-CM

## 2024-11-12 DIAGNOSIS — R78.81 STAPHYLOCOCCUS EPIDERMIDIS BACTEREMIA: ICD-10-CM

## 2024-11-12 DIAGNOSIS — T84.51XD INFECTION AND INFLAMMATORY REACTION DUE TO INTERNAL RIGHT HIP PROSTHESIS, SUBSEQUENT ENCOUNTER: ICD-10-CM

## 2024-11-12 DIAGNOSIS — D72.823 NEUTROPHILIC LEUKEMOID REACTION: ICD-10-CM

## 2024-11-12 DIAGNOSIS — R70.0 ELEVATED SEDIMENTATION RATE: ICD-10-CM

## 2024-11-12 DIAGNOSIS — R70.0 ELEVATED SEDIMENTATION RATE: Primary | ICD-10-CM

## 2024-11-12 LAB
ALBUMIN SERPL-MCNC: 4.1 G/DL (ref 3.5–5.2)
ALBUMIN/GLOB SERPL: 1.5 G/DL
ALP SERPL-CCNC: 67 U/L (ref 39–117)
ALT SERPL W P-5'-P-CCNC: 15 U/L (ref 1–41)
ANION GAP SERPL CALCULATED.3IONS-SCNC: 10 MMOL/L (ref 5–15)
AST SERPL-CCNC: 18 U/L (ref 1–40)
BASOPHILS # BLD AUTO: 0.01 10*3/MM3 (ref 0–0.2)
BASOPHILS NFR BLD AUTO: 0.2 % (ref 0–1.5)
BILIRUB SERPL-MCNC: 0.7 MG/DL (ref 0–1.2)
BUN SERPL-MCNC: 15 MG/DL (ref 8–23)
BUN/CREAT SERPL: 15.2 (ref 7–25)
CALCIUM SPEC-SCNC: 9.1 MG/DL (ref 8.6–10.5)
CHLORIDE SERPL-SCNC: 103 MMOL/L (ref 98–107)
CO2 SERPL-SCNC: 28 MMOL/L (ref 22–29)
CREAT SERPL-MCNC: 0.99 MG/DL (ref 0.76–1.27)
CRP SERPL-MCNC: <0.3 MG/DL (ref 0–0.5)
DEPRECATED RDW RBC AUTO: 42.9 FL (ref 37–54)
EGFRCR SERPLBLD CKD-EPI 2021: 80.4 ML/MIN/1.73
EOSINOPHIL # BLD AUTO: 0.12 10*3/MM3 (ref 0–0.4)
EOSINOPHIL NFR BLD AUTO: 2.4 % (ref 0.3–6.2)
ERYTHROCYTE [DISTWIDTH] IN BLOOD BY AUTOMATED COUNT: 12.5 % (ref 12.3–15.4)
ERYTHROCYTE [SEDIMENTATION RATE] IN BLOOD: 15 MM/HR (ref 0–20)
GLOBULIN UR ELPH-MCNC: 2.7 GM/DL
GLUCOSE SERPL-MCNC: 93 MG/DL (ref 65–99)
HCT VFR BLD AUTO: 44.8 % (ref 37.5–51)
HGB BLD-MCNC: 15 G/DL (ref 13–17.7)
IMM GRANULOCYTES # BLD AUTO: 0.01 10*3/MM3 (ref 0–0.05)
IMM GRANULOCYTES NFR BLD AUTO: 0.2 % (ref 0–0.5)
LYMPHOCYTES # BLD AUTO: 1.07 10*3/MM3 (ref 0.7–3.1)
LYMPHOCYTES NFR BLD AUTO: 21.5 % (ref 19.6–45.3)
MCH RBC QN AUTO: 30.9 PG (ref 26.6–33)
MCHC RBC AUTO-ENTMCNC: 33.5 G/DL (ref 31.5–35.7)
MCV RBC AUTO: 92.2 FL (ref 79–97)
MONOCYTES # BLD AUTO: 0.59 10*3/MM3 (ref 0.1–0.9)
MONOCYTES NFR BLD AUTO: 11.8 % (ref 5–12)
NEUTROPHILS NFR BLD AUTO: 3.18 10*3/MM3 (ref 1.7–7)
NEUTROPHILS NFR BLD AUTO: 63.9 % (ref 42.7–76)
NRBC BLD AUTO-RTO: 0 /100 WBC (ref 0–0.2)
PLATELET # BLD AUTO: 253 10*3/MM3 (ref 140–450)
PMV BLD AUTO: 9.8 FL (ref 6–12)
POTASSIUM SERPL-SCNC: 3.9 MMOL/L (ref 3.5–5.2)
PROT SERPL-MCNC: 6.8 G/DL (ref 6–8.5)
RBC # BLD AUTO: 4.86 10*6/MM3 (ref 4.14–5.8)
SODIUM SERPL-SCNC: 141 MMOL/L (ref 136–145)
WBC NRBC COR # BLD AUTO: 4.98 10*3/MM3 (ref 3.4–10.8)

## 2024-11-12 PROCEDURE — 85652 RBC SED RATE AUTOMATED: CPT

## 2024-11-12 PROCEDURE — 86140 C-REACTIVE PROTEIN: CPT

## 2024-11-12 PROCEDURE — 36415 COLL VENOUS BLD VENIPUNCTURE: CPT

## 2024-11-12 PROCEDURE — 80053 COMPREHEN METABOLIC PANEL: CPT

## 2024-11-12 PROCEDURE — 85025 COMPLETE CBC W/AUTO DIFF WBC: CPT

## 2024-11-18 ENCOUNTER — TELEPHONE (OUTPATIENT)
Age: 74
End: 2024-11-18
Payer: MEDICARE

## 2024-11-18 NOTE — TELEPHONE ENCOUNTER
PT HAD TO BE CANCELLED DUE TO A PROVIDER EMERGENCY. I HAVE REACHED OUT VIA [employer], [employer] AND PHONE. IF PT CALLS BACK TO RESCHEDULE, PLEASE SCHEDULE WITH JOSLYN CASAS OR JUANITA. IF THE PT WANTS TO SEE DR. POWER, SHE IS BOOKED UNTIL MARCH. IF IT IS A NEW PT, CONFRIM IF THEY ARE INTRESTED IN SEEING ANOTHER PROVIDER OR IF THEY WANT TO WAIT FOR DR. POWER. PT HAS BEEN ADDED TO THE CANCELLATION LIST AS HIGH PRIORITY.         -HUB READY TO SCHEDULE.

## 2024-12-05 DIAGNOSIS — G44.229 CHRONIC TENSION-TYPE HEADACHE, NOT INTRACTABLE: ICD-10-CM

## 2024-12-05 RX ORDER — CYCLOBENZAPRINE HCL 10 MG
10 TABLET ORAL 3 TIMES DAILY PRN
Qty: 30 TABLET | Refills: 5 | Status: SHIPPED | OUTPATIENT
Start: 2024-12-05

## 2024-12-11 ENCOUNTER — OFFICE VISIT (OUTPATIENT)
Dept: ORTHOPEDIC SURGERY | Facility: CLINIC | Age: 74
End: 2024-12-11
Payer: MEDICARE

## 2024-12-11 VITALS
DIASTOLIC BLOOD PRESSURE: 78 MMHG | SYSTOLIC BLOOD PRESSURE: 142 MMHG | BODY MASS INDEX: 46.65 KG/M2 | HEIGHT: 69 IN | WEIGHT: 315 LBS

## 2024-12-11 DIAGNOSIS — I87.8 VENOUS STASIS: ICD-10-CM

## 2024-12-11 DIAGNOSIS — M19.072 PRIMARY OSTEOARTHRITIS OF LEFT FOOT: ICD-10-CM

## 2024-12-11 DIAGNOSIS — Z09 SURGERY FOLLOW-UP: Primary | ICD-10-CM

## 2024-12-11 NOTE — PROGRESS NOTES
ESTABLISHED PATIENT    Patient: Obi Jackman Jr.  : 1950    Primary Care Provider: Varsha Hahn MD    Requesting Provider: As above    Follow-up (2 year follow up -- 5 years s/p (L) triple fusion, ICBG, achilles lengthening 10/15/2019)      History    Chief Complaint: Follow-up left triple fusion    History of Present Illness: He is now 5 years status post left triple arthrodesis, 10/15/2019.  He reports excellent pain relief.  He also has significant venous stasis he reports he is wearing his compression socks.  He is wearing good shoes with orthotics.    Current Outpatient Medications on File Prior to Visit   Medication Sig Dispense Refill    acetaminophen (TYLENOL) 500 MG tablet Take 1 tablet by mouth Every 4 (Four) to 6 (Six) Hours As Needed.      ALPRAZolam (XANAX) 0.25 MG tablet Take 1/2 to 1 tablet prn when flying 10 tablet 0    Ascorbic Acid (VITAMIN C PO) Take  by mouth Daily.      atenolol (TENORMIN) 50 MG tablet Take 1 tablet by mouth Daily. 90 tablet 3    atorvastatin (LIPITOR) 10 MG tablet Take 1 tablet by mouth Daily. Resume when not on Dapto anymore 90 tablet 3    buPROPion XL (WELLBUTRIN XL) 150 MG 24 hr tablet Take 1 tablet by mouth Daily. 90 tablet 3    Cyanocobalamin (VITAMIN B-12 ER PO) Take 1 tablet by mouth Daily.      cyclobenzaprine (FLEXERIL) 10 MG tablet TAKE 1 TABLET BY MOUTH 3 TIMES A DAY AS NEEDED FOR MUSCLE SPASMS. 30 tablet 5    doxycycline (VIBRAMYCIN) 100 MG capsule Take 1 capsule by mouth 2 (Two) Times a Day.      gabapentin (NEURONTIN) 300 MG capsule Take 1 capsule by mouth 2 (Two) Times a Day.      hydroxychloroquine (PLAQUENIL) 200 MG tablet TAKE 1 TABLET BY MOUTH 2 TIMES EVERY DAY 60 tablet 2    LISINOPRIL PO Take 12.5 tablets by mouth Daily.      lisinopril-hydrochlorothiazide (PRINZIDE,ZESTORETIC) 20-12.5 MG per tablet Take 1 tablet by mouth Daily. 90 tablet 3    meclizine (ANTIVERT) 25 MG tablet 1 tablet Every 8 (Eight) Hours As Needed.      multivitamin  (THERAGRAN) tablet tablet       nystatin (MYCOSTATIN) 434950 UNIT/GM cream       ondansetron ODT (ZOFRAN-ODT) 4 MG disintegrating tablet Take 1 tablet by mouth Every 8 (Eight) Hours As Needed for Nausea or Vomiting. 20 tablet 2    oxaprozin (DAYPRO) 600 MG tablet Take 2 tablets by mouth Daily. 60 tablet 5    prednisoLONE acetate (PRED FORTE) 1 % ophthalmic suspension Apply 1 drop to eye(s) as directed by provider 4 (Four) Times a Day.      promethazine (PHENERGAN) 25 MG tablet Take 1 tablet by mouth Every 6 (Six) Hours As Needed for Nausea or Vomiting. 20 tablet 1    sennosides-docusate (senna-docusate sodium) 8.6-50 MG per tablet Take 1-2 tablets by mouth Daily. 20 tablet 0    SUMAtriptan (IMITREX) 100 MG tablet Take one tablet at onset of headache. May repeat dose one time in 2 hours if headache not relieved. 9 tablet 3    Tirzepatide-Weight Management (ZEPBOUND) 7.5 MG/0.5ML solution auto-injector Inject 0.5 mL under the skin into the appropriate area as directed 1 (One) Time Per Week. 2 mL 2    traMADol (ULTRAM) 50 MG tablet Take 2 tablets by mouth Every 12 (Twelve) Hours As Needed for Severe Pain. 60 tablet 5    triamcinolone (KENALOG) 0.025 % cream        No current facility-administered medications on file prior to visit.      No Known Allergies   Past Medical History:   Diagnosis Date    Anxiety and depression     Arthritis of neck ?    Arthrodesis present     lt foot triple    Bulging of lumbar intervertebral disc     Cataract     Lens implated in left eye.    Cervical disc disorder ?    Cornea transplant recipient     BILATERAL    COVID-19 11/2021    S/p MAB    CPAP (continuous positive airway pressure) dependence     CTS (carpal tunnel syndrome) Slight case; Dr. Chanda Castanon    DDD (degenerative disc disease), lumbar     09/09/201s/p injection Dr Abel Abbott    Diverticulosis     Elevated cholesterol     Glaucoma July 2022    Drops daily.    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    Neuropathy     bilatral feet    Obesity     CHRISTOPHE on CPAP     Osteopenia     Periarthritis of shoulder ?    Posterior tibial tendinitis, left     09/19/2019 pending-10/19 with Dr Carolina    Rheumatoid arthritis     Rotator cuff syndrome 09/15/2022    Dr. Chanda Castanon    Swelling of left ear     Venous stasis 09/09/2019    priti lower extremities    Visual impairment     Cornea grafts both eyes; Cataracts.    Wears contact lenses     Wears contact lenses     Wears contact lenses      Past Surgical History:   Procedure Laterality Date    ACHILLES TENDON SURGERY Left 10/15/2019    Procedure: ACHILLES TENDON LENGTHENING LEFT;  Surgeon: Elodia Carolina MD;  Location:  [facility];  Service: Orthopedics    CATARACT EXTRACTION Right     COLONOSCOPY  2015    EYE SURGERY Bilateral     cornea transplant     FOOT SURGERY Left 10/15/2019    triple arthrodesis and achilles tendon lengthening- [facility]    HERNIA REPAIR      abdominal    ILIAC CREST BONE GRAFT Left 10/15/2019    Procedure: ILIAC CREST BONE GRAFT LEFT;  Surgeon: Elodia Carolina MD;  Location:  [facility];  Service: Orthopedics    INCISION AND DRAINAGE LEG Right 12/04/2020    Procedure: INCISION AND DRAINAGE WITH COMPONENT EXCHANGE, HEAD AND LINER RIGHT HIP;  Surgeon: Osbaldo Olivas MD;  Location:  [facility];  Service: Orthopedics;  Laterality: Right;    JOINT REPLACEMENT  8/4/20 and 12/4/20    Right hip replacement    KNEE SURGERY      TOE FUSION Left 10/15/2019    Procedure: TRIPLE ARTHODESIS LEFT;  Surgeon: Elodia Carolina MD;  Location: [facility];  Service: Orthopedics    TONSILLECTOMY      TOTAL HIP ARTHROPLASTY Right 08/04/2020    Procedure: TOTAL HIP ARTHROPLASTY RIGHT;  Surgeon: Osbaldo Olivas MD;  Location:  [facility];  Service: Orthopedics;  Laterality: Right;    TOTAL KNEE ARTHROPLASTY Left 03/10/2023     "Procedure: TOTAL KNEE ARTHROPLASTY WITH CORI ROBOT - LEFT;  Surgeon: Osbaldo Olivas MD;  Location: [facility];  Service: Robotics - Ortho;  Laterality: Left;    TRIGGER POINT INJECTION      UMBILICAL HERNIA REPAIR      Repaired over 15 years ago.    VENOUS ABLATION Left 2022    Endovenous laser ablation of left great sapheuos vein for venous insufficiency     Family History   Problem Relation Age of Onset    No Known Problems Mother     Alcohol abuse Father     Prostate cancer Father     Cancer Father          - prostate/bladder cancer    Cancer Sister         Stomach cancer.    Cancer Brother         Kidney removed 23 due renal cell  carcenoma    Multiple sclerosis Son     Cancer Maternal Grandmother         Pancreatic/stomach cancer      Social History     Socioeconomic History    Marital status:     Number of children: 3   Tobacco Use    Smoking status: Never     Passive exposure: Never    Smokeless tobacco: Never   Vaping Use    Vaping status: Never Used   Substance and Sexual Activity    Alcohol use: Not Currently     Comment: One drink 2-3x per year.    Drug use: No    Sexual activity: Yes     Partners: Female     Birth control/protection: None        Review of Systems    The following portions of the patient's history were reviewed and updated as appropriate: allergies, current medications, past family history, past medical history, past social history, past surgical history, and problem list.    Physical Exam:   /78   Ht 175 cm (68.9\")   Wt (!) 144 kg (317 lb)   BMI 46.95 kg/m²   GENERAL: Body habitus: morbidly obese    Lower extremity edema: Left: 2+ pitting; Right: 2+ pitting    Gait: normal     Mental Status:  awake and alert; oriented to person, place, and time  MSK:    Left hindfoot incisions are fully healed, ankle has very reasonable range of motion 10 degrees dorsiflexion 30 degrees plantarflexion, no inversion eversion which is to be expected.  No tenderness.  No " tenderness on the right side    Medical Decision Making    Data Review:   ordered and reviewed x-rays today    Assessment/Plan/Diagnosis/Treatment Options:   1. Primary osteoarthritis of left foot  He has done very well status post triple fusion with very slow wound healing.  He is very happy with the pain relief, he reports its helped his life significantly.  I will see him again in 2 years with standing 2 views of the foot    2. Surgery follow-up  As above  - XR Foot 2 View Left    3. Venous stasis  Definitely needs to wear compression socks daily          Elodia Guzman MD

## 2024-12-17 DIAGNOSIS — R52 PAIN MANAGEMENT: ICD-10-CM

## 2024-12-17 DIAGNOSIS — Z79.899 HIGH RISK MEDICATION USE: ICD-10-CM

## 2024-12-18 RX ORDER — TRAMADOL HYDROCHLORIDE 50 MG/1
TABLET ORAL
Qty: 180 TABLET | Refills: 2 | Status: SHIPPED | OUTPATIENT
Start: 2024-12-18

## 2025-01-15 ENCOUNTER — OFFICE VISIT (OUTPATIENT)
Dept: ORTHOPEDIC SURGERY | Facility: CLINIC | Age: 75
End: 2025-01-15
Payer: MEDICARE

## 2025-01-15 VITALS
BODY MASS INDEX: 46.65 KG/M2 | DIASTOLIC BLOOD PRESSURE: 84 MMHG | HEIGHT: 69 IN | WEIGHT: 315 LBS | SYSTOLIC BLOOD PRESSURE: 146 MMHG

## 2025-01-15 DIAGNOSIS — E66.813 CLASS 3 SEVERE OBESITY DUE TO EXCESS CALORIES WITHOUT SERIOUS COMORBIDITY WITH BODY MASS INDEX (BMI) OF 45.0 TO 49.9 IN ADULT: ICD-10-CM

## 2025-01-15 DIAGNOSIS — E66.01 CLASS 3 SEVERE OBESITY DUE TO EXCESS CALORIES WITHOUT SERIOUS COMORBIDITY WITH BODY MASS INDEX (BMI) OF 45.0 TO 49.9 IN ADULT: ICD-10-CM

## 2025-01-15 DIAGNOSIS — M17.11 PRIMARY OSTEOARTHRITIS OF RIGHT KNEE: Primary | ICD-10-CM

## 2025-01-15 RX ORDER — ROPIVACAINE HYDROCHLORIDE 5 MG/ML
4 INJECTION, SOLUTION EPIDURAL; INFILTRATION; PERINEURAL
Status: COMPLETED | OUTPATIENT
Start: 2025-01-15 | End: 2025-01-15

## 2025-01-15 RX ORDER — TRIAMCINOLONE ACETONIDE 40 MG/ML
40 INJECTION, SUSPENSION INTRA-ARTICULAR; INTRAMUSCULAR
Status: COMPLETED | OUTPATIENT
Start: 2025-01-15 | End: 2025-01-15

## 2025-01-15 RX ORDER — CHLORHEXIDINE GLUCONATE ORAL RINSE 1.2 MG/ML
SOLUTION DENTAL
COMMUNITY
Start: 2024-12-10

## 2025-01-15 RX ADMIN — ROPIVACAINE HYDROCHLORIDE 4 ML: 5 INJECTION, SOLUTION EPIDURAL; INFILTRATION; PERINEURAL at 10:00

## 2025-01-15 RX ADMIN — TRIAMCINOLONE ACETONIDE 40 MG: 40 INJECTION, SUSPENSION INTRA-ARTICULAR; INTRAMUSCULAR at 10:00

## 2025-01-15 NOTE — PROGRESS NOTES
Procedure   - Large Joint Arthrocentesis: R knee on 1/15/2025 10:00 AM  Indications: pain  Details: 21 G needle, anterolateral approach  Medications: 4 mL ropivacaine 0.5 %; 40 mg triamcinolone acetonide 40 MG/ML  Outcome: tolerated well, no immediate complications  Procedure, treatment alternatives, risks and benefits explained, specific risks discussed. Consent was given by the patient. Immediately prior to procedure a time out was called to verify the correct patient, procedure, equipment, support staff and site/side marked as required. Patient was prepped and draped in the usual sterile fashion.

## 2025-01-15 NOTE — PROGRESS NOTES
[facility]    Subjective     Chief Complaint   Patient presents with    Follow-up     5 month follow up---Primary osteoarthritis of right knee        HPI    It has been 5  month(s) since Mr. Jackman's last visit. He returns to clinic today for follow-up of right knee arthritis. The issue has been ongoing for 2 year(s). He rates his pain a 5/10 on the pain scale. Previous/current treatments: NSAIDS, physical therapy, and steroid injection (last injection 09-09-24). Current symptoms: pain, popping, grinding, stiffness, and giving way/buckling. The pain is worse with walking and rising from seated position; sitting and elevating the extremity improve the pain. Overall, he is doing better.  He would like an injection today.      I have reviewed the following portions of the patient's history and agree with: History of Present Illness and Review of Systems    Patient Active Problem List   Diagnosis    Allergic rhinitis    Anxiety disorder    Benign paroxysmal positional vertigo    Chronic tension type headache    Depression    ED (erectile dysfunction) of non-organic origin    Hyperlipidemia    Hypertension    LFTs abnormal    CHRISTOPHE (obstructive sleep apnea)    Elevated glucose    Health care maintenance    Morbid obesity due to excess calories    Onychomycosis of left great toe    Osteoarthritis of ankle or foot    Venous stasis    Neuropathy    Class 3 severe obesity due to excess calories with serious comorbidity and body mass index (BMI) of 40.0 to 44.9 in adult    Pre-op evaluation    Arthritis of foot, left    S/P left ankle triple arthrodesis    Acute blood loss anemia, mild, asymptomatic     Acute postoperative pain    Wound infection    Primary osteoarthritis of right hip    Status post total replacement of right hip    Postoperative wound infection of right hip, s/p I and D, head and liner exchange.    Primary osteoarthritis of left knee    S/P total knee arthroplasty, left    Food  poisoning    Joint pain    Rheumatoid arthritis of multiple sites with negative rheumatoid factor    Infection and inflammatory reaction due to internal right hip prosthesis, sequela    Pain management    High risk medication use    Family history of prostate cancer in father     Past Medical History:   Diagnosis Date    Anxiety and depression     Arthritis of neck ?    Arthrodesis present     lt foot triple    Bulging of lumbar intervertebral disc     Cataract     Lens implated in left eye.    Cervical disc disorder ?    Cornea transplant recipient     BILATERAL    COVID-19 11/2021    S/p MAB    CPAP (continuous positive airway pressure) dependence     CTS (carpal tunnel syndrome) Slight case; Dr. Chanda Castanon    DDD (degenerative disc disease), lumbar     09/09/201s/p injection Dr Abel Abbott    Diverticulosis     Elevated cholesterol     Glaucoma July 2022    Drops daily.    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    Neuropathy     bilatral feet    Obesity     CHRISTOPHE on CPAP     Osteopenia     Periarthritis of shoulder ?    Posterior tibial tendinitis, left     09/19/2019 pending-10/19 with Dr Carolina    Rheumatoid arthritis     Rotator cuff syndrome 09/15/2022    Dr. Chanda Castanon    Swelling of left ear     Venous stasis 09/09/2019    priti lower extremities    Visual impairment     Cornea grafts both eyes; Cataracts.    Wears contact lenses     Wears contact lenses     Wears contact lenses       Past Surgical History:   Procedure Laterality Date    ACHILLES TENDON SURGERY Left 10/15/2019    Procedure: ACHILLES TENDON LENGTHENING LEFT;  Surgeon: Elodia Carolina MD;  Location: [facility];  Service: Orthopedics    CATARACT EXTRACTION Right     COLONOSCOPY  2015    EYE SURGERY Bilateral     cornea transplant     FOOT SURGERY Left 10/15/2019    triple  arthrodesis and achilles tendon lengthening- [facility]    HERNIA REPAIR      abdominal    ILIAC CREST BONE GRAFT Left 10/15/2019    Procedure: ILIAC CREST BONE GRAFT LEFT;  Surgeon: Elodia Guzman MD;  Location:  [facility];  Service: Orthopedics    INCISION AND DRAINAGE LEG Right 2020    Procedure: INCISION AND DRAINAGE WITH COMPONENT EXCHANGE, HEAD AND LINER RIGHT HIP;  Surgeon: Osbaldo Olivas MD;  Location:  [facility];  Service: Orthopedics;  Laterality: Right;    JOINT REPLACEMENT  20 and 20    Right hip replacement    KNEE SURGERY      TOE FUSION Left 10/15/2019    Procedure: TRIPLE ARTHODESIS LEFT;  Surgeon: Elodia Guzman MD;  Location:  [facility];  Service: Orthopedics    TONSILLECTOMY      TOTAL HIP ARTHROPLASTY Right 2020    Procedure: TOTAL HIP ARTHROPLASTY RIGHT;  Surgeon: Osbaldo Olivas MD;  Location:  [facility];  Service: Orthopedics;  Laterality: Right;    TOTAL KNEE ARTHROPLASTY Left 03/10/2023    Procedure: TOTAL KNEE ARTHROPLASTY WITH CORI ROBOT - LEFT;  Surgeon: Osbaldo Olivas MD;  Location:  [facility];  Service: Robotics - Ortho;  Laterality: Left;    TRIGGER POINT INJECTION      UMBILICAL HERNIA REPAIR      Repaired over 15 years ago.    VENOUS ABLATION Left 2022    Endovenous laser ablation of left great sapheuos vein for venous insufficiency      Family History   Problem Relation Age of Onset    No Known Problems Mother     Alcohol abuse Father     Prostate cancer Father     Cancer Father          - prostate/bladder cancer    Cancer Sister         Stomach cancer.    Cancer Brother         Kidney removed 23 due renal cell  carcenoma    Multiple sclerosis Son     Cancer Maternal Grandmother         Pancreatic/stomach cancer     Social History     Socioeconomic History    Marital status:     Number of children: 3   Tobacco Use    Smoking status: Never     Passive exposure: Never    Smokeless tobacco: Never   Vaping Use    Vaping status: Never Used    Substance and Sexual Activity    Alcohol use: Not Currently     Comment: One drink 2-3x per year.    Drug use: No    Sexual activity: Yes     Partners: Female     Birth control/protection: None      Current Outpatient Medications on File Prior to Visit   Medication Sig Dispense Refill    acetaminophen (TYLENOL) 500 MG tablet Take 1 tablet by mouth Every 4 (Four) to 6 (Six) Hours As Needed.      ALPRAZolam (XANAX) 0.25 MG tablet Take 1/2 to 1 tablet prn when flying 10 tablet 0    Ascorbic Acid (VITAMIN C PO) Take  by mouth Daily.      atenolol (TENORMIN) 50 MG tablet Take 1 tablet by mouth Daily. 90 tablet 3    atorvastatin (LIPITOR) 10 MG tablet Take 1 tablet by mouth Daily. Resume when not on Dapto anymore 90 tablet 3    buPROPion XL (WELLBUTRIN XL) 150 MG 24 hr tablet Take 1 tablet by mouth Daily. 90 tablet 3    chlorhexidine (PERIDEX) 0.12 % solution       Cyanocobalamin (VITAMIN B-12 ER PO) Take 1 tablet by mouth Daily.      cyclobenzaprine (FLEXERIL) 10 MG tablet TAKE 1 TABLET BY MOUTH 3 TIMES A DAY AS NEEDED FOR MUSCLE SPASMS. 30 tablet 5    doxycycline (VIBRAMYCIN) 100 MG capsule Take 1 capsule by mouth 2 (Two) Times a Day.      gabapentin (NEURONTIN) 300 MG capsule Take 1 capsule by mouth 2 (Two) Times a Day.      LISINOPRIL PO Take 12.5 tablets by mouth Daily.      lisinopril-hydrochlorothiazide (PRINZIDE,ZESTORETIC) 20-12.5 MG per tablet Take 1 tablet by mouth Daily. 90 tablet 3    meclizine (ANTIVERT) 25 MG tablet 1 tablet Every 8 (Eight) Hours As Needed.      multivitamin (THERAGRAN) tablet tablet       nystatin (MYCOSTATIN) 777506 UNIT/GM cream       ondansetron ODT (ZOFRAN-ODT) 4 MG disintegrating tablet Take 1 tablet by mouth Every 8 (Eight) Hours As Needed for Nausea or Vomiting. 20 tablet 2    oxaprozin (DAYPRO) 600 MG tablet Take 2 tablets by mouth Daily. 60 tablet 5    prednisoLONE acetate (PRED FORTE) 1 % ophthalmic suspension Apply 1 drop to eye(s) as directed by provider 4 (Four) Times a Day.       promethazine (PHENERGAN) 25 MG tablet Take 1 tablet by mouth Every 6 (Six) Hours As Needed for Nausea or Vomiting. 20 tablet 1    sennosides-docusate (senna-docusate sodium) 8.6-50 MG per tablet Take 1-2 tablets by mouth Daily. 20 tablet 0    SUMAtriptan (IMITREX) 100 MG tablet Take one tablet at onset of headache. May repeat dose one time in 2 hours if headache not relieved. 9 tablet 3    Tirzepatide-Weight Management (ZEPBOUND) 7.5 MG/0.5ML solution auto-injector Inject 0.5 mL under the skin into the appropriate area as directed 1 (One) Time Per Week. 2 mL 2    traMADol (ULTRAM) 50 MG tablet Take 2 tablets by mouth 3 times daily as needed for pain 180 tablet 2    triamcinolone (KENALOG) 0.025 % cream       [DISCONTINUED] hydroxychloroquine (PLAQUENIL) 200 MG tablet TAKE 1 TABLET BY MOUTH 2 TIMES EVERY DAY 60 tablet 2     No current facility-administered medications on file prior to visit.      No Known Allergies     Review of Systems   Constitutional:  Negative for activity change, appetite change, chills, diaphoresis, fatigue, fever and unexpected weight change.   HENT:  Negative for congestion, dental problem, drooling, ear discharge, ear pain, facial swelling, hearing loss, mouth sores, nosebleeds, postnasal drip, rhinorrhea, sinus pressure, sneezing, sore throat, tinnitus, trouble swallowing and voice change.    Eyes:  Negative for photophobia, pain, discharge, redness, itching and visual disturbance.   Respiratory:  Negative for apnea, cough, choking, chest tightness, shortness of breath, wheezing and stridor.    Cardiovascular:  Negative for chest pain, palpitations and leg swelling.   Gastrointestinal:  Negative for abdominal distention, abdominal pain, anal bleeding, blood in stool, constipation, diarrhea, nausea, rectal pain and vomiting.   Endocrine: Negative for cold intolerance, heat intolerance, polydipsia, polyphagia and polyuria.   Genitourinary:  Negative for decreased urine volume, difficulty  "urinating, dysuria, enuresis, flank pain, frequency, genital sores, hematuria and urgency.   Musculoskeletal:  Positive for arthralgias. Negative for back pain, gait problem, joint swelling, myalgias, neck pain and neck stiffness.   Skin:  Negative for color change, pallor, rash and wound.   Allergic/Immunologic: Negative for environmental allergies, food allergies and immunocompromised state.   Neurological:  Negative for dizziness, tremors, seizures, syncope, facial asymmetry, speech difficulty, weakness, light-headedness, numbness and headaches.   Hematological:  Negative for adenopathy. Does not bruise/bleed easily.   Psychiatric/Behavioral:  Negative for agitation, behavioral problems, confusion, decreased concentration, dysphoric mood, hallucinations, self-injury, sleep disturbance and suicidal ideas. The patient is not nervous/anxious and is not hyperactive.         Objective      Physical Exam  /84   Ht 175 cm (68.9\")   Wt (!) 143 kg (316 lb)   BMI 46.80 kg/m²     Body mass index is 46.8 kg/m².         General:   Mental Status:  Alert   Appearance: Cooperative, in no acute distress   Build and Nutrition: Obese by BMI male   Orientation: Alert and oriented to person, place and time   Posture: Normal   Gait: Nonantalgic    Integument:   Right knee: No skin lesions, no rash, no ecchymosis    Lower Extremities:   Right Knee:    Tenderness:  Lateral joint line tenderness    Effusion:  None    Swelling: None    Crepitus:  Positive    Range of motion:  Extension: 0°       Flexion: 125°  Instability:  No varus laxity, no valgus laxity, negative anterior drawer  Deformities:  Varus      Imaging/Studies  Imaging Results (Last 24 Hours)       Procedure Component Value Units Date/Time    XR Knee 4+ View Right [[id number]] Resulted: 01/15/25 1016     Updated: 01/15/25 1016    Narrative:      Right Knee Radiographs  Indication: right knee pain  Views: Standing AP's and skiers of both knees, with lateral and " sunrise   views of the right knee    Comparison: 12/28/2022    Findings:    Bone-on-bone contact medial compartment, tricompartmental arthritis, no   acute bony abnormalities.  No unusual bony features.  Mild worsening   compared to the previous imaging.  Advanced knee arthritis.                Assessment and Plan     Diagnoses and all orders for this visit:    1. Primary osteoarthritis of right knee (Primary)  -     XR Knee 4+ View Right  -     - Large Joint Arthrocentesis: R knee    2. Class 3 severe obesity due to excess calories without serious comorbidity with body mass index (BMI) of 45.0 to 49.9 in adult        1. Primary osteoarthritis of right knee    2. Class 3 severe obesity due to excess calories without serious comorbidity with body mass index (BMI) of 45.0 to 49.9 in adult          I reviewed my findings with the patient.  He would like an injection for his right knee today, and this was provided.  He has a trip coming up in the fall, and is trying to avoid surgery until after that.  I will see him back in 4 months, but I will be happy to see him back sooner for any problems.    Procedure Note:  The potential benefits of performing a therapeutic right knee joint injection, as well as potential risks (including, but not limited to infection, swelling, pain, bleeding, bruising, nerve/blood vessel damage, skin color changes, transient elevation in blood glucose levels, and fat atrophy) were discussed with the patient.  After informed consent, timeout procedure was performed, and the skin on the right knee was prepped with chlorhexidine soap and alcohol, after which ethyl chloride was applied to the skin at the injection site. Via the anterolateral approach, 1ml of Kenalog 40mg/ml mixed with 4ml 0.5% ropivacaine plain was injected into the knee joint.  The patient tolerated the procedure well, experiencing 50% improvement a few minutes following the injection. There were no complications.  Band-Aid was  applied to the injection site. Post-procedural instructions were given to the patient and/or their caregiver.      Return in about 4 months (around 5/15/2025).      Osbaldo Olivas MD  01/15/25  10:37 EST    Dictated Utilizing Dragon Dictation

## 2025-02-20 NOTE — ASSESSMENT & PLAN NOTE
2019 , Anuradha 1:160, , SSA/ssb and smith negative. MRI neg for synovitis. RNP 28.3 (<25), Dna + 1:10 but crithidia negative with normal C3 and C4.  Pattern is not typical of RA, but he has AM stiffness and is responsive to NSAID. No erosions on hand films. Pain involves 1/2 mcp, pip, wrists bilaterally; right shoulder  Hand films negative for erosions 9/19  S/p Methotrexate for a period of time.  H/o Severe stiffness as well as joint pain. H/o  Synovitis in hands . Concerning for inflammatory arthritis - could be RA versus SLE according to labs - overlap.  S/p ibuprofen, Daypro, Lodine  L shoulder film Negative AC and GH joint , Humerus high riding ? Consistent with rotator cuff pathology.  X-ray of the right shoulder 1/23  Degenerative changes of the acromion at the acromioclavicular joint which is not an arrow. There is no narrowing of the glenohumeral joint.  X-ray of both hands 1/23  Normal X-ray of the left hand.  Minor degenerative narrowing of the radial lunate articulation in the right wrist.      Continue Plaquenil. Seems to be working well.  Sulfasalazine is another option if needed.  Voltaren gel or Biofreeze.  Steroid injection to R shoulder if needed. Can call to schedule.  11/2024 labs stable  New lab order placed for him to do in 5/2025  RTC 4 months

## 2025-02-20 NOTE — ASSESSMENT & PLAN NOTE
Started Plaquenil 12/2019- Well tolerated and seems to be effective.  Hep negative June 2020    Plan:  Eye exam Q 12 months with annual -upcoming in 3 weeks.   Cbc,Cmp Q 6 months-  11/2024 stable

## 2025-02-20 NOTE — ASSESSMENT & PLAN NOTE
Tramadol, gabapentin  Controlled substance agreement discussed and signed 6/11/24    UDS UTD 6/11/24  PDMP reviewed

## 2025-02-20 NOTE — ASSESSMENT & PLAN NOTE
----- Message from Meera Doctor sent at 12/8/2021 12:45 PM EST -----  Subject: Appointment Request    Reason for Call: Urgent (Patient Request) No Script    QUESTIONS  Type of Appointment? New Patient/New to Provider  Reason for appointment request? No appointments available during search  Additional Information for Provider? Patient needs a VV or Telecall visit   set up because she is elderly and was attempting to try to walk on the ice   today for visit and that is why canceled visit for today. Please call   patient back asap for an available VV or Tele-call visit.  ---------------------------------------------------------------------------  --------------  CALL BACK INFO  What is the best way for the office to contact you? OK to leave message on   voicemail  Preferred Call Back Phone Number? [phone]  ---------------------------------------------------------------------------  --------------  SCRIPT ANSWERS  Relationship to Patient? Self  (Is the patient requesting to see the provider for a procedure?)? No  (Is the patient requesting to see the provider urgently  today or   tomorrow. )? Yes  Have you been diagnosed with, awaiting test results for, or told that you   are suspected of having COVID-19 (Coronavirus)? (If patient has tested   negative or was tested as a requirement for work, school, or travel and   not based on symptoms, answer no)? No  Within the past two weeks have you developed any of the following symptoms   (answer no if symptoms have been present longer than 2 weeks or began   more than 2 weeks ago)? Fever or Chills, Cough, Shortness of breath or   difficulty breathing, Loss of taste or smell, Sore throat, Nasal   congestion, Sneezing or runny nose, Fatigue or generalized body aches   (answer no if pain is specific to a body part e.g. back pain), Diarrhea,   Headache? No  Have you had close contact with someone with COVID-19 in the last 14 days?    No  (Service Expert  click yes below to MRI report from 9/14/19 is negative for any fracture. DJD of the hips R>L; He has joint space narrowing superiorly and subchondral cystic change. They could not see any gross tear of labrum but was limited evaluation.  He did have some inflammation or edema on the Right in the iliopsoas myotendinous  junction. He did have  questionable bursitis/tendonitis in the iliopsoas bursa.  Saw Dr. Amato. S/p injection. Positive OA and Tendonitis.  S/p R total hip replacement 8/4/2020 with infection 12/2020.  S/p hip joint revision.  lc roca.   S/p IV Rocephin daily through PICC.     Currently on Doxycycline indefinitely per ID   proceed with [employer] As Usual   Scheduling)?  Yes

## 2025-02-26 ENCOUNTER — OFFICE VISIT (OUTPATIENT)
Age: 75
End: 2025-02-26
Payer: MEDICARE

## 2025-02-26 VITALS
HEIGHT: 69 IN | BODY MASS INDEX: 46.65 KG/M2 | HEART RATE: 78 BPM | TEMPERATURE: 97.4 F | DIASTOLIC BLOOD PRESSURE: 74 MMHG | SYSTOLIC BLOOD PRESSURE: 138 MMHG | WEIGHT: 315 LBS

## 2025-02-26 DIAGNOSIS — M06.09 RHEUMATOID ARTHRITIS OF MULTIPLE SITES WITH NEGATIVE RHEUMATOID FACTOR: Primary | ICD-10-CM

## 2025-02-26 DIAGNOSIS — Z79.899 HIGH RISK MEDICATION USE: ICD-10-CM

## 2025-02-26 DIAGNOSIS — Z51.81 ENCOUNTER FOR MEDICATION MONITORING: ICD-10-CM

## 2025-02-26 DIAGNOSIS — T84.51XS INFECTION AND INFLAMMATORY REACTION DUE TO INTERNAL RIGHT HIP PROSTHESIS, SEQUELA: ICD-10-CM

## 2025-02-26 PROBLEM — I10 BENIGN ESSENTIAL HYPERTENSION: Status: ACTIVE | Noted: 2020-12-07

## 2025-02-26 RX ORDER — HYDROXYCHLOROQUINE SULFATE 200 MG/1
200 TABLET, FILM COATED ORAL 2 TIMES DAILY
Qty: 180 TABLET | Refills: 1 | Status: SHIPPED | OUTPATIENT
Start: 2025-02-26

## 2025-02-26 RX ORDER — HYDROXYCHLOROQUINE SULFATE 200 MG/1
200 TABLET, FILM COATED ORAL 2 TIMES DAILY
COMMUNITY
Start: 2025-02-21 | End: 2025-02-26 | Stop reason: SDUPTHER

## 2025-02-26 RX ORDER — LATANOPROST 50 UG/ML
1 SOLUTION/ DROPS OPHTHALMIC NIGHTLY
COMMUNITY
Start: 2025-02-05

## 2025-02-26 NOTE — ASSESSMENT & PLAN NOTE
Tramadol  Controlled substance agreement discussed and signed 6/11/24    UDS UTD 6/11/24  PDMP reviewed

## 2025-03-03 ENCOUNTER — TELEPHONE (OUTPATIENT)
Dept: ORTHOPEDIC SURGERY | Facility: CLINIC | Age: 75
End: 2025-03-03

## 2025-03-03 NOTE — TELEPHONE ENCOUNTER
"    Caller: Obi Jackman Jr. \"Herb\"    Relationship to patient: Self    Best call back number:     Chief complaint: RIGHT KNEE F/UP TO PLAN SX    Type of visit: FOLLOW UP    Requested date: ASAP     If rescheduling, when is the original appointment: 3/10/25     Additional notes:PATIENT IS REQUESTING TO BE SEEN SOONER SOMETIME THIS WEEK.     "

## 2025-03-05 ENCOUNTER — OFFICE VISIT (OUTPATIENT)
Dept: ORTHOPEDIC SURGERY | Facility: CLINIC | Age: 75
End: 2025-03-05
Payer: MEDICARE

## 2025-03-05 VITALS
WEIGHT: 315 LBS | BODY MASS INDEX: 46.65 KG/M2 | HEIGHT: 69 IN | DIASTOLIC BLOOD PRESSURE: 90 MMHG | SYSTOLIC BLOOD PRESSURE: 130 MMHG

## 2025-03-05 DIAGNOSIS — M17.11 PRIMARY OSTEOARTHRITIS OF RIGHT KNEE: Primary | ICD-10-CM

## 2025-03-05 DIAGNOSIS — E66.01 CLASS 3 SEVERE OBESITY DUE TO EXCESS CALORIES WITHOUT SERIOUS COMORBIDITY WITH BODY MASS INDEX (BMI) OF 45.0 TO 49.9 IN ADULT: ICD-10-CM

## 2025-03-05 DIAGNOSIS — E66.813 CLASS 3 SEVERE OBESITY DUE TO EXCESS CALORIES WITHOUT SERIOUS COMORBIDITY WITH BODY MASS INDEX (BMI) OF 45.0 TO 49.9 IN ADULT: ICD-10-CM

## 2025-03-05 PROCEDURE — 1159F MED LIST DOCD IN RCRD: CPT | Performed by: ORTHOPAEDIC SURGERY

## 2025-03-05 PROCEDURE — 3075F SYST BP GE 130 - 139MM HG: CPT | Performed by: ORTHOPAEDIC SURGERY

## 2025-03-05 PROCEDURE — 3080F DIAST BP >= 90 MM HG: CPT | Performed by: ORTHOPAEDIC SURGERY

## 2025-03-05 PROCEDURE — 1160F RVW MEDS BY RX/DR IN RCRD: CPT | Performed by: ORTHOPAEDIC SURGERY

## 2025-03-05 PROCEDURE — 99214 OFFICE O/P EST MOD 30 MIN: CPT | Performed by: ORTHOPAEDIC SURGERY

## 2025-03-05 RX ORDER — CHLORHEXIDINE GLUCONATE 40 MG/ML
1 SOLUTION TOPICAL DAILY
Qty: 237 ML | Refills: 0 | Status: SHIPPED | OUTPATIENT
Start: 2025-03-05

## 2025-03-05 RX ORDER — PREGABALIN 150 MG/1
150 CAPSULE ORAL ONCE
OUTPATIENT
Start: 2025-03-05 | End: 2025-03-05

## 2025-03-05 RX ORDER — ACETAMINOPHEN 325 MG/1
1000 TABLET ORAL ONCE
OUTPATIENT
Start: 2025-03-05 | End: 2025-03-05

## 2025-03-05 NOTE — PROGRESS NOTES
[facility] Note    Subjective     Chief Complaint   Patient presents with    Follow-up     2 month follow up -- Primary osteoarthritis of right knee        HPI    It has been 2  month(s) since Mr. Jackman's last visit. He returns to clinic today for follow-up of right knee pain. The issue has been ongoing for 3 year(s). He rates his pain a 6/10 on the pain scale. Previous/current treatments: physical therapy. Current symptoms: pain, popping, grinding, stiffness, and giving way/buckling. The pain is worse with walking and climbing stairs; resting improve the pain. Overall, he is doing worse.  Has reached a point where he would like to proceed with right total knee arthroplasty surgery.  He has exhausted conservative treatment.  His wife can help him out postoperatively.  No history of clots or clotting disorders.  No blood thinners.      I have reviewed the following portions of the patient's history and agree with: History of Present Illness and Review of Systems    Patient Active Problem List   Diagnosis    Allergic rhinitis    Anxiety disorder    Benign paroxysmal positional vertigo    Chronic tension type headache    Depression    ED (erectile dysfunction) of non-organic origin    Hyperlipidemia    Hypertension    LFTs abnormal    CHRISTOPHE (obstructive sleep apnea)    Elevated glucose    Health care maintenance    Morbid obesity due to excess calories    Onychomycosis of left great toe    Osteoarthritis of ankle or foot    Venous stasis    Neuropathy    Class 3 severe obesity due to excess calories with serious comorbidity and body mass index (BMI) of 40.0 to 44.9 in adult    Pre-op evaluation    Arthritis of foot, left    S/P left ankle triple arthrodesis    Acute blood loss anemia, mild, asymptomatic     Acute postoperative pain    Wound infection    Primary osteoarthritis of right hip    Status post total replacement of right hip    Postoperative wound infection of right hip, s/p I and D,  head and liner exchange.    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    Arthrodesis present     lt foot triple    Bulging of lumbar intervertebral disc     Cataract     Lens implated in left eye.    Cervical disc disorder ?    Cornea transplant recipient     BILATERAL    COVID-19 11/2021    S/p MAB    CPAP (continuous positive airway pressure) dependence     CTS (carpal tunnel syndrome) Slight case; Dr. Chanda Castanon    DDD (degenerative disc disease), lumbar     09/09/201s/p injection Dr Abel Abbott    Diverticulosis     Elevated cholesterol     Glaucoma July 2022    Drops daily.    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    Neuropathy     bilatral feet    Obesity     CHRISTOPHE on CPAP     Osteopenia     Periarthritis of shoulder ?    Posterior tibial tendinitis, left     09/19/2019 pending-10/19 with Dr Carolina    Rheumatoid arthritis     Rotator cuff syndrome 09/15/2022    Dr. Chanda Castanon    Swelling of left ear     Venous stasis 09/09/2019    priti lower extremities    Visual impairment     Cornea grafts both eyes; Cataracts.    Wears contact lenses     Wears contact lenses     Wears contact lenses       Past Surgical History:   Procedure Laterality Date    ACHILLES TENDON SURGERY Left 10/15/2019     Procedure: ACHILLES TENDON LENGTHENING LEFT;  Surgeon: Elodia Guzman MD;  Location:  [facility];  Service: Orthopedics    CATARACT EXTRACTION Right     COLONOSCOPY      EYE SURGERY Bilateral     cornea transplant     FOOT SURGERY Left 10/15/2019    triple arthrodesis and achilles tendon lengthening- [facility]    HERNIA REPAIR      abdominal    ILIAC CREST BONE GRAFT Left 10/15/2019    Procedure: ILIAC CREST BONE GRAFT LEFT;  Surgeon: Elodia Guzman MD;  Location:  [facility];  Service: Orthopedics    INCISION AND DRAINAGE LEG Right 2020    Procedure: INCISION AND DRAINAGE WITH COMPONENT EXCHANGE, HEAD AND LINER RIGHT HIP;  Surgeon: Osbaldo Olivas MD;  Location: [facility];  Service: Orthopedics;  Laterality: Right;    JOINT REPLACEMENT  20 and 20    Right hip replacement    KNEE SURGERY      TOE FUSION Left 10/15/2019    Procedure: TRIPLE ARTHODESIS LEFT;  Surgeon: Elodia Guzman MD;  Location: [facility];  Service: Orthopedics    TONSILLECTOMY      TOTAL HIP ARTHROPLASTY Right 2020    Procedure: TOTAL HIP ARTHROPLASTY RIGHT;  Surgeon: Osbaldo Olivas MD;  Location:  [facility];  Service: Orthopedics;  Laterality: Right;    TOTAL KNEE ARTHROPLASTY Left 03/10/2023    Procedure: TOTAL KNEE ARTHROPLASTY WITH CORI ROBOT - LEFT;  Surgeon: Osbaldo Olivas MD;  Location:  [facility];  Service: Robotics - Ortho;  Laterality: Left;    TRIGGER POINT INJECTION      UMBILICAL HERNIA REPAIR      Repaired over 15 years ago.    VENOUS ABLATION Left 2022    Endovenous laser ablation of left great sapheuos vein for venous insufficiency      Family History   Problem Relation Age of Onset    No Known Problems Mother     Alcohol abuse Father     Prostate cancer Father     Cancer Father          - prostate/bladder cancer    Cancer Sister         Stomach cancer.    Cancer Brother         Kidney removed 23 due renal cell  carcenoma    Multiple sclerosis Son     Cancer Maternal Grandmother          Pancreatic/stomach cancer     Social History     Socioeconomic History    Marital status:     Number of children: 3   Tobacco Use    Smoking status: Never     Passive exposure: Never    Smokeless tobacco: Never   Vaping Use    Vaping status: Never Used   Substance and Sexual Activity    Alcohol use: Not Currently     Comment: One drink 2-3x per year.    Drug use: No    Sexual activity: Yes     Partners: Female     Birth control/protection: None      Current Outpatient Medications on File Prior to Visit   Medication Sig Dispense Refill    acetaminophen (TYLENOL) 500 MG tablet Take 1 tablet by mouth Every 4 (Four) to 6 (Six) Hours As Needed.      ALPRAZolam (XANAX) 0.25 MG tablet Take 1/2 to 1 tablet prn when flying 10 tablet 0    Ascorbic Acid (VITAMIN C PO) Take  by mouth Daily.      atenolol (TENORMIN) 50 MG tablet Take 1 tablet by mouth Daily. 90 tablet 3    atorvastatin (LIPITOR) 10 MG tablet Take 1 tablet by mouth Daily. Resume when not on Dapto anymore 90 tablet 3    buPROPion XL (WELLBUTRIN XL) 150 MG 24 hr tablet Take 1 tablet by mouth Daily. 90 tablet 3    Cyanocobalamin (VITAMIN B-12 ER PO) Take 1 tablet by mouth Daily.      cyclobenzaprine (FLEXERIL) 10 MG tablet TAKE 1 TABLET BY MOUTH 3 TIMES A DAY AS NEEDED FOR MUSCLE SPASMS. 30 tablet 5    doxycycline (VIBRAMYCIN) 100 MG capsule Take 1 capsule by mouth 2 (Two) Times a Day.      gabapentin (NEURONTIN) 300 MG capsule Take 1 capsule by mouth 2 (Two) Times a Day.      hydroxychloroquine (PLAQUENIL) 200 MG tablet Take 1 tablet by mouth 2 (Two) Times a Day. 180 tablet 1    latanoprost (XALATAN) 0.005 % ophthalmic solution Administer 1 drop to both eyes Every Night.      LISINOPRIL PO Take 12.5 tablets by mouth Daily.      lisinopril-hydrochlorothiazide (PRINZIDE,ZESTORETIC) 20-12.5 MG per tablet Take 1 tablet by mouth Daily. 90 tablet 3    meclizine (ANTIVERT) 25 MG tablet 1 tablet Every 8 (Eight) Hours As Needed.      multivitamin (THERAGRAN) tablet  tablet       nystatin (MYCOSTATIN) 502632 UNIT/GM cream       ondansetron ODT (ZOFRAN-ODT) 4 MG disintegrating tablet Take 1 tablet by mouth Every 8 (Eight) Hours As Needed for Nausea or Vomiting. 20 tablet 2    oxaprozin (DAYPRO) 600 MG tablet Take 2 tablets by mouth Daily. 180 tablet 1    prednisoLONE acetate (PRED FORTE) 1 % ophthalmic suspension Apply 1 drop to eye(s) as directed by provider 4 (Four) Times a Day.      promethazine (PHENERGAN) 25 MG tablet Take 1 tablet by mouth Every 6 (Six) Hours As Needed for Nausea or Vomiting. 20 tablet 1    sennosides-docusate (senna-docusate sodium) 8.6-50 MG per tablet Take 1-2 tablets by mouth Daily. 20 tablet 0    SUMAtriptan (IMITREX) 100 MG tablet Take one tablet at onset of headache. May repeat dose one time in 2 hours if headache not relieved. 9 tablet 3    Tirzepatide-Weight Management (ZEPBOUND) 7.5 MG/0.5ML solution auto-injector Inject 0.5 mL under the skin into the appropriate area as directed 1 (One) Time Per Week. 2 mL 2    traMADol (ULTRAM) 50 MG tablet Take 2 tablets by mouth 3 times daily as needed for pain 180 tablet 2    triamcinolone (KENALOG) 0.025 % cream       [DISCONTINUED] chlorhexidine (PERIDEX) 0.12 % solution        No current facility-administered medications on file prior to visit.      No Known Allergies     Review of Systems   Constitutional:  Negative for activity change, appetite change, chills, diaphoresis, fatigue, fever and unexpected weight change.   HENT:  Negative for congestion, dental problem, drooling, ear discharge, ear pain, facial swelling, hearing loss, mouth sores, nosebleeds, postnasal drip, rhinorrhea, sinus pressure, sneezing, sore throat, tinnitus, trouble swallowing and voice change.    Eyes:  Negative for photophobia, pain, discharge, redness, itching and visual disturbance.   Respiratory:  Negative for apnea, cough, choking, chest tightness, shortness of breath, wheezing and stridor.    Cardiovascular:  Negative for  "chest pain, palpitations and leg swelling.   Gastrointestinal:  Negative for abdominal distention, abdominal pain, anal bleeding, blood in stool, constipation, diarrhea, nausea, rectal pain and vomiting.   Endocrine: Negative for cold intolerance, heat intolerance, polydipsia, polyphagia and polyuria.   Genitourinary:  Negative for decreased urine volume, difficulty urinating, dysuria, enuresis, flank pain, frequency, genital sores, hematuria and urgency.   Musculoskeletal:  Positive for arthralgias. Negative for back pain, gait problem, joint swelling, myalgias, neck pain and neck stiffness.   Skin:  Negative for color change, pallor, rash and wound.   Allergic/Immunologic: Negative for environmental allergies, food allergies and immunocompromised state.   Neurological:  Negative for dizziness, tremors, seizures, syncope, facial asymmetry, speech difficulty, weakness, light-headedness, numbness and headaches.   Hematological:  Negative for adenopathy. Does not bruise/bleed easily.   Psychiatric/Behavioral:  Negative for agitation, behavioral problems, confusion, decreased concentration, dysphoric mood, hallucinations, self-injury, sleep disturbance and suicidal ideas. The patient is not nervous/anxious and is not hyperactive.         Objective      Physical Exam  /90   Ht 175.3 cm (69\")   Wt (!) 147 kg (323 lb)   BMI 47.70 kg/m²     Body mass index is 47.7 kg/m².         General:   Mental Status:  Alert   Appearance: Cooperative, in no acute distress   Build and Nutrition: Obese by BMI male   Orientation: Alert and oriented to person, place and time   Posture: Normal   Gait: Limp on the right with a cane    Integument:   Right knee: No skin lesions, no rash, no ecchymosis    Lower Extremities:   Right Knee:    Tenderness:  Medial/lateral joint line tenderness    Effusion:  None    Swelling: None    Crepitus:  Positive    Range of motion:  Extension: 0°       Flexion: 125°  Instability:  No varus laxity, no " valgus laxity, negative anterior drawer  Deformities:  Varus      Imaging/Studies  Imaging Results (Last 24 Hours)       ** No results found for the last 24 hours. **          No new imaging today.    Assessment and Plan     Diagnoses and all orders for this visit:    1. Primary osteoarthritis of right knee (Primary)  -     Case Request; Standing  -     Instructions on coughing, deep breathing, and incentive spirometry.; Future  -     CBC and Differential; Future  -     Basic metabolic panel; Future  -     Protime-INR; Future  -     APTT; Future  -     Hemoglobin A1c; Future  -     Sedimentation rate; Future  -     C-reactive protein; Future  -     Tranexamic Acid 1,000 mg in sodium chloride 0.9 % 100 mL  -     Tranexamic Acid 1,000 mg in sodium chloride 0.9 % 100 mL  -     ethyl alcohol 62 % 2 each  -     ceFAZolin (ANCEF) 2 g in sodium chloride 0.9 % 100 mL IVPB  -     acetaminophen (TYLENOL) tablet 975 mg  -     pregabalin (LYRICA) capsule 150 mg  -     Case Request    2. Class 3 severe obesity due to excess calories without serious comorbidity with body mass index (BMI) of 45.0 to 49.9 in adult    Other orders  -     Outpatient In A Bed; Standing  -     Follow Anesthesia Guidelines / Protocol; Future  -     Follow Anesthesia Guidelines / Protocol; Standing  -     Nerve Block; Standing  -     Verify NPO Status; Standing  -     Verify The Time Patient Completed ERAS Hydration Drink; Standing  -     SCD (sequential compression device)- to be placed on patient in Pre-op; Standing  -     POC Glucose Once; Standing  -     Clip operative site; Standing  -     Provide Patient With Carbo Loading Instructions  -     Provide Patient With ERAS Booklet(s)/Handout  -     Chlorhexidine Gluconate 4 % solution; Apply 1 Application topically to the appropriate area as directed Daily. Shower with hibiclens solution as directed for 5 days prior to surgery  Dispense: 236 mL; Refill: 0        1. Primary osteoarthritis of right knee     2. Class 3 severe obesity due to excess calories without serious comorbidity with body mass index (BMI) of 45.0 to 49.9 in adult          I reviewed my findings with the patient.  He has reached a point where he like to proceed with right total knee arthroplasty surgery.  He will need to wait at least until after 4/15/2025, given his injection performed on 1/15/2025.  Risks, benefits, alternatives surgery been discussed.  Please see my counseling note for details.  Of note, he is on chronic antibiotic suppression for his right hip.    Surgical Counseling     I have informed the patient of the diagnosis and the prognosis.  Exhaustive conservative treatment modalities have not resulted in long term pain relief.  The symptoms have progressed to the point of daily pain and inability to perform activities of daily living without significant pain. The patient has reached the point of desiring to proceed with total knee arthroplasty after discussing the risks, benefits and alternatives to the procedure.  The surgical procedure itself was discussed in detail. Risks of the procedure were discussed, which included but are not limited to, bleeding, infection, damage to blood vessels and nerves, incomplete pain relief, loosening of the prosthesis (early or late), deep infection (early or late), need for further surgery, loss of limb, deep venous thrombosis, pulmonary embolus, death, heart attack, stroke, kidney failure, liver failure, and anesthetic complications.  In addition, the potential for deep infection developing in the future was discussed, which could require further surgery.  The knee would have to be re-opened, debrided, and potentially remove the prosthesis, which may or may not be replaced in the future.  Also, the possibility for loosening of the prosthesis has been mentioned.  If the prosthesis loosened, a revision arthroplasty could be performed, with results that are not as predictable compared to the  original procedure.  The typical rehabilitative course has also been discussed, and full recovery may take up to a year to see the maximum benefit.  The importance of patient cooperation in the rehabilitative efforts has also been discussed.  No guarantees were given.  The patient understands the potential risks versus the benefits and desires to proceed with total knee arthroplasty at a mutually convenient time.     Return for surgery.      Osbaldo Olivas MD  03/05/25  10:34 EST    Dictated Utilizing Dragon Dictation

## 2025-03-21 DIAGNOSIS — G44.229 CHRONIC TENSION-TYPE HEADACHE, NOT INTRACTABLE: ICD-10-CM

## 2025-03-21 RX ORDER — CYCLOBENZAPRINE HCL 10 MG
10 TABLET ORAL 3 TIMES DAILY PRN
Qty: 30 TABLET | Refills: 6 | Status: SHIPPED | OUTPATIENT
Start: 2025-03-21

## 2025-04-16 DIAGNOSIS — M06.09 RHEUMATOID ARTHRITIS OF MULTIPLE SITES WITH NEGATIVE RHEUMATOID FACTOR: ICD-10-CM

## 2025-04-17 RX ORDER — LATANOPROST 50 UG/ML
1 SOLUTION/ DROPS OPHTHALMIC NIGHTLY
OUTPATIENT
Start: 2025-04-17

## 2025-04-21 ENCOUNTER — OFFICE VISIT (OUTPATIENT)
Dept: ORTHOPEDIC SURGERY | Facility: CLINIC | Age: 75
End: 2025-04-21
Payer: MEDICARE

## 2025-04-21 VITALS — WEIGHT: 315 LBS | HEIGHT: 69 IN | BODY MASS INDEX: 46.65 KG/M2

## 2025-04-21 DIAGNOSIS — Z96.641 STATUS POST TOTAL HIP REPLACEMENT, RIGHT: Primary | ICD-10-CM

## 2025-04-21 DIAGNOSIS — Z09 POSTOPERATIVE EXAMINATION: ICD-10-CM

## 2025-04-21 PROCEDURE — 99213 OFFICE O/P EST LOW 20 MIN: CPT | Performed by: ORTHOPAEDIC SURGERY

## 2025-04-21 PROCEDURE — 1159F MED LIST DOCD IN RCRD: CPT | Performed by: ORTHOPAEDIC SURGERY

## 2025-04-21 PROCEDURE — 1160F RVW MEDS BY RX/DR IN RCRD: CPT | Performed by: ORTHOPAEDIC SURGERY

## 2025-04-21 NOTE — PROGRESS NOTES
[facility] [place] [facility] [place]    Subjective     Chief Complaint   Patient presents with    Follow-up     3 year f/u -- (R) total hip arthroplasty 08/04/20         HPI    It has been 3  year(s) since Mr. Jackman's last visit. He returns to clinic today for postoperative follow-up of right hip arthroplasty. The issue has been ongoing for 5 year(s). He rates his pain a 0-10/10 on the pain scale. Previous/current treatments: NSAIDS and physical therapy. Current symptoms: pain. The pain is worse with walking; sitting and pain medication and/or NSAID improve the pain. Overall, he is doing better.  He has intermittent anterior hip pain, lasting seconds, with certain movements.  No pain at rest.  Ambulatory without external aids.  He wanted to have it checked out prior to having his right knee replaced in the near future.      I have reviewed the following portions of the patient's history and agree with: History of Present Illness and Review of Systems    Patient Active Problem List   Diagnosis    Allergic rhinitis    Anxiety disorder    Benign paroxysmal positional vertigo    Chronic tension type headache    Depression    ED (erectile dysfunction) of non-organic origin    Hyperlipidemia    Hypertension    LFTs abnormal    CHRISTOPHE (obstructive sleep apnea)    Elevated glucose    Health care maintenance    Morbid obesity due to excess calories    Onychomycosis of left great toe    Osteoarthritis of ankle or foot    Venous stasis    Neuropathy    Class 3 severe obesity due to excess calories with serious comorbidity and body mass index (BMI) of 40.0 to 44.9 in adult    Pre-op evaluation    Arthritis of foot, left    S/P left ankle triple arthrodesis    Acute blood loss anemia, mild, asymptomatic     Acute postoperative pain    Wound infection    Primary osteoarthritis of right hip    Status post total replacement of right hip    Postoperative wound infection of right hip, s/p I and D, head and liner exchange.     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    Arthrodesis present     lt foot triple    Bulging of lumbar intervertebral disc     Cataract     Lens implated in left eye.    Cervical disc disorder ?    Cornea transplant recipient     BILATERAL    COVID-19 11/2021    S/p MAB    CPAP (continuous positive airway pressure) dependence     CTS (carpal tunnel syndrome) Slight case; Dr. Chanda Castanon    DDD (degenerative disc disease), lumbar     09/09/201s/p injection Dr Abel Abbott    Diverticulosis     Elevated cholesterol     Glaucoma July 2022    Drops daily.    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    Neuropathy     bilatral feet    Obesity     CHRISTOPHE on CPAP     Osteopenia     Periarthritis of shoulder ?    Posterior tibial tendinitis, left     09/19/2019 pending-10/19 with Dr Carolina    Rheumatoid arthritis     Rotator cuff syndrome 09/15/2022    Dr. Chanda Castanon    Swelling of left ear     Venous stasis 09/09/2019    priti lower extremities    Visual impairment     Cornea grafts both eyes; Cataracts.    Wears contact lenses     Wears contact lenses     Wears contact lenses       Past Surgical History:   Procedure Laterality Date    ACHILLES TENDON SURGERY Left 10/15/2019    Procedure: ACHILLES TENDON  LENGTHENING LEFT;  Surgeon: Elodia Guzman MD;  Location:  [facility];  Service: Orthopedics    CATARACT EXTRACTION Right     COLONOSCOPY      EYE SURGERY Bilateral     cornea transplant     FOOT SURGERY Left 10/15/2019    triple arthrodesis and achilles tendon lengthening- [place]    HERNIA REPAIR      abdominal    ILIAC CREST BONE GRAFT Left 10/15/2019    Procedure: ILIAC CREST BONE GRAFT LEFT;  Surgeon: Elodia Guzman MD;  Location:  [facility];  Service: Orthopedics    INCISION AND DRAINAGE LEG Right 2020    Procedure: INCISION AND DRAINAGE WITH COMPONENT EXCHANGE, HEAD AND LINER RIGHT HIP;  Surgeon: Osbaldo Olivas MD;  Location: [facility];  Service: Orthopedics;  Laterality: Right;    JOINT REPLACEMENT  20 and 20    Right hip replacement    KNEE SURGERY      TOE FUSION Left 10/15/2019    Procedure: TRIPLE ARTHODESIS LEFT;  Surgeon: Elodia Guzman MD;  Location:  [facility];  Service: Orthopedics    TONSILLECTOMY      TOTAL HIP ARTHROPLASTY Right 2020    Procedure: TOTAL HIP ARTHROPLASTY RIGHT;  Surgeon: Osbaldo Olivas MD;  Location:  [facility];  Service: Orthopedics;  Laterality: Right;    TOTAL KNEE ARTHROPLASTY Left 03/10/2023    Procedure: TOTAL KNEE ARTHROPLASTY WITH CORI ROBOT - LEFT;  Surgeon: Osbaldo Olivas MD;  Location:  [facility];  Service: Robotics - Ortho;  Laterality: Left;    TRIGGER POINT INJECTION      UMBILICAL HERNIA REPAIR      Repaired over 15 years ago.    VENOUS ABLATION Left 2022    Endovenous laser ablation of left great sapheuos vein for venous insufficiency      Family History   Problem Relation Age of Onset    No Known Problems Mother     Alcohol abuse Father     Prostate cancer Father     Cancer Father          - prostate/bladder cancer    Cancer Sister         Stomach cancer.    Cancer Brother         Kidney removed 23 due renal cell  carcenoma    Multiple sclerosis Son     Cancer Maternal Grandmother         Pancreatic/stomach cancer      Social History     Socioeconomic History    Marital status:     Number of children: 3   Tobacco Use    Smoking status: Never     Passive exposure: Never    Smokeless tobacco: Never   Vaping Use    Vaping status: Never Used   Substance and Sexual Activity    Alcohol use: Not Currently     Comment: One drink 2-3x per year.    Drug use: No    Sexual activity: Yes     Partners: Female     Birth control/protection: None      Current Outpatient Medications on File Prior to Visit   Medication Sig Dispense Refill    acetaminophen (TYLENOL) 500 MG tablet Take 1 tablet by mouth Every 4 (Four) to 6 (Six) Hours As Needed.      ALPRAZolam (XANAX) 0.25 MG tablet Take 1/2 to 1 tablet prn when flying 10 tablet 0    Ascorbic Acid (VITAMIN C PO) Take  by mouth Daily.      atenolol (TENORMIN) 50 MG tablet Take 1 tablet by mouth Daily. 90 tablet 3    atorvastatin (LIPITOR) 10 MG tablet Take 1 tablet by mouth Daily. Resume when not on Dapto anymore 90 tablet 3    buPROPion XL (WELLBUTRIN XL) 150 MG 24 hr tablet Take 1 tablet by mouth Daily. 90 tablet 3    Chlorhexidine Gluconate 4 % solution Apply 1 Application topically to the appropriate area as directed Daily. Shower with hibiclens solution as directed for 5 days prior to surgery 237 mL 0    Cyanocobalamin (VITAMIN B-12 ER PO) Take 1 tablet by mouth Daily.      cyclobenzaprine (FLEXERIL) 10 MG tablet TAKE 1 TABLET BY MOUTH 3 TIMES A DAY AS NEEDED FOR MUSCLE SPASMS. 30 tablet 6    doxycycline (VIBRAMYCIN) 100 MG capsule Take 1 capsule by mouth 2 (Two) Times a Day.      gabapentin (NEURONTIN) 300 MG capsule Take 1 capsule by mouth 2 (Two) Times a Day.      hydroxychloroquine (PLAQUENIL) 200 MG tablet Take 1 tablet by mouth 2 (Two) Times a Day. 180 tablet 1    latanoprost (XALATAN) 0.005 % ophthalmic solution Administer 1 drop to both eyes Every Night.      LISINOPRIL PO Take 12.5 tablets by mouth Daily.      lisinopril-hydrochlorothiazide (PRINZIDE,ZESTORETIC) 20-12.5 MG per  tablet Take 1 tablet by mouth Daily. 90 tablet 3    meclizine (ANTIVERT) 25 MG tablet 1 tablet Every 8 (Eight) Hours As Needed.      multivitamin (THERAGRAN) tablet tablet       nystatin (MYCOSTATIN) 419941 UNIT/GM cream       ondansetron ODT (ZOFRAN-ODT) 4 MG disintegrating tablet Take 1 tablet by mouth Every 8 (Eight) Hours As Needed for Nausea or Vomiting. 20 tablet 2    oxaprozin (DAYPRO) 600 MG tablet Take 2 tablets by mouth Daily. 180 tablet 1    prednisoLONE acetate (PRED FORTE) 1 % ophthalmic suspension Apply 1 drop to eye(s) as directed by provider 4 (Four) Times a Day.      promethazine (PHENERGAN) 25 MG tablet Take 1 tablet by mouth Every 6 (Six) Hours As Needed for Nausea or Vomiting. 20 tablet 1    sennosides-docusate (senna-docusate sodium) 8.6-50 MG per tablet Take 1-2 tablets by mouth Daily. 20 tablet 0    SUMAtriptan (IMITREX) 100 MG tablet Take one tablet at onset of headache. May repeat dose one time in 2 hours if headache not relieved. 9 tablet 3    Tirzepatide-Weight Management (ZEPBOUND) 7.5 MG/0.5ML solution auto-injector Inject 0.5 mL under the skin into the appropriate area as directed 1 (One) Time Per Week. 2 mL 2    traMADol (ULTRAM) 50 MG tablet Take 2 tablets by mouth 3 times daily as needed for pain 180 tablet 2    triamcinolone (KENALOG) 0.025 % cream        No current facility-administered medications on file prior to visit.      No Known Allergies     Review of Systems   Constitutional:  Negative for activity change, appetite change, chills, diaphoresis, fatigue, fever and unexpected weight change.   HENT:  Negative for congestion, dental problem, drooling, ear discharge, ear pain, facial swelling, hearing loss, mouth sores, nosebleeds, postnasal drip, rhinorrhea, sinus pressure, sneezing, sore throat, tinnitus, trouble swallowing and voice change.    Eyes:  Negative for photophobia, pain, discharge, redness, itching and visual disturbance.   Respiratory:  Negative for apnea, cough,  "choking, chest tightness, shortness of breath, wheezing and stridor.    Cardiovascular:  Negative for chest pain, palpitations and leg swelling.   Gastrointestinal:  Negative for abdominal distention, abdominal pain, anal bleeding, blood in stool, constipation, diarrhea, nausea, rectal pain and vomiting.   Endocrine: Negative for cold intolerance, heat intolerance, polydipsia, polyphagia and polyuria.   Genitourinary:  Negative for decreased urine volume, difficulty urinating, dysuria, enuresis, flank pain, frequency, genital sores, hematuria and urgency.   Musculoskeletal:  Positive for arthralgias. Negative for back pain, gait problem, joint swelling, myalgias, neck pain and neck stiffness.   Skin:  Negative for color change, pallor, rash and wound.   Allergic/Immunologic: Negative for environmental allergies, food allergies and immunocompromised state.   Neurological:  Negative for dizziness, tremors, seizures, syncope, facial asymmetry, speech difficulty, weakness, light-headedness, numbness and headaches.   Hematological:  Negative for adenopathy. Does not bruise/bleed easily.   Psychiatric/Behavioral:  Negative for agitation, behavioral problems, confusion, decreased concentration, dysphoric mood, hallucinations, self-injury, sleep disturbance and suicidal ideas. The patient is not nervous/anxious and is not hyperactive.         Objective      Physical Exam  Ht 175.3 cm (69.02\")   Wt (!) 144 kg (317 lb 6.4 oz)   BMI 46.85 kg/m²     Body mass index is 46.85 kg/m².         General:   Mental Status:  Alert   Appearance: Cooperative, in no acute distress   Build and Nutrition: Obese by BMI male   Orientation: Alert and oriented to person, place and time   Posture: Normal   Gait: Nonantalgic    Integument:   Right hip: Wound is well-healed with no signs of infection    Lower Extremity:   Right Hip:    Tenderness:  None    Swelling:  None    Crepitus:  None    Range of motion:  External Rotation: 30°, no " pain       Internal Rotation: 30°, no pain       Flexion:  100°       Extension:  0°    Deformities:  None  [facility]    No leg length discrepancy      Imaging/Studies  Imaging Results (Last 24 Hours)       Procedure Component Value Units Date/Time    XR Hip With or Without Pelvis 2 - 3 View Right [[id number]] Resulted: 04/21/25 1515     Updated: 04/21/25 1515    Narrative:      Right Hip Radiographs  Indication: status-post right total hip arthroplasty  Views: low AP pelvis and lateral of the right hip    Comparison: no change compared to prior study, 8/22/2022    Findings:   The components are well aligned, with no signs of loosening or failure.                 Assessment and Plan     Diagnoses and all orders for this visit:    1. Status post total hip replacement, right (Primary)  -     XR Hip With or Without Pelvis 2 - 3 View Right    2. Postoperative examination        1. Status post total hip replacement, right    2. Postoperative examination          I reviewed my findings with the patient.  I see no unusual features with the right total hip arthroplasty.  Will proceed with right total knee arthroplasty surgery as planned.  He does have his preadmission testing tomorrow, with screening anti-inflammatory markers.  We will contact him if there are any issues.        Osbaldo Olivas MD  04/21/25  15:26 EDT    Dictated Utilizing Dragon Dictation

## 2025-04-22 ENCOUNTER — PRE-ADMISSION TESTING (OUTPATIENT)
Dept: PREADMISSION TESTING | Facility: HOSPITAL | Age: 75
End: 2025-04-22
Payer: MEDICARE

## 2025-04-22 VITALS — HEIGHT: 70 IN | BODY MASS INDEX: 44.25 KG/M2 | WEIGHT: 309.08 LBS

## 2025-04-22 DIAGNOSIS — M17.11 PRIMARY OSTEOARTHRITIS OF RIGHT KNEE: ICD-10-CM

## 2025-04-22 DIAGNOSIS — M17.11 PRIMARY OSTEOARTHRITIS OF RIGHT KNEE: Primary | ICD-10-CM

## 2025-04-22 LAB
ANION GAP SERPL CALCULATED.3IONS-SCNC: 10 MMOL/L (ref 5–15)
APTT PPP: 29.6 SECONDS (ref 22–39)
BASOPHILS # BLD AUTO: 0.01 10*3/MM3 (ref 0–0.2)
BASOPHILS NFR BLD AUTO: 0.2 % (ref 0–1.5)
BUN SERPL-MCNC: 14 MG/DL (ref 8–23)
BUN/CREAT SERPL: 25 (ref 7–25)
CALCIUM SPEC-SCNC: 9.1 MG/DL (ref 8.6–10.5)
CHLORIDE SERPL-SCNC: 104 MMOL/L (ref 98–107)
CO2 SERPL-SCNC: 28 MMOL/L (ref 22–29)
CREAT SERPL-MCNC: 0.56 MG/DL (ref 0.76–1.27)
CRP SERPL-MCNC: <0.3 MG/DL (ref 0–0.5)
DEPRECATED RDW RBC AUTO: 42.7 FL (ref 37–54)
EGFRCR SERPLBLD CKD-EPI 2021: 104.1 ML/MIN/1.73
EOSINOPHIL # BLD AUTO: 0.09 10*3/MM3 (ref 0–0.4)
EOSINOPHIL NFR BLD AUTO: 2.1 % (ref 0.3–6.2)
ERYTHROCYTE [DISTWIDTH] IN BLOOD BY AUTOMATED COUNT: 12.7 % (ref 12.3–15.4)
ERYTHROCYTE [SEDIMENTATION RATE] IN BLOOD: 14 MM/HR (ref 0–20)
GLUCOSE SERPL-MCNC: 102 MG/DL (ref 65–99)
HBA1C MFR BLD: 5.3 % (ref 4.8–5.6)
HCT VFR BLD AUTO: 43.5 % (ref 37.5–51)
HGB BLD-MCNC: 14.2 G/DL (ref 13–17.7)
IMM GRANULOCYTES # BLD AUTO: 0.01 10*3/MM3 (ref 0–0.05)
IMM GRANULOCYTES NFR BLD AUTO: 0.2 % (ref 0–0.5)
INR PPP: 0.95 (ref 0.89–1.12)
LYMPHOCYTES # BLD AUTO: 0.82 10*3/MM3 (ref 0.7–3.1)
LYMPHOCYTES NFR BLD AUTO: 18.9 % (ref 19.6–45.3)
MCH RBC QN AUTO: 29.9 PG (ref 26.6–33)
MCHC RBC AUTO-ENTMCNC: 32.6 G/DL (ref 31.5–35.7)
MCV RBC AUTO: 91.6 FL (ref 79–97)
MONOCYTES # BLD AUTO: 0.54 10*3/MM3 (ref 0.1–0.9)
MONOCYTES NFR BLD AUTO: 12.4 % (ref 5–12)
NEUTROPHILS NFR BLD AUTO: 2.87 10*3/MM3 (ref 1.7–7)
NEUTROPHILS NFR BLD AUTO: 66.2 % (ref 42.7–76)
NRBC BLD AUTO-RTO: 0 /100 WBC (ref 0–0.2)
PLATELET # BLD AUTO: 235 10*3/MM3 (ref 140–450)
PMV BLD AUTO: 10 FL (ref 6–12)
POTASSIUM SERPL-SCNC: 3.9 MMOL/L (ref 3.5–5.2)
PROTHROMBIN TIME: 13.3 SECONDS (ref 12.2–15.3)
QT INTERVAL: 412 MS
QTC INTERVAL: 457 MS
RBC # BLD AUTO: 4.75 10*6/MM3 (ref 4.14–5.8)
SODIUM SERPL-SCNC: 142 MMOL/L (ref 136–145)
WBC NRBC COR # BLD AUTO: 4.34 10*3/MM3 (ref 3.4–10.8)

## 2025-04-22 PROCEDURE — 85652 RBC SED RATE AUTOMATED: CPT

## 2025-04-22 PROCEDURE — 93005 ELECTROCARDIOGRAM TRACING: CPT

## 2025-04-22 PROCEDURE — 36415 COLL VENOUS BLD VENIPUNCTURE: CPT

## 2025-04-22 PROCEDURE — 83036 HEMOGLOBIN GLYCOSYLATED A1C: CPT

## 2025-04-22 PROCEDURE — 80048 BASIC METABOLIC PNL TOTAL CA: CPT

## 2025-04-22 PROCEDURE — 85610 PROTHROMBIN TIME: CPT

## 2025-04-22 PROCEDURE — 86140 C-REACTIVE PROTEIN: CPT

## 2025-04-22 PROCEDURE — 85025 COMPLETE CBC W/AUTO DIFF WBC: CPT

## 2025-04-22 PROCEDURE — 85730 THROMBOPLASTIN TIME PARTIAL: CPT

## 2025-04-22 PROCEDURE — 93010 ELECTROCARDIOGRAM REPORT: CPT | Performed by: INTERNAL MEDICINE

## 2025-04-22 NOTE — PAT
Prescription for Chlorhexidine shower called into patient's pharmacy or [facility] pharmacy by patient's surgeon.  Reinforced with patient to  the prescription from applicable pharmacy if they haven't already.  Verbal and written instructions given regarding proper use of Chlorhexidine body wash to patient and/or famlily during PAT visit. Patient/family also instructed to complete checklist and return it to Pre-op on the day of surgery.  Patient and/or family verbalized understanding.    Patient to apply Chlorhexadine wipes  to surgical area (as instructed) the night before procedure and the AM of procedure. Wipes provided.    Per Anesthesia Request, patient instructed not to take their ACE/ARB medications on the AM of surgery.    Discussed with patient options for receiving total joint replacement education and assessed patient's ability and preference. Joint Replacement Guide given to patient during PAT visit since not received a copy within the last year. Encouraged patient/family to read guide thoroughly and notify PAT staff with any questions or concerns. Handout provided directing patient to links to watch online videos related to joint replacement surgery on the [facility] website. The handout gives detailed instructions for joining an online joint replacement class through Microsoft Teams or phone conference offered on the 1st and 3rd Thursdays of the month. Patient agreed to participate by watching videos online. Patient verbalized understanding of instructions. Encouraged to share information with family and/or . An overview of the joint replacement education was provided during the visit including general perioperative instructions that are routine for all surgical patients (PAT PASS, wipes, directions to pre-op, etc.).    Clean catch urinalysis not indicated because patient denied recent urinary frequency, urinary urgency, burning/pain upon urination, or flank pain. No recent UTIs.

## 2025-04-23 DIAGNOSIS — I15.9 SECONDARY HYPERTENSION: ICD-10-CM

## 2025-04-23 RX ORDER — LISINOPRIL AND HYDROCHLOROTHIAZIDE 12.5; 2 MG/1; MG/1
1 TABLET ORAL DAILY
Qty: 90 TABLET | Refills: 0 | Status: SHIPPED | OUTPATIENT
Start: 2025-04-23

## 2025-04-25 ENCOUNTER — TELEPHONE (OUTPATIENT)
Dept: ORTHOPEDIC SURGERY | Facility: CLINIC | Age: 75
End: 2025-04-25

## 2025-04-25 NOTE — TELEPHONE ENCOUNTER
Caller: SANDIE LOPEZ.[facility] PHYSICAL THERAPY//[facility]//       Best call back number: [phone]     What orders are you requesting (i.e. lab or imaging): PHYSICAL THERAPY     In what timeframe would the patient need to come in: MAY 8TH 11 AM     Where will you receive your lab/imaging services: [facility]/ -[phone]    Additional notes: SANDIE LOPEZ.[facility] PHYSICAL THERAPY//[facility] LOCATION//     IS HAVING TKA W/DR RIDLEY MAY 6 PATIENT HAS ALREADY BEEN SCHEDUELED FOR MAY 8TH FOR PT. SANDIE SPOKE WITH OFFICE STAFF TO GET REFFERAL/ORDER BUT SHE  HAS  NOT RECEIVED IT YET.

## 2025-04-29 ENCOUNTER — PRE-PROCEDURE SCREENING (OUTPATIENT)
Dept: ORTHOPEDIC SURGERY | Facility: CLINIC | Age: 75
End: 2025-04-29
Payer: MEDICARE

## 2025-04-29 NOTE — TELEPHONE ENCOUNTER
TOTAL JOINT REPLACEMENT CARE NAVIGATION INITIAL ASSESSMENT    PATIENT INFORMATION:     Patient: Obi Jackman Jr.       YOB: 1950         Age/Gender: 74 y.o. male     Medical Record Number: [id number]     Surgery:  R TKA              Surgery Date: 5/6/25    Surgeon: [employer] Replacement    Physical Therapy Location: [facility]    PT Date & Time: 5/8/25 @ 3pm    DVT PPX: ASA 81mg BID    DISCHARGE PLAN: Outpatient      LIVING SITUATION:     [x]Private Residence      Who lives in the home?    Wife                      []Nursing Home:                                     []Assisted Living  []Rehabilitation Facility:                          []Live Alone  []Homeless    HOUSING STYLE:     [x]Ranch Style   []Multi-level   []Split level   []Townhouse   []Apartment    Do you have steps to navigate in the home? No  Do you have steps to navigate outside the home? 2 steps      ADL:     [x]Independent   []Independent with difficulty   []Partially Dependent   []Dependent    Do you require bathing/showering?   Do you require assistance with grooming (brushing hair, shaving, etc)?  Do you require assistance dressing?  Do you require assistance with walking?  Do you require assistive devices while walking (cane, walker, wheelchair)?  Do you require assistance with transfers (moving from bed to chair, wheelchair, etc)?  Do you require assistance preparing meals?  Do you require assistance when eating meals?  Do you require assistance to use the toilet?  Do you have any issues with bowel or bladder incontinence?  Do you require assistance with household chores (cleaning, laundry, etc)?  Have you had any recent falls? No    CURRENT SERVICES:    []Home Health (PT, OT, Skilled Nursing)  Agency:    County:  []Palliative Care  []Meals on Wheels  []Daily Caregiver  [x]None    CURRENT DME:    []Walker   [x]Cane   []Wheelchair   []Crutches   []Bedside Commode   []Shower Chair  []Hospital Bed   []Nebulizer    []Oxygen  [x]Other: CPAP-will bring mask & tubing     MEDICATIONS: Lisinopril-hctz-takes at night. Doesn't plan to take any meds in AM    Are you currently on any blood thinners? No  Are you currently on any anti-inflammatory medications? Ibuprofen-will hold starting today  Are you currently on any medications for rheumatoid arthritis? Hydroxychloroquine-can continue  Are you currently taking any herbal supplements? No      Post-op Pharmacy Name:   [facility]      Phone Number:     Does anyone assist you filling medication boxes or remind you that medication is due?   Are you currently on a mail order prescription plan?  What pharmacy do you use to fill your short-term prescriptions? [facility]  Do you have prescription insurance coverage?   Can you afford your medications/co-pays?    CURRENT TRANSPORTATION:    Which services do you rely on? []Taxi   []Public Transportation   [x]Private Vehicle     []Ambulance   []Tack (Transportation Assistance [facility])    Do you have a way to get home when you are discharged? Yes-wife, Rachael    POTENTIAL NEEDS:    []Rehabilitation   []Home Health PT   []SNF  []Meals on Wheels   []Department of    []Palliative Care  []Nursing Home   []Hospice   [x]Durable Medical Equipment- has all DMEs.  []Caregiver Services   []Financial Services   []Pre-op In Home PT Evaluation    CLEARANCES NEEDED FOR SURGERY:    []Dental   []Cardiology   []Pulmonology   []Medical (PCP)   []Rheumatology  []Endocrinology   []Hematology/Oncology   []Neurology   []Neurosurgery   []CT Vascular      INITIAL DISCHARGE PLAN: Plan for same day discharge. Patient's wife, Rachael, will be surgery , as well as, post-op care giver. Patient has all DMEs. Patient informed about [facility] program. Patient okay with taking oxycodone and Meloxicam post-op (denies any problems in the past). Instructed pt to have OTC 500mg Tylenol, 81mg ASA, and OTC stool softener on hand  post-op. Post-op PT scheduled at [facility] on 5/8/25 @ 3pm.

## 2025-05-01 ENCOUNTER — TRANSCRIBE ORDERS (OUTPATIENT)
Dept: LAB | Facility: HOSPITAL | Age: 75
End: 2025-05-01
Payer: MEDICARE

## 2025-05-01 ENCOUNTER — LAB (OUTPATIENT)
Dept: LAB | Facility: HOSPITAL | Age: 75
End: 2025-05-01
Payer: MEDICARE

## 2025-05-01 DIAGNOSIS — L03.115 CELLULITIS OF RIGHT FOOT: ICD-10-CM

## 2025-05-01 DIAGNOSIS — T84.51XD INFLAMMATORY REACTION DUE TO INTERNAL PROSTHESIS OF RIGHT HIP, SUBSEQUENT ENCOUNTER: ICD-10-CM

## 2025-05-01 DIAGNOSIS — B95.7 CHRONIC GRANULOMATOUS INFECTION DUE MOSTLY TO STAPHYLOCOCCUS AUREUS: ICD-10-CM

## 2025-05-01 DIAGNOSIS — T84.51XD INFLAMMATORY REACTION DUE TO INTERNAL PROSTHESIS OF RIGHT HIP, SUBSEQUENT ENCOUNTER: Primary | ICD-10-CM

## 2025-05-01 LAB
ALBUMIN SERPL-MCNC: 4.1 G/DL (ref 3.5–5.2)
ALBUMIN/GLOB SERPL: 1.7 G/DL
ALP SERPL-CCNC: 60 U/L (ref 39–117)
ALT SERPL W P-5'-P-CCNC: 15 U/L (ref 1–41)
ANION GAP SERPL CALCULATED.3IONS-SCNC: 10 MMOL/L (ref 5–15)
AST SERPL-CCNC: 17 U/L (ref 1–40)
BASOPHILS # BLD AUTO: 0.02 10*3/MM3 (ref 0–0.2)
BASOPHILS NFR BLD AUTO: 0.5 % (ref 0–1.5)
BILIRUB SERPL-MCNC: 0.6 MG/DL (ref 0–1.2)
BUN SERPL-MCNC: 11 MG/DL (ref 8–23)
BUN/CREAT SERPL: 14.1 (ref 7–25)
CALCIUM SPEC-SCNC: 9.2 MG/DL (ref 8.6–10.5)
CHLORIDE SERPL-SCNC: 104 MMOL/L (ref 98–107)
CO2 SERPL-SCNC: 28 MMOL/L (ref 22–29)
CREAT SERPL-MCNC: 0.78 MG/DL (ref 0.76–1.27)
CRP SERPL-MCNC: <0.3 MG/DL (ref 0–0.5)
DEPRECATED RDW RBC AUTO: 41.7 FL (ref 37–54)
EGFRCR SERPLBLD CKD-EPI 2021: 94.2 ML/MIN/1.73
EOSINOPHIL # BLD AUTO: 0.14 10*3/MM3 (ref 0–0.4)
EOSINOPHIL NFR BLD AUTO: 3.5 % (ref 0.3–6.2)
ERYTHROCYTE [DISTWIDTH] IN BLOOD BY AUTOMATED COUNT: 12.7 % (ref 12.3–15.4)
ERYTHROCYTE [SEDIMENTATION RATE] IN BLOOD: 10 MM/HR (ref 0–20)
GLOBULIN UR ELPH-MCNC: 2.4 GM/DL
GLUCOSE SERPL-MCNC: 107 MG/DL (ref 65–99)
HCT VFR BLD AUTO: 44.1 % (ref 37.5–51)
HGB BLD-MCNC: 14.3 G/DL (ref 13–17.7)
IMM GRANULOCYTES # BLD AUTO: 0.01 10*3/MM3 (ref 0–0.05)
IMM GRANULOCYTES NFR BLD AUTO: 0.2 % (ref 0–0.5)
LYMPHOCYTES # BLD AUTO: 1.03 10*3/MM3 (ref 0.7–3.1)
LYMPHOCYTES NFR BLD AUTO: 25.4 % (ref 19.6–45.3)
MCH RBC QN AUTO: 29.5 PG (ref 26.6–33)
MCHC RBC AUTO-ENTMCNC: 32.4 G/DL (ref 31.5–35.7)
MCV RBC AUTO: 90.9 FL (ref 79–97)
MONOCYTES # BLD AUTO: 0.5 10*3/MM3 (ref 0.1–0.9)
MONOCYTES NFR BLD AUTO: 12.3 % (ref 5–12)
NEUTROPHILS NFR BLD AUTO: 2.35 10*3/MM3 (ref 1.7–7)
NEUTROPHILS NFR BLD AUTO: 58.1 % (ref 42.7–76)
NRBC BLD AUTO-RTO: 0 /100 WBC (ref 0–0.2)
PLATELET # BLD AUTO: 242 10*3/MM3 (ref 140–450)
PMV BLD AUTO: 10 FL (ref 6–12)
POTASSIUM SERPL-SCNC: 3.6 MMOL/L (ref 3.5–5.2)
PROT SERPL-MCNC: 6.5 G/DL (ref 6–8.5)
RBC # BLD AUTO: 4.85 10*6/MM3 (ref 4.14–5.8)
SODIUM SERPL-SCNC: 142 MMOL/L (ref 136–145)
WBC NRBC COR # BLD AUTO: 4.05 10*3/MM3 (ref 3.4–10.8)

## 2025-05-01 PROCEDURE — 85025 COMPLETE CBC W/AUTO DIFF WBC: CPT | Performed by: INTERNAL MEDICINE

## 2025-05-01 PROCEDURE — 80053 COMPREHEN METABOLIC PANEL: CPT | Performed by: INTERNAL MEDICINE

## 2025-05-01 PROCEDURE — 36415 COLL VENOUS BLD VENIPUNCTURE: CPT | Performed by: INTERNAL MEDICINE

## 2025-05-01 PROCEDURE — 85652 RBC SED RATE AUTOMATED: CPT

## 2025-05-01 PROCEDURE — 86140 C-REACTIVE PROTEIN: CPT | Performed by: INTERNAL MEDICINE

## 2025-05-05 ENCOUNTER — TELEPHONE (OUTPATIENT)
Dept: ORTHOPEDIC SURGERY | Facility: CLINIC | Age: 75
End: 2025-05-05
Payer: MEDICARE

## 2025-05-05 ENCOUNTER — ANESTHESIA EVENT (OUTPATIENT)
Dept: PERIOP | Facility: HOSPITAL | Age: 75
End: 2025-05-05
Payer: MEDICARE

## 2025-05-05 NOTE — TELEPHONE ENCOUNTER
I called patient and explained to him how the [employer] works. I did email an order for the [employer] bike.

## 2025-05-06 ENCOUNTER — ANESTHESIA EVENT CONVERTED (OUTPATIENT)
Dept: ANESTHESIOLOGY | Facility: HOSPITAL | Age: 75
End: 2025-05-06
Payer: MEDICARE

## 2025-05-06 ENCOUNTER — APPOINTMENT (OUTPATIENT)
Dept: GENERAL RADIOLOGY | Facility: HOSPITAL | Age: 75
End: 2025-05-06
Payer: MEDICARE

## 2025-05-06 ENCOUNTER — HOSPITAL ENCOUNTER (OUTPATIENT)
Facility: HOSPITAL | Age: 75
Discharge: HOME | End: 2025-05-06
Attending: ORTHOPAEDIC SURGERY | Admitting: ORTHOPAEDIC SURGERY
Payer: MEDICARE

## 2025-05-06 ENCOUNTER — ANESTHESIA (OUTPATIENT)
Dept: PERIOP | Facility: HOSPITAL | Age: 75
End: 2025-05-06
Payer: MEDICARE

## 2025-05-06 VITALS
RESPIRATION RATE: 15 BRPM | HEART RATE: 75 BPM | BODY MASS INDEX: 42 KG/M2 | DIASTOLIC BLOOD PRESSURE: 87 MMHG | SYSTOLIC BLOOD PRESSURE: 140 MMHG | WEIGHT: 300 LBS | OXYGEN SATURATION: 96 % | TEMPERATURE: 98.4 F | HEIGHT: 71 IN

## 2025-05-06 DIAGNOSIS — M17.11 PRIMARY OSTEOARTHRITIS OF RIGHT KNEE: ICD-10-CM

## 2025-05-06 DIAGNOSIS — E66.812 CLASS 2 SEVERE OBESITY DUE TO EXCESS CALORIES WITH SERIOUS COMORBIDITY AND BODY MASS INDEX (BMI) OF 39.0 TO 39.9 IN ADULT: ICD-10-CM

## 2025-05-06 DIAGNOSIS — Z96.651 STATUS POST RIGHT KNEE REPLACEMENT: Primary | ICD-10-CM

## 2025-05-06 DIAGNOSIS — E66.01 CLASS 2 SEVERE OBESITY DUE TO EXCESS CALORIES WITH SERIOUS COMORBIDITY AND BODY MASS INDEX (BMI) OF 39.0 TO 39.9 IN ADULT: ICD-10-CM

## 2025-05-06 PROBLEM — Z96.659 S/P KNEE REPLACEMENT: Status: ACTIVE | Noted: 2025-05-06

## 2025-05-06 LAB — GLUCOSE BLDC GLUCOMTR-MCNC: 107 MG/DL (ref 70–130)

## 2025-05-06 PROCEDURE — 25010000002 ROPIVACAINE PER 1 MG: Performed by: ORTHOPAEDIC SURGERY

## 2025-05-06 PROCEDURE — 25010000002 BUPIVACAINE (PF) 0.25 % SOLUTION: Performed by: ANESTHESIOLOGY

## 2025-05-06 PROCEDURE — 25810000003 LACTATED RINGERS PER 1000 ML: Performed by: ANESTHESIOLOGY

## 2025-05-06 PROCEDURE — 25010000002 ROPIVACAINE HCL-NACL 0.2-0.9 % SOLUTION: Performed by: NURSE ANESTHETIST, CERTIFIED REGISTERED

## 2025-05-06 PROCEDURE — 97161 PT EVAL LOW COMPLEX 20 MIN: CPT

## 2025-05-06 PROCEDURE — 25010000002 BUPIVACAINE 0.5 % SOLUTION: Performed by: ANESTHESIOLOGY

## 2025-05-06 PROCEDURE — 25010000002 CEFAZOLIN PER 500 MG: Performed by: ORTHOPAEDIC SURGERY

## 2025-05-06 PROCEDURE — C1776 JOINT DEVICE (IMPLANTABLE): HCPCS | Performed by: ORTHOPAEDIC SURGERY

## 2025-05-06 PROCEDURE — 82948 REAGENT STRIP/BLOOD GLUCOSE: CPT

## 2025-05-06 PROCEDURE — 25010000002 LIDOCAINE PF 1% 1 % SOLUTION: Performed by: ANESTHESIOLOGY

## 2025-05-06 PROCEDURE — 97110 THERAPEUTIC EXERCISES: CPT

## 2025-05-06 PROCEDURE — C1713 ANCHOR/SCREW BN/BN,TIS/BN: HCPCS | Performed by: ORTHOPAEDIC SURGERY

## 2025-05-06 PROCEDURE — 25010000002 ONDANSETRON PER 1 MG: Performed by: ANESTHESIOLOGY

## 2025-05-06 PROCEDURE — 73560 X-RAY EXAM OF KNEE 1 OR 2: CPT

## 2025-05-06 PROCEDURE — 97165 OT EVAL LOW COMPLEX 30 MIN: CPT

## 2025-05-06 PROCEDURE — 25010000002 DEXAMETHASONE PER 1 MG: Performed by: ANESTHESIOLOGY

## 2025-05-06 PROCEDURE — 25010000002 PROPOFOL 10 MG/ML EMULSION: Performed by: ANESTHESIOLOGY

## 2025-05-06 DEVICE — PALACOS® R IS A FAST-CURING, RADIOPAQUE, POLY(METHYL METHACRYLATE)-BASED BONE CEMENT.PALACOS ® R CONTAINS THE X-RAY CONTRAST MEDIUM ZIRCONIUM DIOXIDE. TO IMPROVE VISIBILITY IN THE SURGICAL FIELD PALACOS ® R HAS BEEN COLOURED WITH CHLOROPHYLL (E141). THE BONE CEMENT IS PREPARED DIRECTLY BEFORE USE BY MIXING A POLYMER POWDER COMPONENT WITH A LIQUID MONOMER COMPONENT. A DUCTILE DOUGH FORMS WHICH CURES WITHIN A FEW MINUTES.
Type: IMPLANTABLE DEVICE | Site: KNEE | Status: FUNCTIONAL
Brand: PALACOS®

## 2025-05-06 DEVICE — LEGION CR HIGH FLEX XLPE SZ 5-6 10MM
Type: IMPLANTABLE DEVICE | Site: KNEE | Status: FUNCTIONAL
Brand: LEGION

## 2025-05-06 DEVICE — DEV CONTRL TISS STRATAFIX SYMM PDS PLUS VIL CT-1 45CM: Type: IMPLANTABLE DEVICE | Site: KNEE | Status: FUNCTIONAL

## 2025-05-06 DEVICE — DEV CONTRL TISS STRATAFIX SPIRAL MNCRYL UD 3/0 PLS 60CM: Type: IMPLANTABLE DEVICE | Site: KNEE | Status: FUNCTIONAL

## 2025-05-06 DEVICE — LEGION CRUCIATE RETAINING OXINIUM                                    FEMORAL SIZE 7 RIGHT
Type: IMPLANTABLE DEVICE | Site: KNEE | Status: FUNCTIONAL
Brand: LEGION

## 2025-05-06 DEVICE — GENESIS II NON-POROUS TIBIAL                                    BASEPLATE SIZE 6 RIGHT
Type: IMPLANTABLE DEVICE | Site: KNEE | Status: FUNCTIONAL
Brand: GENESIS II

## 2025-05-06 DEVICE — GEN II 7.5MM RESUR PAT 35MM
Type: IMPLANTABLE DEVICE | Site: KNEE | Status: FUNCTIONAL
Brand: GENESIS II

## 2025-05-06 DEVICE — IMPLANTABLE DEVICE: Type: IMPLANTABLE DEVICE | Site: KNEE | Status: FUNCTIONAL

## 2025-05-06 RX ORDER — SODIUM CHLORIDE 0.9 % (FLUSH) 0.9 %
10 SYRINGE (ML) INJECTION EVERY 12 HOURS SCHEDULED
Status: DISCONTINUED | OUTPATIENT
Start: 2025-05-06 | End: 2025-05-06 | Stop reason: HOSPADM

## 2025-05-06 RX ORDER — MAGNESIUM HYDROXIDE 1200 MG/15ML
LIQUID ORAL AS NEEDED
Status: DISCONTINUED | OUTPATIENT
Start: 2025-05-06 | End: 2025-05-06 | Stop reason: HOSPADM

## 2025-05-06 RX ORDER — LIDOCAINE HYDROCHLORIDE 10 MG/ML
0.5 INJECTION, SOLUTION EPIDURAL; INFILTRATION; INTRACAUDAL; PERINEURAL ONCE AS NEEDED
Status: DISCONTINUED | OUTPATIENT
Start: 2025-05-06 | End: 2025-05-06 | Stop reason: SDUPTHER

## 2025-05-06 RX ORDER — ROPIVACAINE HYDROCHLORIDE 2 MG/ML
INJECTION, SOLUTION EPIDURAL; INFILTRATION; PERINEURAL CONTINUOUS
Status: DISCONTINUED | OUTPATIENT
Start: 2025-05-06 | End: 2025-05-06 | Stop reason: HOSPADM

## 2025-05-06 RX ORDER — BUPIVACAINE HCL/0.9 % NACL/PF 0.125 %
PLASTIC BAG, INJECTION (ML) EPIDURAL AS NEEDED
Status: DISCONTINUED | OUTPATIENT
Start: 2025-05-06 | End: 2025-05-06 | Stop reason: SURG

## 2025-05-06 RX ORDER — FAMOTIDINE 20 MG/1
20 TABLET, FILM COATED ORAL ONCE
Status: DISCONTINUED | OUTPATIENT
Start: 2025-05-06 | End: 2025-05-06 | Stop reason: SDUPTHER

## 2025-05-06 RX ORDER — DOCUSATE SODIUM 100 MG/1
100 CAPSULE, LIQUID FILLED ORAL 2 TIMES DAILY
Start: 2025-05-06 | End: 2025-05-21

## 2025-05-06 RX ORDER — BUPIVACAINE HYDROCHLORIDE 2.5 MG/ML
INJECTION, SOLUTION EPIDURAL; INFILTRATION; INTRACAUDAL; PERINEURAL
Status: COMPLETED | OUTPATIENT
Start: 2025-05-06 | End: 2025-05-06

## 2025-05-06 RX ORDER — ONDANSETRON 2 MG/ML
4 INJECTION INTRAMUSCULAR; INTRAVENOUS EVERY 6 HOURS PRN
Status: CANCELLED | OUTPATIENT
Start: 2025-05-06

## 2025-05-06 RX ORDER — EPHEDRINE SULFATE 50 MG/ML
INJECTION INTRAVENOUS AS NEEDED
Status: DISCONTINUED | OUTPATIENT
Start: 2025-05-06 | End: 2025-05-06 | Stop reason: SURG

## 2025-05-06 RX ORDER — NALOXONE HCL 0.4 MG/ML
0.4 VIAL (ML) INJECTION
Status: CANCELLED | OUTPATIENT
Start: 2025-05-06

## 2025-05-06 RX ORDER — OXYCODONE HYDROCHLORIDE 5 MG/1
5 TABLET ORAL EVERY 4 HOURS PRN
Refills: 0 | Status: CANCELLED | OUTPATIENT
Start: 2025-05-06 | End: 2025-05-16

## 2025-05-06 RX ORDER — PROPOFOL 10 MG/ML
VIAL (ML) INTRAVENOUS AS NEEDED
Status: DISCONTINUED | OUTPATIENT
Start: 2025-05-06 | End: 2025-05-06 | Stop reason: SURG

## 2025-05-06 RX ORDER — MIDAZOLAM HYDROCHLORIDE 1 MG/ML
0.5 INJECTION, SOLUTION INTRAMUSCULAR; INTRAVENOUS
Status: DISCONTINUED | OUTPATIENT
Start: 2025-05-06 | End: 2025-05-06 | Stop reason: HOSPADM

## 2025-05-06 RX ORDER — ACETAMINOPHEN 500 MG
1000 TABLET ORAL ONCE
Status: COMPLETED | OUTPATIENT
Start: 2025-05-06 | End: 2025-05-06

## 2025-05-06 RX ORDER — FAMOTIDINE 10 MG/ML
20 INJECTION, SOLUTION INTRAVENOUS ONCE
Status: DISCONTINUED | OUTPATIENT
Start: 2025-05-06 | End: 2025-05-06

## 2025-05-06 RX ORDER — DEXAMETHASONE SODIUM PHOSPHATE 4 MG/ML
INJECTION, SOLUTION INTRA-ARTICULAR; INTRALESIONAL; INTRAMUSCULAR; INTRAVENOUS; SOFT TISSUE AS NEEDED
Status: DISCONTINUED | OUTPATIENT
Start: 2025-05-06 | End: 2025-05-06 | Stop reason: SURG

## 2025-05-06 RX ORDER — ASPIRIN 81 MG/1
81 TABLET ORAL 2 TIMES DAILY
Start: 2025-05-07

## 2025-05-06 RX ORDER — NALOXONE HCL 0.4 MG/ML
0.1 VIAL (ML) INJECTION
Status: CANCELLED | OUTPATIENT
Start: 2025-05-06

## 2025-05-06 RX ORDER — ACETAMINOPHEN 500 MG
1000 TABLET ORAL EVERY 8 HOURS
Status: CANCELLED | OUTPATIENT
Start: 2025-05-06 | End: 2025-05-08

## 2025-05-06 RX ORDER — TRANEXAMIC ACID 10 MG/ML
1000 INJECTION, SOLUTION INTRAVENOUS ONCE
Status: COMPLETED | OUTPATIENT
Start: 2025-05-06 | End: 2025-05-06

## 2025-05-06 RX ORDER — SODIUM CHLORIDE 0.9 % (FLUSH) 0.9 %
10 SYRINGE (ML) INJECTION EVERY 12 HOURS SCHEDULED
Status: CANCELLED | OUTPATIENT
Start: 2025-05-06

## 2025-05-06 RX ORDER — SODIUM CHLORIDE 0.9 % (FLUSH) 0.9 %
1-10 SYRINGE (ML) INJECTION AS NEEDED
Status: CANCELLED | OUTPATIENT
Start: 2025-05-06

## 2025-05-06 RX ORDER — LIDOCAINE HYDROCHLORIDE 10 MG/ML
INJECTION, SOLUTION EPIDURAL; INFILTRATION; INTRACAUDAL; PERINEURAL AS NEEDED
Status: DISCONTINUED | OUTPATIENT
Start: 2025-05-06 | End: 2025-05-06 | Stop reason: SURG

## 2025-05-06 RX ORDER — ONDANSETRON 2 MG/ML
4 INJECTION INTRAMUSCULAR; INTRAVENOUS ONCE AS NEEDED
Status: DISCONTINUED | OUTPATIENT
Start: 2025-05-06 | End: 2025-05-06 | Stop reason: HOSPADM

## 2025-05-06 RX ORDER — OXYCODONE HYDROCHLORIDE 5 MG/1
5 TABLET ORAL EVERY 4 HOURS PRN
Qty: 30 TABLET | Refills: 0 | Status: SHIPPED | OUTPATIENT
Start: 2025-05-06 | End: 2025-05-12 | Stop reason: SDUPTHER

## 2025-05-06 RX ORDER — MELOXICAM 15 MG/1
15 TABLET ORAL DAILY
Qty: 15 TABLET | Refills: 0 | Status: SHIPPED | OUTPATIENT
Start: 2025-05-07 | End: 2025-05-22

## 2025-05-06 RX ORDER — PREGABALIN 150 MG/1
150 CAPSULE ORAL ONCE
Status: COMPLETED | OUTPATIENT
Start: 2025-05-06 | End: 2025-05-06

## 2025-05-06 RX ORDER — SODIUM CHLORIDE, SODIUM LACTATE, POTASSIUM CHLORIDE, CALCIUM CHLORIDE 600; 310; 30; 20 MG/100ML; MG/100ML; MG/100ML; MG/100ML
9 INJECTION, SOLUTION INTRAVENOUS ONCE AS NEEDED
Status: DISCONTINUED | OUTPATIENT
Start: 2025-05-06 | End: 2025-05-06 | Stop reason: HOSPADM

## 2025-05-06 RX ORDER — ROPIVACAINE HYDROCHLORIDE 5 MG/ML
INJECTION, SOLUTION EPIDURAL; INFILTRATION; PERINEURAL AS NEEDED
Status: DISCONTINUED | OUTPATIENT
Start: 2025-05-06 | End: 2025-05-06 | Stop reason: HOSPADM

## 2025-05-06 RX ORDER — BUPIVACAINE HYDROCHLORIDE 5 MG/ML
INJECTION, SOLUTION PERINEURAL
Status: COMPLETED | OUTPATIENT
Start: 2025-05-06 | End: 2025-05-06

## 2025-05-06 RX ORDER — SODIUM CHLORIDE 9 MG/ML
40 INJECTION, SOLUTION INTRAVENOUS AS NEEDED
Status: DISCONTINUED | OUTPATIENT
Start: 2025-05-06 | End: 2025-05-06 | Stop reason: HOSPADM

## 2025-05-06 RX ORDER — FAMOTIDINE 20 MG/1
20 TABLET, FILM COATED ORAL
Status: COMPLETED | OUTPATIENT
Start: 2025-05-06 | End: 2025-05-06

## 2025-05-06 RX ORDER — MELOXICAM 15 MG/1
15 TABLET ORAL DAILY
Status: CANCELLED | OUTPATIENT
Start: 2025-05-06

## 2025-05-06 RX ORDER — SODIUM CHLORIDE 0.9 % (FLUSH) 0.9 %
10 SYRINGE (ML) INJECTION AS NEEDED
Status: DISCONTINUED | OUTPATIENT
Start: 2025-05-06 | End: 2025-05-06 | Stop reason: HOSPADM

## 2025-05-06 RX ORDER — MORPHINE SULFATE 4 MG/ML
4 INJECTION, SOLUTION INTRAMUSCULAR; INTRAVENOUS
Status: CANCELLED | OUTPATIENT
Start: 2025-05-06

## 2025-05-06 RX ORDER — SODIUM CHLORIDE, SODIUM LACTATE, POTASSIUM CHLORIDE, CALCIUM CHLORIDE 600; 310; 30; 20 MG/100ML; MG/100ML; MG/100ML; MG/100ML
9 INJECTION, SOLUTION INTRAVENOUS CONTINUOUS
Status: DISCONTINUED | OUTPATIENT
Start: 2025-05-07 | End: 2025-05-06

## 2025-05-06 RX ORDER — ONDANSETRON 2 MG/ML
INJECTION INTRAMUSCULAR; INTRAVENOUS AS NEEDED
Status: DISCONTINUED | OUTPATIENT
Start: 2025-05-06 | End: 2025-05-06 | Stop reason: SURG

## 2025-05-06 RX ORDER — LIDOCAINE HYDROCHLORIDE 10 MG/ML
0.5 INJECTION, SOLUTION EPIDURAL; INFILTRATION; INTRACAUDAL; PERINEURAL ONCE AS NEEDED
Status: COMPLETED | OUTPATIENT
Start: 2025-05-06 | End: 2025-05-06

## 2025-05-06 RX ORDER — FENTANYL CITRATE 50 UG/ML
50 INJECTION, SOLUTION INTRAMUSCULAR; INTRAVENOUS
Status: DISCONTINUED | OUTPATIENT
Start: 2025-05-06 | End: 2025-05-06 | Stop reason: HOSPADM

## 2025-05-06 RX ORDER — SODIUM CHLORIDE 9 MG/ML
120 INJECTION, SOLUTION INTRAVENOUS CONTINUOUS
Status: CANCELLED | OUTPATIENT
Start: 2025-05-06 | End: 2025-05-07

## 2025-05-06 RX ORDER — ASPIRIN 81 MG/1
81 TABLET ORAL EVERY 12 HOURS SCHEDULED
Status: CANCELLED | OUTPATIENT
Start: 2025-05-07

## 2025-05-06 RX ORDER — ROPIVACAINE HYDROCHLORIDE 2 MG/ML
2 INJECTION, SOLUTION EPIDURAL; INFILTRATION; PERINEURAL CONTINUOUS
Start: 2025-05-06

## 2025-05-06 RX ORDER — CEFAZOLIN SODIUM 2 G/100ML
2 INJECTION, SOLUTION INTRAVENOUS EVERY 8 HOURS
Status: CANCELLED | OUTPATIENT
Start: 2025-05-06 | End: 2025-05-07

## 2025-05-06 RX ORDER — ONDANSETRON 4 MG/1
4 TABLET, ORALLY DISINTEGRATING ORAL EVERY 6 HOURS PRN
Status: CANCELLED | OUTPATIENT
Start: 2025-05-06

## 2025-05-06 RX ORDER — SODIUM CHLORIDE 9 MG/ML
INJECTION, SOLUTION INTRAVENOUS
Status: DISCONTINUED
Start: 2025-05-06 | End: 2025-05-06 | Stop reason: HOSPADM

## 2025-05-06 RX ORDER — ACETAMINOPHEN 500 MG
1000 TABLET ORAL EVERY 8 HOURS
Qty: 60 TABLET | Refills: 0 | Status: SHIPPED | OUTPATIENT
Start: 2025-05-06

## 2025-05-06 RX ORDER — CEFAZOLIN SODIUM 1 G/3ML
INJECTION, POWDER, FOR SOLUTION INTRAMUSCULAR; INTRAVENOUS
Status: DISCONTINUED
Start: 2025-05-06 | End: 2025-05-06 | Stop reason: HOSPADM

## 2025-05-06 RX ADMIN — LIDOCAINE HYDROCHLORIDE 50 MG: 10 INJECTION, SOLUTION EPIDURAL; INFILTRATION; INTRACAUDAL; PERINEURAL at 07:37

## 2025-05-06 RX ADMIN — TRANEXAMIC ACID 1000 MG: 10 INJECTION, SOLUTION INTRAVENOUS at 07:43

## 2025-05-06 RX ADMIN — Medication 100 MCG: at 08:14

## 2025-05-06 RX ADMIN — TRANEXAMIC ACID 1000 MG: 10 INJECTION, SOLUTION INTRAVENOUS at 08:51

## 2025-05-06 RX ADMIN — SODIUM CHLORIDE, POTASSIUM CHLORIDE, SODIUM LACTATE AND CALCIUM CHLORIDE: 600; 310; 30; 20 INJECTION, SOLUTION INTRAVENOUS at 08:53

## 2025-05-06 RX ADMIN — SODIUM CHLORIDE, POTASSIUM CHLORIDE, SODIUM LACTATE AND CALCIUM CHLORIDE 9 ML/HR: 600; 310; 30; 20 INJECTION, SOLUTION INTRAVENOUS at 07:18

## 2025-05-06 RX ADMIN — Medication 1000 MG: at 09:56

## 2025-05-06 RX ADMIN — CEFAZOLIN 1000 MG: 1 INJECTION, POWDER, FOR SOLUTION INTRAMUSCULAR; INTRAVENOUS at 09:56

## 2025-05-06 RX ADMIN — PROPOFOL 50 MG: 10 INJECTION, EMULSION INTRAVENOUS at 07:37

## 2025-05-06 RX ADMIN — ACETAMINOPHEN 1000 MG: 500 TABLET ORAL at 07:17

## 2025-05-06 RX ADMIN — Medication 100 MCG: at 08:38

## 2025-05-06 RX ADMIN — EPHEDRINE SULFATE 10 MG: 50 INJECTION INTRAVENOUS at 08:35

## 2025-05-06 RX ADMIN — DEXAMETHASONE SODIUM PHOSPHATE 4 MG: 4 INJECTION INTRA-ARTICULAR; INTRALESIONAL; INTRAMUSCULAR; INTRAVENOUS; SOFT TISSUE at 08:00

## 2025-05-06 RX ADMIN — PROPOFOL 100 MCG/KG/MIN: 10 INJECTION, EMULSION INTRAVENOUS at 07:41

## 2025-05-06 RX ADMIN — Medication 100 MCG: at 08:29

## 2025-05-06 RX ADMIN — PROPOFOL 50 MG: 10 INJECTION, EMULSION INTRAVENOUS at 07:58

## 2025-05-06 RX ADMIN — ONDANSETRON 4 MG: 2 INJECTION INTRAMUSCULAR; INTRAVENOUS at 08:00

## 2025-05-06 RX ADMIN — EPHEDRINE SULFATE 5 MG: 50 INJECTION INTRAVENOUS at 08:29

## 2025-05-06 RX ADMIN — BUPIVACAINE HYDROCHLORIDE 20 ML: 2.5 INJECTION, SOLUTION EPIDURAL; INFILTRATION; INTRACAUDAL; PERINEURAL at 09:35

## 2025-05-06 RX ADMIN — SODIUM CHLORIDE 3 G: 900 INJECTION INTRAVENOUS at 07:41

## 2025-05-06 RX ADMIN — BUPIVACAINE HYDROCHLORIDE 2 ML: 5 INJECTION, SOLUTION PERINEURAL at 07:40

## 2025-05-06 RX ADMIN — LIDOCAINE HYDROCHLORIDE 0.5 ML: 10 INJECTION, SOLUTION EPIDURAL; INFILTRATION; INTRACAUDAL; PERINEURAL at 07:19

## 2025-05-06 RX ADMIN — PREGABALIN 150 MG: 150 CAPSULE ORAL at 07:17

## 2025-05-06 RX ADMIN — Medication 100 MCG: at 08:18

## 2025-05-06 RX ADMIN — Medication 100 MCG: at 09:14

## 2025-05-06 RX ADMIN — FAMOTIDINE 20 MG: 20 TABLET, FILM COATED ORAL at 07:17

## 2025-05-06 NOTE — ANESTHESIA PROCEDURE NOTES
Spinal Block      Patient reassessed immediately prior to procedure    Patient location during procedure: OR  Indication:at surgeon's request  Performed By  CRNA/CAA: Chet Soliz CRNASRNA: Jacky Real SRNA  Preanesthetic Checklist  Completed: patient identified, IV checked, site marked, risks and benefits discussed, surgical consent, monitors and equipment checked, pre-op evaluation and timeout performed  Spinal Block Prep:  Patient Position:sitting  Sterile Tech:cap, gloves, sterile barriers and mask  Prep:Chloraprep  Patient Monitoring:blood pressure monitoring, continuous pulse oximetry and EKG    Spinal Block Procedure  Approach:midline  Guidance:landmark technique and palpation technique  Location:L4-L5  Needle Type:Quincke  Needle Gauge:22 G  Placement of Spinal needle event:cerebrospinal fluid aspirated  Paresthesia: no  Fluid Appearance:clear  Medications: bupivacaine (MARCAINE) 0.5 % injection - Injection   2 mL - 5/6/2025 7:40:00 AM   Post Assessment  Patient Tolerance:patient tolerated the procedure well with no apparent complications  Complications no  Additional Notes  Procedure:  Pt assisted to sitting position, with legs in position of comfort over side of bed.  Pt. instructed in optimal spine presentation, the spine was prepped/ Draped and the skin at insertion site was anesthetized with 1% Lidocaine 2 ml.  The spinal needle was then advanced until CSF flow was obtained and LA was injected:           Attempt x 1 by ELDA unsuccessful. Attempt by Martínez MOCK x1 first pass success. Atraumatic

## 2025-05-06 NOTE — ANESTHESIA PROCEDURE NOTES
RIGHT Adductor canal cath      Patient reassessed immediately prior to procedure    Start time: 5/6/2025 9:35 AM  Reason for block: at surgeon's request and post-op pain management  Performed by  CRNA/CAA: Rik Wilkerson, CRNA  Assisted by: Karen Gan RNSRNA: Jacky Real SRNA  Preanesthetic Checklist  Completed: patient identified, IV checked, site marked, risks and benefits discussed, surgical consent, monitors and equipment checked, pre-op evaluation and timeout performed  Prep:  Pt Position: supine  Sterile barriers:cap, gloves, mask, sterile barriers and washed/disinfected hands  Prep: ChloraPrep  Patient monitoring: blood pressure monitoring, continuous pulse oximetry and EKG  Procedure    Sedation: no  Performed under: spinal  Guidance:ultrasound guided    ULTRASOUND INTERPRETATION.  Using ultrasound guidance a 20 G gauge needle was placed in close proximity to the nerve, at which point, under ultrasound guidance anesthetic was injected in the area of the nerve and spread of the anesthesia was seen on ultrasound in close proximity thereto.  There were no abnormalities seen on ultrasound; a digital image was taken; and the patient tolerated the procedure with no complications. Images:still images obtained, printed/placed on chart    Laterality:right  Block Type:adductor canal block  Injection Technique:catheter  Needle Type:Tuohy and echogenic  Needle Gauge:18 G  Resistance on Injection: none  Catheter Size:20 G (20g)  Cath Depth at skin: 10 cm    Medications Used: bupivacaine PF (MARCAINE) 0.25 % injection - Injection   20 mL - 5/6/2025 9:35:00 AM      Post Assessment  Injection Assessment: negative aspiration for heme, incremental injection and no paresthesia on injection  Patient Tolerance:comfortable throughout block  Complications:no  Additional Notes  CATHETER   A high-frequency linear transducer, with sterile cover, was placed on the anterior mid-thigh (between the anterior superior iliac  "spine and patella). The transducer was then moved medially to identify the Sartorius muscle (Chaim), Vastus Medialis muscle (VMM), Superficial Femoral Artery (SFA) and Vein. The transducer was then moved cephalad or caudad to position the SFA in the middle of the Chaim. The insertion site was prepped and draped in sterile fashion. Skin and cutaneous tissue was infiltrated with 2-5 ml of 1% Lidocaine. Using ultrasound-guidance, an 18-gauge Contiplex Ultra 360 Touhy needle was advanced in plane from lateral to medial. Preservative-free normal saline was utilized for hydro-dissection of tissue, advancement of Touhy, and to confirm needle placement below the fascial plane of the Chaim where the Nerve to the VMM is located. Local anesthetic (LA) 5 ml deposited here. The Touhy needle continues its path lateral to the SFA at the level of the Saphenous Nerve. The remainder of the LA was deposited at the 10-11 o'clock position of the SFA. This injection created a space between the Chaim and the SFA. Aspiration every 5 ml to prevent intravascular injection. Injection was completed with negative aspiration of blood and negative intravascular injection. Injection pressures were normal with minimal resistance. A 20-gauge Contiplex Echo catheter was placed through the needle and advance out the tip of the Touhy 3-5 cm anterior to the SFA. The Touhy needle was then removed, and final catheter position verified at the 12 o'clock position to the SFA. The catheter was secured in the usual fashion with skin glue, benzoin, steri-strips, CHG tegaderm and label noting \"Nerve Block Catheter\". Jerk tape applied at yellow connector and catheter connection.   Performed by: Chet Soliz, NISH            " High Dose Vitamin A Pregnancy And Lactation Text: High dose vitamin A therapy is contraindicated during pregnancy and breast feeding.

## 2025-05-06 NOTE — OP NOTE
DATE OF PROCEDURE: 05/06/25    PREOPERATIVE DIAGNOSIS: right knee arthritis      POSTOPERATIVE DIAGNOSIS:  right knee arthritis    PROCEDURES PERFORMED:   right total knee arthroplasty with [employer] components (# 7 cruciate retaining femur, # 6 tibia, 10 mm polyethylene, with 35 three peg patella) with CORI robotic assistance    Surgical Approach: Knee Medial Parapatellar     SURGEON: Osbaldo Olivas MD    ASSISTANT: Darwin Bradford PA-C  (Darwin Bradford PA-C was present and necessary for positioning, draping, retraction, instrumentation and closure.)    SPECIMENS: None    IMPLANTS:   Implant Name Type Inv. Item Serial No.  Lot No. LRB No. Used Action   CMT BONE PALACOS R HI/VISC 1X40 - [id number] Implant CMT BONE PALACOS R HI/VISC 1X40  [facility] [id number] Right 2 Implanted   DEV CONTRL TISS STRATAFIX SPIRAL MNCRYL UD 3/0 PLS 60CM - [id number] Implant DEV CONTRL TISS STRATAFIX SPIRAL MNCRYL UD 3/0 PLS 60CM  [employer]  DIV OF J AND J [id number] Right 1 Implanted   DEV CONTRL TISS STRATAFIX SYMM PDS PLUS DOROTHEA CT-1 45CM - [id number] Implant DEV CONTRL TISS STRATAFIX SYMM PDS PLUS DOROTHEA CT-1 45CM  [employer]  DIV OF J AND J [id number] Right 1 Implanted   PATELLA RESRF GEN2 7.5X35MM - [id number] Implant PATELLA RESRF GEN2 7.5X35MM  [employer] Right 1 Implanted   COMP FEM LEGION OXINIUM CR SZ7 RT - [id number] Implant COMP FEM LEGION OXINIUM CR SZ7 RT  [employer] [id number] Right 1 Implanted   BASE TIB/KN GEN2 NONPOR TI SZ6 RT - [id number] Implant BASE TIB/KN GEN2 NONPOR TI SZ6 RT  [employer] [id number] Right 1 Implanted   INSRT ART/KN LEGION CR HF XLPE SZ5TO6 10MM - [id number] Implant INSRT ART/KN LEGION CR HF XLPE SZ5TO6 10MM  [employer] [id number] Right 1 Implanted   DEV CONTRL TISS STRATAFIX SYMM PDS PLUS DOROTHEA CT-1 45CM - [id number] Implant DEV CONTRL TISS STRATAFIX SYMM PDS PLUS DOROTHEA CT-1 45CM  [employer]  DIV OF J AND J [id number] Right 1 Implanted         ANESTHESIA:   Spinal    STAFF:  Circulator: Yeny Xiao RN  Scrub Person: Ward Bolden  Vendor Representative: Koko Roberto ([employer])  Nursing Assistant: Roro Estrada  Assistant: Rajan Bradford PA    TOURNIQUET TIME: 15 minutes    ESTIMATED BLOOD LOSS: 50 cc     COMPLICATIONS: None    PREOPERATIVE ANTIBIOTICS: Ancef 3 g    INDICATIONS: The patient is a 74 y.o. male with debilitating right knee pain secondary to osteoarthritis that failed to improve in spite of conservative treatment .  Options have been discussed at length with the patient and the patient has had an extended course of conservative treatment without long-term benefit. The patient has reached the point where the patient desires total knee arthroplasty surgery and understands the risks, benefits, and alternatives. Consent was obtained. Please see my office notes for details with regard to preoperative counseling and operative rationale.     DESCRIPTION OF PROCEDURE: The patient was positively identified in the preoperative holding area and brought to the operating suite and placed in a supine position. After adequate spinal anesthetic had been achieved, the right lower extremity was prepped and draped in the usual sterile fashion.  After application of a tourniquet to the right upper thigh, which was used during the procedure for a total 15 minutes during the cementation process only. Landmarks of the knee were identified and timeout procedure was performed to confirm the operative site, as well as other parameters. Following the sterile prep and drape, a skin incision was made just off the medial aspect of midline for a medial parapatellar approach. Following a sharp skin incision, dissection was carried down to the level of the extensor mechanism and a medial parapatellar arthrotomy was made and the patella was tucked into the lateral gutter.  Anterior horns of the medial and lateral menisci were removed, and ACL transected.  Osteophytes  were also removed.  Description of arthritis: Bone-on-bone contact medial compartment, tricompartmental osteophytes, varus alignment.      Jigsaw robotic system was then employed to assist with preparation of the femoral and tibial bone surfaces.  Femoral and tibial arrays were placed, as well as markers in both the femur and tibia.  System was registered in a systematic fashion, and 3D models created of both the femoral and tibial aspects.  Range of motion and gap assessments were also performed, and implants were selected and appropriately sized and oriented virtually on both the femur and tibial aspects starting with the flexion gap, and then progressing to the extension gap.  Planning was made for a 7 femoral component and a 6 tibial component with a 9 mm insert. The patella was prepared with the cutting guide for a 35 implant and a trial patella placed.  Once virtual balancing had been achieved, distal femoral surface was then prepared with the CORI thao, followed by preparation for the 4-in-1 cutting block.  Proximal tibial cut was then performed in a guided fashion, posterior condyles were freed of osteophytes, and trial implants placed, namely a 7 cruciate retaining femur with a 6 tibia and a 10 mm trial insert.  Range of motion and gap assessments were again performed with excellent balancing noted and the patella had excellent tracking.     Therefore the trial components were removed, and preparations made for final placement, and final components were cemented in place with namely a # 6 tibia, # 7 cruciate retaining femur, and a 35 three peg patella with a trial 10 mm insert for cement compression. All the excess cement was removed from the bone implant interface and allowed to harden. Tourniquet was deflated. Hemostasis was obtained with electrocautery. There was no brisk bleeding noted in the popliteal fossa in particular. Therefore, the knee was copiously irrigated as it was between major steps, and the  final 10 mm insert was placed as this was deemed appropriate for the patient's anatomy with full flexion and extension and no instability and attention was then directed towards closure. The medial parapatellar arthrotomy was closed with #1 Vicryl in an interrupted figure-of-eight fashion in 4 strategic locations followed by oversewing this from proximal to distal with a #1 StrataFix symmetric, which nicely sealed the joint, followed by closure of the deep fascial layer with #1 Vicryl in a buried interrupted fashion, followed by closure of the subcutaneous layer with 2-0 Vicryl and the skin with 3-0 Stratafix in a running subcuticular fashion.  Adhesive wound closure dressing was applied followed by a sterile dressing with 4 x 4's, abdominal pad, soft roll and Ace wrap. The patient tolerated the procedure well and was brought to the recovery room in good condition.     PLAN:  1.  The patient will begin early range of motion and weight-bearing per the post total knee arthroplasty protocol.   2.  I anticipate brief hospitalization for initial rehabilitation and pain control followed by continued rehabilitation home health/outpatient physical therapy setting.  Patient likely for discharge later today as long as he is cleared medically and by physical therapy.  Follow-up in 3 weeks as planned.   3.  Postoperative medical management with Dr. Patel.  4.  Aspirin 81 mg p.o. twice daily for 1 month will be utilized for DVT prophylaxis.  Patient will also resume his doxycycline postoperatively.      Osbaldo Olivas MD  05/06/25  09:26 EDT

## 2025-05-06 NOTE — DISCHARGE INSTRUCTIONS
InfuBLOCK - Patient Information    What is a pain pump?  The InfuBLOCK pump delivers post-operative, non-narcotic, numbing medication to the nerve near the surgical site for pain relief.     Where can I find information about my pain pump?           For more information about your pain pump, scan the QR code.  For additional patient resources, visit [email or web address].                                                                                               While your physician is your primary source for information about your treatment there may be times during your treatment that you need assistance with your infusion pump.     If you need assistance take the following steps:    The [employer] Nursing Hotline is Here for You 24/7.  Please call [phone] for the following concerns or complications:    Answers to questions about your infusion pump                 Tubing disconnect  Assistance with pump alarms                                                      Dislodged catheter  Excessive leakage noted from pump                                         Inadequate pain control    2.   [facility] Acute Pain Service: [phone] is available 24/7 for any further needs or concerns about medication or pain control.     -------------------------------------------------------------------------    Nerve Catheter Removal Instructions  When your device is empty:    Remove your catheter by pulling the dressing off slowly (like you would remove a regular bandage). The catheter should pull right out of the skin.  Check that the BLUE tip is intact.                                                                                     If the catheter is stuck, reposition your   extremity and pull slowly until removed.  *If catheter is HURTING and WON'T come out, stop and call [phone] for further assistance.    Remove medication bag from the black carrying case.  Cut the  tubing on right and left side of pump, and discard the medication bag and tubing into garbage.  Place the pump and black carrying case into the plastic bag and then place this into the return box.  Seal box with blue stickers and return to [place] postal service. THIS IS PRE-PAID POSTAGE.        -------------------------------------------------------------------------    [facility] COLD THERAPY - PATIENT INSTRUCTION SHEET    Cold Compression Therapy for your comfort and rehabilitation  Your caregivers want you to be productive in your rehab and comfortable during your stay. In keeping with those goals, you will be receiving an SMI Cold Therapy Wrap to help ease post-operative pain and swelling that might keep you from getting back on track! Your SMI Cold Therapy Wrap is effective and simple-to-use, and you will be encouraged to apply it throughout your hospital stay and at home through the duration of your recovery.    When you are ready to go home  Be sure to take your SMI Cold Therapy Wrap and both sets of Gel Bags with you for continued comfort and use throughout your rehabilitation. If you don't already have them, ask your nurse or aide to retrieve your SMI Gel Bags from the patient freezer.    Home use precautions  Always follow your medical professional's application instructions upon discharge. Your SMI Cold Therapy Wrap and Gel Bags are designed to last for months following your surgery. Never heat the Gel Bags unless specified by your healthcare provider. Supervision is advised when using this product on children or geriatric patients. To avoid danger of suffocation, please keep the outer plastic packaging away from children & pets.    Cold Therapy Instructions  Place Gel Bags in a freezer set ¾ of the way to max temperature for at least (4) hours. For best results, lay the Gel Bags flat and [id number] in the freezer. Once frozen, slide Gel Bags into the gel pouch and secure your wrap to the affected area with the  straps.  Gel wraps that have been stored in a freezer for an extended period of time may require a (10) minute period of softening up in a room temperature environment before application.  The gel pouch acts as a protective barrier. NEVER place frozen bags directly onto skin, as this may cause frostbite injury.  The [facility] Cold Therapy Wrap is designed to be able to be worm while ambulating. The compression straps can be secured well enough so that the Wrap won't fall off while moving.  Wrap Application Videos can be viewed at [email or web address].  An additional protective barrier such as clothing, a washcloth, hand-towel or pillowcase may be used during prolonged treatment applications.  The Gel-Pouch and Wrap are both Latex-Free and the Gel Bag ingredients are non toxic.    [facility] Wrap care instructions  The [facility] Cold Therapy Wrap may be hand washed and hung to dry when needed.    [facility] re-order information  Additional [facility] body specific wraps and/or Gel Bags can be re-ordered from [email or web address] or call [email or web address] ([phone])

## 2025-05-06 NOTE — ANESTHESIA POSTPROCEDURE EVALUATION
Patient: Obi Jackman Jr.    Procedure Summary       Date: 05/06/25 Room / Location:  [facility]  [facility]    Anesthesia Start: 0731 Anesthesia Stop: 0939    Procedure: Right total knee arthroplasty with Cori Robot (Right: Knee) Diagnosis:       Primary osteoarthritis of right knee      (Primary osteoarthritis of right knee [M17.11])    Surgeons: Osbaldo Olivas MD Provider: Carmelo Keating MD    Anesthesia Type: spinal ASA Status: 3            Anesthesia Type: spinal    Vitals  Vitals Value Taken Time   /48 05/06/25 09:34   Temp 97 °F (36.1 °C) 05/06/25 09:34   Pulse 71 05/06/25 09:38   Resp 12 05/06/25 09:34   SpO2 97 % 05/06/25 09:38   Vitals shown include unfiled device data.        Post Anesthesia Care and Evaluation    Patient location during evaluation: PACU  Patient participation: complete - patient participated  Level of consciousness: awake and alert  Pain management: adequate    Airway patency: patent  Anesthetic complications: No anesthetic complications  PONV Status: none  Cardiovascular status: hemodynamically stable and acceptable  Respiratory status: nonlabored ventilation, acceptable and nasal cannula  Hydration status: acceptable

## 2025-05-06 NOTE — H&P
"Pre-Op H&P  Obi Jackman Jr.  [id number]  1950    Chief complaint: \"Right knee pain\"    HPI:    Patient is a 74 y.o.male who presents with unrelenting right knee pain.  He has no primary osteoarthritis of the right knee.  He has now failed conservative therapy.  It is certainly lifestyle limiting.  It wakes him at night.  He has tried physical therapy, joint injections, nonsteroidal anti-inflammatories.  He is now admitted for right total knee arthroplasty.    Review of Systems:  General ROS: negative for chills, fever or skin lesions;  No changes since last office visit.  Neg for recent sick exposure  Cardiovascular ROS: no chest pain or dyspnea on exertion  Respiratory ROS: no cough, shortness of breath, or wheezing    Allergies: No Known Allergies    Home Meds:    No current facility-administered medications on file prior to encounter.     Current Outpatient Medications on File Prior to Encounter   Medication Sig Dispense Refill    acetaminophen (TYLENOL) 500 MG tablet Take 2 tablets by mouth Every 4 (Four) to 6 (Six) Hours As Needed for Moderate Pain.      ALPRAZolam (XANAX) 0.25 MG tablet Take 1/2 to 1 tablet prn when flying (Patient taking differently: Take 1 tablet by mouth As Needed for Anxiety (prior to flight). Take 1/2 to 1 tablet prn when flying) 10 tablet 0    Ascorbic Acid (VITAMIN C PO) Take 1 tablet by mouth Daily.      atenolol (TENORMIN) 50 MG tablet Take 1 tablet by mouth Daily. (Patient taking differently: Take 1 tablet by mouth Every Night.) 90 tablet 3    atorvastatin (LIPITOR) 10 MG tablet Take 1 tablet by mouth Daily. Resume when not on Dapto anymore (Patient taking differently: Take 1 tablet by mouth Every Night.) 90 tablet 3    buPROPion XL (WELLBUTRIN XL) 150 MG 24 hr tablet Take 1 tablet by mouth Daily. 90 tablet 3    Chlorhexidine Gluconate 4 % solution Apply 1 Application topically to the appropriate area as directed Daily. Shower with hibiclens solution as directed for 5 days " prior to surgery 237 mL 0    Cyanocobalamin (VITAMIN B-12 ER PO) Take 1 tablet by mouth Daily.      doxycycline (VIBRAMYCIN) 100 MG capsule Take 1 capsule by mouth 2 (Two) Times a Day.      gabapentin (NEURONTIN) 300 MG capsule Take 1 capsule by mouth 2 (Two) Times a Day.      hydroxychloroquine (PLAQUENIL) 200 MG tablet Take 1 tablet by mouth 2 (Two) Times a Day. 180 tablet 1    latanoprost (XALATAN) 0.005 % ophthalmic solution Administer 1 drop to both eyes Every Night.      meclizine (ANTIVERT) 25 MG tablet Take 1 tablet by mouth Every 8 (Eight) Hours As Needed for Dizziness or Nausea.      multivitamin (THERAGRAN) tablet tablet Take 1 tablet by mouth Daily.      nystatin (MYCOSTATIN) 470135 UNIT/GM cream Apply 1 Application topically to the appropriate area as directed Daily As Needed (skiin fold irritation).      ondansetron ODT (ZOFRAN-ODT) 4 MG disintegrating tablet Take 1 tablet by mouth Every 8 (Eight) Hours As Needed for Nausea or Vomiting. 20 tablet 2    prednisoLONE acetate (PRED FORTE) 1 % ophthalmic suspension Administer 1 drop to both eyes Daily.      promethazine (PHENERGAN) 25 MG tablet Take 1 tablet by mouth Every 6 (Six) Hours As Needed for Nausea or Vomiting. 20 tablet 1    sennosides-docusate (senna-docusate sodium) 8.6-50 MG per tablet Take 1-2 tablets by mouth Daily. (Patient taking differently: Take 1-2 tablets by mouth Daily As Needed for Constipation.) 20 tablet 0    SUMAtriptan (IMITREX) 100 MG tablet Take one tablet at onset of headache. May repeat dose one time in 2 hours if headache not relieved. (Patient taking differently: Take 1 tablet by mouth Every 2 (Two) Hours As Needed for Migraine. Take one tablet at onset of headache. May repeat dose one time in 2 hours if headache not relieved.) 9 tablet 3    Tirzepatide-Weight Management (ZEPBOUND) 7.5 MG/0.5ML solution auto-injector Inject 0.5 mL under the skin into the appropriate area as directed 1 (One) Time Per Week. 2 mL 2    traMADol  (ULTRAM) 50 MG tablet Take 2 tablets by mouth 3 times daily as needed for pain (Patient taking differently: Take 2 tablets by mouth Every 8 (Eight) Hours As Needed for Moderate Pain. Take 2 tablets by mouth 3 times daily as needed for pain) 180 tablet 2    triamcinolone (KENALOG) 0.025 % cream Apply 1 Application topically to the appropriate area as directed 2 (Two) Times a Day As Needed for Rash.         PMH:   Past Medical History:   Diagnosis Date    Anxiety and depression     Arthritis of neck ?    Arthrodesis present     lt foot triple    Bulging of lumbar intervertebral disc     Cataract     Lens implated in left eye.    Cervical disc disorder ?    Cornea transplant recipient     BILATERAL    COVID-19 11/2021    S/p MAB    CPAP (continuous positive airway pressure) dependence     CTS (carpal tunnel syndrome) Slight case; Dr. Chanda Castanon    DDD (degenerative disc disease), lumbar     09/09/201s/p injection Dr Abel Abbott    Diverticulosis     Elevated cholesterol     Glaucoma July 2022    Drops daily.    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    Neuropathy     bilatral feet    Obesity     CHRISTOPHE on CPAP     Osteopenia     Periarthritis of shoulder ?    Posterior tibial tendinitis, left     09/19/2019 pending-10/19 with Dr Carolina    Rheumatoid arthritis     Rotator cuff syndrome 09/15/2022    Dr. Chanda Castanon    Swelling of left ear     Venous stasis 09/09/2019    priti lower extremities    Visual impairment     Cornea grafts both eyes; Cataracts.    Wears contact lenses      PSH:    Past Surgical History:   Procedure Laterality Date    ACHILLES TENDON SURGERY Left 10/15/2019    Procedure: ACHILLES TENDON LENGTHENING LEFT;  Surgeon: Elodia Carolina MD;  Location: [facility];  Service: Orthopedics    CATARACT EXTRACTION Right     COLONOSCOPY  2015     EYE SURGERY Bilateral     cornea transplant     FOOT SURGERY Left 10/15/2019    triple arthrodesis and achilles tendon lengthening- [facility]    HERNIA REPAIR      abdominal    ILIAC CREST BONE GRAFT Left 10/15/2019    Procedure: ILIAC CREST BONE GRAFT LEFT;  Surgeon: Elodia Guzman MD;  Location:  [facility];  Service: Orthopedics    INCISION AND DRAINAGE LEG Right 12/04/2020    Procedure: INCISION AND DRAINAGE WITH COMPONENT EXCHANGE, HEAD AND LINER RIGHT HIP;  Surgeon: Osbaldo Olivas MD;  Location:  [facility];  Service: Orthopedics;  Laterality: Right;    TOE FUSION Left 10/15/2019    Procedure: TRIPLE ARTHODESIS LEFT;  Surgeon: Elodia Guzman MD;  Location:  [facility];  Service: Orthopedics    TONSILLECTOMY      TOTAL HIP ARTHROPLASTY Right 08/04/2020    Procedure: TOTAL HIP ARTHROPLASTY RIGHT;  Surgeon: Osbaldo Olivas MD;  Location:  [facility];  Service: Orthopedics;  Laterality: Right;    TOTAL KNEE ARTHROPLASTY Left 03/10/2023    Procedure: TOTAL KNEE ARTHROPLASTY WITH CORI ROBOT - LEFT;  Surgeon: Osbaldo Olivas MD;  Location:  [facility];  Service: Robotics - Ortho;  Laterality: Left;    TRIGGER POINT INJECTION      UMBILICAL HERNIA REPAIR      Repaired over 15 years ago.    VENOUS ABLATION Left 01/21/2022    Endovenous laser ablation of left great sapheuos vein for venous insufficiency     Patient denies allergy to contrast dye or latex  Immunization History:  Influenza: Yes  Pneumococcal: Yes  Tetanus: Up-to-date    Social History:   Tobacco:   Social History     Tobacco Use   Smoking Status Never    Passive exposure: Never   Smokeless Tobacco Never      Alcohol:     Social History     Substance and Sexual Activity   Alcohol Use Yes    Comment: One drink 2-3x per year. rare       Vitals:           There were no vitals taken for this visit.    Physical Exam:  General Appearance:    Alert, cooperative, no distress, appears stated age   Head:    Normocephalic, without obvious abnormality, atraumatic    Lungs:   Clear to auscultation bilaterally to the bases    Heart: S1 and S2 without rubs murmurs or gallops    Abdomen:  Soft, nontender, bowel sounds present throughout.   Breast Exam:    deferred   Genitalia:    deferred   Extremities:   Extremities normal, atraumatic, no cyanosis or edema   Skin:   Skin color, texture, turgor normal, no rashes or lesions   Neurologic:   Grossly intact   Results Review  LABS:  Lab Results   Component Value Date    WBC 4.05 05/01/2025    HGB 14.3 05/01/2025    HCT 44.1 05/01/2025    MCV 90.9 05/01/2025     05/01/2025    NEUTROABS 2.35 05/01/2025    GLUCOSE 107 (H) 05/01/2025    BUN 11 05/01/2025    CREATININE 0.78 05/01/2025    EGFRIFNONA 88 09/14/2021     05/01/2025    K 3.6 05/01/2025     05/01/2025    CO2 28.0 05/01/2025    CALCIUM 9.2 05/01/2025    ALBUMIN 4.1 05/01/2025    AST 17 05/01/2025    ALT 15 05/01/2025    BILITOT 0.6 05/01/2025    PTT 29.6 04/22/2025    INR 0.95 04/22/2025       RADIOLOGY:  No radiology results for the last 3 days     I reviewed the patient's new clinical results.    Cancer Staging (if applicable)  Cancer Patient: __ yes __no __unknown; If yes, clinical stage T:__ N:__M:__, stage group or __N/A    Impression: Osteoarthritis right knee  Obesity  Status post left knee replacement  Obstructive sleep apnea  Hypertension  Depression    Plan: Right total knee arthroplasty with Cori robot, right      PEGGY Garrett   05/06/25   6:43 AM EDT     Agree with above.  Plan for right total knee arthroplasty.    Osbaldo Olivas MD  05/06/25  07:26 EDT

## 2025-05-06 NOTE — H&P
Patient Name: Obi Jackman Jr.  MRN: [id number]  : 1950  DOS: 2025    Attending: Osbaldo Olivas MD    Primary Care Provider: Varsha Hahn MD      Chief complaint: Right knee Pain.    Subjective   Patient is a pleasant 74 y.o. male presented for scheduled surgery by Dr. Olivas.    Per his note: (The patient is a 74 y.o. male with debilitating right knee pain secondary to osteoarthritis that failed to improve in spite of conservative treatment .  Options have been discussed at length with the patient and the patient has had an extended course of conservative treatment without long-term benefit. The patient has reached the point where the patient desires total knee arthroplasty surgery and understands the risks, benefits, and alternatives. Consent was obtained. Please see my office notes for details with regard to preoperative counseling and operative rationale. )    Patient is known to us from previous hospitalization for left total knee replacement.  He did well postop and with subsequent rehab.    Patient underwent right total knee arthroplasty under spinal anesthesia, tolerated surgery well.  Adductor canal nerve block catheter was placed by acute pain service, and patient was admitted for further management.    Seen postoperatively, doing well with good pain control, no complaints of nausea, vomiting, or shortness of breath.  Patient has he history of DVT or PE.    Reviewed with patient past medical history and home medications    Allergies:  No Known Allergies    Meds:  Medications Prior to Admission   Medication Sig Dispense Refill Last Dose/Taking    acetaminophen (TYLENOL) 500 MG tablet Take 2 tablets by mouth Every 4 (Four) to 6 (Six) Hours As Needed for Moderate Pain.   2025 Evening    Ascorbic Acid (VITAMIN C PO) Take 1 tablet by mouth Daily.   2025    atenolol (TENORMIN) 50 MG tablet Take 1 tablet by mouth Daily. (Patient taking differently: Take 1 tablet by mouth Every Night.)  90 tablet 3 5/5/2025 at  9:00 PM    atorvastatin (LIPITOR) 10 MG tablet Take 1 tablet by mouth Daily. Resume when not on Dapto anymore (Patient taking differently: Take 1 tablet by mouth Every Night.) 90 tablet 3 5/5/2025 Evening    buPROPion XL (WELLBUTRIN XL) 150 MG 24 hr tablet Take 1 tablet by mouth Daily. 90 tablet 3 5/5/2025 Evening    Chlorhexidine Gluconate 4 % solution Apply 1 Application topically to the appropriate area as directed Daily. Shower with hibiclens solution as directed for 5 days prior to surgery 237 mL 0 5/5/2025    Cyanocobalamin (VITAMIN B-12 ER PO) Take 1 tablet by mouth Daily.   5/5/2025 Evening    cyclobenzaprine (FLEXERIL) 10 MG tablet TAKE 1 TABLET BY MOUTH 3 TIMES A DAY AS NEEDED FOR MUSCLE SPASMS. 30 tablet 6 Past Month    doxycycline (VIBRAMYCIN) 100 MG capsule Take 1 capsule by mouth 2 (Two) Times a Day.   5/6/2025 at  5:00 AM    gabapentin (NEURONTIN) 300 MG capsule Take 1 capsule by mouth 2 (Two) Times a Day.   Past Month    hydroxychloroquine (PLAQUENIL) 200 MG tablet Take 1 tablet by mouth 2 (Two) Times a Day. 180 tablet 1 5/6/2025 at  5:00 AM    latanoprost (XALATAN) 0.005 % ophthalmic solution Administer 1 drop to both eyes Every Night.   5/5/2025 Evening    lisinopril-hydrochlorothiazide (PRINZIDE,ZESTORETIC) 20-12.5 MG per tablet TAKE 1 TABLET BY MOUTH DAILY. 90 tablet 0 5/5/2025 Evening    meclizine (ANTIVERT) 25 MG tablet Take 1 tablet by mouth Every 8 (Eight) Hours As Needed for Dizziness or Nausea.   Past Month    multivitamin (THERAGRAN) tablet tablet Take 1 tablet by mouth Daily.   Past Month    oxaprozin (DAYPRO) 600 MG tablet Take 2 tablets by mouth Daily. (Patient taking differently: Take 1 tablet by mouth Every Night.) 180 tablet 1 5/6/2025 Morning    prednisoLONE acetate (PRED FORTE) 1 % ophthalmic suspension Administer 1 drop to both eyes Daily.   5/5/2025 Evening    SUMAtriptan (IMITREX) 100 MG tablet Take one tablet at onset of headache. May repeat dose one  time in 2 hours if headache not relieved. (Patient taking differently: Take 1 tablet by mouth Every 2 (Two) Hours As Needed for Migraine. Take one tablet at onset of headache. May repeat dose one time in 2 hours if headache not relieved.) 9 tablet 3 Past Week    Tirzepatide-Weight Management (ZEPBOUND) 7.5 MG/0.5ML solution auto-injector Inject 0.5 mL under the skin into the appropriate area as directed 1 (One) Time Per Week. 2 mL 2 Past Month    traMADol (ULTRAM) 50 MG tablet Take 2 tablets by mouth 3 times daily as needed for pain (Patient taking differently: Take 2 tablets by mouth Every 8 (Eight) Hours As Needed for Moderate Pain. Take 2 tablets by mouth 3 times daily as needed for pain) 180 tablet 2 Past Week    ALPRAZolam (XANAX) 0.25 MG tablet Take 1/2 to 1 tablet prn when flying (Patient taking differently: Take 1 tablet by mouth As Needed for Anxiety (prior to flight). Take 1/2 to 1 tablet prn when flying) 10 tablet 0     nystatin (MYCOSTATIN) 739218 UNIT/GM cream Apply 1 Application topically to the appropriate area as directed Daily As Needed (skiin fold irritation).       ondansetron ODT (ZOFRAN-ODT) 4 MG disintegrating tablet Take 1 tablet by mouth Every 8 (Eight) Hours As Needed for Nausea or Vomiting. 20 tablet 2 More than a month    promethazine (PHENERGAN) 25 MG tablet Take 1 tablet by mouth Every 6 (Six) Hours As Needed for Nausea or Vomiting. 20 tablet 1 More than a month    sennosides-docusate (senna-docusate sodium) 8.6-50 MG per tablet Take 1-2 tablets by mouth Daily. (Patient taking differently: Take 1-2 tablets by mouth Daily As Needed for Constipation.) 20 tablet 0 More than a month    triamcinolone (KENALOG) 0.025 % cream Apply 1 Application topically to the appropriate area as directed 2 (Two) Times a Day As Needed for Rash.            History:   Past Medical History:   Diagnosis Date    Anxiety and depression     Arthritis of neck ?    Arthrodesis present     lt foot triple    Bulging of  lumbar intervertebral disc     Cataract     Lens implated in left eye.    Cervical disc disorder ?    Cornea transplant recipient     BILATERAL    COVID-19 11/2021    S/p MAB    CPAP (continuous positive airway pressure) dependence     CTS (carpal tunnel syndrome) Slight case; Dr. Chanda Castanon    DDD (degenerative disc disease), lumbar     09/09/201s/p injection Dr Abel Abbott    Diverticulosis     Elevated cholesterol     Glaucoma July 2022    Drops daily.    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    Neuropathy     bilatral feet    Obesity     CHRISTOPHE on CPAP     Osteopenia     Periarthritis of shoulder ?    Posterior tibial tendinitis, left     09/19/2019 pending-10/19 with Dr Carolina    Rheumatoid arthritis     Rotator cuff syndrome 09/15/2022    Dr. Chanda Castanon    Swelling of left ear     Venous stasis 09/09/2019    priti lower extremities    Visual impairment     Cornea grafts both eyes; Cataracts.    Wears contact lenses      Past Surgical History:   Procedure Laterality Date    ACHILLES TENDON SURGERY Left 10/15/2019    Procedure: ACHILLES TENDON LENGTHENING LEFT;  Surgeon: Elodia Carolina MD;  Location:  [facility];  Service: Orthopedics    CATARACT EXTRACTION Right     COLONOSCOPY  2015    EYE SURGERY Bilateral     cornea transplant     FOOT SURGERY Left 10/15/2019    triple arthrodesis and achilles tendon lengthening- [facility]    HERNIA REPAIR      abdominal    ILIAC CREST BONE GRAFT Left 10/15/2019    Procedure: ILIAC CREST BONE GRAFT LEFT;  Surgeon: Elodia Carolina MD;  Location:  [facility];  Service: Orthopedics    INCISION AND DRAINAGE LEG Right 12/04/2020    Procedure: INCISION AND DRAINAGE WITH COMPONENT EXCHANGE, HEAD AND LINER RIGHT HIP;  Surgeon: Osbaldo Olivas MD;  Location:  [facility];  Service: Orthopedics;  Laterality: Right;     "TOE FUSION Left 10/15/2019    Procedure: TRIPLE ARTHODESIS LEFT;  Surgeon: Elodia Guzman MD;  Location:  [facility];  Service: Orthopedics    TONSILLECTOMY      TOTAL HIP ARTHROPLASTY Right 2020    Procedure: TOTAL HIP ARTHROPLASTY RIGHT;  Surgeon: Osbaldo Olivas MD;  Location:  [facility];  Service: Orthopedics;  Laterality: Right;    TOTAL KNEE ARTHROPLASTY Left 03/10/2023    Procedure: TOTAL KNEE ARTHROPLASTY WITH CORI ROBOT - LEFT;  Surgeon: Osbaldo Olivas MD;  Location:  [facility];  Service: Robotics - Ortho;  Laterality: Left;    TRIGGER POINT INJECTION      UMBILICAL HERNIA REPAIR      Repaired over 15 years ago.    VENOUS ABLATION Left 2022    Endovenous laser ablation of left great sapheuos vein for venous insufficiency     Family History   Problem Relation Age of Onset    No Known Problems Mother     Alcohol abuse Father     Prostate cancer Father     Cancer Father          - prostate/bladder cancer    Cancer Sister         Stomach cancer.    Cancer Brother         Kidney removed 23 due renal cell  carcenoma    Multiple sclerosis Son     Cancer Maternal Grandmother         Pancreatic/stomach cancer     Social History     Tobacco Use    Smoking status: Never     Passive exposure: Never    Smokeless tobacco: Never   Vaping Use    Vaping status: Never Used   Substance Use Topics    Alcohol use: Yes     Comment: One drink 2-3x per year. rare    Drug use: No       Review of Systems  Pertinent items are noted in HPI    Vital Signs  /84   Pulse 70   Temp 98.3 °F (36.8 °C)   Resp 15   Ht 180.3 cm (71\")   Wt 136 kg (300 lb)   SpO2 98%   BMI 41.84 kg/m²     Physical Exam:    General Appearance:    Alert, cooperative, in no acute distress   Head:    Normocephalic, without obvious abnormality, atraumatic   Eyes:            Lids and lashes normal, conjunctivae and sclerae normal, no   icterus, no pallor, corneas clear    Ears:    Ears appear intact with no abnormalities noted "   Throat:   No oral lesions, no thrush, oral mucosa moist   Neck:   No adenopathy, supple, trachea midline, no thyromegaly         Lungs:     Clear to auscultation,respirations regular, even and                   unlabored    Heart:    Regular rhythm and normal rate, normal S1 and S2, no       murmur, no gallop   Abdomen:     Normal bowel sounds, no masses, no organomegaly, soft        nontender, nondistended, no guarding, no rebound                 tenderness   Genitalia:    Deferred   Extremities:  RLE, CDI dressing on knee, PNB cath present.    Pulses:   Pulses palpable and equal bilaterally   Skin:   No bleeding, bruising or rash   Neurologic:   Cranial nerves 2 - 12 grossly intact, Flexion and dorsiflexion intact bilateral feet.        I reviewed the patient's new clinical results.       Results from last 7 days   Lab Units 05/01/25  0859   WBC 10*3/mm3 4.05   HEMOGLOBIN g/dL 14.3   HEMATOCRIT % 44.1   PLATELETS 10*3/mm3 242     Results from last 7 days   Lab Units 05/01/25  0859   SODIUM mmol/L 142   POTASSIUM mmol/L 3.6   CHLORIDE mmol/L 104   CO2 mmol/L 28.0   BUN mg/dL 11   CREATININE mg/dL 0.78   CALCIUM mg/dL 9.2   BILIRUBIN mg/dL 0.6   ALK PHOS U/L 60   ALT (SGPT) U/L 15   AST (SGOT) U/L 17   GLUCOSE mg/dL 107*     Lab Results   Component Value Date    HGBA1C 5.30 04/22/2025           Assessment and Plan:       S/P knee replacement,right    Hyperlipidemia    Hypertension    CHRISTOPHE (obstructive sleep apnea)    Morbid obesity due to excess calories    Benign essential hypertension    Primary osteoarthritis of right knee    Degenerative arthritis of right knee      Plan  1. PT/OT,  Weight bearing as tolerated RLE  2. Pain control-prns, ACB cath with ropivacaine infusion.  3. IS-encourage  4. DVT proph- Mechanicals and asa  5. Bowel regimen  6. Resume home medications as appropriate  7.  DC planning for home.     Patient is very motivated to work with physical therapy and achieve  mobility and pain control  among other goals for possible discharge home later in the day.    We reviewed these goals and discussed with patient tracking  progress for the next few hours and if all is achieved to receive next antibiotic prophylactic dose and be discharged home.     We discussed medications and precriptions at time of discharge including DVT prophylaxis, pain control, and bowel regimen.( Patient has asa 81 mg and colace at home.)      All questions were answered .    Patient and wife expressed understanding and agreement.      Dragon disclaimer:  Part of this encounter note is an electronic transcription/translation of spoken language to printed text. The electronic translation of spoken language may permit erroneous, or at times, nonsensical words or phrases to be inadvertently transcribed; Although I have reviewed the note for such errors, some may still exist.    Joselyn Patel MD  05/06/25  12:58 EDT

## 2025-05-06 NOTE — ANESTHESIA PREPROCEDURE EVALUATION
Anesthesia Evaluation     Patient summary reviewed and Nursing notes reviewed   no history of anesthetic complications:   NPO Solid Status: > 8 hours  NPO Liquid Status: > 8 hours           Airway   Mallampati: II  TM distance: >3 FB  Neck ROM: full  No difficulty expected  Dental      Pulmonary - normal exam   (+) ,sleep apnea  Cardiovascular - normal exam    (+) hypertension, hyperlipidemia      Neuro/Psych  (+) headaches, numbness, psychiatric history  GI/Hepatic/Renal/Endo    (+) morbid obesity    Musculoskeletal     Abdominal    Substance History      OB/GYN          Other   arthritis,                 Anesthesia Plan    ASA 3     spinal     intravenous induction     Anesthetic plan, risks, benefits, and alternatives have been provided, discussed and informed consent has been obtained with: patient.    Plan discussed with CRNA.    CODE STATUS:

## 2025-05-06 NOTE — PLAN OF CARE
Goal Outcome Evaluation:  Plan of Care Reviewed With: patient        Progress: no change  Outcome Evaluation: Patient presenting below his functional baseline d/t RLE ROM deficits and pain. Pt educated on safety and sequencing for ADL task completion while having nerve cath and to decrease risk accidental dislodgement. Good carryover noted w/ education. Recommend home w/ assist when medically appropriate for d/c.    Anticipated Discharge Disposition (OT): home with assist

## 2025-05-06 NOTE — THERAPY EVALUATION
Patient Name: Obi Jackman Jr.  : 1950    MRN: [id number]                              Today's Date: 2025       Admit Date: 2025    Visit Dx:     ICD-10-CM ICD-9-CM   1. Status post right knee replacement  Z96.651 V43.65   2. Primary osteoarthritis of right knee  M17.11 715.16   3. Class 2 severe obesity due to excess calories with serious comorbidity and body mass index (BMI) of 39.0 to 39.9 in adult  E66.812 278.01    E66.01 V85.39    Z68.39      Patient Active Problem List   Diagnosis    Allergic rhinitis    Anxiety disorder    Benign paroxysmal positional vertigo    Chronic tension type headache    Depression    ED (erectile dysfunction) of non-organic origin    Hyperlipidemia    Hypertension    LFTs abnormal    CHRISTOPHE (obstructive sleep apnea)    Elevated glucose    Health care maintenance    Morbid obesity due to excess calories    Onychomycosis of left great toe    Osteoarthritis of ankle or foot    Venous stasis    Neuropathy    Class 3 severe obesity due to excess calories with serious comorbidity and body mass index (BMI) of 40.0 to 44.9 in adult    Pre-op evaluation    Arthritis of foot, left    S/P left ankle triple arthrodesis    Acute blood loss anemia, mild, asymptomatic     Acute postoperative pain    Wound infection    Primary osteoarthritis of right hip    Status post total replacement of right hip    Postoperative wound infection of right hip, s/p I and D, head and liner exchange.    Primary osteoarthritis of left knee    S/P total knee arthroplasty, left    Food poisoning    Joint pain    Rheumatoid arthritis of multiple sites with negative rheumatoid factor    Infection and inflammatory reaction due to internal right hip prosthesis, sequela    Pain management    High risk medication use    Family history of prostate cancer in father    Benign essential hypertension    Encounter for medication monitoring    Primary osteoarthritis of right knee    Degenerative arthritis of right  knee    S/P knee replacement,right     Past Medical History:   Diagnosis Date    Anxiety and depression     Arthritis of neck ?    Arthrodesis present     lt foot triple    Bulging of lumbar intervertebral disc     Cataract     Lens implated in left eye.    Cervical disc disorder ?    Cornea transplant recipient     BILATERAL    COVID-19 11/2021    S/p MAB    CPAP (continuous positive airway pressure) dependence     CTS (carpal tunnel syndrome) Slight case; Dr. Chanda Castanon    DDD (degenerative disc disease), lumbar     09/09/201s/p injection Dr Abel Abbott    Diverticulosis     Elevated cholesterol     Glaucoma July 2022    Drops daily.    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    Neuropathy     bilatral feet    Obesity     CHRISTOPHE on CPAP     Osteopenia     Periarthritis of shoulder ?    Posterior tibial tendinitis, left     09/19/2019 pending-10/19 with Dr Carolina    Rheumatoid arthritis     Rotator cuff syndrome 09/15/2022    Dr. Chanda Castanon    Swelling of left ear     Venous stasis 09/09/2019    priti lower extremities    Visual impairment     Cornea grafts both eyes; Cataracts.    Wears contact lenses      Past Surgical History:   Procedure Laterality Date    ACHILLES TENDON SURGERY Left 10/15/2019    Procedure: ACHILLES TENDON LENGTHENING LEFT;  Surgeon: Elodia Carolina MD;  Location:  [facility];  Service: Orthopedics    CATARACT EXTRACTION Right     COLONOSCOPY  2015    EYE SURGERY Bilateral     cornea transplant     FOOT SURGERY Left 10/15/2019    triple arthrodesis and achilles tendon lengthening- [place]    HERNIA REPAIR      abdominal    ILIAC CREST BONE GRAFT Left 10/15/2019    Procedure: ILIAC CREST BONE GRAFT LEFT;  Surgeon: Elodia Carolina MD;  Location:  [facility];  Service: Orthopedics    INCISION AND DRAINAGE LEG Right 12/04/2020     Procedure: INCISION AND DRAINAGE WITH COMPONENT EXCHANGE, HEAD AND LINER RIGHT HIP;  Surgeon: Osbaldo Olivas MD;  Location:  [facility];  Service: Orthopedics;  Laterality: Right;    TOE FUSION Left 10/15/2019    Procedure: TRIPLE ARTHODESIS LEFT;  Surgeon: Elodia Guzman MD;  Location:  [facility];  Service: Orthopedics    TONSILLECTOMY      TOTAL HIP ARTHROPLASTY Right 08/04/2020    Procedure: TOTAL HIP ARTHROPLASTY RIGHT;  Surgeon: Osbaldo Olivas MD;  Location:  [facility];  Service: Orthopedics;  Laterality: Right;    TOTAL KNEE ARTHROPLASTY Left 03/10/2023    Procedure: TOTAL KNEE ARTHROPLASTY WITH CORI ROBOT - LEFT;  Surgeon: Osbaldo Olivas MD;  Location:  [facility];  Service: Robotics - Ortho;  Laterality: Left;    TRIGGER POINT INJECTION      UMBILICAL HERNIA REPAIR      Repaired over 15 years ago.    VENOUS ABLATION Left 01/21/2022    Endovenous laser ablation of left great sapheuos vein for venous insufficiency      General Information       Row Name 05/06/25 1350          Physical Therapy Time and Intention    Document Type evaluation  -     Mode of Treatment physical therapy  -       Row Name 05/06/25 1350          General Information    Patient Profile Reviewed yes  -     Prior Level of Function independent:;all household mobility;community mobility;gait;bed mobility;ADL's  Pt denied recent falls or AD use; lifestyle limited by pain  -     Existing Precautions/Restrictions fall;other (see comments)  adductor canal nerve cath  -     Barriers to Rehab none identified  -       Row Name 05/06/25 1350          Living Environment    Current Living Arrangements home  -     People in Home spouse  -       Row Name 05/06/25 1350          Home Main Entrance    Number of Stairs, Main Entrance none  -       Row Name 05/06/25 1350          Stairs Within Home, Primary    Number of Stairs, Within Home, Primary two  -HW     Stair Railings, Within Home, Primary none  -       Row Name 05/06/25 1354           Cognition    Orientation Status (Cognition) oriented x 3  -       Row Name 05/06/25 1350          Safety Issues/Impairments Affecting Functional Mobility    Safety Issues Affecting Function (Mobility) awareness of need for assistance;insight into deficits/self-awareness  -     Impairments Affecting Function (Mobility) balance;endurance/activity tolerance;pain;range of motion (ROM);strength  -               User Key  (r) = Recorded By, (t) = Taken By, (c) = Cosigned By      Initials Name Provider Type     Tiera Lee PT Physical Therapist                   Mobility       Row Name 05/06/25 1358          Bed Mobility    Bed Mobility supine-sit;sit-supine  -     Supine-Sit [place] (Bed Mobility) minimum assist (75% patient effort)  -     Sit-Supine [place] (Bed Mobility) minimum assist (75% patient effort)  -     Comment, (Bed Mobility) VC for sequencing. Assistance for RLE management  -[facility] Name 05/06/25 1356          Transfers    Comment, (Transfers) VC for hand placement.  -[facility] Name 05/06/25 1350          Sit-Stand Transfer    Sit-Stand [place] (Transfers) contact guard;nonverbal cues (demo/gesture);verbal cues;2 person assist  -     Assistive Device (Sit-Stand Transfers) walker, front-wheeled  -[facility] Name 05/06/25 1356          Gait/Stairs (Locomotion)    [place] Level (Gait) contact guard;nonverbal cues (demo/gesture);verbal cues;2 person assist  -     Assistive Device (Gait) walker, front-wheeled  -     Patient was able to Ambulate yes  -     Distance in Feet (Gait) 160  -     Deviations/Abnormal Patterns (Gait) bilateral deviations;base of support, wide;weight shifting decreased;stride length decreased;gait speed decreased  -     Bilateral Gait Deviations forward flexed posture;heel strike decreased  -     [place] Level (Stairs) contact guard;nonverbal cues (demo/gesture);verbal cues;2 person assist  -     Handrail Location  (Stairs) other (see comments)  BUE support  -     Number of Steps (Stairs) 2  -     Ascending Technique (Stairs) step-to-step  -     Descending Technique (Stairs) step-to-step  -     Comment, (Gait/Stairs) Pt amb in nair with step-through gait pattern. Wide MAU, forward flexed posture, slow gait speed, and shuffling steps. Decreased weight shifting onto RLE and heavy reliance on BUE initially that improved with repetition and VC. No knee buckling or LOB observed. Pt navigated a step twice with VC for sequencing. Heavy reliance on BUE support while pushing up to ascend step. Family to be present for home stair navigation. Activity limited by fatigue.  -       Row Name 05/06/25 1354          Mobility    Extremity Weight-bearing Status right lower extremity  -     Right Lower Extremity (Weight-bearing Status) weight-bearing as tolerated (WBAT)  -               User Key  (r) = Recorded By, (t) = Taken By, (c) = Cosigned By      Initials Name Provider Type     Tiera Lee PT Physical Therapist                   Obj/Interventions       [facility] Name 05/06/25 1401          Range of Motion Comprehensive    General Range of Motion lower extremity range of motion deficits identified  -     Comment, General Range of Motion Surgical knee ROM: 12-78  -[facility] Name 05/06/25 1401          Strength Comprehensive (MMT)    General Manual Muscle Testing (MMT) Assessment lower extremity strength deficits identified  -     Comment, General Manual Muscle Testing (MMT) Assessment Pt able to perform B active ankle DF and SLR  -       Row Name 05/06/25 1401          Motor Skills    Therapeutic Exercise knee;ankle  -[facility] Name 05/06/25 1401          Knee (Therapeutic Exercise)    Knee (Therapeutic Exercise) isometric exercises;strengthening exercise  -     Knee Isometrics (Therapeutic Exercise) right;quad sets;10 repetitions  -     Knee Strengthening (Therapeutic Exercise) right;heel slides;SLR (straight  leg raise);SAQ (short arc quad);LAQ (long arc quad);10 repetitions  -[facility] Name 05/06/25 1401          Ankle (Therapeutic Exercise)    Ankle (Therapeutic Exercise) AROM (active range of motion)  -     Ankle AROM (Therapeutic Exercise) bilateral;dorsiflexion;plantarflexion;10 repetitions  -[facility] Name 05/06/25 1401          Balance    Balance Assessment sitting static balance;sitting dynamic balance;standing static balance;standing dynamic balance  -     Static Sitting Balance standby assist  -     Dynamic Sitting Balance standby assist  -     Position, Sitting Balance sitting edge of bed  -     Static Standing Balance contact guard  -     Dynamic Standing Balance contact guard  -     Position/Device Used, Standing Balance supported;walker, rolling  -     Balance Interventions sitting;standing;sit to stand;occupation based/functional task  -[facility] Name 05/06/25 1401          Sensory Assessment (Somatosensory)    Sensory Assessment (Somatosensory) LE sensation intact  -               User Key  (r) = Recorded By, (t) = Taken By, (c) = Cosigned By      Initials Name Provider Type     Tiera Lee, PT Physical Therapist                   Goals/Plan       [facility] Name 05/06/25 1422          Bed Mobility Goal 1 (PT)    Activity/Assistive Device (Bed Mobility Goal 1, PT) sit to supine/supine to sit  -     [place] Level/Cues Needed (Bed Mobility Goal 1, PT) modified independence  -HW     Time Frame (Bed Mobility Goal 1, PT) short term goal (STG);2 days  -[facility] Name 05/06/25 1422          Transfer Goal 1 (PT)    Activity/Assistive Device (Transfer Goal 1, PT) sit-to-stand/stand-to-sit  -     [place] Level/Cues Needed (Transfer Goal 1, PT) modified independence  -HW     Time Frame (Transfer Goal 1, PT) long term goal (LTG);3 days  -[facility] Name 05/06/25 1422          Gait Training Goal 1 (PT)    Activity/Assistive Device (Gait Training Goal 1, PT) gait (walking  locomotion)  -     [place] Level (Gait Training Goal 1, PT) modified independence  -HW     Distance (Gait Training Goal 1, PT) 500  -HW     Time Frame (Gait Training Goal 1, PT) long term goal (LTG);3 days  -       Row Name 05/06/25 1422          ROM Goal 1 (PT)    ROM Goal 1 (PT) Surgical Knee ROM:0-90  -HW     Time Frame (ROM Goal 1, PT) long-term goal (LTG);3 days  -       Row Name 05/06/25 1422          Stairs Goal 1 (PT)    Activity/Assistive Device (Stairs Goal 1, PT) ascending stairs;descending stairs  -     [place] Level/Cues Needed (Stairs Goal 1, PT) modified independence  -HW     Number of Stairs (Stairs Goal 1, PT) 2  -HW     Time Frame (Stairs Goal 1, PT) long term goal (LTG);3 days  -       Row Name 05/06/25 1422          Therapy Assessment/Plan (PT)    Planned Therapy Interventions (PT) balance training;bed mobility training;gait training;home exercise program;ROM (range of motion);patient/family education;stair training;strengthening;stretching;transfer training  -               User Key  (r) = Recorded By, (t) = Taken By, (c) = Cosigned By      Initials Name Provider Type    HW Tiera Lee, JORGE ALBERTO Physical Therapist                   Clinical Impression       Row Name 05/06/25 1402          Pain    Pretreatment Pain Rating 2/10  -     Posttreatment Pain Rating 8/10  -     Pain Location knee  -     Pain Side/Orientation right;anterior  -     Pain Management Interventions cold applied;exercise or physical activity utilized;nursing notified;positioning techniques utilized  -     Response to Pain Interventions activity participation with increased pain  -       Row Name 05/06/25 1402          Plan of Care Review    Plan of Care Reviewed With patient;spouse  -     Progress no change  -     Outcome Evaluation Pt amb 160' with FWW and CGAx2. Pt navigated 2 steps with Min A for support. No knee buckling or LOB observed. Activity limited by fatigue and elevated anterior  knee pain. HEP and precautions reviewed with pt. Frequent ambulation encouraged. Recommend d/c home with assist and OPPT when medically appropriate. Pt cleared to d/c today from PT standpoint. Recommend OT consult to address limitations with ADLs.  -       Row Name 05/06/25 1402          Therapy Assessment/Plan (PT)    Rehab Potential (PT) good  -HW     Criteria for Skilled Interventions Met (PT) yes;meets criteria;skilled treatment is necessary  -     Therapy Frequency (PT) 2 times/day  -HW     Predicted Duration of Therapy Intervention (PT) one day  -       Row Name 05/06/25 1402          Positioning and Restraints    Pre-Treatment Position in bed  -HW     Post Treatment Position bed  -HW     In Bed notified nsg;supine;fowlers;with family/caregiver;patient within staff view;encouraged to call for assist;side rails up x3  in PACU with nsg  -               User Key  (r) = Recorded By, (t) = Taken By, (c) = Cosigned By      Initials Name Provider Type     Tiera Lee, PT Physical Therapist                   Outcome Measures       Row Name 05/06/25 1422          How much help from another person do you currently need...    Turning from your back to your side while in flat bed without using bedrails? 3  -HW     Moving from lying on back to sitting on the side of a flat bed without bedrails? 3  -HW     Moving to and from a bed to a chair (including a wheelchair)? 3  -HW     Standing up from a chair using your arms (e.g., wheelchair, bedside chair)? 3  -HW     Climbing 3-5 steps with a railing? 3  -HW     To walk in hospital room? 3  -HW     AM-PAC 6 Clicks Score (PT) 18  -HW     Highest Level of Mobility Goal 6 --> Walk 10 steps or more  -       Row Name 05/06/25 1422          PADD    Diagnosis 1  -HW     Gender 2  -HW     Age Group 1  -HW     Gait Distance 1  -HW     Assist Level 1  -HW     Home Support 3  -HW     PADD Score 9  -HW     Patient Preference home with outpatient rehab  -     Prediction by  PADD Score directly home (with home health or out-patient rehab)  -       Row Name 05/06/25 1422          Functional Assessment    Outcome Measure Options AM-PAC 6 Clicks Basic Mobility (PT);PADD  -               User Key  (r) = Recorded By, (t) = Taken By, (c) = Cosigned By      Initials Name Provider Type     Tiera Lee PT Physical Therapist                                 Physical Therapy Education       Title: PT OT SLP Therapies (In Progress)       Topic: Physical Therapy (Done)       Point: Mobility training (Done)       Learning Progress Summary            Patient Acceptance, E,D, VU,DU by  at 5/6/2025 1423                      Point: Home exercise program (Done)       Learning Progress Summary            Patient Acceptance, E,D, VU,DU by  at 5/6/2025 1423                      Point: Body mechanics (Done)       Learning Progress Summary            Patient Acceptance, E,D, VU,DU by  at 5/6/2025 1423                      Point: Precautions (Done)       Learning Progress Summary            Patient Acceptance, E,D, VU,DU by  at 5/6/2025 1423                                      User Key       Initials Effective Dates Name Provider Type Discipline     12/15/23 -  Tiera Lee, JORGE ALBERTO Physical Therapist PT                  PT Recommendation and Plan  Planned Therapy Interventions (PT): balance training, bed mobility training, gait training, home exercise program, ROM (range of motion), patient/family education, stair training, strengthening, stretching, transfer training  Progress: no change  Outcome Evaluation: Pt amb 160' with FWW and CGAx2. Pt navigated 2 steps with Min A for support. No knee buckling or LOB observed. Activity limited by fatigue and elevated anterior knee pain. HEP and precautions reviewed with pt. Frequent ambulation encouraged. Recommend d/c home with assist and OPPT when medically appropriate. Pt cleared to d/c today from PT standpoint. Recommend OT consult to address  limitations with ADLs.     Time Calculation:   PT Evaluation Complexity  History, PT Evaluation Complexity: 1-2 personal factors and/or comorbidities  Examination of Body Systems (PT Eval Complexity): total of 3 or more elements  Clinical Presentation (PT Evaluation Complexity): stable  Clinical Decision Making (PT Evaluation Complexity): low complexity  Overall Complexity (PT Evaluation Complexity): low complexity     PT Charges       Row Name 05/06/25 1424             Time Calculation    Start Time 1303  -HW      PT Received On 05/06/25  -HW      PT Goal Re-Cert Due Date 05/16/25  -HW         Timed Charges    92063 - PT Therapeutic Exercise Minutes 10  -HW         Untimed Charges    PT Eval/Re-eval Minutes 55  -HW         Total Minutes    Timed Charges Total Minutes 10  -HW      Untimed Charges Total Minutes 55  -HW       Total Minutes 65  -HW                User Key  (r) = Recorded By, (t) = Taken By, (c) = Cosigned By      Initials Name Provider Type     Tiera Lee, PT Physical Therapist                  Therapy Charges for Today       Code Description Service Date Service Provider Modifiers Qty    10483860024 HC PT THER PROC EA 15 MIN 5/6/2025 Tiera Lee, PT GP 1    17065713601 HC PT EVAL LOW COMPLEXITY 4 5/6/2025 Tiera Lee, PT GP 1    75505531782 HC PT THER SUPP EA 15 MIN 5/6/2025 Tiera Lee, PT GP 3            PT G-Codes  Outcome Measure Options: AM-PAC 6 Clicks Basic Mobility (PT), PADD  AM-PAC 6 Clicks Score (PT): 18  PT Discharge Summary  Anticipated Discharge Disposition (PT): home with assist, home with outpatient therapy services    Tiera Lee PT  5/6/2025

## 2025-05-06 NOTE — NURSING NOTE
Caregiver Telephone Number    Phone Number for Ride/Caregiver: RUSLAN QUINTERO (SPOUSE) [phone]     Who will be staying with you post procedure for the next 24 hours? Name and Phone Number?: RUSLAN QUINTERO (SPOUSE) [phone]

## 2025-05-06 NOTE — PLAN OF CARE
Goal Outcome Evaluation:  Plan of Care Reviewed With: patient, spouse        Progress: no change  Outcome Evaluation: Pt amb 160' with FWW and CGAx2. Pt navigated 2 steps with Min A for support. No knee buckling or LOB observed. Activity limited by fatigue and elevated anterior knee pain. HEP and precautions reviewed with pt. Frequent ambulation encouraged. Recommend d/c home with assist and OPPT when medically appropriate. Pt cleared to d/c today from PT standpoint. Recommend OT consult to address limitations with ADLs.    Anticipated Discharge Disposition (PT): home with assist, home with outpatient therapy services

## 2025-05-12 DIAGNOSIS — Z96.651 STATUS POST RIGHT KNEE REPLACEMENT: ICD-10-CM

## 2025-05-12 RX ORDER — OXYCODONE HYDROCHLORIDE 5 MG/1
5 TABLET ORAL EVERY 6 HOURS PRN
Qty: 40 TABLET | Refills: 0 | Status: SHIPPED | OUTPATIENT
Start: 2025-05-12

## 2025-05-12 NOTE — TELEPHONE ENCOUNTER
Dr. Olivas-please advise oxycodone refill request. Thanks!    Called pt to see how he was doing 6 days s/p TKA. Reports a decent amount of swelling still despite icing frequently and elevating higher than the heart. He reports using the [employer] bike 2x/day and going to PT and doing exercises at home. Does note some warmth especially towards the back of his calf; Denies any specific calf pain; Denies specific lower leg swelling. Has had to double up on his oxycodone to help control the pain. Still taking the Tylenol, Meloxicam, and ASA as well.    I've encouraged him to try using an ACE wrap for compression and to discuss his swelling with PT at today's session. Also advised that after PT today, he focus on resting, icing, and elevating to help reduce the swelling. Also explained that I would send Dr. Olivas a message for the oxycodone refill request.     He verbalized understanding and will call back if his swelling does not improve over the next 24 hours.    Fan DAY CMA ([facility]), ROT

## 2025-05-15 ENCOUNTER — OFFICE VISIT (OUTPATIENT)
Dept: INTERNAL MEDICINE | Facility: CLINIC | Age: 75
End: 2025-05-15
Payer: MEDICARE

## 2025-05-15 ENCOUNTER — LAB (OUTPATIENT)
Dept: LAB | Facility: HOSPITAL | Age: 75
End: 2025-05-15
Payer: MEDICARE

## 2025-05-15 VITALS
DIASTOLIC BLOOD PRESSURE: 86 MMHG | OXYGEN SATURATION: 97 % | SYSTOLIC BLOOD PRESSURE: 154 MMHG | HEIGHT: 71 IN | HEART RATE: 84 BPM | BODY MASS INDEX: 44.1 KG/M2 | WEIGHT: 315 LBS

## 2025-05-15 DIAGNOSIS — E78.2 MIXED HYPERLIPIDEMIA: ICD-10-CM

## 2025-05-15 DIAGNOSIS — Z96.651 STATUS POST RIGHT KNEE REPLACEMENT: ICD-10-CM

## 2025-05-15 DIAGNOSIS — G44.229 CHRONIC TENSION-TYPE HEADACHE, NOT INTRACTABLE: ICD-10-CM

## 2025-05-15 DIAGNOSIS — Z00.00 MEDICARE ANNUAL WELLNESS VISIT, SUBSEQUENT: Primary | ICD-10-CM

## 2025-05-15 DIAGNOSIS — F43.9 SITUATIONAL STRESS: ICD-10-CM

## 2025-05-15 DIAGNOSIS — N40.0 BENIGN PROSTATIC HYPERPLASIA, UNSPECIFIED WHETHER LOWER URINARY TRACT SYMPTOMS PRESENT: ICD-10-CM

## 2025-05-15 DIAGNOSIS — F32.A DEPRESSION, UNSPECIFIED DEPRESSION TYPE: ICD-10-CM

## 2025-05-15 DIAGNOSIS — G47.33 OSA (OBSTRUCTIVE SLEEP APNEA): ICD-10-CM

## 2025-05-15 DIAGNOSIS — E55.9 VITAMIN D DEFICIENCY: ICD-10-CM

## 2025-05-15 DIAGNOSIS — R73.09 ELEVATED GLUCOSE: ICD-10-CM

## 2025-05-15 DIAGNOSIS — G62.9 NEUROPATHY: ICD-10-CM

## 2025-05-15 LAB
25(OH)D3 SERPL-MCNC: 24.4 NG/ML (ref 30–100)
ALBUMIN SERPL-MCNC: 3.9 G/DL (ref 3.5–5.2)
ALBUMIN/GLOB SERPL: 1.1 G/DL
ALP SERPL-CCNC: 66 U/L (ref 39–117)
ALT SERPL W P-5'-P-CCNC: 12 U/L (ref 1–41)
ANION GAP SERPL CALCULATED.3IONS-SCNC: 13.2 MMOL/L (ref 5–15)
AST SERPL-CCNC: 19 U/L (ref 1–40)
BILIRUB SERPL-MCNC: 0.8 MG/DL (ref 0–1.2)
BUN SERPL-MCNC: 9 MG/DL (ref 8–23)
BUN/CREAT SERPL: 10.2 (ref 7–25)
CALCIUM SPEC-SCNC: 9.6 MG/DL (ref 8.6–10.5)
CHLORIDE SERPL-SCNC: 101 MMOL/L (ref 98–107)
CHOLEST SERPL-MCNC: 134 MG/DL (ref 0–200)
CO2 SERPL-SCNC: 25.8 MMOL/L (ref 22–29)
CREAT SERPL-MCNC: 0.88 MG/DL (ref 0.76–1.27)
DEPRECATED RDW RBC AUTO: 40.8 FL (ref 37–54)
EGFRCR SERPLBLD CKD-EPI 2021: 90.8 ML/MIN/1.73
ERYTHROCYTE [DISTWIDTH] IN BLOOD BY AUTOMATED COUNT: 12.3 % (ref 12.3–15.4)
GLOBULIN UR ELPH-MCNC: 3.4 GM/DL
GLUCOSE SERPL-MCNC: 108 MG/DL (ref 65–99)
HCT VFR BLD AUTO: 40.2 % (ref 37.5–51)
HDLC SERPL-MCNC: 30 MG/DL (ref 40–60)
HGB BLD-MCNC: 13.2 G/DL (ref 13–17.7)
LDLC SERPL CALC-MCNC: 80 MG/DL (ref 0–100)
LDLC/HDLC SERPL: 2.57 {RATIO}
MCH RBC QN AUTO: 30.1 PG (ref 26.6–33)
MCHC RBC AUTO-ENTMCNC: 32.8 G/DL (ref 31.5–35.7)
MCV RBC AUTO: 91.6 FL (ref 79–97)
PLATELET # BLD AUTO: 423 10*3/MM3 (ref 140–450)
PMV BLD AUTO: 9.7 FL (ref 6–12)
POTASSIUM SERPL-SCNC: 3.8 MMOL/L (ref 3.5–5.2)
PROT SERPL-MCNC: 7.3 G/DL (ref 6–8.5)
PSA SERPL-MCNC: 1.78 NG/ML (ref 0–4)
RBC # BLD AUTO: 4.39 10*6/MM3 (ref 4.14–5.8)
SODIUM SERPL-SCNC: 140 MMOL/L (ref 136–145)
TRIGL SERPL-MCNC: 134 MG/DL (ref 0–150)
TSH SERPL DL<=0.05 MIU/L-ACNC: 2.21 UIU/ML (ref 0.27–4.2)
VIT B12 BLD-MCNC: 370 PG/ML (ref 211–946)
VLDLC SERPL-MCNC: 24 MG/DL (ref 5–40)
WBC NRBC COR # BLD AUTO: 8.47 10*3/MM3 (ref 3.4–10.8)

## 2025-05-15 PROCEDURE — 82306 VITAMIN D 25 HYDROXY: CPT

## 2025-05-15 PROCEDURE — 80053 COMPREHEN METABOLIC PANEL: CPT

## 2025-05-15 PROCEDURE — 84153 ASSAY OF PSA TOTAL: CPT

## 2025-05-15 PROCEDURE — 82607 VITAMIN B-12: CPT

## 2025-05-15 PROCEDURE — 85027 COMPLETE CBC AUTOMATED: CPT

## 2025-05-15 PROCEDURE — 84443 ASSAY THYROID STIM HORMONE: CPT

## 2025-05-15 PROCEDURE — 80061 LIPID PANEL: CPT

## 2025-05-15 PROCEDURE — 83036 HEMOGLOBIN GLYCOSYLATED A1C: CPT

## 2025-05-15 RX ORDER — CYCLOBENZAPRINE HCL 10 MG
10 TABLET ORAL 3 TIMES DAILY PRN
Qty: 30 TABLET | Refills: 6 | Status: SHIPPED | OUTPATIENT
Start: 2025-05-15

## 2025-05-15 RX ORDER — ALPRAZOLAM 0.25 MG
TABLET ORAL
Qty: 10 TABLET | Refills: 0 | Status: SHIPPED | OUTPATIENT
Start: 2025-05-15

## 2025-05-15 RX ORDER — SUMATRIPTAN SUCCINATE 100 MG/1
100 TABLET ORAL
Qty: 27 TABLET | Refills: 3 | Status: SHIPPED | OUTPATIENT
Start: 2025-05-15

## 2025-05-15 RX ORDER — GABAPENTIN 300 MG/1
CAPSULE ORAL
Qty: 90 CAPSULE | Refills: 3 | Status: SHIPPED | OUTPATIENT
Start: 2025-05-15

## 2025-05-15 RX ORDER — ALPRAZOLAM 0.25 MG
0.25 TABLET ORAL AS NEEDED
Status: CANCELLED | OUTPATIENT
Start: 2025-05-15

## 2025-05-15 RX ORDER — PROMETHAZINE HYDROCHLORIDE 25 MG/1
25 TABLET ORAL EVERY 6 HOURS PRN
Qty: 20 TABLET | Refills: 1 | Status: SHIPPED | OUTPATIENT
Start: 2025-05-15

## 2025-05-15 NOTE — ASSESSMENT & PLAN NOTE
Orders:    Lipid Panel; Future    Comprehensive Metabolic Panel; Future    TSH Rfx On Abnormal To Free T4; Future

## 2025-05-15 NOTE — ASSESSMENT & PLAN NOTE
Orders:    promethazine (PHENERGAN) 25 MG tablet; Take 1 tablet by mouth Every 6 (Six) Hours As Needed for Nausea or Vomiting.

## 2025-05-15 NOTE — PROGRESS NOTES
Subjective   The ABCs of the Annual Wellness Visit  Medicare Wellness Visit      Obi Jackman Jr. is a 74 y.o. patient who presents for a Medicare Wellness Visit.    The following portions of the patient's history were reviewed and   updated as appropriate: allergies, current medications, past family history, past medical history, past social history, past surgical history, and problem list.    Compared to one year ago, the patient's physical   health is the same.  Compared to one year ago, the patient's mental   health is the same.    Recent Hospitalizations:  He was not admitted to the hospital during the last year.     Current Medical Providers:  Patient Care Team:  Varsha Hahn MD as PCP - General (Internal Medicine)  Chanda Castanon MD as Consulting Physician (Rheumatology)  Medardo Dorsey APRN as Nurse Practitioner (Rheumatology)  Christopher Doe MD as Consulting Physician (Ophthalmology)  Osbaldo Olivas MD as Consulting Physician (Orthopedic Surgery)  Elodia Carolina MD as Consulting Physician (Orthopedic Surgery)  Michelle Stone APRN as Nurse Practitioner (Rheumatology)    Outpatient Medications Prior to Visit   Medication Sig Dispense Refill    acetaminophen (TYLENOL) 500 MG tablet Take 2 tablets by mouth Every 8 (Eight) Hours. Change to every 8 hours  as needed after 1 week 60 tablet 0    Ascorbic Acid (VITAMIN C PO) Take 1 tablet by mouth Daily.      aspirin (ASPIR) 81 MG EC tablet Take 1 tablet by mouth 2 (Two) Times a Day.      atenolol (TENORMIN) 50 MG tablet Take 1 tablet by mouth Daily. (Patient taking differently: Take 1 tablet by mouth Every Night.) 90 tablet 3    atorvastatin (LIPITOR) 10 MG tablet Take 1 tablet by mouth Daily. Resume when not on Dapto anymore (Patient taking differently: Take 1 tablet by mouth Every Night.) 90 tablet 3    buPROPion XL (WELLBUTRIN XL) 150 MG 24 hr tablet Take 1 tablet by mouth Daily. 90 tablet 3    Cyanocobalamin (VITAMIN B-12 ER PO) Take  1 tablet by mouth Daily.      docusate sodium (COLACE) 100 MG capsule Take 1 capsule by mouth 2 (Two) Times a Day for 15 days.      doxycycline (VIBRAMYCIN) 100 MG capsule Take 1 capsule by mouth 2 (Two) Times a Day.      hydroxychloroquine (PLAQUENIL) 200 MG tablet Take 1 tablet by mouth 2 (Two) Times a Day. 180 tablet 1    latanoprost (XALATAN) 0.005 % ophthalmic solution Administer 1 drop to both eyes Every Night.      lisinopril-hydrochlorothiazide (PRINZIDE,ZESTORETIC) 20-12.5 MG per tablet TAKE 1 TABLET BY MOUTH DAILY. 90 tablet 0    meclizine (ANTIVERT) 25 MG tablet Take 1 tablet by mouth Every 8 (Eight) Hours As Needed for Dizziness or Nausea.      meloxicam (MOBIC) 15 MG tablet Take 1 tablet by mouth Daily for 15 days. 15 tablet 0    multivitamin (THERAGRAN) tablet tablet Take 1 tablet by mouth Daily.      naloxone (NARCAN) 4 MG/0.1ML nasal spray Call 911. Don't prime. [place] in 1 nostril for overdose. Repeat in 2-3 minutes in other nostril if no or minimal breathing/responsiveness. 2 each 0    nystatin (MYCOSTATIN) 102134 UNIT/GM cream Apply 1 Application topically to the appropriate area as directed Daily As Needed (skiin fold irritation).      ondansetron ODT (ZOFRAN-ODT) 4 MG disintegrating tablet Take 1 tablet by mouth Every 8 (Eight) Hours As Needed for Nausea or Vomiting. 20 tablet 2    oxyCODONE (Roxicodone) 5 MG immediate release tablet Take 1 tablet by mouth Every 6 (Six) Hours As Needed for Moderate Pain. 40 tablet 0    prednisoLONE acetate (PRED FORTE) 1 % ophthalmic suspension Administer 1 drop to both eyes Daily.      triamcinolone (KENALOG) 0.025 % cream Apply 1 Application topically to the appropriate area as directed 2 (Two) Times a Day As Needed for Rash.      ALPRAZolam (XANAX) 0.25 MG tablet Take 1/2 to 1 tablet prn when flying (Patient taking differently: Take 1 tablet by mouth As Needed for Anxiety (prior to flight). Take 1/2 to 1 tablet prn when flying) 10 tablet 0    cyclobenzaprine  (FLEXERIL) 10 MG tablet TAKE 1 TABLET BY MOUTH 3 TIMES A DAY AS NEEDED FOR MUSCLE SPASMS. 30 tablet 6    promethazine (PHENERGAN) 25 MG tablet Take 1 tablet by mouth Every 6 (Six) Hours As Needed for Nausea or Vomiting. 20 tablet 1    SUMAtriptan (IMITREX) 100 MG tablet Take one tablet at onset of headache. May repeat dose one time in 2 hours if headache not relieved. (Patient taking differently: Take 1 tablet by mouth Every 2 (Two) Hours As Needed for Migraine. Take one tablet at onset of headache. May repeat dose one time in 2 hours if headache not relieved.) 9 tablet 3    gabapentin (NEURONTIN) 300 MG capsule Take 1 capsule by mouth 2 (Two) Times a Day.      ropivacaine (NAROPIN) 0.2 % infusion (INFUSYSTEM) 4 mg/hr by Peripheral Nerve route Continuous.      sennosides-docusate (senna-docusate sodium) 8.6-50 MG per tablet Take 1-2 tablets by mouth Daily. (Patient taking differently: Take 1-2 tablets by mouth Daily As Needed for Constipation.) 20 tablet 0    Tirzepatide-Weight Management (ZEPBOUND) 7.5 MG/0.5ML solution auto-injector Inject 0.5 mL under the skin into the appropriate area as directed 1 (One) Time Per Week. 2 mL 2     No facility-administered medications prior to visit.     Opioid medication/s are on active medication list.  and I have evaluated his active treatment plan and pain score trends (see table).  Vitals:    05/15/25 1250   PainSc: 7    PainLoc: Knee     I have reviewed the chart for potential of high risk medication and harmful drug interactions in the elderly.        Aspirin is on active medication list. Aspirin use is indicated based on review of current medical condition/s. Pros and cons of this therapy have been discussed today. Benefits of this medication outweigh potential harm.  Patient has been encouraged to continue taking this medication.  .      Patient Active Problem List   Diagnosis    Allergic rhinitis    Anxiety disorder    Benign paroxysmal positional vertigo    Chronic  "tension type headache    Depression    ED (erectile dysfunction) of non-organic origin    Hyperlipidemia    Hypertension    LFTs abnormal    CHRISTOPHE (obstructive sleep apnea)    Elevated glucose    Health care maintenance    Morbid obesity due to excess calories    Onychomycosis of left great toe    Osteoarthritis of ankle or foot    Venous stasis    Neuropathy    Class 3 severe obesity due to excess calories with serious comorbidity and body mass index (BMI) of 40.0 to 44.9 in adult    Pre-op evaluation    Arthritis of foot, left    S/P left ankle triple arthrodesis    Acute blood loss anemia, mild, asymptomatic     Acute postoperative pain    Wound infection    Primary osteoarthritis of right hip    Status post total replacement of right hip    Postoperative wound infection of right hip, s/p I and D, head and liner exchange.    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  ACP discussion was held with the patient during this visit. Patient has an advance directive in EMR which is still valid.             Objective   Vitals:    05/15/25 1250   BP: 154/86   Pulse: 84   SpO2: 97%   Weight: (!) 143 kg (315 lb 9.6 oz)   Height: 180.3 cm (71\")   PainSc: 7    PainLoc: Knee       Estimated body mass index is 44.02 kg/m² as calculated from the following:    Height as of this encounter: 180.3 cm (71\").    Weight as of this encounter: 143 kg (315 lb 9.6 oz).                Does the " patient have evidence of cognitive impairment? No  Lab Results   Component Value Date    TRIG 134 05/15/2025    HDL 30 (L) 05/15/2025    LDL 80 05/15/2025    VLDL 24 05/15/2025    HGBA1C 5.70 (H) 05/15/2025                                                                                                Health  Risk Assessment    Smoking Status:  Social History     Tobacco Use   Smoking Status Never    Passive exposure: Never   Smokeless Tobacco Never     Alcohol Consumption:  Social History     Substance and Sexual Activity   Alcohol Use Yes    Comment: One drink 2-3x per year. rare       Fall Risk Screen  STEADI Fall Risk Assessment was completed, and patient is at MODERATE risk for falls. Assessment completed on:5/15/2025    Depression Screening   Little interest or pleasure in doing things? Not at all   Feeling down, depressed, or hopeless? Not at all   PHQ-2 Total Score 0      Health Habits and Functional and Cognitive Screenin/14/2025     1:05 PM   Functional & Cognitive Status   Do you have difficulty preparing food and eating? No   Do you have difficulty bathing yourself, getting dressed or grooming yourself? No   Do you have difficulty using the toilet? No   Do you have difficulty moving around from place to place? No   Do you have trouble with steps or getting out of a bed or a chair? Yes   Current Diet Well Balanced Diet   Dental Exam Unknown   Eye Exam Up to date   Exercise (times per week) 6 times per week   Current Exercises Include Stationary Bicycling/Spin Class;Walking   Do you need help using the phone?  No   Are you deaf or do you have serious difficulty hearing?  No   Do you need help to go to places out of walking distance? Yes   Do you need help shopping? No   Do you need help preparing meals?  No   Do you need help with housework?  No   Do you need help with laundry? No   Do you need help taking your medications? No   Do you need help managing money? No   Do you ever drive or ride in a  car without wearing a seat belt? No   Have you felt unusual stress, anger or loneliness in the last month? No   Who do you live with? Spouse   If you need help, do you have trouble finding someone available to you? No   Have you been bothered in the last four weeks by sexual problems? No   Do you have difficulty concentrating, remembering or making decisions? No           Age-appropriate Screening Schedule:  Refer to the list below for future screening recommendations based on patient's age, sex and/or medical conditions. Orders for these recommended tests are listed in the plan section. The patient has been provided with a written plan.    Health Maintenance List  Health Maintenance   Topic Date Due    COVID-19 Vaccine (6 - 2024-25 season) 11/15/2025 (Originally 3/17/2025)    INFLUENZA VACCINE  07/01/2025    ANNUAL WELLNESS VISIT  05/15/2026    LIPID PANEL  05/15/2026    COLORECTAL CANCER SCREENING  01/13/2027    TDAP/TD VACCINES (3 - Td or Tdap) 09/11/2034    HEPATITIS C SCREENING  Completed    Pneumococcal Vaccine 50+  Completed    ZOSTER VACCINE  Completed                                                                                                                                                CMS Preventative Services Quick Reference  Risk Factors Identified During Encounter  S/p knee surgery    The above risks/problems have been discussed with the patient.  Pertinent information has been shared with the patient in the After Visit Summary.  An After Visit Summary and PPPS were made available to the patient.    Follow Up:   Next Medicare Wellness visit to be scheduled in 1 year.         Additional E&M Note during same encounter follows:  Patient has additional, significant, and separately identifiable condition(s)/problem(s) that require work above and beyond the Medicare Wellness Visit     Chief Complaint  Medicare Wellness-subsequent, Hyperlipidemia, Hypertension, and Depression    Subjective   HPI  Herb is  "also being seen today for an annual adult preventative physical exam.  and Herb is also being seen today for additional medical problem/s.    Review of Systems   Constitutional:  Positive for activity change. Negative for chills and fever.   HENT:  Negative for congestion, ear pain and sore throat.    Eyes:  Negative for pain, redness and visual disturbance.   Respiratory:  Negative for cough and shortness of breath.    Cardiovascular:  Negative for chest pain, palpitations and leg swelling.   Gastrointestinal:  Negative for abdominal pain, diarrhea and nausea.   Endocrine: Negative for cold intolerance and heat intolerance.   Genitourinary:  Negative for flank pain and urgency.   Musculoskeletal:  Positive for arthralgias, gait problem, joint swelling and myalgias.   Skin:  Negative for pallor and rash.   Neurological:  Negative for dizziness and weakness.   Psychiatric/Behavioral:  Negative for dysphoric mood and sleep disturbance. The patient is not nervous/anxious.               Objective   Vital Signs:  /86   Pulse 84   Ht 180.3 cm (71\")   Wt (!) 143 kg (315 lb 9.6 oz)   SpO2 97%   BMI 44.02 kg/m²   Physical Exam  Vitals and nursing note reviewed.   Constitutional:       General: He is not in acute distress.     Appearance: Normal appearance. He is well-developed.   HENT:      Head: Normocephalic and atraumatic.      Right Ear: Tympanic membrane and external ear normal.      Left Ear: Tympanic membrane and external ear normal.      Nose: Nose normal.      Mouth/Throat:      Mouth: Mucous membranes are moist.      Pharynx: No oropharyngeal exudate.   Eyes:      General: No scleral icterus.     Conjunctiva/sclera: Conjunctivae normal.      Pupils: Pupils are equal, round, and reactive to light.   Neck:      Thyroid: No thyromegaly.      Vascular: No carotid bruit.   Cardiovascular:      Rate and Rhythm: Normal rate and regular rhythm.      Pulses: Normal pulses.      Heart sounds: Normal heart sounds. " No murmur heard.     No friction rub. No gallop.   Pulmonary:      Effort: Pulmonary effort is normal.      Breath sounds: Normal breath sounds. No rhonchi or rales.   Abdominal:      General: Bowel sounds are normal. There is no distension.      Palpations: Abdomen is soft.      Tenderness: There is no abdominal tenderness. There is no right CVA tenderness, left CVA tenderness, guarding or rebound.      Hernia: No hernia is present.   Musculoskeletal:         General: Swelling and tenderness present. Normal range of motion.      Cervical back: Normal range of motion and neck supple.      Right lower leg: Edema present.      Left lower leg: No edema.   Lymphadenopathy:      Cervical: No cervical adenopathy.   Skin:     General: Skin is warm and dry.      Findings: No rash.   Neurological:      General: No focal deficit present.      Mental Status: He is alert and oriented to person, place, and time. Mental status is at baseline.      Cranial Nerves: No cranial nerve deficit.      Sensory: No sensory deficit.      Coordination: Coordination normal.      Gait: Gait normal.      Deep Tendon Reflexes: Reflexes normal.      Comments: Balance wnl   Psychiatric:         Mood and Affect: Mood normal.         Behavior: Behavior normal.         Judgment: Judgment normal.         The following data was reviewed by: Varsha Hahn MD on 05/15/2025:           Assessment and Plan       Current Outpatient Medications:     acetaminophen (TYLENOL) 500 MG tablet, Take 2 tablets by mouth Every 8 (Eight) Hours. Change to every 8 hours  as needed after 1 week, Disp: 60 tablet, Rfl: 0    ALPRAZolam (XANAX) 0.25 MG tablet, Take 1/2 to 1 tablet prn when flying, Disp: 10 tablet, Rfl: 0    Ascorbic Acid (VITAMIN C PO), Take 1 tablet by mouth Daily., Disp: , Rfl:     aspirin (ASPIR) 81 MG EC tablet, Take 1 tablet by mouth 2 (Two) Times a Day., Disp: , Rfl:     atenolol (TENORMIN) 50 MG tablet, Take 1 tablet by mouth Daily. (Patient taking  differently: Take 1 tablet by mouth Every Night.), Disp: 90 tablet, Rfl: 3    atorvastatin (LIPITOR) 10 MG tablet, Take 1 tablet by mouth Daily. Resume when not on Dapto anymore (Patient taking differently: Take 1 tablet by mouth Every Night.), Disp: 90 tablet, Rfl: 3    buPROPion XL (WELLBUTRIN XL) 150 MG 24 hr tablet, Take 1 tablet by mouth Daily., Disp: 90 tablet, Rfl: 3    Cyanocobalamin (VITAMIN B-12 ER PO), Take 1 tablet by mouth Daily., Disp: , Rfl:     cyclobenzaprine (FLEXERIL) 10 MG tablet, Take 1 tablet by mouth 3 (Three) Times a Day As Needed for Muscle Spasms., Disp: 30 tablet, Rfl: 6    docusate sodium (COLACE) 100 MG capsule, Take 1 capsule by mouth 2 (Two) Times a Day for 15 days., Disp: , Rfl:     doxycycline (VIBRAMYCIN) 100 MG capsule, Take 1 capsule by mouth 2 (Two) Times a Day., Disp: , Rfl:     gabapentin (NEURONTIN) 300 MG capsule, 1 PO AM and 2 PO HS, Disp: 90 capsule, Rfl: 3    hydroxychloroquine (PLAQUENIL) 200 MG tablet, Take 1 tablet by mouth 2 (Two) Times a Day., Disp: 180 tablet, Rfl: 1    latanoprost (XALATAN) 0.005 % ophthalmic solution, Administer 1 drop to both eyes Every Night., Disp: , Rfl:     lisinopril-hydrochlorothiazide (PRINZIDE,ZESTORETIC) 20-12.5 MG per tablet, TAKE 1 TABLET BY MOUTH DAILY., Disp: 90 tablet, Rfl: 0    meclizine (ANTIVERT) 25 MG tablet, Take 1 tablet by mouth Every 8 (Eight) Hours As Needed for Dizziness or Nausea., Disp: , Rfl:     meloxicam (MOBIC) 15 MG tablet, Take 1 tablet by mouth Daily for 15 days., Disp: 15 tablet, Rfl: 0    multivitamin (THERAGRAN) tablet tablet, Take 1 tablet by mouth Daily., Disp: , Rfl:     naloxone (NARCAN) 4 MG/0.1ML nasal spray, Call 911. Don't prime. [place] in 1 nostril for overdose. Repeat in 2-3 minutes in other nostril if no or minimal breathing/responsiveness., Disp: 2 each, Rfl: 0    nystatin (MYCOSTATIN) 451000 UNIT/GM cream, Apply 1 Application topically to the appropriate area as directed Daily As Needed (skiin fold  "irritation)., Disp: , Rfl:     ondansetron ODT (ZOFRAN-ODT) 4 MG disintegrating tablet, Take 1 tablet by mouth Every 8 (Eight) Hours As Needed for Nausea or Vomiting., Disp: 20 tablet, Rfl: 2    oxyCODONE (Roxicodone) 5 MG immediate release tablet, Take 1 tablet by mouth Every 6 (Six) Hours As Needed for Moderate Pain., Disp: 40 tablet, Rfl: 0    prednisoLONE acetate (PRED FORTE) 1 % ophthalmic suspension, Administer 1 drop to both eyes Daily., Disp: , Rfl:     promethazine (PHENERGAN) 25 MG tablet, Take 1 tablet by mouth Every 6 (Six) Hours As Needed for Nausea or Vomiting., Disp: 20 tablet, Rfl: 1    SUMAtriptan (IMITREX) 100 MG tablet, Take 1 tablet by mouth Every 2 (Two) Hours As Needed for Migraine. Take one tablet at onset of headache. May repeat dose one time in 2 hours if headache not relieved., Disp: 27 tablet, Rfl: 3    triamcinolone (KENALOG) 0.025 % cream, Apply 1 Application topically to the appropriate area as directed 2 (Two) Times a Day As Needed for Rash., Disp: , Rfl:       The following portions of the patient's history were reviewed and updated as appropriate: allergies, current medications, past family history, past medical history, past social history, past surgical history and problem list.       Objective   /86   Pulse 84   Ht 180.3 cm (71\")   Wt (!) 143 kg (315 lb 9.6 oz)   SpO2 97%   BMI 44.02 kg/m²         Results for orders placed or performed during the hospital encounter of 05/06/25   POC Glucose Once    Collection Time: 05/06/25  6:45 AM    Specimen: Blood   Result Value Ref Range    Glucose 107 70 - 130 mg/dL             Assessment & Plan   Diagnoses and all orders for this visit:    Medicare annual wellness visit, subsequent    Chronic tension-type headache, not intractable  -     cyclobenzaprine (FLEXERIL) 10 MG tablet; Take 1 tablet by mouth 3 (Three) Times a Day As Needed for Muscle Spasms.  -     SUMAtriptan (IMITREX) 100 MG tablet; Take 1 tablet by mouth Every 2 (Two) " Hours As Needed for Migraine. Take one tablet at onset of headache. May repeat dose one time in 2 hours if headache not relieved.    Situational stress  -     ALPRAZolam (XANAX) 0.25 MG tablet; Take 1/2 to 1 tablet prn when flying    Depression, unspecified depression type  -     promethazine (PHENERGAN) 25 MG tablet; Take 1 tablet by mouth Every 6 (Six) Hours As Needed for Nausea or Vomiting.    Vitamin D deficiency  -     Vitamin B12; Future  -     Vitamin D,25-Hydroxy; Future    Elevated glucose  -     Hemoglobin A1c; Future    Mixed hyperlipidemia  -     Lipid Panel; Future  -     Comprehensive Metabolic Panel; Future  -     TSH Rfx On Abnormal To Free T4; Future    Status post right knee replacement  -     CBC (No Diff); Future    Benign prostatic hyperplasia, unspecified whether lower urinary tract symptoms present  -     PSA DIAGNOSTIC; Future    CHRISTOPHE (obstructive sleep apnea)  -     CBC (No Diff); Future    Neuropathy  -     gabapentin (NEURONTIN) 300 MG capsule; 1 PO AM and 2 PO HS    Post right TKR, recovering gradually.  He is moving around well with his cane.  Spouse notes that his pain level is worse today.  Takes oxycodone as needed.  Also on gabapentin, advised to restart.  Rx sent.           PHQ-2/PHQ-9 Depression screening reviewed with patient . Total time spent today for depression screening  with Obi Jackman Jr.  was 15 minutes in counseling, along with plans for any diagnositc work-up and treatment.    Advice and education were given regarding cardiovascular risk reduction, healthy dietary habits, Seatbelt and helmet use and self skin examination.          Electronically signed by:    Varsha Hahn MD                     Follow Up   Return in about 5 months (around 10/15/2025) for Recheck and wellness in 1 year.  Patient was given instructions and counseling regarding his condition or for health maintenance advice. Please see specific information pulled into the AVS if appropriate.

## 2025-05-15 NOTE — ASSESSMENT & PLAN NOTE
Orders:    cyclobenzaprine (FLEXERIL) 10 MG tablet; Take 1 tablet by mouth 3 (Three) Times a Day As Needed for Muscle Spasms.    SUMAtriptan (IMITREX) 100 MG tablet; Take 1 tablet by mouth Every 2 (Two) Hours As Needed for Migraine. Take one tablet at onset of headache. May repeat dose one time in 2 hours if headache not relieved.

## 2025-05-16 ENCOUNTER — PRIOR AUTHORIZATION (OUTPATIENT)
Dept: INTERNAL MEDICINE | Facility: CLINIC | Age: 75
End: 2025-05-16
Payer: MEDICARE

## 2025-05-16 LAB — HBA1C MFR BLD: 5.7 % (ref 4.8–5.6)

## 2025-05-16 NOTE — TELEPHONE ENCOUNTER
PA approved for Cyclobenzaprine effective 4/16/2025-5/16/2026    PA approved for Promethazine effective 4/16/2025-5/16/2026    PA approved for Alprazolam effective 4/16/2025-5/16/2026

## 2025-05-21 DIAGNOSIS — E78.5 HYPERLIPIDEMIA, UNSPECIFIED HYPERLIPIDEMIA TYPE: ICD-10-CM

## 2025-05-21 RX ORDER — ATORVASTATIN CALCIUM 10 MG/1
10 TABLET, FILM COATED ORAL DAILY
Qty: 90 TABLET | Refills: 1 | Status: SHIPPED | OUTPATIENT
Start: 2025-05-21

## 2025-05-26 ENCOUNTER — RESULTS FOLLOW-UP (OUTPATIENT)
Dept: INTERNAL MEDICINE | Facility: CLINIC | Age: 75
End: 2025-05-26
Payer: MEDICARE

## 2025-05-27 RX ORDER — ERGOCALCIFEROL 1.25 MG/1
50000 CAPSULE, LIQUID FILLED ORAL WEEKLY
Qty: 12 CAPSULE | Refills: 0 | Status: SHIPPED | OUTPATIENT
Start: 2025-05-27

## 2025-05-28 ENCOUNTER — OFFICE VISIT (OUTPATIENT)
Dept: ORTHOPEDIC SURGERY | Facility: CLINIC | Age: 75
End: 2025-05-28
Payer: MEDICARE

## 2025-05-28 VITALS — TEMPERATURE: 97.3 F | HEIGHT: 71 IN | WEIGHT: 315 LBS | BODY MASS INDEX: 44.1 KG/M2

## 2025-05-28 DIAGNOSIS — Z96.651 S/P TOTAL KNEE ARTHROPLASTY, RIGHT: Primary | ICD-10-CM

## 2025-05-28 NOTE — PROGRESS NOTES
[facility] Note      Subjective     CC: Post-op (3 week s/p Total Knee Arthroplasty With Cori Robot  Right knee (DOS - 5/6/25))      KEYA Jackman Jr. is a 74 y.o. male.  Patient returns today 3 weeks status post right total knee arthroplasty with Dr. Kwon.  He reports the knee is doing very well.  He is having more pain in his right hip than his knee.  He is status post right total hip and has had persisting pain, this is nothing new.  Dr. Olivas has evaluated this hip recently.  The knee is improving.  He is taking Tylenol and anti-inflammatories for pain.  He is doing outpatient physical therapy.  Happy with his progress        ROS:    Constiutional:Pt denies fever, chills, nausea, or vomiting.  MSK:as above        Objective      Past Medical History  Past Medical History:   Diagnosis Date    Anxiety and depression     Arthritis of neck ?    Arthrodesis present     lt foot triple    Bulging of lumbar intervertebral disc     Cataract     Lens implated in left eye.    Cervical disc disorder ?    Cornea transplant recipient     BILATERAL    COVID-19 11/2021    S/p MAB    CPAP (continuous positive airway pressure) dependence     CTS (carpal tunnel syndrome) Slight case; Dr. Chanda Castanon    DDD (degenerative disc disease), lumbar     09/09/201s/p injection Dr Abel Abbott    Diverticulosis     Elevated cholesterol     Glaucoma July 2022    Drops daily.    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    Neuropathy     bilatral feet    Obesity     CHRISTOPHE on CPAP     Osteopenia     Periarthritis of shoulder ?    Posterior tibial tendinitis, left     09/19/2019 pending-10/19 with Dr Carolina    Rheumatoid arthritis     Rotator cuff syndrome 09/15/2022    Dr. Chanda Castanon    Swelling of left ear     Venous stasis 09/09/2019     "priti lower extremities    Visual impairment     Cornea grafts both eyes; Cataracts.    Wears contact lenses          Physical Exam  Temp 97.3 °F (36.3 °C)   Ht 180.3 cm (70.98\")   Wt (!) 143 kg (315 lb 4.1 oz)   BMI 43.99 kg/m²     Body mass index is 43.99 kg/m².    Patient is well nourished and well developed.        Ortho Exam  Right knee exam: Anterior knee incision is healing well.  Range of motion 0-100.  Ligament stable.  Neurovascular intact distally.    Imaging/Labs/EMG Reviewed:  XR Knee 3+ View With [place] Right  Order date: 5/28/2025  Authorizing: Evangelist Amato MD  Ordered by Evangelist Amato MD on 5/28/2025.     Narrative & Impression    Indication: Status post right total knee arthroplasty     Comparison: Todays xrays were compared to previous xrays from 5/6/2025     IMPRESSION:      Right Knee: Demonstrate well positioned knee arthroplasty components in satisfactory alignment without evidence of wear, loosening, subsidence, fracture, or osteolysis and No significant changes compared to prior radiographs.          Assessment    Assessment:  1. S/P total knee arthroplasty, right        Plan:    Doing well status post right total knee arthroplasty with Dr. Olivas on 5/6/2025.  Patient is doing very well.  I reviewed x-rays with him.  He will continue with his PT and return to see Dr. Olivas in 6 weeks, sooner if needed.      Radha Valencia PA-C  05/29/25  13:38 EDT      Dragon disclaimer:  Much of this encounter note is an electronic transcription/translation of spoken language to printed text. The electronic translation of spoken language may permit erroneous, or at times, nonsensical words or phrases to be inadvertently transcribed; Although I have reviewed the note for such errors, some may still exist.      Answers submitted by the patient for this visit:  Post Operative Visit (Submitted on 5/26/2025)  Chief Complaint: Follow-up  Pain Control: controlled  Fever: unmeasured  Diet: " adequate intake  Activity: impaired due to weakness  Operative Site Issues: Yes  Drainage: none  Redness: improved  Swelling: improved  Operative site comments:: Swelling impairing PT.

## 2025-06-06 DIAGNOSIS — F32.A DEPRESSION, UNSPECIFIED DEPRESSION TYPE: ICD-10-CM

## 2025-06-06 DIAGNOSIS — I15.9 SECONDARY HYPERTENSION: ICD-10-CM

## 2025-06-06 RX ORDER — ATENOLOL 50 MG/1
50 TABLET ORAL DAILY
Qty: 90 TABLET | Refills: 1 | Status: SHIPPED | OUTPATIENT
Start: 2025-06-06

## 2025-06-06 RX ORDER — BUPROPION HYDROCHLORIDE 150 MG/1
150 TABLET ORAL DAILY
Qty: 90 TABLET | Refills: 1 | Status: SHIPPED | OUTPATIENT
Start: 2025-06-06

## 2025-06-09 ENCOUNTER — TELEPHONE (OUTPATIENT)
Age: 75
End: 2025-06-09
Payer: MEDICARE

## 2025-06-09 DIAGNOSIS — Z79.899 HIGH RISK MEDICATION USE: ICD-10-CM

## 2025-06-09 DIAGNOSIS — R52 PAIN MANAGEMENT: ICD-10-CM

## 2025-06-10 RX ORDER — TRAMADOL HYDROCHLORIDE 50 MG/1
TABLET ORAL
Qty: 180 TABLET | Refills: 2 | Status: SHIPPED | OUTPATIENT
Start: 2025-06-10 | End: 2025-06-11 | Stop reason: SDUPTHER

## 2025-06-11 RX ORDER — TRAMADOL HYDROCHLORIDE 50 MG/1
TABLET ORAL
Qty: 180 TABLET | Refills: 2 | Status: SHIPPED | OUTPATIENT
Start: 2025-06-11

## 2025-06-11 NOTE — TELEPHONE ENCOUNTER
Please resend tramadol rx. It was reordered yesterday but it was printed instead of being sent to pharmacy.

## 2025-07-05 DIAGNOSIS — G44.229 CHRONIC TENSION-TYPE HEADACHE, NOT INTRACTABLE: ICD-10-CM

## 2025-07-07 RX ORDER — CYCLOBENZAPRINE HCL 10 MG
10 TABLET ORAL 3 TIMES DAILY PRN
Qty: 90 TABLET | Refills: 1 | Status: SHIPPED | OUTPATIENT
Start: 2025-07-07

## 2025-07-08 NOTE — PROGRESS NOTES
"Chief Complaint: \"Pain in my right hip\"      History of Present Illness: Mr. Obi Jackman Jr., 74 y.o. male presenting with a longstanding history of persistent right groin pain.Obi Jackman Jr. Underwent right Total hip arthroplasty with Dr. Olivas on 08/04/2020.  The patient reports that he developed an infection at the surgical site.  He reports that he started experiencing increasing groin pain.  He underwent aspiration of the site and infection was confirmed.  His treatment was guided by Dr Olivas along with an infectious disease specialist. On 12/04/2020, he underwent incision and drainage right leg with component exchange, head and liner, with Dr. Osbaldo Olivas.  Since then, he has been maintained on doxycycline.  Obi Jackman Jr. underwent orthopedic surgical consultation with Dr. Osbaldo Olivas on 04/21/2025, and was found not to be a surgical candidate for his hip issues as he didn't see unusual features with the right total hip arthroplasty. He underwent bilateral knee arthroplasties after his hip replacement without incident. Unfortunately, he has continued to experience persistent right groin pain that limits his activities. He saw Radha Valencia PA-C on 05/29/25, who recommended to continue PT @ [facility]. He also has an upcoming appointment with Dr. Olivas next week. Right Knee X-rays 3+ View With [place] on 05/28/2025 revealed well positioned knee arthroplasty components in satisfactory alignment without evidence of wear, loosening, fracture, or osteolysis.  Right hip X-rays 04/21/2025: The components are well aligned, with no signs of loosening or failure. Obi Jackman Jr. underwent orthopedic surgical consultation with Dr. Osbaldo Olivas on 04/21/2025, and was found not to be a surgical candidate for his hip issues as he didn't see unusual features with the right total hip arthroplasty. Obi Jackman Jr. has failed to obtain pain relief with conservative measures for more than 4 " years including: oral analgesics, opioids, topical analgesics, ice, heat, physical therapy (ongoing, last visit this week, about 10 visits), physical therapist directed home exercise program HEP (ongoing, about 10 minutes/session, 7 x week for the past years), to name a few. Pain has progressed in intensity over the past years.  Pain Description: Constant right groin and lateral hip pain with intermittent exacerbation, described as aching, dull, and sharp sensation.   Radiation of Pain: The pain does not radiate   Pain intensity today: 2/10   Average pain intensity last week: 4/10  Pain intensity ranges from: 2/10 to 6/10  Aggravating factors: Pain increases with standing, walking. Patient describes neurogenic claudication. Patient uses a cane.  Alleviating factors: Pain decreases with sitting down, sitting on a recliner, lying down  Associated Symptoms:   Patient denies pain, numbness, or weakness in the lower extremities.   Patient denies any new bladder or bowel problems.   Patient reports difficulties with his balance but denies recent falls.   Pain interferes with general activities and affects patient's quality of life  Pain does not interfere with sleep  Muscle spasms: No   Stiffness: RT hip    Review of previous therapies and additional medical records:  Obi Jackman Jr. has already failed the following measures, including:   Conservative Measures: Oral analgesics, opioids, topical analgesics, ice, heat, physical therapy (ongoing, last visit this week, about 10 visits), physical therapist directed home exercise program HEP (ongoing, about 10 minutes/session, 7 x week for the past years)  Interventional Measures: None for his RT hip  08/04/2020: Right Total hip arthroplasty   12/04/2020: incision and drainage right leg with component exchange, head and liner, Dr. Osbaldo Olivas  Surgical Measures: No history of previous cervical spine or lumbar spine surgery   05/06/2025: Right Total knee arthroplasty    03/10/2023: Left Total knee arthroplasty   10/15/2019: Left Foot surgery   10/15/2019: Left Toe Fusion   10/15/2019: Left Iliac crest bone graft   10/15/2019: Left Achilles tendon surgery       Obi HANSON Gasper Cheung underwent orthopedic surgical consultation with Dr. Osbaldo Olivas on 04/21/2025, and was found not to be a surgical candidate for his hip issues as he didn't see unusual features with the right total hip arthroplasty.  Dr Olivas offered to proceed with right total knee arthroplasty surgery    Obi MARGIE Jackman Jr. presents with significant comorbidities including anxiety and depression, HLP, HTN, CHRISTOPHE on CPAP, osteopenia, benign paroxysmal positional vertigo, headache; depression; obesity, rheumatoid arthritis, on aspirin 81 mg  In terms of current analgesics, Obi Jackman JrStalin takes: acetaminophen, tramadol, cyclobenzaprine, gabapentin. The patient also takes Xanax PRN, Wellbutrin, hydroxychloroquine, meclizine, ondansetron, promethazine, Imitrex  I have reviewed PDMP/[place] Report consistent with medication reconciliation.  SOAPP/ORT: Low Risk     PHQ-2 Depression Screening  Little interest or pleasure in doing things? Not at all   Feeling down, depressed, or hopeless? Not at all   PHQ-2 Total Score 0        Pain Self-Efficacy Questionnaire (PSEQ)   ITEM: Scale  0 = Does not agree   6 = Agree  07-10  2025        I can enjoy things despite the pain. 2        I can do most of the household chores (tidying up, washing dishes, etc) despite the pain. 5        I can socialize with my friends or family members as often as I used to do despite the pain. 4        I can cope with my pain in most situations. 3        I can do some form of work despite the pain (housework, paid and unpaid work). 4        I can still do many of the things I enjoy doing (hobbies, leisure activities, etc) despite pain. 3        I can cope with my pain without medications. 3        I can accomplish most of my goals in life despite  the pain. 3        I can live in a normal lifestyle, despite the pain. 3        I can gradually become more active despite the pain. 3        TOTAL SCORE 33/60            Global Pain Scale 07-10  2025          Pain 9          Feelings 0          Clinical outcomes 2          Activities 12          GPS Total: 23            The [employer] Osteoarthritis Index (WOMAC)   to assess pain, stiffness, and physical function in patients with RIGHT hip osteoarthritis    CATEGORIES  Rate: 0 = None 1 = Slight 2 = Moderate 3 = Very 4 = Extremely 07-10  2025       PAIN: Think about the pain you felt during the last 48 hours caused by the arthritis in your hip.         1. Walking in a flat surface?  1       2. When going up or down stairs?  3       3. At night while in bed/pain that disturbs your sleep?  0       4. While sitting or lying down?  1       5. While standing?  2       Total Pain Score (Max 20): 7       STIFFNESS: Think about the stiffness (not pain) you felt during the last 48 hours caused by the arthritis in your hip.         1. How severe has your stiffness been after you first woke up   in the morning?  1       2. How severe has your stiffness been after sitting or lying   down or while resting later in the day?  1       Total Stiffness Score (Max 8): 2       PHYSICAL FUNCTION: Think about the difficulty you had in doing the following daily physical activities during the last 48 hours caused by the arthritis in your hip.        1. When going down the stairs?  3       2. When going up the stairs?  4       3. When getting up from a sitting position?  3         4. While standing?  3       5. When bending to the floor?  1       6. When walking on a flat surface?  1       7. Getting in or out of a car, or getting on or off a bus?  2         8. While going shopping?  3       9. When putting on your socks or panty hose or stockings?  1       10. When getting out of bed?  1       11. When taking  off your socks or panty hose or stockings?  1       12. When lying in bed?  1       13. When getting in or out of the bathtub?  1         14. While sitting?  1       15. When getting on or off the toilet?  1       16. While doing heavy household chores?  2       17. While doing light household chores?  1       Total Difficulty Score (Max 68): 30       Total WOMAC Score: __ /96 X100 = ___% 39         Review of Diagnostic Studies:   I have independently reviewed and interpreted the images. I have also reviewed the reports.  Right Knee X-rays 3+ View With [place] on 05/28/2025: Well positioned knee arthroplasty components in satisfactory alignment without evidence of wear, loosening, subsidence, fracture, or osteolysis and No significant changes compared to prior radiographs.   Right hip X-rays 04/21/2025: The components are well aligned, with no signs of loosening or failure.      Patient Active Problem List   Diagnosis    Allergic rhinitis    Anxiety disorder    Benign paroxysmal positional vertigo    Chronic tension type headache    Depression    ED (erectile dysfunction) of non-organic origin    Hyperlipidemia    Hypertension    LFTs abnormal    CHRISTOPHE (obstructive sleep apnea)    Elevated glucose    Health care maintenance    Morbid obesity due to excess calories    Onychomycosis of left great toe    Osteoarthritis of ankle or foot    Venous stasis    Neuropathy    Class 3 severe obesity due to excess calories with serious comorbidity and body mass index (BMI) of 40.0 to 44.9 in adult    Pre-op evaluation    Arthritis of foot, left    S/P left ankle triple arthrodesis    Acute blood loss anemia, mild, asymptomatic     Acute postoperative pain    Wound infection    Primary osteoarthritis of right hip    Status post total replacement of right hip    Postoperative wound infection of right hip, s/p I and D, head and liner exchange.    Primary osteoarthritis of left knee    S/P total knee arthroplasty, left    Food  poisoning    Joint pain    Rheumatoid arthritis of multiple sites with negative rheumatoid factor    Infection and inflammatory reaction due to internal right hip prosthesis, sequela    Pain management    High risk medication use    Family history of prostate cancer in father    Benign essential hypertension    Encounter for medication monitoring    Primary osteoarthritis of right knee    Degenerative arthritis of right knee    S/P knee replacement,right    Psoas tendinitis of right side    Greater trochanteric bursitis of right hip    History of right hip replacement    Pain in hip region after total hip replacement    Referred pain    History of bilateral knee replacement    Leg length discrepancy    BMI 40.0-44.9, adult    Waddling gait    Gait disturbance       Past Medical History:   Diagnosis Date    Anxiety and depression     Arthritis of neck ?    Arthrodesis present     lt foot triple    Bulging of lumbar intervertebral disc     Cataract     Lens implated in left eye.    Cervical disc disorder ?    Chronic pain disorder     Cornea transplant recipient     BILATERAL    COVID-19 11/2021    S/p MAB    CPAP (continuous positive airway pressure) dependence     CTS (carpal tunnel syndrome) Slight case; Dr. Chanda Castanon    DDD (degenerative disc disease), lumbar     09/09/201s/p injection Dr Abel Abbott    Diverticulosis     Elevated cholesterol     Glaucoma July 2022    Drops daily.    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    Neuropathy     bilatral feet    Obesity     CHRISTOPHE on CPAP     Osteopenia     Periarthritis of shoulder ?    Posterior tibial tendinitis, left     09/19/2019 pending-10/19 with   [facility]    Rheumatoid arthritis     Rotator cuff syndrome 09/15/2022    Dr. Chanda Castanon    Swelling of left ear     Venous stasis 09/09/2019    priti lower extremities    Visual impairment     Cornea grafts both eyes; Cataracts.    Wears contact lenses          Past Surgical History:   Procedure Laterality Date    ACHILLES TENDON SURGERY Left 10/15/2019    Procedure: ACHILLES TENDON LENGTHENING LEFT;  Surgeon: Elodia Guzman MD;  Location: [facility];  Service: Orthopedics    CATARACT EXTRACTION Right     COLONOSCOPY  2015    EPIDURAL BLOCK  5/6/2025    Left knee replacement    EYE SURGERY Bilateral     cornea transplant     FOOT SURGERY Left 10/15/2019    triple arthrodesis and achilles tendon lengthening- [place]    HERNIA REPAIR      abdominal    ILIAC CREST BONE GRAFT Left 10/15/2019    Procedure: ILIAC CREST BONE GRAFT LEFT;  Surgeon: Elodia Guzman MD;  Location: [facility];  Service: Orthopedics    INCISION AND DRAINAGE LEG Right 12/04/2020    Procedure: INCISION AND DRAINAGE WITH COMPONENT EXCHANGE, HEAD AND LINER RIGHT HIP;  Surgeon: Osbaldo Olivas MD;  Location: [facility] OR;  Service: Orthopedics;  Laterality: Right;    JOINT REPLACEMENT      Total right hip and left knee; left ankke/achilles tendon rebuild;    ORTHOPEDIC SURGERY  8/2019    Triple arthrodecis of left ankle    TOE FUSION Left 10/15/2019    Procedure: TRIPLE ARTHODESIS LEFT;  Surgeon: Elodia Guzman MD;  Location: [facility];  Service: Orthopedics    TONSILLECTOMY      TOTAL HIP ARTHROPLASTY Right 08/04/2020    Procedure: TOTAL HIP ARTHROPLASTY RIGHT;  Surgeon: Osbaldo Olivas MD;  Location: [facility];  Service: Orthopedics;  Laterality: Right;    TOTAL KNEE ARTHROPLASTY Left 03/10/2023    Procedure: TOTAL KNEE ARTHROPLASTY WITH CORI ROBOT - LEFT;  Surgeon: Osbaldo Olivas MD;  Location: [facility];  Service: Robotics - Ortho;  Laterality: Left;    TOTAL KNEE ARTHROPLASTY Right 05/06/2025    Procedure: TOTAL KNEE ARTHROPLASTY WITH CORI  ROBOT RIGHT;  Surgeon: Osbaldo Olivas MD;  Location: [facility];  Service: Robotics - Ortho;  Laterality: Right;    TRIGGER POINT INJECTION      Corizone shots to both knees; unsucessful    UMBILICAL HERNIA REPAIR      Repaired over 15 years ago.    VENOUS ABLATION Left 2022    Endovenous laser ablation of left great sapheuos vein for venous insufficiency         Family History   Problem Relation Age of Onset    No Known Problems Mother     Alcohol abuse Father     Prostate cancer Father     Cancer Father          - prostate/bladder cancer    Cancer Sister         Stomach cancer.    Cancer Brother         Kidney removed 23 due renal cell  carcenoma    Multiple sclerosis Son     Cancer Maternal Grandmother         Pancreatic/stomach cancer         Social History     Socioeconomic History    Marital status:     Number of children: 3   Tobacco Use    Smoking status: Never     Passive exposure: Never    Smokeless tobacco: Never   Vaping Use    Vaping status: Never Used   Substance and Sexual Activity    Alcohol use: Not Currently     Comment: One drink 2-3x per year.    Drug use: Never    Sexual activity: Yes     Partners: Female     Birth control/protection: None           Current Outpatient Medications:     acetaminophen (TYLENOL) 500 MG tablet, Take 2 tablets by mouth Every 8 (Eight) Hours. Change to every 8 hours  as needed after 1 week, Disp: 60 tablet, Rfl: 0    ALPRAZolam (XANAX) 0.25 MG tablet, Take 1/2 to 1 tablet prn when flying, Disp: 10 tablet, Rfl: 0    Ascorbic Acid (VITAMIN C PO), Take 1 tablet by mouth Daily., Disp: , Rfl:     aspirin (ASPIR) 81 MG EC tablet, Take 1 tablet by mouth 2 (Two) Times a Day., Disp: , Rfl:     atenolol (TENORMIN) 50 MG tablet, TAKE 1 TABLET BY MOUTH DAILY., Disp: 90 tablet, Rfl: 1    atorvastatin (LIPITOR) 10 MG tablet, TAKE 1 TABLET BY MOUTH DAILY. RESUME WHEN NOT ON DAPTO ANYMORE, Disp: 90 tablet, Rfl: 1    buPROPion XL (WELLBUTRIN XL) 150 MG 24 hr  tablet, TAKE 1 TABLET BY MOUTH DAILY., Disp: 90 tablet, Rfl: 1    Cyanocobalamin (VITAMIN B-12 ER PO), Take 1 tablet by mouth Daily., Disp: , Rfl:     cyclobenzaprine (FLEXERIL) 10 MG tablet, TAKE 1 TABLET BY MOUTH 3 TIMES A DAY AS NEEDED FOR MUSCLE SPASMS., Disp: 90 tablet, Rfl: 1    doxycycline (VIBRAMYCIN) 100 MG capsule, Take 1 capsule by mouth 2 (Two) Times a Day., Disp: , Rfl:     gabapentin (NEURONTIN) 300 MG capsule, 1 PO AM and 2 PO HS, Disp: 90 capsule, Rfl: 3    hydroxychloroquine (PLAQUENIL) 200 MG tablet, Take 1 tablet by mouth 2 (Two) Times a Day., Disp: 180 tablet, Rfl: 1    latanoprost (XALATAN) 0.005 % ophthalmic solution, Administer 1 drop to both eyes Every Night., Disp: , Rfl:     lisinopril-hydrochlorothiazide (PRINZIDE,ZESTORETIC) 20-12.5 MG per tablet, TAKE 1 TABLET BY MOUTH DAILY., Disp: 90 tablet, Rfl: 0    meclizine (ANTIVERT) 25 MG tablet, Take 1 tablet by mouth Every 8 (Eight) Hours As Needed for Dizziness or Nausea., Disp: , Rfl:     multivitamin (THERAGRAN) tablet tablet, Take 1 tablet by mouth Daily., Disp: , Rfl:     naloxone (NARCAN) 4 MG/0.1ML nasal spray, Call 911. Don't prime. [place] in 1 nostril for overdose. Repeat in 2-3 minutes in other nostril if no or minimal breathing/responsiveness., Disp: 2 each, Rfl: 0    nystatin (MYCOSTATIN) 531464 UNIT/GM cream, Apply 1 Application topically to the appropriate area as directed Daily As Needed (skiin fold irritation)., Disp: , Rfl:     prednisoLONE acetate (PRED FORTE) 1 % ophthalmic suspension, Administer 1 drop to both eyes Daily., Disp: , Rfl:     promethazine (PHENERGAN) 25 MG tablet, Take 1 tablet by mouth Every 6 (Six) Hours As Needed for Nausea or Vomiting., Disp: 20 tablet, Rfl: 1    SUMAtriptan (IMITREX) 100 MG tablet, Take 1 tablet by mouth Every 2 (Two) Hours As Needed for Migraine. Take one tablet at onset of headache. May repeat dose one time in 2 hours if headache not relieved., Disp: 27 tablet, Rfl: 3    traMADol (ULTRAM)  "50 MG tablet, Take 2 tablets by mouth 3 times daily as needed for pain, Disp: 180 tablet, Rfl: 2    triamcinolone (KENALOG) 0.025 % cream, Apply 1 Application topically to the appropriate area as directed 2 (Two) Times a Day As Needed for Rash., Disp: , Rfl:     vitamin D (ERGOCALCIFEROL) 1.25 MG (32574 UT) capsule capsule, Take 1 capsule by mouth 1 (One) Time Per Week., Disp: 12 capsule, Rfl: 0      No Known Allergies      Ht 180.3 cm (70.98\")   Wt 134 kg (295 lb 1.6 oz)   BMI 41.18 kg/m²       Physical Exam:  Constitutional: Patient appears well-developed, well-nourished, well-hydrated, appears younger than stated age  HEENT: Head: Normocephalic and atraumatic  Eyes: Conjunctivae and lids are normal  Pupils: Equal, round, reactive to light  Peripheral vascular exam: Posterior tibialis: right 2+ and left 2+. Dorsalis pedis: right 2+ and left 2+. 2+ edema. Presence of varicose veins.  Musculoskeletal   Gait and station: Gait evaluation demonstrated a waddling gate   Lumbar Spine: Passive and active range of motion are almost full and without pain. Extension, flexion, lateral flexion, rotation of the lumbar spine did not increase or reproduce pain. Lumbar facet joint loading maneuvers are negative.   Sacroiliac Joints Provocative Maneuvers: Negative   Piriformis maneuvers: Negative   Right Hip Joint: The range of motion of the hip joint is slightly limited to flexion and internal/external rotation with some pain  Left Hip Joint: The range of motion of the hip joint is slightly limited to flexion and internal/external rotation but without pain   Palpation of the bilateral ischial tuberosities: Unrevealing   Palpation of the bilateral psoas tendons and iliopsoas bursas: Reveals tenderness on the right  Palpation of the bilateral greater trochanters: Reveals tenderness on the right   Examination of the Iliotibial band: Reveals tenderness on the right   Neurological:   Patient is alert and oriented to person, place, " and time.   Speech: Normal.   Cortical function: Normal mental status.   Reflex Scores:  Right patellar: 0+  Left patellar: 0+  Right Achilles: 0+  Left Achilles: 0+  Motor strength: 5/5 (RT hip flexors 4+/5)  Motor Tone: Normal  Involuntary movements: None.   Superficial/Primitive Reflexes: Primitive reflexes were absent.   Right Rae: Absent  Left Rae: Absent  Right ankle clonus: Absent  Left ankle clonus: Absent   Babinsky: Absent  Long tract signs: Negative. Straight leg raising test:  Negative. Femoral stretch sign: Negative on the left, non diagnostic on the right (limited ROM RT knee).   Sensory exam: Intact to light touch, intact pain and temperature sensation, intact vibration sensation and normal proprioception  Coordination: Finger to nose: Normal. Balance: Normal Romberg's sign: Negative   Skin and subcutaneous tissue: Skin is warm and intact. No rash noted. No cyanosis.   Psychiatric: Judgment and insight: Normal. Recent and remote memory: Intact. Mood and affect: Normal.     ASSESSMENT:   1. Psoas tendinitis of right side    2. Greater trochanteric bursitis of right hip    3. History of right hip replacement    4. Pain in hip region after total hip replacement, sequela    5. Referred pain    6. History of bilateral knee replacement    7. Rheumatoid arthritis of multiple sites with negative rheumatoid factor    8. Leg length discrepancy    9. Morbid obesity due to excess calories    10. Benign paroxysmal positional vertigo, unspecified laterality    11. BMI 40.0-44.9, adult    12. Waddling gait    13. Gait disturbance        PLAN/MEDICAL DECISION MAKING:  Mr. Obi Jackman Jr., 74 y.o. male presents with with a longstanding history of persistent right groin pain.Obi Jackman Jr. Underwent right Total hip arthroplasty with Dr. Olivas on 08/04/2020.  The patient reports that he developed an infection at the surgical site.  He reports that he started experiencing increasing groin pain.  He  underwent aspiration of the site and infection was confirmed.  His treatment was guided by Dr Olivas along with an infectious disease specialist. On 12/04/2020, he underwent incision and drainage right leg with component exchange, head and liner, with Dr. Obsaldo Olivas.  Since then, he has been maintained on doxycycline.  Obi Jackman Jr. underwent orthopedic surgical consultation with Dr. Osbaldo Olivas on 04/21/2025, and was found not to be a surgical candidate for his hip issues as he didn't see unusual features with the right total hip arthroplasty. He underwent bilateral knee arthroplasties after his hip replacement without incident. Unfortunately, he has continued to experience persistent right groin pain that limits his activities. He saw Radha Valencia PA-C on 05/29/25, who recommended to [facility]. He also has an upcoming appointment with Dr. Olivas next week. Right Knee X-rays 3+ View With [place] on 05/28/2025 revealed well positioned knee arthroplasty components in satisfactory alignment without evidence of wear, loosening, fracture, or osteolysis.  Right hip X-rays 04/21/2025: The components are well aligned, with no signs of loosening or failure. Obi Jackman Jr. underwent orthopedic surgical consultation with Dr. Osbaldo Olivas on 04/21/2025, and was found not to be a surgical candidate for his hip issues as he didn't see unusual features with the right total hip arthroplasty. Obi Jackman Jr. has failed to obtain pain relief with conservative measures for more than 4 years including: oral analgesics, opioids, topical analgesics, ice, heat, physical therapy (ongoing, last visit this week, about 10 visits), physical therapist directed home exercise program HEP (ongoing, about 10 minutes/session, 7 x week for the past years), to name a few. Pain has progressed in intensity over the past years. A comprehensive evaluation--including a detailed history, physical examination,  and relevant physiologic and functional assessments--was conducted. The patient presents with intractable pain attributed to the diagnoses listed above. This pain has persisted despite trials of conservative modalities, as documented in the HPI and previous records. The patient's moderate pain significantly impacts daily functioning, activities of daily living (ADLs), and overall quality of life. This is evidenced by results from standardized assessment tools, including:  Global Pain Scale: 23/100  [employer] Osteoarthritis Index (WOMAC): Right Hip: 40/96  Tinetti Gait & Balance Assessment Tool: Low fall risk  Additionally, I have reviewed relevant diagnostic imaging and studies supporting the chronicity and severity of the patient’s pain condition. I have also reviewed available medical records, including documentation of previous therapies and treatment outcomes.  Psychological screening was also performed:  PHQ-2: 0  Pain Self-Efficacy Questionnaire (PSEQ): 33/60  Pain nature: The patient’s pain is predominantly physical in nature. I conducted a detailed discussion with  Obi MARGIE Jackman Jr. regarding the nature of his chronic pain condition and reviewed potential therapeutic options. This conversation included a review of the risks, benefits, alternatives, and the rationale for each approach. We agreed to pursue a stepwise treatment strategy, beginning with the least invasive interventions appropriate to the severity and complexity of the patient’s condition. The proposed treatment plan is consistent with current clinical guidelines and evidence-based standards of care. Its scope, duration, and setting are medically appropriate, with the intent to improve function, reduce pain, and enhance quality of life. Accordingly, I recommend the following plan:    1. Interventional pain management measures: The patient is not currently taking anticoagulants other than low-dose aspirin (ASA  81 mg). I had a detailed discussion with Herb regarding the importance of establishing an accurate and comprehensive working diagnosis, especially given the chronic nature of his symptoms. We also reviewed the potential for referred pain or multiple overlapping pain generators that have yet to be properly evaluated. Based on our discussion, I proposed the following structured diagnostic and interventional approach:    Groin Pain  A. Right Iliopsoas Tendinitis  The patient will be scheduled for a diagnostic right iliopsoas tendon sheath injection using local anesthetic under combined ultrasound and fluoroscopic guidance.  If he experiences significant symptom relief, we may proceed with a therapeutic injection using either local anesthetic and corticosteroid, or platelet-rich plasma (PRP) therapy (Platelet rich/leukocyte-poor PRP combined with A2M).  B. Suspected Painful Right Total Hip Arthroplasty  We discussed initiating a first set of diagnostic nerve blocks targeting the terminal sensory articular branches of the right femoral and right obturator nerves.  If the patient reports >50% pain relief and improved hip range of motion, we will proceed with a second set of confirmatory diagnostic blocks, followed by bipolar radiofrequency ablation (RFA) of the same sensory branches for longer-term pain control.  C. Peripheral Nerve Stimulation (PNS)  PNS is a well-established treatment for chronic pain syndromes refractory to conservative management.  For hip and groin pain, potential target nerves include the femoral, obturator, ilioinguinal, and genitofemoral nerves.  A diagnostic nerve block will be performed first to confirm the nerve's contribution to the pain before proceeding with a trial stimulation.  We will consider available platforms such as Sprint, Curonix, or Nalu for the PNS trial and potential implant.  D. Spinal Cord Stimulation (SCS) / Dorsal Root Ganglion (DRG) Stimulation  In cases of refractory groin  pain, conventional SCS or DRG stimulation may be considered.  A lumbar MRI will be obtained to assess for nerve root compression and to determine the patient's candidacy for neuromodulation.  If radiculopathy is confirmed and correlates with clinical symptoms, a selective diagnostic nerve root block may be indicated for further confirmation and to dictate potential therapies and/or referrals    Lateral Hip Pain / Greater Trochanteric Bursitis  A. Injection Therapy  We may perform a right greater trochanteric bursa injection using local anesthetic and corticosteroids, or alternatively consider PRP therapy based on response and patient preference.  B. Diagnostic Nerve Blocks and RFA  The trochanteric branches of the femoral nerve provide sensory innervation to the greater trochanter region.  A first set of diagnostic blocks targeting these branches will be performed.  If the patient experiences >80% pain relief and improved function (e.g., increased activity tolerance), we will follow up with a second set of confirmatory blocks, followed by bipolar radiofrequency ablation of the trochanteric branches.    2. Diagnostic studies:   A. MRI of the lumbar spine without contrast  B. Lumbar Spine X-rays, full views including flexion and extension to assess lumbar stability  C. CT leg length study  D. We could consider triple phase bone scan depending on X-ray findings, etc    3. Pharmacological measures: Reviewed and discussed; Patient takes acetaminophen, tramadol, cyclobenzaprine, gabapentin. The patient also takes Xanax PRN, Wellbutrin, hydroxychloroquine, meclizine, ondansetron, promethazine, Imitrex    4. Long-term rehabilitation efforts:  A. The patient does not have a history of falls. I performed a risk assessment for falls using the Tinetti gait & balance assessment tool (scored low risk for falls).   B. Continue a comprehensive physical therapy program   C. Contrast therapy: Apply ice-packs for 15-20 minutes,  "followed by heating pads for 15-20 minutes to affected area   D. Referral to [facility] for Pilates  E. Prescription for Pulsed Electro-Magnetic Field (PEMF) Therapy device \"Oska Pulse\" for treatment of this patient's chronic pain condition.   F. Prior to PNS/SCS/DRG: Referral to Dr. Sami Castanon for psychological clearance for peripheral nerve stimulation, spinal cord stimulation  G. Referral to [facility]. Patient's Body mass index is 41.18 kg/m². Patient counseled on the importance of weight loss to help with overall health and pain control. Boy reports that he has lost 50 pounds over the past 5 years by dieting and exercising  H. Obi HANSON Gasper Cheung  reports that he has never smoked. He has never been exposed to tobacco smoke. He has never used smokeless tobacco.     5. The patient has been instructed to contact my office with any questions or difficulties. The patient understands the plan and agrees to proceed accordingly.      The patient has a documented plan of care to address chronic pain. Obi HANSON Gasper Wong. reports a pain score of 2/10.  Given his pain assessment as noted, treatment options were discussed and the following options were decided upon as a follow-up plan to address the patient's pain: continuation of current treatment plan for pain, educational materials on pain management, home exercises and therapy, referral to Physical Therapy, referral to specialist for assistance in pain treatment guidance, steroid injections, use of non-medical modalities (ice, heat, stretching and/or behavior modifications), and interventional pain management measures.               Patient does not receive prescriptions for controlled substances from this office. Therefore, a controlled substance agreement is not medically necessary at this time.   TAJ query complete. TAJ reviewed by Andrea Kennedy MD.     Pain Management Panel  More data may exist         Latest " "Ref Rng & Units 6/11/2024 4/25/2022   Pain Management Panel   Creatinine, Urine 20.0 - 300.0 mg/dL - 276.9    Amphetamine, Urine Qual Negative Negative  Negative    Barbiturates Screen, Urine Negative Negative  Negative    Benzodiazepine Screen, Urine Negative Negative  Negative    Buprenorphine, Screen, Urine Negative Negative  -   Cocaine Screen, Urine Negative Negative  Negative    Fentanyl, Urine Negative Negative  -   Methadone Screen , Urine Negative Negative  Negative    Methamphetamine, Ur Negative Negative  -        Pain Medications              acetaminophen (TYLENOL) 500 MG tablet Take 2 tablets by mouth Every 8 (Eight) Hours. Change to every 8 hours  as needed after 1 week    aspirin (ASPIR) 81 MG EC tablet Take 1 tablet by mouth 2 (Two) Times a Day.    buPROPion XL (WELLBUTRIN XL) 150 MG 24 hr tablet TAKE 1 TABLET BY MOUTH DAILY.    cyclobenzaprine (FLEXERIL) 10 MG tablet TAKE 1 TABLET BY MOUTH 3 TIMES A DAY AS NEEDED FOR MUSCLE SPASMS.    gabapentin (NEURONTIN) 300 MG capsule 1 PO AM and 2 PO HS    traMADol (ULTRAM) 50 MG tablet Take 2 tablets by mouth 3 times daily as needed for pain             Orders Placed This Encounter   Procedures    External Facility Surgical/Procedural Request     Standing Status:   Future     Expected Date:   7/10/2025     Expiration Date:   7/10/2026     Provider:   NICOLE TYSON [[id number]]     Requested Location:   [address] [place] [address]     Procedure/ Order for Consent:   Diagnostic right iliopsoas tendon sheath injection under US and X-ray guidance     Laterality:   Right     Anesthesia type:   Sedation [260]     Pre-op diagnosis:   right iliopsoas tendonitis    Miscellaneous DME     Type of DME:   Pulsed Electro-Magnetic Field (PEMF) Therapy device \"Oska Pulse\" for treatment of chronic pain condition, chronic inflammation     Length of Need:   99 Months = Lifetime    XR Spine Lumbar Complete With Flex & Ext     Standing Status:   Future     Expected Date:   7/15/2025    "  Expiration Date:   7/10/2026     Reason for Exam::   RA, Hx hip replacement with persistent pain, r/o radiculopathy     Release to patient:   Routine Release [[id number]]    MRI Lumbar Spine Without Contrast     Standing Status:   Future     Expected Date:   7/11/2025     Expiration Date:   10/10/2026     Release to patient:   Routine Release [[id number]]     Reason for Exam::   RA, Hx RT hip replacement with persistent right inguinal and lateral hip pain, r/o radiculopathy    CT Leg Length Study     Standing Status:   Future     Expected Date:   7/11/2025     Expiration Date:   10/10/2026     Release to patient:   Routine Release [[id number]]     Reason for Exam::   S/P RT HIP REP< CARO KNEE REP< LEFT FOOT Sx. Apparent leg length discrepancy    Ambulatory Referral to Nutrition Services     Referral Priority:   Routine     Referral Type:   Health Education     Referral Reason:   Specialty Services Required     Requested Specialty:   Nutrition     Number of Visits Requested:   1    Ambulatory Referral for Yoga/Pilates     Referral Priority:   Routine     Referral Type:   Pain Management     Referral Reason:   Specialty Services Required     Number of Visits Requested:   1      Total Time Spent: 91 minutes    Please note that portions of this note were completed with a voice recognition program.   Any copied data in any portion of my note from previous notes included in the HPI, PE, MDM and/or assessment and plan has been reviewed by myself and accurate as of this date.   The 21st Century Cures Act makes medical notes like this available to patients in the interest of transparency. This is a medical document intended as peer to peer communication. It is written in medical language and may contain abbreviations or verbiage that are unfamiliar. It may appear blunt or direct. Medical documents are intended to carry relevant information, facts as evident, and the clinical opinion of the practitioner.     Andrea Kennedy,  MD    Patient Care Team:  Varsha Hahn MD as PCP - General (Internal Medicine)  Chanda Castanon MD as Consulting Physician (Rheumatology)  Medardo Dorsey APRN as Nurse Practitioner (Rheumatology)  Christopher Doe MD as Consulting Physician (Ophthalmology)  Osbaldo Olivas MD as Consulting Physician (Orthopedic Surgery)  Elodia Carolina MD as Consulting Physician (Orthopedic Surgery)  Michelle Stone APRN as Nurse Practitioner (Rheumatology)     No orders of the defined types were placed in this encounter.        Future Appointments   Date Time Provider Department Center   7/14/2025  2:50 PM Osbaldo Olivas MD [facility]   7/16/2025  2:15 PM Medardo Dorsey APRN [facility]   10/15/2025  2:00 PM Varsha Hahn MD [facility]   6/1/2026  2:15 PM Varsha Hahn MD [facility]

## 2025-07-10 ENCOUNTER — OFFICE VISIT (OUTPATIENT)
Dept: PAIN MEDICINE | Facility: CLINIC | Age: 75
End: 2025-07-10
Payer: MEDICARE

## 2025-07-10 VITALS — HEIGHT: 71 IN | WEIGHT: 295.1 LBS | BODY MASS INDEX: 41.31 KG/M2

## 2025-07-10 DIAGNOSIS — E66.01 MORBID OBESITY DUE TO EXCESS CALORIES: ICD-10-CM

## 2025-07-10 DIAGNOSIS — Z96.641 HISTORY OF RIGHT HIP REPLACEMENT: ICD-10-CM

## 2025-07-10 DIAGNOSIS — R52 REFERRED PAIN: ICD-10-CM

## 2025-07-10 DIAGNOSIS — R26.89 WADDLING GAIT: ICD-10-CM

## 2025-07-10 DIAGNOSIS — M76.11 PSOAS TENDINITIS OF RIGHT SIDE: ICD-10-CM

## 2025-07-10 DIAGNOSIS — Z96.649 PAIN IN HIP REGION AFTER TOTAL HIP REPLACEMENT, SEQUELA: ICD-10-CM

## 2025-07-10 DIAGNOSIS — Z96.653 HISTORY OF BILATERAL KNEE REPLACEMENT: ICD-10-CM

## 2025-07-10 DIAGNOSIS — M06.09 RHEUMATOID ARTHRITIS OF MULTIPLE SITES WITH NEGATIVE RHEUMATOID FACTOR: ICD-10-CM

## 2025-07-10 DIAGNOSIS — M70.61 GREATER TROCHANTERIC BURSITIS OF RIGHT HIP: ICD-10-CM

## 2025-07-10 DIAGNOSIS — M21.70 LEG LENGTH DISCREPANCY: ICD-10-CM

## 2025-07-10 DIAGNOSIS — T84.84XS PAIN IN HIP REGION AFTER TOTAL HIP REPLACEMENT, SEQUELA: ICD-10-CM

## 2025-07-10 DIAGNOSIS — H81.10 BENIGN PAROXYSMAL POSITIONAL VERTIGO, UNSPECIFIED LATERALITY: ICD-10-CM

## 2025-07-10 DIAGNOSIS — R26.9 GAIT DISTURBANCE: ICD-10-CM

## 2025-07-10 PROBLEM — T84.84XA PAIN IN HIP REGION AFTER TOTAL HIP REPLACEMENT: Status: ACTIVE | Noted: 2025-07-10

## 2025-07-14 ENCOUNTER — OFFICE VISIT (OUTPATIENT)
Dept: ORTHOPEDIC SURGERY | Facility: CLINIC | Age: 75
End: 2025-07-14
Payer: MEDICARE

## 2025-07-14 DIAGNOSIS — Z47.89 ORTHOPEDIC AFTERCARE: ICD-10-CM

## 2025-07-14 DIAGNOSIS — Z96.651 S/P TOTAL KNEE ARTHROPLASTY, RIGHT: Primary | ICD-10-CM

## 2025-07-14 PROCEDURE — 99024 POSTOP FOLLOW-UP VISIT: CPT | Performed by: ORTHOPAEDIC SURGERY

## 2025-07-14 PROCEDURE — 1160F RVW MEDS BY RX/DR IN RCRD: CPT | Performed by: ORTHOPAEDIC SURGERY

## 2025-07-14 PROCEDURE — 1159F MED LIST DOCD IN RCRD: CPT | Performed by: ORTHOPAEDIC SURGERY

## 2025-07-14 NOTE — PROGRESS NOTES
[facility] [place] [facility] Note    Subjective     Chief Complaint   Patient presents with    Post-op Follow-up     1.5 month follow up - 2.5 months S/P Total Knee Arthroplasty With Cori Robot Right (DOS: 5/6/25)        HPI    It has been 1.5  month(s) since Mr. Jackman's last visit. He returns to clinic today for postoperative follow-up of right knee arthroplasty. The issue has been ongoing for 2.5 month(s). He rates his pain a 3/10 on the pain scale. Previous/current treatments: cane/walker and physical therapy. Current symptoms: pain. The pain is worse with none; ice, lying down, and elevating the extremity improve the pain. Overall, he is doing better.  80 to 90% improvement compared to his preoperative symptoms.  He is pleased with the results.      I have reviewed the following portions of the patient's history and agree with: History of Present Illness and Review of Systems    Patient Active Problem List   Diagnosis    Allergic rhinitis    Anxiety disorder    Benign paroxysmal positional vertigo    Chronic tension type headache    Depression    ED (erectile dysfunction) of non-organic origin    Hyperlipidemia    Hypertension    LFTs abnormal    CHRISTOPHE (obstructive sleep apnea)    Elevated glucose    Health care maintenance    Morbid obesity due to excess calories    Onychomycosis of left great toe    Osteoarthritis of ankle or foot    Venous stasis    Neuropathy    Class 3 severe obesity due to excess calories with serious comorbidity and body mass index (BMI) of 40.0 to 44.9 in adult    Pre-op evaluation    Arthritis of foot, left    S/P left ankle triple arthrodesis    Acute blood loss anemia, mild, asymptomatic     Acute postoperative pain    Wound infection    Primary osteoarthritis of right hip    Status post total replacement of right hip    Postoperative wound infection of right hip, s/p I and D, head and liner exchange.    Primary osteoarthritis of left knee    S/P total knee arthroplasty, left     Food poisoning    Joint pain    Rheumatoid arthritis of multiple sites with negative rheumatoid factor    Infection and inflammatory reaction due to internal right hip prosthesis, sequela    Pain management    High risk medication use    Family history of prostate cancer in father    Benign essential hypertension    Encounter for medication monitoring    Primary osteoarthritis of right knee    Degenerative arthritis of right knee    S/P knee replacement,right    Psoas tendinitis of right side    Greater trochanteric bursitis of right hip    History of right hip replacement    Pain in hip region after total hip replacement    Referred pain    History of bilateral knee replacement    Leg length discrepancy    BMI 40.0-44.9, adult    Waddling gait    Gait disturbance     Past Medical History:   Diagnosis Date    Anxiety and depression     Arthritis of neck ?    Arthrodesis present     lt foot triple    Bulging of lumbar intervertebral disc     Cataract     Lens implated in left eye.    Cervical disc disorder ?    Chronic pain disorder     Cornea transplant recipient     BILATERAL    COVID-19 11/2021    S/p MAB    CPAP (continuous positive airway pressure) dependence     CTS (carpal tunnel syndrome) Slight case; Dr. Chanda Castanon    DDD (degenerative disc disease), lumbar     09/09/201s/p injection Dr Abel Abbott    Diverticulosis     Elevated cholesterol     Glaucoma July 2022    Drops daily.    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    Neuropathy     bilatral feet    Obesity     CHRISTOPHE on CPAP     Osteopenia     Periarthritis of shoulder ?    Posterior tibial tendinitis, left     09/19/2019 pending-10/19 with   [facility]    Rheumatoid arthritis     Rotator cuff syndrome 09/15/2022    Dr. Chanda Castanon    Swelling of left ear     Venous stasis 09/09/2019    priti lower extremities    Visual impairment     Cornea grafts both eyes; Cataracts.    Wears contact lenses       Past Surgical History:   Procedure Laterality Date    ACHILLES TENDON SURGERY Left 10/15/2019    Procedure: ACHILLES TENDON LENGTHENING LEFT;  Surgeon: Elodia Guzman MD;  Location:  [facility] OR;  Service: Orthopedics    CATARACT EXTRACTION Right     COLONOSCOPY  2015    EPIDURAL BLOCK  5/6/2025    Left knee replacement    EYE SURGERY Bilateral     cornea transplant     FOOT SURGERY Left 10/15/2019    triple arthrodesis and achilles tendon lengthening- [place]    HERNIA REPAIR      abdominal    ILIAC CREST BONE GRAFT Left 10/15/2019    Procedure: ILIAC CREST BONE GRAFT LEFT;  Surgeon: Elodia Guzman MD;  Location: [facility] OR;  Service: Orthopedics    INCISION AND DRAINAGE LEG Right 12/04/2020    Procedure: INCISION AND DRAINAGE WITH COMPONENT EXCHANGE, HEAD AND LINER RIGHT HIP;  Surgeon: Osbaldo Olivas MD;  Location: [facility] OR;  Service: Orthopedics;  Laterality: Right;    JOINT REPLACEMENT      Total right hip and left knee; left ankke/achilles tendon rebuild;    ORTHOPEDIC SURGERY  8/2019    Triple arthrodecis of left ankle    TOE FUSION Left 10/15/2019    Procedure: TRIPLE ARTHODESIS LEFT;  Surgeon: Elodia Guzman MD;  Location: [facility] OR;  Service: Orthopedics    TONSILLECTOMY      TOTAL HIP ARTHROPLASTY Right 08/04/2020    Procedure: TOTAL HIP ARTHROPLASTY RIGHT;  Surgeon: Osbaldo Olivas MD;  Location: [facility] OR;  Service: Orthopedics;  Laterality: Right;    TOTAL KNEE ARTHROPLASTY Left 03/10/2023    Procedure: TOTAL KNEE ARTHROPLASTY WITH CORI ROBOT - LEFT;  Surgeon: Osbaldo Olivas MD;  Location: [facility] OR;  Service: Robotics - Ortho;  Laterality: Left;    TOTAL KNEE ARTHROPLASTY Right 05/06/2025    Procedure: TOTAL KNEE ARTHROPLASTY WITH CORI ROBOT  RIGHT;  Surgeon: Osbaldo Olivas MD;  Location: [facility];  Service: Robotics - Ortho;  Laterality: Right;    TRIGGER POINT INJECTION      Corizone shots to both knees; unsucessful    UMBILICAL HERNIA REPAIR      Repaired over 15 years ago.    VENOUS ABLATION Left 2022    Endovenous laser ablation of left great sapheuos vein for venous insufficiency      Family History   Problem Relation Age of Onset    No Known Problems Mother     Alcohol abuse Father     Prostate cancer Father     Cancer Father          - prostate/bladder cancer    Cancer Sister         Stomach cancer.    Cancer Brother         Kidney removed 23 due renal cell  carcenoma    Multiple sclerosis Son     Cancer Maternal Grandmother         Pancreatic/stomach cancer     Social History     Socioeconomic History    Marital status:     Number of children: 3   Tobacco Use    Smoking status: Never     Passive exposure: Never    Smokeless tobacco: Never   Vaping Use    Vaping status: Never Used   Substance and Sexual Activity    Alcohol use: Not Currently     Comment: One drink 2-3x per year.    Drug use: No    Sexual activity: Yes     Partners: Female     Birth control/protection: None      Current Outpatient Medications on File Prior to Visit   Medication Sig Dispense Refill    acetaminophen (TYLENOL) 500 MG tablet Take 2 tablets by mouth Every 8 (Eight) Hours. Change to every 8 hours  as needed after 1 week 60 tablet 0    ALPRAZolam (XANAX) 0.25 MG tablet Take 1/2 to 1 tablet prn when flying 10 tablet 0    Ascorbic Acid (VITAMIN C PO) Take 1 tablet by mouth Daily.      aspirin (ASPIR) 81 MG EC tablet Take 1 tablet by mouth 2 (Two) Times a Day.      atenolol (TENORMIN) 50 MG tablet TAKE 1 TABLET BY MOUTH DAILY. 90 tablet 1    atorvastatin (LIPITOR) 10 MG tablet TAKE 1 TABLET BY MOUTH DAILY. RESUME WHEN NOT ON DAPTO ANYMORE 90 tablet 1    buPROPion XL (WELLBUTRIN XL) 150 MG 24 hr tablet TAKE 1 TABLET BY MOUTH DAILY. 90 tablet 1     Cyanocobalamin (VITAMIN B-12 ER PO) Take 1 tablet by mouth Daily.      cyclobenzaprine (FLEXERIL) 10 MG tablet TAKE 1 TABLET BY MOUTH 3 TIMES A DAY AS NEEDED FOR MUSCLE SPASMS. 90 tablet 1    doxycycline (VIBRAMYCIN) 100 MG capsule Take 1 capsule by mouth 2 (Two) Times a Day.      gabapentin (NEURONTIN) 300 MG capsule 1 PO AM and 2 PO HS 90 capsule 3    hydroxychloroquine (PLAQUENIL) 200 MG tablet Take 1 tablet by mouth 2 (Two) Times a Day. 180 tablet 1    latanoprost (XALATAN) 0.005 % ophthalmic solution Administer 1 drop to both eyes Every Night.      lisinopril-hydrochlorothiazide (PRINZIDE,ZESTORETIC) 20-12.5 MG per tablet TAKE 1 TABLET BY MOUTH DAILY. 90 tablet 0    meclizine (ANTIVERT) 25 MG tablet Take 1 tablet by mouth Every 8 (Eight) Hours As Needed for Dizziness or Nausea.      multivitamin (THERAGRAN) tablet tablet Take 1 tablet by mouth Daily.      naloxone (NARCAN) 4 MG/0.1ML nasal spray Call 911. Don't prime. [place] in 1 nostril for overdose. Repeat in 2-3 minutes in other nostril if no or minimal breathing/responsiveness. 2 each 0    nystatin (MYCOSTATIN) 211676 UNIT/GM cream Apply 1 Application topically to the appropriate area as directed Daily As Needed (skiin fold irritation).      prednisoLONE acetate (PRED FORTE) 1 % ophthalmic suspension Administer 1 drop to both eyes Daily.      promethazine (PHENERGAN) 25 MG tablet Take 1 tablet by mouth Every 6 (Six) Hours As Needed for Nausea or Vomiting. 20 tablet 1    SUMAtriptan (IMITREX) 100 MG tablet Take 1 tablet by mouth Every 2 (Two) Hours As Needed for Migraine. Take one tablet at onset of headache. May repeat dose one time in 2 hours if headache not relieved. 27 tablet 3    traMADol (ULTRAM) 50 MG tablet Take 2 tablets by mouth 3 times daily as needed for pain 180 tablet 2    triamcinolone (KENALOG) 0.025 % cream Apply 1 Application topically to the appropriate area as directed 2 (Two) Times a Day As Needed for Rash.      vitamin D (ERGOCALCIFEROL)  1.25 MG (85549 UT) capsule capsule Take 1 capsule by mouth 1 (One) Time Per Week. 12 capsule 0     No current facility-administered medications on file prior to visit.      No Known Allergies     Review of Systems   Constitutional:  Negative for activity change, appetite change, chills, diaphoresis, fatigue, fever and unexpected weight change.   HENT:  Negative for congestion, dental problem, drooling, ear discharge, ear pain, facial swelling, hearing loss, mouth sores, nosebleeds, postnasal drip, rhinorrhea, sinus pressure, sneezing, sore throat, tinnitus, trouble swallowing and voice change.    Eyes:  Negative for photophobia, pain, discharge, redness, itching and visual disturbance.   Respiratory:  Negative for apnea, cough, choking, chest tightness, shortness of breath, wheezing and stridor.    Cardiovascular:  Negative for chest pain, palpitations and leg swelling.   Gastrointestinal:  Negative for abdominal distention, abdominal pain, anal bleeding, blood in stool, constipation, diarrhea, nausea, rectal pain and vomiting.   Endocrine: Negative for cold intolerance, heat intolerance, polydipsia, polyphagia and polyuria.   Genitourinary:  Negative for decreased urine volume, difficulty urinating, dysuria, enuresis, flank pain, frequency, genital sores, hematuria and urgency.   Musculoskeletal:  Positive for arthralgias. Negative for back pain, gait problem, joint swelling, myalgias, neck pain and neck stiffness.   Skin:  Negative for color change, pallor, rash and wound.   Allergic/Immunologic: Negative for environmental allergies, food allergies and immunocompromised state.   Neurological:  Negative for dizziness, tremors, seizures, syncope, facial asymmetry, speech difficulty, weakness, light-headedness, numbness and headaches.   Hematological:  Negative for adenopathy. Does not bruise/bleed easily.   Psychiatric/Behavioral:  Negative for agitation, behavioral problems, confusion, decreased concentration,  dysphoric mood, hallucinations, self-injury, sleep disturbance and suicidal ideas. The patient is not nervous/anxious and is not hyperactive.         Objective      Physical Exam  There were no vitals taken for this visit.    There is no height or weight on file to calculate BMI.         General:   Mental Status:  Alert   Appearance: Cooperative, in no acute distress   Build and Nutrition: Obese by BMI male   Orientation: Alert and oriented to person, place and time   Posture: Normal   Gait: With cane    Integument:   Right knee: Wound is well-healed with no signs of infection    Lower Extremities:   Right Knee:    Tenderness:  None    Effusion:  None    Swelling: None    Crepitus:  None    Range of motion:  Extension: 0°       Flexion: 125°  Instability:  No varus laxity, no valgus laxity, negative anterior drawer  Deformities:  None      Imaging/Studies  Imaging Results (Last 24 Hours)       ** No results found for the last 24 hours. **          No new imaging today.    Assessment and Plan     Diagnoses and all orders for this visit:    1. S/P total knee arthroplasty, right (Primary)    2. Orthopedic aftercare        1. S/P total knee arthroplasty, right    2. Orthopedic aftercare          I reviewed my findings with the patient.  His right total knee arthroplasty is functioning well.  I will see him back in May 2026 for what will be a 1 year check with x-rays.  I will see him back sooner for any problems.    Return in about 10 months (around 5/14/2026).      Osbaldo Olivas MD  07/14/25  15:16 EDT    Dictated Utilizing Dragon Dictation

## 2025-07-18 ENCOUNTER — RESULTS FOLLOW-UP (OUTPATIENT)
Dept: PAIN MEDICINE | Facility: CLINIC | Age: 75
End: 2025-07-18
Payer: MEDICARE

## 2025-07-18 ENCOUNTER — HOSPITAL ENCOUNTER (OUTPATIENT)
Dept: MRI IMAGING | Facility: HOSPITAL | Age: 75
Discharge: HOME | End: 2025-07-18
Payer: MEDICARE

## 2025-07-18 DIAGNOSIS — R26.9 GAIT DISTURBANCE: ICD-10-CM

## 2025-07-18 DIAGNOSIS — R52 REFERRED PAIN: ICD-10-CM

## 2025-07-18 PROCEDURE — 72148 MRI LUMBAR SPINE W/O DYE: CPT

## 2025-07-25 ENCOUNTER — HOSPITAL ENCOUNTER (OUTPATIENT)
Dept: GENERAL RADIOLOGY | Facility: HOSPITAL | Age: 75
Discharge: HOME | End: 2025-07-25
Payer: MEDICARE

## 2025-07-25 ENCOUNTER — HOSPITAL ENCOUNTER (OUTPATIENT)
Dept: CT IMAGING | Facility: HOSPITAL | Age: 75
Discharge: HOME | End: 2025-07-25
Payer: MEDICARE

## 2025-07-25 DIAGNOSIS — R26.9 GAIT DISTURBANCE: ICD-10-CM

## 2025-07-25 DIAGNOSIS — Z96.653 HISTORY OF BILATERAL KNEE REPLACEMENT: ICD-10-CM

## 2025-07-25 DIAGNOSIS — Z96.641 HISTORY OF RIGHT HIP REPLACEMENT: ICD-10-CM

## 2025-07-25 DIAGNOSIS — M21.70 LEG LENGTH DISCREPANCY: ICD-10-CM

## 2025-07-25 DIAGNOSIS — R52 REFERRED PAIN: ICD-10-CM

## 2025-07-25 PROCEDURE — 77073 BONE LENGTH STUDIES: CPT

## 2025-07-25 PROCEDURE — 72114 X-RAY EXAM L-S SPINE BENDING: CPT

## 2025-08-04 DIAGNOSIS — G44.229 CHRONIC TENSION-TYPE HEADACHE, NOT INTRACTABLE: ICD-10-CM

## 2025-08-04 RX ORDER — SUMATRIPTAN SUCCINATE 100 MG/1
100 TABLET ORAL
Qty: 9 TABLET | Refills: 3 | Status: SHIPPED | OUTPATIENT
Start: 2025-08-04

## 2025-08-05 ENCOUNTER — OFFICE VISIT (OUTPATIENT)
Age: 75
End: 2025-08-05
Payer: MEDICARE

## 2025-08-05 VITALS
TEMPERATURE: 97 F | WEIGHT: 295.2 LBS | BODY MASS INDEX: 41.33 KG/M2 | DIASTOLIC BLOOD PRESSURE: 72 MMHG | HEIGHT: 71 IN | SYSTOLIC BLOOD PRESSURE: 108 MMHG | HEART RATE: 69 BPM

## 2025-08-05 DIAGNOSIS — M21.70 LEG LENGTH DISCREPANCY: Primary | ICD-10-CM

## 2025-08-05 DIAGNOSIS — R53.83 OTHER FATIGUE: ICD-10-CM

## 2025-08-05 DIAGNOSIS — M06.09 RHEUMATOID ARTHRITIS OF MULTIPLE SITES WITH NEGATIVE RHEUMATOID FACTOR: Primary | Chronic | ICD-10-CM

## 2025-08-05 DIAGNOSIS — R52 PAIN MANAGEMENT: ICD-10-CM

## 2025-08-05 DIAGNOSIS — Z79.899 HIGH RISK MEDICATION USE: Chronic | ICD-10-CM

## 2025-08-05 DIAGNOSIS — R26.9 GAIT DISTURBANCE: ICD-10-CM

## 2025-08-05 DIAGNOSIS — R26.89 WADDLING GAIT: ICD-10-CM

## 2025-08-05 DIAGNOSIS — Z51.81 ENCOUNTER FOR MEDICATION MONITORING: ICD-10-CM

## 2025-08-05 DIAGNOSIS — T84.51XS INFECTION AND INFLAMMATORY REACTION DUE TO INTERNAL RIGHT HIP PROSTHESIS, SEQUELA: ICD-10-CM

## 2025-08-05 RX ORDER — HYDROXYCHLOROQUINE SULFATE 200 MG/1
200 TABLET, FILM COATED ORAL 2 TIMES DAILY
Qty: 180 TABLET | Refills: 1 | Status: SHIPPED | OUTPATIENT
Start: 2025-08-05

## 2025-08-05 RX ORDER — TRAMADOL HYDROCHLORIDE 50 MG/1
TABLET ORAL
Qty: 180 TABLET | Refills: 2 | Status: CANCELLED | OUTPATIENT
Start: 2025-08-05

## 2025-08-08 LAB
1OH-MIDAZOLAM UR QL SCN: NOT DETECTED NG/MG CREAT
6MAM UR QL SCN: NEGATIVE NG/ML
7AMINOCLONAZEPAM/CREAT UR: NOT DETECTED NG/MG CREAT
A-OH ALPRAZ/CREAT UR: NOT DETECTED NG/MG CREAT
A-OH-TRIAZOLAM/CREAT UR CFM: NOT DETECTED NG/MG CREAT
ACP UR QL CFM: NOT DETECTED
ALBUMIN SERPL-MCNC: 4.3 G/DL (ref 3.5–5.2)
ALBUMIN/GLOB SERPL: 1.7 G/DL
ALP SERPL-CCNC: 74 U/L (ref 39–117)
ALPRAZ/CREAT UR CFM: NOT DETECTED NG/MG CREAT
ALT SERPL-CCNC: 14 U/L (ref 1–41)
AMPHET UR CFM-MCNC: NOT DETECTED NG/MG CREAT
AMPHETAMINES UR QL SCN: NORMAL NG/ML
AMPHETAMINES UR QL: NEGATIVE
APAP UR QL SCN: NEGATIVE UG/ML
AST SERPL-CCNC: 18 U/L (ref 1–40)
BARBITURATES UR QL SCN: NEGATIVE NG/ML
BENZODIAZ SCN METH UR: NEGATIVE
BILIRUB SERPL-MCNC: 0.7 MG/DL (ref 0–1.2)
BUN SERPL-MCNC: 15 MG/DL (ref 8–23)
BUN/CREAT SERPL: 16.9 (ref 7–25)
BUPRENORPHINE UR QL SCN: NEGATIVE
BUPRENORPHINE/CREAT UR: NOT DETECTED NG/MG CREAT
CALCIUM SERPL-MCNC: 9.6 MG/DL (ref 8.6–10.5)
CARISOPRODOL UR QL: NEGATIVE NG/ML
CHLORIDE SERPL-SCNC: 103 MMOL/L (ref 98–107)
CLONAZEPAM/CREAT UR CFM: NOT DETECTED NG/MG CREAT
CO2 SERPL-SCNC: 28.4 MMOL/L (ref 22–29)
COCAINE+BZE UR QL SCN: NEGATIVE NG/ML
CREAT SERPL-MCNC: 0.89 MG/DL (ref 0.76–1.27)
CREAT UR-MCNC: 135 MG/DL
CRP SERPL-MCNC: <0.3 MG/DL (ref 0–0.5)
D-METHORPHAN UR-MCNC: NOT DETECTED NG/ML
D-METHORPHAN+LEVORPHANOL UR QL: NOT DETECTED
DESALKYLFLURAZ/CREAT UR: NOT DETECTED NG/MG CREAT
DIAZEPAM/CREAT UR: NOT DETECTED NG/MG CREAT
EGFRCR SERPLBLD CKD-EPI 2021: 90.5 ML/MIN/1.73
ERYTHROCYTE [DISTWIDTH] IN BLOOD BY AUTOMATED COUNT: 12.2 % (ref 12.3–15.4)
ERYTHROCYTE [SEDIMENTATION RATE] IN BLOOD BY WESTERGREN METHOD: 10 MM/HR (ref 0–20)
ETHANOL UR QL SCN: NEGATIVE G/DL
ETHANOL UR QL SCN: NEGATIVE NG/ML
FENTANYL CTO UR SCN-MCNC: NEGATIVE NG/ML
FENTANYL/CREAT UR: NOT DETECTED NG/MG CREAT
FLUNITRAZEPAM UR QL SCN: NOT DETECTED NG/MG CREAT
GABAPENTIN UR-MCNC: NEGATIVE UG/ML
GLOBULIN SER CALC-MCNC: 2.6 GM/DL
GLUCOSE SERPL-MCNC: 89 MG/DL (ref 65–99)
HALLUCINOGENS UR: NEGATIVE
HAV IGM SERPL QL IA: NEGATIVE
HBV CORE IGM SERPL QL IA: NEGATIVE
HBV SURFACE AG SERPL QL IA: NEGATIVE
HCT VFR BLD AUTO: 46.1 % (ref 37.5–51)
HCV AB SERPL QL IA: NON REACTIVE
HCV AB SERPL QL IA: NORMAL
HGB BLD-MCNC: 15.1 G/DL (ref 13–17.7)
HYPNOTICS UR QL SCN: NEGATIVE
KETAMINE UR QL: NOT DETECTED
LORAZEPAM/CREAT UR: NOT DETECTED NG/MG CREAT
MCH RBC QN AUTO: 29.9 PG (ref 26.6–33)
MCHC RBC AUTO-ENTMCNC: 32.8 G/DL (ref 31.5–35.7)
MCV RBC AUTO: 91.3 FL (ref 79–97)
MDA UR CFM-MCNC: NOT DETECTED NG/MG CREAT
MDMA UR CFM-MCNC: NOT DETECTED NG/MG CREAT
MEPERIDINE UR QL SCN: NEGATIVE NG/ML
METHADONE UR QL SCN: NEGATIVE NG/ML
METHADONE+METAB UR QL SCN: NEGATIVE NG/ML
METHAMPHET UR CFM-MCNC: NOT DETECTED NG/MG CREAT
MIDAZOLAM/CREAT UR CFM: NOT DETECTED NG/MG CREAT
MISCELLANEOUS, UR: NEGATIVE
N-NORTRAMADOL/CREAT UR CFM: >3704 NG/MG CREAT
NORBUPRENORPHINE/CREAT UR: NOT DETECTED NG/MG CREAT
NORDIAZEPAM/CREAT UR: NOT DETECTED NG/MG CREAT
NORFENTANYL/CREAT UR: NOT DETECTED NG/MG CREAT
NORFLUNITRAZEPAM UR-MCNC: NOT DETECTED NG/MG CREAT
NORKETAMINE UR-MCNC: NOT DETECTED UG/ML
O-NORTRAMADOL UR CFM-MCNC: >3704 NG/MG CREAT
OPIATES UR SCN-MCNC: NEGATIVE NG/ML
OXAZEPAM/CREAT UR: NOT DETECTED NG/MG CREAT
OXYCODONE CTO UR SCN-MCNC: NEGATIVE NG/ML
PCP UR QL SCN: NEGATIVE NG/ML
PLATELET # BLD AUTO: 265 10*3/MM3 (ref 140–450)
POTASSIUM SERPL-SCNC: 4.1 MMOL/L (ref 3.5–5.2)
PRESCRIBED MEDICATIONS: NORMAL
PROPOXYPH UR QL SCN: NEGATIVE NG/ML
PROT SERPL-MCNC: 6.9 G/DL (ref 6–8.5)
RBC # BLD AUTO: 5.05 10*6/MM3 (ref 4.14–5.8)
SODIUM SERPL-SCNC: 143 MMOL/L (ref 136–145)
TAPENTADOL CTO UR SCN-MCNC: NEGATIVE NG/ML
TEMAZEPAM/CREAT UR: NOT DETECTED NG/MG CREAT
TRAMADOL UR CFM-MCNC: >3704 NG/MG CREAT
TRAMADOL UR QL CFM: NORMAL
TRAMADOL UR QL SCN: NORMAL NG/ML
WBC # BLD AUTO: 6.02 10*3/MM3 (ref 3.4–10.8)
ZALEPLON UR-MCNC: NOT DETECTED NG/ML
ZOLPIDEM PHENYL-4-CARB UR QL SCN: NOT DETECTED
ZOLPIDEM UR QL SCN: NOT DETECTED
ZOPICLONE-N-OXIDE UR-MCNC: NOT DETECTED NG/ML

## 2025-08-20 ENCOUNTER — DOCUMENTATION (OUTPATIENT)
Dept: PAIN MEDICINE | Facility: CLINIC | Age: 75
End: 2025-08-20

## 2025-08-20 ENCOUNTER — OUTSIDE FACILITY SERVICE (OUTPATIENT)
Dept: PAIN MEDICINE | Facility: CLINIC | Age: 75
End: 2025-08-20
Payer: MEDICARE

## 2025-08-20 DIAGNOSIS — M76.11 PSOAS TENDINITIS OF RIGHT SIDE: Primary | ICD-10-CM

## 2025-08-20 PROCEDURE — 20550 NJX 1 TENDON SHEATH/LIGAMENT: CPT | Performed by: ANESTHESIOLOGY

## 2025-08-22 ENCOUNTER — TELEPHONE (OUTPATIENT)
Dept: PAIN MEDICINE | Facility: CLINIC | Age: 75
End: 2025-08-22
Payer: MEDICARE

## 2025-08-27 ENCOUNTER — DOCUMENTATION (OUTPATIENT)
Dept: PAIN MEDICINE | Facility: CLINIC | Age: 75
End: 2025-08-27

## 2025-08-29 DIAGNOSIS — Z79.899 HIGH RISK MEDICATION USE: ICD-10-CM

## 2025-08-29 DIAGNOSIS — R52 PAIN MANAGEMENT: ICD-10-CM

## 2025-08-29 RX ORDER — TRAMADOL HYDROCHLORIDE 50 MG/1
TABLET ORAL
Qty: 180 TABLET | Refills: 2 | Status: SHIPPED | OUTPATIENT
Start: 2025-08-29

## (undated) DEVICE — PRECISION THIN, OFFSET (5.5 X 0.38 X 25.0MM)

## (undated) DEVICE — SYS SKIN CLS DERMABOND PRINEO W/22CM MESH TP

## (undated) DEVICE — DISPOSABLE TOURNIQUET CUFF SINGLE BLADDER, DUAL PORT AND QUICK CONNECT CONNECTOR: Brand: COLOR CUFF

## (undated) DEVICE — SUT MNCRYL 0 CT1 36IN Y946H

## (undated) DEVICE — KT PUMP INFUBLOCK MDL 2100 PMKITSOLIS

## (undated) DEVICE — SUT MNCRYL 2/0 CT1 27IN Y339H

## (undated) DEVICE — 3M™ WARMING BLANKET, UPPER BODY, 10 PER CASE, 42268: Brand: BAIR HUGGER™

## (undated) DEVICE — CVR HNDL LT SURG ACCSSRY BLU STRL

## (undated) DEVICE — SUT MNCRYL 2/0 SH 27IN UD MCP417H

## (undated) DEVICE — PROXIMATE RH ROTATING HEAD SKIN STAPLERS (35 WIDE) CONTAINS 35 STAINLESS STEEL STAPLES: Brand: PROXIMATE

## (undated) DEVICE — GLV SURG SENSICARE W/ALOE PF LF 9 STRL

## (undated) DEVICE — PROXIMATE PLUS MD MULTI-DIRECTIONAL RELEASE SKIN STAPLERS CONTAINS 35 STAINLESS STEEL STAPLES APPROXIMATE CLOSED DIMENSIONS: 6.9MM X 3.9MM WIDE: Brand: PROXIMATE

## (undated) DEVICE — DRAPE,TOP,102X53,STERILE: Brand: MEDLINE

## (undated) DEVICE — DRSNG TELFA PAD NONADH STR 1S 3X8IN

## (undated) DEVICE — DRP CASSET 10 RAY LG 24X40

## (undated) DEVICE — SUT MONOCRYL PLS ANTIB UND 3/0  PS1 27IN

## (undated) DEVICE — Device

## (undated) DEVICE — ANTIBACTERIAL UNDYED BRAIDED (POLYGLACTIN 910), SYNTHETIC ABSORBABLE SUTURE: Brand: COATED VICRYL

## (undated) DEVICE — SHEET,T,THYROID,STERILE: Brand: MEDLINE

## (undated) DEVICE — BNDG ELAS W/CLIP 6IN 10YD LF STRL

## (undated) DEVICE — SYR LUERLOK 20CC BX/50

## (undated) DEVICE — GAUZE,SPONGE,4"X4",16PLY,XRAY,STRL,LF: Brand: MEDLINE

## (undated) DEVICE — SKIN AFFIX SURG ADHESIVE 72/CS 0.55ML: Brand: MEDLINE

## (undated) DEVICE — PAD CAST SOF ROL NS 4IN

## (undated) DEVICE — 3M™ STERI-DRAPE™ INSTRUMENT POUCH 1018: Brand: STERI-DRAPE™

## (undated) DEVICE — DRSNG PAD ABD 8X10IN STRL

## (undated) DEVICE — PATIENT RETURN ELECTRODE, SINGLE-USE, CONTACT QUALITY MONITORING, ADULT, WITH 9FT CORD, FOR PATIENTS WEIGING OVER 33LBS. (15KG): Brand: MEGADYNE

## (undated) DEVICE — IMPLANTABLE DEVICE
Type: IMPLANTABLE DEVICE | Site: ANKLE | Status: NON-FUNCTIONAL
Removed: 2019-10-15

## (undated) DEVICE — COVER,MAYO STAND,XL,STERILE: Brand: MEDLINE

## (undated) DEVICE — APPL CHLORAPREP TINTED 26ML TEAL

## (undated) DEVICE — TRY EPID SFTY 18G 3.5IN 1T7680

## (undated) DEVICE — SPNG GZ WOVN 4X4IN 12PLY 10/BX STRL

## (undated) DEVICE — BLANKT WARM UPPR/BDY ARM/OUT 57X196CM

## (undated) DEVICE — UNDERGLV SURG BIOGEL INDICAT PF 61/2 GRN

## (undated) DEVICE — NDL HYPO ECLPS SFTY 18G 1 1/2IN

## (undated) DEVICE — GLV SURG SENSICARE PI MIC PF SZ9 LF STRL

## (undated) DEVICE — GLV SURG PREMIERPRO MIC LTX PF SZ8.5 BRN

## (undated) DEVICE — CONTAINER,SPECIMEN,OR STERILE,4OZ: Brand: MEDLINE

## (undated) DEVICE — STRYKER PERFORMANCE SERIES SAGITTAL BLADE: Brand: STRYKER PERFORMANCE SERIES

## (undated) DEVICE — SYS CLS SKIN PREMIERPRO EXOFINFUSION 22CM

## (undated) DEVICE — UNDERCAST PADDING: Brand: DEROYAL

## (undated) DEVICE — PICO 7 10CM X 30CM: Brand: PICO™ 7

## (undated) DEVICE — GOWN,REINF,POLY,ECL,PP SLV,3XL,XLONG: Brand: MEDLINE

## (undated) DEVICE — 3M™ IOBAN™ 2 ANTIMICROBIAL INCISE DRAPE 6650EZ: Brand: IOBAN™ 2

## (undated) DEVICE — GLV SURG SIGNATURE TOUCH PF LTX 7 STRL

## (undated) DEVICE — CEMENT MIXING SYSTEM WITH MIS FEMORAL BREAKAWAY NOZZLE: Brand: REVOLUTION

## (undated) DEVICE — TRAP FLD MINIVAC MEGADYNE 100ML

## (undated) DEVICE — INTENDED FOR TISSUE SEPARATION, AND OTHER PROCEDURES THAT REQUIRE A SHARP SURGICAL BLADE TO PUNCTURE OR CUT.: Brand: BARD-PARKER ® SAFETYLOCK CARBON RIB-BACK BLADES

## (undated) DEVICE — ADHS SKIN PREMIERPRO EXOFIN TOPICAL HI/VISC .5ML

## (undated) DEVICE — CVR FTSWITCH UNIV

## (undated) DEVICE — BIT DRL UNICP 2.7MM

## (undated) DEVICE — DISH PETRI 3.5IN MD STRL LF

## (undated) DEVICE — PK EXTREM LOWR 10

## (undated) DEVICE — PK HIP TOTL UNIV 10

## (undated) DEVICE — PAD ARMBRD SURG CONVOL 7.5X20X2IN

## (undated) DEVICE — TOWEL,OR,DSP,ST,BLUE,STD,4/PK,20PK/CS: Brand: MEDLINE

## (undated) DEVICE — SYS SKIN EXOFIN WND CLS 4X22CM

## (undated) DEVICE — DELFI MATCHING LIMB PROTECTION SLEEVES (MLPS) HELP PROTECT THE PATIENT’S LIMB FROM POSSIBLE WRINKLING, PINCHING AND SHEARING OF SKIN AND SOFT TISSUES OF THE LIMB.EACH DELFI MATCHING LIMB PROTECTION SLEEVE IS INTENDED FOR USE WITH A SPECIFIC DELFI TOURNIQUET CUFF. THIS SLEEVE IS SPECIFICALLY FOR THIGH.: Brand: MATCHING LIMB PROTECTION SLEEVES - VARI-FIT

## (undated) DEVICE — 1010 S-DRAPE TOWEL DRAPE 10/BX: Brand: STERI-DRAPE™

## (undated) DEVICE — PUMP PAIN AUTOFUSER AUTO SELCT NOBOLUS 1TO14ML/HR 550ML DISP

## (undated) DEVICE — SHEET,DRAPE,40X58,STERILE: Brand: MEDLINE

## (undated) DEVICE — PULLOVER TOGA, 2X LARGE: Brand: FLYTE, SURGICOOL

## (undated) DEVICE — 2963 MEDIPORE SOFT CLOTH TAPE 3 IN X 10 YD 12 RLS/CS: Brand: 3M™ MEDIPORE™

## (undated) DEVICE — 4.0MM EGG RESURFACING TOOL

## (undated) DEVICE — SPINAL TRAY/P: Brand: MEDLINE INDUSTRIES, INC.

## (undated) DEVICE — MICRO HVTSA, 0.5G AND HVTSA SOURCEMARK PRODUCT CODE M1206 AND M1206-01: Brand: EXOFIN MICRO HVTSA, 0.5G

## (undated) DEVICE — CVR HNDL LIGHT RIGID

## (undated) DEVICE — Device: Brand: THROMBI-GEL® THROMBIN/GELATIN FOAM HEMOSTAT

## (undated) DEVICE — GW THRD TROC/PT 2.8X300MM

## (undated) DEVICE — SPNG LAP PREWSH SFTPK 18X18IN STRL PK/5

## (undated) DEVICE — DRSNG SURG AQUACEL AG 9X15CM

## (undated) DEVICE — TBG PENCL TELESCP MEGADYNE SMOKE EVAC 10FT

## (undated) DEVICE — SPLNT ORTHOGLASS UNPAD P/C 4X24IN

## (undated) DEVICE — SNAP KOVER: Brand: UNBRANDED

## (undated) DEVICE — NEEDLE,HYPODERM,SAFETY,18GX1.5: Brand: MEDLINE

## (undated) DEVICE — SPLNT ORTHOGLASS UNPAD P/C 4X38IN

## (undated) DEVICE — A SINGLE USE BEAD MOLD FOR MOLDING BEADS OUT OF CERAMIC BONE GRAFT SUBSTITUTES. THE BEAD MOLD CONSISTS OF CAVITIES IN THREE DIFFERENT SIZES. THE CAVITIES ARE FILLED WITH CERAMIC BONE GRAFT SUBSTITUTE AND EVENLY DISTRIBUTED ACROSS THE MOLD USING THE SPATULA. THE CERAMIC BONE GRAFT SUBSTITUTE SETS IN THE CAVITIES AND THE CERAMIC BONE GRAFT SUBSTITUTE BEADS ARE THEN REMOVED FROM THE MOLD.: Brand: CERAMENT™ BEAD TRAY

## (undated) DEVICE — SUT MNCRYL 3/0 SH 27IN UD MCP416H

## (undated) DEVICE — DRAPE,REIN 53X77,STERILE: Brand: MEDLINE

## (undated) DEVICE — 3M™ MEDIPORE™ H SOFT CLOTH SURGICAL TAPE, 2863, 3 IN X 10 YD, 12/CASE: Brand: 3M™ MEDIPORE™

## (undated) DEVICE — 4.0MM ROUND SOLID CARBIDE BUR LONG

## (undated) DEVICE — COVER,MAYO STAND,STERILE: Brand: MEDLINE

## (undated) DEVICE — SUT MNCRYL PLS ANTIB UD 4/0 PS2 18IN

## (undated) DEVICE — PK KN TOTL 10

## (undated) DEVICE — SYR LL TP 10ML STRL

## (undated) DEVICE — SUT ETHLN 4/0 PS2 18IN 1667H

## (undated) DEVICE — NDL HYPO ECLPS SFTY 25G 1 1/2IN

## (undated) DEVICE — HANDPIECE SET WITH HIGH FLOW TIP AND SUCTION TUBE: Brand: INTERPULSE

## (undated) DEVICE — GLV SURG RAD SENSICARE SHLD PF LF SZ8 STRL

## (undated) DEVICE — PENCL SMOKE/EVAC MEGADYNE TELESCP 10FT